# Patient Record
Sex: FEMALE | Race: WHITE
[De-identification: names, ages, dates, MRNs, and addresses within clinical notes are randomized per-mention and may not be internally consistent; named-entity substitution may affect disease eponyms.]

---

## 2019-11-01 ENCOUNTER — HOSPITAL ENCOUNTER (OUTPATIENT)
Dept: HOSPITAL 46 - ED | Age: 19
Setting detail: OBSERVATION
LOS: 1 days | Discharge: HOME | End: 2019-11-02
Attending: INTERNAL MEDICINE | Admitting: INTERNAL MEDICINE
Payer: COMMERCIAL

## 2019-11-01 VITALS — HEIGHT: 66 IN | WEIGHT: 222.01 LBS | BODY MASS INDEX: 35.68 KG/M2

## 2019-11-01 DIAGNOSIS — A08.4: ICD-10-CM

## 2019-11-01 DIAGNOSIS — I10: ICD-10-CM

## 2019-11-01 DIAGNOSIS — E86.0: ICD-10-CM

## 2019-11-01 DIAGNOSIS — Z88.2: ICD-10-CM

## 2019-11-01 DIAGNOSIS — Z94.4: ICD-10-CM

## 2019-11-01 DIAGNOSIS — N17.9: Primary | ICD-10-CM

## 2019-11-01 DIAGNOSIS — Z79.899: ICD-10-CM

## 2019-11-01 PROCEDURE — G0378 HOSPITAL OBSERVATION PER HR: HCPCS

## 2019-11-01 NOTE — XMS
Clinical Summary
  Created on: 2019
 
 BonifacioNila
 External Reference #: [id number]
 : [birth year]
 Sex: Female
 
 Demographics
 
 
+-----------------------+------------------------+
| Address               | [address]            |
|                       | [place]  [address]   |
+-----------------------+------------------------+
| Home Phone            | [phone]        |
+-----------------------+------------------------+
| Preferred Language    | Unknown                |
+-----------------------+------------------------+
| Marital Status        | Single                 |
+-----------------------+------------------------+
| Hindu Affiliation | Unknown                |
+-----------------------+------------------------+
| Race                  | White                  |
+-----------------------+------------------------+
| Ethnic Group          | Not  or  |
+-----------------------+------------------------+
 
 
 Author
 
 
+--------------+---------------------+
| Author       | [facility] |
+--------------+---------------------+
| Organization | [facility] |
+--------------+---------------------+
| Address      | Unknown             |
+--------------+---------------------+
| Phone        | Unavailable         |
+--------------+---------------------+
 
 
 
 Support
 
 
+-----------------+--------------+---------+-----------------+
| Name            | Relationship | Address | Phone           |
+-----------------+--------------+---------+-----------------+
| Kit Mathew   | ECON         | Unknown | [phone] |
+-----------------+--------------+---------+-----------------+
| Magdalena Mathew | ECON         | Unknown | [phone] |
+-----------------+--------------+---------+-----------------+
 
 
 
 Care Team Providers
 
 
 
+------------------------+------+-----------------+
| Care Team Member Name  | Role | Phone           |
+------------------------+------+-----------------+
| Lily Horton MD | PCP  | [phone] |
+------------------------+------+-----------------+
 
 
 
 Source Comments
 [facility]LAKO is fully live on both EpicCare Ambulatory and EpicCare InPatient.[facility]
 
 Allergies
 
 
+---------------------+-----------------+----------+----------+----------------------+
| Active Allergy      | Reactions       | Severity | Noted    | Comments             |
|                     |                 |          | Date     |                      |
+---------------------+-----------------+----------+----------+----------------------+
| Sulfa (Sulfonamide  | Hives,          | High     | 20 |                      |
| Antibiotics)        | Swelling-Facial |          | 09       |                      |
+---------------------+-----------------+----------+----------+----------------------+
| Varicella Vaccines  |                 | Low      | 20 |   Possible reaction  |
|                     |                 |          | 09       | with sulfa meds      |
+---------------------+-----------------+----------+----------+----------------------+
 
 
 
 Medications
 
 
+--------------------+---------------------+-----------+---------+------+------+-------+
| Medication         | Sig                 | Dispensed | Refills | Star | End  | Statu |
|                    |                     |           |         | t    | Date | s     |
|                    |                     |           |         | Date |      |       |
+--------------------+---------------------+-----------+---------+------+------+-------+
|   amLODIPine 5 mg  | Take 5 mg by mouth  |           | 0       |      |      | Activ |
| oral tablet        | once daily.         |           |         |      |      | e     |
+--------------------+---------------------+-----------+---------+------+------+-------+
 
 
 
 Active Problems
 
 
+-------------------------------------------------+------------+
| Problem                                         | Noted Date |
+-------------------------------------------------+------------+
| Polysplenia                                     | 2014 |
+-------------------------------------------------+------------+
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
+-------------------------------------------------+------------+
| Aortic insufficiency                            | 2014 |
+-------------------------------------------------+------------+
| Aortic root dilatation                          | 2014 |
+-------------------------------------------------+------------+
| VSD (ventricular septal defect), perimembranous | 2008 |
+-------------------------------------------------+------------+
 
 
 
 
+-----------------------------------------------------------------+
|   Overview:   10/16/2014  S/p 8-mm Amplatzer Vascular Plug IV,  |
| with no significant residual shuntingLeft atrial isomerism LAE  |
| (left atrial enlargement) LVE (left ventricular enlargement)    |
|LVE (left ventricular enlargement)                               |
+-----------------------------------------------------------------+
 
 
 
+--------------------+------------+
| Transplanted liver | 10/12/2006 |
+--------------------+------------+
 
 
 
+---------------------------------------------------------+
|   Overview:   For biliary atresia, 2000 at [facility] |
+---------------------------------------------------------+
 
 
 
 Resolved Problems
 
 
+----------------------------+----------+-----------+
| Problem                    | Noted    | Resolved  |
|                            | Date     | Date      |
+----------------------------+----------+-----------+
| Immunosuppressed status    | 20 |  |
|                            | 14       | 5         |
+----------------------------+----------+-----------+
| Aortic valve insufficiency | 20 |  |
|                            | 08       | 4         |
+----------------------------+----------+-----------+
 
 
 
 Social History
 
 
+-------------------+-------+-----------+--------+------+
| Tobacco Use       | Types | Packs/Day | Years  | Date |
|                   |       |           | Used   |      |
+-------------------+-------+-----------+--------+------+
| Passive Smoke     |       |           |        |      |
| Exposure - Never  |       |           |        |      |
| Smoker            |       |           |        |      |
+-------------------+-------+-----------+--------+------+
 
 
 
+---------------------+---+---+---+
| Smokeless Tobacco:  |   |   |   |
| Never Used          |   |   |   |
+---------------------+---+---+---+
 
 
 
 
+-------------+-------------+---------+----------+
| Alcohol Use | Drinks/Week | oz/Week | Comments |
+-------------+-------------+---------+----------+
| Not Asked   |             |         |          |
+-------------+-------------+---------+----------+
 
 
 
+------------------+---------------+
| Sex Assigned at  | Date Recorded |
| Birth            |               |
+------------------+---------------+
| Not on file      |               |
+------------------+---------------+
 
 
 
+----------------+-------------+-------------+
| Job Start Date | Occupation  | Industry    |
+----------------+-------------+-------------+
| Not on file    | Not on file | Not on file |
+----------------+-------------+-------------+
 
 
 
+----------------+--------------+------------+
| Travel History | Travel Start | Travel End |
+----------------+--------------+------------+
 
 
 
+-------------------------------------+
| No recent travel history available. |
+-------------------------------------+
 
 
 
 Last Filed Vital Signs
 
 
+-------------------+----------------------+----------------------+----------------------+
| Vital Sign        | Reading              | Time Taken           | Comments             |
+-------------------+----------------------+----------------------+----------------------+
| Blood Pressure    | 130/93               | 2016  9:24 AM  | right arm adult cuff |
|                   |                      | PDT                  |                      |
+-------------------+----------------------+----------------------+----------------------+
| Pulse             | 72                   | 2016  9:24 AM  |                      |
|                   |                      | PDT                  |                      |
+-------------------+----------------------+----------------------+----------------------+
| Temperature       | 36.7   C (98.1   F)  | 2016  8:59 AM  |                      |
|                   |                      | PDT                  |                      |
+-------------------+----------------------+----------------------+----------------------+
| Respiratory Rate  | -                    | -                    |                      |
+-------------------+----------------------+----------------------+----------------------+
| Oxygen Saturation | -                    | -                    |                      |
+-------------------+----------------------+----------------------+----------------------+
| Inhaled Oxygen    | -                    | -                    |                      |
| Concentration     |                      |                      |                      |
+-------------------+----------------------+----------------------+----------------------+
 
| Weight            | 81.5 kg (179 lb 10.8 | 2016  8:59 AM  |                      |
|                   |  oz)                 | PDT                  |                      |
+-------------------+----------------------+----------------------+----------------------+
| Height            | 164.4 cm (5' 4.72")  | 2016  8:59 AM  |                      |
|                   |                      | PDT                  |                      |
+-------------------+----------------------+----------------------+----------------------+
| Body Mass Index   | 30.16                | 2016  8:59 AM  |                      |
|                   |                      | PDT                  |                      |
+-------------------+----------------------+----------------------+----------------------+
 
 
 
 Plan of Treatment
 
 
+----------------------+-----------+-----------+----------+
| Health Maintenance   | Due Date  | Last Done | Comments |
+----------------------+-----------+-----------+----------+
| Pneumococcal         | 01/15/200 |           |          |
| vaccination (1 of 3  | 5         |           |          |
| - PCV13)             |           |           |          |
+----------------------+-----------+-----------+----------+
| Influenza (Flu)      | 10/01/201 |           |          |
| vaccination (#1)     | 9         |           |          |
+----------------------+-----------+-----------+----------+
 
 
 
 Results
 Not on filefrom Last 3 Months
 
 Insurance
 
 
+--------------+--------+-------------+--------+-------+---------+--------+
| Payer        | Benefi | Subscriber  | Effect | Phone | Address | Type   |
|              | t Plan | ID          | david    |       |         |        |
|              |  /     |             | Dates  |       |         |        |
|              | Group  |             |        |       |         |        |
+--------------+--------+-------------+--------+-------+---------+--------+
| [employer] MEDICA[employer] | [employer]    | [id number]    |  |       |         | Medica |
|              | [place] |             | 015-Pr |       |         | id     |
|              | [place]   |             | esent  |       |         |        |
+--------------+--------+-------------+--------+-------+---------+--------+
 
 
 
+----------------+--------+-------------+--------+-------------+---------------------+
| Guarantor Name | Accoun | Relation to | Date   | Phone       | Billing Address     |
|                | t Type |  Patient    | of     |             |                     |
|                |        |             | Birth  |             |                     |
+----------------+--------+-------------+--------+-------------+---------------------+
| KIT MATHEW  | Person | Father      | / |             |   [address]       |
|                | al/Fam |             | 1979   | [phone] | [place] [id number] |
|                | lenny    |             |        | [address] (Home)    |                     |
+----------------+--------+-------------+--------+-------------+---------------------+
 
 
 
 Advance Directives
 
 
 
+-----------------+---------------+----------------+-------------+
| Type            | Date Recorded | Patient        | Explanation |
|                 |               | Representative |             |
+-----------------+---------------+----------------+-------------+
| Advance         |               |                |             |
| Directives and  |               |                |             |
| Living Will     |               |                |             |
+-----------------+---------------+----------------+-------------+
| Power of        |               |                |             |
|         |               |                |             |
+-----------------+---------------+----------------+-------------+

## 2019-11-01 NOTE — XMS
Encounter Summary
  Created on: 2019
 
 Bonifacio Nila JB
 External Reference #: [id number]
 : [birth year]
 Sex: Female
 
 Demographics
 
 
+-----------------------+------------------------+
| Address               | [address]            |
|                       | [place]  [address]   |
+-----------------------+------------------------+
| Home Phone            | [phone]        |
+-----------------------+------------------------+
| Preferred Language    | Unknown                |
+-----------------------+------------------------+
| Marital Status        | Single                 |
+-----------------------+------------------------+
| Confucianist Affiliation | Unknown                |
+-----------------------+------------------------+
| Race                  | White                  |
+-----------------------+------------------------+
| Ethnic Group          | Not  or  |
+-----------------------+------------------------+
 
 
 Author
 
 
+--------------+------------------------------+
| Author       | [facility] |
+--------------+------------------------------+
| Organization | [facility] |
+--------------+------------------------------+
| Address      | Unknown                      |
+--------------+------------------------------+
| Phone        | Unavailable                  |
+--------------+------------------------------+
 
 
 
 Support
 
 
+-----------------+--------------+---------+-----------------+
| Name            | Relationship | Address | Phone           |
+-----------------+--------------+---------+-----------------+
| Kit Valdovinos   | ECON         | Unknown | [phone] |
+-----------------+--------------+---------+-----------------+
| Magdalena Valdovinos | ECON         | Unknown | [phone] |
+-----------------+--------------+---------+-----------------+
 
 
 
 Care Team Providers
 
 
 
+------------------------+------+-----------------+
| Care Team Member Name  | Role | Phone           |
+------------------------+------+-----------------+
| Lliy Horton MD | PCP  | [phone] |
+------------------------+------+-----------------+
 
 
 
 Reason for Visit
 
 
+----------+------------------------------+
| Reason   | Comments                     |
+----------+------------------------------+
| Lab Draw | past due for transplant labs |
+----------+------------------------------+
 
 
 
 Encounter Details
 
 
+--------+-----------+----------------------+----------------------+----------------------+
| Date   | Type      | Department           | Care Team            | Description          |
+--------+-----------+----------------------+----------------------+----------------------+
| / | Telephone |   Pediatric          |   Neema Khalil,   | Lab Draw (past due   |
| 2015   |           | Gastroenterology at  | MD  [address] | for transplant labs) |
|        |           | [place]          |   [place], [place]       |                      |
|        |           | [facility]  | [address]           |                      |
|        |           |  [address] | [phone]         |                      |
|        |           |  [address]  Mailcode:  | [phone] (Fax)   |                      |
|        |           | [facility]   |                      |                      |
|        |           | [place], [place]         |                      |                      |
|        |           | [address]           |                      |                      |
|        |           | [phone]         |                      |                      |
+--------+-----------+----------------------+----------------------+----------------------+
 
 
 
 Social History
 
 
+-------------------+-------+-----------+--------+------+
| Tobacco Use       | Types | Packs/Day | Years  | Date |
|                   |       |           | Used   |      |
+-------------------+-------+-----------+--------+------+
| Passive Smoke     |       |           |        |      |
| Exposure - Never  |       |           |        |      |
| Smoker            |       |           |        |      |
+-------------------+-------+-----------+--------+------+
 
 
 
+---------------------+---+---+---+
| Smokeless Tobacco:  |   |   |   |
| Never Used          |   |   |   |
+---------------------+---+---+---+
 
 
 
 
+-------------+-------------+---------+----------+
| Alcohol Use | Drinks/Week | oz/Week | Comments |
+-------------+-------------+---------+----------+
| Not Asked   |             |         |          |
+-------------+-------------+---------+----------+
 
 
 
+------------------+---------------+
| Sex Assigned at  | Date Recorded |
| Birth            |               |
+------------------+---------------+
| Not on file      |               |
+------------------+---------------+
 
 
 
+----------------+-------------+-------------+
| Job Start Date | Occupation  | Industry    |
+----------------+-------------+-------------+
| Not on file    | Not on file | Not on file |
+----------------+-------------+-------------+
 
 
 
+----------------+--------------+------------+
| Travel History | Travel Start | Travel End |
+----------------+--------------+------------+
 
 
 
+-------------------------------------+
| No recent travel history available. |
+-------------------------------------+
 documented as of this encounter
 
 Plan of Treatment
 Not on filedocumented as of this encounter
 
 Visit Diagnoses
 Not on filedocumented in this encounter

## 2019-11-01 NOTE — XMS
Encounter Summary
  Created on: 2019
 
 Bonifacio Nila JB
 External Reference #: [id number]
 : [birth year]
 Sex: Female
 
 Demographics
 
 
+-----------------------+------------------------+
| Address               | [address]            |
|                       | [place]  [address]   |
+-----------------------+------------------------+
| Home Phone            | [phone]        |
+-----------------------+------------------------+
| Preferred Language    | Unknown                |
+-----------------------+------------------------+
| Marital Status        | Single                 |
+-----------------------+------------------------+
| Jewish Affiliation | Unknown                |
+-----------------------+------------------------+
| Race                  | White                  |
+-----------------------+------------------------+
| Ethnic Group          | Not  or  |
+-----------------------+------------------------+
 
 
 Author
 
 
+--------------+------------------------------+
| Author       | [facility] |
+--------------+------------------------------+
| Organization | [facility] |
+--------------+------------------------------+
| Address      | Unknown                      |
+--------------+------------------------------+
| Phone        | Unavailable                  |
+--------------+------------------------------+
 
 
 
 Support
 
 
+-----------------+--------------+---------+-----------------+
| Name            | Relationship | Address | Phone           |
+-----------------+--------------+---------+-----------------+
| Kit Valdovinos   | ECON         | Unknown | [phone] |
+-----------------+--------------+---------+-----------------+
| Magdalena Valdovinos | ECON         | Unknown | [phone] |
+-----------------+--------------+---------+-----------------+
 
 
 
 Care Team Providers
 
 
 
+------------------------+------+-----------------+
| Care Team Member Name  | Role | Phone           |
+------------------------+------+-----------------+
| Lily Horton MD | PCP  | [phone] |
+------------------------+------+-----------------+
 
 
 
 Reason for Visit
 
 
+-------------------+----------+
| Reason            | Comments |
+-------------------+----------+
| Follow-up in      |          |
| outpatient clinic |          |
+-------------------+----------+
 Office Visit - E/M Services (Routine)
 
+--------+--------------+---------------+--------------+--------------+---------------+
| Status | Reason       | Specialty     | Diagnoses /  | Referred By  | Referred To   |
|        |              |               | Procedures   | Contact      | Contact       |
+--------+--------------+---------------+--------------+--------------+---------------+
| Closed |   Specialty  | Pediatric     |              |   Non[facility]   |   Remi,     |
|        | Services     | Gastroenterol |              | Epic Dept    | MD Neema   |
|        | Required     | ogy           |              |              | [address] |
|        |              |               |              |              |  Rd           |
|        |              |               |              |              | [place], [place]  |
|        |              |               |              |              | [address]    |
|        |              |               |              |              | Phone:        |
|        |              |               |              |              | [phone]  |
|        |              |               |              |              |  Fax:         |
|        |              |               |              |              | [phone]  |
+--------+--------------+---------------+--------------+--------------+---------------+
 
 
 
 
 Encounter Details
 
 
+--------+---------+----------------------+----------------------+----------------------+
| Date   | Type    | Department           | Care Team            | Description          |
+--------+---------+----------------------+----------------------+----------------------+
| / | Office  |   Pediatric          |   Neema Khalil,   | Transplanted liver   |
| 2014   | Visit   | Gastroenterology at  | MD  [address] | (HCC) (Primary Dx);  |
|        |         | [place]          |   [place], [place]       | Immunosuppressed     |
|        |         | [facility]  | [address]           | status (HCC); High   |
|        |         |  [address] | [phone]         | risk medications     |
|        |         |  Naresh Ward  Mailcode:  | [phone] (Fax)   | (not anticoagulants) |
|        |         | [facility]  [facility]   |                      |  long-term use; VSD  |
|        |         | [place], [place]         |                      | (ventricular septal  |
|        |         | [address]           |                      | defect),             |
|        |         | [phone]         |                      | perimembranous;      |
|        |         |                      |                      | Polysplenia; Aortic  |
|        |         |                      |                      | insufficiency;       |
|        |         |                      |                      | Aortic root          |
|        |         |                      |                      | dilatation (HCC);    |
 
|        |         |                      |                      | Interrupted inferior |
|        |         |                      |                      |  vena cava           |
+--------+---------+----------------------+----------------------+----------------------+
 
 
 
 Social History
 
 
+-------------------+-------+-----------+--------+------+
| Tobacco Use       | Types | Packs/Day | Years  | Date |
|                   |       |           | Used   |      |
+-------------------+-------+-----------+--------+------+
| Passive Smoke     |       |           |        |      |
| Exposure - Never  |       |           |        |      |
| Smoker            |       |           |        |      |
+-------------------+-------+-----------+--------+------+
 
 
 
+---------------------+---+---+---+
| Smokeless Tobacco:  |   |   |   |
| Never Used          |   |   |   |
+---------------------+---+---+---+
 
 
 
+-------------+-------------+---------+----------+
| Alcohol Use | Drinks/Week | oz/Week | Comments |
+-------------+-------------+---------+----------+
| Not Asked   |             |         |          |
+-------------+-------------+---------+----------+
 
 
 
+------------------+---------------+
| Sex Assigned at  | Date Recorded |
| Birth            |               |
+------------------+---------------+
| Not on file      |               |
+------------------+---------------+
 
 
 
+----------------+-------------+-------------+
| Job Start Date | Occupation  | Industry    |
+----------------+-------------+-------------+
| Not on file    | Not on file | Not on file |
+----------------+-------------+-------------+
 
 
 
+----------------+--------------+------------+
| Travel History | Travel Start | Travel End |
+----------------+--------------+------------+
 
 
 
+-------------------------------------+
| No recent travel history available. |
 
+-------------------------------------+
 documented as of this encounter
 
 Last Filed Vital Signs
 
 
+-------------------+---------------------+----------------------+----------+
| Vital Sign        | Reading             | Time Taken           | Comments |
+-------------------+---------------------+----------------------+----------+
| Blood Pressure    | 137/95              | 2014  3:52 PM  |          |
|                   |                     | PST                  |          |
+-------------------+---------------------+----------------------+----------+
| Pulse             | 74                  | 2014  3:52 PM  |          |
|                   |                     | PST                  |          |
+-------------------+---------------------+----------------------+----------+
| Temperature       | -                   | -                    |          |
+-------------------+---------------------+----------------------+----------+
| Respiratory Rate  | -                   | -                    |          |
+-------------------+---------------------+----------------------+----------+
| Oxygen Saturation | -                   | -                    |          |
+-------------------+---------------------+----------------------+----------+
| Inhaled Oxygen    | -                   | -                    |          |
| Concentration     |                     |                      |          |
+-------------------+---------------------+----------------------+----------+
| Weight            | 72 kg (158 lb 11.7  | 2014  3:52 PM  |          |
|                   | oz)                 | PST                  |          |
+-------------------+---------------------+----------------------+----------+
| Height            | 163.8 cm (5' 4.49") | 2014  3:52 PM  |          |
|                   |                     | PST                  |          |
+-------------------+---------------------+----------------------+----------+
| Body Mass Index   | 26.84               | 2014  3:52 PM  |          |
|                   |                     | PST                  |          |
+-------------------+---------------------+----------------------+----------+
 documented in this encounter
 
 Patient Instructions
 Patient Instructions Neema Khalil MD - 2014 10:38 AM PSTPlan: 
 
 - Target prograf level: Not established
 
 - Blood draw for labs: every  3  months
 
 - Change in medicines: None
 
 - Next appointment: 2015
 
 It was nice to see you today ! Thank you for choosing Pediatric Hepatology Clinic at [facility]
 
 Neema Khalil MD
 
 Results of tests:  
 Test results will be sent to you by [employer]. 
 
 Calls:
 Medical emergencies:  call 911
 Non-urgent issues:  Send [employer] message
 Monday - Friday work hours: call  [phone] # [phone]
 After hours, weekends, holidays: call [phone]
 To schedule an appointment: call [phone] # [phone]
 
 For refills: call to your pharmacy a week before running out medicine
 
 Electronically signed by Neema Khalil MD at 2014  4:20 PM PST
 documented in this encounter
 
 Progress Notes
 Neema Khalil MD - 2014 10:38 AM PSTFormatting of this note might be different fro
m the original.
 Primary Care Provider: Lily Horton MD
 
 [facility] Trans[facility] 
 DOS: 2014
 Visit type: Follow-up 
 
 Patient accompanied by: mother
  used: no
 
 Chief Complaint/Reason for visit: 
 - Post-liver transplant care 
 - High risk medication management
 
 Patient Active Problem List 
 Diagnosis 
   Transplanted Liver 
   VSD (Ventricular Septal Defect) 
   Aortic Valve Insufficiency 
 
 PAST Hx/kim: Biliary atresia
 -  s/p OLT at [facility]. She was last seen by Dr Burkett in . 
In , it was noticed that she has not been seen, attempts to reach to family was unsucces
sful. lost to follow up since that time. 
 - Cardiology:  last cardiology note is from  from Dr Lily Horton, cardiologist at [facility]. Her cardiac dx includes 1) moderate perimembraneous VSD nearly occluded by aneurysma
l tissue leaving a tiny VSD, 2) trace central aortic insufficiency, 3) Left atrial isomerism
 (polysplenia). It was recommended her annual follow up for progression of aortic insufficie
ncy in which case she may need closure of VSD. No further notes available in our system as s
he is lost to follow up since .
 - 10/16/2014 s/p VSD repair, 8-mm Amplatzer Vascular Plug IV with no significant residual s
hunting noted.
 
 INTERVAL HISTORY:
 - s/p VSD closure in mid-October. Will receive abx ppx 5 more months and aspirin x 5 mo
 - did not receive flu shot, mother says they don't do flu shots
 - doing well after VSD closure
 - no fever, abdominal pain, nausea, emesis, diarrhea
 
 REVIEW OF SYSTEMS: 
 General:  No fever, fatigue, or weight loss.  
 Eyes:  No changes in vision, no eye irritation or discharge.  
 ENT:  No nosebleeds, nasal congestion, difficulty swallowing, no mouth sores. 
 Respiratory:  No shortness of breath, cough, wheezing.
 Cardiovascular:  No difficulty breathing, edema, cyanosis.  
 Genitourinary:  No urinary infections.  
 Neurologic:  No seizures. No headaches.  
 Musculoskeletal:  No joint pain, swelling. No back pain.  Full range of motion.
 Skin:  No itching, rash, jaundice.  
 Heme: No anemia, abnormal bleeding, easy bruising
 Lymphatic: No enlarged lymph nodes.
 Metabolic/Endo: no glucose intolerance, hypothyroidism
 Allergy/immunology: no seasonal or food allergies, no asthma
 
 
 All other ROS are negative.
 
  
 Current Outpatient Prescriptions 
 Medication Sig 
   PROGRAF 1 mg Oral Capsule Take 1 Cap by mouth two times daily. 
 
 Aspirin daily
 
 Allergies 
 Allergen Reactions 
   Sulfa (Sulfonamide Antibiotics) Hives and Swelling-Facial 
   Varicella Vaccines  
   Possible reaction with sulfa meds 
 
 PHYSICAL EXAMINATION: 
 
 Patient reports a pain level of 0  today. No action required.
 
 Ht 163.8 cm (5' 4.49") (58%, Z = 0.21), Wt 72 kg (158 lb 11.7 oz) (92%, Z = 1.38), /9
5, Pulse 74, BMI 26.84 kg/(m^2).
 
 APPEARANCE: alert, active and in no apparent distress. 
 SKIN: no jaundice or rashes. 
 HEENT: normocephalic, PERRLA, mucous membranes moist. 
 NECK: supple, without thyromegaly. 
 CHEST: clear to auscultation bilaterally. 
 CV: no murmurs. 
 ABDOMEN: soft, NTND, no hepatosplenomegaly. 
 BACK: without tenderness. 
 MUSCULOSKELETAL: no muscle wasting, no deformity.
 EXTREMITIES: No edema, clubbing, deformity.
 NEURO: grossly intact
 
  
 Labs/imaging: as below
 
 ASSESSMENT:  14 yo female with
 
 V42.7   Transplanted liver: normal LFTs, GGT
 279.9   Immunosuppressed status: no signs of infection
 V58.69  High risk medications (not anticoagulants) long-term use
 745.4   VSD (ventricular septal defect), perimembranous: s/p closure
 759.0   Polysplenia
 424.1   Aortic insufficiency
 447.71  Aortic root dilatation
 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
 
 PLAN/RECOMMENDATIONS:
 1) Goal prograf level: not established
 
 2) Meds to continue without change: prograf 1 mg bid
 
 4) Labs:
  - CBC w diff, CMP, GGT, magnesium, trough tacrolimus level, quantitative EBC and CMV PCR: 
every 3 months
  
 5) follow up in liver transplant clinic: in 2015, then annually
 
 
 Health Care Maintenance: 
 - No live vaccines for immunosuppressed patients  
 - Flu vaccine (intramuscular): every . 
  2 shots the first year after transplant (regardless of age)
  If late in the season, can be given next season
 - Dental care every 6 months
 
 At this visit: 
 Dr. Nazario and DHARMESH Francois from [facility] were presen
t as observers during this visit.
 Psychosocial or economic issues that may affect patient's medical care or well being:none 
 Co-morbid chronic medical problems that may affect procedural sedation risk: N/A 
 
 Labs/imaging:
 
 Component
     Latest Ref Rng 2014 
 GLUCOSE
     60-99 mg/dL 81 
 BUN  6-20 mg/dL 13 
 CREATININE     0.46-0.81 mg/dL 0.76 
 SODIUM 136-145 mmol/L 139 
 POTASSIUM
     3.4-5.0 mmol/L 3.9 
 CHLORIDE     mmol/L 105 
 TOTAL CO2
     21-32 mmol/L 30 
 CALCIUM    8.6-10.2 mg/dL 9.4 
 BILIRUBIN TOTAL
     0.3-1.2 mg/dL 0.2 (L) 
 TOTAL PROTEIN
     6.2-8.5 g/dL 7.6 
 ALBUMIN
     3.5-4.7 g/dL 3.7 
 ALK PHOS
      U/L 101 
 AST(SGOT)
     18-36 U/L 15 (L) 
 ALT (SGPT)
     12-60 U/L 18 
 ANION GAP(ALB CORRECTED)
     4-11 mmol/L 4 
 ANION GAP     4 
 BILIRUBIN DIRECT
     0.0-0.3 mg/dL <0.1 
 LD TOTAL, PLASMA
     175-285 U/L 154 (L) 
 MAGNESIUM    1.8-2.5 mg/dL 1.6 (L) 
 PHOSPHORUS
     2.4-4.7 mg/dL 3.3 
 URIC ACID    2.5-6.2 mg/dL 3.7 
 CHOLESTEROL 
     <200 mg/dL 136 
 TACROLIMUS ()  5.0-15.0 ng/mL <2.0 (L) 
 
 Neema Khalil MD
 Pediatric Gastroenterology
 Consult line: [phone] 
 
 Electronically signed by Neema Khalil MD at 2014  9:34 PM PSTdocumented in this en
 
counter
 
 Plan of Treatment
 Not on filedocumented as of this encounter
 
 Results
 TACROLIMUS, WHOLE BLOOD (2014  5:27 PM PST)
 
+-------------+----------+-------------+-------------+--------------+
| Component   | Value    | Ref Range   | Performed   | Pathologist  |
|             |          |             | At          | Signature    |
+-------------+----------+-------------+-------------+--------------+
| TACROLIMUS  | <2.0 (L) | 5.0 - 15.0  | [facility]        |              |
| ()    |          | ng/mL       | LABORATORY  |              |
|             |          |             | SERVICES,   |              |
|             |          |             | SPECIAL IMM |              |
|             |          |             |  + COAG     |              |
+-------------+----------+-------------+-------------+--------------+
 
 
 
+---------------+
| Specimen      |
+---------------+
| Blood - Blood |
+---------------+
 
 
 
+------------------------------------------------------------------------+----------------+
| Narrative                                                              | Performed At   |
+------------------------------------------------------------------------+----------------+
|         Therapeutic range is based on a whole blood specimen drawn 12  |   [facility]         |
| hours post-dose or prior to next dose (the trough). Some other factors | LABORATORY     |
|  influencing therapeutic range, dose administered, and result          | SERVICES,      |
| interpretation include time since transplantation, the organ           | SPECIAL IMM +  |
| transplanted, co-administration of other immunosuppressants and        | COAG           |
| interaction with other drugs that may increase or decrease Tacrolimus  |                |
| concentrations.                                                        |                |
+------------------------------------------------------------------------+----------------+
 
 
 
+--------------------+------------------------+--------------------+--------------+
| Performing         | Address                | City/State/Zipcode | Phone Number |
| Organization       |                        |                    |              |
+--------------------+------------------------+--------------------+--------------+
|   [facility]  |   [address]  | [place] [address] |              |
| SERVICES, SPECIAL  | [address]                |                    |              |
| IMM + COAG         |                        |                    |              |
+--------------------+------------------------+--------------------+--------------+
 CHOLESTEROL TOTAL, PLASMA (2014  5:27 PM PST)
 
+-------------+-------+------------+-------------+--------------+
| Component   | Value | Ref Range  | Performed   | Pathologist  |
|             |       |            | At          | Signature    |
+-------------+-------+------------+-------------+--------------+
| CHOLESTEROL | 136   | <200 mg/dL | [facility]        |              |
|   (LAB)     |       |            | LABORATORY  |              |
|             |       |            | SERVICES,   |              |
 
|             |       |            | CORE        |              |
+-------------+-------+------------+-------------+--------------+
 
 
 
+---------------+
| Specimen      |
+---------------+
| Blood - Blood |
+---------------+
 
 
 
+--------------------+------------------------+--------------------+--------------+
| Performing         | Address                | City/State/Zipcode | Phone Number |
| Organization       |                        |                    |              |
+--------------------+------------------------+--------------------+--------------+
|   [facility]  |   [address]  | [place] [address] |              |
| SERVICES, CORE     | [address]                |                    |              |
+--------------------+------------------------+--------------------+--------------+
 LDH TOTAL, PLASMA (2014  5:27 PM PST)
 
+------------+---------+---------------+-------------+--------------+
| Component  | Value   | Ref Range     | Performed   | Pathologist  |
|            |         |               | At          | Signature    |
+------------+---------+---------------+-------------+--------------+
| LD TOTAL,  | 154 (L) | 175 - 285 U/L | [facility]        |              |
| PLASMA     |         |               | LABORATORY  |              |
|            |         |               | SERVICES,   |              |
|            |         |               | CORE        |              |
+------------+---------+---------------+-------------+--------------+
| LD CMNT    | No Hemo |               | [facility]        |              |
|            |         |               | LABORATORY  |              |
|            |         |               | SERVICES,   |              |
|            |         |               | CORE        |              |
+------------+---------+---------------+-------------+--------------+
 
 
 
+---------------+
| Specimen      |
+---------------+
| Blood - Blood |
+---------------+
 
 
 
+-----------------------------------------------------------------------+----------------+
| Narrative                                                             | Performed At   |
+-----------------------------------------------------------------------+----------------+
|      New reference range effective 2013 for LD Total performed  |   [facility]         |
| in Core Lab only.                                                     | LABORATORY     |
|                                                                       | SERVICES, CORE |
+-----------------------------------------------------------------------+----------------+
 
 
 
+--------------------+------------------------+--------------------+--------------+
| Performing         | Address                | City/State/Zipcode | Phone Number |
| Organization       |                        |                    |              |
 
+--------------------+------------------------+--------------------+--------------+
|   [facility]  |   [address]  | [place] [address] |              |
| SERVICES, CORE     | [address]                |                    |              |
+--------------------+------------------------+--------------------+--------------+
 BILIRUBIN DIRECT (2014  5:27 PM PST)
 
+-------------+---------+-----------------+-------------+--------------+
| Component   | Value   | Ref Range       | Performed   | Pathologist  |
|             |         |                 | At          | Signature    |
+-------------+---------+-----------------+-------------+--------------+
| BILIRUBIN   | <0.1    | 0.0 - 0.3 mg/dL | [facility]        |              |
| DIRECT      |         |                 | LABORATORY  |              |
|             |         |                 | SERVICES,   |              |
|             |         |                 | CORE        |              |
+-------------+---------+-----------------+-------------+--------------+
| BILI D CMNT | No Hemo |                 | [facility]        |              |
|             |         |                 | LABORATORY  |              |
|             |         |                 | SERVICES,   |              |
|             |         |                 | CORE        |              |
+-------------+---------+-----------------+-------------+--------------+
 
 
 
+---------------+
| Specimen      |
+---------------+
| Blood - Blood |
+---------------+
 
 
 
+--------------------+------------------------+--------------------+--------------+
| Performing         | Address                | City/State/Zipcode | Phone Number |
| Organization       |                        |                    |              |
+--------------------+------------------------+--------------------+--------------+
|   [facility]  |   [address]  | [place] [address] |              |
| SERVICES, CORE     | [address]                |                    |              |
+--------------------+------------------------+--------------------+--------------+
 URIC ACID, PLASMA (2014  5:27 PM PST)
 
+-------------+-------+-----------------+-------------+--------------+
| Component   | Value | Ref Range       | Performed   | Pathologist  |
|             |       |                 | At          | Signature    |
+-------------+-------+-----------------+-------------+--------------+
| URIC ACID,  | 3.7   | 2.5 - 6.2 mg/dL | [facility]        |              |
| PLASMA      |       |                 | LABORATORY  |              |
| (LAB)       |       |                 | SERVICES,   |              |
|             |       |                 | CORE        |              |
+-------------+-------+-----------------+-------------+--------------+
 
 
 
+---------------+
| Specimen      |
+---------------+
| Blood - Blood |
+---------------+
 
 
 
 
+--------------------+------------------------+--------------------+--------------+
| Performing         | Address                | City/State/Zipcode | Phone Number |
| Organization       |                        |                    |              |
+--------------------+------------------------+--------------------+--------------+
|   [facility]  |   [address]  | [place] [address] |              |
| SERVICES, CORE     | [address]                |                    |              |
+--------------------+------------------------+--------------------+--------------+
 PHOSPHORUS, PLASMA (2014  5:27 PM PST)
 
+-------------+-------+-----------------+-------------+--------------+
| Component   | Value | Ref Range       | Performed   | Pathologist  |
|             |       |                 | At          | Signature    |
+-------------+-------+-----------------+-------------+--------------+
| PHOSPHORUS, | 3.3   | 2.4 - 4.7 mg/dL | [facility]        |              |
|  PLASMA     |       |                 | LABORATORY  |              |
| (LAB)       |       |                 | SERVICES,   |              |
|             |       |                 | CORE        |              |
+-------------+-------+-----------------+-------------+--------------+
 
 
 
+---------------+
| Specimen      |
+---------------+
| Blood - Blood |
+---------------+
 
 
 
+--------------------+------------------------+--------------------+--------------+
| Performing         | Address                | City/State/Zipcode | Phone Number |
| Organization       |                        |                    |              |
+--------------------+------------------------+--------------------+--------------+
|   [facility]  |   [address]  | [place] [address] |              |
| SERVICES, CORE     | [address]                |                    |              |
+--------------------+------------------------+--------------------+--------------+
 MAGNESIUM, PLASMA (2014  5:27 PM PST)
 
+-------------+---------+-----------------+-------------+--------------+
| Component   | Value   | Ref Range       | Performed   | Pathologist  |
|             |         |                 | At          | Signature    |
+-------------+---------+-----------------+-------------+--------------+
| MAGNESIUM,P | 1.6 (L) | 1.8 - 2.5 mg/dL | [facility]        |              |
| LASMA       |         |                 | LABORATORY  |              |
|             |         |                 | SERVICES,   |              |
|             |         |                 | CORE        |              |
+-------------+---------+-----------------+-------------+--------------+
 
 
 
+---------------+
| Specimen      |
+---------------+
| Blood - Blood |
+---------------+
 
 
 
+--------------------+------------------------+--------------------+--------------+
| Performing         | Address                | City/State/Zipcode | Phone Number |
 
| Organization       |                        |                    |              |
+--------------------+------------------------+--------------------+--------------+
|   [facility]  |   [address]  | [place] [address] |              |
| SERVICES, CORE     | [address]                |                    |              |
+--------------------+------------------------+--------------------+--------------+
 COMPLETE METABOLIC SET (NA,K,CL,CO2,BUN,CREAT,GLUC,CA,AST,ALT,BILI TOTAL,ALK PHOS,ALB,PROT 
TOTAL) (2014  5:27 PM PST)
 
+-------------+---------+-----------------+-------------+--------------+
| Component   | Value   | Ref Range       | Performed   | Pathologist  |
|             |         |                 | At          | Signature    |
+-------------+---------+-----------------+-------------+--------------+
| GLUCOSE,    | 81      | 60 - 99 mg/dL   | [facility]        |              |
| PLASMA      |         |                 | LABORATORY  |              |
| (LAB)       |         |                 | SERVICES,   |              |
|             |         |                 | CORE        |              |
+-------------+---------+-----------------+-------------+--------------+
| BUN, PLASMA | 13      | 6 - 20 mg/dL    | [facility]        |              |
|  (LAB)      |         |                 | LABORATORY  |              |
|             |         |                 | SERVICES,   |              |
|             |         |                 | CORE        |              |
+-------------+---------+-----------------+-------------+--------------+
| CREATININE  | 0.76    | 0.46 - 0.81     | [facility]        |              |
| PLASMA      |         | mg/dL           | LABORATORY  |              |
| (LAB)       |         |                 | SERVICES,   |              |
|             |         |                 | CORE        |              |
+-------------+---------+-----------------+-------------+--------------+
| SODIUM,     | 139     | 136 - 145       | [facility]        |              |
| PLASMA      |         | mmol/L          | LABORATORY  |              |
| (LAB)       |         |                 | SERVICES,   |              |
|             |         |                 | CORE        |              |
+-------------+---------+-----------------+-------------+--------------+
| POTASSIUM,  | 3.9     | 3.4 - 5.0       | [facility]        |              |
| PLASMA      |         | mmol/L          | LABORATORY  |              |
| (LAB)       |         |                 | SERVICES,   |              |
|             |         |                 | CORE        |              |
+-------------+---------+-----------------+-------------+--------------+
| CHLORIDE,   | 105     | 97 - 108 mmol/L | [facility]        |              |
| PLASMA      |         |                 | LABORATORY  |              |
| (LAB)       |         |                 | SERVICES,   |              |
|             |         |                 | CORE        |              |
+-------------+---------+-----------------+-------------+--------------+
| TOTAL CO2,  | 30      | 21 - 32 mmol/L  | [facility]        |              |
| PLASMA      |         |                 | LABORATORY  |              |
| (LAB)       |         |                 | SERVICES,   |              |
|             |         |                 | CORE        |              |
+-------------+---------+-----------------+-------------+--------------+
| CALCIUM,    | 9.4     | 8.6 - 10.2      | [facility]        |              |
| PLASMA      |         | mg/dL           | LABORATORY  |              |
| (LAB)       |         |                 | SERVICES,   |              |
|             |         |                 | CORE        |              |
+-------------+---------+-----------------+-------------+--------------+
| BILIRUBIN   | 0.2 (L) | 0.3 - 1.2 mg/dL | [facility]        |              |
| TOTAL       |         |                 | LABORATORY  |              |
|             |         |                 | SERVICES,   |              |
|             |         |                 | CORE        |              |
+-------------+---------+-----------------+-------------+--------------+
| TOTAL       | 7.6     | 6.2 - 8.5 g/dL  | [facility]        |              |
| PROTEIN,    |         |                 | LABORATORY  |              |
| PLASMA      |         |                 | SERVICES,   |              |
 
| (LAB)       |         |                 | CORE        |              |
+-------------+---------+-----------------+-------------+--------------+
| ALBUMIN,    | 3.7     | 3.5 - 4.7 g/dL  | [facility]        |              |
| PLASMA      |         |                 | LABORATORY  |              |
| (LAB)       |         |                 | SERVICES,   |              |
|             |         |                 | CORE        |              |
+-------------+---------+-----------------+-------------+--------------+
| ALK PHOS    | 101     | 42 - 110 U/L    | [facility]        |              |
|             |         |                 | LABORATORY  |              |
|             |         |                 | SERVICES,   |              |
|             |         |                 | CORE        |              |
+-------------+---------+-----------------+-------------+--------------+
| AST(SGOT)   | 15 (L)  | 18 - 36 U/L     | [facility]        |              |
|             |         |                 | LABORATORY  |              |
|             |         |                 | SERVICES,   |              |
|             |         |                 | CORE        |              |
+-------------+---------+-----------------+-------------+--------------+
| ALT (SGPT)  | 18      | 12 - 60 U/L     | [facility]        |              |
|             |         |                 | LABORATORY  |              |
|             |         |                 | SERVICES,   |              |
|             |         |                 | CORE        |              |
+-------------+---------+-----------------+-------------+--------------+
| ANION       | 4       | 4 - 11 mmol/L   | [facility]        |              |
| GAP(ALB     |         |                 | LABORATORY  |              |
| CORRECTED)  |         |                 | SERVICES,   |              |
|             |         |                 | CORE        |              |
+-------------+---------+-----------------+-------------+--------------+
| POTASSIUM   | No Hemo |                 | [facility]        |              |
| CMNT        |         |                 | LABORATORY  |              |
|             |         |                 | SERVICES,   |              |
|             |         |                 | CORE        |              |
+-------------+---------+-----------------+-------------+--------------+
| BILI T CMNT | No Hemo |                 | [facility]        |              |
|             |         |                 | LABORATORY  |              |
|             |         |                 | SERVICES,   |              |
|             |         |                 | CORE        |              |
+-------------+---------+-----------------+-------------+--------------+
| AST CMNT    | No Hemo |                 | [facility]        |              |
|             |         |                 | LABORATORY  |              |
|             |         |                 | SERVICES,   |              |
|             |         |                 | CORE        |              |
+-------------+---------+-----------------+-------------+--------------+
| ANION GAP   | 4       | mmol/L          | [facility]        |              |
|             |         |                 | LABORATORY  |              |
|             |         |                 | SERVICES,   |              |
|             |         |                 | CORE        |              |
+-------------+---------+-----------------+-------------+--------------+
 
 
 
+---------------+
| Specimen      |
+---------------+
| Blood - Blood |
+---------------+
 
 
 
+--------------------+------------------------+--------------------+--------------+
| Performing         | Address                | City/State/Zipcode | Phone Number |
 
| Organization       |                        |                    |              |
+--------------------+------------------------+--------------------+--------------+
|   [facility]  |   [address]  | [place] [address] |              |
| SERVICES, CORE     | [address]                |                    |              |
+--------------------+------------------------+--------------------+--------------+
 documented in this encounter
 
 Visit Diagnoses
 
 
+--------------------------------------------------------------------------------------+
| Diagnosis                                                                            |
+--------------------------------------------------------------------------------------+
|   Transplanted liver (HCC) - Primary  Liver replaced by transplant                   |
+--------------------------------------------------------------------------------------+
|   Immunosuppressed status (HCC)  Unspecified disorder of immune mechanism            |
+--------------------------------------------------------------------------------------+
|   High risk medications (not anticoagulants) long-term use  Encounter for long-term  |
| (current) use of other medications                                                   |
+--------------------------------------------------------------------------------------+
|   VSD (ventricular septal defect), perimembranous  Ventricular septal defect         |
+--------------------------------------------------------------------------------------+
|   Polysplenia  Congenital anomalies of spleen                                        |
+--------------------------------------------------------------------------------------+
|   Aortic insufficiency  Aortic valve disorders                                       |
+--------------------------------------------------------------------------------------+
|   Aortic root dilatation (HCC)  Thoracic aortic ectasia                              |
+--------------------------------------------------------------------------------------+
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
+--------------------------------------------------------------------------------------+
 documented in this encounter

## 2019-11-01 NOTE — XMS
Encounter Summary
  Created on: 2019
 
 Bonifacio Nila JB
 External Reference #: [id number]
 : [birth year]
 Sex: Female
 
 Demographics
 
 
+-----------------------+------------------------+
| Address               | [address]            |
|                       | [place]  [address]   |
+-----------------------+------------------------+
| Home Phone            | [phone]        |
+-----------------------+------------------------+
| Preferred Language    | Unknown                |
+-----------------------+------------------------+
| Marital Status        | Single                 |
+-----------------------+------------------------+
| Episcopalian Affiliation | Unknown                |
+-----------------------+------------------------+
| Race                  | White                  |
+-----------------------+------------------------+
| Ethnic Group          | Not  or  |
+-----------------------+------------------------+
 
 
 Author
 
 
+--------------+------------------------------+
| Author       | [facility] |
+--------------+------------------------------+
| Organization | [facility] |
+--------------+------------------------------+
| Address      | Unknown                      |
+--------------+------------------------------+
| Phone        | Unavailable                  |
+--------------+------------------------------+
 
 
 
 Support
 
 
+-----------------+--------------+---------+-----------------+
| Name            | Relationship | Address | Phone           |
+-----------------+--------------+---------+-----------------+
| Kit Valdovinos   | ECON         | Unknown | [phone] |
+-----------------+--------------+---------+-----------------+
| Magdalena Valdovinos | ECON         | Unknown | [phone] |
+-----------------+--------------+---------+-----------------+
 
 
 
 Care Team Providers
 
 
 
+------------------------+------+-----------------+
| Care Team Member Name  | Role | Phone           |
+------------------------+------+-----------------+
| Lily Horton MD | PCP  | [phone] |
+------------------------+------+-----------------+
 
 
 
 Reason for Visit
 
 
+-------------------+----------+
| Reason            | Comments |
+-------------------+----------+
| Care Coordination |          |
+-------------------+----------+
 
 
 
 Encounter Details
 
 
+--------+-----------+----------------------+----------------------+-------------------+
| Date   | Type      | Department           | Care Team            | Description       |
+--------+-----------+----------------------+----------------------+-------------------+
| / | Telephone |   Pediatric          |   Neema Khalil,   | Care Coordination |
| 2017   |           | Gastroenterology at  | MD  [address] |                   |
|        |           | [place]          |   [place], [place]       |                   |
|        |           | [facility]  | [address]           |                   |
|        |           |  [address] [facility] | [phone]         |                   |
|        |           |  A[address]  Mailcode:  | [phone] (Fax)   |                   |
|        |           | [address]   |                      |                   |
|        |           | [place], [place]         |                      |                   |
|        |           | [address]           |                      |                   |
|        |           | [phone]         |                      |                   |
+--------+-----------+----------------------+----------------------+-------------------+
 
 
 
 Social History
 
 
+-------------------+-------+-----------+--------+------+
| Tobacco Use       | Types | Packs/Day | Years  | Date |
|                   |       |           | Used   |      |
+-------------------+-------+-----------+--------+------+
| Passive Smoke     |       |           |        |      |
| Exposure - Never  |       |           |        |      |
| Smoker            |       |           |        |      |
+-------------------+-------+-----------+--------+------+
 
 
 
+---------------------+---+---+---+
| Smokeless Tobacco:  |   |   |   |
| Never Used          |   |   |   |
+---------------------+---+---+---+
 
 
 
 
+-------------+-------------+---------+----------+
| Alcohol Use | Drinks/Week | oz/Week | Comments |
+-------------+-------------+---------+----------+
| Not Asked   |             |         |          |
+-------------+-------------+---------+----------+
 
 
 
+------------------+---------------+
| Sex Assigned at  | Date Recorded |
| Birth            |               |
+------------------+---------------+
| Not on file      |               |
+------------------+---------------+
 
 
 
+----------------+-------------+-------------+
| Job Start Date | Occupation  | Industry    |
+----------------+-------------+-------------+
| Not on file    | Not on file | Not on file |
+----------------+-------------+-------------+
 
 
 
+----------------+--------------+------------+
| Travel History | Travel Start | Travel End |
+----------------+--------------+------------+
 
 
 
+-------------------------------------+
| No recent travel history available. |
+-------------------------------------+
 documented as of this encounter
 
 Plan of Treatment
 Not on filedocumented as of this encounter
 
 Visit Diagnoses
 Not on filedocumented in this encounter

## 2019-11-01 NOTE — XMS
Encounter Summary
  Created on: 2019
 
 Bonifacio Nila JB
 External Reference #: [id number]
 : [birth year]
 Sex: Female
 
 Demographics
 
 
+-----------------------+------------------------+
| Address               | [address]            |
|                       | [place]  [address]   |
+-----------------------+------------------------+
| Home Phone            | [phone]        |
+-----------------------+------------------------+
| Preferred Language    | Unknown                |
+-----------------------+------------------------+
| Marital Status        | Single                 |
+-----------------------+------------------------+
| Uatsdin Affiliation | Unknown                |
+-----------------------+------------------------+
| Race                  | White                  |
+-----------------------+------------------------+
| Ethnic Group          | Not  or  |
+-----------------------+------------------------+
 
 
 Author
 
 
+--------------+------------------------------+
| Author       | [facility] |
+--------------+------------------------------+
| Organization | [facility] |
+--------------+------------------------------+
| Address      | Unknown                      |
+--------------+------------------------------+
| Phone        | Unavailable                  |
+--------------+------------------------------+
 
 
 
 Support
 
 
+-----------------+--------------+---------+-----------------+
| Name            | Relationship | Address | Phone           |
+-----------------+--------------+---------+-----------------+
| Kit Valdovinos   | ECON         | Unknown | [phone] |
+-----------------+--------------+---------+-----------------+
| Magdalena Valdovinos | ECON         | Unknown | [phone] |
+-----------------+--------------+---------+-----------------+
 
 
 
 Care Team Providers
 
 
 
+------------------------+------+-----------------+
| Care Team Member Name  | Role | Phone           |
+------------------------+------+-----------------+
| Lily Horton MD | PCP  | [phone] |
+------------------------+------+-----------------+
 
 
 
 Reason for Visit
 
 
+-------------------+-----------------------------------+
| Reason            | Comments                          |
+-------------------+-----------------------------------+
| Care Coordination | transplant labs overdue, liver US |
+-------------------+-----------------------------------+
 
 
 
 Encounter Details
 
 
+--------+-----------+----------------------+----------------------+--------------------+
| Date   | Type      | Department           | Care Team            | Description        |
+--------+-----------+----------------------+----------------------+--------------------+
| 10/16/ | Telephone |   Pediatric          |   Neema Khalil,   | Care Coordination  |
| 2015   |           | Gastroenterology at  | MD  [address] | (transplant labs   |
|        |           | [place]          |   [place], [place]       | overdue, liver US) |
|        |           | [facility]  | [address]           |                    |
|        |           |  [address] | [phone]         |                    |
|        |           |  [address]  Mailcode:  | [phone] (Fax)   |                    |
|        |           | [facility]   |                      |                    |
|        |           | [place], [place]         |                      |                    |
|        |           | [address]           |                      |                    |
|        |           | [phone]         |                      |                    |
+--------+-----------+----------------------+----------------------+--------------------+
 
 
 
 Social History
 
 
+-------------------+-------+-----------+--------+------+
| Tobacco Use       | Types | Packs/Day | Years  | Date |
|                   |       |           | Used   |      |
+-------------------+-------+-----------+--------+------+
| Passive Smoke     |       |           |        |      |
| Exposure - Never  |       |           |        |      |
| Smoker            |       |           |        |      |
+-------------------+-------+-----------+--------+------+
 
 
 
+---------------------+---+---+---+
| Smokeless Tobacco:  |   |   |   |
| Never Used          |   |   |   |
+---------------------+---+---+---+
 
 
 
 
+-------------+-------------+---------+----------+
| Alcohol Use | Drinks/Week | oz/Week | Comments |
+-------------+-------------+---------+----------+
| Not Asked   |             |         |          |
+-------------+-------------+---------+----------+
 
 
 
+------------------+---------------+
| Sex Assigned at  | Date Recorded |
| Birth            |               |
+------------------+---------------+
| Not on file      |               |
+------------------+---------------+
 
 
 
+----------------+-------------+-------------+
| Job Start Date | Occupation  | Industry    |
+----------------+-------------+-------------+
| Not on file    | Not on file | Not on file |
+----------------+-------------+-------------+
 
 
 
+----------------+--------------+------------+
| Travel History | Travel Start | Travel End |
+----------------+--------------+------------+
 
 
 
+-------------------------------------+
| No recent travel history available. |
+-------------------------------------+
 documented as of this encounter
 
 Plan of Treatment
 Not on filedocumented as of this encounter
 
 Visit Diagnoses
 Not on filedocumented in this encounter

## 2019-11-01 NOTE — XMS
Clinical Summary
  Created on: 2019
 
 Bonifacio Nila JB
 External Reference #: [id number]
 : [birth year]
 Sex: Female
 
 Demographics
 
 
+-----------------------+---------------------------+
| Address               | [address]          |
|                       | [place]  [address] |
+-----------------------+---------------------------+
| Home Phone            | [phone]           |
+-----------------------+---------------------------+
| Preferred Language    | Unknown                   |
+-----------------------+---------------------------+
| Marital Status        | Single                    |
+-----------------------+---------------------------+
| Quaker Affiliation | Unknown                   |
+-----------------------+---------------------------+
| Race                  | Unknown                   |
+-----------------------+---------------------------+
| Ethnic Group          | Unknown                   |
+-----------------------+---------------------------+
 
 
 Author
 
 
+--------------+------------------------------------------+
| Author       | [facility]Historical as of  |
|              | 19)                                 |
+--------------+------------------------------------------+
| Organization | [facility] (Historical as of  |
|              | 19)                                 |
+--------------+------------------------------------------+
| Address      | Unknown                                  |
+--------------+------------------------------------------+
| Phone        | Unavailable                              |
+--------------+------------------------------------------+
 
 
 
 Support
 
 
+-----------------+--------------+---------+-----------------+
| Name            | Relationship | Address | Phone           |
+-----------------+--------------+---------+-----------------+
| Magdalena Valdovinos | ECON         | Unknown | [phone] |
+-----------------+--------------+---------+-----------------+
 
 
 
 Care Team Providers
 
 
 
+-----------------------+------+-----------------+
| Care Team Member Name | Role | Phone           |
+-----------------------+------+-----------------+
| Augustine Buck DO      | PP   | [phone] |
+-----------------------+------+-----------------+
 
 
 
 Allergies
 
 
+-------------------+----------------+----------+----------+----------+
| Active Allergy    | Reactions      | Severity | Noted    | Comments |
|                   |                |          | Date     |          |
+-------------------+----------------+----------+----------+----------+
| Sulfa Antibiotics | Swelling, Rash | Medium   | 20 |          |
|                   |                |          | 19       |          |
+-------------------+----------------+----------+----------+----------+
 
 
 
 Current Medications
 
 
+----------------------+---------------------+-------+---------+------+------+-------+
| Prescription         | Sig.                | Disp. | Refills | Star | End  | Statu |
|                      |                     |       |         | t    | Date | s     |
|                      |                     |       |         | Date |      |       |
+----------------------+---------------------+-------+---------+------+------+-------+
|                      | Inject  into the    |       |         |      |      | Activ |
| medroxyPROGESTERone  | muscle every 3      |       |         |      |      | e     |
| Acetate              | (three) months.     |       |         |      |      |       |
| (DEPO-PROVERA IM)    |                     |       |         |      |      |       |
+----------------------+---------------------+-------+---------+------+------+-------+
|   amLODIPine         | Take 5 mg by mouth  |       |         |      |      | Activ |
| (NORVASC) 5 MG       | daily.              |       |         |      |      | e     |
| tablet               |                     |       |         |      |      |       |
+----------------------+---------------------+-------+---------+------+------+-------+
|                      | Take 1 tablet by    |       | 1       | 04/2 |      | Activ |
| lisinopril-hydrochlo | mouth daily.        |       |         | 3/20 |      | e     |
| rothiazide           |                     |       |         | 19   |      |       |
| (ZESTORETIC) 20-12.5 |                     |       |         |      |      |       |
|  MG per tablet       |                     |       |         |      |      |       |
+----------------------+---------------------+-------+---------+------+------+-------+
 
 
 
 Active Problems
 
 
+-----------------+------------+
| Problem         | Noted Date |
+-----------------+------------+
| S/P VSD closure | 2016 |
+-----------------+------------+
 
 
 
 
+------------------------------------------------------------+
|   Overview:   Overview:                                    |
| 8 mm Amplatzer Vascular plug-4 device placement (10/16/14) |
+------------------------------------------------------------+
 
 
 
+-------------------------------------------------+------------+
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
+-------------------------------------------------+------------+
| VSD (ventricular septal defect), perimembranous | 2008 |
+-------------------------------------------------+------------+
 
 
 
+------------------------------------------------------------------+
|   Overview:   Overview: 10/16/2014  S/p 8-mm Amplatzer Vascular  |
| Plug IV, with no significant residual shuntingLeft atrial        |
| isomerism LAE (left atrial enlargement) LVE (left ventricular    |
| enlargement)                                                     |
|LVE (left ventricular enlargement)                                |
+------------------------------------------------------------------+
 
 
 
+--------------------------+------------+
| Transplanted liver (HCC) | 10/12/2006 |
+--------------------------+------------+
 
 
 
+-------------------------------------------+
|   Overview:   Overview:                   |
| For biliary atresia, 2000 at [facility] |
+-------------------------------------------+
 
 
 
 Family History
 
 
+-----------------+----------+------+--------------------+
| Medical History | Relation | Name | Comments           |
+-----------------+----------+------+--------------------+
| Heart disease   | Father   |      | had heart surgery  |
+-----------------+----------+------+--------------------+
| Hypertension    | Father   |      |                    |
+-----------------+----------+------+--------------------+
 
 
 
+----------+------+--------+----------+
| Relation | Name | Status | Comments |
+----------+------+--------+----------+
| Father   |      | Alive  |          |
+----------+------+--------+----------+
| Mother   |      | Alive  |          |
+----------+------+--------+----------+
 
 
 
 
 Social History
 
 
+--------------+-------+-----------+--------+------+
| Tobacco Use  | Types | Packs/Day | Years  | Date |
|              |       |           | Used   |      |
+--------------+-------+-----------+--------+------+
| Never Smoker |       |           |        |      |
+--------------+-------+-----------+--------+------+
 
 
 
+---------------------+---+---+---+
| Smokeless Tobacco:  |   |   |   |
| Current User        |   |   |   |
+---------------------+---+---+---+
 
 
 
+-------------+-----------+---------+----------+
| Alcohol Use | Drinks/We | oz/Week | Comments |
|             | ek        |         |          |
+-------------+-----------+---------+----------+
| No          |           |         |          |
+-------------+-----------+---------+----------+
 
 
 
+------------------+---------------+
| Sex Assigned at  | Date Recorded |
| Birth            |               |
+------------------+---------------+
| Not on file      |               |
+------------------+---------------+
 
 
 
 Last Filed Vital Signs
 
 
+-------------------+----------------------+-------------------------+
| Vital Sign        | Reading              | Time Taken              |
+-------------------+----------------------+-------------------------+
| Blood Pressure    | 124/70               | 2019  2:18 PM PDT |
+-------------------+----------------------+-------------------------+
| Pulse             | 100                  | 2019  2:18 PM PDT |
+-------------------+----------------------+-------------------------+
| Temperature       | -                    | -                       |
+-------------------+----------------------+-------------------------+
| Respiratory Rate  | -                    | -                       |
+-------------------+----------------------+-------------------------+
| Oxygen Saturation | 98%                  | 2019  2:18 PM PDT |
+-------------------+----------------------+-------------------------+
| Inhaled Oxygen    | -                    | -                       |
| Concentration     |                      |                         |
+-------------------+----------------------+-------------------------+
| Weight            | 96.4 kg (212 lb 9.6  | 2019  2:18 PM PDT |
|                   | oz)                  |                         |
+-------------------+----------------------+-------------------------+
 
| Height            | 165.1 cm (5' 5")     | 2019  2:18 PM PDT |
+-------------------+----------------------+-------------------------+
| Body Mass Index   | 35.38                | 2019  2:18 PM PDT |
+-------------------+----------------------+-------------------------+
 
 
 
 Plan of Treatment
 
 
+--------+---------+-----------+----------------------+-------------+
| Date   | Type    | Specialty | Care Team            | Description |
+--------+---------+-----------+----------------------+-------------+
| / | Office  |           |   Renetta Goldberg, |             |
|    | Visit   |           |  MD [address]   |             |
|        |         |           | [address],  |             |
|        |         |           | [place] [address]             |             |
|        |         |           | [phone]         |             |
|        |         |           | [phone] (Fax)   |             |
+--------+---------+-----------+----------------------+-------------+
 
 
 
+----------------------+-----------+-----------+----------+
| Health Maintenance   | Due Date  | Last Done | Comments |
+----------------------+-----------+-----------+----------+
| Well Child Check     | 01/15/200 |           |          |
|                      | 2         |           |          |
+----------------------+-----------+-----------+----------+
| Vaccine: HPV (1 -    | 01/15/201 |           |          |
| Female 3-dose        | 4         |           |          |
| series)              |           |           |          |
+----------------------+-----------+-----------+----------+
| Vaccine:             | 01/15/201 |           |          |
| Dtap/Tdap/Td (1 -    | 8         |           |          |
| Tdap)                |           |           |          |
+----------------------+-----------+-----------+----------+
| Vaccine:             | 01/15/201 |           |          |
| Pneumococcal 19-64   | 8         |           |          |
| Highest Risk (1 of 3 |           |           |          |
|  - PCV13)            |           |           |          |
+----------------------+-----------+-----------+----------+
| Vaccine: Influenza   |  |           |          |
| (#1)                 | 9         |           |          |
+----------------------+-----------+-----------+----------+
 
 
 
 Results
 Not on filefrom Last 3 Months
 
 Insurance
 
 
+--------------------+--------+-------------+------+-------+---------+
| Payer              | Benefi | Subscriber  | Type | Phone | Address |
|                    | t Plan | ID          |      |       |         |
|                    |  /     |             |      |       |         |
|                    | Group  |             |      |       |         |
+--------------------+--------+-------------+------+-------+---------+
 
| [employer] [facility]     | FIRST  | [id number] |      |       |         |
| [facility]           | HEALTH |             |      |       |         |
|                    |  -     |             |      |       |         |
|                    | PACIFI |             |      |       |         |
|                    | CSOURC |             |      |       |         |
|                    | E      |             |      |       |         |
+--------------------+--------+-------------+------+-------+---------+
| [employer]  | PROVID | [id number] | PPO  |       |         |
| PLAN               | ENCE   |             |      |       |         |
|                    | HEALTH |             |      |       |         |
|                    |  PLAN  |             |      |       |         |
+--------------------+--------+-------------+------+-------+---------+
 
 
 
+-----------------+--------+-------------+--------+-------------+----------------------+
| Guarantor Name  | [employer]oun | Relation to | Date   | Phone       | Billing Address      |
|                 | t Type |  Patient    | of     |             |                      |
|                 |        |             | Birth  |             |                      |
+-----------------+--------+-------------+--------+-------------+----------------------+
| NILA VALDOVINOS | Person | Self        | [birth year] |   Home:     |   [address]   |
|                 | [address] |             |    | [phone] | [place]        |
|                 | [place]    |             |        | [address]        | [address]           |
+-----------------+--------+-------------+--------+-------------+----------------------+

## 2019-11-01 NOTE — XMS
Encounter Summary
  Created on: 2019
 
 Bonifacio Nila JB
 External Reference #: [id number]
 : [birth year]
 Sex: Female
 
 Demographics
 
 
+-----------------------+------------------------+
| Address               | [address]            |
|                       | [place]  [address]   |
+-----------------------+------------------------+
| Home Phone            | [phone]        |
+-----------------------+------------------------+
| Preferred Language    | Unknown                |
+-----------------------+------------------------+
| Marital Status        | Single                 |
+-----------------------+------------------------+
| Yazdanism Affiliation | Unknown                |
+-----------------------+------------------------+
| Race                  | White                  |
+-----------------------+------------------------+
| Ethnic Group          | Not  or  |
+-----------------------+------------------------+
 
 
 Author
 
 
+--------------+------------------------------+
| Author       | [facility] |
+--------------+------------------------------+
| Organization | [facility] |
+--------------+------------------------------+
| Address      | Unknown                      |
+--------------+------------------------------+
| Phone        | Unavailable                  |
+--------------+------------------------------+
 
 
 
 Support
 
 
+-----------------+--------------+---------+-----------------+
| Name            | Relationship | Address | Phone           |
+-----------------+--------------+---------+-----------------+
| Kit Valdovinos   | ECON         | Unknown | [phone] |
+-----------------+--------------+---------+-----------------+
| Magdalena Valdovinos | ECON         | Unknown | [phone] |
+-----------------+--------------+---------+-----------------+
 
 
 
 Care Team Providers
 
 
 
+------------------------+------+-----------------+
| Care Team Member Name  | Role | Phone           |
+------------------------+------+-----------------+
| Lily Horton MD | PCP  | [phone] |
+------------------------+------+-----------------+
 
 
 
 Reason for Visit
 
 
+-------------------+----------+
| Reason            | Comments |
+-------------------+----------+
| Care Coordination |          |
+-------------------+----------+
 
 
 
 Encounter Details
 
 
+--------+-----------+----------------------+----------------------+-------------------+
| Date   | Type      | Department           | Care Team            | Description       |
+--------+-----------+----------------------+----------------------+-------------------+
| / | Telephone |   Pediatric          |   Neema Khalil,   | Care Coordination |
| 2015   |           | Gastroenterology at  | MD  [address] |                   |
|        |           | [place]          |   [place], [place]       |                   |
|        |           | [facility]  | [address]           |                   |
|        |           |  [address] [facility] | [phone]         |                   |
|        |           |  [address]  Mailcode:  | [phone] (Fax)   |                   |
|        |           | [address]   |                      |                   |
|        |           | [place], [place]         |                      |                   |
|        |           | [address]           |                      |                   |
|        |           | [phone]         |                      |                   |
+--------+-----------+----------------------+----------------------+-------------------+
 
 
 
 Social History
 
 
+-------------------+-------+-----------+--------+------+
| Tobacco Use       | Types | Packs/Day | Years  | Date |
|                   |       |           | Used   |      |
+-------------------+-------+-----------+--------+------+
| Passive Smoke     |       |           |        |      |
| Exposure - Never  |       |           |        |      |
| Smoker            |       |           |        |      |
+-------------------+-------+-----------+--------+------+
 
 
 
+---------------------+---+---+---+
| Smokeless Tobacco:  |   |   |   |
| Never Used          |   |   |   |
+---------------------+---+---+---+
 
 
 
 
+-------------+-------------+---------+----------+
| Alcohol Use | Drinks/Week | oz/Week | Comments |
+-------------+-------------+---------+----------+
| Not Asked   |             |         |          |
+-------------+-------------+---------+----------+
 
 
 
+------------------+---------------+
| Sex Assigned at  | Date Recorded |
| Birth            |               |
+------------------+---------------+
| Not on file      |               |
+------------------+---------------+
 
 
 
+----------------+-------------+-------------+
| Job Start Date | Occupation  | Industry    |
+----------------+-------------+-------------+
| Not on file    | Not on file | Not on file |
+----------------+-------------+-------------+
 
 
 
+----------------+--------------+------------+
| Travel History | Travel Start | Travel End |
+----------------+--------------+------------+
 
 
 
+-------------------------------------+
| No recent travel history available. |
+-------------------------------------+
 documented as of this encounter
 
 Plan of Treatment
 Not on filedocumented as of this encounter
 
 Visit Diagnoses
 Not on filedocumented in this encounter

## 2019-11-01 NOTE — XMS
Encounter Summary
  Created on: 2019
 
 Bonifacio Nila JB
 External Reference #: [id number]
 : [birth year]
 Sex: Female
 
 Demographics
 
 
+-----------------------+------------------------+
| Address               | [address]            |
|                       | [place]  [address]   |
+-----------------------+------------------------+
| Home Phone            | [phone]        |
+-----------------------+------------------------+
| Preferred Language    | Unknown                |
+-----------------------+------------------------+
| Marital Status        | Single                 |
+-----------------------+------------------------+
| Temple Affiliation | Unknown                |
+-----------------------+------------------------+
| Race                  | White                  |
+-----------------------+------------------------+
| Ethnic Group          | Not  or  |
+-----------------------+------------------------+
 
 
 Author
 
 
+--------------+-------------+
| Organization | Unknown     |
+--------------+-------------+
| Address      | Unknown     |
+--------------+-------------+
| Phone        | Unavailable |
+--------------+-------------+
 
 
 
 Support
 
 
+-----------------+--------------+---------+-----------------+
| Name            | Relationship | Address | Phone           |
+-----------------+--------------+---------+-----------------+
| Kit Valdovinos   | ECON         | Unknown | [phone] |
+-----------------+--------------+---------+-----------------+
| Magdalena Valdovinos | ECON         | Unknown | [phone] |
+-----------------+--------------+---------+-----------------+
 
 
 
 Care Team Providers
 
 
 
+-----------------------+------+-----------------+
| Care Team Member Name | Role | Phone           |
+-----------------------+------+-----------------+
| Lily Horton MD   | PCP  | [phone] |
+-----------------------+------+-----------------+
 
 
 
 Encounter Details
 
 
+--------+-------------+------------+---------------------+---------------+
| Date   | Type        | Department | Care Team           | Description   |
+--------+-------------+------------+---------------------+---------------+
| 10/12/ | Office      |            |   Note, Outpatient  | Progress Note |
| 2006   | Visit-Trans |            | Clinic              |               |
|        | cribed      |            |                     |               |
+--------+-------------+------------+---------------------+---------------+
 
 
 
 Social History
 
 
+----------------+-------+-----------+--------+------+
| Tobacco Use    | Types | Packs/Day | Years  | Date |
|                |       |           | Used   |      |
+----------------+-------+-----------+--------+------+
| Never Assessed |       |           |        |      |
+----------------+-------+-----------+--------+------+
 
 
 
+------------------+---------------+
| Sex Assigned at  | Date Recorded |
| Birth            |               |
+------------------+---------------+
| Not on file      |               |
+------------------+---------------+
 
 
 
+----------------+-------------+-------------+
| Job Start Date | Occupation  | Industry    |
+----------------+-------------+-------------+
| Not on file    | Not on file | Not on file |
+----------------+-------------+-------------+
 
 
 
+----------------+--------------+------------+
| Travel History | Travel Start | Travel End |
+----------------+--------------+------------+
 
 
 
+-------------------------------------+
| No recent travel history available. |
+-------------------------------------+
 documented as of this encounter
 
 
 Progress Notes
 Interface, Transcription In - 2006  2:32 AM PST
      [id number]           10/12/529987/12/469159/2006 [id number]
         [id number]  BONIFACIO LEE          N [id number]
 
 Clinic Date:  10/12/2006
 
 Clinic:       Pediatric Liver Transplant Followup Clinic
 
 Problems:  Status post liver transplant for biliary atresia, 2000.
 
 Medication:  Prograf 1-mg capsules 1 p.o. b.i.d.
 
 Allergies:  SULFA, HIVES.
 
 Subjective:  Nila is a 6-year 8-month-old referred in consultation by Dr. Horton for followup of her liver transplant.  Dr. Eddy Nazario was an
 observer in clinic today.  Nila was accompanied by her father.  He reports
 that she was a 5-pound 2-ounce product of a 34-week gestation.  She
 required some oxygen in the  period.  She was found to have
 polysplenia, malrotation, and VSD.  She had biliary atresia and was
 transplanted at about [age] of age.  Her father reports that she
 did wonderfully after her transplant.  She has never had a rejection and
 has had no significant problems.  She is on 1 medication only.
 
 Past Medical History:  She has a VSD that is followed by Dr. Bush at
 [facility].
 
 Review of Systems:  She has no chronic cough, no abdominal pain, no
 diarrhea, no eczema or other allergic problems.  Rest of her review of
 systems is negative.
 
 Family History:  Positive for type 2 diabetes and heart problems in
 adults.
 
 Social History:  She lives with her mother, her father, and her 3-year-old
 brother.  She is in 2nd grade.
 
 Objective:  General:  She is a well-appearing girl in no acute distress.
 Vital Signs:  Her weight is 30.8 kg, 87th percentile for age; her height is
 130 cm which is 75th percentile; her BMI is 18.22, 86th percentile.  Her
 blood pressure is 120/83, slightly high; her heart rate is 80.  HEENT:  She
 is normocephalic.  Eyes:  No icterus.  No periorbital edema.  Pharynx
 unremarkable.  Neck:  Supple.  Chest:  Clear to auscultation.  Cardiac:
 She has a 3 to 4 over 6 systolic murmur that is heard best in the apex.
 Abdomen:  She has well-healed incisions, no hepatosplenomegaly, no masses
 noted.  Extremities:  No clubbing or edema.  Nodes:  No cervical or
 supraclavicular nodes.  Skin:  Clear.
 
 Laboratory Tests:  Most recent tests were done on 2006,
 electrolytes unremarkable, AST 32, ALT 19, alkaline phosphatase 221,
 bilirubin 0.8, albumin 3.2 which in the normal range for that lab.  GGT 9,
 magnesium 1.9.  White count 8.2, hematocrit 42.2, MCV 83, and platelets
 325.  Tacrolimus level 3.2.  In the past in , she developed positive
 antibodies to EBV.  She is also positive for varicella antibody.
 
 Assessment:  Status post liver transplant for biliary atresia.  She is
 doing well and has never had rejection.  Her Prograf level is in the target
 
 range.
 
 Recommendations:
 1.      Recommend she continue labs every 3 months as her previous
       schedule.
 2.      EBV PCR every 6 months.
 3.      No change in her medication at present.  Plan to return to clinic
       once a year unless she has problems.
 
 Ivis Burkett M.D.
 
 [facility] / 
 [id number] / [id number] / [id number] / [id number]
 D: 10/12/2006
 T: 10/15/2006
 
 cc:
 
 Lily Horton M.D.
 [address]
 [place]  [address]
 
 * [facility]
 [facility] [address] [place]
 [place]  [address]
 
 Electronically signed by Ivis Burkett 2006 10:05:03 PM
 Electronically signed by Interface, Transcription In at 2006  2:32 AM PSTdocumented i
n this encounter
 
 Plan of Treatment
 Not on filedocumented as of this encounter
 
 Visit Diagnoses
 Not on filedocumented in this encounter

## 2019-11-01 NOTE — XMS
Encounter Summary
  Created on: 2019
 
 Bonifacio Nila JB
 External Reference #: [id number]
 : [birth year]
 Sex: Female
 
 Demographics
 
 
+-----------------------+------------------------+
| Address               | [address]            |
|                       | [place]  [address]   |
+-----------------------+------------------------+
| Home Phone            | [phone]        |
+-----------------------+------------------------+
| Preferred Language    | Unknown                |
+-----------------------+------------------------+
| Marital Status        | Single                 |
+-----------------------+------------------------+
| Mu-ism Affiliation | Unknown                |
+-----------------------+------------------------+
| Race                  | White                  |
+-----------------------+------------------------+
| Ethnic Group          | Not  or  |
+-----------------------+------------------------+
 
 
 Author
 
 
+--------------+------------------------------+
| Author       | [facility] |
+--------------+------------------------------+
| Organization | [facility] |
+--------------+------------------------------+
| Address      | Unknown                      |
+--------------+------------------------------+
| Phone        | Unavailable                  |
+--------------+------------------------------+
 
 
 
 Support
 
 
+-----------------+--------------+---------+-----------------+
| Name            | Relationship | Address | Phone           |
+-----------------+--------------+---------+-----------------+
| Kit Valdovinos   | ECON         | Unknown | [phone] |
+-----------------+--------------+---------+-----------------+
| Magdalena Valdovinos | ECON         | Unknown | [phone] |
+-----------------+--------------+---------+-----------------+
 
 
 
 Care Team Providers
 
 
 
+------------------------+------+-----------------+
| Care Team Member Name  | Role | Phone           |
+------------------------+------+-----------------+
| Lily Horton MD | PCP  | [phone] |
+------------------------+------+-----------------+
 
 
 
 Encounter Details
 
 
+--------+-------------+----------------------+--------------+-------------+
| Date   | Type        | Department           | Care Team    | Description |
+--------+-------------+----------------------+--------------+-------------+
| / | Document-Sc |   UNKNOWN DEPARTMENT |   Unknown  . |             |
| 2016   | anned       |   [address]        |              |             |
|        |             | [address]      |              |             |
|        |             | [place], [place]         |              |             |
|        |             | [address]           |              |             |
+--------+-------------+----------------------+--------------+-------------+
 
 
 
 Social History
 
 
+-------------------+-------+-----------+--------+------+
| Tobacco Use       | Types | Packs/Day | Years  | Date |
|                   |       |           | Used   |      |
+-------------------+-------+-----------+--------+------+
| Passive Smoke     |       |           |        |      |
| Exposure - Never  |       |           |        |      |
| Smoker            |       |           |        |      |
+-------------------+-------+-----------+--------+------+
 
 
 
+---------------------+---+---+---+
| Smokeless Tobacco:  |   |   |   |
| Never Used          |   |   |   |
+---------------------+---+---+---+
 
 
 
+-------------+-------------+---------+----------+
| Alcohol Use | Drinks/Week | oz/Week | Comments |
+-------------+-------------+---------+----------+
| Not Asked   |             |         |          |
+-------------+-------------+---------+----------+
 
 
 
+------------------+---------------+
| Sex Assigned at  | Date Recorded |
| Birth            |               |
+------------------+---------------+
| Not on file      |               |
+------------------+---------------+
 
 
 
 
+----------------+-------------+-------------+
| Job Start Date | Occupation  | Industry    |
+----------------+-------------+-------------+
| Not on file    | Not on file | Not on file |
+----------------+-------------+-------------+
 
 
 
+----------------+--------------+------------+
| Travel History | Travel Start | Travel End |
+----------------+--------------+------------+
 
 
 
+-------------------------------------+
| No recent travel history available. |
+-------------------------------------+
 documented as of this encounter
 
 Plan of Treatment
 Not on filedocumented as of this encounter
 
 Procedures
 
 
+----------------+--------+-------------+----------------------+----------------------+
| Procedure Name | Priori | Date/Time   | Associated Diagnosis | Comments             |
|                | ty     |             |                      |                      |
+----------------+--------+-------------+----------------------+----------------------+
| LAB REPORTS    |        | 2016  |                      |   Results for this   |
|                |        | 12:00 AM    |                      | procedure are in the |
|                |        | PDT         |                      |  results section.    |
+----------------+--------+-------------+----------------------+----------------------+
 documented in this encounter
 
 Results
 LAB REPORTS (2016 12:00 AM PDT)
 
+----------------------------------------------------------+--------------+
| Narrative                                                | Performed At |
+----------------------------------------------------------+--------------+
|   This result has an attachment that is not available.   |              |
+----------------------------------------------------------+--------------+
 documented in this encounter
 
 Visit Diagnoses
 Not on filedocumented in this encounter

## 2019-11-01 NOTE — XMS
Encounter Summary
  Created on: 2019
 
 Bonifacio Nila JB
 External Reference #: [id number]
 : [birth year]
 Sex: Female
 
 Demographics
 
 
+-----------------------+------------------------+
| Address               | [address]            |
|                       | [place]  [address]   |
+-----------------------+------------------------+
| Home Phone            | [phone]        |
+-----------------------+------------------------+
| Preferred Language    | Unknown                |
+-----------------------+------------------------+
| Marital Status        | Single                 |
+-----------------------+------------------------+
| [employer] Affiliation | Unknown                |
+-----------------------+------------------------+
| Race                  | White                  |
+-----------------------+------------------------+
| Ethnic Group          | Not  or  |
+-----------------------+------------------------+
 
 
 Author
 
 
+--------------+------------------------------+
| Author       | [facility] |
+--------------+------------------------------+
| Organization | [facility] |
+--------------+------------------------------+
| Address      | Unknown                      |
+--------------+------------------------------+
| Phone        | Unavailable                  |
+--------------+------------------------------+
 
 
 
 Support
 
 
+-----------------+--------------+---------+-----------------+
| Name            | Relationship | Address | Phone           |
+-----------------+--------------+---------+-----------------+
| Kit Valdovinos   | ECON         | Unknown | [phone] |
+-----------------+--------------+---------+-----------------+
| Magdalena Valdovinos | ECON         | Unknown | [phone] |
+-----------------+--------------+---------+-----------------+
 
 
 
 Care Team Providers
 
 
 
+------------------------+------+-----------------+
| Care Team Member Name  | Role | Phone           |
+------------------------+------+-----------------+
| Lily Horton MD | PCP  | [phone] |
+------------------------+------+-----------------+
 
 
 
 Encounter Details
 
 
+--------+------+----------------------+-----------+---------------------+
| Date   | Type | Department           | Care Team | Description         |
+--------+------+----------------------+-----------+---------------------+
| / | Lab  |   [facility]  |           | Transplanted liver  |
|    |      | 7th Floor  [address]   |           | (HCC); High risk    |
|        |      | [address]  |           | medications (not    |
|        |      |  [place]        |           | anticoagulants)     |
|        |      | [address]           |           | long-term use       |
|        |      | [phone]         |           |                     |
+--------+------+----------------------+-----------+---------------------+
 
 
 
 Social History
 
 
+-------------------+-------+-----------+--------+------+
| Tobacco Use       | Types | Packs/Day | Years  | Date |
|                   |       |           | Used   |      |
+-------------------+-------+-----------+--------+------+
| Passive Smoke     |       |           |        |      |
| Exposure - Never  |       |           |        |      |
| Smoker            |       |           |        |      |
+-------------------+-------+-----------+--------+------+
 
 
 
+---------------------+---+---+---+
| Smokeless Tobacco:  |   |   |   |
| Never Used          |   |   |   |
+---------------------+---+---+---+
 
 
 
+-------------+-------------+---------+----------+
| Alcohol Use | Drinks/Week | oz/Week | Comments |
+-------------+-------------+---------+----------+
| Not Asked   |             |         |          |
+-------------+-------------+---------+----------+
 
 
 
+------------------+---------------+
| Sex Assigned at  | Date Recorded |
| Birth            |               |
+------------------+---------------+
| Not on file      |               |
 
+------------------+---------------+
 
 
 
+----------------+-------------+-------------+
| Job Start Date | Occupation  | Industry    |
+----------------+-------------+-------------+
| Not on file    | Not on file | Not on file |
+----------------+-------------+-------------+
 
 
 
+----------------+--------------+------------+
| Travel History | Travel Start | Travel End |
+----------------+--------------+------------+
 
 
 
+-------------------------------------+
| No recent travel history available. |
+-------------------------------------+
 documented as of this encounter
 
 Plan of Treatment
 Not on filedocumented as of this encounter
 
 Procedures
 
 
+----------------------+--------+-------------+----------------------+----------------------
+
| Procedure Name       | Priori | Date/Time   | Associated Diagnosis | Comments             
|
|                      | ty     |             |                      |                      
|
+----------------------+--------+-------------+----------------------+----------------------
+
| COMPLETE METABOLIC   | Routin | 2014  |   Transplanted liver |   Results for this   
|
| SET                  | e      |  5:27 PM    |  (HCC)  High risk    | procedure are in the 
|
| (NA,K,CL,CO2,BUN,CRE |        | PST         | medications (not     |  results section.    
|
| AT,GLUC,CA,AST,ALT,B |        |             | anticoagulants)      |                      
|
| ASHWINI TOTAL,ALK        |        |             | long-term use        |                      
|
| PHOS,ALB,PROT TOTAL) |        |             |                      |                      
|
+----------------------+--------+-------------+----------------------+----------------------
+
| TACROLIMUS, WHOLE    | Routin | 2014  |   Transplanted liver |   Results for this   
|
| BLOOD                | e      |  5:27 PM    |  (HCC)  High risk    | procedure are in the 
|
|                      |        | PST         | medications (not     |  results section.    
|
|                      |        |             | anticoagulants)      |                      
|
|                      |        |             | long-term use        |                      
 
|
+----------------------+--------+-------------+----------------------+----------------------
+
| PHOSPHORUS, PLASMA   | Routin | 2014  |   Transplanted liver |   Results for this   
|
|                      | e      |  5:27 PM    |  (HCC)  High risk    | procedure are in the 
|
|                      |        | PST         | medications (not     |  results section.    
|
|                      |        |             | anticoagulants)      |                      
|
|                      |        |             | long-term use        |                      
|
+----------------------+--------+-------------+----------------------+----------------------
+
| BILIRUBIN DIRECT     | Routin | 2014  |   Transplanted liver |   Results for this   
|
|                      | e      |  5:27 PM    |  (HCC)  High risk    | procedure are in the 
|
|                      |        | PST         | medications (not     |  results section.    
|
|                      |        |             | anticoagulants)      |                      
|
|                      |        |             | long-term use        |                      
|
+----------------------+--------+-------------+----------------------+----------------------
+
| URIC ACID, PLASMA    | Routin | 2014  |   Transplanted liver |   Results for this   
|
|                      | e      |  5:27 PM    |  (HCC)  High risk    | procedure are in the 
|
|                      |        | PST         | medications (not     |  results section.    
|
|                      |        |             | anticoagulants)      |                      
|
|                      |        |             | long-term use        |                      
|
+----------------------+--------+-------------+----------------------+----------------------
+
| MAGNESIUM, PLASMA    | Routin | 2014  |   Transplanted liver |   Results for this   
|
|                      | e      |  5:27 PM    |  (HCC)  High risk    | procedure are in the 
|
|                      |        | PST         | medications (not     |  results section.    
|
|                      |        |             | anticoagulants)      |                      
|
|                      |        |             | long-term use        |                      
|
+----------------------+--------+-------------+----------------------+----------------------
+
| LDH TOTAL, PLASMA    | Routin | 2014  |   Transplanted liver |   Results for this   
|
|                      | e      |  5:27 PM    |  (HCC)  High risk    | procedure are in the 
|
|                      |        | PST         | medications (not     |  results section.    
|
|                      |        |             | anticoagulants)      |                      
|
|                      |        |             | long-term use        |                      
 
|
+----------------------+--------+-------------+----------------------+----------------------
+
| CHOLESTEROL TOTAL,   | Routin | 2014  |   Transplanted liver |   Results for this   
|
| PLASMA               | e      |  5:27 PM    |  (HCC)  High risk    | procedure are in the 
|
|                      |        | PST         | medications (not     |  results section.    
|
|                      |        |             | anticoagulants)      |                      
|
|                      |        |             | long-term use        |                      
|
+----------------------+--------+-------------+----------------------+----------------------
+
 documented in this encounter
 
 Results
 TACROLIMUS, WHOLE BLOOD (2014  5:27 PM PST)
 
+-------------+----------+-------------+-------------+--------------+
| Component   | Value    | Ref Range   | Performed   | Pathologist  |
|             |          |             | At          | Signature    |
+-------------+----------+-------------+-------------+--------------+
| TACROLIMUS  | <2.0 (L) | 5.0 - 15.0  | [facility]        |              |
| ()    |          | ng/mL       | LABORATORY  |              |
|             |          |             | SERVICES,   |              |
|             |          |             | SPECIAL IMM |              |
|             |          |             |  + COAG     |              |
+-------------+----------+-------------+-------------+--------------+
 
 
 
+---------------+
| Specimen      |
+---------------+
| Blood - Blood |
+---------------+
 
 
 
+------------------------------------------------------------------------+----------------+
| Narrative                                                              | Performed At   |
+------------------------------------------------------------------------+----------------+
|         Therapeutic range is based on a whole blood specimen drawn 12  |   [facility]         |
| hours post-dose or prior to next dose (the trough). Some other factors | LABORATORY     |
|  influencing therapeutic range, dose administered, and result          | SERVICES,      |
| interpretation include time since transplantation, the organ           | SPECIAL IMM +  |
| transplanted, co-administration of other immunosuppressants and        | COAG           |
| interaction with other drugs that may increase or decrease Tacrolimus  |                |
| concentrations.                                                        |                |
+------------------------------------------------------------------------+----------------+
 
 
 
+--------------------+------------------------+--------------------+--------------+
| Performing         | Address                | City/State/Zipcode | Phone Number |
| Organization       |                        |                    |              |
+--------------------+------------------------+--------------------+--------------+
|   [facility]  |   [address]  | [place] [address] |              |
 
| SERVICES, SPECIAL  | [address]                |                    |              |
| IMM + COAG         |                        |                    |              |
+--------------------+------------------------+--------------------+--------------+
 CHOLESTEROL TOTAL, PLASMA (2014  5:27 PM PST)
 
+-------------+-------+------------+-------------+--------------+
| Component   | Value | Ref Range  | Performed   | Pathologist  |
|             |       |            | At          | Signature    |
+-------------+-------+------------+-------------+--------------+
| CHOLESTEROL | 136   | <200 mg/dL | [facility]        |              |
|   (LAB)     |       |            | LABORATORY  |              |
|             |       |            | SERVICES,   |              |
|             |       |            | CORE        |              |
+-------------+-------+------------+-------------+--------------+
 
 
 
+---------------+
| Specimen      |
+---------------+
| Blood - Blood |
+---------------+
 
 
 
+--------------------+------------------------+--------------------+--------------+
| Performing         | Address                | City/State/Zipcode | Phone Number |
| Organization       |                        |                    |              |
+--------------------+------------------------+--------------------+--------------+
|   [facility]  |   [address]  | [place] [address] |              |
| SERVICES, CORE     | [address]                |                    |              |
+--------------------+------------------------+--------------------+--------------+
 LDH TOTAL, PLASMA (2014  5:27 PM PST)
 
+------------+---------+---------------+-------------+--------------+
| Component  | Value   | Ref Range     | Performed   | Pathologist  |
|            |         |               | At          | Signature    |
+------------+---------+---------------+-------------+--------------+
| LD TOTAL,  | 154 (L) | 175 - 285 U/L | [facility]        |              |
| PLASMA     |         |               | LABORATORY  |              |
|            |         |               | SERVICES,   |              |
|            |         |               | CORE        |              |
+------------+---------+---------------+-------------+--------------+
| LD CMNT    | No Hemo |               | [facility]        |              |
|            |         |               | LABORATORY  |              |
|            |         |               | SERVICES,   |              |
|            |         |               | CORE        |              |
+------------+---------+---------------+-------------+--------------+
 
 
 
+---------------+
| Specimen      |
+---------------+
| Blood - Blood |
+---------------+
 
 
 
+-----------------------------------------------------------------------+----------------+
 
| Narrative                                                             | Performed At   |
+-----------------------------------------------------------------------+----------------+
|      New reference range effective 2013 for LD Total performed  |   [facility]         |
| in Core Lab only.                                                     | LABORATORY     |
|                                                                       | SERVICES, CORE |
+-----------------------------------------------------------------------+----------------+
 
 
 
+--------------------+------------------------+--------------------+--------------+
| Performing         | Address                | City/State/Zipcode | Phone Number |
| Organization       |                        |                    |              |
+--------------------+------------------------+--------------------+--------------+
|   [facility]  |   [address]  | [place] [address] |              |
| SERVICES, CORE     | [address]                |                    |              |
+--------------------+------------------------+--------------------+--------------+
 BILIRUBIN DIRECT (2014  5:27 PM PST)
 
+-------------+---------+-----------------+-------------+--------------+
| Component   | Value   | Ref Range       | Performed   | Pathologist  |
|             |         |                 | At          | Signature    |
+-------------+---------+-----------------+-------------+--------------+
| BILIRUBIN   | <0.1    | 0.0 - 0.3 mg/dL | [facility]        |              |
| DIRECT      |         |                 | LABORATORY  |              |
|             |         |                 | SERVICES,   |              |
|             |         |                 | CORE        |              |
+-------------+---------+-----------------+-------------+--------------+
| BILI D CMNT | No Hemo |                 | [facility]        |              |
|             |         |                 | LABORATORY  |              |
|             |         |                 | SERVICES,   |              |
|             |         |                 | CORE        |              |
+-------------+---------+-----------------+-------------+--------------+
 
 
 
+---------------+
| Specimen      |
+---------------+
| Blood - Blood |
+---------------+
 
 
 
+--------------------+------------------------+--------------------+--------------+
| Performing         | Address                | City/State/Zipcode | Phone Number |
| Organization       |                        |                    |              |
+--------------------+------------------------+--------------------+--------------+
|   [facility]  |   [address]  | [place] [address] |              |
| SERVICES, CORE     | [address]                |                    |              |
+--------------------+------------------------+--------------------+--------------+
 URIC ACID, PLASMA (2014  5:27 PM PST)
 
+-------------+-------+-----------------+-------------+--------------+
| Component   | Value | Ref Range       | Performed   | Pathologist  |
|             |       |                 | At          | Signature    |
+-------------+-------+-----------------+-------------+--------------+
| URIC ACID,  | 3.7   | 2.5 - 6.2 mg/dL | [facility]        |              |
| PLASMA      |       |                 | LABORATORY  |              |
| (LAB)       |       |                 | SERVICES,   |              |
|             |       |                 | CORE        |              |
 
+-------------+-------+-----------------+-------------+--------------+
 
 
 
+---------------+
| Specimen      |
+---------------+
| Blood - Blood |
+---------------+
 
 
 
+--------------------+------------------------+--------------------+--------------+
| Performing         | Address                | City/State/Zipcode | Phone Number |
| Organization       |                        |                    |              |
+--------------------+------------------------+--------------------+--------------+
|   [facility]  |   [address]  | [place] [address] |              |
| SERVICES, CORE     | [address]                |                    |              |
+--------------------+------------------------+--------------------+--------------+
 PHOSPHORUS, PLASMA (2014  5:27 PM PST)
 
+-------------+-------+-----------------+-------------+--------------+
| Component   | Value | Ref Range       | Performed   | Pathologist  |
|             |       |                 | At          | Signature    |
+-------------+-------+-----------------+-------------+--------------+
| PHOSPHORUS, | 3.3   | 2.4 - 4.7 mg/dL | [facility]        |              |
|  PLASMA     |       |                 | LABORATORY  |              |
| (LAB)       |       |                 | SERVICES,   |              |
|             |       |                 | CORE        |              |
+-------------+-------+-----------------+-------------+--------------+
 
 
 
+---------------+
| Specimen      |
+---------------+
| Blood - Blood |
+---------------+
 
 
 
+--------------------+------------------------+--------------------+--------------+
| Performing         | Address                | City/State/Zipcode | Phone Number |
| Organization       |                        |                    |              |
+--------------------+------------------------+--------------------+--------------+
|   [facility]  |   [address]  | [place] [address] |              |
| SERVICES, CORE     | [address]                |                    |              |
+--------------------+------------------------+--------------------+--------------+
 MAGNESIUM, PLASMA (2014  5:27 PM PST)
 
+-------------+---------+-----------------+-------------+--------------+
| Component   | Value   | Ref Range       | Performed   | Pathologist  |
|             |         |                 | At          | Signature    |
+-------------+---------+-----------------+-------------+--------------+
| MAGNESIUM,P | 1.6 (L) | 1.8 - 2.5 mg/dL | [facility]        |              |
| LASMA       |         |                 | LABORATORY  |              |
|             |         |                 | SERVICES,   |              |
|             |         |                 | CORE        |              |
+-------------+---------+-----------------+-------------+--------------+
 
 
 
 
+---------------+
| Specimen      |
+---------------+
| Blood - Blood |
+---------------+
 
 
 
+--------------------+------------------------+--------------------+--------------+
| Performing         | Address                | City/State/Zipcode | Phone Number |
| Organization       |                        |                    |              |
+--------------------+------------------------+--------------------+--------------+
|   [facility]  |   [address]  | [place] [address] |              |
| SERVICES, CORE     | [address]                |                    |              |
+--------------------+------------------------+--------------------+--------------+
 COMPLETE METABOLIC SET (NA,K,CL,CO2,BUN,CREAT,GLUC,CA,AST,ALT,BILI TOTAL,ALK PHOS,ALB,PROT 
TOTAL) (2014  5:27 PM PST)
 
+-------------+---------+-----------------+-------------+--------------+
| Component   | Value   | Ref Range       | Performed   | Pathologist  |
|             |         |                 | At          | Signature    |
+-------------+---------+-----------------+-------------+--------------+
| GLUCOSE,    | 81      | 60 - 99 mg/dL   | [facility]        |              |
| PLASMA      |         |                 | LABORATORY  |              |
| (LAB)       |         |                 | SERVICES,   |              |
|             |         |                 | CORE        |              |
+-------------+---------+-----------------+-------------+--------------+
| BUN, PLASMA | 13      | 6 - 20 mg/dL    | [facility]        |              |
|  (LAB)      |         |                 | LABORATORY  |              |
|             |         |                 | SERVICES,   |              |
|             |         |                 | CORE        |              |
+-------------+---------+-----------------+-------------+--------------+
| CREATININE  | 0.76    | 0.46 - 0.81     | [facility]        |              |
| PLASMA      |         | mg/dL           | LABORATORY  |              |
| (LAB)       |         |                 | SERVICES,   |              |
|             |         |                 | CORE        |              |
+-------------+---------+-----------------+-------------+--------------+
| SODIUM,     | 139     | 136 - 145       | [facility]        |              |
| PLASMA      |         | mmol/L          | LABORATORY  |              |
| (LAB)       |         |                 | SERVICES,   |              |
|             |         |                 | CORE        |              |
+-------------+---------+-----------------+-------------+--------------+
| POTASSIUM,  | 3.9     | 3.4 - 5.0       | [facility]        |              |
| PLASMA      |         | mmol/L          | LABORATORY  |              |
| (LAB)       |         |                 | SERVICES,   |              |
|             |         |                 | CORE        |              |
+-------------+---------+-----------------+-------------+--------------+
| CHLORIDE,   | 105     | 97 - 108 mmol/L | [facility]        |              |
| PLASMA      |         |                 | LABORATORY  |              |
| (LAB)       |         |                 | SERVICES,   |              |
|             |         |                 | CORE        |              |
+-------------+---------+-----------------+-------------+--------------+
| TOTAL CO2,  | 30      | 21 - 32 mmol/L  | [facility]        |              |
| PLASMA      |         |                 | LABORATORY  |              |
| (LAB)       |         |                 | SERVICES,   |              |
|             |         |                 | CORE        |              |
+-------------+---------+-----------------+-------------+--------------+
| CALCIUM,    | 9.4     | 8.6 - 10.2      | [facility]        |              |
 
| PLASMA      |         | mg/dL           | LABORATORY  |              |
| (LAB)       |         |                 | SERVICES,   |              |
|             |         |                 | CORE        |              |
+-------------+---------+-----------------+-------------+--------------+
| BILIRUBIN   | 0.2 (L) | 0.3 - 1.2 mg/dL | [facility]        |              |
| TOTAL       |         |                 | LABORATORY  |              |
|             |         |                 | SERVICES,   |              |
|             |         |                 | CORE        |              |
+-------------+---------+-----------------+-------------+--------------+
| TOTAL       | 7.6     | 6.2 - 8.5 g/dL  | [facility]        |              |
| PROTEIN,    |         |                 | LABORATORY  |              |
| PLASMA      |         |                 | SERVICES,   |              |
| (LAB)       |         |                 | CORE        |              |
+-------------+---------+-----------------+-------------+--------------+
| ALBUMIN,    | 3.7     | 3.5 - 4.7 g/dL  | [facility]        |              |
| PLASMA      |         |                 | LABORATORY  |              |
| (LAB)       |         |                 | SERVICES,   |              |
|             |         |                 | CORE        |              |
+-------------+---------+-----------------+-------------+--------------+
| ALK PHOS    | 101     | 42 - 110 U/L    | [facility]        |              |
|             |         |                 | LABORATORY  |              |
|             |         |                 | SERVICES,   |              |
|             |         |                 | CORE        |              |
+-------------+---------+-----------------+-------------+--------------+
| AST(SGOT)   | 15 (L)  | 18 - 36 U/L     | [facility]        |              |
|             |         |                 | LABORATORY  |              |
|             |         |                 | SERVICES,   |              |
|             |         |                 | CORE        |              |
+-------------+---------+-----------------+-------------+--------------+
| ALT (SGPT)  | 18      | 12 - 60 U/L     | [facility]        |              |
|             |         |                 | LABORATORY  |              |
|             |         |                 | SERVICES,   |              |
|             |         |                 | CORE        |              |
+-------------+---------+-----------------+-------------+--------------+
| ANION       | 4       | 4 - 11 mmol/L   | [facility]        |              |
| GAP(ALB     |         |                 | LABORATORY  |              |
| CORRECTED)  |         |                 | SERVICES,   |              |
|             |         |                 | CORE        |              |
+-------------+---------+-----------------+-------------+--------------+
| POTASSIUM   | No Hemo |                 | [facility]        |              |
| CMNT        |         |                 | LABORATORY  |              |
|             |         |                 | SERVICES,   |              |
|             |         |                 | CORE        |              |
+-------------+---------+-----------------+-------------+--------------+
| BILI T CMNT | No Hemo |                 | [facility]        |              |
|             |         |                 | LABORATORY  |              |
|             |         |                 | SERVICES,   |              |
|             |         |                 | CORE        |              |
+-------------+---------+-----------------+-------------+--------------+
| AST CMNT    | No Hemo |                 | [facility]        |              |
|             |         |                 | LABORATORY  |              |
|             |         |                 | SERVICES,   |              |
|             |         |                 | CORE        |              |
+-------------+---------+-----------------+-------------+--------------+
| ANION GAP   | 4       | mmol/L          | [facility]        |              |
|             |         |                 | LABORATORY  |              |
|             |         |                 | SERVICES,   |              |
|             |         |                 | CORE        |              |
+-------------+---------+-----------------+-------------+--------------+
 
 
 
 
+---------------+
| Specimen      |
+---------------+
| Blood - Blood |
+---------------+
 
 
 
+--------------------+------------------------+--------------------+--------------+
| Performing         | Address                | City/State/Zipcode | Phone Number |
| Organization       |                        |                    |              |
+--------------------+------------------------+--------------------+--------------+
|   [facility]  |   [address]  | [place] [address] |              |
| SERVICES, CORE     | [address]                |                    |              |
+--------------------+------------------------+--------------------+--------------+
 documented in this encounter
 
 Visit Diagnoses
 
 
+--------------------------------------------------------------------------------------+
| Diagnosis                                                                            |
+--------------------------------------------------------------------------------------+
|   Transplanted liver (HCC)  Liver replaced by transplant                             |
+--------------------------------------------------------------------------------------+
|   High risk medications (not anticoagulants) long-term use  Encounter for long-term  |
| (current) use of other medications                                                   |
+--------------------------------------------------------------------------------------+
 documented in this encounter

## 2019-11-01 NOTE — XMS
Encounter Summary
  Created on: 2019
 
 Bonifacio Nila JB
 External Reference #: [id number]
 : [birth year]
 Sex: Female
 
 Demographics
 
 
+-----------------------+------------------------+
| Address               | [address]            |
|                       | [place]  [address]   |
+-----------------------+------------------------+
| Home Phone            | [phone]        |
+-----------------------+------------------------+
| Preferred Language    | Unknown                |
+-----------------------+------------------------+
| Marital Status        | Single                 |
+-----------------------+------------------------+
| Nondenominational Affiliation | Unknown                |
+-----------------------+------------------------+
| Race                  | White                  |
+-----------------------+------------------------+
| Ethnic Group          | Not  or  |
+-----------------------+------------------------+
 
 
 Author
 
 
+--------------+------------------------------+
| Author       | [facility] |
+--------------+------------------------------+
| Organization | [facility] |
+--------------+------------------------------+
| Address      | Unknown                      |
+--------------+------------------------------+
| Phone        | Unavailable                  |
+--------------+------------------------------+
 
 
 
 Support
 
 
+-----------------+--------------+---------+-----------------+
| Name            | Relationship | Address | Phone           |
+-----------------+--------------+---------+-----------------+
| Kit Valdovinos   | ECON         | Unknown | [phone] |
+-----------------+--------------+---------+-----------------+
| Magdalena Valdovinos | ECON         | Unknown | [phone] |
+-----------------+--------------+---------+-----------------+
 
 
 
 Care Team Providers
 
 
 
+------------------------+------+-----------------+
| Care Team Member Name  | Role | Phone           |
+------------------------+------+-----------------+
| Lily Horton MD | PCP  | [phone] |
+------------------------+------+-----------------+
 
 
 
 Reason for Visit
 
 
+-------------------+------------------+
| Reason            | Comments         |
+-------------------+------------------+
| Care Coordination | Pedro request |
+-------------------+------------------+
 
 
 
 Encounter Details
 
 
+--------+-----------+----------------------+----------------------+--------------------+
| Date   | Type      | Department           | Care Team            | Description        |
+--------+-----------+----------------------+----------------------+--------------------+
| / | Telephone |   Pediatric          |   Neema Khalil,   | Care Coordination  |
| 2017   |           | Gastroenterology at  | MD  [address] | ([facility]) |
|        |           | [place]          |   [place], [place]       |                    |
|        |           | [facility]  | [address]           |                    |
|        |           |  [address] | [phone]         |                    |
|        |           |  [address]  Mailcode:  | [phone] (Fax)   |                    |
|        |           | [facility] [place]   |                      |                    |
|        |           | [facility] [place]         |                      |                    |
|        |           | [address]           |                      |                    |
|        |           | [phone]         |                      |                    |
+--------+-----------+----------------------+----------------------+--------------------+
 
 
 
 Social History
 
 
+-------------------+-------+-----------+--------+------+
| Tobacco Use       | Types | Packs/Day | Years  | Date |
|                   |       |           | Used   |      |
+-------------------+-------+-----------+--------+------+
| Passive Smoke     |       |           |        |      |
| Exposure - Never  |       |           |        |      |
| Smoker            |       |           |        |      |
+-------------------+-------+-----------+--------+------+
 
 
 
+---------------------+---+---+---+
| Smokeless Tobacco:  |   |   |   |
| Never Used          |   |   |   |
+---------------------+---+---+---+
 
 
 
 
+-------------+-------------+---------+----------+
| Alcohol Use | Drinks/Week | oz/Week | Comments |
+-------------+-------------+---------+----------+
| Not Asked   |             |         |          |
+-------------+-------------+---------+----------+
 
 
 
+------------------+---------------+
| Sex Assigned at  | Date Recorded |
| Birth            |               |
+------------------+---------------+
| Not on file      |               |
+------------------+---------------+
 
 
 
+----------------+-------------+-------------+
| Job Start Date | Occupation  | Industry    |
+----------------+-------------+-------------+
| Not on file    | Not on file | Not on file |
+----------------+-------------+-------------+
 
 
 
+----------------+--------------+------------+
| Travel History | Travel Start | Travel End |
+----------------+--------------+------------+
 
 
 
+-------------------------------------+
| No recent travel history available. |
+-------------------------------------+
 documented as of this encounter
 
 Plan of Treatment
 Not on filedocumented as of this encounter
 
 Visit Diagnoses
 Not on filedocumented in this encounter

## 2019-11-01 NOTE — XMS
Encounter Summary
  Created on: 2019
 
 Bonifacio Nila JB
 External Reference #: [id number]
 : [birth year]
 Sex: Female
 
 Demographics
 
 
+-----------------------+------------------------+
| Address               | [address]            |
|                       | [place]  [address]   |
+-----------------------+------------------------+
| Home Phone            | [phone]        |
+-----------------------+------------------------+
| Preferred Language    | Unknown                |
+-----------------------+------------------------+
| Marital Status        | Single                 |
+-----------------------+------------------------+
| Congregational Affiliation | Unknown                |
+-----------------------+------------------------+
| Race                  | White                  |
+-----------------------+------------------------+
| Ethnic Group          | Not  or  |
+-----------------------+------------------------+
 
 
 Author
 
 
+--------------+------------------------------+
| Author       | [facility] |
+--------------+------------------------------+
| Organization | [facility] |
+--------------+------------------------------+
| Address      | Unknown                      |
+--------------+------------------------------+
| Phone        | Unavailable                  |
+--------------+------------------------------+
 
 
 
 Support
 
 
+-----------------+--------------+---------+-----------------+
| Name            | Relationship | Address | Phone           |
+-----------------+--------------+---------+-----------------+
| Kit Valdovinos   | ECON         | Unknown | [phone] |
+-----------------+--------------+---------+-----------------+
| Magdalena Valdovinos | ECON         | Unknown | [phone] |
+-----------------+--------------+---------+-----------------+
 
 
 
 Care Team Providers
 
 
 
+-----------------------+------+-----------------+
| Care Team Member Name | Role | Phone           |
+-----------------------+------+-----------------+
| Lily Horton MD   | PCP  | [phone] |
+-----------------------+------+-----------------+
 
 
 
 Reason for Visit
 Consultation (Routine)
 
+--------+--------------+---------------+--------------+--------------+---------------+
| Status | Reason       | Specialty     | Diagnoses /  | Referred By  | Referred To   |
|        |              |               | Procedures   | Contact      | Contact       |
+--------+--------------+---------------+--------------+--------------+---------------+
| Closed |   Specialty  | Pediatric     |   Diagnoses  |   [facility]  |
|        | Services     | Gastroenterol |  Liver       | Lily CARY MD | [facility]  [address]   |
|        | Required     | ogy           | replaced by  |   PEDS       | [address]   |
|        |              |               | transplant   | SPECIALISTS  | [address]       |
|        |              |               | (HCC)        | [facility] [place] | Mailcode:     |
|        |              |               |              |   [address]   | [facility]         |
|        |              |               |              | [address] | [place]   |
|        |              |               |              |  [address]         | [place], [place]  |
|        |              |               |              | [place],   | [address]    |
|        |              |               |              | [place] [address]     | Phone:        |
|        |              |               |              | Phone:       | [phone]  |
|        |              |               |              | [phone] |  Fax:         |
|        |              |               |              |   Fax:       | [phone]  |
|        |              |               |              | [phone] |               |
+--------+--------------+---------------+--------------+--------------+---------------+
 
 
 
 
 Encounter Details
 
 
+--------+-----------+----------------------+--------------------+-------------+
| Date   | Type      | Department           | Care Team          | Description |
+--------+-----------+----------------------+--------------------+-------------+
| 10/12/ | Office    |   Pediatric          |   Ivis Burkett MD |             |
|    | Visit-ECX | Gastroenterology at  |                    |             |
|        |           | [place]          |                    |             |
|        |           | [facility]  |                    |             |
|        |           |  [address] |                    |             |
|        |           |  [address]  Mailcode:  |                    |             |
|        |           | [facility]  [place]   |                    |             |
|        |           | [place], [place]         |                    |             |
|        |           | [address]           |                    |             |
|        |           | [phone]         |                    |             |
+--------+-----------+----------------------+--------------------+-------------+
 
 
 
 Social History
 
 
+----------------+-------+-----------+--------+------+
 
| Tobacco Use    | Types | Packs/Day | Years  | Date |
|                |       |           | Used   |      |
+----------------+-------+-----------+--------+------+
| Never Assessed |       |           |        |      |
+----------------+-------+-----------+--------+------+
 
 
 
+------------------+---------------+
| Sex Assigned at  | Date Recorded |
| Birth            |               |
+------------------+---------------+
| Not on file      |               |
+------------------+---------------+
 
 
 
+----------------+-------------+-------------+
| Job Start Date | Occupation  | Industry    |
+----------------+-------------+-------------+
| Not on file    | Not on file | Not on file |
+----------------+-------------+-------------+
 
 
 
+----------------+--------------+------------+
| Travel History | Travel Start | Travel End |
+----------------+--------------+------------+
 
 
 
+-------------------------------------+
| No recent travel history available. |
+-------------------------------------+
 documented as of this encounter
 
 Last Filed Vital Signs
 
 
+-------------------+----------------------+----------------------+----------+
| Vital Sign        | Reading              | Time Taken           | Comments |
+-------------------+----------------------+----------------------+----------+
| Blood Pressure    | 120/83               | 10/12/2006  2:15 PM  |          |
|                   |                      | PDT                  |          |
+-------------------+----------------------+----------------------+----------+
| Pulse             | 80                   | 10/12/2006  2:15 PM  |          |
|                   |                      | PDT                  |          |
+-------------------+----------------------+----------------------+----------+
| Temperature       | -                    | -                    |          |
+-------------------+----------------------+----------------------+----------+
| Respiratory Rate  | -                    | -                    |          |
+-------------------+----------------------+----------------------+----------+
| Oxygen Saturation | -                    | -                    |          |
+-------------------+----------------------+----------------------+----------+
| Inhaled Oxygen    | -                    | -                    |          |
| Concentration     |                      |                      |          |
+-------------------+----------------------+----------------------+----------+
| Weight            | 30.8 kg (67 lb 14.4  | 10/12/2006  2:15 PM  |          |
|                   | oz)                  | PDT                  |          |
+-------------------+----------------------+----------------------+----------+
 
| Height            | 130 cm (4' 3.18")    | 10/12/2006  2:15 PM  |          |
|                   |                      | PDT                  |          |
+-------------------+----------------------+----------------------+----------+
| Body Mass Index   | 18.23                | 10/12/2006  2:15 PM  |          |
|                   |                      | PDT                  |          |
+-------------------+----------------------+----------------------+----------+
 documented in this encounter
 
 Plan of Treatment
 Not on filedocumented as of this encounter
 
 Visit Diagnoses
 Not on filedocumented in this encounter

## 2019-11-01 NOTE — XMS
Encounter Summary
  Created on: 2019
 
 Bonifacio Nila JB
 External Reference #: [id number]
 : [birth year]
 Sex: Female
 
 Demographics
 
 
+-----------------------+------------------------+
| Address               | [address]            |
|                       | [place]  [address]   |
+-----------------------+------------------------+
| Home Phone            | [phone]        |
+-----------------------+------------------------+
| Preferred Language    | Unknown                |
+-----------------------+------------------------+
| Marital Status        | Single                 |
+-----------------------+------------------------+
| Denominational Affiliation | Unknown                |
+-----------------------+------------------------+
| Race                  | White                  |
+-----------------------+------------------------+
| Ethnic Group          | Not  or  |
+-----------------------+------------------------+
 
 
 Author
 
 
+--------------+------------------------------+
| Author       | [facility] |
+--------------+------------------------------+
| Organization | [facility] |
+--------------+------------------------------+
| Address      | Unknown                      |
+--------------+------------------------------+
| Phone        | Unavailable                  |
+--------------+------------------------------+
 
 
 
 Support
 
 
+-----------------+--------------+---------+-----------------+
| Name            | Relationship | Address | Phone           |
+-----------------+--------------+---------+-----------------+
| Kit Valdovinos   | ECON         | Unknown | [phone] |
+-----------------+--------------+---------+-----------------+
| Magdalena Valdovinos | ECON         | Unknown | [phone] |
+-----------------+--------------+---------+-----------------+
 
 
 
 Care Team Providers
 
 
 
+------------------------+------+-----------------+
| Care Team Member Name  | Role | Phone           |
+------------------------+------+-----------------+
| Lily Horton MD | PCP  | [phone] |
+------------------------+------+-----------------+
 
 
 
 Reason for Visit
 
 
+-------------------+----------+
| Reason            | Comments |
+-------------------+----------+
| Social Work Notes |          |
+-------------------+----------+
 
 
 
 Encounter Details
 
 
+--------+-------------+----------------------+---------------------+-------------------+
| Date   | Type        | Department           | Care Team           | Description       |
+--------+-------------+----------------------+---------------------+-------------------+
| / | Documentati |   SOCIAL WORK        |   Magdalena Galarza,  | Social Work Notes |
| 2016   | on          | AM[address]ULATORY  [address]  | LCSW  [address]   |                   |
|        |             | [address]  | [address]     |                   |
|        |             |  Mailcode: [address]      | [place], [place]        |                   |
|        |             | [place], [place]         | [address]          |                   |
|        |             | [address]           |                     |                   |
|        |             | [phone]         |                     |                   |
+--------+-------------+----------------------+---------------------+-------------------+
 
 
 
 Social History
 
 
+-------------------+-------+-----------+--------+------+
| Tobacco Use       | Types | Packs/Day | Years  | Date |
|                   |       |           | Used   |      |
+-------------------+-------+-----------+--------+------+
| Passive Smoke     |       |           |        |      |
| Exposure - Never  |       |           |        |      |
| Smoker            |       |           |        |      |
+-------------------+-------+-----------+--------+------+
 
 
 
+---------------------+---+---+---+
| Smokeless Tobacco:  |   |   |   |
| Never Used          |   |   |   |
+---------------------+---+---+---+
 
 
 
+-------------+-------------+---------+----------+
 
| Alcohol Use | Drinks/Week | oz/Week | Comments |
+-------------+-------------+---------+----------+
| Not Asked   |             |         |          |
+-------------+-------------+---------+----------+
 
 
 
+------------------+---------------+
| Sex Assigned at  | Date Recorded |
| Birth            |               |
+------------------+---------------+
| Not on file      |               |
+------------------+---------------+
 
 
 
+----------------+-------------+-------------+
| Job Start Date | Occupation  | Industry    |
+----------------+-------------+-------------+
| Not on file    | Not on file | Not on file |
+----------------+-------------+-------------+
 
 
 
+----------------+--------------+------------+
| Travel History | Travel Start | Travel End |
+----------------+--------------+------------+
 
 
 
+-------------------------------------+
| No recent travel history available. |
+-------------------------------------+
 documented as of this encounter
 
 Plan of Treatment
 Not on filedocumented as of this encounter
 
 Visit Diagnoses
 Not on filedocumented in this encounter

## 2019-11-01 NOTE — XMS
Encounter Summary
  Created on: 2019
 
 Bonifacio Nila JB
 External Reference #: [id number]
 : [birth year]
 Sex: Female
 
 Demographics
 
 
+-----------------------+------------------------+
| Address               | [address]            |
|                       | [place]  [address]   |
+-----------------------+------------------------+
| Home Phone            | [phone]        |
+-----------------------+------------------------+
| Preferred Language    | Unknown                |
+-----------------------+------------------------+
| Marital Status        | Single                 |
+-----------------------+------------------------+
| Advent Affiliation | Unknown                |
+-----------------------+------------------------+
| Race                  | White                  |
+-----------------------+------------------------+
| Ethnic Group          | Not  or  |
+-----------------------+------------------------+
 
 
 Author
 
 
+--------------+------------------------------+
| Author       | [facility] |
+--------------+------------------------------+
| Organization | [facility] |
+--------------+------------------------------+
| Address      | Unknown                      |
+--------------+------------------------------+
| Phone        | Unavailable                  |
+--------------+------------------------------+
 
 
 
 Support
 
 
+-----------------+--------------+---------+-----------------+
| Name            | Relationship | Address | Phone           |
+-----------------+--------------+---------+-----------------+
| Kit Valdovinos   | ECON         | Unknown | [phone] |
+-----------------+--------------+---------+-----------------+
| Magdalena Valdovinos | ECON         | Unknown | [phone] |
+-----------------+--------------+---------+-----------------+
 
 
 
 Care Team Providers
 
 
 
+------------------------+------+-----------------+
| Care Team Member Name  | Role | Phone           |
+------------------------+------+-----------------+
| Lily Horton MD | PCP  | [phone] |
+------------------------+------+-----------------+
 
 
 
 Reason for Visit
 
 
+-------------------+----------+
| Reason            | Comments |
+-------------------+----------+
| Care Coordination |          |
+-------------------+----------+
 
 
 
 Encounter Details
 
 
+--------+-----------+----------------------+----------------------+-------------------+
| Date   | Type      | Department           | Care Team            | Description       |
+--------+-----------+----------------------+----------------------+-------------------+
| / | Telephone |   Pediatric          |   Neema Khalil,   | Care Coordination |
| 2016   |           | Gastroenterology at  | MD  [address] |                   |
|        |           | [place]          |   [place], [place]       |                   |
|        |           | [facility]  | [address]           |                   |
|        |           |  [address] [facility] | [phone]         |                   |
|        |           |  [address]  Mailcode:  | [phone] (Fax)   |                   |
|        |           | [address]   |                      |                   |
|        |           | [place], [place]         |                      |                   |
|        |           | [address]           |                      |                   |
|        |           | [phone]         |                      |                   |
+--------+-----------+----------------------+----------------------+-------------------+
 
 
 
 Social History
 
 
+-------------------+-------+-----------+--------+------+
| Tobacco Use       | Types | Packs/Day | Years  | Date |
|                   |       |           | Used   |      |
+-------------------+-------+-----------+--------+------+
| Passive Smoke     |       |           |        |      |
| Exposure - Never  |       |           |        |      |
| Smoker            |       |           |        |      |
+-------------------+-------+-----------+--------+------+
 
 
 
+---------------------+---+---+---+
| Smokeless Tobacco:  |   |   |   |
| Never Used          |   |   |   |
+---------------------+---+---+---+
 
 
 
 
+-------------+-------------+---------+----------+
| Alcohol Use | Drinks/Week | oz/Week | Comments |
+-------------+-------------+---------+----------+
| Not Asked   |             |         |          |
+-------------+-------------+---------+----------+
 
 
 
+------------------+---------------+
| Sex Assigned at  | Date Recorded |
| Birth            |               |
+------------------+---------------+
| Not on file      |               |
+------------------+---------------+
 
 
 
+----------------+-------------+-------------+
| Job Start Date | Occupation  | Industry    |
+----------------+-------------+-------------+
| Not on file    | Not on file | Not on file |
+----------------+-------------+-------------+
 
 
 
+----------------+--------------+------------+
| Travel History | Travel Start | Travel End |
+----------------+--------------+------------+
 
 
 
+-------------------------------------+
| No recent travel history available. |
+-------------------------------------+
 documented as of this encounter
 
 Plan of Treatment
 Not on filedocumented as of this encounter
 
 Visit Diagnoses
 Not on filedocumented in this encounter

## 2019-11-01 NOTE — XMS
Encounter Summary
  Created on: 2019
 
 Bonifacio Nila JB
 External Reference #: [id number]
 : [birth year]
 Sex: Female
 
 Demographics
 
 
+-----------------------+------------------------+
| Address               | [address]            |
|                       | [place]  [address]   |
+-----------------------+------------------------+
| Home Phone            | [phone]        |
+-----------------------+------------------------+
| Preferred Language    | Unknown                |
+-----------------------+------------------------+
| Marital Status        | Single                 |
+-----------------------+------------------------+
| Holiness Affiliation | Unknown                |
+-----------------------+------------------------+
| Race                  | White                  |
+-----------------------+------------------------+
| Ethnic Group          | Not  or  |
+-----------------------+------------------------+
 
 
 Author
 
 
+--------------+------------------------------+
| Author       | [facility] |
+--------------+------------------------------+
| Organization | [facility] |
+--------------+------------------------------+
| Address      | Unknown                      |
+--------------+------------------------------+
| Phone        | Unavailable                  |
+--------------+------------------------------+
 
 
 
 Support
 
 
+-----------------+--------------+---------+-----------------+
| Name            | Relationship | Address | Phone           |
+-----------------+--------------+---------+-----------------+
| Kit Valdovinos   | ECON         | Unknown | [phone] |
+-----------------+--------------+---------+-----------------+
| Magdalena Valdovinos | ECON         | Unknown | [phone] |
+-----------------+--------------+---------+-----------------+
 
 
 
 Care Team Providers
 
 
 
+------------------------+------+-----------------+
| Care Team Member Name  | Role | Phone           |
+------------------------+------+-----------------+
| Lily Horton MD | PCP  | [phone] |
+------------------------+------+-----------------+
 
 
 
 Encounter Details
 
 
+--------+-------------+----------------------+--------------+-------------+
| Date   | Type        | Department           | Care Team    | Description |
+--------+-------------+----------------------+--------------+-------------+
| / | Document-Sc |   UNKNOWN DEPARTMENT |   Unknown  . |             |
| 2016   | anned       |   [address]        |              |             |
|        |             | [address]      |              |             |
|        |             | [place], [place]         |              |             |
|        |             | [address]           |              |             |
+--------+-------------+----------------------+--------------+-------------+
 
 
 
 Social History
 
 
+-------------------+-------+-----------+--------+------+
| Tobacco Use       | Types | Packs/Day | Years  | Date |
|                   |       |           | Used   |      |
+-------------------+-------+-----------+--------+------+
| Passive Smoke     |       |           |        |      |
| Exposure - Never  |       |           |        |      |
| Smoker            |       |           |        |      |
+-------------------+-------+-----------+--------+------+
 
 
 
+---------------------+---+---+---+
| Smokeless Tobacco:  |   |   |   |
| Never Used          |   |   |   |
+---------------------+---+---+---+
 
 
 
+-------------+-------------+---------+----------+
| Alcohol Use | Drinks/Week | oz/Week | Comments |
+-------------+-------------+---------+----------+
| Not Asked   |             |         |          |
+-------------+-------------+---------+----------+
 
 
 
+------------------+---------------+
| Sex Assigned at  | Date Recorded |
| Birth            |               |
+------------------+---------------+
| Not on file      |               |
+------------------+---------------+
 
 
 
 
+----------------+-------------+-------------+
| Job Start Date | Occupation  | Industry    |
+----------------+-------------+-------------+
| Not on file    | Not on file | Not on file |
+----------------+-------------+-------------+
 
 
 
+----------------+--------------+------------+
| Travel History | Travel Start | Travel End |
+----------------+--------------+------------+
 
 
 
+-------------------------------------+
| No recent travel history available. |
+-------------------------------------+
 documented as of this encounter
 
 Plan of Treatment
 Not on filedocumented as of this encounter
 
 Procedures
 
 
+----------------+--------+-------------+----------------------+----------------------+
| Procedure Name | Priori | Date/Time   | Associated Diagnosis | Comments             |
|                | ty     |             |                      |                      |
+----------------+--------+-------------+----------------------+----------------------+
| LAB REPORTS    |        | 2016  |                      |   Results for this   |
|                |        | 12:00 AM    |                      | procedure are in the |
|                |        | PDT         |                      |  results section.    |
+----------------+--------+-------------+----------------------+----------------------+
 documented in this encounter
 
 Results
 LAB REPORTS (2016 12:00 AM PDT)
 
+----------------------------------------------------------+--------------+
| Narrative                                                | Performed At |
+----------------------------------------------------------+--------------+
|   This result has an attachment that is not available.   |              |
+----------------------------------------------------------+--------------+
 documented in this encounter
 
 Visit Diagnoses
 Not on filedocumented in this encounter

## 2019-11-01 NOTE — XMS
Encounter Summary
  Created on: 2019
 
 Bonifacio Nila JB
 External Reference #: [id number]
 : [birth year]
 Sex: Female
 
 Demographics
 
 
+-----------------------+------------------------+
| Address               | [address]            |
|                       | [place]  [address]   |
+-----------------------+------------------------+
| Home Phone            | [phone]        |
+-----------------------+------------------------+
| Preferred Language    | Unknown                |
+-----------------------+------------------------+
| Marital Status        | Single                 |
+-----------------------+------------------------+
| Protestant Affiliation | Unknown                |
+-----------------------+------------------------+
| Race                  | White                  |
+-----------------------+------------------------+
| Ethnic Group          | Not  or  |
+-----------------------+------------------------+
 
 
 Author
 
 
+--------------+------------------------------+
| Author       | [facility] |
+--------------+------------------------------+
| Organization | [facility] |
+--------------+------------------------------+
| Address      | Unknown                      |
+--------------+------------------------------+
| Phone        | Unavailable                  |
+--------------+------------------------------+
 
 
 
 Support
 
 
+-----------------+--------------+---------+-----------------+
| Name            | Relationship | Address | Phone           |
+-----------------+--------------+---------+-----------------+
| Kit Valdovinos   | ECON         | Unknown | [phone] |
+-----------------+--------------+---------+-----------------+
| Magdalena Valdovinos | ECON         | Unknown | [phone] |
+-----------------+--------------+---------+-----------------+
 
 
 
 Care Team Providers
 
 
 
+-----------------------+------+-----------------+
| Care Team Member Name | Role | Phone           |
+-----------------------+------+-----------------+
| Lily Horton MD   | PCP  | [phone] |
+-----------------------+------+-----------------+
 
 
 
 Reason for Visit
 
 
+-------------------+----------+
| Reason            | Comments |
+-------------------+----------+
| Follow-up in      |          |
| outpatient clinic |          |
+-------------------+----------+
 Consultation (Routine)
 
+--------+--------------+---------------+--------------+--------------+---------------+
| Status | Reason       | Specialty     | Diagnoses /  | Referred By  | Referred To   |
|        |              |               | Procedures   | Contact      | Contact       |
+--------+--------------+---------------+--------------+--------------+---------------+
| Closed |   Specialty  | Pediatric     |   Diagnoses  |   Sol,    |   Ped Gastro  |
|        | Services     | Gastroenterol |  Liver       | Lily CARY MD | [facility]  [address]   |
|        | Required     | ogy           | replaced by  |   PEDS       | [address]   |
|        |              |               | transplant   | SPECIALISTS  | [address]       |
|        |              |               | (HCC)        | OF [place] | Mailcode:     |
|        |              |               |              |   [address]   | [address]         |
|        |              |               |              | [address] | [address]   |
|        |              |               |              |  [address]         | [place], [place]  |
|        |              |               |              | [place],   | [address]    |
|        |              |               |              | [place] [address]     | Phone:        |
|        |              |               |              | Phone:       | [phone]  |
|        |              |               |              | [phone] |  Fax:         |
|        |              |               |              |   Fax:       | [phone]  |
|        |              |               |              | [phone] |               |
+--------+--------------+---------------+--------------+--------------+---------------+
 
 
 
 
 Encounter Details
 
 
+--------+---------+----------------------+--------------------+---------------------+
| Date   | Type    | Department           | Care Team          | Description         |
+--------+---------+----------------------+--------------------+---------------------+
| / | Office  |   Pediatric          |   Ivis Burkett MD | Transplanted Liver  |
|    | Visit   | Gastroenterology at  |                    | (HCC) (Primary Dx)  |
|        |         | [place]          |                    |                     |
|        |         | [facility]  |                    |                     |
|        |         |  [address] |                    |                     |
|        |         |  [address]  Mailcode:  |                    |                     |
|        |         | [address]  [place]   |                    |                     |
|        |         | [place], [place]         |                    |                     |
|        |         | [address]           |                    |                     |
|        |         | [phone]         |                    |                     |
 
+--------+---------+----------------------+--------------------+---------------------+
 
 
 
 Social History
 
 
+----------------+-------+-----------+--------+------+
| Tobacco Use    | Types | Packs/Day | Years  | Date |
|                |       |           | Used   |      |
+----------------+-------+-----------+--------+------+
| Never Assessed |       |           |        |      |
+----------------+-------+-----------+--------+------+
 
 
 
+------------------+---------------+
| Sex Assigned at  | Date Recorded |
| Birth            |               |
+------------------+---------------+
| Not on file      |               |
+------------------+---------------+
 
 
 
+----------------+-------------+-------------+
| Job Start Date | Occupation  | Industry    |
+----------------+-------------+-------------+
| Not on file    | Not on file | Not on file |
+----------------+-------------+-------------+
 
 
 
+----------------+--------------+------------+
| Travel History | Travel Start | Travel End |
+----------------+--------------+------------+
 
 
 
+-------------------------------------+
| No recent travel history available. |
+-------------------------------------+
 documented as of this encounter
 
 Last Filed Vital Signs
 
 
+-------------------+----------------------+----------------------+----------+
| Vital Sign        | Reading              | Time Taken           | Comments |
+-------------------+----------------------+----------------------+----------+
| Blood Pressure    | 126/80               | 2008 12:49 PM  |          |
|                   |                      | PDT                  |          |
+-------------------+----------------------+----------------------+----------+
| Pulse             | 77                   | 2008 12:49 PM  |          |
|                   |                      | PDT                  |          |
+-------------------+----------------------+----------------------+----------+
| Temperature       | -                    | -                    |          |
+-------------------+----------------------+----------------------+----------+
| Respiratory Rate  | -                    | -                    |          |
+-------------------+----------------------+----------------------+----------+
 
| Oxygen Saturation | -                    | -                    |          |
+-------------------+----------------------+----------------------+----------+
| Inhaled Oxygen    | -                    | -                    |          |
| Concentration     |                      |                      |          |
+-------------------+----------------------+----------------------+----------+
| Weight            | 38.9 kg (85 lb 12.1  | 2008 12:49 PM  |          |
|                   | oz)                  | PDT                  |          |
+-------------------+----------------------+----------------------+----------+
| Height            | 140.6 cm (4' 7.35")  | 2008 12:49 PM  |          |
|                   |                      | PDT                  |          |
+-------------------+----------------------+----------------------+----------+
| Body Mass Index   | 19.68                | 2008 12:49 PM  |          |
|                   |                      | PDT                  |          |
+-------------------+----------------------+----------------------+----------+
 documented in this encounter
 
 Tanmay Burkett, Ivis - 2008  1:12 PM PDTFormatting of this note might be different from the o
riginal.
 
 Nila Valdovinos is an 8 y.o. female, who is referred by Lily Horton, who returns to [facility] clinic for followup of her liver transplant
 
 Patient Active Problem List: 
   VSD (Ventricular Septal Defect)[745.4Y]
     Comment: With polysplenia 
   Aortic Valve Insufficiency[424.1F]
   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
     Comment: For biliary atresia, 2000 at [place]
 
 Current outpatient prescriptions prior to encounter 
 Medication Sig Dispense Refill 
   Tacrolimus (PROGRAF) 1 mg Oral Capsule 1 PO bid  60  1 
 
 No Known Allergies.
 
 HPI: Nila returned to clinic today with her mother. She has been doing very well and has n
ot had any significant illnesses since her last visit, which was in Oct 2006. She has contin
ued to take Prograf but admits to forgetting a dose about every few days. Her mother gives h
er a dose at 5AM but she is at her grandfather's at 5PM and sometimes forgets. 
 
 She did not have a flu shot this year. She changed schools to a private school with only 11
 kids in her class and 50 in the school, and due to decreased exposure, her mother felt she 
didn't need the shot.
 
 She was seen by her cardiologist, Dr Norman, and new aortic insufficiency was noted. Althoug
h her VSD is functionally smaller, it may need closure in the future if the aortic insuffici
ency worsens. She has no exercise restriction but SBE prophylaxis is recommended.
 
 Review of Systems:  
 General:  No  Fevers. Not sure whether EBV PCR was done with last weeks labs
 Ears, Nose and Throat:  No recurrent aphthous ulcers, sore throat
 Respiratory:  No cough, history of pneumonia, or wheezing
 Gastrointestinal:  No diarrhea or abdominal pain except when she is nervous
 Neurologic:  No unusual headaches 
 Skin:  No rashes
 
 grade 3  and runs track, with her best events being the 100 and 1500 M
 
 Physical Examination:
 
 
 /80, Pulse 77, Ht 140.6 cm (4' 7") (83 %ile), Wt 38.900 kg (85 lbs 12.1 oz) (89 %ile)
, Weight for age(%)  88.63%, BMI for age(%)  86.99%, Length for age(%)  82.81%. 
 
 86.99% of growth percentile based on BMI-for-age.
 
 Appearance: alert, active and in no apparent distress.
 Skin:  turgor normal,  capillary refill brisk, no rashes, petechiae 
 HEENT: normocephalic,PERRLA , no icterus,,nose without discharge, mouth no aphthous lesions
, mucous membranes moist, pharynx unremarkable, tonsils moderate sized without exudate
 Neck: supple, without thyromegaly
 Chest: clear to auscultation bilaterally.
 CV: regular sinus rhythm, normal S1 and S2, no  murmurs.
 Abdomen:,  well healed incisions, soft, no distention, no tenderness to palpation, no rebou
nd , no guarding, no palpable masses, normal bowel sounds, no hepatosplenomegaly
 Musculoskeletal: grossly intact  without clubbing or edema
 Neuro: appropriate interactions, symmetric facies, moves all extremities well
 Nodes: no signficant cervical, supraclavicular, or axillary adenopathy
 
 Last labs 08
 AST 28, ALT 24, alk phos 230, bili 0.1/0.8
 Cr 0.5
 GGT9
 Wbc 8.3, hct 40, plt 339
 Prograf level pending
 
 Assessment: Patient Active Problem List: 
   VSD (Ventricular Septal Defect)[745.4Y]
     Comment: With polysplenia 
   Aortic Valve Insufficiency[424.1F]
 
   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
     Comment: For biliary atresia, 2000 at [place]
 
 Doing well. May or may not have had labs in December, but recent labs normal
 
 Plan:  
 1. CBC, primary transplant panel, drug levels-      every  3  months
 2. EBV PCR and serology                                      every   12  months
 3. CMV PCR and serology                                     Not needed
 
 4. Other labs:   
 5. Imaging:       none
 6. Flu shot from PCP- recommend yearly 
 7. Return to transplant clinic about 6 months.
 
 8. .Mother has questions about whether immunosuppression can ever be stopped, and we discus
sed this and they were counselled to be regular with her medication. Advised to get med disp
enser with days of the week to keep at her grandfather's house.
 
 7. change in medications: none
      
 
 Electronically signed by Ivis Burkett at 2008  1:27 PM PDTdocumented in this encounter
 
 Plan of Treatment
 Not on filedocumented as of this encounter
 
 Visit Diagnoses
 
 
 
+--------------------------------------------------------------------+
| Diagnosis                                                          |
+--------------------------------------------------------------------+
|   Transplanted liver (HCC) - Primary  Liver replaced by transplant |
+--------------------------------------------------------------------+
 documented in this encounter

## 2019-11-01 NOTE — XMS
Encounter Summary
  Created on: 2019
 
 Bonifacio Nila JB
 External Reference #: [id number]
 : [birth year]
 Sex: Female
 
 Demographics
 
 
+-----------------------+------------------------+
| Address               | [address]            |
|                       | [place]  [address]   |
+-----------------------+------------------------+
| Home Phone            | [phone]        |
+-----------------------+------------------------+
| Preferred Language    | Unknown                |
+-----------------------+------------------------+
| Marital Status        | Single                 |
+-----------------------+------------------------+
| Mandaeism Affiliation | Unknown                |
+-----------------------+------------------------+
| Race                  | White                  |
+-----------------------+------------------------+
| Ethnic Group          | Not  or  |
+-----------------------+------------------------+
 
 
 Author
 
 
+--------------+------------------------------+
| Author       | [facility] [employer] [facility] |
+--------------+------------------------------+
| Organization | [facility] |
+--------------+------------------------------+
| Address      | Unknown                      |
+--------------+------------------------------+
| Phone        | Unavailable                  |
+--------------+------------------------------+
 
 
 
 Support
 
 
+-----------------+--------------+---------+-----------------+
| Name            | Relationship | Address | Phone           |
+-----------------+--------------+---------+-----------------+
| Kit Valdovinos   | ECON         | Unknown | [phone] |
+-----------------+--------------+---------+-----------------+
| Magdalena Valdovinos | ECON         | Unknown | [phone] |
+-----------------+--------------+---------+-----------------+
 
 
 
 Care Team Providers
 
 
 
+------------------------+------+-----------------+
| Care Team Member Name  | Role | Phone           |
+------------------------+------+-----------------+
| Lily Horton MD | PCP  | [phone] |
+------------------------+------+-----------------+
 
 
 
 Encounter Details
 
 
+--------+------+----------------------+-----------+---------------------+
| Date   | Type | Department           | Care Team | Description         |
+--------+------+----------------------+-----------+---------------------+
| / | Lab  |   [facility]  |           | Transplanted liver  |
|    |      | 7th Floor  [address]   |           | (HCC); High risk    |
|        |      | [address]  |           | medications (not    |
|        |      |  [place], [place]        |           | anticoagulants)     |
|        |      | [address]           |           | long-term use       |
|        |      | [phone]         |           |                     |
+--------+------+----------------------+-----------+---------------------+
 
 
 
 Social History
 
 
+-------------------+-------+-----------+--------+------+
| Tobacco Use       | Types | Packs/Day | Years  | Date |
|                   |       |           | Used   |      |
+-------------------+-------+-----------+--------+------+
| Passive Smoke     |       |           |        |      |
| Exposure - Never  |       |           |        |      |
| Smoker            |       |           |        |      |
+-------------------+-------+-----------+--------+------+
 
 
 
+---------------------+---+---+---+
| Smokeless Tobacco:  |   |   |   |
| Never Used          |   |   |   |
+---------------------+---+---+---+
 
 
 
+-------------+-------------+---------+----------+
| Alcohol Use | Drinks/Week | oz/Week | Comments |
+-------------+-------------+---------+----------+
| Not Asked   |             |         |          |
+-------------+-------------+---------+----------+
 
 
 
+------------------+---------------+
| Sex Assigned at  | Date Recorded |
| Birth            |               |
+------------------+---------------+
| Not on file      |               |
 
+------------------+---------------+
 
 
 
+----------------+-------------+-------------+
| Job Start Date | Occupation  | Industry    |
+----------------+-------------+-------------+
| Not on file    | Not on file | Not on file |
+----------------+-------------+-------------+
 
 
 
+----------------+--------------+------------+
| Travel History | Travel Start | Travel End |
+----------------+--------------+------------+
 
 
 
+-------------------------------------+
| No recent travel history available. |
+-------------------------------------+
 documented as of this encounter
 
 Plan of Treatment
 Not on filedocumented as of this encounter
 
 Procedures
 
 
+----------------------+--------+-------------+----------------------+----------------------
+
| Procedure Name       | Priori | Date/Time   | Associated Diagnosis | Comments             
|
|                      | ty     |             |                      |                      
|
+----------------------+--------+-------------+----------------------+----------------------
+
| COMPLETE METABOLIC   | Routin | 2014  |   Transplanted liver |   Results for this   
|
| SET                  | e      |  5:27 PM    |  (HCC)  High risk    | procedure are in the 
|
| (NA,K,CL,CO2,BUN,CRE |        | PST         | medications (not     |  results section.    
|
| AT,GLUC,CA,AST,ALT,B |        |             | anticoagulants)      |                      
|
| ASHWINI TOTAL,ALK        |        |             | long-term use        |                      
|
| PHOS,ALB,PROT TOTAL) |        |             |                      |                      
|
+----------------------+--------+-------------+----------------------+----------------------
+
| TACROLIMUS, WHOLE    | Routin | 2014  |   Transplanted liver |   Results for this   
|
| BLOOD                | e      |  5:27 PM    |  (HCC)  High risk    | procedure are in the 
|
|                      |        | PST         | medications (not     |  results section.    
|
|                      |        |             | anticoagulants)      |                      
|
|                      |        |             | long-term use        |                      
 
|
+----------------------+--------+-------------+----------------------+----------------------
+
| PHOSPHORUS, PLASMA   | Routin | 2014  |   Transplanted liver |   Results for this   
|
|                      | e      |  5:27 PM    |  (HCC)  High risk    | procedure are in the 
|
|                      |        | PST         | medications (not     |  results section.    
|
|                      |        |             | anticoagulants)      |                      
|
|                      |        |             | long-term use        |                      
|
+----------------------+--------+-------------+----------------------+----------------------
+
| BILIRUBIN DIRECT     | Routin | 2014  |   Transplanted liver |   Results for this   
|
|                      | e      |  5:27 PM    |  (HCC)  High risk    | procedure are in the 
|
|                      |        | PST         | medications (not     |  results section.    
|
|                      |        |             | anticoagulants)      |                      
|
|                      |        |             | long-term use        |                      
|
+----------------------+--------+-------------+----------------------+----------------------
+
| URIC ACID, PLASMA    | Routin | 2014  |   Transplanted liver |   Results for this   
|
|                      | e      |  5:27 PM    |  (HCC)  High risk    | procedure are in the 
|
|                      |        | PST         | medications (not     |  results section.    
|
|                      |        |             | anticoagulants)      |                      
|
|                      |        |             | long-term use        |                      
|
+----------------------+--------+-------------+----------------------+----------------------
+
| MAGNESIUM, PLASMA    | Routin | 2014  |   Transplanted liver |   Results for this   
|
|                      | e      |  5:27 PM    |  (HCC)  High risk    | procedure are in the 
|
|                      |        | PST         | medications (not     |  results section.    
|
|                      |        |             | anticoagulants)      |                      
|
|                      |        |             | long-term use        |                      
|
+----------------------+--------+-------------+----------------------+----------------------
+
| LDH TOTAL, PLASMA    | Routin | 2014  |   Transplanted liver |   Results for this   
|
|                      | e      |  5:27 PM    |  (HCC)  High risk    | procedure are in the 
|
|                      |        | PST         | medications (not     |  results section.    
|
|                      |        |             | anticoagulants)      |                      
|
|                      |        |             | long-term use        |                      
 
|
+----------------------+--------+-------------+----------------------+----------------------
+
| CHOLESTEROL TOTAL,   | Routin | 2014  |   Transplanted liver |   Results for this   
|
| PLASMA               | e      |  5:27 PM    |  (HCC)  High risk    | procedure are in the 
|
|                      |        | PST         | medications (not     |  results section.    
|
|                      |        |             | anticoagulants)      |                      
|
|                      |        |             | long-term use        |                      
|
+----------------------+--------+-------------+----------------------+----------------------
+
 documented in this encounter
 
 Results
 TACROLIMUS, WHOLE BLOOD (2014  5:27 PM PST)
 
+-------------+----------+-------------+-------------+--------------+
| Component   | Value    | Ref Range   | Performed   | Pathologist  |
|             |          |             | At          | Signature    |
+-------------+----------+-------------+-------------+--------------+
| TACROLIMUS  | <2.0 (L) | 5.0 - 15.0  | [facility]        |              |
| ()    |          | ng/mL       | LABORATORY  |              |
|             |          |             | SERVICES,   |              |
|             |          |             | SPECIAL IMM |              |
|             |          |             |  + COAG     |              |
+-------------+----------+-------------+-------------+--------------+
 
 
 
+---------------+
| Specimen      |
+---------------+
| Blood - Blood |
+---------------+
 
 
 
+------------------------------------------------------------------------+----------------+
| Narrative                                                              | Performed At   |
+------------------------------------------------------------------------+----------------+
|         Therapeutic range is based on a whole blood specimen drawn 12  |   [facility]         |
| hours post-dose or prior to next dose (the trough). Some other factors | LABORATORY     |
|  influencing therapeutic range, dose administered, and result          | SERVICES,      |
| interpretation include time since transplantation, the organ           | SPECIAL IMM +  |
| transplanted, co-administration of other immunosuppressants and        | COAG           |
| interaction with other drugs that may increase or decrease Tacrolimus  |                |
| concentrations.                                                        |                |
+------------------------------------------------------------------------+----------------+
 
 
 
+--------------------+------------------------+--------------------+--------------+
| Performing         | Address                | City/State/Zipcode | Phone Number |
| Organization       |                        |                    |              |
+--------------------+------------------------+--------------------+--------------+
|   [facility]  |   [address]  | [place] [address] |              |
 
| SERVICES, SPECIAL  | [address]                |                    |              |
| IMM + COAG         |                        |                    |              |
+--------------------+------------------------+--------------------+--------------+
 CHOLESTEROL TOTAL, PLASMA (2014  5:27 PM PST)
 
+-------------+-------+------------+-------------+--------------+
| Component   | Value | Ref Range  | Performed   | Pathologist  |
|             |       |            | At          | Signature    |
+-------------+-------+------------+-------------+--------------+
| CHOLESTEROL | 136   | <200 mg/dL | [facility]        |              |
|   (LAB)     |       |            | LABORATORY  |              |
|             |       |            | SERVICES,   |              |
|             |       |            | CORE        |              |
+-------------+-------+------------+-------------+--------------+
 
 
 
+---------------+
| Specimen      |
+---------------+
| Blood - Blood |
+---------------+
 
 
 
+--------------------+------------------------+--------------------+--------------+
| Performing         | Address                | City/State/Zipcode | Phone Number |
| Organization       |                        |                    |              |
+--------------------+------------------------+--------------------+--------------+
|   [facility]  |   [address]  | [place] [address] |              |
| SERVICES, CORE     | [address]                |                    |              |
+--------------------+------------------------+--------------------+--------------+
 LDH TOTAL, PLASMA (2014  5:27 PM PST)
 
+------------+---------+---------------+-------------+--------------+
| Component  | Value   | Ref Range     | Performed   | Pathologist  |
|            |         |               | At          | Signature    |
+------------+---------+---------------+-------------+--------------+
| LD TOTAL,  | 154 (L) | 175 - 285 U/L | [facility]        |              |
| PLASMA     |         |               | LABORATORY  |              |
|            |         |               | SERVICES,   |              |
|            |         |               | CORE        |              |
+------------+---------+---------------+-------------+--------------+
| LD CMNT    | No Hemo |               | [facility]        |              |
|            |         |               | LABORATORY  |              |
|            |         |               | SERVICES,   |              |
|            |         |               | CORE        |              |
+------------+---------+---------------+-------------+--------------+
 
 
 
+---------------+
| Specimen      |
+---------------+
| Blood - Blood |
+---------------+
 
 
 
+-----------------------------------------------------------------------+----------------+
 
| Narrative                                                             | Performed At   |
+-----------------------------------------------------------------------+----------------+
|      New reference range effective 2013 for LD Total performed  |   [facility]         |
| in Core Lab only.                                                     | LABORATORY     |
|                                                                       | SERVICES, CORE |
+-----------------------------------------------------------------------+----------------+
 
 
 
+--------------------+------------------------+--------------------+--------------+
| Performing         | Address                | City/State/Zipcode | Phone Number |
| Organization       |                        |                    |              |
+--------------------+------------------------+--------------------+--------------+
|   [facility]  |   [address]  | [place] [address] |              |
| SERVICES, CORE     | [address]                |                    |              |
+--------------------+------------------------+--------------------+--------------+
 BILIRUBIN DIRECT (2014  5:27 PM PST)
 
+-------------+---------+-----------------+-------------+--------------+
| Component   | Value   | Ref Range       | Performed   | Pathologist  |
|             |         |                 | At          | Signature    |
+-------------+---------+-----------------+-------------+--------------+
| BILIRUBIN   | <0.1    | 0.0 - 0.3 mg/dL | [facility]        |              |
| DIRECT      |         |                 | LABORATORY  |              |
|             |         |                 | SERVICES,   |              |
|             |         |                 | CORE        |              |
+-------------+---------+-----------------+-------------+--------------+
| BILI D CMNT | No Hemo |                 | [facility]        |              |
|             |         |                 | LABORATORY  |              |
|             |         |                 | SERVICES,   |              |
|             |         |                 | CORE        |              |
+-------------+---------+-----------------+-------------+--------------+
 
 
 
+---------------+
| Specimen      |
+---------------+
| Blood - Blood |
+---------------+
 
 
 
+--------------------+------------------------+--------------------+--------------+
| Performing         | Address                | City/State/Zipcode | Phone Number |
| Organization       |                        |                    |              |
+--------------------+------------------------+--------------------+--------------+
|   [facility]  |   [address]  | [place] [address] |              |
| SERVICES, CORE     | [address]                |                    |              |
+--------------------+------------------------+--------------------+--------------+
 URIC ACID, PLASMA (2014  5:27 PM PST)
 
+-------------+-------+-----------------+-------------+--------------+
| Component   | Value | Ref Range       | Performed   | Pathologist  |
|             |       |                 | At          | Signature    |
+-------------+-------+-----------------+-------------+--------------+
| URIC ACID,  | 3.7   | 2.5 - 6.2 mg/dL | [facility]        |              |
| PLASMA      |       |                 | LABORATORY  |              |
| (LAB)       |       |                 | SERVICES,   |              |
|             |       |                 | CORE        |              |
 
+-------------+-------+-----------------+-------------+--------------+
 
 
 
+---------------+
| Specimen      |
+---------------+
| Blood - Blood |
+---------------+
 
 
 
+--------------------+------------------------+--------------------+--------------+
| Performing         | Address                | City/State/Zipcode | Phone Number |
| Organization       |                        |                    |              |
+--------------------+------------------------+--------------------+--------------+
|   [facility]  |   [address]  | [place] [address] |              |
| SERVICES, CORE     | [address]                |                    |              |
+--------------------+------------------------+--------------------+--------------+
 PHOSPHORUS, PLASMA (2014  5:27 PM PST)
 
+-------------+-------+-----------------+-------------+--------------+
| Component   | Value | Ref Range       | Performed   | Pathologist  |
|             |       |                 | At          | Signature    |
+-------------+-------+-----------------+-------------+--------------+
| PHOSPHORUS, | 3.3   | 2.4 - 4.7 mg/dL | [facility]        |              |
|  PLASMA     |       |                 | LABORATORY  |              |
| (LAB)       |       |                 | SERVICES,   |              |
|             |       |                 | CORE        |              |
+-------------+-------+-----------------+-------------+--------------+
 
 
 
+---------------+
| Specimen      |
+---------------+
| Blood - Blood |
+---------------+
 
 
 
+--------------------+------------------------+--------------------+--------------+
| Performing         | Address                | City/State/Zipcode | Phone Number |
| Organization       |                        |                    |              |
+--------------------+------------------------+--------------------+--------------+
|   [facility]  |   [address]  | [place] [address] |              |
| SERVICES, CORE     | [address]                |                    |              |
+--------------------+------------------------+--------------------+--------------+
 MAGNESIUM, PLASMA (2014  5:27 PM PST)
 
+-------------+---------+-----------------+-------------+--------------+
| Component   | Value   | Ref Range       | Performed   | Pathologist  |
|             |         |                 | At          | Signature    |
+-------------+---------+-----------------+-------------+--------------+
| MAGNESIUM,P | 1.6 (L) | 1.8 - 2.5 mg/dL | [facility]        |              |
| LASMA       |         |                 | LABORATORY  |              |
|             |         |                 | SERVICES,   |              |
|             |         |                 | CORE        |              |
+-------------+---------+-----------------+-------------+--------------+
 
 
 
 
+---------------+
| Specimen      |
+---------------+
| Blood - Blood |
+---------------+
 
 
 
+--------------------+------------------------+--------------------+--------------+
| Performing         | Address                | City/State/Zipcode | Phone Number |
| Organization       |                        |                    |              |
+--------------------+------------------------+--------------------+--------------+
|   [facility]  |   [address]  | [place] [address] |              |
| SERVICES, CORE     | [address]                |                    |              |
+--------------------+------------------------+--------------------+--------------+
 COMPLETE METABOLIC SET (NA,K,CL,CO2,BUN,CREAT,GLUC,CA,AST,ALT,BILI TOTAL,ALK PHOS,ALB,PROT 
TOTAL) (2014  5:27 PM PST)
 
+-------------+---------+-----------------+-------------+--------------+
| Component   | Value   | Ref Range       | Performed   | Pathologist  |
|             |         |                 | At          | Signature    |
+-------------+---------+-----------------+-------------+--------------+
| GLUCOSE,    | 81      | 60 - 99 mg/dL   | [facility]        |              |
| PLASMA      |         |                 | LABORATORY  |              |
| (LAB)       |         |                 | SERVICES,   |              |
|             |         |                 | CORE        |              |
+-------------+---------+-----------------+-------------+--------------+
| BUN, PLASMA | 13      | 6 - 20 mg/dL    | [facility]        |              |
|  (LAB)      |         |                 | LABORATORY  |              |
|             |         |                 | SERVICES,   |              |
|             |         |                 | CORE        |              |
+-------------+---------+-----------------+-------------+--------------+
| CREATININE  | 0.76    | 0.46 - 0.81     | [facility]        |              |
| PLASMA      |         | mg/dL           | LABORATORY  |              |
| (LAB)       |         |                 | SERVICES,   |              |
|             |         |                 | CORE        |              |
+-------------+---------+-----------------+-------------+--------------+
| SODIUM,     | 139     | 136 - 145       | [facility]        |              |
| PLASMA      |         | mmol/L          | LABORATORY  |              |
| (LAB)       |         |                 | SERVICES,   |              |
|             |         |                 | CORE        |              |
+-------------+---------+-----------------+-------------+--------------+
| POTASSIUM,  | 3.9     | 3.4 - 5.0       | [facility]        |              |
| PLASMA      |         | mmol/L          | LABORATORY  |              |
| (LAB)       |         |                 | SERVICES,   |              |
|             |         |                 | CORE        |              |
+-------------+---------+-----------------+-------------+--------------+
| CHLORIDE,   | 105     | 97 - 108 mmol/L | [facility]        |              |
| PLASMA      |         |                 | LABORATORY  |              |
| (LAB)       |         |                 | SERVICES,   |              |
|             |         |                 | CORE        |              |
+-------------+---------+-----------------+-------------+--------------+
| TOTAL CO2,  | 30      | 21 - 32 mmol/L  | [facility]        |              |
| PLASMA      |         |                 | LABORATORY  |              |
| (LAB)       |         |                 | SERVICES,   |              |
|             |         |                 | CORE        |              |
+-------------+---------+-----------------+-------------+--------------+
| CALCIUM,    | 9.4     | 8.6 - 10.2      | [facility]        |              |
 
| PLASMA      |         | mg/dL           | LABORATORY  |              |
| (LAB)       |         |                 | SERVICES,   |              |
|             |         |                 | CORE        |              |
+-------------+---------+-----------------+-------------+--------------+
| BILIRUBIN   | 0.2 (L) | 0.3 - 1.2 mg/dL | [facility]        |              |
| TOTAL       |         |                 | LABORATORY  |              |
|             |         |                 | SERVICES,   |              |
|             |         |                 | CORE        |              |
+-------------+---------+-----------------+-------------+--------------+
| TOTAL       | 7.6     | 6.2 - 8.5 g/dL  | [facility]        |              |
| PROTEIN,    |         |                 | LABORATORY  |              |
| PLASMA      |         |                 | SERVICES,   |              |
| (LAB)       |         |                 | CORE        |              |
+-------------+---------+-----------------+-------------+--------------+
| ALBUMIN,    | 3.7     | 3.5 - 4.7 g/dL  | [facility]        |              |
| PLASMA      |         |                 | LABORATORY  |              |
| (LAB)       |         |                 | SERVICES,   |              |
|             |         |                 | CORE        |              |
+-------------+---------+-----------------+-------------+--------------+
| ALK PHOS    | 101     | 42 - 110 U/L    | [facility]        |              |
|             |         |                 | LABORATORY  |              |
|             |         |                 | SERVICES,   |              |
|             |         |                 | CORE        |              |
+-------------+---------+-----------------+-------------+--------------+
| AST(SGOT)   | 15 (L)  | 18 - 36 U/L     | [facility]        |              |
|             |         |                 | LABORATORY  |              |
|             |         |                 | SERVICES,   |              |
|             |         |                 | CORE        |              |
+-------------+---------+-----------------+-------------+--------------+
| ALT (SGPT)  | 18      | 12 - 60 U/L     | [facility]        |              |
|             |         |                 | LABORATORY  |              |
|             |         |                 | SERVICES,   |              |
|             |         |                 | CORE        |              |
+-------------+---------+-----------------+-------------+--------------+
| ANION       | 4       | 4 - 11 mmol/L   | [facility]        |              |
| GAP(ALB     |         |                 | LABORATORY  |              |
| CORRECTED)  |         |                 | SERVICES,   |              |
|             |         |                 | CORE        |              |
+-------------+---------+-----------------+-------------+--------------+
| POTASSIUM   | No Hemo |                 | [facility]        |              |
| CMNT        |         |                 | LABORATORY  |              |
|             |         |                 | SERVICES,   |              |
|             |         |                 | CORE        |              |
+-------------+---------+-----------------+-------------+--------------+
| BILI T CMNT | No Hemo |                 | [facility]        |              |
|             |         |                 | LABORATORY  |              |
|             |         |                 | SERVICES,   |              |
|             |         |                 | CORE        |              |
+-------------+---------+-----------------+-------------+--------------+
| AST CMNT    | No Hemo |                 | [facility]        |              |
|             |         |                 | LABORATORY  |              |
|             |         |                 | SERVICES,   |              |
|             |         |                 | CORE        |              |
+-------------+---------+-----------------+-------------+--------------+
| ANION GAP   | 4       | mmol/L          | [facility]        |              |
|             |         |                 | LABORATORY  |              |
|             |         |                 | SERVICES,   |              |
|             |         |                 | CORE        |              |
+-------------+---------+-----------------+-------------+--------------+
 
 
 
 
+---------------+
| Specimen      |
+---------------+
| Blood - Blood |
+---------------+
 
 
 
+--------------------+------------------------+--------------------+--------------+
| Performing         | Address                | City/State/Zipcode | Phone Number |
| Organization       |                        |                    |              |
+--------------------+------------------------+--------------------+--------------+
|   [facility]  |   [address]  | [place] [address] |              |
| SERVICES, CORE     | [address]                |                    |              |
+--------------------+------------------------+--------------------+--------------+
 documented in this encounter
 
 Visit Diagnoses
 
 
+--------------------------------------------------------------------------------------+
| Diagnosis                                                                            |
+--------------------------------------------------------------------------------------+
|   Transplanted liver (HCC)  Liver replaced by transplant                             |
+--------------------------------------------------------------------------------------+
|   High risk medications (not anticoagulants) long-term use  Encounter for long-term  |
| (current) use of other medications                                                   |
+--------------------------------------------------------------------------------------+
 documented in this encounter

## 2019-11-01 NOTE — XMS
Encounter Summary
  Created on: 2019
 
 Bonifacio Nila JB
 External Reference #: [id number]
 : [birth year]
 Sex: Female
 
 Demographics
 
 
+-----------------------+------------------------+
| Address               | [address]            |
|                       | [place]  [address]   |
+-----------------------+------------------------+
| Home Phone            | [phone]        |
+-----------------------+------------------------+
| Preferred Language    | Unknown                |
+-----------------------+------------------------+
| Marital Status        | Single                 |
+-----------------------+------------------------+
| Catholic Affiliation | Unknown                |
+-----------------------+------------------------+
| Race                  | White                  |
+-----------------------+------------------------+
| Ethnic Group          | Not  or  |
+-----------------------+------------------------+
 
 
 Author
 
 
+--------------+------------------------------+
| Author       | [facility] |
+--------------+------------------------------+
| Organization | [facility] |
+--------------+------------------------------+
| Address      | Unknown                      |
+--------------+------------------------------+
| Phone        | Unavailable                  |
+--------------+------------------------------+
 
 
 
 Support
 
 
+-----------------+--------------+---------+-----------------+
| Name            | Relationship | Address | Phone           |
+-----------------+--------------+---------+-----------------+
| Kit Valdovinos   | ECON         | Unknown | [phone] |
+-----------------+--------------+---------+-----------------+
| Magdalena Valdovinos | ECON         | Unknown | [phone] |
+-----------------+--------------+---------+-----------------+
 
 
 
 Care Team Providers
 
 
 
+-----------------------+------+-----------------+
| Care Team Member Name | Role | Phone           |
+-----------------------+------+-----------------+
| Lily Horton MD   | PCP  | [phone] |
+-----------------------+------+-----------------+
 
 
 
 Reason for Visit
 
 
+--------+-----------------+
| Reason | Comments        |
+--------+-----------------+
| Other  | Need labs drawn |
+--------+-----------------+
 
 
 
 Encounter Details
 
 
+--------+-----------+----------------------+---------------------+-------------------+
| Date   | Type      | Department           | Care Team           | Description       |
+--------+-----------+----------------------+---------------------+-------------------+
| 10/14/ | Telephone |   Pediatric          |   Monserrat Spann,  | Other (Need labs  |
|    |           | Gastroenterology at  | RN  [address] [facility] [facility]     | drawn)            |
|        |           | [place]          | [address]     |                   |
|        |           | [facility]  | [place] [address]  |                   |
|        |           |  [address] |                     |                   |
|        |           |  [facility]  Mailcode:  |                     |                   |
|        |           | [facility]  [place]   |                     |                   |
|        |           | [place], [place]         |                     |                   |
|        |           | [address]           |                     |                   |
|        |           | [phone]         |                     |                   |
+--------+-----------+----------------------+---------------------+-------------------+
 
 
 
 Social History
 
 
+----------------+-------+-----------+--------+------+
| Tobacco Use    | Types | Packs/Day | Years  | Date |
|                |       |           | Used   |      |
+----------------+-------+-----------+--------+------+
| Never Assessed |       |           |        |      |
+----------------+-------+-----------+--------+------+
 
 
 
+------------------+---------------+
| Sex Assigned at  | Date Recorded |
| Birth            |               |
+------------------+---------------+
| Not on file      |               |
+------------------+---------------+
 
 
 
 
+----------------+-------------+-------------+
| Job Start Date | Occupation  | Industry    |
+----------------+-------------+-------------+
| Not on file    | Not on file | Not on file |
+----------------+-------------+-------------+
 
 
 
+----------------+--------------+------------+
| Travel History | Travel Start | Travel End |
+----------------+--------------+------------+
 
 
 
+-------------------------------------+
| No recent travel history available. |
+-------------------------------------+
 documented as of this encounter
 
 Plan of Treatment
 Not on filedocumented as of this encounter
 
 Visit Diagnoses
 Not on filedocumented in this encounter

## 2019-11-01 NOTE — XMS
Encounter Summary
  Created on: 2019
 
 Bonifacio Nila JB
 External Reference #: [id number]
 : [birth year]
 Sex: Female
 
 Demographics
 
 
+-----------------------+------------------------+
| Address               | [address]            |
|                       | [place]  [address]   |
+-----------------------+------------------------+
| Home Phone            | [phone]        |
+-----------------------+------------------------+
| Preferred Language    | Unknown                |
+-----------------------+------------------------+
| Marital Status        | Single                 |
+-----------------------+------------------------+
| Faith Affiliation | Unknown                |
+-----------------------+------------------------+
| Race                  | White                  |
+-----------------------+------------------------+
| Ethnic Group          | Not  or  |
+-----------------------+------------------------+
 
 
 Author
 
 
+--------------+------------------------------+
| Author       | [facility] |
+--------------+------------------------------+
| Organization | [facility] |
+--------------+------------------------------+
| Address      | Unknown                      |
+--------------+------------------------------+
| Phone        | Unavailable                  |
+--------------+------------------------------+
 
 
 
 Support
 
 
+-----------------+--------------+---------+-----------------+
| Name            | Relationship | Address | Phone           |
+-----------------+--------------+---------+-----------------+
| Kit Valdovinos   | ECON         | Unknown | [phone] |
+-----------------+--------------+---------+-----------------+
| Magdalena Valdovinos | ECON         | Unknown | [phone] |
+-----------------+--------------+---------+-----------------+
 
 
 
 Care Team Providers
 
 
 
+------------------------+------+-----------------+
| Care Team Member Name  | Role | Phone           |
+------------------------+------+-----------------+
| Lily Horton MD | PCP  | [phone] |
+------------------------+------+-----------------+
 
 
 
 Reason for Visit
 
 
+-------------+----------+
| Reason      | Comments |
+-------------+----------+
| Lab Results |          |
+-------------+----------+
 
 
 
 Encounter Details
 
 
+--------+-----------+----------------------+----------------------+-------------+
| Date   | Type      | Department           | Care Team            | Description |
+--------+-----------+----------------------+----------------------+-------------+
| 10/23/ | Telephone |   Pediatric          |   Neema Khalil,   | Lab Results |
| 2017   |           | Gastroenterology at  | MD  [address] |             |
|        |           | [place]          |   [place], [place]       |             |
|        |           | [facility]  | [address]           |             |
|        |           |  [address] | [phone]         |             |
|        |           |  [address]  Mailcode:  | [phone] (Fax)   |             |
|        |           | [address] [address]   |                      |             |
|        |           | [place], [place]         |                      |             |
|        |           | [address]           |                      |             |
|        |           | [phone]         |                      |             |
+--------+-----------+----------------------+----------------------+-------------+
 
 
 
 Social History
 
 
+-------------------+-------+-----------+--------+------+
| Tobacco Use       | Types | Packs/Day | Years  | Date |
|                   |       |           | Used   |      |
+-------------------+-------+-----------+--------+------+
| Passive Smoke     |       |           |        |      |
| Exposure - Never  |       |           |        |      |
| Smoker            |       |           |        |      |
+-------------------+-------+-----------+--------+------+
 
 
 
+---------------------+---+---+---+
| Smokeless Tobacco:  |   |   |   |
| Never Used          |   |   |   |
+---------------------+---+---+---+
 
 
 
 
+-------------+-------------+---------+----------+
| Alcohol Use | Drinks/Week | oz/Week | Comments |
+-------------+-------------+---------+----------+
| Not Asked   |             |         |          |
+-------------+-------------+---------+----------+
 
 
 
+------------------+---------------+
| Sex Assigned at  | Date Recorded |
| Birth            |               |
+------------------+---------------+
| Not on file      |               |
+------------------+---------------+
 
 
 
+----------------+-------------+-------------+
| Job Start Date | Occupation  | Industry    |
+----------------+-------------+-------------+
| Not on file    | Not on file | Not on file |
+----------------+-------------+-------------+
 
 
 
+----------------+--------------+------------+
| Travel History | Travel Start | Travel End |
+----------------+--------------+------------+
 
 
 
+-------------------------------------+
| No recent travel history available. |
+-------------------------------------+
 documented as of this encounter
 
 Plan of Treatment
 Not on filedocumented as of this encounter
 
 Visit Diagnoses
 Not on filedocumented in this encounter

## 2019-11-01 NOTE — XMS
Encounter Summary
  Created on: 2019
 
 Bonifacio Nila JB
 External Reference #: [id number]
 : [birth year]
 Sex: Female
 
 Demographics
 
 
+-----------------------+------------------------+
| Address               | [address]            |
|                       | [place]  [address]   |
+-----------------------+------------------------+
| Home Phone            | [phone]        |
+-----------------------+------------------------+
| Preferred Language    | Unknown                |
+-----------------------+------------------------+
| Marital Status        | Single                 |
+-----------------------+------------------------+
| Alevism Affiliation | Unknown                |
+-----------------------+------------------------+
| Race                  | White                  |
+-----------------------+------------------------+
| Ethnic Group          | Not  or  |
+-----------------------+------------------------+
 
 
 Author
 
 
+--------------+-------------+
| Organization | Unknown     |
+--------------+-------------+
| Address      | Unknown     |
+--------------+-------------+
| Phone        | Unavailable |
+--------------+-------------+
 
 
 
 Support
 
 
+-----------------+--------------+---------+-----------------+
| Name            | Relationship | Address | Phone           |
+-----------------+--------------+---------+-----------------+
| Kit Valdovinos   | ECON         | Unknown | [phone] |
+-----------------+--------------+---------+-----------------+
| Magdalena Valdovinos | ECON         | Unknown | [phone] |
+-----------------+--------------+---------+-----------------+
 
 
 
 Care Team Providers
 
 
 
+-----------------------+------+-------------+
| Care Team Member Name | Role | Phone       |
+-----------------------+------+-------------+
 PCP  | Unavailable |
+-----------------------+------+-------------+
 
 
 
 Encounter Details
 
 
+--------+-------------+------------+--------------------+-------------+
| Date   | Type        | Department | Care Team          | Description |
+--------+-------------+------------+--------------------+-------------+
| 10/18/ | Transcribed |            |   Dictation, Other | Transcribed |
| 2001   |             |            |                    |             |
+--------+-------------+------------+--------------------+-------------+
 
 
 
 Social History
 
 
+----------------+-------+-----------+--------+------+
| Tobacco Use    | Types | Packs/Day | Years  | Date |
|                |       |           | Used   |      |
+----------------+-------+-----------+--------+------+
| Never Assessed |       |           |        |      |
+----------------+-------+-----------+--------+------+
 
 
 
+------------------+---------------+
| Sex Assigned at  | Date Recorded |
| Birth            |               |
+------------------+---------------+
| Not on file      |               |
+------------------+---------------+
 
 
 
+----------------+-------------+-------------+
| Job Start Date | Occupation  | Industry    |
+----------------+-------------+-------------+
| Not on file    | Not on file | Not on file |
+----------------+-------------+-------------+
 
 
 
+----------------+--------------+------------+
| Travel History | Travel Start | Travel End |
+----------------+--------------+------------+
 
 
 
+-------------------------------------+
| No recent travel history available. |
+-------------------------------------+
 documented as of this encounter
 
 
 Progress Notes
 Interface, Transcription In - 10/02/2006  3:01 AM [facility]                                    [place] [address], [place] [address] [phone] or [phone]
 
 2001
 
 TON BELLO M.D.
 [address] [place]  [address]
 
 RE:   NILA VALDOVINOS
 MR #: [id number]
 DATE OF TRANSPLANT:     2000
 
 Dear Dr. Bello:
 
 Nila returned to the Pediatric Gastroenterology  Clinic at [facility] today with
 her parents.  It was attended by Dr. Taiwo Hyatt  and Shaylee Louise R.N. of the Pediatric Liver Transplant  Team  visiting from [place].  This
 is one of her summer annual visits.  She continues to do extremely well and
 appears to have really nothing to complain of today.
 
 Her  only medication is Prograf 3 mL  b.i.d.   Acyclovir  was  discontinued
 earlier this year.  SHE IS ALLERGIC TO THE SULFA DRUGS.
 
 On examination, she weighs 14.8 kg and  measures 91 cm.  Her blood pressure
 is 103/48 and her pulse is 117.  She is anicteric and quite well nourished.
 Her behavior is normal in the office.   There  is  no  cervical adenopathy.
 She has unlabored respirations.  Her abdomen is soft and flat with no mass,
 tenderness, or organomegaly.  She has well-healed surgical scars.
 
 A  recent  laboratory  test  on  October   15,   2001  showed  normal  CBC,
 comprehensive metabolic panel, GGT, and  magnesium.  Her tacrolimus 12-hour
 trough level was 7.6.  Prior to this it ran about 4.
 
 In summary, Nila is a [id number] white  female  who  is 14 months status
 post liver transplantation for extrahepatic  biliary  atresia  at [place].
 She  continues to do extremely well with  an  uncomplicated  posttransplant
 course.  Her Prograf level was able to  be  lowered  over the last 6 months
 without difficulty.  It is unclear why  her  recent  level  of 7.6 occurred
 without a good explanation.  We would  merely repeat the Prograf level next
 month when she has her summer annual  EBV,  serology,  and  PCR level done.
 She  is  having  these  done every 6 months,  in  May  and  2001.
 Otherwise, since she is doing so well  Dr. Hyatt  believes she can have
 her labs drawn every other month instead of every month.
 We would like to have her Varicella titers  rechecked  soon.   In addition,
 she needs a flu shot once a year henceforth.
 
 We will plan to see her again in this clinic in 6 months.
 
 Sincerely,
 
 Eagle De Luna M.D.
 Pediatric Gastroenterology
 
 [facility] / 
 
 [id number] / [id number] / [id number] /
 D: 10/18/2001
 T: 10/19/2001
 C: 2001 ds
 
 cc:
 
 LETTY AMAYA M.D.
 [address]
 [place]  [address]
 
 ADRIANA FLANNERY M.D.
 [address]
 [place]  [address]
 
 TAIWO HYATT M.D.
 [address]
 [place]  [address]
 
 [id number] signed by Interface, Transcription In at 10/02/2006  3:01 AM [facility]
umented in this encounter
 
 Plan of Treatment
 Not on filedocumented as of this encounter
 
 Visit Diagnoses
 Not on filedocumented in this encounter

## 2019-11-01 NOTE — XMS
Encounter Summary
  Created on: 2019
 
 Bonifacio Nila JB
 External Reference #: [id number]
 : [birth year]
 Sex: Female
 
 Demographics
 
 
+-----------------------+------------------------+
| Address               | [address]            |
|                       | [place]  [address]   |
+-----------------------+------------------------+
| Home Phone            | [phone]        |
+-----------------------+------------------------+
| Preferred Language    | Unknown                |
+-----------------------+------------------------+
| Marital Status        | Single                 |
+-----------------------+------------------------+
| Confucianist Affiliation | Unknown                |
+-----------------------+------------------------+
| Race                  | White                  |
+-----------------------+------------------------+
| Ethnic Group          | Not  or  |
+-----------------------+------------------------+
 
 
 Author
 
 
+--------------+-------------+
| Organization | Unknown     |
+--------------+-------------+
| Address      | Unknown     |
+--------------+-------------+
| Phone        | Unavailable |
+--------------+-------------+
 
 
 
 Support
 
 
+-----------------+--------------+---------+-----------------+
| Name            | Relationship | Address | Phone           |
+-----------------+--------------+---------+-----------------+
| Kit Valdovinos   | ECON         | Unknown | [phone] |
+-----------------+--------------+---------+-----------------+
| Magdalena Valdovinos | ECON         | Unknown | [phone] |
+-----------------+--------------+---------+-----------------+
 
 
 
 Care Team Providers
 
 
 
+-----------------------+------+-----------------+
| Care Team Member Name | Role | Phone           |
+-----------------------+------+-----------------+
| Lily Horton MD   | PCP  | [phone] |
+-----------------------+------+-----------------+
 
 
 
 Encounter Details
 
 
+--------+-------------+------------+---------------------+---------------+
| Date   | Type        | Department | Care Team           | Description   |
+--------+-------------+------------+---------------------+---------------+
| 10/12/ | Office      |            |   Note, Outpatient  | Progress Note |
| 2006   | Visit-Trans |            | Clinic              |               |
|        | cribed      |            |                     |               |
+--------+-------------+------------+---------------------+---------------+
 
 
 
 Social History
 
 
+----------------+-------+-----------+--------+------+
| Tobacco Use    | Types | Packs/Day | Years  | Date |
|                |       |           | Used   |      |
+----------------+-------+-----------+--------+------+
| Never Assessed |       |           |        |      |
+----------------+-------+-----------+--------+------+
 
 
 
+------------------+---------------+
| Sex Assigned at  | Date Recorded |
| Birth            |               |
+------------------+---------------+
| Not on file      |               |
+------------------+---------------+
 
 
 
+----------------+-------------+-------------+
| Job Start Date | Occupation  | Industry    |
+----------------+-------------+-------------+
| Not on file    | Not on file | Not on file |
+----------------+-------------+-------------+
 
 
 
+----------------+--------------+------------+
| Travel History | Travel Start | Travel End |
+----------------+--------------+------------+
 
 
 
+-------------------------------------+
| No recent travel history available. |
+-------------------------------------+
 documented as of this encounter
 
 
 Progress Notes
 Interface, Transcription In - 2006  2:32 AM PST
      [id number]           10/12/094431/12/802062/2006 [id number]
         [id number]  BONIFACIO LEE          N [id number]
 
 Clinic Date:  10/12/2006
 
 Clinic:       Pediatric Liver Transplant Followup Clinic
 
 Problems:  Status post liver transplant for biliary atresia, 2000.
 
 Medication:  Prograf 1-mg capsules 1 p.o. b.i.d.
 
 Allergies:  SULFA, HIVES.
 
 Subjective:  Nila is a 6-year 8-month-old referred in consultation by Dr. Horton for followup of her liver transplant.  Dr. Eddy Nazario was an
 observer in clinic today.  Nila was accompanied by her father.  He reports
 that she was a 5-pound 2-ounce product of a 34-week gestation.  She
 required some oxygen in the  period.  She was found to have
 polysplenia, malrotation, and VSD.  She had biliary atresia and was
 transplanted at about [age] of age.  Her father reports that she
 did wonderfully after her transplant.  She has never had a rejection and
 has had no significant problems.  She is on 1 medication only.
 
 Past Medical History:  She has a VSD that is followed by Dr. Bush at
 [facility].
 
 Review of Systems:  She has no chronic cough, no abdominal pain, no
 diarrhea, no eczema or other allergic problems.  Rest of her review of
 systems is negative.
 
 Family History:  Positive for type 2 diabetes and heart problems in
 adults.
 
 Social History:  She lives with her mother, her father, and her 3-year-old
 brother.  She is in 2nd grade.
 
 Objective:  General:  She is a well-appearing girl in no acute distress.
 Vital Signs:  Her weight is 30.8 kg, 87th percentile for age; her height is
 130 cm which is 75th percentile; her BMI is 18.22, 86th percentile.  Her
 blood pressure is 120/83, slightly high; her heart rate is 80.  HEENT:  She
 is normocephalic.  Eyes:  No icterus.  No periorbital edema.  Pharynx
 unremarkable.  Neck:  Supple.  Chest:  Clear to auscultation.  Cardiac:
 She has a 3 to 4 over 6 systolic murmur that is heard best in the apex.
 Abdomen:  She has well-healed incisions, no hepatosplenomegaly, no masses
 noted.  Extremities:  No clubbing or edema.  Nodes:  No cervical or
 supraclavicular nodes.  Skin:  Clear.
 
 Laboratory Tests:  Most recent tests were done on 2006,
 electrolytes unremarkable, AST 32, ALT 19, alkaline phosphatase 221,
 bilirubin 0.8, albumin 3.2 which in the normal range for that lab.  GGT 9,
 magnesium 1.9.  White count 8.2, hematocrit 42.2, MCV 83, and platelets
 325.  Tacrolimus level 3.2.  In the past in , she developed positive
 antibodies to EBV.  She is also positive for varicella antibody.
 
 Assessment:  Status post liver transplant for biliary atresia.  She is
 doing well and has never had rejection.  Her Prograf level is in the target
 
 range.
 
 Recommendations:
 1.      Recommend she continue labs every 3 months as her previous
       schedule.
 2.      EBV PCR every 6 months.
 3.      No change in her medication at present.  Plan to return to clinic
       once a year unless she has problems.
 
 Ivis Burkett M.D.
 
 [facility] / 
 [id number] / [id number] / [id number] / [id number]
 D: 10/12/2006
 T: 10/15/2006
 
 cc:
 
 Lily Horton M.D.
 [address]
 [place]  [address]
 
 * [facility]
 [facility] [address] [place]
 [place]  [address]
 
 Electronically signed by Ivis Burkett 2006 10:05:03 PM
 Electronically signed by Interface, Transcription In at 2006  2:32 AM PSTdocumented i
n this encounter
 
 Plan of Treatment
 Not on filedocumented as of this encounter
 
 Visit Diagnoses
 Not on filedocumented in this encounter

## 2019-11-01 NOTE — XMS
Encounter Summary
  Created on: 2019
 
 Bonifacio Nila JB
 External Reference #: [id number]
 : [birth year]
 Sex: Female
 
 Demographics
 
 
+-----------------------+------------------------+
| Address               | [address]            |
|                       | [place]  [address]   |
+-----------------------+------------------------+
| Home Phone            | [phone]        |
+-----------------------+------------------------+
| Preferred Language    | Unknown                |
+-----------------------+------------------------+
| Marital Status        | Single                 |
+-----------------------+------------------------+
| Druze Affiliation | Unknown                |
+-----------------------+------------------------+
| Race                  | White                  |
+-----------------------+------------------------+
| Ethnic Group          | Not  or  |
+-----------------------+------------------------+
 
 
 Author
 
 
+--------------+------------------------------+
| Author       | [facility] |
+--------------+------------------------------+
| Organization | [facility] |
+--------------+------------------------------+
| Address      | Unknown                      |
+--------------+------------------------------+
| Phone        | Unavailable                  |
+--------------+------------------------------+
 
 
 
 Support
 
 
+-----------------+--------------+---------+-----------------+
| Name            | Relationship | Address | Phone           |
+-----------------+--------------+---------+-----------------+
| Kit Valdovinos   | ECON         | Unknown | [phone] |
+-----------------+--------------+---------+-----------------+
| Magdalena Valdovinos | ECON         | Unknown | [phone] |
+-----------------+--------------+---------+-----------------+
 
 
 
 Care Team Providers
 
 
 
+-----------------------+------+-----------------+
| Care Team Member Name | Role | Phone           |
+-----------------------+------+-----------------+
| Lily Horton MD   | PCP  | [phone] |
+-----------------------+------+-----------------+
 
 
 
 Encounter Details
 
 
+--------+----------+----------------------+--------------------+-------------+
| Date   | Type     | Department           | Care Team          | Description |
+--------+----------+----------------------+--------------------+-------------+
| / | Abstract |   Pediatric          |   Ivis Burkett MD |             |
|    |          | Gastroenterology at  |                    |             |
|        |          | [facility]          |                    |             |
|        |          | [facility]  |                    |             |
|        |          |  [address] |                    |             |
|        |          |  [address]  Mailcode:  |                    |             |
|        |          | [place]   |                    |             |
|        |          | [place], [place]         |                    |             |
|        |          | [address]           |                    |             |
|        |          | [phone]         |                    |             |
+--------+----------+----------------------+--------------------+-------------+
 
 
 
 Social History
 
 
+----------------+-------+-----------+--------+------+
| Tobacco Use    | Types | Packs/Day | Years  | Date |
|                |       |           | Used   |      |
+----------------+-------+-----------+--------+------+
| Never Assessed |       |           |        |      |
+----------------+-------+-----------+--------+------+
 
 
 
+------------------+---------------+
| Sex Assigned at  | Date Recorded |
| Birth            |               |
+------------------+---------------+
| Not on file      |               |
+------------------+---------------+
 
 
 
+----------------+-------------+-------------+
| Job Start Date | Occupation  | Industry    |
+----------------+-------------+-------------+
| Not on file    | Not on file | Not on file |
+----------------+-------------+-------------+
 
 
 
+----------------+--------------+------------+
 
| Travel History | Travel Start | Travel End |
+----------------+--------------+------------+
 
 
 
+-------------------------------------+
| No recent travel history available. |
+-------------------------------------+
 documented as of this encounter
 
 Plan of Treatment
 Not on filedocumented as of this encounter
 
 Procedures
 
 
+----------------------+--------+-------------+----------------------+----------------------
+
| Procedure Name       | Priori | Date/Time   | Associated Diagnosis | Comments             
|
|                      | ty     |             |                      |                      
|
+----------------------+--------+-------------+----------------------+----------------------
+
| CBC, WITH            | Routin | 2008  |                      |   Results for this   
|
| DIFFERENTIAL         | e      |  3:25 PM    |                      | procedure are in the 
|
|                      |        | PDT         |                      |  results section.    
|
+----------------------+--------+-------------+----------------------+----------------------
+
| COMPLETE METABOLIC   | Routin | 2008  |                      |   Results for this   
|
| SET                  | e      |  3:25 PM    |                      | procedure are in the 
|
| (NA,K,CL,CO2,BUN,CRE |        | PDT         |                      |  results section.    
|
| AT,GLUC,CA,AST,ALT,B |        |             |                      |                      
|
| ASHWINI TOTAL,ALK        |        |             |                      |                      
|
| PHOS,ALB,PROT TOTAL) |        |             |                      |                      
|
+----------------------+--------+-------------+----------------------+----------------------
+
| TACROLIMUS, WHOLE    | Routin | 2008  |                      |                      
|
| BLOOD                | e      |  3:25 PM    |                      |                      
|
|                      |        | PDT         |                      |                      
|
+----------------------+--------+-------------+----------------------+----------------------
+
| PHOSPHORUS, PLASMA   | Routin | 2008  |                      |   Results for this   
|
|                      | e      |  3:25 PM    |                      | procedure are in the 
|
|                      |        | PDT         |                      |  results section.    
|
 
+----------------------+--------+-------------+----------------------+----------------------
+
| GGT, PLASMA          | Routin | 2008  |                      |   Results for this   
|
|                      | e      |  3:25 PM    |                      | procedure are in the 
|
|                      |        | PDT         |                      |  results section.    
|
+----------------------+--------+-------------+----------------------+----------------------
+
| MAGNESIUM, PLASMA    | Routin | 2008  |                      |   Results for this   
|
|                      | e      |  3:25 PM    |                      | procedure are in the 
|
|                      |        | PDT         |                      |  results section.    
|
+----------------------+--------+-------------+----------------------+----------------------
+
| TRIGLYCERIDES,       | Routin | 2008  |                      |   Results for this   
|
| PLASMA               | e      |  3:25 PM    |                      | procedure are in the 
|
|                      |        | PDT         |                      |  results section.    
|
+----------------------+--------+-------------+----------------------+----------------------
+
| CHOLESTEROL TOTAL,   | Routin | 2008  |                      |   Results for this   
|
| PLASMA               | e      |  3:25 PM    |                      | procedure are in the 
|
|                      |        | PDT         |                      |  results section.    
|
+----------------------+--------+-------------+----------------------+----------------------
+
 documented in this encounter
 
 Results
 COMPLETE METABOLIC SET (NA,K,CL,CO2,BUN,CREAT,GLUC,CA,AST,ALT,BILI TOTAL,ALK PHOS,ALB,PROT 
TOTAL) (2008  3:25 PM PDT)
 
+-------------+-------+-----------------+------------+--------------+
| Component   | Value | Ref Range       | Performed  | Pathologist  |
|             |       |                 | At         | Signature    |
+-------------+-------+-----------------+------------+--------------+
| GLUCOSE,    | 82    | 60 - 100 mg/dL  | NON [facility]   |              |
| PLASMA      |       |                 | LAB        |              |
| (LAB)       |       |                 |            |              |
+-------------+-------+-----------------+------------+--------------+
| BUN, PLASMA | 11.1  | 7 - 21 mg/dL    | NON [facility]   |              |
|  (LAB)      |       |                 | LAB        |              |
+-------------+-------+-----------------+------------+--------------+
| CREATININE  | 0.51  | 0.5 - 1.5 mg/dL | NON [facility]   |              |
| PLASMA      |       |                 | LAB        |              |
| (LAB)       |       |                 |            |              |
+-------------+-------+-----------------+------------+--------------+
| TOTAL       | 7.1   | 6.1 - 8.5 g/dL  | NON [facility]   |              |
| PROTEIN,    |       |                 | LAB        |              |
| PLASMA      |       |                 |            |              |
| (LAB)       |       |                 |            |              |
+-------------+-------+-----------------+------------+--------------+
 
| ALBUMIN,    | 4.2   | 2.9 - 5.5 g/dL  | NON [facility]   |              |
| PLASMA      |       |                 | LAB        |              |
| (LAB)       |       |                 |            |              |
+-------------+-------+-----------------+------------+--------------+
| CALCIUM,    | 9.4   | 8.5 - 10.5      | NON [facility]   |              |
| PLASMA      |       | mg/dL           | LAB        |              |
| (LAB)       |       |                 |            |              |
+-------------+-------+-----------------+------------+--------------+
| BILIRUBIN   | 0.8   | 0.0 - 1.2       | NON [facility]   |              |
| TOTAL       |       | Transcutaneous  | LAB        |              |
|             |       | Bilirubinometer |            |              |
+-------------+-------+-----------------+------------+--------------+
| ALK PHOS    | 230   | 135 - 384 U/L   | NON [facility]   |              |
|             |       |                 | LAB        |              |
+-------------+-------+-----------------+------------+--------------+
| AST(SGOT)   | 28    | 0 - 40 U/L      | NON [facility]   |              |
|             |       |                 | LAB        |              |
+-------------+-------+-----------------+------------+--------------+
| SODIUM,     | 136   | 132 - 143       | NON [facility]   |              |
| PLASMA      |       | mmol/L          | LAB        |              |
| (LAB)       |       |                 |            |              |
+-------------+-------+-----------------+------------+--------------+
| POTASSIUM,  | 3.8   | 3.6 - 5.1       | NON [facility]   |              |
| PLASMA      |       | mmol/L          | LAB        |              |
| (LAB)       |       |                 |            |              |
+-------------+-------+-----------------+------------+--------------+
| CHLORIDE,   | 107   | 95 - 112 mmol/L | NON [facility]   |              |
| PLASMA      |       |                 | LAB        |              |
| (LAB)       |       |                 |            |              |
+-------------+-------+-----------------+------------+--------------+
| TOTAL CO2,  | 21.7  | 19 - 31 mmol/L  | NON [facility]   |              |
| PLASMA      |       |                 | LAB        |              |
| (LAB)       |       |                 |            |              |
+-------------+-------+-----------------+------------+--------------+
| ALT (SGPT)  | 24    | 0 - 46 U/L      | NON [facility]   |              |
|             |       |                 | LAB        |              |
+-------------+-------+-----------------+------------+--------------+
| BILIRUBIN   | 0.1   | 0.0 - 0.4 mg/dL | NON [facility]   |              |
| DIRECT      |       |                 | LAB        |              |
+-------------+-------+-----------------+------------+--------------+
 
 
 
+----------+
| Specimen |
+----------+
|          |
+----------+
 
 
 
+----------------+---------+--------------------+--------------+
| Performing     | Address | City/State/Zipcode | Phone Number |
| Organization   |         |                    |              |
+----------------+---------+--------------------+--------------+
|   NON [facility] |         |                    |              |
+----------------+---------+--------------------+--------------+
 CBC, WITH DIFFERENTIAL (2008  3:25 PM PDT)
 
+-------------+----------+-----------------+------------+--------------+
 
| Component   | Value    | Ref Range       | Performed  | Pathologist  |
|             |          |                 | At         | Signature    |
+-------------+----------+-----------------+------------+--------------+
| WHITE CELL  | 8.3      | 4.5 - 13.5 K/cu | [facility]   |              |
| COUNT       |          |  mm             | LAB        |              |
+-------------+----------+-----------------+------------+--------------+
| RED CELL    | 4.65     | 4.1 - 5.3 M/cu  | [facility]   |              |
| COUNT       |          | mm              | LAB        |              |
+-------------+----------+-----------------+------------+--------------+
| HEMOGLOBIN  | 14.2     | 10.6 - 15.1     | NON [facility]   |              |
|             |          | g/dL            | LAB        |              |
+-------------+----------+-----------------+------------+--------------+
| HEMATOCRIT  | 40.8     | 32 - 42 %       | NON [facility]   |              |
|             |          |                 | LAB        |              |
+-------------+----------+-----------------+------------+--------------+
| MCV         | 84.6     | 71 - 89 fL      | NON [facility]   |              |
|             |          |                 | LAB        |              |
+-------------+----------+-----------------+------------+--------------+
| MCH         | 31 (A)   | 24 - 30 pg      | NON [facility]   |              |
|             |          |                 | LAB        |              |
+-------------+----------+-----------------+------------+--------------+
| MCHC        | 35       | 30 - 36 g/dL    | [facility]   |              |
|             |          |                 | LAB        |              |
+-------------+----------+-----------------+------------+--------------+
| PLATELET    | 339      | 140 - 440 K/cu  | [facility]   |              |
| COUNT       |          | mm              | LAB        |              |
+-------------+----------+-----------------+------------+--------------+
| NEUTROPHIL  | 43.3     | 31 - 60 %       | [facility]   |              |
| %           |          |                 | LAB        |              |
+-------------+----------+-----------------+------------+--------------+
| LYMPHOCYTE  | 41.1     | 28 - 48 %       | [facility]   |              |
| %           |          |                 | LAB        |              |
+-------------+----------+-----------------+------------+--------------+
| MONOCYTE %  | 12.2 (A) | 0 - 12 %        | NON [facility]   |              |
|             |          |                 | LAB        |              |
+-------------+----------+-----------------+------------+--------------+
| EOS %       | 3.2      | 0 - 6 %         | [facility]   |              |
|             |          |                 | LAB        |              |
+-------------+----------+-----------------+------------+--------------+
| BASO %      | 0.2      | 0 - 2 %         | NON [facility]   |              |
|             |          |                 | LAB        |              |
+-------------+----------+-----------------+------------+--------------+
| RDW         | 12.6     | 10.5 - 15.0 %   | NON [facility]   |              |
|             |          |                 | LAB        |              |
+-------------+----------+-----------------+------------+--------------+
| MPV         |          | fL              | NON [facility]   |              |
|             |          |                 | LAB        |              |
+-------------+----------+-----------------+------------+--------------+
| NEUTROPHIL  |          | K/cu mm         | NON [facility]   |              |
| #           |          |                 | LAB        |              |
+-------------+----------+-----------------+------------+--------------+
| LYMPHOCYTE  |          | K/cu mm         | [facility]   |              |
| #           |          |                 | LAB        |              |
+-------------+----------+-----------------+------------+--------------+
| MONOCYTE #  |          | K/cu mm         | [facility]   |              |
|             |          |                 | LAB        |              |
+-------------+----------+-----------------+------------+--------------+
| EOS #       |          | K/cu mm         | NON [facility]   |              |
|             |          |                 | LAB        |              |
+-------------+----------+-----------------+------------+--------------+
 
| BASO #      |          |                 | NON [facility]   |              |
|             |          |                 | LAB        |              |
+-------------+----------+-----------------+------------+--------------+
 
 
 
+----------+
| Specimen |
+----------+
|          |
+----------+
 
 
 
+----------------+---------+--------------------+--------------+
| Performing     | Address | City/State/Zipcode | Phone Number |
| Organization   |         |                    |              |
+----------------+---------+--------------------+--------------+
|   [facility] |         |                    |              |
+----------------+---------+--------------------+--------------+
 MAGNESIUM, PLASMA (2008  3:25 PM PDT)
 
+-------------+-------+-----------------+------------+--------------+
| Component   | Value | Ref Range       | Performed  | Pathologist  |
|             |       |                 | At         | Signature    |
+-------------+-------+-----------------+------------+--------------+
| MAGNESIUM,P | 2.0   | 1.7 - 2.5 mg/dL | NON [facility]   |              |
| LASMA       |       |                 | LAB        |              |
+-------------+-------+-----------------+------------+--------------+
 
 
 
+----------+
| Specimen |
+----------+
|          |
+----------+
 
 
 
+----------------+---------+--------------------+--------------+
| Performing     | Address | City/State/Zipcode | Phone Number |
| Organization   |         |                    |              |
+----------------+---------+--------------------+--------------+
|   [facility] |         |                    |              |
+----------------+---------+--------------------+--------------+
 TACROLIMUS, WHOLE BLOOD (2008  3:25 PM PDT)
 
+-------------+-------+-----------+------------+--------------+
| Component   | Value | Ref Range | Performed  | Pathologist  |
|             |       |           | At         | Signature    |
+-------------+-------+-----------+------------+--------------+
| TACROLIMUS  |       | ng/mL     | NON [facility]   |              |
| ()    |       |           | LAB        |              |
+-------------+-------+-----------+------------+--------------+
 
 
 
+----------+
| Specimen |
 
+----------+
|          |
+----------+
 
 
 
+----------------+---------+--------------------+--------------+
| Performing     | Address | City/State/Zipcode | Phone Number |
| Organization   |         |                    |              |
+----------------+---------+--------------------+--------------+
|   [facility] |         |                    |              |
+----------------+---------+--------------------+--------------+
 GGT, PLASMA (2008  3:25 PM PDT)
 
+-----------+-------+------------+------------+--------------+
| Component | Value | Ref Range  | Performed  | Pathologist  |
|           |       |            | At         | Signature    |
+-----------+-------+------------+------------+--------------+
| GAMMA     | 9     | 5 - 60 U/L | NON [facility]   |              |
| GLUTAMYL  |       |            | LAB        |              |
| TRANS     |       |            |            |              |
+-----------+-------+------------+------------+--------------+
 
 
 
+----------+
| Specimen |
+----------+
|          |
+----------+
 
 
 
+----------------+---------+--------------------+--------------+
| Performing     | Address | City/State/Zipcode | Phone Number |
| Organization   |         |                    |              |
+----------------+---------+--------------------+--------------+
|   [facility] |         |                    |              |
+----------------+---------+--------------------+--------------+
 CHOLESTEROL TOTAL, PLASMA (2008  3:25 PM PDT)
 
+-------------+-------+---------------+------------+--------------+
| Component   | Value | Ref Range     | Performed  | Pathologist  |
|             |       |               | At         | Signature    |
+-------------+-------+---------------+------------+--------------+
| CHOLESTEROL | 145   | < - 200 mg/dL | [facility]   |              |
|   (LAB)     |       |               | LAB        |              |
+-------------+-------+---------------+------------+--------------+
 
 
 
+----------+
| Specimen |
+----------+
|          |
+----------+
 
 
 
+----------------+---------+--------------------+--------------+
 
| Performing     | Address | City/State/Zipcode | Phone Number |
| Organization   |         |                    |              |
+----------------+---------+--------------------+--------------+
|   [facility] |         |                    |              |
+----------------+---------+--------------------+--------------+
 TRIGLYCERIDES, PLASMA (2008  3:25 PM PDT)
 
+-------------+-------+----------------+------------+--------------+
| Component   | Value | Ref Range      | Performed  | Pathologist  |
|             |       |                | At         | Signature    |
+-------------+-------+----------------+------------+--------------+
| TRIGLYCERID | 120   | 30 - 150 mg/dL | NON [facility]   |              |
| ES          |       |                | LAB        |              |
+-------------+-------+----------------+------------+--------------+
 
 
 
+----------+
| Specimen |
+----------+
|          |
+----------+
 
 
 
+----------------+---------+--------------------+--------------+
| Performing     | Address | City/State/Zipcode | Phone Number |
| Organization   |         |                    |              |
+----------------+---------+--------------------+--------------+
|   [facility] |         |                    |              |
+----------------+---------+--------------------+--------------+
 PHOSPHORUS, PLASMA (2008  3:25 PM PDT)
 
+-------------+-------+-----------------+------------+--------------+
| Component   | Value | Ref Range       | Performed  | Pathologist  |
|             |       |                 | At         | Signature    |
+-------------+-------+-----------------+------------+--------------+
| PHOSPHORUS, | 4.5   | 2.6 - 6.2 mg/dL | NON [facility]   |              |
|  PLASMA     |       |                 | LAB        |              |
| (LAB)       |       |                 |            |              |
+-------------+-------+-----------------+------------+--------------+
 
 
 
+----------+
| Specimen |
+----------+
|          |
+----------+
 
 
 
+----------------+---------+--------------------+--------------+
| Performing     | Address | City/State/Zipcode | Phone Number |
| Organization   |         |                    |              |
+----------------+---------+--------------------+--------------+
|   [facility] |         |                    |              |
+----------------+---------+--------------------+--------------+
 documented in this encounter
 
 
 Visit Diagnoses
 Not on filedocumented in this encounter

## 2019-11-01 NOTE — XMS
Encounter Summary
  Created on: 2019
 
 Bonifacoi Nila JB
 External Reference #: [id number]
 : [birth year]
 Sex: Female
 
 Demographics
 
 
+-----------------------+------------------------+
| Address               | [address]            |
|                       | [place]  [address]   |
+-----------------------+------------------------+
| Home Phone            | [phone]        |
+-----------------------+------------------------+
| Preferred Language    | Unknown                |
+-----------------------+------------------------+
| Marital Status        | Single                 |
+-----------------------+------------------------+
| Shinto Affiliation | Unknown                |
+-----------------------+------------------------+
| Race                  | White                  |
+-----------------------+------------------------+
| Ethnic Group          | Not  or  |
+-----------------------+------------------------+
 
 
 Author
 
 
+--------------+------------------------------+
| Author       | [facility] [employer] [facility] |
+--------------+------------------------------+
| Organization | [facility] |
+--------------+------------------------------+
| Address      | Unknown                      |
+--------------+------------------------------+
| Phone        | Unavailable                  |
+--------------+------------------------------+
 
 
 
 Support
 
 
+-----------------+--------------+---------+-----------------+
| Name            | Relationship | Address | Phone           |
+-----------------+--------------+---------+-----------------+
| Kit Valdovinos   | ECON         | Unknown | [phone] |
+-----------------+--------------+---------+-----------------+
| Magdalena Valdovinos | ECON         | Unknown | [phone] |
+-----------------+--------------+---------+-----------------+
 
 
 
 Care Team Providers
 
 
 
+------------------------+------+-----------------+
| Care Team Member Name  | Role | Phone           |
+------------------------+------+-----------------+
| Lily Horton MD | PCP  | [phone] |
+------------------------+------+-----------------+
 
 
 
 Reason for Visit
 
 
+----------+---------------+
| Reason   | Comments      |
+----------+---------------+
| Lab Draw | Reminder call |
+----------+---------------+
 
 
 
 Encounter Details
 
 
+--------+-----------+----------------------+----------------------+---------------------+
| Date   | Type      | Department           | Care Team            | Description         |
+--------+-----------+----------------------+----------------------+---------------------+
| 10/13/ | Telephone |   Pediatric          |   Neema Khalil,   | Lab Draw (Reminder  |
| 2016   |           | Gastroenterology at  | MD  [address] | call)               |
|        |           | [place]          |   [place], [place]       |                     |
|        |           | [facility]  | [address]           |                     |
|        |           |  [address] [facility] | [phone]         |                     |
|        |           |  A[address]  Mailcode:  | [phone] (Fax)   |                     |
|        |           | [address] [address]   |                      |                     |
|        |           | [place], [place]         |                      |                     |
|        |           | [address]           |                      |                     |
|        |           | [phone]         |                      |                     |
+--------+-----------+----------------------+----------------------+---------------------+
 
 
 
 Social History
 
 
+-------------------+-------+-----------+--------+------+
| Tobacco Use       | Types | Packs/Day | Years  | Date |
|                   |       |           | Used   |      |
+-------------------+-------+-----------+--------+------+
| Passive Smoke     |       |           |        |      |
| Exposure - Never  |       |           |        |      |
| Smoker            |       |           |        |      |
+-------------------+-------+-----------+--------+------+
 
 
 
+---------------------+---+---+---+
| Smokeless Tobacco:  |   |   |   |
| Never Used          |   |   |   |
+---------------------+---+---+---+
 
 
 
 
+-------------+-------------+---------+----------+
| Alcohol Use | Drinks/Week | oz/Week | Comments |
+-------------+-------------+---------+----------+
| Not Asked   |             |         |          |
+-------------+-------------+---------+----------+
 
 
 
+------------------+---------------+
| Sex Assigned at  | Date Recorded |
| Birth            |               |
+------------------+---------------+
| Not on file      |               |
+------------------+---------------+
 
 
 
+----------------+-------------+-------------+
| Job Start Date | Occupation  | Industry    |
+----------------+-------------+-------------+
| Not on file    | Not on file | Not on file |
+----------------+-------------+-------------+
 
 
 
+----------------+--------------+------------+
| Travel History | Travel Start | Travel End |
+----------------+--------------+------------+
 
 
 
+-------------------------------------+
| No recent travel history available. |
+-------------------------------------+
 documented as of this encounter
 
 Plan of Treatment
 Not on filedocumented as of this encounter
 
 Visit Diagnoses
 Not on filedocumented in this encounter

## 2019-11-01 NOTE — XMS
Clinical Summary
  Created on: 2019
 
 BonifacioNila
 External Reference #: [id number]
 : [birth year]
 Sex: Female
 
 Demographics
 
 
+-----------------------+------------------------+
| Address               | [address]            |
|                       | [place]  [address]   |
+-----------------------+------------------------+
| Home Phone            | [phone]        |
+-----------------------+------------------------+
| Preferred Language    | Unknown                |
+-----------------------+------------------------+
| Marital Status        | Single                 |
+-----------------------+------------------------+
| Temple Affiliation | Unknown                |
+-----------------------+------------------------+
| Race                  | White                  |
+-----------------------+------------------------+
| Ethnic Group          | Not  or  |
+-----------------------+------------------------+
 
 
 Author
 
 
+--------------+---------------------+
| Author       | [facility] |
+--------------+---------------------+
| Organization | [facility] |
+--------------+---------------------+
| Address      | Unknown             |
+--------------+---------------------+
| Phone        | Unavailable         |
+--------------+---------------------+
 
 
 
 Support
 
 
+-----------------+--------------+---------+-----------------+
| Name            | Relationship | Address | Phone           |
+-----------------+--------------+---------+-----------------+
| Kit Mathew   | ECON         | Unknown | [phone] |
+-----------------+--------------+---------+-----------------+
| Magdalena Mathew | ECON         | Unknown | [phone] |
+-----------------+--------------+---------+-----------------+
 
 
 
 Care Team Providers
 
 
 
+------------------------+------+-----------------+
| Care Team Member Name  | Role | Phone           |
+------------------------+------+-----------------+
| Lily Horton MD | PCP  | [phone] |
+------------------------+------+-----------------+
 
 
 
 Source Comments
 [facility] is fully live on both Epic[facility] Ambulatory and Epic[facility] InPatient.[facility]
 
 Allergies
 
 
+---------------------+-----------------+----------+----------+----------------------+
| Active Allergy      | Reactions       | Severity | Noted    | Comments             |
|                     |                 |          | Date     |                      |
+---------------------+-----------------+----------+----------+----------------------+
| Sulfa (Sulfonamide  | Hives,          | High     | 20 |                      |
| Antibiotics)        | Swelling-Facial |          | 09       |                      |
+---------------------+-----------------+----------+----------+----------------------+
| Varicella Vaccines  |                 | Low      | 20 |   Possible reaction  |
|                     |                 |          | 09       | with sulfa meds      |
+---------------------+-----------------+----------+----------+----------------------+
 
 
 
 Medications
 
 
+--------------------+---------------------+-----------+---------+------+------+-------+
| Medication         | Sig                 | Dispensed | Refills | Star | End  | Statu |
|                    |                     |           |         | t    | Date | s     |
|                    |                     |           |         | Date |      |       |
+--------------------+---------------------+-----------+---------+------+------+-------+
|   amLODIPine 5 mg  | Take 5 mg by mouth  |           | 0       |      |      | Activ |
| oral tablet        | once daily.         |           |         |      |      | e     |
+--------------------+---------------------+-----------+---------+------+------+-------+
 
 
 
 Active Problems
 
 
+-------------------------------------------------+------------+
| Problem                                         | Noted Date |
+-------------------------------------------------+------------+
| Polysplenia                                     | 2014 |
+-------------------------------------------------+------------+
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
+-------------------------------------------------+------------+
| Aortic insufficiency                            | 2014 |
+-------------------------------------------------+------------+
| Aortic root dilatation                          | 2014 |
+-------------------------------------------------+------------+
| VSD (ventricular septal defect), perimembranous | 2008 |
+-------------------------------------------------+------------+
 
 
 
 
+-----------------------------------------------------------------+
|   Overview:   10/16/2014  S/p 8-mm Amplatzer Vascular Plug IV,  |
| with no significant residual shuntingLeft atrial isomerism LAE  |
| (left atrial enlargement) LVE (left ventricular enlargement)    |
|LVE (left ventricular enlargement)                               |
+-----------------------------------------------------------------+
 
 
 
+--------------------+------------+
| Transplanted liver | 10/12/2006 |
+--------------------+------------+
 
 
 
+---------------------------------------------------------+
|   Overview:   For biliary atresia, 2000 at [facility] |
+---------------------------------------------------------+
 
 
 
 Resolved Problems
 
 
+----------------------------+----------+-----------+
| Problem                    | Noted    | Resolved  |
|                            | Date     | Date      |
+----------------------------+----------+-----------+
| Immunosuppressed status    | 20 |  |
|                            | 14       | 5         |
+----------------------------+----------+-----------+
| Aortic valve insufficiency | 20 |  |
|                            | 08       | 4         |
+----------------------------+----------+-----------+
 
 
 
 Social History
 
 
+-------------------+-------+-----------+--------+------+
| Tobacco Use       | Types | Packs/Day | Years  | Date |
|                   |       |           | Used   |      |
+-------------------+-------+-----------+--------+------+
| Passive Smoke     |       |           |        |      |
| Exposure - Never  |       |           |        |      |
| Smoker            |       |           |        |      |
+-------------------+-------+-----------+--------+------+
 
 
 
+---------------------+---+---+---+
| Smokeless Tobacco:  |   |   |   |
| Never Used          |   |   |   |
+---------------------+---+---+---+
 
 
 
 
+-------------+-------------+---------+----------+
| Alcohol Use | Drinks/Week | oz/Week | Comments |
+-------------+-------------+---------+----------+
| Not Asked   |             |         |          |
+-------------+-------------+---------+----------+
 
 
 
+------------------+---------------+
| Sex Assigned at  | Date Recorded |
| Birth            |               |
+------------------+---------------+
| Not on file      |               |
+------------------+---------------+
 
 
 
+----------------+-------------+-------------+
| Job Start Date | Occupation  | Industry    |
+----------------+-------------+-------------+
| Not on file    | Not on file | Not on file |
+----------------+-------------+-------------+
 
 
 
+----------------+--------------+------------+
| Travel History | Travel Start | Travel End |
+----------------+--------------+------------+
 
 
 
+-------------------------------------+
| No recent travel history available. |
+-------------------------------------+
 
 
 
 Last Filed Vital Signs
 
 
+-------------------+----------------------+----------------------+----------------------+
| Vital Sign        | Reading              | Time Taken           | Comments             |
+-------------------+----------------------+----------------------+----------------------+
| Blood Pressure    | 130/93               | 2016  9:24 AM  | right arm adult cuff |
|                   |                      | PDT                  |                      |
+-------------------+----------------------+----------------------+----------------------+
| Pulse             | 72                   | 2016  9:24 AM  |                      |
|                   |                      | PDT                  |                      |
+-------------------+----------------------+----------------------+----------------------+
| Temperature       | 36.7   C (98.1   F)  | 2016  8:59 AM  |                      |
|                   |                      | PDT                  |                      |
+-------------------+----------------------+----------------------+----------------------+
| Respiratory Rate  | -                    | -                    |                      |
+-------------------+----------------------+----------------------+----------------------+
| Oxygen Saturation | -                    | -                    |                      |
+-------------------+----------------------+----------------------+----------------------+
| Inhaled Oxygen    | -                    | -                    |                      |
| Concentration     |                      |                      |                      |
+-------------------+----------------------+----------------------+----------------------+
 
| Weight            | 81.5 kg (179 lb 10.8 | 2016  8:59 AM  |                      |
|                   |  oz)                 | PDT                  |                      |
+-------------------+----------------------+----------------------+----------------------+
| Height            | 164.4 cm (5' 4.72")  | 2016  8:59 AM  |                      |
|                   |                      | PDT                  |                      |
+-------------------+----------------------+----------------------+----------------------+
| Body Mass Index   | 30.16                | 2016  8:59 AM  |                      |
|                   |                      | PDT                  |                      |
+-------------------+----------------------+----------------------+----------------------+
 
 
 
 Plan of Treatment
 
 
+----------------------+-----------+-----------+----------+
| Health Maintenance   | Due Date  | Last Done | Comments |
+----------------------+-----------+-----------+----------+
| Pneumococcal         | 01/15/200 |           |          |
| vaccination (1 of 3  | 5         |           |          |
| - PCV13)             |           |           |          |
+----------------------+-----------+-----------+----------+
| Influenza (Flu)      | 10/01/201 |           |          |
| vaccination (#1)     | 9         |           |          |
+----------------------+-----------+-----------+----------+
 
 
 
 Results
 Not on filefrom Last 3 Months
 
 Insurance
 
 
+--------------+--------+-------------+--------+-------+---------+--------+
| Payer        | Benefi | Subscriber  | Effect | Phone | Address | Type   |
|              | t Plan | ID          | david    |       |         |        |
|              |  /     |             | Dates  |       |         |        |
|              | Group  |             |        |       |         |        |
+--------------+--------+-------------+--------+-------+---------+--------+
| [employer] | [employer]    | xxxxxxxx    |  |       |         | Medica |
|              | [place] |             | 015-Pr |       |         | id     |
|              | [place]   |             | esent  |       |         |        |
+--------------+--------+-------------+--------+-------+---------+--------+
 
 
 
+----------------+--------+-------------+--------+-------------+---------------------+
| Guarantor Name | Accoun | Relation to | Date   | Phone       | Billing Address     |
|                | t Type |  Patient    | of     |             |                     |
|                |        |             | Birth  |             |                     |
+----------------+--------+-------------+--------+-------------+---------------------+
| KIT MATHEW  | Person | Father      | / |             |   [address]       |
|                | al/Fam |             | 1979   | [phone] | [place] [id number] |
|                | lenny    |             |        | [address] (Home)    |                     |
+----------------+--------+-------------+--------+-------------+---------------------+
 
 
 
 Advance Directives
 
 
 
+-----------------+---------------+----------------+-------------+
| Type            | Date Recorded | Patient        | Explanation |
|                 |               | Representative |             |
+-----------------+---------------+----------------+-------------+
| Advance         |               |                |             |
| Directives and  |               |                |             |
| Living Will     |               |                |             |
+-----------------+---------------+----------------+-------------+
| Power of        |               |                |             |
|         |               |                |             |
+-----------------+---------------+----------------+-------------+

## 2019-11-01 NOTE — XMS
Encounter Summary
  Created on: 2019
 
 Bonifacio Nila JB
 External Reference #: [id number]
 : [birth year]
 Sex: Female
 
 Demographics
 
 
+-----------------------+------------------------+
| Address               | [address]            |
|                       | [place]  [address]   |
+-----------------------+------------------------+
| Home Phone            | [phone]        |
+-----------------------+------------------------+
| Preferred Language    | Unknown                |
+-----------------------+------------------------+
| Marital Status        | Single                 |
+-----------------------+------------------------+
| Yazidi Affiliation | Unknown                |
+-----------------------+------------------------+
| Race                  | White                  |
+-----------------------+------------------------+
| Ethnic Group          | Not  or  |
+-----------------------+------------------------+
 
 
 Author
 
 
+--------------+-------------+
| Organization | Unknown     |
+--------------+-------------+
| Address      | Unknown     |
+--------------+-------------+
| Phone        | Unavailable |
+--------------+-------------+
 
 
 
 Support
 
 
+-----------------+--------------+---------+-----------------+
| Name            | Relationship | Address | Phone           |
+-----------------+--------------+---------+-----------------+
| Kit Valdovinos   | ECON         | Unknown | [phone] |
+-----------------+--------------+---------+-----------------+
| Magdalena Valdovinos | ECON         | Unknown | [phone] |
+-----------------+--------------+---------+-----------------+
 
 
 
 Care Team Providers
 
 
 
+-----------------------+------+-----------------+
| Care Team Member Name | Role | Phone           |
+-----------------------+------+-----------------+
| Lily Horton MD   | PCP  | [phone] |
+-----------------------+------+-----------------+
 
 
 
 Encounter Details
 
 
+--------+-------------+------------+--------------------+-------------+
| Date   | Type        | Department | Care Team          | Description |
+--------+-------------+------------+--------------------+-------------+
| 10/26/ | Transcribed |            |   Dictation, Other | Transcribed |
| 2000   |             |            |                    |             |
+--------+-------------+------------+--------------------+-------------+
 
 
 
 Social History
 
 
+----------------+-------+-----------+--------+------+
| Tobacco Use    | Types | Packs/Day | Years  | Date |
|                |       |           | Used   |      |
+----------------+-------+-----------+--------+------+
| Never Assessed |       |           |        |      |
+----------------+-------+-----------+--------+------+
 
 
 
+------------------+---------------+
| Sex Assigned at  | Date Recorded |
| Birth            |               |
+------------------+---------------+
| Not on file      |               |
+------------------+---------------+
 
 
 
+----------------+-------------+-------------+
| Job Start Date | Occupation  | Industry    |
+----------------+-------------+-------------+
| Not on file    | Not on file | Not on file |
+----------------+-------------+-------------+
 
 
 
+----------------+--------------+------------+
| Travel History | Travel Start | Travel End |
+----------------+--------------+------------+
 
 
 
+-------------------------------------+
| No recent travel history available. |
+-------------------------------------+
 documented as of this encounter
 
 
 Progress Notes
 Interface, Transcription In - 2006  3:05 AM [facility]                                    [place] [address], [place] [address] [phone] or [phone]
 
 2000
 
 Lily Horton M.D.
 [address]
 [place]  [address]
 
 RE:   NILA VALDOVINOS
 MR #: [id number]
 :  2000
 DATE OF TRANSPLANT:  2000
 
 Dear Dr. Horton:
 
 I had the pleasure of seeing Nila at the Pediatric Liver Transplant Clinic
 held  at  [facility],  attended  by  Dr. Mcrae   and   Dr. Becca Marsh,
 representatives of the Liver Transplant  team at [facility], in lieu with Dr. Bailey.  As you know, she underwent  liver  transplantation  on  2000, and did well posttransplant.   Since  she  has  been at home, she has
 continued to do fairly well.  The only problem is some diarrhea.  On a good
 day she will have 2 bowel movements a  day,  and on a bad day she will have
 4-5.  She has no fever or blood in the stool, however.
 
 She is on Prograf 5 ml b.i.d. and acyclovir 10 ml q.i.d.
 
 Septra was stopped a few weeks ago because of A LIKELY ALLERGIC REACTION TO
 SEPTRA.  It involved hives.
 
 On exam, she weighs 11.2 kg and measures  78  cm.  Blood pressure is 85/50.
 Pulse was 126.  She is anicteric, active, and playful.  Her abdomen is soft
 and rounded with no mass, tenderness, or organomegaly.  She had some recent
 blood tests, which show transaminases in the 40-50 range.  The remainder of
 the blood test results are very similar  to  the  ones  done a week before.
 Her most recent tacrolimus level is appropriate.   The  most  recent one is
 pending.
 
 In summary, Nila is a 1-1/2-year-old  white  female  who  is  2-1/2 months
 status post liver transplantation for  extrahepatic  biliary  atresia.  She
 has  had a relatively uncomplicated  posttransplant  course  and  is  doing
 fairly well.  We need to begin a pneumocystis  prophylaxis  soon.  She will
 start receiving pentamidine, 1 unit  dose  per  month,  (1 vial) inhalation
 therapy.  We will see if can arrange this to be done in the [facility],
 rather than having the family drive all  the  way to the [place] for this.
 This should be done for 1 year posttransplant.
 
 For the next 1-1/2 months, the family will have Nila's blood drawn every 2
 weeks.   Assuming  everything is under  control,  then  it  could  be  done
 monthly.  The family will be talking  to  Dr. Bailey soon to firm up plans
 to have Nila followed mostly by you in  the [facility], and to come to
 [place] either when the situation is  unstable  and,  at  the  very least,
 every 3-6 months for chronic monitoring.
 
 Finally, Becca will work out the details  of which laboratory tests should
 
 be done and when with the family.   Please  let  me  know  if  you have any
 questions.
 
 Sincerely,
 
 Eagle De Luna M.D.
 Pediatric Gastroenterology
 
 [facility] / 
 [id number] / [id number] / [id number] / [id number]
 D: 10/26/2000
 T: 10/28/2000
 
 cc:
 
 Eagle Mcrae M.D.
 [address] [place] [address]  [id number]
 
 [id number] signed by Interface, Transcription In at 2006  3:05 AM PSTdocume
nted in this encounter
 
 Plan of Treatment
 Not on filedocumented as of this encounter
 
 Visit Diagnoses
 Not on filedocumented in this encounter

## 2019-11-01 NOTE — XMS
Encounter Summary
  Created on: 2019
 
 Bonifacio Nila JB
 External Reference #: [id number]
 : [birth year]
 Sex: Female
 
 Demographics
 
 
+-----------------------+------------------------+
| Address               | [address]            |
|                       | [place]  [address]   |
+-----------------------+------------------------+
| Home Phone            | [phone]        |
+-----------------------+------------------------+
| Preferred Language    | Unknown                |
+-----------------------+------------------------+
| Marital Status        | Single                 |
+-----------------------+------------------------+
| Latter-day Affiliation | Unknown                |
+-----------------------+------------------------+
| Race                  | White                  |
+-----------------------+------------------------+
| Ethnic Group          | Not  or  |
+-----------------------+------------------------+
 
 
 Author
 
 
+--------------+------------------------------+
| Author       | [facility] |
+--------------+------------------------------+
| Organization | [facility] |
+--------------+------------------------------+
| Address      | Unknown                      |
+--------------+------------------------------+
| Phone        | Unavailable                  |
+--------------+------------------------------+
 
 
 
 Support
 
 
+-----------------+--------------+---------+-----------------+
| Name            | Relationship | Address | Phone           |
+-----------------+--------------+---------+-----------------+
| Kit Valdovinos   | ECON         | Unknown | [phone] |
+-----------------+--------------+---------+-----------------+
| Magdalena Valdovinos | ECON         | Unknown | [phone] |
+-----------------+--------------+---------+-----------------+
 
 
 
 Care Team Providers
 
 
 
+-----------------------+------+-------------+
| Care Team Member Name | Role | Phone       |
+-----------------------+------+-------------+
 PCP  | Unavailable |
+-----------------------+------+-------------+
 
 
 
 Encounter Details
 
 
+--------+-------------+----------------------+----------------------+-------------+
| Date   | Type        | Department           | Care Team            | Description |
+--------+-------------+----------------------+----------------------+-------------+
| / | Documentati |   Pediatric          |   Neema Khalil,   |             |
|    | on          | Gastroenterology at  | [address] |             |
|        |             | [place]          |   [facility] [place]       |             |
|        |             | [facility]  | [address]           |             |
|        |             |  [address] | [phone]         |             |
|        |             |  [address]  Mailcode:  | [phone] (Fax)   |             |
|        |             | [facility]  [place]   |                      |             |
|        |             | [place], [place]         |                      |             |
|        |             | [address]           |                      |             |
|        |             | [phone]         |                      |             |
+--------+-------------+----------------------+----------------------+-------------+
 
 
 
 Social History
 
 
+----------------+-------+-----------+--------+------+
| Tobacco Use    | Types | Packs/Day | Years  | Date |
|                |       |           | Used   |      |
+----------------+-------+-----------+--------+------+
| Never Assessed |       |           |        |      |
+----------------+-------+-----------+--------+------+
 
 
 
+------------------+---------------+
| Sex Assigned at  | Date Recorded |
| Birth            |               |
+------------------+---------------+
| Not on file      |               |
+------------------+---------------+
 
 
 
+----------------+-------------+-------------+
| Job Start Date | Occupation  | Industry    |
+----------------+-------------+-------------+
| Not on file    | Not on file | Not on file |
+----------------+-------------+-------------+
 
 
 
+----------------+--------------+------------+
 
| Travel History | Travel Start | Travel End |
+----------------+--------------+------------+
 
 
 
+-------------------------------------+
| No recent travel history available. |
+-------------------------------------+
 documented as of this encounter
 
 Plan of Treatment
 Not on filedocumented as of this encounter
 
 Visit Diagnoses
 Not on filedocumented in this encounter

## 2019-11-01 NOTE — XMS
Encounter Summary
  Created on: 2019
 
 Bonifacio Nila JB
 External Reference #: [id number]
 : [birth year]
 Sex: Female
 
 Demographics
 
 
+-----------------------+------------------------+
| Address               | [address]            |
|                       | [place]  [address]   |
+-----------------------+------------------------+
| Home Phone            | [phone]        |
+-----------------------+------------------------+
| Preferred Language    | Unknown                |
+-----------------------+------------------------+
| Marital Status        | Single                 |
+-----------------------+------------------------+
| Oriental orthodox Affiliation | Unknown                |
+-----------------------+------------------------+
| Race                  | White                  |
+-----------------------+------------------------+
| Ethnic Group          | Not  or  |
+-----------------------+------------------------+
 
 
 Author
 
 
+--------------+------------------------------+
| Author       | [facility] |
+--------------+------------------------------+
| Organization | [facility] |
+--------------+------------------------------+
| Address      | Unknown                      |
+--------------+------------------------------+
| Phone        | Unavailable                  |
+--------------+------------------------------+
 
 
 
 Support
 
 
+-----------------+--------------+---------+-----------------+
| Name            | Relationship | Address | Phone           |
+-----------------+--------------+---------+-----------------+
| Kit Valdovinos   | ECON         | Unknown | [phone] |
+-----------------+--------------+---------+-----------------+
| Magdalena Valdovinos | ECON         | Unknown | [phone] |
+-----------------+--------------+---------+-----------------+
 
 
 
 Care Team Providers
 
 
 
+------------------------+------+-----------------+
| Care Team Member Name  | Role | Phone           |
+------------------------+------+-----------------+
| Lily Horton MD | PCP  | [phone] |
+------------------------+------+-----------------+
 
 
 
 Reason for Visit
 
 
+-------------------+----------+
| Reason            | Comments |
+-------------------+----------+
| Social Work Notes |          |
+-------------------+----------+
 
 
 
 Encounter Details
 
 
+--------+-------------+----------------------+---------------------+-------------------+
| Date   | Type        | Department           | Care Team           | Description       |
+--------+-------------+----------------------+---------------------+-------------------+
| / | Documentati |   SOCIAL WORK        |   Magdalena Galarza,  | Social Work Notes |
| 2016   | on          | AMBULATORY  [address]  | LCSW  [address]   |                   |
|        |             | [address]  | [address]     |                   |
|        |             |  Mailcode: [address]      | [place], [place]        |                   |
|        |             | [place], [place]         | [address]          |                   |
|        |             | [address]           |                     |                   |
|        |             | [phone]         |                     |                   |
+--------+-------------+----------------------+---------------------+-------------------+
 
 
 
 Social History
 
 
+-------------------+-------+-----------+--------+------+
| Tobacco Use       | Types | Packs/Day | Years  | Date |
|                   |       |           | Used   |      |
+-------------------+-------+-----------+--------+------+
| Passive Smoke     |       |           |        |      |
| Exposure - Never  |       |           |        |      |
| Smoker            |       |           |        |      |
+-------------------+-------+-----------+--------+------+
 
 
 
+---------------------+---+---+---+
| Smokeless Tobacco:  |   |   |   |
| Never Used          |   |   |   |
+---------------------+---+---+---+
 
 
 
+-------------+-------------+---------+----------+
 
| Alcohol Use | Drinks/Week | oz/Week | Comments |
+-------------+-------------+---------+----------+
| Not Asked   |             |         |          |
+-------------+-------------+---------+----------+
 
 
 
+------------------+---------------+
| Sex Assigned at  | Date Recorded |
| Birth            |               |
+------------------+---------------+
| Not on file      |               |
+------------------+---------------+
 
 
 
+----------------+-------------+-------------+
| Job Start Date | Occupation  | Industry    |
+----------------+-------------+-------------+
| Not on file    | Not on file | Not on file |
+----------------+-------------+-------------+
 
 
 
+----------------+--------------+------------+
| Travel History | Travel Start | Travel End |
+----------------+--------------+------------+
 
 
 
+-------------------------------------+
| No recent travel history available. |
+-------------------------------------+
 documented as of this encounter
 
 Plan of Treatment
 Not on filedocumented as of this encounter
 
 Visit Diagnoses
 Not on filedocumented in this encounter

## 2019-11-01 NOTE — XMS
Encounter Summary
  Created on: 2019
 
 Bonifacio Nila JB
 External Reference #: [id number]
 : [birth year]
 Sex: Female
 
 Demographics
 
 
+-----------------------+------------------------+
| Address               | [address]            |
|                       | [place]  [address]   |
+-----------------------+------------------------+
| Home Phone            | [phone]        |
+-----------------------+------------------------+
| Preferred Language    | Unknown                |
+-----------------------+------------------------+
| Marital Status        | Single                 |
+-----------------------+------------------------+
| Catholic Affiliation | Unknown                |
+-----------------------+------------------------+
| Race                  | White                  |
+-----------------------+------------------------+
| Ethnic Group          | Not  or  |
+-----------------------+------------------------+
 
 
 Author
 
 
+--------------+------------------------------+
| Author       | [facility] |
+--------------+------------------------------+
| Organization | [facility] |
+--------------+------------------------------+
| Address      | Unknown                      |
+--------------+------------------------------+
| Phone        | Unavailable                  |
+--------------+------------------------------+
 
 
 
 Support
 
 
+-----------------+--------------+---------+-----------------+
| Name            | Relationship | Address | Phone           |
+-----------------+--------------+---------+-----------------+
| Kit Valdovinos   | ECON         | Unknown | [phone] |
+-----------------+--------------+---------+-----------------+
| Magdalena Valdovinos | ECON         | Unknown | [phone] |
+-----------------+--------------+---------+-----------------+
 
 
 
 Care Team Providers
 
 
 
+------------------------+------+-----------------+
| Care Team Member Name  | Role | Phone           |
+------------------------+------+-----------------+
| Lily Horton MD | PCP  | [phone] |
+------------------------+------+-----------------+
 
 
 
 Encounter Details
 
 
+--------+----------+----------------------+----------------------+-------------+
| Date   | Type     | Department           | Care Team            | Description |
+--------+----------+----------------------+----------------------+-------------+
| 10/27/ | Abstract |   Pediatric          |   Neema Khalil,   |             |
|    |          | Gastroenterology at  | MD  [address] |             |
|        |          | [place]          |   [place], [place]       |             |
|        |          | [facility]  | [address]           |             |
|        |          |  [address] | [phone]         |             |
|        |          |  [address]  Mailcode:  | [phone] (Fax)   |             |
|        |          |   [place]   |                      |             |
|        |          | [place], [place]         |                      |             |
|        |          | [address]           |                      |             |
|        |          | [phone]         |                      |             |
+--------+----------+----------------------+----------------------+-------------+
 
 
 
 Social History
 
 
+-------------------+-------+-----------+--------+------+
| Tobacco Use       | Types | Packs/Day | Years  | Date |
|                   |       |           | Used   |      |
+-------------------+-------+-----------+--------+------+
| Passive Smoke     |       |           |        |      |
| Exposure - Never  |       |           |        |      |
| Smoker            |       |           |        |      |
+-------------------+-------+-----------+--------+------+
 
 
 
+---------------------+---+---+---+
| Smokeless Tobacco:  |   |   |   |
| Never Used          |   |   |   |
+---------------------+---+---+---+
 
 
 
+-------------+-------------+---------+----------+
| Alcohol Use | Drinks/Week | oz/Week | Comments |
+-------------+-------------+---------+----------+
| Not Asked   |             |         |          |
+-------------+-------------+---------+----------+
 
 
 
+------------------+---------------+
 
| Sex Assigned at  | Date Recorded |
| Birth            |               |
+------------------+---------------+
| Not on file      |               |
+------------------+---------------+
 
 
 
+----------------+-------------+-------------+
| Job Start Date | Occupation  | Industry    |
+----------------+-------------+-------------+
| Not on file    | Not on file | Not on file |
+----------------+-------------+-------------+
 
 
 
+----------------+--------------+------------+
| Travel History | Travel Start | Travel End |
+----------------+--------------+------------+
 
 
 
+-------------------------------------+
| No recent travel history available. |
+-------------------------------------+
 documented as of this encounter
 
 Plan of Treatment
 Not on filedocumented as of this encounter
 
 Procedures
 
 
+----------------------+--------+-------------+----------------------+----------------------
+
| Procedure Name       | Priori | Date/Time   | Associated Diagnosis | Comments             
|
|                      | ty     |             |                      |                      
|
+----------------------+--------+-------------+----------------------+----------------------
+
| CMV PCR              | Routin | 10/23/2015  |                      |   Results for this   
|
| QUANTITATION, PLASMA | e      |  9:25 AM    |                      | procedure are in the 
|
|                      |        | PDT         |                      |  results section.    
|
+----------------------+--------+-------------+----------------------+----------------------
+
| FRANCIS CAGLE         | Routin | 10/23/2015  |                      |   Results for this   
|
| PCR,QUANT (PLASMA OR | e      |  9:25 AM    |                      | procedure are in the 
|
|  CSF)                |        | PDT         |                      |  results section.    
|
+----------------------+--------+-------------+----------------------+----------------------
+
 documented in this encounter
 
 Results
 
 CMV PCR QUANTITATION, PLASMA (10/23/2015  9:25 AM PDT)
 
+-------------+--------------+-----------+------------+--------------+
| Component   | Value        | Ref Range | Performed  | Pathologist  |
|             |              |           | At         | Signature    |
+-------------+--------------+-----------+------------+--------------+
| CMV DNA     | <390         |           | [facility]  |              |
| QUANT       |              |           | LAB -      |              |
| COPY/ML     |              |           | [place]  |              |
+-------------+--------------+-----------+------------+--------------+
| CMV, QUANT, | <2.6         |           | [facility]  |              |
|  LOG CPY/ML |              |           | LAB -      |              |
|             |              |           | [place]  |              |
+-------------+--------------+-----------+------------+--------------+
| CMV DNA     | <227         |           | [facility]  |              |
| QUANT IU/ML |              |           | LAB -      |              |
|             |              |           | [place]  |              |
+-------------+--------------+-----------+------------+--------------+
| CMV DNA     | <2.4         |           | [facility]  |              |
| QUANT LOG   |              |           | LAB -      |              |
| IU/ML       |              |           | [place]  |              |
+-------------+--------------+-----------+------------+--------------+
| CMV DNA     | Not detected |           | [facility]  |              |
| QUANT       |              |           | LAB -      |              |
| INTER[facility]      |              |           | [place]  |              |
+-------------+--------------+-----------+------------+--------------+
 
 
 
+---------------+
| Specimen      |
+---------------+
| Blood - Blood |
+---------------+
 
 
 
+--------------------+----------------------+--------------------+----------------+
| Performing         | Address              | City/State/Zipcode | Phone Number   |
| Organization       |                      |                    |                |
+--------------------+----------------------+--------------------+----------------+
|   [facility]  |   [address] | [place], [place]      |   [phone] |
| [place]          |                      |                    |                |
+--------------------+----------------------+--------------------+----------------+
 FRANCIS BARR PCR,QUANT (PLASMA OR CSF) (10/23/2015  9:25 AM PDT)
 
+-------------+--------------+-----------+------------+--------------+
| Component   | Value        | Ref Range | Performed  | Pathologist  |
|             |              |           | At         | Signature    |
+-------------+--------------+-----------+------------+--------------+
| EBV QUANT   | <390         |           | [facility]  |              |
| COPY/ML     |              |           | LAB -      |              |
|             |              |           | [place]  |              |
+-------------+--------------+-----------+------------+--------------+
| EBV QUANT   | Log <2.6     |           | [facility]  |              |
| SPECIMEN    |              |           | LAB -      |              |
|             |              |           | [place]  |              |
+-------------+--------------+-----------+------------+--------------+
| EBV         | Not detected |           | [facility]  |              |
| INTERPRETAT |              |           | LAB -      |              |
 
| ION         |              |           | [place]  |              |
+-------------+--------------+-----------+------------+--------------+
 
 
 
+----------+
| Specimen |
+----------+
| Blood    |
+----------+
 
 
 
+--------------------+----------------------+--------------------+----------------+
| Performing         | Address              | City/State/Zipcode | Phone Number   |
| Organization       |                      |                    |                |
+--------------------+----------------------+--------------------+----------------+
|   [facility]  |   [address] | [place], [place]      |   [phone] |
| [place]          |                      |                    |                |
+--------------------+----------------------+--------------------+----------------+
 documented in this encounter
 
 Visit Diagnoses
 Not on filedocumented in this encounter

## 2019-11-01 NOTE — XMS
Encounter Summary
  Created on: 2019
 
 Bonifacio Nila JB
 External Reference #: [id number]
 : [birth year]
 Sex: Female
 
 Demographics
 
 
+-----------------------+------------------------+
| Address               | [address]            |
|                       | [place]  [address]   |
+-----------------------+------------------------+
| Home Phone            | [phone]        |
+-----------------------+------------------------+
| Preferred Language    | Unknown                |
+-----------------------+------------------------+
| Marital Status        | Single                 |
+-----------------------+------------------------+
| Mormonism Affiliation | Unknown                |
+-----------------------+------------------------+
| Race                  | White                  |
+-----------------------+------------------------+
| Ethnic Group          | Not  or  |
+-----------------------+------------------------+
 
 
 Author
 
 
+--------------+------------------------------+
| Author       | [facility] |
+--------------+------------------------------+
| Organization | [facility] |
+--------------+------------------------------+
| Address      | Unknown                      |
+--------------+------------------------------+
| Phone        | Unavailable                  |
+--------------+------------------------------+
 
 
 
 Support
 
 
+-----------------+--------------+---------+-----------------+
| Name            | Relationship | Address | Phone           |
+-----------------+--------------+---------+-----------------+
| Kit Valdovinos   | ECON         | Unknown | [phone] |
+-----------------+--------------+---------+-----------------+
| Magdalena Valdovinos | ECON         | Unknown | [phone] |
+-----------------+--------------+---------+-----------------+
 
 
 
 Care Team Providers
 
 
 
+------------------------+------+-----------------+
| Care Team Member Name  | Role | Phone           |
+------------------------+------+-----------------+
| Lily Horton MD | PCP  | [phone] |
+------------------------+------+-----------------+
 
 
 
 Reason for Visit
 
 
+-------------------+----------+
| Reason            | Comments |
+-------------------+----------+
| Follow-up in      |          |
| outpatient clinic |          |
+-------------------+----------+
 Office Visit - E/M Services (Routine)
 
+--------+--------------+---------------+--------------+--------------+---------------+
| Status | Reason       | Specialty     | Diagnoses /  | Referred By  | Referred To   |
|        |              |               | Procedures   | Contact      | Contact       |
+--------+--------------+---------------+--------------+--------------+---------------+
| Closed |   Specialty  | Pediatric     |              |   Non-[facility]   |   Remi,     |
|        | Services     | Gastroenterol |              | Epic Dept    | MD Neema   |
|        | Required     | ogy           |              |              | [address] |
|        |              |               |              |              |  Rd           |
|        |              |               |              |              | [place], [place]  |
|        |              |               |              |              | [address]    |
|        |              |               |              |              | Phone:        |
|        |              |               |              |              | [phone]  |
|        |              |               |              |              |  Fax:         |
|        |              |               |              |              | [phone]  |
+--------+--------------+---------------+--------------+--------------+---------------+
 
 
 
 
 Encounter Details
 
 
+--------+---------+----------------------+----------------------+----------------------+
| Date   | Type    | Department           | Care Team            | Description          |
+--------+---------+----------------------+----------------------+----------------------+
| / | Office  |   Pediatric          |   Neema Khalil,   | Transplanted liver   |
| 2014   | Visit   | Gastroenterology at  | MD  [address] | (HCC) (Primary Dx);  |
|        |         | [place]          |   [place], [place]       | Immunosuppressed     |
|        |         | [facility]  | [address]           | status (HCC); High   |
|        |         |  [address] | [phone]         | risk medications     |
|        |         |  Naresh Ward  Mailcode:  | [phone] (Fax)   | (not anticoagulants) |
|        |         | [facility]   |                      |  long-term use; VSD  |
|        |         | [place], [place]         |                      | (ventricular septal  |
|        |         | [address]           |                      | defect),             |
|        |         | [phone]         |                      | perimembranous;      |
|        |         |                      |                      | Polysplenia; Aortic  |
|        |         |                      |                      | insufficiency;       |
|        |         |                      |                      | Aortic root          |
|        |         |                      |                      | dilatation (HCC);    |
 
|        |         |                      |                      | Interrupted inferior |
|        |         |                      |                      |  vena cava           |
+--------+---------+----------------------+----------------------+----------------------+
 
 
 
 Social History
 
 
+-------------------+-------+-----------+--------+------+
| Tobacco Use       | Types | Packs/Day | Years  | Date |
|                   |       |           | Used   |      |
+-------------------+-------+-----------+--------+------+
| Passive Smoke     |       |           |        |      |
| Exposure - Never  |       |           |        |      |
| Smoker            |       |           |        |      |
+-------------------+-------+-----------+--------+------+
 
 
 
+---------------------+---+---+---+
| Smokeless Tobacco:  |   |   |   |
| Never Used          |   |   |   |
+---------------------+---+---+---+
 
 
 
+-------------+-------------+---------+----------+
| Alcohol Use | Drinks/Week | oz/Week | Comments |
+-------------+-------------+---------+----------+
| Not Asked   |             |         |          |
+-------------+-------------+---------+----------+
 
 
 
+------------------+---------------+
| Sex Assigned at  | Date Recorded |
| Birth            |               |
+------------------+---------------+
| Not on file      |               |
+------------------+---------------+
 
 
 
+----------------+-------------+-------------+
| Job Start Date | Occupation  | Industry    |
+----------------+-------------+-------------+
| Not on file    | Not on file | Not on file |
+----------------+-------------+-------------+
 
 
 
+----------------+--------------+------------+
| Travel History | Travel Start | Travel End |
+----------------+--------------+------------+
 
 
 
+-------------------------------------+
| No recent travel history available. |
 
+-------------------------------------+
 documented as of this encounter
 
 Last Filed Vital Signs
 
 
+-------------------+---------------------+----------------------+----------+
| Vital Sign        | Reading             | Time Taken           | Comments |
+-------------------+---------------------+----------------------+----------+
| Blood Pressure    | 137/95              | 2014  3:52 PM  |          |
|                   |                     | PST                  |          |
+-------------------+---------------------+----------------------+----------+
| Pulse             | 74                  | 2014  3:52 PM  |          |
|                   |                     | PST                  |          |
+-------------------+---------------------+----------------------+----------+
| Temperature       | -                   | -                    |          |
+-------------------+---------------------+----------------------+----------+
| Respiratory Rate  | -                   | -                    |          |
+-------------------+---------------------+----------------------+----------+
| Oxygen Saturation | -                   | -                    |          |
+-------------------+---------------------+----------------------+----------+
| Inhaled Oxygen    | -                   | -                    |          |
| Concentration     |                     |                      |          |
+-------------------+---------------------+----------------------+----------+
| Weight            | 72 kg (158 lb 11.7  | 2014  3:52 PM  |          |
|                   | oz)                 | PST                  |          |
+-------------------+---------------------+----------------------+----------+
| Height            | 163.8 cm (5' 4.49") | 2014  3:52 PM  |          |
|                   |                     | PST                  |          |
+-------------------+---------------------+----------------------+----------+
| Body Mass Index   | 26.84               | 2014  3:52 PM  |          |
|                   |                     | PST                  |          |
+-------------------+---------------------+----------------------+----------+
 documented in this encounter
 
 Patient Instructions
 Patient Instructions Neema Khalil MD - 2014 10:38 AM PSTPlan: 
 
 - Target prograf level: Not established
 
 - Blood draw for labs: every  3  months
 
 - Change in medicines: None
 
 - Next appointment: 2015
 
 It was nice to see you today ! Thank you for choosing Pediatric [facility]atology Clinic at [facility]
 
 Neema Khalil MD
 
 Results of tests:  
 Test results will be sent to you by [employer]. 
 
 Calls:
 Medical emergencies:  call 911
 Non-urgent issues:  Send [employer] message
 Monday - Friday work hours: call  [phone] # [phone]
 After hours, weekends, holidays: call [phone]
 To schedule an appointment: call [phone] # [phone]
 
 For refills: call to your pharmacy a week before running out medicine
 
 Electronically signed by Neema Khalil MD at 2014  4:20 PM PST
 documented in this encounter
 
 Progress Notes
 Neema Khalil MD - 2014 10:38 AM PSTFormatting of this note might be different fro
m the original.
 Primary Care Provider: Lily Horton MD
 
 [facility] 
 DOS: 2014
 Visit type: Follow-up 
 
 Patient accompanied by: mother
  used: no
 
 Chief Complaint/Reason for visit: 
 - Post-liver transplant care 
 - High risk medication management
 
 Patient Active Problem List 
 Diagnosis 
   Transplanted Liver 
   VSD (Ventricular Septal Defect) 
   Aortic Valve Insufficiency 
 
 PAST Hx/kim: Biliary atresia
 -  s/p OLT at [facility]. She was last seen by Dr Burkett in . 
In , it was noticed that she has not been seen, attempts to reach to family was unsucces
sful. lost to follow up since that time. 
 - Cardiology:  last cardiology note is from  from Dr Lily Horton, cardiologist at [facility]. Her cardiac dx includes 1) moderate perimembraneous VSD nearly occluded by aneurysma
l tissue leaving a tiny VSD, 2) trace central aortic insufficiency, 3) Left atrial isomerism
 (polysplenia). It was recommended her annual follow up for progression of aortic insufficie
ncy in which case she may need closure of VSD. No further notes available in our system as s
he is lost to follow up since .
 - 10/16/2014 s/p VSD repair, 8-mm Amplatzer Vascular Plug IV with no significant residual s
hunting noted.
 
 INTERVAL HISTORY:
 - s/p VSD closure in mid-October. Will receive abx ppx 5 more months and aspirin x 5 mo
 - did not receive flu shot, mother says they don't do flu shots
 - doing well after VSD closure
 - no fever, abdominal pain, nausea, emesis, diarrhea
 
 REVIEW OF SYSTEMS: 
 General:  No fever, fatigue, or weight loss.  
 Eyes:  No changes in vision, no eye irritation or discharge.  
 ENT:  No nosebleeds, nasal congestion, difficulty swallowing, no mouth sores. 
 Respiratory:  No shortness of breath, cough, wheezing.
 Cardiovascular:  No difficulty breathing, edema, cyanosis.  
 Genitourinary:  No urinary infections.  
 Neurologic:  No seizures. No headaches.  
 Musculoskeletal:  No joint pain, swelling. No back pain.  Full range of motion.
 Skin:  No itching, rash, jaundice.  
 Heme: No anemia, abnormal bleeding, easy bruising
 Lymphatic: No enlarged lymph nodes.
 Metabolic/Endo: no glucose intolerance, hypothyroidism
 Allergy/immunology: no seasonal or food allergies, no asthma
 
 
 All other ROS are negative.
 
  
 Current Outpatient Prescriptions 
 Medication Sig 
   PROGRAF 1 mg Oral Capsule Take 1 Cap by mouth two times daily. 
 
 Aspirin daily
 
 Allergies 
 Allergen Reactions 
   Sulfa (Sulfonamide Antibiotics) Hives and Swelling-Facial 
   Varicella Vaccines  
   Possible reaction with sulfa meds 
 
 PHYSICAL EXAMINATION: 
 
 Patient reports a pain level of 0  today. No action required.
 
 Ht 163.8 cm (5' 4.49") (58%, Z = 0.21), Wt 72 kg (158 lb 11.7 oz) (92%, Z = 1.38), /9
5, Pulse 74, BMI 26.84 kg/(m^2).
 
 APPEARANCE: alert, active and in no apparent distress. 
 SKIN: no jaundice or rashes. 
 HEENT: normocephalic, PERRLA, mucous membranes moist. 
 NECK: supple, without thyromegaly. 
 CHEST: clear to auscultation bilaterally. 
 CV: no murmurs. 
 ABDOMEN: soft, NTND, no hepatosplenomegaly. 
 BACK: without tenderness. 
 MUSCULOSKELETAL: no muscle wasting, no deformity.
 EXTREMITIES: No edema, clubbing, deformity.
 NEURO: grossly intact
 
  
 Labs/imaging: as below
 
 ASSESSMENT:  14 yo female with
 
 V42.7   Transplanted liver: normal LFTs, GGT
 279.9   Immunosuppressed status: no signs of infection
 V58.69  High risk medications (not anticoagulants) long-term use
 745.4   VSD (ventricular septal defect), perimembranous: s/p closure
 759.0   Polysplenia
 424.1   Aortic insufficiency
 447.71  Aortic root dilatation
 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
 
 PLAN/RECOMMENDATIONS:
 1) Goal prograf level: not established
 
 2) Meds to continue without change: prograf 1 mg bid
 
 4) Labs:
  - CBC w diff, CMP, GGT, magnesium, trough tacrolimus level, quantitative EBC and CMV PCR: 
every 3 months
  
 5) follow up in liver transplant clinic: in 2015, then annually
 
 
 Health Care Maintenance: 
 - No live vaccines for immunosuppressed patients  
 - Flu vaccine (intramuscular): every . 
  2 shots the first year after transplant (regardless of age)
  If late in the season, can be given next season
 - Dental care every 6 months
 
 At this visit: 
 Dr. Nazario and DHARMESH Francois from [facility] were presen
t as observers during this visit.
 Psychosocial or economic issues that may affect patient's medical care or well being:none 
 Co-morbid chronic medical problems that may affect procedural sedation risk: N/A 
 
 Labs/imaging:
 
 Component
     Latest Ref Rng 2014 
 GLUCOSE
     60-99 mg/dL 81 
 BUN  6-20 mg/dL 13 
 CREATININE     0.46-0.81 mg/dL 0.76 
 SODIUM 136-145 mmol/L 139 
 POTASSIUM
     3.4-5.0 mmol/L 3.9 
 CHLORIDE     mmol/L 105 
 TOTAL CO2
     21-32 mmol/L 30 
 CALCIUM    8.6-10.2 mg/dL 9.4 
 BILIRUBIN TOTAL
     0.3-1.2 mg/dL 0.2 (L) 
 TOTAL PROTEIN
     6.2-8.5 g/dL 7.6 
 ALBUMIN
     3.5-4.7 g/dL 3.7 
 ALK PHOS
      U/L 101 
 AST(SGOT)
     18-36 U/L 15 (L) 
 ALT (SGPT)
     12-60 U/L 18 
 ANION GAP(ALB CORRECTED)
     4-11 mmol/L 4 
 ANION GAP     4 
 BILIRUBIN DIRECT
     0.0-0.3 mg/dL <0.1 
 LD TOTAL, PLASMA
     175-285 U/L 154 (L) 
 MAGNESIUM    1.8-2.5 mg/dL 1.6 (L) 
 PHOSPHORUS
     2.4-4.7 mg/dL 3.3 
 URIC ACID    2.5-6.2 mg/dL 3.7 
 CHOLESTEROL 
     <200 mg/dL 136 
 TACROLIMUS ()  5.0-15.0 ng/mL <2.0 (L) 
 
 Neema Khalil MD
 Pediatric Gastroenterology
 Consult line: [phone] 
 
 Electronically signed by Neema Khalil MD at 2014  9:34 PM PSTdocumented in this en
 
counter
 
 Plan of Treatment
 Not on filedocumented as of this encounter
 
 Results
 TACROLIMUS, WHOLE BLOOD (2014  5:27 PM PST)
 
+-------------+----------+-------------+-------------+--------------+
| Component   | Value    | Ref Range   | Performed   | Pathologist  |
|             |          |             | At          | Signature    |
+-------------+----------+-------------+-------------+--------------+
| TACROLIMUS  | <2.0 (L) | 5.0 - 15.0  | [facility]        |              |
| ()    |          | ng/mL       | LABORATORY  |              |
|             |          |             | SERVICES,   |              |
|             |          |             | SPECIAL IMM |              |
|             |          |             |  + COAG     |              |
+-------------+----------+-------------+-------------+--------------+
 
 
 
+---------------+
| Specimen      |
+---------------+
| Blood - Blood |
+---------------+
 
 
 
+------------------------------------------------------------------------+----------------+
| Narrative                                                              | Performed At   |
+------------------------------------------------------------------------+----------------+
|         Therapeutic range is based on a whole blood specimen drawn 12  |   [facility]         |
| hours post-dose or prior to next dose (the trough). Some other factors | LABORATORY     |
|  influencing therapeutic range, dose administered, and result          | SERVICES,      |
| interpretation include time since transplantation, the organ           | SPECIAL IMM +  |
| transplanted, co-administration of other immunosuppressants and        | COAG           |
| interaction with other drugs that may increase or decrease Tacrolimus  |                |
| concentrations.                                                        |                |
+------------------------------------------------------------------------+----------------+
 
 
 
+--------------------+------------------------+--------------------+--------------+
| Performing         | Address                | City/State/Zipcode | Phone Number |
| Organization       |                        |                    |              |
+--------------------+------------------------+--------------------+--------------+
|   [facility]  |   [address]  | [place] [address] |              |
| SERVICES, SPECIAL  | [address]                |                    |              |
| IMM + COAG         |                        |                    |              |
+--------------------+------------------------+--------------------+--------------+
 CHOLESTEROL TOTAL, PLASMA (2014  5:27 PM PST)
 
+-------------+-------+------------+-------------+--------------+
| Component   | Value | Ref Range  | Performed   | Pathologist  |
|             |       |            | At          | Signature    |
+-------------+-------+------------+-------------+--------------+
| CHOLESTEROL | 136   | <200 mg/dL | [facility]        |              |
|   (LAB)     |       |            | LABORATORY  |              |
|             |       |            | SERVICES,   |              |
 
|             |       |            | CORE        |              |
+-------------+-------+------------+-------------+--------------+
 
 
 
+---------------+
| Specimen      |
+---------------+
| Blood - Blood |
+---------------+
 
 
 
+--------------------+------------------------+--------------------+--------------+
| Performing         | Address                | City/State/Zipcode | Phone Number |
| Organization       |                        |                    |              |
+--------------------+------------------------+--------------------+--------------+
|   [facility]  |   [address]  | [place] [address] |              |
| SERVICES, CORE     | [address]                |                    |              |
+--------------------+------------------------+--------------------+--------------+
 LDH TOTAL, PLASMA (2014  5:27 PM PST)
 
+------------+---------+---------------+-------------+--------------+
| Component  | Value   | Ref Range     | Performed   | Pathologist  |
|            |         |               | At          | Signature    |
+------------+---------+---------------+-------------+--------------+
| LD TOTAL,  | 154 (L) | 175 - 285 U/L | [facility]        |              |
| PLASMA     |         |               | LABORATORY  |              |
|            |         |               | SERVICES,   |              |
|            |         |               | CORE        |              |
+------------+---------+---------------+-------------+--------------+
| LD CMNT    | No Hemo |               | [facility]        |              |
|            |         |               | LABORATORY  |              |
|            |         |               | SERVICES,   |              |
|            |         |               | CORE        |              |
+------------+---------+---------------+-------------+--------------+
 
 
 
+---------------+
| Specimen      |
+---------------+
| Blood - Blood |
+---------------+
 
 
 
+-----------------------------------------------------------------------+----------------+
| Narrative                                                             | Performed At   |
+-----------------------------------------------------------------------+----------------+
|      New reference range effective 2013 for LD Total performed  |   [facility]         |
| in Core Lab only.                                                     | LABORATORY     |
|                                                                       | SERVICES, CORE |
+-----------------------------------------------------------------------+----------------+
 
 
 
+--------------------+------------------------+--------------------+--------------+
| Performing         | Address                | City/State/Zipcode | Phone Number |
| Organization       |                        |                    |              |
 
+--------------------+------------------------+--------------------+--------------+
|   [facility]  |   [address]  | [place] [address] |              |
| SERVICES, CORE     | [address]                |                    |              |
+--------------------+------------------------+--------------------+--------------+
 BILIRUBIN DIRECT (2014  5:27 PM PST)
 
+-------------+---------+-----------------+-------------+--------------+
| Component   | Value   | Ref Range       | Performed   | Pathologist  |
|             |         |                 | At          | Signature    |
+-------------+---------+-----------------+-------------+--------------+
| BILIRUBIN   | <0.1    | 0.0 - 0.3 mg/dL | [facility]        |              |
| DIRECT      |         |                 | LABORATORY  |              |
|             |         |                 | SERVICES,   |              |
|             |         |                 | CORE        |              |
+-------------+---------+-----------------+-------------+--------------+
| BILI D CMNT | No Hemo |                 | [facility]        |              |
|             |         |                 | LABORATORY  |              |
|             |         |                 | SERVICES,   |              |
|             |         |                 | CORE        |              |
+-------------+---------+-----------------+-------------+--------------+
 
 
 
+---------------+
| Specimen      |
+---------------+
| Blood - Blood |
+---------------+
 
 
 
+--------------------+------------------------+--------------------+--------------+
| Performing         | Address                | City/State/Zipcode | Phone Number |
| Organization       |                        |                    |              |
+--------------------+------------------------+--------------------+--------------+
|   [facility]  |   [address]  | [place] [address] |              |
| SERVICES, CORE     | [address]                |                    |              |
+--------------------+------------------------+--------------------+--------------+
 URIC ACID, PLASMA (2014  5:27 PM PST)
 
+-------------+-------+-----------------+-------------+--------------+
| Component   | Value | Ref Range       | Performed   | Pathologist  |
|             |       |                 | At          | Signature    |
+-------------+-------+-----------------+-------------+--------------+
| URIC ACID,  | 3.7   | 2.5 - 6.2 mg/dL | [facility]        |              |
| PLASMA      |       |                 | LABORATORY  |              |
| (LAB)       |       |                 | SERVICES,   |              |
|             |       |                 | CORE        |              |
+-------------+-------+-----------------+-------------+--------------+
 
 
 
+---------------+
| Specimen      |
+---------------+
| Blood - Blood |
+---------------+
 
 
 
 
+--------------------+------------------------+--------------------+--------------+
| Performing         | Address                | City/State/Zipcode | Phone Number |
| Organization       |                        |                    |              |
+--------------------+------------------------+--------------------+--------------+
|   [facility]  |   [address]  | [place] [address] |              |
| SERVICES, CORE     | [address]                |                    |              |
+--------------------+------------------------+--------------------+--------------+
 PHOSPHORUS, PLASMA (2014  5:27 PM PST)
 
+-------------+-------+-----------------+-------------+--------------+
| Component   | Value | Ref Range       | Performed   | Pathologist  |
|             |       |                 | At          | Signature    |
+-------------+-------+-----------------+-------------+--------------+
| PHOSPHORUS, | 3.3   | 2.4 - 4.7 mg/dL | [facility]        |              |
|  PLASMA     |       |                 | LABORATORY  |              |
| (LAB)       |       |                 | SERVICES,   |              |
|             |       |                 | CORE        |              |
+-------------+-------+-----------------+-------------+--------------+
 
 
 
+---------------+
| Specimen      |
+---------------+
| Blood - Blood |
+---------------+
 
 
 
+--------------------+------------------------+--------------------+--------------+
| Performing         | Address                | City/State/Zipcode | Phone Number |
| Organization       |                        |                    |              |
+--------------------+------------------------+--------------------+--------------+
|   [facility]  |   [address]  | [place] [address] |              |
| SERVICES, CORE     | [address]                |                    |              |
+--------------------+------------------------+--------------------+--------------+
 MAGNESIUM, PLASMA (2014  5:27 PM PST)
 
+-------------+---------+-----------------+-------------+--------------+
| Component   | Value   | Ref Range       | Performed   | Pathologist  |
|             |         |                 | At          | Signature    |
+-------------+---------+-----------------+-------------+--------------+
| MAGNESIUM,P | 1.6 (L) | 1.8 - 2.5 mg/dL | [facility]        |              |
| LASMA       |         |                 | LABORATORY  |              |
|             |         |                 | SERVICES,   |              |
|             |         |                 | CORE        |              |
+-------------+---------+-----------------+-------------+--------------+
 
 
 
+---------------+
| Specimen      |
+---------------+
| Blood - Blood |
+---------------+
 
 
 
+--------------------+------------------------+--------------------+--------------+
| Performing         | Address                | City/State/Zipcode | Phone Number |
 
| Organization       |                        |                    |              |
+--------------------+------------------------+--------------------+--------------+
|   [facility]  |   [address]  | [place] [address] |              |
| SERVICES, CORE     | [address]                |                    |              |
+--------------------+------------------------+--------------------+--------------+
 COMPLETE METABOLIC SET (NA,K,CL,CO2,BUN,CREAT,GLUC,CA,AST,ALT,BILI TOTAL,ALK PHOS,ALB,PROT 
TOTAL) (2014  5:27 PM PST)
 
+-------------+---------+-----------------+-------------+--------------+
| Component   | Value   | Ref Range       | Performed   | Pathologist  |
|             |         |                 | At          | Signature    |
+-------------+---------+-----------------+-------------+--------------+
| GLUCOSE,    | 81      | 60 - 99 mg/dL   | [facility]        |              |
| PLASMA      |         |                 | LABORATORY  |              |
| (LAB)       |         |                 | SERVICES,   |              |
|             |         |                 | CORE        |              |
+-------------+---------+-----------------+-------------+--------------+
| BUN, PLASMA | 13      | 6 - 20 mg/dL    | [facility]        |              |
|  (LAB)      |         |                 | LABORATORY  |              |
|             |         |                 | SERVICES,   |              |
|             |         |                 | CORE        |              |
+-------------+---------+-----------------+-------------+--------------+
| CREATININE  | 0.76    | 0.46 - 0.81     | [facility]        |              |
| PLASMA      |         | mg/dL           | LABORATORY  |              |
| (LAB)       |         |                 | SERVICES,   |              |
|             |         |                 | CORE        |              |
+-------------+---------+-----------------+-------------+--------------+
| SODIUM,     | 139     | 136 - 145       | [facility]        |              |
| PLASMA      |         | mmol/L          | LABORATORY  |              |
| (LAB)       |         |                 | SERVICES,   |              |
|             |         |                 | CORE        |              |
+-------------+---------+-----------------+-------------+--------------+
| POTASSIUM,  | 3.9     | 3.4 - 5.0       | [facility]        |              |
| PLASMA      |         | mmol/L          | LABORATORY  |              |
| (LAB)       |         |                 | SERVICES,   |              |
|             |         |                 | CORE        |              |
+-------------+---------+-----------------+-------------+--------------+
| CHLORIDE,   | 105     | 97 - 108 mmol/L | [facility]        |              |
| PLASMA      |         |                 | LABORATORY  |              |
| (LAB)       |         |                 | SERVICES,   |              |
|             |         |                 | CORE        |              |
+-------------+---------+-----------------+-------------+--------------+
| TOTAL CO2,  | 30      | 21 - 32 mmol/L  | [facility]        |              |
| PLASMA      |         |                 | LABORATORY  |              |
| (LAB)       |         |                 | SERVICES,   |              |
|             |         |                 | CORE        |              |
+-------------+---------+-----------------+-------------+--------------+
| CALCIUM,    | 9.4     | 8.6 - 10.2      | [facility]        |              |
| PLASMA      |         | mg/dL           | LABORATORY  |              |
| (LAB)       |         |                 | SERVICES,   |              |
|             |         |                 | CORE        |              |
+-------------+---------+-----------------+-------------+--------------+
| BILIRUBIN   | 0.2 (L) | 0.3 - 1.2 mg/dL | [facility]        |              |
| TOTAL       |         |                 | LABORATORY  |              |
|             |         |                 | SERVICES,   |              |
|             |         |                 | CORE        |              |
+-------------+---------+-----------------+-------------+--------------+
| TOTAL       | 7.6     | 6.2 - 8.5 g/dL  | [facility]        |              |
| PROTEIN,    |         |                 | LABORATORY  |              |
| PLASMA      |         |                 | SERVICES,   |              |
 
| (LAB)       |         |                 | CORE        |              |
+-------------+---------+-----------------+-------------+--------------+
| ALBUMIN,    | 3.7     | 3.5 - 4.7 g/dL  | [facility]        |              |
| PLASMA      |         |                 | LABORATORY  |              |
| (LAB)       |         |                 | SERVICES,   |              |
|             |         |                 | CORE        |              |
+-------------+---------+-----------------+-------------+--------------+
| ALK PHOS    | 101     | 42 - 110 U/L    | [facility]        |              |
|             |         |                 | LABORATORY  |              |
|             |         |                 | SERVICES,   |              |
|             |         |                 | CORE        |              |
+-------------+---------+-----------------+-------------+--------------+
| AST(SGOT)   | 15 (L)  | 18 - 36 U/L     | [facility]        |              |
|             |         |                 | LABORATORY  |              |
|             |         |                 | SERVICES,   |              |
|             |         |                 | CORE        |              |
+-------------+---------+-----------------+-------------+--------------+
| ALT (SGPT)  | 18      | 12 - 60 U/L     | [facility]        |              |
|             |         |                 | LABORATORY  |              |
|             |         |                 | SERVICES,   |              |
|             |         |                 | CORE        |              |
+-------------+---------+-----------------+-------------+--------------+
| ANION       | 4       | 4 - 11 mmol/L   | [facility]        |              |
| GAP(ALB     |         |                 | LABORATORY  |              |
| CORRECTED)  |         |                 | SERVICES,   |              |
|             |         |                 | CORE        |              |
+-------------+---------+-----------------+-------------+--------------+
| POTASSIUM   | No Hemo |                 | [facility]        |              |
| CMNT        |         |                 | LABORATORY  |              |
|             |         |                 | SERVICES,   |              |
|             |         |                 | CORE        |              |
+-------------+---------+-----------------+-------------+--------------+
| BILI T CMNT | No Hemo |                 | [facility]        |              |
|             |         |                 | LABORATORY  |              |
|             |         |                 | SERVICES,   |              |
|             |         |                 | CORE        |              |
+-------------+---------+-----------------+-------------+--------------+
| AST CMNT    | No Hemo |                 | [facility]        |              |
|             |         |                 | LABORATORY  |              |
|             |         |                 | SERVICES,   |              |
|             |         |                 | CORE        |              |
+-------------+---------+-----------------+-------------+--------------+
| ANION GAP   | 4       | mmol/L          | [facility]        |              |
|             |         |                 | LABORATORY  |              |
|             |         |                 | SERVICES,   |              |
|             |         |                 | CORE        |              |
+-------------+---------+-----------------+-------------+--------------+
 
 
 
+---------------+
| Specimen      |
+---------------+
| Blood - Blood |
+---------------+
 
 
 
+--------------------+------------------------+--------------------+--------------+
| Performing         | Address                | City/State/Zipcode | Phone Number |
 
| Organization       |                        |                    |              |
+--------------------+------------------------+--------------------+--------------+
|   [facility]  |   [address]  | [place] [address] |              |
| SERVICES, CORE     | [address]                |                    |              |
+--------------------+------------------------+--------------------+--------------+
 documented in this encounter
 
 Visit Diagnoses
 
 
+--------------------------------------------------------------------------------------+
| Diagnosis                                                                            |
+--------------------------------------------------------------------------------------+
|   Transplanted liver (HCC) - Primary  Liver replaced by transplant                   |
+--------------------------------------------------------------------------------------+
|   Immunosuppressed status (HCC)  Unspecified disorder of immune mechanism            |
+--------------------------------------------------------------------------------------+
|   High risk medications (not anticoagulants) long-term use  Encounter for long-term  |
| (current) use of other medications                                                   |
+--------------------------------------------------------------------------------------+
|   VSD (ventricular septal defect), perimembranous  Ventricular septal defect         |
+--------------------------------------------------------------------------------------+
|   Polysplenia  Congenital anomalies of spleen                                        |
+--------------------------------------------------------------------------------------+
|   Aortic insufficiency  Aortic valve disorders                                       |
+--------------------------------------------------------------------------------------+
|   Aortic root dilatation (HCC)  Thoracic aortic ectasia                              |
+--------------------------------------------------------------------------------------+
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
+--------------------------------------------------------------------------------------+
 documented in this encounter

## 2019-11-01 NOTE — XMS
Encounter Summary
  Created on: 2019
 
 Bonifacio Nila JB
 External Reference #: [id number]
 : [birth year]
 Sex: Female
 
 Demographics
 
 
+-----------------------+------------------------+
| Address               | [address]            |
|                       | [place]  [address]   |
+-----------------------+------------------------+
| Home Phone            | [phone]        |
+-----------------------+------------------------+
| Preferred Language    | Unknown                |
+-----------------------+------------------------+
| Marital Status        | Single                 |
+-----------------------+------------------------+
| Sabianism Affiliation | Unknown                |
+-----------------------+------------------------+
| Race                  | White                  |
+-----------------------+------------------------+
| Ethnic Group          | Not  or  |
+-----------------------+------------------------+
 
 
 Author
 
 
+--------------+------------------------------+
| Author       | [facility] |
+--------------+------------------------------+
| Organization | [facility] |
+--------------+------------------------------+
| Address      | Unknown                      |
+--------------+------------------------------+
| Phone        | Unavailable                  |
+--------------+------------------------------+
 
 
 
 Support
 
 
+-----------------+--------------+---------+-----------------+
| Name            | Relationship | Address | Phone           |
+-----------------+--------------+---------+-----------------+
| Kit Valdovinos   | ECON         | Unknown | [phone] |
+-----------------+--------------+---------+-----------------+
| Magdalena Valdovinos | ECON         | Unknown | [phone] |
+-----------------+--------------+---------+-----------------+
 
 
 
 Care Team Providers
 
 
 
+-----------------------+------+-------------+
| Care Team Member Name | Role | Phone       |
+-----------------------+------+-------------+
 PCP  | Unavailable |
+-----------------------+------+-------------+
 
 
 
 Encounter Details
 
 
+--------+-------------+----------------------+----------------------+---------------------+
| Date   | Type        | Department           | Care Team            | Description         |
+--------+-------------+----------------------+----------------------+---------------------+
| / |  |   Pediatric          |   Neema Khalil,   | Transplanted liver  |
|    |             | Gastroenterology at  | MD  [address] | (HCC) (Primary Dx)  |
|        |             | [facility]          |   [place], [place]       |                     |
|        |             | [facility]  | [address]           |                     |
|        |             |  [address] | [phone]         |                     |
|        |             |  [address]  Mailcode:  | [phone] (Fax)   |                     |
|        |             | [facility]   |                      |                     |
|        |             | [place], [place]         |                      |                     |
|        |             | [address]           |                      |                     |
|        |             | [phone]         |                      |                     |
+--------+-------------+----------------------+----------------------+---------------------+
 
 
 
 Social History
 
 
+----------------+-------+-----------+--------+------+
| Tobacco Use    | Types | Packs/Day | Years  | Date |
|                |       |           | Used   |      |
+----------------+-------+-----------+--------+------+
| Never Assessed |       |           |        |      |
+----------------+-------+-----------+--------+------+
 
 
 
+------------------+---------------+
| Sex Assigned at  | Date Recorded |
| Birth            |               |
+------------------+---------------+
| Not on file      |               |
+------------------+---------------+
 
 
 
+----------------+-------------+-------------+
| Job Start Date | Occupation  | Industry    |
+----------------+-------------+-------------+
| Not on file    | Not on file | Not on file |
+----------------+-------------+-------------+
 
 
 
+----------------+--------------+------------+
 
| Travel History | Travel Start | Travel End |
+----------------+--------------+------------+
 
 
 
+-------------------------------------+
| No recent travel history available. |
+-------------------------------------+
 documented as of this encounter
 
 Plan of Treatment
 Not on filedocumented as of this encounter
 
 Visit Diagnoses
 
 
+--------------------------------------------------------------------+
| Diagnosis                                                          |
+--------------------------------------------------------------------+
|   Transplanted liver (HCC) - Primary  Liver replaced by transplant |
+--------------------------------------------------------------------+
 documented in this encounter

## 2019-11-01 NOTE — XMS
Encounter Summary
  Created on: 2019
 
 Bonifacio Nila JB
 External Reference #: [id number]
 : [birth year]
 Sex: Female
 
 Demographics
 
 
+-----------------------+------------------------+
| Address               | [address]            |
|                       | [place]  [address]   |
+-----------------------+------------------------+
| Home Phone            | [phone]        |
+-----------------------+------------------------+
| Preferred Language    | Unknown                |
+-----------------------+------------------------+
| Marital Status        | Single                 |
+-----------------------+------------------------+
| Mosque Affiliation | Unknown                |
+-----------------------+------------------------+
| Race                  | White                  |
+-----------------------+------------------------+
| Ethnic Group          | Not  or  |
+-----------------------+------------------------+
 
 
 Author
 
 
+--------------+------------------------------+
| Author       | [facility] |
+--------------+------------------------------+
| Organization | [facility] |
+--------------+------------------------------+
| Address      | Unknown                      |
+--------------+------------------------------+
| Phone        | Unavailable                  |
+--------------+------------------------------+
 
 
 
 Support
 
 
+-----------------+--------------+---------+-----------------+
| Name            | Relationship | Address | Phone           |
+-----------------+--------------+---------+-----------------+
| Kit Valdovinos   | ECON         | Unknown | [phone] |
+-----------------+--------------+---------+-----------------+
| Magdalena Valdovinos | ECON         | Unknown | [phone] |
+-----------------+--------------+---------+-----------------+
 
 
 
 Care Team Providers
 
 
 
+-----------------------+------+-----------------+
| Care Team Member Name | Role | Phone           |
+-----------------------+------+-----------------+
| Lily Horton MD   | PCP  | [phone] |
+-----------------------+------+-----------------+
 
 
 
 Encounter Details
 
 
+--------+-------------+----------------------+--------------------+-------------+
| Date   | Type        | Department           | Care Team          | Description |
+--------+-------------+----------------------+--------------------+-------------+
| / | Documentati |   Pediatric          |   Ivis Burkett MD |             |
|    | on          | Gastroenterology at  |                    |             |
|        |             | [facility]          |                    |             |
|        |             | [facility]  |                    |             |
|        |             |  [address] |                    |             |
|        |             |  [address]  Mailcode:  |                    |             |
|        |             |   [facility]   |                    |             |
|        |             | [place], [place]         |                    |             |
|        |             | [address]           |                    |             |
|        |             | [phone]         |                    |             |
+--------+-------------+----------------------+--------------------+-------------+
 
 
 
 Social History
 
 
+----------------+-------+-----------+--------+------+
| Tobacco Use    | Types | Packs/Day | Years  | Date |
|                |       |           | Used   |      |
+----------------+-------+-----------+--------+------+
| Never Assessed |       |           |        |      |
+----------------+-------+-----------+--------+------+
 
 
 
+------------------+---------------+
| Sex Assigned at  | Date Recorded |
| Birth            |               |
+------------------+---------------+
| Not on file      |               |
+------------------+---------------+
 
 
 
+----------------+-------------+-------------+
| Job Start Date | Occupation  | Industry    |
+----------------+-------------+-------------+
| Not on file    | Not on file | Not on file |
+----------------+-------------+-------------+
 
 
 
+----------------+--------------+------------+
 
| Travel History | Travel Start | Travel End |
+----------------+--------------+------------+
 
 
 
+-------------------------------------+
| No recent travel history available. |
+-------------------------------------+
 documented as of this encounter
 
 Plan of Treatment
 Not on filedocumented as of this encounter
 
 Visit Diagnoses
 Not on filedocumented in this encounter

## 2019-11-01 NOTE — XMS
Encounter Summary
  Created on: 2019
 
 Bonifacio Nila JB
 External Reference #: [id number]
 : [birth year]
 Sex: Female
 
 Demographics
 
 
+-----------------------+------------------------+
| Address               | [address]            |
|                       | [place]  [address]   |
+-----------------------+------------------------+
| Home Phone            | [phone]        |
+-----------------------+------------------------+
| Preferred Language    | Unknown                |
+-----------------------+------------------------+
| Marital Status        | Single                 |
+-----------------------+------------------------+
| Episcopalian Affiliation | Unknown                |
+-----------------------+------------------------+
| Race                  | White                  |
+-----------------------+------------------------+
| Ethnic Group          | Not  or  |
+-----------------------+------------------------+
 
 
 Author
 
 
+--------------+------------------------------+
| Author       | [facility] |
+--------------+------------------------------+
| Organization | [facility] |
+--------------+------------------------------+
| Address      | Unknown                      |
+--------------+------------------------------+
| Phone        | Unavailable                  |
+--------------+------------------------------+
 
 
 
 Support
 
 
+-----------------+--------------+---------+-----------------+
| Name            | Relationship | Address | Phone           |
+-----------------+--------------+---------+-----------------+
| Kit Valdovinos   | ECON         | Unknown | [phone] |
+-----------------+--------------+---------+-----------------+
| Magdalena Valdovinos | ECON         | Unknown | [phone] |
+-----------------+--------------+---------+-----------------+
 
 
 
 Care Team Providers
 
 
 
+-----------------------+------+-----------------+
| Care Team Member Name | Role | Phone           |
+-----------------------+------+-----------------+
| Lily Horton MD   | PCP  | [phone] |
+-----------------------+------+-----------------+
 
 
 
 Reason for Visit
 
 
+--------+-----------------+
| Reason | Comments        |
+--------+-----------------+
| Other  | Need labs drawn |
+--------+-----------------+
 
 
 
 Encounter Details
 
 
+--------+-----------+----------------------+---------------------+-------------------+
| Date   | Type      | Department           | Care Team           | Description       |
+--------+-----------+----------------------+---------------------+-------------------+
| 10/14/ | Telephone |   Pediatric          |   Monserrat Spann,  | Other (Need labs  |
|    |           | Gastroenterology at  | RN  [address] [facility]     | drawn)            |
|        |           | [facility]          | [address]     |                   |
|        |           | [facility]  | [place] [address]  |                   |
|        |           |  [address] [facility] |                     |                   |
|        |           |  [facility]  Mailcode:  |                     |                   |
|        |           | [facility]  [facility]   |                     |                   |
|        |           | [place], [place]         |                     |                   |
|        |           | [address]           |                     |                   |
|        |           | [phone]         |                     |                   |
+--------+-----------+----------------------+---------------------+-------------------+
 
 
 
 Social History
 
 
+----------------+-------+-----------+--------+------+
| Tobacco Use    | Types | Packs/Day | Years  | Date |
|                |       |           | Used   |      |
+----------------+-------+-----------+--------+------+
| Never Assessed |       |           |        |      |
+----------------+-------+-----------+--------+------+
 
 
 
+------------------+---------------+
| Sex Assigned at  | Date Recorded |
| Birth            |               |
+------------------+---------------+
| Not on file      |               |
+------------------+---------------+
 
 
 
 
+----------------+-------------+-------------+
| Job Start Date | Occupation  | Industry    |
+----------------+-------------+-------------+
| Not on file    | Not on file | Not on file |
+----------------+-------------+-------------+
 
 
 
+----------------+--------------+------------+
| Travel History | Travel Start | Travel End |
+----------------+--------------+------------+
 
 
 
+-------------------------------------+
| No recent travel history available. |
+-------------------------------------+
 documented as of this encounter
 
 Plan of Treatment
 Not on filedocumented as of this encounter
 
 Visit Diagnoses
 Not on filedocumented in this encounter

## 2019-11-01 NOTE — XMS
Encounter Summary
  Created on: 2019
 
 Bonifacio Nila JB
 External Reference #: [id number]
 : [birth year]
 Sex: Female
 
 Demographics
 
 
+-----------------------+------------------------+
| Address               | [address]            |
|                       | [place]  [address]   |
+-----------------------+------------------------+
| Home Phone            | [phone]        |
+-----------------------+------------------------+
| Preferred Language    | Unknown                |
+-----------------------+------------------------+
| Marital Status        | Single                 |
+-----------------------+------------------------+
| Restoration Affiliation | Unknown                |
+-----------------------+------------------------+
| Race                  | White                  |
+-----------------------+------------------------+
| Ethnic Group          | Not  or  |
+-----------------------+------------------------+
 
 
 Author
 
 
+--------------+------------------------------+
| Author       | [facility] |
+--------------+------------------------------+
| Organization | [facility] |
+--------------+------------------------------+
| Address      | Unknown                      |
+--------------+------------------------------+
| Phone        | Unavailable                  |
+--------------+------------------------------+
 
 
 
 Support
 
 
+-----------------+--------------+---------+-----------------+
| Name            | Relationship | Address | Phone           |
+-----------------+--------------+---------+-----------------+
| Kit Valdovinos   | ECON         | Unknown | [phone] |
+-----------------+--------------+---------+-----------------+
| Magdalena Valdovinos | ECON         | Unknown | [phone] |
+-----------------+--------------+---------+-----------------+
 
 
 
 Care Team Providers
 
 
 
+------------------------+------+-----------------+
| Care Team Member Name  | Role | Phone           |
+------------------------+------+-----------------+
| Lily Horton MD | PCP  | [phone] |
+------------------------+------+-----------------+
 
 
 
 Reason for Visit
 
 
+-------------+----------+
| Reason      | Comments |
+-------------+----------+
| Lab Results |          |
+-------------+----------+
 
 
 
 Encounter Details
 
 
+--------+-----------+----------------------+----------------------+-------------+
| Date   | Type      | Department           | Care Team            | Description |
+--------+-----------+----------------------+----------------------+-------------+
| 10/23/ | Telephone |   Pediatric          |   Neema Khalil,   | Lab Results |
| 2017   |           | Gastroenterology at  | MD  [address] |             |
|        |           | [facility]          |   [place], [place]       |             |
|        |           | [facility]  | [address]           |             |
|        |           |  [address] | [phone]         |             |
|        |           |  [address]  Mailcode:  | [phone] (Fax)   |             |
|        |           | [address]   |                      |             |
|        |           | [place], [place]         |                      |             |
|        |           | [address]           |                      |             |
|        |           | [phone]         |                      |             |
+--------+-----------+----------------------+----------------------+-------------+
 
 
 
 Social History
 
 
+-------------------+-------+-----------+--------+------+
| Tobacco Use       | Types | Packs/Day | Years  | Date |
|                   |       |           | Used   |      |
+-------------------+-------+-----------+--------+------+
| Passive Smoke     |       |           |        |      |
| Exposure - Never  |       |           |        |      |
| Smoker            |       |           |        |      |
+-------------------+-------+-----------+--------+------+
 
 
 
+---------------------+---+---+---+
| Smokeless Tobacco:  |   |   |   |
| Never Used          |   |   |   |
+---------------------+---+---+---+
 
 
 
 
+-------------+-------------+---------+----------+
| Alcohol Use | Drinks/Week | oz/Week | Comments |
+-------------+-------------+---------+----------+
| Not Asked   |             |         |          |
+-------------+-------------+---------+----------+
 
 
 
+------------------+---------------+
| Sex Assigned at  | Date Recorded |
| Birth            |               |
+------------------+---------------+
| Not on file      |               |
+------------------+---------------+
 
 
 
+----------------+-------------+-------------+
| Job Start Date | Occupation  | Industry    |
+----------------+-------------+-------------+
| Not on file    | Not on file | Not on file |
+----------------+-------------+-------------+
 
 
 
+----------------+--------------+------------+
| Travel History | Travel Start | Travel End |
+----------------+--------------+------------+
 
 
 
+-------------------------------------+
| No recent travel history available. |
+-------------------------------------+
 documented as of this encounter
 
 Plan of Treatment
 Not on filedocumented as of this encounter
 
 Visit Diagnoses
 Not on filedocumented in this encounter

## 2019-11-01 NOTE — XMS
Encounter Summary
  Created on: 2019
 
 Bonifacio Nila JB
 External Reference #: [id number]
 : [birth year]
 Sex: Female
 
 Demographics
 
 
+-----------------------+------------------------+
| Address               | [address]            |
|                       | [place]  [address]   |
+-----------------------+------------------------+
| Home Phone            | [phone]        |
+-----------------------+------------------------+
| Preferred Language    | Unknown                |
+-----------------------+------------------------+
| Marital Status        | Single                 |
+-----------------------+------------------------+
| Mandaeism Affiliation | Unknown                |
+-----------------------+------------------------+
| Race                  | White                  |
+-----------------------+------------------------+
| Ethnic Group          | Not  or  |
+-----------------------+------------------------+
 
 
 Author
 
 
+--------------+-------------+
| Organization | Unknown     |
+--------------+-------------+
| Address      | Unknown     |
+--------------+-------------+
| Phone        | Unavailable |
+--------------+-------------+
 
 
 
 Support
 
 
+-----------------+--------------+---------+-----------------+
| Name            | Relationship | Address | Phone           |
+-----------------+--------------+---------+-----------------+
| Kit Valdovinos   | ECON         | Unknown | [phone] |
+-----------------+--------------+---------+-----------------+
| Magdalena Valdovinos | ECON         | Unknown | [phone] |
+-----------------+--------------+---------+-----------------+
 
 
 
 Care Team Providers
 
 
 
+-----------------------+------+-----------------+
| Care Team Member Name | Role | Phone           |
+-----------------------+------+-----------------+
| Lily Horton MD   | PCP  | [phone] |
+-----------------------+------+-----------------+
 
 
 
 Encounter Details
 
 
+--------+-------------+------------+--------------------+-------------+
| Date   | Type        | Department | Care Team          | Description |
+--------+-------------+------------+--------------------+-------------+
| 10/28/ | Transcribed |            |   Dictation, Other | Transcribed |
|    |             |            |                    |             |
+--------+-------------+------------+--------------------+-------------+
 
 
 
 Social History
 
 
+----------------+-------+-----------+--------+------+
| Tobacco Use    | Types | Packs/Day | Years  | Date |
|                |       |           | Used   |      |
+----------------+-------+-----------+--------+------+
| Never Assessed |       |           |        |      |
+----------------+-------+-----------+--------+------+
 
 
 
+------------------+---------------+
| Sex Assigned at  | Date Recorded |
| Birth            |               |
+------------------+---------------+
| Not on file      |               |
+------------------+---------------+
 
 
 
+----------------+-------------+-------------+
| Job Start Date | Occupation  | Industry    |
+----------------+-------------+-------------+
| Not on file    | Not on file | Not on file |
+----------------+-------------+-------------+
 
 
 
+----------------+--------------+------------+
| Travel History | Travel Start | Travel End |
+----------------+--------------+------------+
 
 
 
+-------------------------------------+
| No recent travel history available. |
+-------------------------------------+
 documented as of this encounter
 
 
 Progress Notes
 Interface, Transcription In - 12/10/2006  3:10 AM [address] [address] [phone] or [phone]
 
 2000
 
 Lily Horton M.D.
 [address] [place] [address] 14
 [place]   [address]
 
 RE:   NILA VALDOVINOS
 MR #: [id number]
 
 Dear Sol:
 
 Nila was evaluated at [facility] in the Pediatric Liver Transplantation Clinic
 for the first time today by members of the [facility]
 [facility] Program which included Jorge Aguirre M.D. and Becca Marsh R.N.,
 Pediatric Liver Transplant Coordinator.  I presented Nila to the team in
 place of Dr. Bailey.  Briefly as you know, Nila is an 7-year-old female
 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
 heart disease.  This included left atrial isomerism, interrupted IVC, left
 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
 There were some other mild abnormalities found on angiography on 1999, (this information has been forwarded from the Cardiologist at
 [facility] to the cardiologists at [facility]).  She was eventually
 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  It required revision two days later, and she seemed to respond
 well to this with a fall in her bilirubin.  By , her bilirubin was 1.8
 and she was doing reasonably well.  She is doing reasonably well with
 colored stools.  Unfortunately in August, she developed jaundice with no
 fever.  Her white count was 20,000 and her delta bilirubin was elevated
 She was admitted to [facility] for a treatment of possible cholangitis.
 Unfortunately, her bilirubin did not come down and it has become clear that
 her Kasai procedure has failed.  Additional problems that have developed
 over the last few months include poor weight gain and growth requiring
 nocturnal supplementation, variceal bleeding, and apparent cirrhosis with
 resultant portal hypertension.  Approximately two weeks ago, she developed
 hematemesis and melena and required two transfusions with packed red blood
 cells.  Endoscopy showed incipient esophageal varices.
 
 She is on multiple medications including Actigall, Zantac, vitamin A, D, E,
 K, and iron.
 
 Her parents live in [place], [place] approximately four hours from
 [place].  They and the rest of the family are quite committed Nila's
 medical care.
 On examination today, she weighs 5.76 kg and measures 65 cm.  She is mildly
 icteric with an NG tube in place.  Her overall appearance is petite.  Her
 respirations are unlabored.  Her abdomen is soft and full with a large,
 hard nodular liver that extends 4-5 cm below the right costal margin.
 Spleen is also enlarged in the left upper quadrant descending approximately
 6 cm below the left costal margin.  Her umbilicus protrudes slightly and
 there certainly is a suggestion of ascites.  Her extremities are rather
 thin.  She is an alert and responsive child.
 
 
 Recent laboratory tests show a white blood count of 8.8, hematocrit of 27%,
 platelet count of 168k, sodium of 126, chloride 97, potassium 4.6, CO2 15,
 glucose 72, calcium 8.9, BUN 11, creatinine 0.2, , , alkaline
 phosphatase 1.017, gamma-GT 1250, total bilirubin 6.1 (mostly direct), and
 an albumin of 3.4.  Recent vitamin levels show adequate A and K but
 deficiency in D and E.  Her doses of D and E were recently to treat these
 deficiencies.
 
 In summary, Nila is an 7-year-old white female with rather progressed
 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
 biliary atresia and a failed Kasai procedure.  She is at an increased risk
 of even more progressive liver disease over the next 3 to 12 months
 resulting in death without liver transplantation.  She clearly needs to be
 listed and the transplant team intends to do this soon.  During the later
 half of the visit today at [facility], the family spent conversing with
 transplant coordinator regarding many of the practical aspects of getting
 one listed for liver transplantation.  In addition to category transplants,
 the living-related donor program was discussed.  Assuming a suitable donor
 can be identified, Nila is a good candidate for this program given the
 distance that the family lives from the Transplant Center and the degree of
 liver disease present.
 
 Dr. Bailey will continue to follow Nila closely and act as the liaison
 between the family and the Transplant Center.  Please let me know if you
 have any questions.
 
 Eagle De Luna M.D.
 
 [facility] / 
 [id number] / [id number] / [id number] /
 D: 1999
 T: 2000
 
 cc:
 
 Kevon Bailey M.D.
 [address] [place] [address]   [id number]
 
 Jorge Aguirre M.D.
 [address] [place] [address]   [phone]
 
 Allen Covington M.D.
 [address] [place] [address]   [address]
 
 [id number] signed by Interface, Transcription In at 12/10/2006  3:10 AM PSTdocume
nted in this encounter
 
 Plan of Treatment
 Not on filedocumented as of this encounter
 
 Visit Diagnoses
 Not on filedocumented in this encounter

## 2019-11-01 NOTE — XMS
Encounter Summary
  Created on: 2019
 
 Bonifacio Nila JB
 External Reference #: [id number]
 : [birth year]
 Sex: Female
 
 Demographics
 
 
+-----------------------+------------------------+
| Address               | [address]            |
|                       | [place]  [address]   |
+-----------------------+------------------------+
| Home Phone            | [phone]        |
+-----------------------+------------------------+
| Preferred Language    | Unknown                |
+-----------------------+------------------------+
| Marital Status        | Single                 |
+-----------------------+------------------------+
| Christianity Affiliation | Unknown                |
+-----------------------+------------------------+
| Race                  | White                  |
+-----------------------+------------------------+
| Ethnic Group          | Not  or  |
+-----------------------+------------------------+
 
 
 Author
 
 
+--------------+------------------------------+
| Author       | [facility] |
+--------------+------------------------------+
| Organization | [facility] |
+--------------+------------------------------+
| Address      | Unknown                      |
+--------------+------------------------------+
| Phone        | Unavailable                  |
+--------------+------------------------------+
 
 
 
 Support
 
 
+-----------------+--------------+---------+-----------------+
| Name            | Relationship | Address | Phone           |
+-----------------+--------------+---------+-----------------+
| Kit Valdovinos   | ECON         | Unknown | [phone] |
+-----------------+--------------+---------+-----------------+
| Magdalena Valdovinos | ECON         | Unknown | [phone] |
+-----------------+--------------+---------+-----------------+
 
 
 
 Care Team Providers
 
 
 
+------------------------+------+-----------------+
| Care Team Member Name  | Role | Phone           |
+------------------------+------+-----------------+
| Lily Horton MD | PCP  | [phone] |
+------------------------+------+-----------------+
 
 
 
 Encounter Details
 
 
+--------+-------------+----------------------+---------------------+-------------+
| Date   | Type        | Department           | Care Team           | Description |
+--------+-------------+----------------------+---------------------+-------------+
| / | Documentati |   SOCIAL WORK        |   Coretta Armijo,  |             |
|    | on          | AMBULATORY  [address]  | MS[address]  [address]    |             |
|        |             | [address]  | [address]     |             |
|        |             |  Mailcode: [address]      | [place], [place]        |             |
|        |             | [place], [place]         | [address]          |             |
|        |             | [address]           | [phone]        |             |
|        |             | [phone]         | [phone] (Fax)  |             |
+--------+-------------+----------------------+---------------------+-------------+
 
 
 
 Social History
 
 
+-------------------+-------+-----------+--------+------+
| Tobacco Use       | Types | Packs/Day | Years  | Date |
|                   |       |           | Used   |      |
+-------------------+-------+-----------+--------+------+
| Passive Smoke     |       |           |        |      |
| Exposure - Never  |       |           |        |      |
| Smoker            |       |           |        |      |
+-------------------+-------+-----------+--------+------+
 
 
 
+---------------------+---+---+---+
| Smokeless Tobacco:  |   |   |   |
| Never Used          |   |   |   |
+---------------------+---+---+---+
 
 
 
+-------------+-------------+---------+----------+
| Alcohol Use | Drinks/Week | oz/Week | Comments |
+-------------+-------------+---------+----------+
| Not Asked   |             |         |          |
+-------------+-------------+---------+----------+
 
 
 
+------------------+---------------+
| Sex Assigned at  | Date Recorded |
| Birth            |               |
+------------------+---------------+
 
| Not on file      |               |
+------------------+---------------+
 
 
 
+----------------+-------------+-------------+
| Job Start Date | Occupation  | Industry    |
+----------------+-------------+-------------+
| Not on file    | Not on file | Not on file |
+----------------+-------------+-------------+
 
 
 
+----------------+--------------+------------+
| Travel History | Travel Start | Travel End |
+----------------+--------------+------------+
 
 
 
+-------------------------------------+
| No recent travel history available. |
+-------------------------------------+
 documented as of this encounter
 
 Plan of Treatment
 Not on filedocumented as of this encounter
 
 Visit Diagnoses
 Not on filedocumented in this encounter

## 2019-11-01 NOTE — XMS
Encounter Summary
  Created on: 2019
 
 Bonifacio Nila JB
 External Reference #: [id number]
 : [birth year]
 Sex: Female
 
 Demographics
 
 
+-----------------------+------------------------+
| Address               | [address]            |
|                       | [place]  [address]   |
+-----------------------+------------------------+
| Home Phone            | [phone]        |
+-----------------------+------------------------+
| Preferred Language    | Unknown                |
+-----------------------+------------------------+
| Marital Status        | Single                 |
+-----------------------+------------------------+
| Mosque Affiliation | Unknown                |
+-----------------------+------------------------+
| Race                  | White                  |
+-----------------------+------------------------+
| Ethnic Group          | Not  or  |
+-----------------------+------------------------+
 
 
 Author
 
 
+--------------+------------------------------+
| Author       | [facility] |
+--------------+------------------------------+
| Organization | [facility] |
+--------------+------------------------------+
| Address      | Unknown                      |
+--------------+------------------------------+
| Phone        | Unavailable                  |
+--------------+------------------------------+
 
 
 
 Support
 
 
+-----------------+--------------+---------+-----------------+
| Name            | Relationship | Address | Phone           |
+-----------------+--------------+---------+-----------------+
| Kit Valdovinos   | ECON         | Unknown | [phone] |
+-----------------+--------------+---------+-----------------+
| Magdalena Valdovinos | ECON         | Unknown | [phone] |
+-----------------+--------------+---------+-----------------+
 
 
 
 Care Team Providers
 
 
 
+-----------------------+------+-------------+
| Care Team Member Name | Role | Phone       |
+-----------------------+------+-------------+
 PCP  | Unavailable |
+-----------------------+------+-------------+
 
 
 
 Encounter Details
 
 
+--------+-------------+----------------------+----------------------+---------------------+
| Date   | Type        | Department           | Care Team            | Description         |
+--------+-------------+----------------------+----------------------+---------------------+
| / |  |   Pediatric          |   Neema Khalil,   | Transplanted liver  |
|    |             | Gastroenterology at  | MD  [address] | (HCC) (Primary Dx)  |
|        |             | [place]          |   [place], [place]       |                     |
|        |             | [facility]  | [address]           |                     |
|        |             |  [address] | [phone]         |                     |
|        |             |  [facility]  Mailcode:  | [phone] (Fax)   |                     |
|        |             | [facility]   |                      |                     |
|        |             | [place], [place]         |                      |                     |
|        |             | [address]           |                      |                     |
|        |             | [phone]         |                      |                     |
+--------+-------------+----------------------+----------------------+---------------------+
 
 
 
 Social History
 
 
+----------------+-------+-----------+--------+------+
| Tobacco Use    | Types | Packs/Day | Years  | Date |
|                |       |           | Used   |      |
+----------------+-------+-----------+--------+------+
| Never Assessed |       |           |        |      |
+----------------+-------+-----------+--------+------+
 
 
 
+------------------+---------------+
| Sex Assigned at  | Date Recorded |
| Birth            |               |
+------------------+---------------+
| Not on file      |               |
+------------------+---------------+
 
 
 
+----------------+-------------+-------------+
| Job Start Date | Occupation  | Industry    |
+----------------+-------------+-------------+
| Not on file    | Not on file | Not on file |
+----------------+-------------+-------------+
 
 
 
+----------------+--------------+------------+
 
| Travel History | Travel Start | Travel End |
+----------------+--------------+------------+
 
 
 
+-------------------------------------+
| No recent travel history available. |
+-------------------------------------+
 documented as of this encounter
 
 Plan of Treatment
 Not on filedocumented as of this encounter
 
 Visit Diagnoses
 
 
+--------------------------------------------------------------------+
| Diagnosis                                                          |
+--------------------------------------------------------------------+
|   Transplanted liver (HCC) - Primary  Liver replaced by transplant |
+--------------------------------------------------------------------+
 documented in this encounter

## 2019-11-01 NOTE — XMS
Encounter Summary
  Created on: 2019
 
 Bonifacio Nila JB
 External Reference #: [id number]
 : [birth year]
 Sex: Female
 
 Demographics
 
 
+-----------------------+------------------------+
| Address               | [address]            |
|                       | [place]  [address]   |
+-----------------------+------------------------+
| Home Phone            | [phone]        |
+-----------------------+------------------------+
| Preferred Language    | Unknown                |
+-----------------------+------------------------+
| Marital Status        | Single                 |
+-----------------------+------------------------+
| [employer] Affiliation | Unknown                |
+-----------------------+------------------------+
| Race                  | White                  |
+-----------------------+------------------------+
| Ethnic Group          | Not  or  |
+-----------------------+------------------------+
 
 
 Author
 
 
+--------------+------------------------------+
| Author       | [facility] [employer] [facility] |
+--------------+------------------------------+
| Organization | [facility] |
+--------------+------------------------------+
| Address      | Unknown                      |
+--------------+------------------------------+
| Phone        | Unavailable                  |
+--------------+------------------------------+
 
 
 
 Support
 
 
+-----------------+--------------+---------+-----------------+
| Name            | Relationship | Address | Phone           |
+-----------------+--------------+---------+-----------------+
| Kit Valdovinos   | ECON         | Unknown | [phone] |
+-----------------+--------------+---------+-----------------+
| Magdalena Valdovinos | ECON         | Unknown | [phone] |
+-----------------+--------------+---------+-----------------+
 
 
 
 Care Team Providers
 
 
 
+-----------------------+------+-----------------+
| Care Team Member Name | Role | Phone           |
+-----------------------+------+-----------------+
| Lily Horton MD   | PCP  | [phone] |
+-----------------------+------+-----------------+
 
 
 
 Encounter Details
 
 
+--------+----------+----------------------+--------------------+-------------+
| Date   | Type     | Department           | Care Team          | Description |
+--------+----------+----------------------+--------------------+-------------+
| 10/27/ | Abstract |   Pediatric          |   Ivis Burkett MD |             |
|    |          | Gastroenterology at  |                    |             |
|        |          | [facility]          |                    |             |
|        |          | [facility]  |                    |             |
|        |          |  [address] |                    |             |
|        |          |  [address]  Mailcode:  |                    |             |
|        |          | [address]   |                    |             |
|        |          | [place], [place]         |                    |             |
|        |          | [address]           |                    |             |
|        |          | [phone]         |                    |             |
+--------+----------+----------------------+--------------------+-------------+
 
 
 
 Social History
 
 
+----------------+-------+-----------+--------+------+
| Tobacco Use    | Types | Packs/Day | Years  | Date |
|                |       |           | Used   |      |
+----------------+-------+-----------+--------+------+
| Never Assessed |       |           |        |      |
+----------------+-------+-----------+--------+------+
 
 
 
+------------------+---------------+
| Sex Assigned at  | Date Recorded |
| Birth            |               |
+------------------+---------------+
| Not on file      |               |
+------------------+---------------+
 
 
 
+----------------+-------------+-------------+
| Job Start Date | Occupation  | Industry    |
+----------------+-------------+-------------+
| Not on file    | Not on file | Not on file |
+----------------+-------------+-------------+
 
 
 
+----------------+--------------+------------+
 
| Travel History | Travel Start | Travel End |
+----------------+--------------+------------+
 
 
 
+-------------------------------------+
| No recent travel history available. |
+-------------------------------------+
 documented as of this encounter
 
 Plan of Treatment
 Not on filedocumented as of this encounter
 
 Procedures
 
 
+----------------------+--------+-------------+----------------------+----------------------
+
| Procedure Name       | Priori | Date/Time   | Associated Diagnosis | Comments             
|
|                      | ty     |             |                      |                      
|
+----------------------+--------+-------------+----------------------+----------------------
+
| OTHER                | Routin | 10/20/2009  |                      |   Results for this   
|
|                      | e      |  8:15 AM    |                      | procedure are in the 
|
|                      |        | PDT         |                      |  results section.    
|
+----------------------+--------+-------------+----------------------+----------------------
+
| CBC, WITH            | Routin | 10/20/2009  |                      |   Results for this   
|
| DIFFERENTIAL         | e      |  8:15 AM    |                      | procedure are in the 
|
|                      |        | PDT         |                      |  results section.    
|
+----------------------+--------+-------------+----------------------+----------------------
+
| MICROALBUMIN/CREATIN | Routin | 10/20/2009  |                      |   Results for this   
|
| INE RATIO (RANDOM    | e      |  8:15 AM    |                      | procedure are in the 
|
| URINE)               |        | PDT         |                      |  results section.    
|
+----------------------+--------+-------------+----------------------+----------------------
+
| COMPLETE METABOLIC   | Routin | 10/20/2009  |                      |   Results for this   
|
| SET                  | e      |  8:15 AM    |                      | procedure are in the 
|
| (NA,K,CL,CO2,BUN,CRE |        | PDT         |                      |  results section.    
|
| AT,GLUC,CA,AST,ALT,B |        |             |                      |                      
|
| ASHWINI TOTAL,ALK        |        |             |                      |                      
|
| PHOS,ALB,PROT TOTAL) |        |             |                      |                      
|
 
+----------------------+--------+-------------+----------------------+----------------------
+
| PHOSPHORUS, PLASMA   | Routin | 10/20/2009  |                      |   Results for this   
|
|                      | e      |  8:15 AM    |                      | procedure are in the 
|
|                      |        | PDT         |                      |  results section.    
|
+----------------------+--------+-------------+----------------------+----------------------
+
| GGT, PLASMA          | Routin | 10/20/2009  |                      |   Results for this   
|
|                      | e      |  8:15 AM    |                      | procedure are in the 
|
|                      |        | PDT         |                      |  results section.    
|
+----------------------+--------+-------------+----------------------+----------------------
+
| BILIRUBIN DIRECT     | Routin | 10/20/2009  |                      |   Results for this   
|
|                      | e      |  8:15 AM    |                      | procedure are in the 
|
|                      |        | PDT         |                      |  results section.    
|
+----------------------+--------+-------------+----------------------+----------------------
+
| MAGNESIUM, PLASMA    | Routin | 10/20/2009  |                      |   Results for this   
|
|                      | e      |  8:15 AM    |                      | procedure are in the 
|
|                      |        | PDT         |                      |  results section.    
|
+----------------------+--------+-------------+----------------------+----------------------
+
| TRIGLYCERIDES,       | Routin | 10/20/2009  |                      |   Results for this   
|
| PLASMA               | e      |  8:15 AM    |                      | procedure are in the 
|
|                      |        | PDT         |                      |  results section.    
|
+----------------------+--------+-------------+----------------------+----------------------
+
| CHOLESTEROL TOTAL,   | Routin | 10/20/2009  |                      |   Results for this   
|
| PLASMA               | e      |  8:15 AM    |                      | procedure are in the 
|
|                      |        | PDT         |                      |  results section.    
|
+----------------------+--------+-------------+----------------------+----------------------
+
| CREATININE, URINE    | Routin | 10/20/2009  |                      |   Results for this   
|
|                      | e      |  8:15 AM    |                      | procedure are in the 
|
|                      |        | PDT         |                      |  results section.    
|
+----------------------+--------+-------------+----------------------+----------------------
+
 documented in this encounter
 
 
 Results
 OTHER (10/20/2009  8:15 AM PDT)
 
+-------------+----------------+--------------+------------+--------------+
| Component   | Value          | Ref Range    | Performed  | Pathologist  |
|             |                |              | At         | Signature    |
+-------------+----------------+--------------+------------+--------------+
| ALKALINE    | 8.3Comment:    | 0 - 30 mg/dL | [facility]  |              |
| PHOS, OTHER | microalb/creat |              | LAB -      |              |
|  CALC       |                |              | [place]  |              |
+-------------+----------------+--------------+------------+--------------+
 
 
 
+----------+
| Specimen |
+----------+
| Urine    |
+----------+
 
 
 
+--------------------+----------------------+--------------------+----------------+
| Performing         | Address              | City/State/Zipcode | Phone Number   |
| Organization       |                      |                    |                |
+--------------------+----------------------+--------------------+----------------+
|   [facility]  |   [address] | [place], [place]      |   [phone] |
| [place]          |                      |                    |                |
+--------------------+----------------------+--------------------+----------------+
|   [facility]  |                      | [place], [place]      |                |
| [place]          |                      |                    |                |
+--------------------+----------------------+--------------------+----------------+
 COMPLETE METABOLIC SET (NA,K,CL,CO2,BUN,CREAT,GLUC,CA,AST,ALT,BILI TOTAL,ALK PHOS,ALB,PROT 
TOTAL) (10/20/2009  8:15 AM PDT)
 
+-------------+-------+-----------------+------------+--------------+
| Component   | Value | Ref Range       | Performed  | Pathologist  |
|             |       |                 | At         | Signature    |
+-------------+-------+-----------------+------------+--------------+
| GLUCOSE,    | 100   | 60 - 100 mg/dL  | [facility]  |              |
| PLASMA      |       |                 | LAB -      |              |
| (LAB)       |       |                 | [place]  |              |
+-------------+-------+-----------------+------------+--------------+
| BUN, PLASMA | 9     | 6 - 23 mg/dL    | [facility]  |              |
|  (LAB)      |       |                 | LAB -      |              |
|             |       |                 | [place]  |              |
+-------------+-------+-----------------+------------+--------------+
| CREATININE  | 0.51  | 0.5 - 1.5 mg/dL | [facility]  |              |
| PLASMA      |       |                 | LAB -      |              |
| (LAB)       |       |                 | [place]  |              |
+-------------+-------+-----------------+------------+--------------+
| TOTAL       | 6.7   | 6.1 - 8.5 g/dL  | [facility]  |              |
| PROTEIN,    |       |                 | LAB -      |              |
| PLASMA      |       |                 | [place]  |              |
| (LAB)       |       |                 |            |              |
+-------------+-------+-----------------+------------+--------------+
| ALBUMIN,    | 3.7   | 2.9 - 5.5 g/dL  | [facility]  |              |
| PLASMA      |       |                 | LAB -      |              |
| (LAB)       |       |                 | [place]  |              |
+-------------+-------+-----------------+------------+--------------+
 
| CALCIUM,    | 9.6   | 8.5 - 10.5      | [facility]  |              |
| PLASMA      |       | mg/dL           | LAB -      |              |
| (LAB)       |       |                 | [place]  |              |
+-------------+-------+-----------------+------------+--------------+
| BILIRUBIN   | 0.5   | 0.0 - 1.2       | [facility]  |              |
| TOTAL       |       | Transcutaneous  | LAB -      |              |
|             |       | Bilirubinometer | [place]  |              |
+-------------+-------+-----------------+------------+--------------+
| ALK PHOS    | 220   | 135 - 384 U/L   | [facility]  |              |
|             |       |                 | LAB -      |              |
|             |       |                 | [place]  |              |
+-------------+-------+-----------------+------------+--------------+
| AST(SGOT)   | 20    | 0 - 40 U/L      | [facility]  |              |
|             |       |                 | LAB -      |              |
|             |       |                 | [place]  |              |
+-------------+-------+-----------------+------------+--------------+
| SODIUM,     | 139   | 132 - 143       | [facility]  |              |
| PLASMA      |       | mmol/L          | LAB -      |              |
| (LAB)       |       |                 | [place]  |              |
+-------------+-------+-----------------+------------+--------------+
| POTASSIUM,  | 4.0   | 3.6 - 5.1       | [facility]  |              |
| PLASMA      |       | mmol/L          | LAB -      |              |
| (LAB)       |       |                 | [place]  |              |
+-------------+-------+-----------------+------------+--------------+
| CHLORIDE,   | 106   | 95 - 112 mmol/L | [facility]  |              |
| PLASMA      |       |                 | LAB -      |              |
| (LAB)       |       |                 | [place]  |              |
+-------------+-------+-----------------+------------+--------------+
| TOTAL CO2,  | 23.2  | 19 - 31 mmol/L  | [facility]  |              |
| PLASMA      |       |                 | LAB -      |              |
| (LAB)       |       |                 | [place]  |              |
+-------------+-------+-----------------+------------+--------------+
| ALT (SGPT)  | 17    | 0 - 46 U/L      | [facility]  |              |
|             |       |                 | LAB -      |              |
|             |       |                 | [place]  |              |
+-------------+-------+-----------------+------------+--------------+
| ANION GAP   | 13.8  | 7 - 21          | [facility]  |              |
|             |       |                 | LAB -      |              |
|             |       |                 | [place]  |              |
+-------------+-------+-----------------+------------+--------------+
| BUN/CREATIN | 17.6  | 6.0 - 28.6      | [facility]  |              |
| INE RATIO   |       |                 | LAB -      |              |
|             |       |                 | [place]  |              |
+-------------+-------+-----------------+------------+--------------+
| A/G RATIO   | 1.2   | 1.1 - 2.4       | [facility]  |              |
|             |       |                 | LAB -      |              |
|             |       |                 | [place]  |              |
+-------------+-------+-----------------+------------+--------------+
 
 
 
+---------------+
| Specimen      |
+---------------+
| Blood - Blood |
+---------------+
 
 
 
+--------------------+----------------------+--------------------+----------------+
 
| Performing         | Address              | City/State/Zipcode | Phone Number   |
| Organization       |                      |                    |                |
+--------------------+----------------------+--------------------+----------------+
|   [facility]  |   [address] | [place], [place]      |   [phone] |
| [place]          |                      |                    |                |
+--------------------+----------------------+--------------------+----------------+
|   [facility]  |                      | [place], [place]      |                |
| [place]          |                      |                    |                |
+--------------------+----------------------+--------------------+----------------+
 CBC, WITH DIFFERENTIAL (10/20/2009  8:15 AM PDT)
 
+-------------+----------+-----------------+------------+--------------+
| Component   | Value    | Ref Range       | Performed  | Pathologist  |
|             |          |                 | At         | Signature    |
+-------------+----------+-----------------+------------+--------------+
| WHITE CELL  | 5.7      | 4.5 - 13.5 K/cu | [facility]  |              |
| COUNT       |          |  mm             | LAB -      |              |
|             |          |                 | [place]  |              |
+-------------+----------+-----------------+------------+--------------+
| RED CELL    | 4.89     | 4.1 - 5.3 M/cu  | [facility]  |              |
| COUNT       |          | mm              | LAB -      |              |
|             |          |                 | [place]  |              |
+-------------+----------+-----------------+------------+--------------+
| HEMOGLOBIN  | 14.1     | 10.6 - 15.2     | [facility]  |              |
|             |          | g/dL            | LAB -      |              |
|             |          |                 | [place]  |              |
+-------------+----------+-----------------+------------+--------------+
| HEMATOCRIT  | 43.0 (A) | 32 - 42 %       | [facility]  |              |
|             |          |                 | LAB -      |              |
|             |          |                 | [place]  |              |
+-------------+----------+-----------------+------------+--------------+
| MCV         | 87.8     | 71 - 89 fL      | [facility]  |              |
|             |          |                 | LAB -      |              |
|             |          |                 | [place]  |              |
+-------------+----------+-----------------+------------+--------------+
| MCH         | 29       | 24 - 30 pg      | [facility]  |              |
|             |          |                 | LAB -      |              |
|             |          |                 | [place]  |              |
+-------------+----------+-----------------+------------+--------------+
| MCHC        | 33       | 30 - 36 g/dL    | [facility]  |              |
|             |          |                 | LAB -      |              |
|             |          |                 | [place]  |              |
+-------------+----------+-----------------+------------+--------------+
| PLATELET    | 320      | 140 - 440 K/cu  | [facility]  |              |
| COUNT       |          | mm              | LAB -      |              |
|             |          |                 | [place]  |              |
+-------------+----------+-----------------+------------+--------------+
| NEUTROPHIL  | 49.7     | 31 - 60 %       | [facility]  |              |
| %           |          |                 | LAB -      |              |
|             |          |                 | [place]  |              |
+-------------+----------+-----------------+------------+--------------+
| LYMPHOCYTE  | 41.0     | 28 - 48 %       | [facility]  |              |
| %           |          |                 | LAB -      |              |
|             |          |                 | [place]  |              |
+-------------+----------+-----------------+------------+--------------+
| MONOCYTE %  | 4.8      | 0 - 12 %        | [facility]  |              |
|             |          |                 | LAB -      |              |
|             |          |                 | [place]  |              |
+-------------+----------+-----------------+------------+--------------+
| EOS %       | 3.8      | 0 - 6 %         | [facility]  |              |
 
|             |          |                 | LAB -      |              |
|             |          |                 | [place]  |              |
+-------------+----------+-----------------+------------+--------------+
| BASO %      | 0.7      | 0 - 2 %         | [facility]  |              |
|             |          |                 | LAB -      |              |
|             |          |                 | [place]  |              |
+-------------+----------+-----------------+------------+--------------+
| RDW         | 13.4     | 10.5 - 15.0 %   | [facility]  |              |
|             |          |                 | LAB -      |              |
|             |          |                 | [place]  |              |
+-------------+----------+-----------------+------------+--------------+
| MPV         |          | fL              | [facility]  |              |
|             |          |                 | LAB -      |              |
|             |          |                 | [place]  |              |
+-------------+----------+-----------------+------------+--------------+
| NEUTROPHIL  |          | K/cu mm         | [facility]  |              |
| #           |          |                 | LAB -      |              |
|             |          |                 | [place]  |              |
+-------------+----------+-----------------+------------+--------------+
| LYMPHOCYTE  |          | K/cu mm         | [facility]  |              |
| #           |          |                 | LAB -      |              |
|             |          |                 | [place]  |              |
+-------------+----------+-----------------+------------+--------------+
| MONOCYTE #  |          | K/cu mm         | [facility]  |              |
|             |          |                 | LAB -      |              |
|             |          |                 | [place]  |              |
+-------------+----------+-----------------+------------+--------------+
| EOS #       |          | K/cu mm         | [facility]  |              |
|             |          |                 | LAB -      |              |
|             |          |                 | [place]  |              |
+-------------+----------+-----------------+------------+--------------+
| BASO #      |          |                 | [facility]  |              |
|             |          |                 | LAB -      |              |
|             |          |                 | [place]  |              |
+-------------+----------+-----------------+------------+--------------+
 
 
 
+---------------+
| Specimen      |
+---------------+
| Blood - Blood |
+---------------+
 
 
 
+--------------------+----------------------+--------------------+----------------+
| Performing         | Address              | City/State/Zipcode | Phone Number   |
| Organization       |                      |                    |                |
+--------------------+----------------------+--------------------+----------------+
|   [facility]  |   [address] | [place], [place]      |   [phone] |
| [place]          |                      |                    |                |
+--------------------+----------------------+--------------------+----------------+
|   [facility]  |                      | [place], [place]      |                |
| [place]          |                      |                    |                |
+--------------------+----------------------+--------------------+----------------+
 GGT, PLASMA (10/20/2009  8:15 AM PDT)
 
+-----------+-------+------------+------------+--------------+
| Component | Value | Ref Range  | Performed  | Pathologist  |
 
|           |       |            | At         | Signature    |
+-----------+-------+------------+------------+--------------+
| GAMMA     | 10    | 5 - 60 U/L | [facility]  |              |
| GLUTAMYL  |       |            | LAB -      |              |
| TRANS     |       |            | [place]  |              |
+-----------+-------+------------+------------+--------------+
 
 
 
+---------------+
| Specimen      |
+---------------+
| Blood - Blood |
+---------------+
 
 
 
+--------------------+----------------------+--------------------+----------------+
| Performing         | Address              | City/State/Zipcode | Phone Number   |
| Organization       |                      |                    |                |
+--------------------+----------------------+--------------------+----------------+
|   [facility]  |   [address] | [place], [place]      |   [phone] |
| [place]          |                      |                    |                |
+--------------------+----------------------+--------------------+----------------+
|   [facility]  |                      | [place], [place]      |                |
| [place]          |                      |                    |                |
+--------------------+----------------------+--------------------+----------------+
 MAGNESIUM, PLASMA (10/20/2009  8:15 AM PDT)
 
+-------------+----------+-----------------+------------+--------------+
| Component   | Value    | Ref Range       | Performed  | Pathologist  |
|             |          |                 | At         | Signature    |
+-------------+----------+-----------------+------------+--------------+
| MAGNESIUM,P | 1.69 (A) | 1.7 - 2.5 mg/dL | [facility]  |              |
| LASMA       |          |                 | LAB -      |              |
|             |          |                 | [place]  |              |
+-------------+----------+-----------------+------------+--------------+
 
 
 
+---------------+
| Specimen      |
+---------------+
| Blood - Blood |
+---------------+
 
 
 
+--------------------+----------------------+--------------------+----------------+
| Performing         | Address              | City/State/Zipcode | Phone Number   |
| Organization       |                      |                    |                |
+--------------------+----------------------+--------------------+----------------+
|   [facility]  |   [address] | [place]      |   [phone] |
| [place]          |                      |                    |                |
+--------------------+----------------------+--------------------+----------------+
|   [facility]  |                      | [place], [place]      |                |
| [place]          |                      |                    |                |
+--------------------+----------------------+--------------------+----------------+
 BILIRUBIN DIRECT (10/20/2009  8:15 AM PDT)
 
 
+------------+-------+-----------------+------------+--------------+
| Component  | Value | Ref Range       | Performed  | Pathologist  |
|            |       |                 | At         | Signature    |
+------------+-------+-----------------+------------+--------------+
| BILIRUBIN  | 0.1   | 0.0 - 0.4 mg/dL | [facility]  |              |
| DIRECT     |       |                 | LAB -      |              |
|            |       |                 | [place]  |              |
+------------+-------+-----------------+------------+--------------+
 
 
 
+---------------+
| Specimen      |
+---------------+
| Blood - Blood |
+---------------+
 
 
 
+--------------------+----------------------+--------------------+----------------+
| Performing         | Address              | City/State/Zipcode | Phone Number   |
| Organization       |                      |                    |                |
+--------------------+----------------------+--------------------+----------------+
|   [facility]  |   [address] | [place], [place]      |   [phone] |
| [place]          |                      |                    |                |
+--------------------+----------------------+--------------------+----------------+
|   [facility]  |                      | [place], [place]      |                |
| [place]          |                      |                    |                |
+--------------------+----------------------+--------------------+----------------+
 TRIGLYCERIDES, PLASMA (10/20/2009  8:15 AM PDT)
 
+-------------+-------+----------------+------------+--------------+
| Component   | Value | Ref Range      | Performed  | Pathologist  |
|             |       |                | At         | Signature    |
+-------------+-------+----------------+------------+--------------+
| TRIGLYCERID | 94    | 30 - 150 mg/dL | [facility]  |              |
| ES          |       |                | LAB -      |              |
|             |       |                | [place]  |              |
+-------------+-------+----------------+------------+--------------+
 
 
 
+---------------+
| Specimen      |
+---------------+
| Blood - Blood |
+---------------+
 
 
 
+--------------------+----------------------+--------------------+----------------+
| Performing         | Address              | City/State/Zipcode | Phone Number   |
| Organization       |                      |                    |                |
+--------------------+----------------------+--------------------+----------------+
|   [facility]  |   [address] | [place], [place]      |   [phone] |
| [place]          |                      |                    |                |
+--------------------+----------------------+--------------------+----------------+
|   [facility]  |                      | [place], [place]      |                |
| [place]          |                      |                    |                |
+--------------------+----------------------+--------------------+----------------+
 
 PHOSPHORUS, PLASMA (10/20/2009  8:15 AM PDT)
 
+-------------+-------+-----------------+------------+--------------+
| Component   | Value | Ref Range       | Performed  | Pathologist  |
|             |       |                 | At         | Signature    |
+-------------+-------+-----------------+------------+--------------+
| PHOSPHORUS, | 4.7   | 2.6 - 6.2 mg/dL | [facility]  |              |
|  PLASMA     |       |                 | LAB -      |              |
| (LAB)       |       |                 | [place]  |              |
+-------------+-------+-----------------+------------+--------------+
 
 
 
+---------------+
| Specimen      |
+---------------+
| Blood - Blood |
+---------------+
 
 
 
+--------------------+----------------------+--------------------+----------------+
| Performing         | Address              | City/State/Zipcode | Phone Number   |
| Organization       |                      |                    |                |
+--------------------+----------------------+--------------------+----------------+
|   [facility]  |   [address] | [place]      |   [phone] |
| [place]          |                      |                    |                |
+--------------------+----------------------+--------------------+----------------+
|   [facility]  |                      | [place], [place]      |                |
| [place]          |                      |                    |                |
+--------------------+----------------------+--------------------+----------------+
 CHOLESTEROL TOTAL, PLASMA (10/20/2009  8:15 AM PDT)
 
+-------------+-------+---------------+------------+--------------+
| Component   | Value | Ref Range     | Performed  | Pathologist  |
|             |       |               | At         | Signature    |
+-------------+-------+---------------+------------+--------------+
| CHOLESTEROL | 142   | < - 200 mg/dL | [facility]  |              |
|   (LAB)     |       |               | LAB -      |              |
|             |       |               | [place]  |              |
+-------------+-------+---------------+------------+--------------+
 
 
 
+---------------+
| Specimen      |
+---------------+
| Blood - Blood |
+---------------+
 
 
 
+--------------------+----------------------+--------------------+----------------+
| Performing         | Address              | City/State/Zipcode | Phone Number   |
| Organization       |                      |                    |                |
+--------------------+----------------------+--------------------+----------------+
|   [facility]  |   [address] | [place]      |   [phone] |
| [place]          |                      |                    |                |
+--------------------+----------------------+--------------------+----------------+
|   [facility]  |                      | [place], [place]      |                |
 
| [place]          |                      |                    |                |
+--------------------+----------------------+--------------------+----------------+
 CREATININE, URINE (10/20/2009  8:15 AM PDT)
 
+-------------+-------+-------------+------------+--------------+
| Component   | Value | Ref Range   | Performed  | Pathologist  |
|             |       |             | At         | Signature    |
+-------------+-------+-------------+------------+--------------+
| CREATININE, | 24    | g/col. time | [facility]  |              |
|  URINE      |       |             | LAB -      |              |
|             |       |             | [place]  |              |
+-------------+-------+-------------+------------+--------------+
 
 
 
+---------------+
| Specimen      |
+---------------+
| Urine - Urine |
+---------------+
 
 
 
+--------------------+----------------------+--------------------+----------------+
| Performing         | Address              | City/State/Zipcode | Phone Number   |
| Organization       |                      |                    |                |
+--------------------+----------------------+--------------------+----------------+
|   [facility]  |   [address] | [place], [place]      |   [phone] |
| [place]          |                      |                    |                |
+--------------------+----------------------+--------------------+----------------+
|   [facility]  |                      | [place], [place]      |                |
| [place]          |                      |                    |                |
+--------------------+----------------------+--------------------+----------------+
 MICROALBUMIN/CREATININE RATIO (RANDOM URINE) (10/20/2009  8:15 AM PDT)
 
+-------------+-------+----------------+------------+--------------+
| Component   | Value | Ref Range      | Performed  | Pathologist  |
|             |       |                | At         | Signature    |
+-------------+-------+----------------+------------+--------------+
| MICROALBUMI | 0.2   | 0.0 - 2.0 mg/L | [facility]  |              |
| N,          |       |                | LAB -      |              |
| URINE-RANDO |       |                | [place]  |              |
| M           |       |                |            |              |
+-------------+-------+----------------+------------+--------------+
 
 
 
+---------------+
| Specimen      |
+---------------+
| Urine - Urine |
+---------------+
 
 
 
+--------------------+----------------------+--------------------+----------------+
| Performing         | Address              | City/State/Zipcode | Phone Number   |
| Organization       |                      |                    |                |
+--------------------+----------------------+--------------------+----------------+
|   [facility]  |   [address] | [place], [place]      |   [phone] |
 
| [place]          |                      |                    |                |
+--------------------+----------------------+--------------------+----------------+
|   [facility]  |                      | [place], [place]      |                |
| [place]          |                      |                    |                |
+--------------------+----------------------+--------------------+----------------+
 documented in this encounter
 
 Visit Diagnoses
 Not on filedocumented in this encounter

## 2019-11-01 NOTE — XMS
Encounter Summary
  Created on: 2019
 
 Bonifacio Nila JB
 External Reference #: [id number]
 : [birth year]
 Sex: Female
 
 Demographics
 
 
+-----------------------+------------------------+
| Address               | [address]            |
|                       | [place]  [address]   |
+-----------------------+------------------------+
| Home Phone            | [phone]        |
+-----------------------+------------------------+
| Preferred Language    | Unknown                |
+-----------------------+------------------------+
| Marital Status        | Single                 |
+-----------------------+------------------------+
| [employer] Affiliation | Unknown                |
+-----------------------+------------------------+
| Race                  | White                  |
+-----------------------+------------------------+
| Ethnic Group          | Not  or  |
+-----------------------+------------------------+
 
 
 Author
 
 
+--------------+------------------------------+
| Author       | [facility] |
+--------------+------------------------------+
| Organization | [facility] |
+--------------+------------------------------+
| Address      | Unknown                      |
+--------------+------------------------------+
| Phone        | Unavailable                  |
+--------------+------------------------------+
 
 
 
 Support
 
 
+-----------------+--------------+---------+-----------------+
| Name            | Relationship | Address | Phone           |
+-----------------+--------------+---------+-----------------+
| Kit Valdovinos   | ECON         | Unknown | [phone] |
+-----------------+--------------+---------+-----------------+
| Magdalena Valdovinos | ECON         | Unknown | [phone] |
+-----------------+--------------+---------+-----------------+
 
 
 
 Care Team Providers
 
 
 
+------------------------+------+-----------------+
| Care Team Member Name  | Role | Phone           |
+------------------------+------+-----------------+
| Lily Horton MD | PCP  | [phone] |
+------------------------+------+-----------------+
 
 
 
 Reason for Referral
 PROC - Dept/Practice Procedure (Routine)
 
+--------+--------+---------------+--------------+--------------+---------------+
| Status | Reason | Specialty     | Diagnoses /  | Referred By  | Referred To   |
|        |        |               | Procedures   | Contact      | Contact       |
+--------+--------+---------------+--------------+--------------+---------------+
| Closed |        | Gastroenterol |   Diagnoses  |   Eroglu,    |   Gas Endo    |
|        |        | ogy           |  Biliary     | MD Neema  | [address] |
|        |        |               | atresia      |  [address]      |  [address]     |
|        |        |               | Transplanted | [address]    | Mailcode:     |
|        |        |               |  liver (HCC) | [place], [place] | [address]  |
|        |        |               |   Procedures |  [address]  | for Health    |
|        |        |               |   [facility]  |  Phone:      | and Healing,  |
|        |        |               |              | [phone] | [address]    |
|        |        |               |              |   Fax:       | [place], [place]  |
|        |        |               |              | [phone] | [address]    |
|        |        |               |              |              | Phone:        |
|        |        |               |              |              | [phone]  |
|        |        |               |              |              |  Fax:         |
|        |        |               |              |              | [phone]  |
+--------+--------+---------------+--------------+--------------+---------------+
 
 
 Consultation (Routine)
 
+------------+--------+------------+--------------+--------------+---------------+
| Status     | Reason | Specialty  | Diagnoses /  | Referred By  | Referred To   |
|            |        |            | Procedures   | Contact      | Contact       |
+------------+--------+------------+--------------+--------------+---------------+
| Authorized |        | Liver      |   Diagnoses  |   Eroglu,    |   Ltx Liver   |
|            |        | Transplant |  Biliary     | MD Neema  | [address]  |
|            |        |            | atresia      |  [address]      | [address]        |
|            |        |            | Other        | [address]    | [address]  |
|            |        |            | secondary    | [place], [place] | Rd  Mailcode: |
|            |        |            | hypertension |  [address]  |  [address]         |
|            |        |            |              |  Phone:      | Physician's   |
|            |        |            | Transplanted | [phone] | [address]  |
|            |        |            |  liver (HCC) |   Fax:       |  [place], [place] |
|            |        |            |   Procedures | [phone] |  [address]   |
|            |        |            |   CONSULT TO |              | Phone:        |
|            |        |            |  HEPATOLOGY  |              | [phone]  |
|            |        |            |              |              |  Fax:         |
|            |        |            |              |              | [phone]  |
+------------+--------+------------+--------------+--------------+---------------+
 
 
 
 
 Reason for Visit
 
 Office Visit - E/M Services (Routine)
 
+--------+--------+---------------+--------------+--------------+---------------+
| Status | Reason | Specialty     | Diagnoses /  | Referred By  | Referred To   |
|        |        |               | Procedures   | Contact      | Contact       |
+--------+--------+---------------+--------------+--------------+---------------+
| Closed |        | Pediatric     |   Diagnoses  |   Sol,    |   Ped Gastro  |
|        |        | Gastroenterol |  Congenital  | Lily Lakhani, | [facility]  [address]   |
|        |        | ogy           | malformation |  [address]    | [address]   |
|        |        |               | s of spleen  | SPECIALISTS  | [address]       |
|        |        |               |  Liver       | [facility] [place] | Mailcode:     |
|        |        |               | transplant   |   [address]    | [facility]CP         |
|        |        |               | status       | [address]  | [address]   |
|        |        |               | Procedures   |  [place],  | [place], [place]  |
|        |        |               | includes     | [place] [address]     | [address]    |
|        |        |               | diagnostics  | Phone:       | Phone:        |
|        |        |               |              | [phone] | [phone]  |
|        |        |               |              |   Fax:       |  Fax:         |
|        |        |               |              | [phone] | [phone]  |
+--------+--------+---------------+--------------+--------------+---------------+
 
 
 
 
 Encounter Details
 
 
+--------+---------+----------------------+----------------------+----------------------+
| Date   | Type    | Department           | Care Team            | Description          |
+--------+---------+----------------------+----------------------+----------------------+
| / | Office  |   Pediatric          |   Neema Khalil,   | Biliary atresia      |
| 2016   | Visit   | Gastroenterology at  | MD  [address] | (Primary Dx);        |
|        |         | [place]          |   [place], [place]       | Polysplenia; Other   |
|        |         | [facility]  | [address]           | secondary            |
|        |         |  [address] | [phone]         | hypertension;        |
|        |         |  [address]  Mailcode:  | [phone] (Fax)   | Transplanted liver   |
|        |         | [facility]  [facility]   |                      | ([facility]); Nonrheumatic  |
|        |         | [place], [place]         |                      | aortic valve         |
|        |         | [address]           |                      | insufficiency; VSD   |
|        |         | [phone]         |                      | (ventricular septal  |
|        |         |                      |                      | defect),             |
|        |         |                      |                      | perimembranous;      |
|        |         |                      |                      | Interrupted inferior |
|        |         |                      |                      |  vena cava; Aortic   |
|        |         |                      |                      | root dilatation      |
|        |         |                      |                      | ([facility])                |
+--------+---------+----------------------+----------------------+----------------------+
 
 
 
 Social History
 
 
+-------------------+-------+-----------+--------+------+
| Tobacco Use       | Types | Packs/Day | Years  | Date |
|                   |       |           | Used   |      |
+-------------------+-------+-----------+--------+------+
| Passive Smoke     |       |           |        |      |
| Exposure - Never  |       |           |        |      |
| Smoker            |       |           |        |      |
 
+-------------------+-------+-----------+--------+------+
 
 
 
+---------------------+---+---+---+
| Smokeless Tobacco:  |   |   |   |
| Never Used          |   |   |   |
+---------------------+---+---+---+
 
 
 
+-------------+-------------+---------+----------+
| Alcohol Use | Drinks/Week | oz/Week | Comments |
+-------------+-------------+---------+----------+
| Not Asked   |             |         |          |
+-------------+-------------+---------+----------+
 
 
 
+------------------+---------------+
| Sex Assigned at  | Date Recorded |
| Birth            |               |
+------------------+---------------+
| Not on file      |               |
+------------------+---------------+
 
 
 
+----------------+-------------+-------------+
| Job Start Date | Occupation  | Industry    |
+----------------+-------------+-------------+
| Not on file    | Not on file | Not on file |
+----------------+-------------+-------------+
 
 
 
+----------------+--------------+------------+
| Travel History | Travel Start | Travel End |
+----------------+--------------+------------+
 
 
 
+-------------------------------------+
| No recent travel history available. |
+-------------------------------------+
 documented as of this encounter
 
 Last Filed Vital Signs
 
 
+-------------------+----------------------+----------------------+----------------------+
| Vital Sign        | Reading              | Time Taken           | Comments             |
+-------------------+----------------------+----------------------+----------------------+
| Blood Pressure    | 130/93               | 2016  9:24 AM  | right arm adult cuff |
|                   |                      | PDT                  |                      |
+-------------------+----------------------+----------------------+----------------------+
| Pulse             | 72                   | 2016  9:24 AM  |                      |
|                   |                      | PDT                  |                      |
+-------------------+----------------------+----------------------+----------------------+
| Temperature       | 36.7   C (98.1   F)  | 2016  8:59 AM  |                      |
 
|                   |                      | PDT                  |                      |
+-------------------+----------------------+----------------------+----------------------+
| Respiratory Rate  | -                    | -                    |                      |
+-------------------+----------------------+----------------------+----------------------+
| Oxygen Saturation | -                    | -                    |                      |
+-------------------+----------------------+----------------------+----------------------+
| Inhaled Oxygen    | -                    | -                    |                      |
| Concentration     |                      |                      |                      |
+-------------------+----------------------+----------------------+----------------------+
| Weight            | 81.5 kg (179 lb 10.8 | 2016  8:59 AM  |                      |
|                   |  oz)                 | PDT                  |                      |
+-------------------+----------------------+----------------------+----------------------+
| Height            | 164.4 cm (5' 4.72")  | 2016  8:59 AM  |                      |
|                   |                      | PDT                  |                      |
+-------------------+----------------------+----------------------+----------------------+
| Body Mass Index   | 30.16                | 2016  8:59 AM  |                      |
|                   |                      | PDT                  |                      |
+-------------------+----------------------+----------------------+----------------------+
 documented in this encounter
 
 Patient Instructions
 Patient Instructions Neema Khalil MD - 2016  9:03 AM PDTDear family, 
 
 It was nice to see you in the clinic today !  Our recommendations are as below:
 
 Plan: 
 
 - Blood draw for labs: every 3 months
 
 - Next appointment: With Adult GI next summer (can schedule with Fibroscan)
 
 Results of tests:  
 Test results will be sent to you by MyChart. 
 
 Calls:
 Medical emergencies:  call 911
 Non-urgent issues:  Send [employer] message
 Monday - Friday work hours: call  [phone] # [phone]
 After hours, weekends, holidays: call [phone]
 To schedule an appointment: call [phone] # [phone]
 For refills: call to your pharmacy a week before running out medicine
 
 Electronically signed by Marianne Barba RN at 2016  9:17 AM PDT
 documented in this encounter
 
 Progress Notes
 Neema Khalil MD - 2016  3:40 PM PDTFormatting of this note might be different fro
m the original.
 
 Primary Care Provider: Lily Horton MD
 
 [facility] 
 DOS: 2016
 Visit type: Follow-up 
 
 Patient accompanied by: father
  used: no
 
 CC: 
 - Post-liver transplant care 
 
 - High risk medication management
 
 Patient Active Problem List 
 Diagnosis 
   Transplanted Liver 
   VSD (ventricular septal defect), perimembranous 
   Polysplenia 
   Interrupted inferior vena cava 
   Aortic insufficiency 
   Aortic root dilatation 
 
  PAST Hx/kim: Biliary atresia
 -  s/p OLT at [facility]. She was last seen by Dr Burkett in . 
In , it was noticed that she has not been seen, attempts to reach to family was unsucces
sful. lost to follow up since that time. 
 - Cardiology: last cardiology note is from  from Dr Lily Horton, cardiologist at [facility]. Her cardiac dx includes 1) moderate perimembraneous VSD nearly occluded by aneurysmal
 tissue leaving a tiny VSD, 2) trace central aortic insufficiency, 3) Left atrial isomerism 
(polysplenia). It was recommended her annual follow up for progression of aortic insufficien
cy in which case she may need closure of VSD. No further notes available in our system as julio c minor is lost to follow up since .
 - 10/16/2014 s/p VSD repair, 8-mm Amplatzer Vascular Plug IV with no significant residual s
hunting noted.
 - not on prograf for a long time
 
 INTERVAL HISTORY:
 - no complaints,no abdominal pain, emesis, diarrhea or constipation
 - started BCP, blood pressures were high since then, she will be seen  By cardiologist chelita acosta, they will monitor BPs
 
 REVIEW OF SYSTEMS: 
 General:  No fever, fatigue, or weight loss.  
 Eyes:  No changes in vision, no eye irritation or discharge.  
 ENT:  No nosebleeds, nasal congestion, difficulty swallowing, no mouth sores. 
 Respiratory:  No shortness of breath, cough, wheezing.
 Cardiovascular:  No difficulty breathing, edema, cyanosis.  
 Genitourinary:  No urinary infections.  
 Neurologic:  No seizures. No headaches.  
 Musculoskeletal:  No joint pain, swelling. No back pain.  Full range of motion.
 Skin:  No itching, rash, jaundice.  
 Psychological:  No abnormal anxiety, depression. Attending school regularly. Will be senior
 this year
 Heme: No anemia, abnormal bleeding, easy bruising
 Lymphatic: No enlarged lymph nodes.
 Metabolic/Endo: no glucose intolerance, hypothyroidism
 Allergy/immunology: no seasonal or food allergies, no asthma
 
 All other ROS are negative.
 
  
 Past Medical History 
 Diagnosis Date 
   Biliary atresia  
 
 Past Surgical History 
 Procedure Laterality Date 
   Liver transplant  2000 
   at [facility] 
   Vsd repair, amplatzer device  10/16/2014 
   8-mm Amplatzer Vascular Plug IV, no significant residual shunting 
 
 
 FHx: reviewed, unchanged
 
 SHx: reviewed, will be senior at  this year
 
 Allergies 
 Allergen Reactions 
   Sulfa (Sulfonamide Antibiotics) Hives and Swelling-Facial 
   Varicella Vaccines  
   Possible reaction with sulfa meds 
 
 PHYSICAL EXAMINATION: 
 
 Patient reports a pain level of  0 today. No action required.
 
 Ht 1.644 m (5' 4.72") (58 %*, Z = 0.21), Wt 81.5 kg (179 lb 10.8 oz) (96 %*, Z = 1.70), Jae
ght for age(%)  96% (Z=1.70) , /98, Pulse 84, Temperature 36.7 C (98.1 F), Tempera
ture source Oral, BMI 30.15 kg/(m^2). 
 
 Repeat BP at the end of clinic: 130/93 mmHg
 
 APPEARANCE: alert, active and in no apparent distress. 
 SKIN: no jaundice or rashes. 
 HEENT: normocephalic, PERRLA, mucous membranes moist. 
 NECK: supple, without thyromegaly. 
 CHEST: clear to auscultation bilaterally. 
 CV: no murmurs. 
 ABDOMEN: soft, NTND, no hepatosplenomegaly. 
 BACK: without tenderness. 
 MUSCULOSKELETAL: no muscle wasting, no deformity.
 EXTREMITIES: No edema, clubbing, deformity.
 NEURO: grossly intact
 
  
 ASSESSMENT: 14 yo female with h/o biliary atresia, s/p OLT in , off immunosuppression b
y choice for a long time, congenital heart disease, s/p surgery, now w hypertension. CNI cou
ld be contributor to HTN, despite that she has been off for a long time. Patient says she wi
ll be referred to nephrologist by PCP.
 Recommend fibroscan to evaluate for fibrosis which could have developed despite normal LFts
.
 
 Q44.2   Biliary atresia
 Q89.09  Polysplenia
 I15.8   Other secondary hypertension
 Z94.4   Transplanted liver (HCC)
 I35.1   Nonrheumatic aortic valve insufficiency
 Q21.0   VSD (ventricular septal defect), perimembranous
 Q26.9   Interrupted inferior vena cava
 I77.810  Aortic root dilatation (HCC)
 
 PLAN/RECOMMENDATIONS:  
 - Labs: CBC w diff, CMP, GGT: every 3 months
 - will transfer care to adult hepatology, requested appt to be scheduled for summer 2017 , 
this works for family well.
 - Fibroscan next year when she is seen by adult hepatology
 - she will be seen by cardiology today for follow up
 - PCP referred her to nephrologist for HTN
 
 Health Care Maintenance: 
 - she can receive all vaccines as she is not on immunsupression, especially we recommend he
 
p A, hep B vaccines if she is not already immune.
 - Flu vaccine (intramuscular): every . 
 - Dental care every 6 months
 
 At this visit: 
 Dr. Jorge Aguirre and DHARMESH Webber from [facility] were presen
t as observers during this visit.
 Psychosocial or economic issues that may affect patient's medical care or well being:none 
 Co-morbid chronic medical problems that may affect future procedural sedation risk: NA
 
 Labs/imaging:
 Component
     Latest Ref Rng 2016 
 GLUCOSE, PLASMA (LAB)
     70 - 100 mg/dL 73 
 BUN, PLASMA (LAB)
     6 - 23 mg/dL 8 
 CREATININE PLASMA (LAB)
     0.6 - 1.2 mg/dL 0.61 
 TOTAL PROTEIN, PLASMA 
     6 - 8 g/dL 7.9 
 ALBUMIN, PLASMA (LAB)
     3.5 - 5 g/dL 4 
 CALCIUM, PLASMA (LAB)
     8.4 - 10.2 mg/dL 9.7 
 BILIRUBIN TOTAL
     0 - 1.2 mg/dL 0.3 
 ALK PHOS     30 - 128 U/L 108 
 AST(SGOT)     13 - 39 U/L 18 
 SODIUM, PLASMA (LAB)
     132 - 143 mmol/L 137 
 POTASSIUM, PLASMA  (LAB)
     3.6 - 5.1 mmol/L 4 
 CHLORIDE, PLASMA (LAB)
     95 - 112 mmol/L 100 
 TOTAL CO2, PLASMA (LAB)
     19 - 31 mmol/L 24 
 ALT (SGPT)     7 - 52 U/L 18 
 WHITE CELL COUNT
     4.5 - 11 K/cu mm 8.8 
 RED CELL COUNT
     3.8 - 5.1 M/cu mm 4.97 
 HEMOGLOBIN
     12 - 16 g/dL 14.5 
 HEMATOCRIT
     35 - 45 % 43.2 
 MCV     81 - 99 fL 86.9 
 PLATELET COUNT
     140 - 440 K/cu mm 415 
 
 Neema Khalil MD
 Pediatric Gastroenterology
 Consult line: [phone] 
 
 Electronically signed by Neema Khalil MD at 2016 12:31 PM PDTEroNeema dias MD -
 2016  3:39 PM PDT
 
 Electronically signed by Neema Khalil MD at 2016 12:31 PM PDTdocumented in this en
counter
 
 
 Plan of Treatment
 
 
+-----------+------------+--------+----------------------+---------------------+
| Name      | Type       | Priori | Associated Diagnoses | Order Schedule      |
|           |            | ty     |                      |                     |
+-----------+------------+--------+----------------------+---------------------+
| [facility] | Procedures | Routin |   Biliary atresia    | Ordered: 2016 |
|           |            | e      | Transplanted liver   |                     |
|           |            |        | (HCC)                |                     |
+-----------+------------+--------+----------------------+---------------------+
 documented as of this encounter
 
 Visit Diagnoses
 
 
+------------------------------------------------------------------------------+
| Diagnosis                                                                    |
+------------------------------------------------------------------------------+
|   Biliary atresia - Primary  Congenital biliary atresia                      |
+------------------------------------------------------------------------------+
|   Polysplenia  Congenital anomalies of spleen                                |
+------------------------------------------------------------------------------+
|   Other secondary hypertension                                               |
+------------------------------------------------------------------------------+
|   Transplanted liver (HCC)  Liver replaced by transplant                     |
+------------------------------------------------------------------------------+
|   Nonrheumatic aortic valve insufficiency  Aortic valve disorders            |
+------------------------------------------------------------------------------+
|   VSD (ventricular septal defect), perimembranous  Ventricular septal defect |
+------------------------------------------------------------------------------+
|   Interrupted inferior vena cava  Other congenital anomalies of great veins  |
+------------------------------------------------------------------------------+
|   Aortic root dilatation (HCC)  Thoracic aortic ectasia                      |
+------------------------------------------------------------------------------+
 documented in this encounter

## 2019-11-01 NOTE — XMS
Encounter Summary
  Created on: 2019
 
 Bonifacio Nila JB
 External Reference #: [id number]
 : [birth year]
 Sex: Female
 
 Demographics
 
 
+-----------------------+------------------------+
| Address               | [address]            |
|                       | [place]  [address]   |
+-----------------------+------------------------+
| Home Phone            | [phone]        |
+-----------------------+------------------------+
| Preferred Language    | Unknown                |
+-----------------------+------------------------+
| Marital Status        | Single                 |
+-----------------------+------------------------+
| Cheondoism Affiliation | Unknown                |
+-----------------------+------------------------+
| Race                  | White                  |
+-----------------------+------------------------+
| Ethnic Group          | Not  or  |
+-----------------------+------------------------+
 
 
 Author
 
 
+--------------+------------------------------+
| Author       | [facility] |
+--------------+------------------------------+
| Organization | [facility] |
+--------------+------------------------------+
| Address      | Unknown                      |
+--------------+------------------------------+
| Phone        | Unavailable                  |
+--------------+------------------------------+
 
 
 
 Support
 
 
+-----------------+--------------+---------+-----------------+
| Name            | Relationship | Address | Phone           |
+-----------------+--------------+---------+-----------------+
| Kit Valdovinos   | ECON         | Unknown | [phone] |
+-----------------+--------------+---------+-----------------+
| Magdalena Valdovinos | ECON         | Unknown | [phone] |
+-----------------+--------------+---------+-----------------+
 
 
 
 Care Team Providers
 
 
 
+------------------------+------+-----------------+
| Care Team Member Name  | Role | Phone           |
+------------------------+------+-----------------+
| Lily Horton MD | PCP  | [phone] |
+------------------------+------+-----------------+
 
 
 
 Encounter Details
 
 
+--------+-------------+----------------------+--------------+-------------+
| Date   | Type        | Department           | Care Team    | Description |
+--------+-------------+----------------------+--------------+-------------+
| / | Document-Sc |   Health Information |   Unknown  . |             |
|    | anned       |  Services  [address]    |              |             |
|        |             | [address]  |              |             |
|        |             |  Mailcode: OP17A     |              |             |
|        |             | [facility]  |              |             |
|        |             | [place], [place]  |              |             |
|        |             | [address]           |              |             |
|        |             | [phone]         |              |             |
+--------+-------------+----------------------+--------------+-------------+
 
 
 
 Social History
 
 
+-------------------+-------+-----------+--------+------+
| Tobacco Use       | Types | Packs/Day | Years  | Date |
|                   |       |           | Used   |      |
+-------------------+-------+-----------+--------+------+
| Passive Smoke     |       |           |        |      |
| Exposure - Never  |       |           |        |      |
| Smoker            |       |           |        |      |
+-------------------+-------+-----------+--------+------+
 
 
 
+---------------------+---+---+---+
| Smokeless Tobacco:  |   |   |   |
| Never Used          |   |   |   |
+---------------------+---+---+---+
 
 
 
+-------------+-------------+---------+----------+
| Alcohol Use | Drinks/Week | oz/Week | Comments |
+-------------+-------------+---------+----------+
| Not Asked   |             |         |          |
+-------------+-------------+---------+----------+
 
 
 
+------------------+---------------+
| Sex Assigned at  | Date Recorded |
| Birth            |               |
 
+------------------+---------------+
| Not on file      |               |
+------------------+---------------+
 
 
 
+----------------+-------------+-------------+
| Job Start Date | Occupation  | Industry    |
+----------------+-------------+-------------+
| Not on file    | Not on file | Not on file |
+----------------+-------------+-------------+
 
 
 
+----------------+--------------+------------+
| Travel History | Travel Start | Travel End |
+----------------+--------------+------------+
 
 
 
+-------------------------------------+
| No recent travel history available. |
+-------------------------------------+
 documented as of this encounter
 
 Plan of Treatment
 Not on filedocumented as of this encounter
 
 Procedures
 
 
+----------------+--------+-------------+----------------------+----------------------+
| Procedure Name | Priori | Date/Time   | Associated Diagnosis | Comments             |
|                | ty     |             |                      |                      |
+----------------+--------+-------------+----------------------+----------------------+
| LAB REPORTS    |        | 2015  |                      |   Results for this   |
|                |        | 12:00 AM    |                      | procedure are in the |
|                |        | PDT         |                      |  results section.    |
+----------------+--------+-------------+----------------------+----------------------+
 documented in this encounter
 
 Results
 LAB REPORTS (2015 12:00 AM PDT)
 
+----------------------------------------------------------+--------------+
| Narrative                                                | Performed At |
+----------------------------------------------------------+--------------+
|   This result has an attachment that is not available.   |              |
+----------------------------------------------------------+--------------+
 documented in this encounter
 
 Visit Diagnoses
 Not on filedocumented in this encounter

## 2019-11-01 NOTE — XMS
Encounter Summary
  Created on: 2019
 
 Bonifacio Nila JB
 External Reference #: [id number]
 : [birth year]
 Sex: Female
 
 Demographics
 
 
+-----------------------+------------------------+
| Address               | [address]            |
|                       | [place]  [address]   |
+-----------------------+------------------------+
| Home Phone            | [phone]        |
+-----------------------+------------------------+
| Preferred Language    | Unknown                |
+-----------------------+------------------------+
| Marital Status        | Single                 |
+-----------------------+------------------------+
| Nondenominational Affiliation | Unknown                |
+-----------------------+------------------------+
| Race                  | White                  |
+-----------------------+------------------------+
| Ethnic Group          | Not  or  |
+-----------------------+------------------------+
 
 
 Author
 
 
+--------------+------------------------------+
| Author       | [facility] |
+--------------+------------------------------+
| Organization | [facility] |
+--------------+------------------------------+
| Address      | Unknown                      |
+--------------+------------------------------+
| Phone        | Unavailable                  |
+--------------+------------------------------+
 
 
 
 Support
 
 
+-----------------+--------------+---------+-----------------+
| Name            | Relationship | Address | Phone           |
+-----------------+--------------+---------+-----------------+
| Kit Valdovinos   | ECON         | Unknown | [phone] |
+-----------------+--------------+---------+-----------------+
| Magdalena Valdovinos | ECON         | Unknown | [phone] |
+-----------------+--------------+---------+-----------------+
 
 
 
 Care Team Providers
 
 
 
+-----------------------+------+-----------------+
| Care Team Member Name | Role | Phone           |
+-----------------------+------+-----------------+
| Lily Horton MD   | PCP  | [phone] |
+-----------------------+------+-----------------+
 
 
 
 Reason for Visit
 
 
+-------------------+----------+
| Reason            | Comments |
+-------------------+----------+
| Follow-up in      |          |
| outpatient clinic |          |
+-------------------+----------+
 Office Visit - E/M Services (Routine)
 
+--------+--------------+---------------+--------------+--------------+---------------+
| Status | Reason       | Specialty     | Diagnoses /  | Referred By  | Referred To   |
|        |              |               | Procedures   | Contact      | Contact       |
+--------+--------------+---------------+--------------+--------------+---------------+
| Closed |   Specialty  | Pediatric     |   Diagnoses  |   Sol,    |   Ped Gastro  |
|        | Services     | Gastroenterol |  Liver       | Lily CARY MD | [facility]  [address]   |
|        | Required     | ogy           | replaced by  |   PEDS       | [address]   |
|        |              |               | transplant   | SPECIALISTS  | [address]       |
|        |              |               | (HCC)        | OF [place] | Mailcode:     |
|        |              |               |              |   [address]   | [facility]         |
|        |              |               |              | [address]   |
|        |              |               |              |  [address]         | [place], [place]  |
|        |              |               |              | [place],   | [address]    |
|        |              |               |              | [place] [address]     | Phone:        |
|        |              |               |              | Phone:       | [phone]  |
|        |              |               |              | [phone] |  Fax:         |
|        |              |               |              |   Fax:       | [phone]  |
|        |              |               |              | [phone] |               |
+--------+--------------+---------------+--------------+--------------+---------------+
 
 
 
 
 Encounter Details
 
 
+--------+---------+----------------------+--------------------+----------------------+
| Date   | Type    | Department           | Care Team          | Description          |
+--------+---------+----------------------+--------------------+----------------------+
| 10/22/ | Office  |   Specialty Clinics  |   Ivis Burkett MD | Complications of     |
|    | Visit   | at [facility]  [address] [facility]  |                    | Transplanted Liver;  |
|        |         | [address]      |                    | Transplanted Liver   |
|        |         | Mailcode: [facility]       |                    | (HCC)                |
|        |         | [address]          |                    |                      |
|        |         | [place], [place]         |                    |                      |
|        |         | [address]           |                    |                      |
|        |         | [phone]         |                    |                      |
+--------+---------+----------------------+--------------------+----------------------+
 
 
 
 
 Social History
 
 
+----------------+-------+-----------+--------+------+
| Tobacco Use    | Types | Packs/Day | Years  | Date |
|                |       |           | Used   |      |
+----------------+-------+-----------+--------+------+
| Never Assessed |       |           |        |      |
+----------------+-------+-----------+--------+------+
 
 
 
+------------------+---------------+
| Sex Assigned at  | Date Recorded |
| Birth            |               |
+------------------+---------------+
| Not on file      |               |
+------------------+---------------+
 
 
 
+----------------+-------------+-------------+
| Job Start Date | Occupation  | Industry    |
+----------------+-------------+-------------+
| Not on file    | Not on file | Not on file |
+----------------+-------------+-------------+
 
 
 
+----------------+--------------+------------+
| Travel History | Travel Start | Travel End |
+----------------+--------------+------------+
 
 
 
+-------------------------------------+
| No recent travel history available. |
+-------------------------------------+
 documented as of this encounter
 
 Last Filed Vital Signs
 
 
+-------------------+----------------------+----------------------+----------+
| Vital Sign        | Reading              | Time Taken           | Comments |
+-------------------+----------------------+----------------------+----------+
| Blood Pressure    | 131/78               | 10/22/2009  9:20 AM  |          |
|                   |                      | PDT                  |          |
+-------------------+----------------------+----------------------+----------+
| Pulse             | 98                   | 10/22/2009  9:20 AM  |          |
|                   |                      | PDT                  |          |
+-------------------+----------------------+----------------------+----------+
| Temperature       | -                    | -                    |          |
+-------------------+----------------------+----------------------+----------+
| Respiratory Rate  | -                    | -                    |          |
+-------------------+----------------------+----------------------+----------+
| Oxygen Saturation | -                    | -                    |          |
+-------------------+----------------------+----------------------+----------+
 
| Inhaled Oxygen    | -                    | -                    |          |
| Concentration     |                      |                      |          |
+-------------------+----------------------+----------------------+----------+
| Weight            | 50.4 kg (111 lb 1.8  | 10/22/2009  9:20 AM  |          |
|                   | oz)                  | PDT                  |          |
+-------------------+----------------------+----------------------+----------+
| Height            | 153.3 cm (5' 0.35")  | 10/22/2009  9:20 AM  |          |
|                   |                      | PDT                  |          |
+-------------------+----------------------+----------------------+----------+
| Body Mass Index   | 21.45                | 10/22/2009  9:20 AM  |          |
|                   |                      | PDT                  |          |
+-------------------+----------------------+----------------------+----------+
 documented in this encounter
 
 Patient Instructions
 Patient Instructions Ivis Burkett MD - 10/22/2009 10:25 AM PDTPlan:  
 1. Please have labs done   every  3  Months
 
 2. EBV PCR Quantitative                     every   12  months
 
 3 . Other labs or imaging studies: none
 
 5. Please get Flu shots  from your PCP
  Be sure to get both the seasonal influenza shot and the H1N1 influenza shot
  Your child should not receive Flumist or any live virus vaccines
  Family members should not receive Flumist
  If your child develops flu like symptoms of cough, fever, muscle pains, then call your [facility] doctor immediately to start Tamiflu
 
 7. change in medications:
    non3  
 
 8. Return to clinic in   12  months
 
 Electronically signed by Ivis Burkett MD at 10/22/2009 10:25 AM PDT
 documented in this encounter
 
 Progress Notes
 Ivis Burkett MD - 10/22/2009 11:17 AM PDTFormatting of this note might be different from t
he original.
 
 
 Nila Valdovinos is an 9 y.o. female, who is referred by Lily Horton, who returns to [facility] for transplant followup. Dr Aguirre from [facility] was an observ
er in clinic today.
 
 Patient Active Problem List: 
   VSD (Ventricular Septal Defect)[745.4Y]
     Comment: With polysplenia 
   Aortic Valve Insufficiency[424.1F]
   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
     Comment: For biliary atresia, 2000 at [facility]
 
  
 Current outpatient prescriptions prior to encounter 
 Medication Sig Dispense Refill 
   PROGRAF 1 mg Oral Capsule Take 1 Cap by mouth two times daily.   60  3 
 
 Allergies 
 Allergen Reactions 
 
   Sulfa (Sulfonamide Antibiotics) Hives and Swelling-Facial 
   Varicella  
   Possible reaction with sulfa meds 
 
 HPI:Nila was seen in clinic with both of her parents. She reports that she has been doing 
well, and has not had any significant illnesses since her last visit except one UTI. She say
s she misses about one dose of Prograf a week. She has not had liver labs between Feb and -- her mother said "I guess we need to be a little better about them". 
 
 She is seeing her cardiologist every 2 years and last saw him 1 year ago. She gets SBE prop
hylaxis because of polysplenia. Her energy is good and she backpacked 30 miles last summer. 
 
 Has not had a flu shot yet.
 
 lives with parents, and sibling and grade 5. Says school is going well.
 
 Physical Examination:
 
 Ht 153.3 cm (5' 0.35") (93 %ile), Wt 50.4 kg (111 lbs 1.8 oz) (93 %ile), Weight for age(%) 
 92.64%, BMI for age(%)  88.85%, Length for age(%)  93.23%, /78, Pulse 98. 
 88.85% of growth percentile based on BMI-for-age.
 
 Appearance: alert, active and in no apparent distress.
 Skin:  turgor normal, capillary refill brisk, no rashes, petechiae 
 HEENT: normocephalic,  no icterus,nose without discharge, mucous membranes moist, pharynx u
nremarkable
 Neck: supple, without thyromegaly
 Chest: clear to auscultation bilaterally.
 CV: regular sinus rhythm, normal S1 and S2, no murmurs.
 Abdomen: , well healed incisions, soft, no distention, no tenderness to palpation, no rebou
nd, no guarding, no palpable masses, normal bowel sounds, no hepatosplenomegaly
 Musculoskeletal: grossly intact without clubbing or edema
 Neuro: appropriate interactions, symmetric facies, moves all extremities well, 
 Nodes: no signficant cervical, supraclavicular,adenopathy
 
 Last labs 10/20/09
 AST 20, ALT 19, alk phos 220, GGT 10, cr 0.51, rest normal also
 Wbc 5.7, hct 43, MCV 87, plt 320
 EBV PCR pending
 
 Lab Results 
 Component Value Date 
    0.5 09 
    3.4 10/9/07 
 
  
  
 Assessment: Patient Active Problem List: 
   VSD (Ventricular Septal Defect)[745.4Y]
     Comment: With polysplenia 
   Aortic Valve Insufficiency[424.1F]
   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
     Comment: For biliary atresia, 2000 at [facility]
 
 Plan:  
 1. CBC, primary transplant panel, drug levels-Prograf 1     every  3  months
 2. EBV PCR Quantitative                                                             every  
 12  months
 3. CMV PCR Quantitative                                                            every  -
   months
 
 
 4. Other labs:   none
 5. Imaging:       non3
 6. Flu shot and other non-live virus vaccinations from PCP
 
 7. If she develops flu -ross symptoms, she should be treated immediately empirically with Ta
miflu for a 10 day course
 8. change in medications: none
       
 9. Return to clinic    12  months Electronically signed by Ivis Burkett MD at 10/26/2009  1
:01 PM PDTdocumented in this encounter
 
 Plan of Treatment
 Not on filedocumented as of this encounter
 
 Procedures
 
 
+----------------+--------+-------------+----------------------+----------------------+
| Procedure Name | Priori | Date/Time   | Associated Diagnosis | Comments             |
|                | ty     |             |                      |                      |
+----------------+--------+-------------+----------------------+----------------------+
| LAB REPORTS    |        | 10/20/2009  |                      |   Results for this   |
|                |        | 12:00 AM    |                      | procedure are in the |
|                |        | PDT         |                      |  results section.    |
+----------------+--------+-------------+----------------------+----------------------+
 documented in this encounter
 
 Results
 LAB REPORTS (10/20/2009 12:00 AM PDT)
 
+----------------------------------------------------------+--------------+
| Narrative                                                | Performed At |
+----------------------------------------------------------+--------------+
|   This result has an attachment that is not available.   |              |
+----------------------------------------------------------+--------------+
 
 
 
+-----------------------------------------------+
| Procedure Note                                |
+-----------------------------------------------+
|   Dang Bowling - 10/20/2009 12:00 AM PDT    |
|                                               |
+-----------------------------------------------+
 documented in this encounter
 
 Visit Diagnoses
 
 
+----------------------------------------------------------+
| Diagnosis                                                |
+----------------------------------------------------------+
|   Complications of transplanted liver                    |
+----------------------------------------------------------+
|   Transplanted liver (HCC)  Liver replaced by transplant |
+----------------------------------------------------------+
 documented in this encounter

## 2019-11-01 NOTE — XMS
MU2 Ambulatory Summary
  Created on: 2017
 
 Nila Valdovinos
 External Reference #: [id number]
 : [birth year]
 Sex: Female
 
 Demographics
 
 
+-----------------------+------------------------+
| Address               | [address] [place] [address]     |
|                       | [place]  [id number]   |
+-----------------------+------------------------+
| Home Phone            | [phone]          |
+-----------------------+------------------------+
| Preferred Language    | Unknown                |
+-----------------------+------------------------+
| Marital Status        | Never           |
+-----------------------+------------------------+
| Temple Affiliation | Unknown                |
+-----------------------+------------------------+
| Race                  | White                  |
+-----------------------+------------------------+
| Ethnic Group          | Not  or  |
+-----------------------+------------------------+
 
 
 Author
 
 
+--------------+----------------------------------------+
| Author       | [facility] |
+--------------+----------------------------------------+
| Organization | [facility] |
+--------------+----------------------------------------+
| Address      | [address]                    |
|              | [place]  [address]              |
+--------------+----------------------------------------+
| Phone        | [phone]                          |
+--------------+----------------------------------------+
 
 
 
 Care Team Providers
 
 
+-----------------------+-------------------+---------------+
| Care Team Member Name | Role              | Phone         |
+-----------------------+-------------------+---------------+
| Lily Horton PCP               | [phone] |
+-----------------------+-------------------+---------------+
 Unavailable       | Unavailable   |
+-----------------------+-------------------+---------------+
 Unavailable       | Unavailable   |
+-----------------------+-------------------+---------------+
 Unavailable       | Unavailable   |
 
+-----------------------+-------------------+---------------+
| Lily Horton      | PreferredProvider | [phone] |
+-----------------------+-------------------+---------------+
 
 
 
 Allergies and Adverse Reactions
 
 
+----------------------+----------+-------+
| Name                 | Reaction | Notes |
+----------------------+----------+-------+
| SULFA (SULFONAMIDES) |          |       |
+----------------------+----------+-------+
 
 
 
 Plan of Treatment
 Not available.
 
 Medications
 
 
+--------+
| Active |
+--------+
 
 
 
+---------------+------------+-----------------+-----+----------+
| Name          | Start Date | Estimated       | SIG | Comments |
|               |            | Completion Date |     |          |
+---------------+------------+-----------------+-----+----------+
| Depo-Provera  |            |                 |     |          |
| intramuscular |            |                 |     |          |
+---------------+------------+-----------------+-----+----------+
 
 
 
+---------+
|  |
+---------+
 
 
 
+-----------------+------------+-----------------+-----------------+----------+
| Name            | Start Date | Expiration Date | SIG             | Comments |
+-----------------+------------+-----------------+-----------------+----------+
| cefprozil 500   | 2016  | 2016        | take 1 tablet   |          |
| mg oral tablet  |            |                 | (500 mg) by     |          |
|                 |            |                 | oral route      |          |
|                 |            |                 | every 12 hours  |          |
|                 |            |                 | for 10 days     |          |
+-----------------+------------+-----------------+-----------------+----------+
| Augmentin       | 2016   | 2016       | take 1 tablet   |          |
| 875-125 mg oral |            |                 | by oral route   |          |
|  tablet         |            |                 | every 12 hours  |          |
|                 |            |                 | for 10 days     |          |
+-----------------+------------+-----------------+-----------------+----------+
| amlodipine 5 mg | 2016  | 2016       | take 1 tablet   |          |
 
|  oral tablet    |            |                 | (5 mg) by oral  |          |
|                 |            |                 | route once      |          |
|                 |            |                 | daily for 30    |          |
|                 |            |                 | days            |          |
+-----------------+------------+-----------------+-----------------+----------+
 
 
 
+--------------+
| Discontinued |
+--------------+
 
 
 
+-----------------+------------+---------------+-----------------+----------+
| Name            | Start Date | Discontinued  | SIG             | Comments |
|                 |            | Date          |                 |          |
+-----------------+------------+---------------+-----------------+----------+
| Sprintec (28)   |            | 2016     | take 1 tablet   |          |
| 0.25-35 mg-mcg  |            |               | by oral route   |          |
| oral tablet     |            |               | once daily for  |          |
|                 |            |               | 30 days         |          |
+-----------------+------------+---------------+-----------------+----------+
 
 
 
 Problem List
 
 
+-----------------------------+--------+------------+
| Description                 | Status | Onset      |
+-----------------------------+--------+------------+
| Liver Transplant            | Active | 2000 |
+-----------------------------+--------+------------+
| Ventricular Septal Defect   | Active |            |
+-----------------------------+--------+------------+
| Aortic stenosis             | Active |            |
+-----------------------------+--------+------------+
| Patent Ductus Arteriosus    | Active |            |
+-----------------------------+--------+------------+
| Biliary atresia             | Active |            |
+-----------------------------+--------+------------+
| Liver Transplant            | Active | 2016  |
+-----------------------------+--------+------------+
| Aortic stenosis             | Active | 2016   |
+-----------------------------+--------+------------+
| Hypertension                | Active | 2016   |
+-----------------------------+--------+------------+
| Dysmenorrhea                | Active | 2016  |
+-----------------------------+--------+------------+
| Eustachian tube dysfunction | Active | 2016  |
+-----------------------------+--------+------------+
 
 
 
 Vital Signs
 
 
+-----+-----+-----+-----+-----+-----+-----+-----+-----+----+-----+-----+-----+-----+
| Ishmael | Lee | BP- | BP- | HR( | RR( | Tem | WT  | HT  | HC | BMI | BSA | BMI | O2  |
 
| e   | e   | Sys | Christine | bpm | rpm | p   |     |     |    |     |     |     | Sat |
|     |     | (mm | (mm | )   | )   |     |     |     |    |     |     | Per | (%) |
|     |     | [Hg | [Hg |     |     |     |     |     |    |     |     | sarahi |     |
|     |     | ]   | ])  |     |     |     |     |     |    |     |     | til |     |
|     |     |     |     |     |     |     |     |     |    |     |     | e   |     |
+-----+-----+-----+-----+-----+-----+-----+-----+-----+----+-----+-----+-----+-----+
| 7 | 1:2 | 120 | 76  | 90  | 32  | 98. | 179 | 52. |    | 46. | 1.7 | 99. | 99  |
| 1/2 | 7:0 |     | mmH | bpm | rpm | 1 F | .5  | 25  |    | 226 | 325 | 3 % | %   |
| 016 | 0   | mmH | g   |     |     |     | lbs | in  |    | 4   |     |     |     |
|     | PM  | g   |     |     |     |     |     |     |    | kg/ | m   |     |     |
|     |     |     |     |     |     |     |     |     |    | m   |     |     |     |
+-----+-----+-----+-----+-----+-----+-----+-----+-----+----+-----+-----+-----+-----+
| 7 | 9:0 | 136 | 100 |     |     |     |     |     |    |     |     |     |     |
| 1/2 | 9:0 |     |     |     |     |     |     |     |    |     |     |     |     |
| 016 | 0   | mmH | mmH |     |     |     |     |     |    |     |     |     |     |
|     | AM  | g   | g   |     |     |     |     |     |    |     |     |     |     |
+-----+-----+-----+-----+-----+-----+-----+-----+-----+----+-----+-----+-----+-----+
|  | 8:5 | 150 | 100 |     |     |     |     |     |    |     |     |     |     |
| /20 | 3:0 |     |     |     |     |     |     |     |    |     |     |     |     |
| 16  | 0   | mmH | mmH |     |     |     |     |     |    |     |     |     |     |
|     | AM  | g   | g   |     |     |     |     |     |    |     |     |     |     |
+-----+-----+-----+-----+-----+-----+-----+-----+-----+----+-----+-----+-----+-----+
|  | 9:1 | 140 | 98  |     |     |     |     |     |    |     |     |     |     |
| 92 | 7:0 |     | mmH |     |     |     |     |     |    |     |     |     |     |
| 016 | 0   | mmH | g   |     |     |     |     |     |    |     |     |     |     |
|     | AM  | g   |     |     |     |     |     |     |    |     |     |     |     |
+-----+-----+-----+-----+-----+-----+-----+-----+-----+----+-----+-----+-----+-----+
|  | 9:4 | 122 | 90  | 76  | 16  | 98. | 171 | 64. |    | 28. | 1.8 | 93. | 98  |
| 1/2 | 6:0 |     | mmH | bpm | rpm | 3 F |     | 5   |    | 90  | 8   | 7 % | %   |
| 016 | 0   | mmH | g   |     |     |     | lbs | in  |    | kg/ | m2  |     |     |
|     | AM  | g   |     |     |     |     |     |     |    | m2  |     |     |     |
+-----+-----+-----+-----+-----+-----+-----+-----+-----+----+-----+-----+-----+-----+
| 6/6 | 9:5 | 162 | 120 | 70  | 16  | 98. | 172 | 64. |    | 28. | 1.8 | 93. | 99  |
| /20 | 8:0 |     |     | bpm | rpm | 2 F |     | 8   |    | 80  | 9   | 6 % | %   |
| 16  | 0   | mmH | mmH |     |     |     | lbs | in  |    | kg/ | m2  |     |     |
|     | AM  | g   | g   |     |     |     |     |     |    | m2  |     |     |     |
+-----+-----+-----+-----+-----+-----+-----+-----+-----+----+-----+-----+-----+-----+
| 5/2 | 1:3 | 140 | 98  | 90  | 30  | 98. | 175 | 64. |    | 29. | 1.9 | 94. | 98  |
| 3/2 | 7:0 |     | mmH | bpm | rpm | 9 F | .5  | 5   |    | 659 | 033 | 7 % | %   |
| 016 | 0   | mmH | g   |     |     |     | lbs | in  |    |     |     |     |     |
|     | PM  | g   |     |     |     |     |     |     |    | kg/ | m   |     |     |
|     |     |     |     |     |     |     |     |     |    | m   |     |     |     |
+-----+-----+-----+-----+-----+-----+-----+-----+-----+----+-----+-----+-----+-----+
| 2/4 | 1:1 | 126 | 68  | 82  | 18  | 97. | 179 | 65  |    | 29. | 1.9 | 95. | 98  |
| /20 | 9:0 |     | mmH | bpm | rpm | 8 F | .5  | in  |    | 87  | 3   | 2 % | %   |
| 16  | 0   | mmH | g   |     |     |     | lbs |     |    | kg/ | m2  |     |     |
|     | PM  | g   |     |     |     |     |     |     |    | m2  |     |     |     |
+-----+-----+-----+-----+-----+-----+-----+-----+-----+----+-----+-----+-----+-----+
| 11/ | 10: | 116 | 74  | 80  | 20  | 97. | 129 | 64  |    | 22. | 1.6 | 87. |     |
| 16/ | 30: |     | mmH | bpm | rpm | 6 F |     | in  |    | 142 | 255 | 6 % |     |
| 201 | 00  | mmH | g   |     |     |     | lbs |     |    | 6   |     |     |     |
| 0   | AM  | g   |     |     |     |     |     |     |    | kg/ | m   |     |     |
|     |     |     |     |     |     |     |     |     |    | m   |     |     |     |
+-----+-----+-----+-----+-----+-----+-----+-----+-----+----+-----+-----+-----+-----+
 
 
 
 Social History
 
 
 
+------------------+-------------+-----------------------------+
| Name             | Description | Comments                    |
+------------------+-------------+-----------------------------+
| Smoker           | Never       |                             |
+------------------+-------------+-----------------------------+
| Parents  |             |                             |
+------------------+-------------+-----------------------------+
| Lives With       |             | (separate households) mom   |
|                  |             | brother Daquan Nichols. Dad |
|                  |             |  nely Pantoja        |
+------------------+-------------+-----------------------------+
 
 
 
 History of Procedures
 
 
+---------------------+----------------------------+--------------+
| Date Ordered        | Description                | Order Status |
+---------------------+----------------------------+--------------+
| 2016 12:00 AM   | HUMAN PAPILLOMA VIRUS      | Reviewed     |
|                     | VACCINE QUADRIV 3 DOSE IM  |              |
+---------------------+----------------------------+--------------+
| 2016 12:00 AM   | INFLUENZA VAC 4 VALENT     | Reviewed     |
|                     | PRSRV FREE 3 YRS PLUS IM   |              |
+---------------------+----------------------------+--------------+
| 2010 12:00 AM | IMMUNIZATION ADMIN         | Reviewed     |
+---------------------+----------------------------+--------------+
| 2016 12:00 AM  | MEASURE BLOOD OXYGEN LEVEL | Reviewed     |
+---------------------+----------------------------+--------------+
| 2016 12:00 AM  | HUMAN PAPILLOMA VIRUS      | Reviewed     |
|                     | NONAVALENT HPV 3 DOSE IM   |              |
+---------------------+----------------------------+--------------+
| 2016 10:15 AM   | URINALYSIS NONAUTO W/O     | Reviewed     |
|                     | SCOPE                      |              |
+---------------------+----------------------------+--------------+
| 2016 12:00 AM   | MEASURE BLOOD OXYGEN LEVEL | Reviewed     |
+---------------------+----------------------------+--------------+
| 2016 12:00 AM   | TYMPANOMETRY               | Reviewed     |
+---------------------+----------------------------+--------------+
| 2016 12:00 AM   | COMPLETE CBC W/AUTO DIFF   | Reviewed     |
|                     | WBC                        |              |
+---------------------+----------------------------+--------------+
| 2016 12:00 AM   | COMPREHEN METABOLIC PANEL  | Reviewed     |
+---------------------+----------------------------+--------------+
| 2016 12:00 AM  | MEASURE BLOOD OXYGEN LEVEL | Reviewed     |
+---------------------+----------------------------+--------------+
| 2016 12:00 AM  | TYMPANOMETRY               | Reviewed     |
+---------------------+----------------------------+--------------+
| 2016 12:00 AM  | MEASURE BLOOD OXYGEN LEVEL | Reviewed     |
+---------------------+----------------------------+--------------+
| 2010 12:00 AM | FLU VACCINE 3 YRS & > IM   | Reviewed     |
+---------------------+----------------------------+--------------+
 
 
 
 Results Summary
 
 
+----------------------+--------------------------------------------+
 
| Date and Description | Results                                    |
+----------------------+--------------------------------------------+
| 2016 10:15 AM    | Glucose. Negative Bilirubin. Negative      |
|                      | Ketones Negative Spec Grav 1.020 PH 6.0    |
|                      | Protein 30+ Urobilinogen 0.2 Nitrites      |
|                      | Negative Leukocyte Est Negative Urine      |
|                      | Color georgette Blood Moderate 2+              |
+----------------------+--------------------------------------------+
| 2016 11:37 AM    | SODIUM 137 POTASSIUM 4.0 CHLORIDE 100      |
|                      | CARBON DIOXIDE 24 ANION GAP 17.0 GLUCOSE   |
|                      | 73 UREA NITROGEN 8 CREATININE, SERUM 0.61  |
|                      | GFR ESTIMATION NOT PERFORMED               |
|                      | BUN/CREAT.RATIO 13.1 CALCIUM 9.7 AST(SGOT) |
|                      |  18 ALT(SGPT) 18 ALKALINE PHOS 108         |
|                      | BILIRUBIN, TOTAL 0.3 PROTEIN 7.9 ALBUMIN   |
|                      | 4.0 GLOBULIN 3.9 A/G RATIO 1.0 WBC 8.8 RBC |
|                      |  4.97 HEMOGLOBIN 14.5 HEMATOCRIT 43.2 MCV  |
|                      | 86.9 RDW 14.4 MCH 29 MCHC 34 PLATELET      |
|                      | COUNT 415 NEUTROPHILS 65.8 LYMPHOCYTES     |
|                      | 22.0 MONOCYTES 7.4 EOSINOPHILS 3.9         |
|                      | BASOPHILS 0.9                              |
+----------------------+--------------------------------------------+
 
 
 
 History Of Immunizations
 
 
+-------+-------+-------+------+-------+-------+-------+-------+-------+-------+-----+
| Name  | Date  | Mfg   | Mfg  | Trade | Lot#  | Route | Inj   | Vis   | Vis   | CVX |
|       | Admin | Name  | Code |  Name |       |       |       | Given | Pub   |     |
+-------+-------+-------+------+-------+-------+-------+-------+-------+-------+-----+
| DTaP  | 3/23/ | Not   | NE   | Not   |       | Not   | Not   |  |  | 999 |
|       | 1999  | Enter |      | Enter |       | Enter | Enter | 001   | 001   |     |
|       |       | ed    |      | ed    |       | ed    | ed    |       |       |     |
+-------+-------+-------+------+-------+-------+-------+-------+-------+-------+-----+
| DTaP  | / | Not   | NE   | Not   |       | Not   | Not   |  |  | 999 |
|       |   | Enter |      | Enter |       | Enter | Enter | 001   | 001   |     |
|       |       | ed    |      | ed    |       | ed    | ed    |       |       |     |
+-------+-------+-------+------+-------+-------+-------+-------+-------+-------+-----+
| DTaP  | / | Not   | NE   | Not   |       | Not   | Not   |  |  | 999 |
|       |   | Enter |      | Enter |       | Enter | Enter | 001   | 001   |     |
|       |       | ed    |      | ed    |       | ed    | ed    |       |       |     |
+-------+-------+-------+------+-------+-------+-------+-------+-------+-------+-----+
| DTaP  | 3/9/2 | Not   | NE   | Not   |       | Not   | Not   |  |  | 999 |
|       | 000   | Enter |      | Enter |       | Enter | Enter | 001   | 001   |     |
|       |       | ed    |      | ed    |       | ed    | ed    |       |       |     |
+-------+-------+-------+------+-------+-------+-------+-------+-------+-------+-----+
| DTaP  |  | Not   | NE   | Not   |       | Not   | Not   |  |  | 999 |
|       | 003   | Enter |      | Enter |       | Enter | Enter | 001   | 001   |     |
|       |       | ed    |      | ed    |       | ed    | ed    |       |       |     |
+-------+-------+-------+------+-------+-------+-------+-------+-------+-------+-----+
| Tdap  |  | Not   | NE   | Not   |       | Not   | Not   |  |  | 999 |
|       | 009   | Enter |      | Enter |       | Enter | Enter | 001   | 001   |     |
|       |       | ed    |      | ed    |       | ed    | ed    |       |       |     |
+-------+-------+-------+------+-------+-------+-------+-------+-------+-------+-----+
| Hib   | 3/23/ | Not   | NE   | Not   |       | Not   | Not   |  |  | 999 |
|       | 1999  | Enter |      | Enter |       | Enter | Enter | 001   | 001   |     |
|       |       | ed    |      | ed    |       | ed    | ed    |       |       |     |
+-------+-------+-------+------+-------+-------+-------+-------+-------+-------+-----+
 
| Hib   | / | Not   | NE   | Not   |       | Not   | Not   |  |  | 999 |
|       | 1999  | Enter |      | Enter |       | Enter | Enter | 001   | 001   |     |
|       |       | ed    |      | ed    |       | ed    | ed    |       |       |     |
+-------+-------+-------+------+-------+-------+-------+-------+-------+-------+-----+
| Hib   | / | Not   | NE   | Not   |       | Not   | Not   |  |  | 999 |
|       | 1999  | Enter |      | Enter |       | Enter | Enter | 001   | 001   |     |
|       |       | ed    |      | ed    |       | ed    | ed    |       |       |     |
+-------+-------+-------+------+-------+-------+-------+-------+-------+-------+-----+
| Hib   | 3/9/2 | Not   | NE   | Not   |       | Not   | Not   |  |  | 999 |
|       | 000   | Enter |      | Enter |       | Enter | Enter | 001   | 001   |     |
|       |       | ed    |      | ed    |       | ed    | ed    |       |       |     |
+-------+-------+-------+------+-------+-------+-------+-------+-------+-------+-----+
| HepB  | 1/15/ | Not   | NE   | Not   |       | Not   | Not   |  |  | 999 |
|       | 1999  | Enter |      | Enter |       | Enter | Enter | 001   | 001   |     |
|       |       | ed    |      | ed    |       | ed    | ed    |       |       |     |
+-------+-------+-------+------+-------+-------+-------+-------+-------+-------+-----+
| HepB  | 3/23/ | Not   | NE   | Not   |       | Not   | Not   |  |  | 999 |
|       | 1999  | Enter |      | Enter |       | Enter | Enter | 001   | 001   |     |
|       |       | ed    |      | ed    |       | ed    | ed    |       |       |     |
+-------+-------+-------+------+-------+-------+-------+-------+-------+-------+-----+
| HepB  | / | Not   | NE   | Not   |       | Not   | Not   |  |  | 999 |
|       | 1999  | Enter |      | Enter |       | Enter | Enter | 001   | 001   |     |
|       |       | ed    |      | ed    |       | ed    | ed    |       |       |     |
+-------+-------+-------+------+-------+-------+-------+-------+-------+-------+-----+
| IPV   | 3/23/ | Not   | NE   | Not   |       | Not   | Not   |  |  | 999 |
|       |   | Enter |      | Enter |       | Enter | Enter | 001   | 001   |     |
|       |       | ed    |      | ed    |       | ed    | ed    |       |       |     |
+-------+-------+-------+------+-------+-------+-------+-------+-------+-------+-----+
| IPV   | / | Not   | NE   | Not   |       | Not   | Not   |  |  | 999 |
|       |   | Enter |      | Enter |       | Enter | Enter | 001   | 001   |     |
|       |       | ed    |      | ed    |       | ed    | ed    |       |       |     |
+-------+-------+-------+------+-------+-------+-------+-------+-------+-------+-----+
| IPV   | / | Not   | NE   | Not   |       | Not   | Not   | 0 |  | 999 |
|       |   | Enter |      | Enter |       | Enter | Enter | 001   | 001   |     |
|       |       | ed    |      | ed    |       | ed    | ed    |       |       |     |
+-------+-------+-------+------+-------+-------+-------+-------+-------+-------+-----+
| IPV   |  | Not   | NE   | Not   |       | Not   | Not   |  |  | 999 |
|       | 003   | Enter |      | Enter |       | Enter | Enter | 001   | 001   |     |
|       |       | ed    |      | ed    |       | ed    | ed    |       |       |     |
+-------+-------+-------+------+-------+-------+-------+-------+-------+-------+-----+
| MMR   | 3/9/2 | Not   | NE   | Not   |       | Not   | Not   |  |  | 999 |
|       | 000   | Enter |      | Enter |       | Enter | Enter | 001   | 001   |     |
|       |       | ed    |      | ed    |       | ed    | ed    |       |       |     |
+-------+-------+-------+------+-------+-------+-------+-------+-------+-------+-----+
| Varic | 3/9/2 | Not   | NE   | Not   |       | Not   | Not   |  |  | 999 |
| violeta  | 000   | Enter |      | Enter |       | Enter | Enter | 001   | 001   |     |
|       |       | ed    |      | ed    |       | ed    | ed    |       |       |     |
+-------+-------+-------+------+-------+-------+-------+-------+-------+-------+-----+
| Hep A |  | Not   | NE   | Not   |       | Not   | Not   |  |  | 999 |
|       | / | Enter |      | Enter |       | Enter | Enter | 001   | 001   |     |
|       |       | ed    |      | ed    |       | ed    | ed    |       |       |     |
+-------+-------+-------+------+-------+-------+-------+-------+-------+-------+-----+
| Hep A |  | Not   | NE   | Not   |       | Not   | Not   |  |  | 999 |
|       | 003   | Enter |      | Enter |       | Enter | Enter | 001   | 001   |     |
|       |       | ed    |      | ed    |       | ed    | ed    |       |       |     |
+-------+-------+-------+------+-------+-------+-------+-------+-------+-------+-----+
| Rotav | 3/23/ | Not   | NE   | Not   |       | Not   | Not   |  |  | 999 |
| irus  |   | Enter |      | Enter |       | Enter | Enter | 001   | 001   |     |
|       |       | ed    |      | ed    |       | ed    | ed    |       |       |     |
+-------+-------+-------+------+-------+-------+-------+-------+-------+-------+-----+
 
| Rotav | / | Not   | NE   | Not   |       | Not   | Not   |  |  | 999 |
| irus  |   | Enter |      | Enter |       | Enter | Enter | 001   | 001   |     |
|       |       | ed    |      | ed    |       | ed    | ed    |       |       |     |
+-------+-------+-------+------+-------+-------+-------+-------+-------+-------+-----+
| Flu   |  | Not   | NE   | Not   |       | Not   | Not   |  |  | 999 |
| 3+    | / | Enter |      | Enter |       | Enter | Enter | 001   | 001   |     |
| years |       | ed    |      | ed    |       | ed    | ed    |       |       |     |
+-------+-------+-------+------+-------+-------+-------+-------+-------+-------+-----+
| Flu   | 2/3/2 | Not   | NE   | Not   |       | Not   | Not   |  |  | 999 |
| 3+    | 005   | Enter |      | Enter |       | Enter | Enter | 001   | 001   |     |
| years |       | ed    |      | ed    |       | ed    | ed    |       |       |     |
+-------+-------+-------+------+-------+-------+-------+-------+-------+-------+-----+
| Flu   | 10/27 | Not   | NE   | Not   |       | Not   | Not   |  |  | 999 |
| 3+    |  | Enter |      | Enter |       | Enter | Enter | 001   | 001   |     |
| years |       | ed    |      | ed    |       | ed    | ed    |       |       |     |
+-------+-------+-------+------+-------+-------+-------+-------+-------+-------+-----+
| Flu   |  | CSL   | CSL  | AFLUR |  | Intra | Right |  | 8/10/ | 999 |
| 3+    | / | Bioth |      | IA    | 8     | muscu |       | / |   |     |
| years |       | erapi |      |       |       | lar   | Delto |       |       |     |
|       |       | es,   |      |       |       |       | id    |       |       |     |
|       |       | Inc.  |      |       |       |       |       |       |       |     |
+-------+-------+-------+------+-------+-------+-------+-------+-------+-------+-----+
| HPV   |  | Merck | MSD  | GARDA |  | Intra | Left  |  | / | 62  |
|       | 016   |  &    |      | DINA   | 47    | muscu | Upper | 016   |   |     |
|       |       | Co.,  |      |       |       | lar   |       |       |       |     |
|       |       | Inc.  |      |       |       |       | Delto |       |       |     |
|       |       |       |      |       |       |       | id    |       |       |     |
+-------+-------+-------+------+-------+-------+-------+-------+-------+-------+-----+
| Flu   |  | sanof | PMC  | Fluzo |  | Intra | Left  |  |  | 150 |
| 3+    | 016   | i     |      | ne    | AB    | muscu | Lower | 016   | 015   |     |
| years |       | paste |      | Quadr |       | lar   |       |       |       |     |
|       |       | ur    |      | ivale |       |       | Delto |       |       |     |
|       |       |       |      | nt    |       |       | id    |       |       |     |
+-------+-------+-------+------+-------+-------+-------+-------+-------+-------+-----+
| HPV   | / | Merck | MSD  | Garda |  | Intra | Left  | / | 3/31/ | 165 |
|       | 2016  |  &    |      | dina 9 | 37    | muscu | Delto | 2016  | 2016  |     |
|       |       | Co.,  |      |       |       | lar   | id    |       |       |     |
|       |       | Inc.  |      |       |       |       |       |       |       |     |
+-------+-------+-------+------+-------+-------+-------+-------+-------+-------+-----+
 
 
 
 History of Past Illness
 
 
+-----------------------------+---------------------+----------+
| Name                        | Date of Onset       | Comments |
+-----------------------------+---------------------+----------+
| Well Child Check            | 2010 10:26AM |          |
+-----------------------------+---------------------+----------+
| Influenza 3YR & UP          | 2010 10:26AM |          |
+-----------------------------+---------------------+----------+
| Ventricular Septal Defect   | 2010 10:26AM |          |
+-----------------------------+---------------------+----------+
| Aortic Stenosis             | 2010 10:26AM |          |
+-----------------------------+---------------------+----------+
| Patent Ductus Arteriosus    | 2010 10:26AM |          |
+-----------------------------+---------------------+----------+
| Biliary atresia             | 2010 10:26AM |          |
+-----------------------------+---------------------+----------+
 
| Ventricular Septal Defect   |                     |          |
+-----------------------------+---------------------+----------+
| Aortic stenosis             |                     |          |
+-----------------------------+---------------------+----------+
| Patent Ductus Arteriosus    |                     |          |
+-----------------------------+---------------------+----------+
| Biliary atresia             |                     |          |
+-----------------------------+---------------------+----------+
| Otitis Media, Acute         |                     |          |
+-----------------------------+---------------------+----------+
| Urinary tract infection     |                     |          |
+-----------------------------+---------------------+----------+
| Gastroenteritis             |                     |          |
+-----------------------------+---------------------+----------+
| Vision problems             |                     |          |
+-----------------------------+---------------------+----------+
| Birth defect                |                     |          |
+-----------------------------+---------------------+----------+
| Jaundice                    |                     |          |
+-----------------------------+---------------------+----------+
| Liver Transplant            | 2016          |          |
+-----------------------------+---------------------+----------+
| Aortic stenosis             | 2016          |          |
+-----------------------------+---------------------+----------+
| Hypertension                | 2016          |          |
+-----------------------------+---------------------+----------+
| Dysmenorrhea                | 2016          |          |
+-----------------------------+---------------------+----------+
| Eustachian tube dysfunction | 2016          |          |
+-----------------------------+---------------------+----------+
| Well Child Check            | 2016  1:11PM |          |
+-----------------------------+---------------------+----------+
| HPV                         | b  2016  1:11PM |          |
+-----------------------------+---------------------+----------+
| Influenza 3YR & UP          | 2016  1:11PM |          |
+-----------------------------+---------------------+----------+
| Ventricular Septal Defect   | 2016  1:11PM |          |
+-----------------------------+---------------------+----------+
| Biliary atresia             | 2016  1:11PM |          |
+-----------------------------+---------------------+----------+
| HPV 9                       | May 23 2016  1:26PM |          |
+-----------------------------+---------------------+----------+
| Otitis Media, Bilateral     | May 23 2016  1:26PM |          |
+-----------------------------+---------------------+----------+
| Ventricular Septal Defect   | May 23 2016  1:26PM |          |
+-----------------------------+---------------------+----------+
| Aortic Stenosis             | May 23 2016  1:26PM |          |
+-----------------------------+---------------------+----------+
| Biliary atresia             | May 23 2016  1:26PM |          |
+-----------------------------+---------------------+----------+
| Liver Transplant            | May 23 2016  1:26PM |          |
+-----------------------------+---------------------+----------+
| Otitis Media, Bilateral     | 2016 10:00AM |          |
+-----------------------------+---------------------+----------+
| Hypertension                | 2016 10:00AM |          |
+-----------------------------+---------------------+----------+
| Liver Transplant            | 2016 10:00AM |          |
+-----------------------------+---------------------+----------+
| Ventricular Septal Defect   | 2016 10:00AM |          |
+-----------------------------+---------------------+----------+
 
| Aortic stenosis             | 2016 10:00AM |          |
+-----------------------------+---------------------+----------+
| Asymptomatic systolic       | 2016  4:28PM |          |
| hypertension in pediatric   |                     |          |
| patient                     |                     |          |
+-----------------------------+---------------------+----------+
| Liver Transplant            | 2016  4:28PM |          |
+-----------------------------+---------------------+----------+
| Eustachian tube dysfunction | 2016  9:34AM |          |
+-----------------------------+---------------------+----------+
| Aortic stenosis             | 2016  9:34AM |          |
+-----------------------------+---------------------+----------+
| Hypertension                | 2016  9:34AM |          |
+-----------------------------+---------------------+----------+
| Liver Transplant            | 2016  9:34AM |          |
+-----------------------------+---------------------+----------+
| Ventricular Septal Defect   | 2016  9:34AM |          |
+-----------------------------+---------------------+----------+
| Patent Ductus Arteriosus    | 2016  9:34AM |          |
+-----------------------------+---------------------+----------+
| Biliary atresia             | 2016  9:34AM |          |
+-----------------------------+---------------------+----------+
| Dysmenorrhea                | 2016  9:34AM |          |
+-----------------------------+---------------------+----------+
| Ventricular Septal Defect   | 2016  1:20PM |          |
+-----------------------------+---------------------+----------+
| Aortic stenosis             | 2016  1:20PM |          |
+-----------------------------+---------------------+----------+
| Dysmenorrhea                | 2016  1:20PM |          |
+-----------------------------+---------------------+----------+
| Liver Transplant            | 2016  1:20PM |          |
+-----------------------------+---------------------+----------+
| Hypertension                | 2016  1:20PM |          |
+-----------------------------+---------------------+----------+
 
 
 
 Payers
 
 
+------------+------------+-----------+----------+------------+---------+------------+
| Insurance  | Company    | Plan Name | Plan     | Policy     | Policy  | Start Date |
| Name       | Name       |           | Number   | Number     | Group   |            |
|            |            |           |          |            | Number  |            |
+------------+------------+-----------+----------+------------+---------+------------+
|            | [employer] | [employer]     | [id number] | [id number]   |         | N/A        |
|            |            |           |          |            |         |            |
|            | Health/ohp |           |          |            |         |            |
+------------+------------+-----------+----------+------------+---------+------------+
|            | [employer]   | [employer]  |          | [id number] |         | Friday,    |
|            |            |           |          | [id number]        |         | December   |
|            |            |           |          |            |         | 2007    |
+------------+------------+-----------+----------+------------+---------+------------+
 
 
 
 History of Encounters
 
 
+------------+------------------+----------------------+
 
| Visit Date | Visit Type       | Provider             |
+------------+------------------+----------------------+
| 2016  | Office Visit     | Lily Horton MD  |
+------------+------------------+----------------------+
| 2016  | Office Visit     | Lily Horton MD  |
+------------+------------------+----------------------+
| 2016   | Acute Illness    |                      |
+------------+------------------+----------------------+
| 2016   | Acute Illness    | Lily Horton MD  |
+------------+------------------+----------------------+
| 2016  | Same Day Appt    | Lily Horton MD  |
+------------+------------------+----------------------+
| 2016   | New Patient      | Isabel BREWSTER |
+------------+------------------+----------------------+
| 2010 | Well Child Check | Lily L. Wyland MD  |
+------------+------------------+----------------------+

## 2019-11-01 NOTE — NUR
IV SITE IS INTACT, NO REDNESS OR SWELLING NOTED, FLUSHES EASILY. PT DENIES
PAIN, NAUSEA, AND SOB. PT FAMILY IS AT THE BEDSIDE. PT REPORTS FEELING HUNGRY
FOR A FEW BITES OF MASHED POTATOES.

## 2019-11-01 NOTE — XMS
Encounter Summary
  Created on: 2019
 
 Bonifacio Nila JB
 External Reference #: [id number]
 : [birth year]
 Sex: Female
 
 Demographics
 
 
+-----------------------+------------------------+
| Address               | [address]            |
|                       | [place]  [address]   |
+-----------------------+------------------------+
| Home Phone            | [phone]        |
+-----------------------+------------------------+
| Preferred Language    | Unknown                |
+-----------------------+------------------------+
| Marital Status        | Single                 |
+-----------------------+------------------------+
| Muslim Affiliation | Unknown                |
+-----------------------+------------------------+
| Race                  | White                  |
+-----------------------+------------------------+
| Ethnic Group          | Not  or  |
+-----------------------+------------------------+
 
 
 Author
 
 
+--------------+-------------+
| Organization | Unknown     |
+--------------+-------------+
| Address      | Unknown     |
+--------------+-------------+
| Phone        | Unavailable |
+--------------+-------------+
 
 
 
 Support
 
 
+-----------------+--------------+---------+-----------------+
| Name            | Relationship | Address | Phone           |
+-----------------+--------------+---------+-----------------+
| Kit Valdovinos   | ECON         | Unknown | [phone] |
+-----------------+--------------+---------+-----------------+
| Magdalena Valdovinos | ECON         | Unknown | [phone] |
+-----------------+--------------+---------+-----------------+
 
 
 
 Care Team Providers
 
 
 
+-----------------------+------+-----------------+
| Care Team Member Name | Role | Phone           |
+-----------------------+------+-----------------+
| Lily Horton MD   | PCP  | [phone] |
+-----------------------+------+-----------------+
 
 
 
 Encounter Details
 
 
+--------+-------------+------------+--------------------+-------------+
| Date   | Type        | Department | Care Team          | Description |
+--------+-------------+------------+--------------------+-------------+
| / | Transcribed |            |   Dictation, Other | Transcribed |
|    |             |            |                    |             |
+--------+-------------+------------+--------------------+-------------+
 
 
 
 Social History
 
 
+----------------+-------+-----------+--------+------+
| Tobacco Use    | Types | Packs/Day | Years  | Date |
|                |       |           | Used   |      |
+----------------+-------+-----------+--------+------+
| Never Assessed |       |           |        |      |
+----------------+-------+-----------+--------+------+
 
 
 
+------------------+---------------+
| Sex Assigned at  | Date Recorded |
| Birth            |               |
+------------------+---------------+
| Not on file      |               |
+------------------+---------------+
 
 
 
+----------------+-------------+-------------+
| Job Start Date | Occupation  | Industry    |
+----------------+-------------+-------------+
| Not on file    | Not on file | Not on file |
+----------------+-------------+-------------+
 
 
 
+----------------+--------------+------------+
| Travel History | Travel Start | Travel End |
+----------------+--------------+------------+
 
 
 
+-------------------------------------+
| No recent travel history available. |
+-------------------------------------+
 documented as of this encounter
 
 
 Progress Notes
 Interface, Transcription In - 10/29/2006  5:04 AM [facility]                                    [place] [address] [place] [place], [place] [address] [phone] or [phone]
 
 2002
 
 Lily Horton M.D.
 [address]
 [place]  [address]
 
 RE:   NILA VALDOVINOS
 MR #: [id number]
 
 Dear Dr. Horton:
 
 I had the pleasure of seeing Nila in the Pediatric Liver Transplant Clinic
 at [facility] today attended by Dr. Marcos Aguirre of the [place] [facility]
 Team for Dr. Bailey.  As you know,  she  is  a  1-year-old female who is 8
 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   She has had a relatively  uneventful  posttransplant
 course and is doing reasonably well.   The  only  clinical concern today is
 intermittent yet chronic mild diarrhea  and  intestinal  gas which produces
 abdominal discomfort and pain.  Since  the  intake  of fruit juice and milk
 products have been reduced, her diarrhea  is  better  but the gas persists.
 There is no fever, vomiting, weight loss,  or  blood  in the stool, but the
 mother is yet concerned.  No studies have been done.
 
 She is otherwise doing well.  Her present  medications include Prograf 5 mL
 b.i.d. (0.5 mg/mL), acyclovir (200 mg/5mL),  2.75  mL  (110 mg) b.i.d., and
 monthly pentamidine.  She missed the pentamidine last month, so the plan is
 to get it soon.  SHE IS ALLERGIC TO SULFA.
 
 On exam, she weighs 12.92 kg and measures  87.2  cm.   Both  parameters are
 close to the 50th percentile for age and represent normal increases.  Blood
 pressure is 107/52, pulse is 120, and  head  circumference is 50.4 cm.  She
 is slightly pale yet active, alert, and  playful.   She  has no adenopathy.
 Her abdomen is slightly distended, and  her  liver  is  several centimeters
 below the right costal margin, although  it  is soft and not enlarged.  Her
 spleen is palpable (or spleens since  she  has polysplenia), and she has an
 adequate amount of fat and muscle in her extremities.
 
 The  last  laboratory  tests were on  2000.   There  were  no
 significant abnormalities except a slightly  elevated  phosphorus  of  8.0.
 Her tacrolimus level was 7.6.  Faye-Barr  virus serology showed positive
 viral capsule antigen IgG but negative IgM, and negative EBV DNA PCR.
 
 In summary, Nila is a [id number]  white  female  who  is 8 months status
 post liver transplantation for biliary atresia who is doing quite well.  We
 need to make some changes in her posttransplant management.
 
 The first goal is to get her Prograf level down.  The first step will be to
 decrease her Prograf dose to 4 mL b.i.d.  or 4 mg a day divided b.i.d.  The
 mother will do this today.  In one week,  she  will  need  laboratory tests
 that will include a chemistry panel,  CBC,  and  Prograf  level.  Then, she
 will get labs one month later.  She needs  to  have  labs done monthly.  If
 she has normal laboratory tests for 2  months  after a reduction in Prograf
 
 dose, then another change can be made.   Her  dose should be decreased by 1
 mg a day which is 1 mL in the morning  and  1  mL at night.  The goal is to
 get her down to 1 to 2 g a day, although  3  g  a  day would be acceptable.
 The Prograf blood level goal is 3 to 5 ng/mL.
 
 Posttransplant patients usually receive  monthly  labs  for  the  first  12
 months following transplant.  During  the  second  year,  if  everything is
 going  well, they can have their laboratory  tests  drawn  every  6  weeks.
 During the third year, if all goes well,  they can have the blood test done
 every other month.  During the third and fourth years and thereafter, every
 third month labs is acceptable, again, given the provision of an uneventful
 course.
 
 When her Prograf dose is down to 3  mg  a  day  (or  3  mL b.i.d.), her EBV
 serology and DNA PCR should be checked  again.  If it is negative, then the
 acyclovir can be discontinued.
 
 Finally, again, when her Prograf dose  is  down  to  3 mg a day, she should
 receive hepatitis A and B vaccines if  they  have  not  already been given.
 Given that when the Prograf dose is  so  elevated,  often  it is associated
 with ineffectiveness, and obviously, immunosuppression  patients should not
 receive live virus vaccines such as the measles vaccine.
 
 Nila should be seen again in 6 months  when  the  transplant  team  visits
 [place] again.
 
 Please let me know if you have any questions.
 
 Sincerely,
 
 Eagle De Luna M.D.
 Pediatric Gastroenterology
 
 [facility] / 
 [id number] / [id number] / [id number] / [id number]
 D: 2002
 T: 2002
 
 cc:
 
 Cristo Bailey M.D.
 [address]
 [place]  [address]
 
 Valentin Covington M.D.
 [address]
 [place]  [address]
 
 Marcos Aguirre M.D.
 [address]
 [place]  [address]
 
 [id number] signed by Interface, Transcription In at 10/29/2006  5:04 AM PSTdocume
nted in this encounter
 
 Plan of Treatment
 Not on filedocumented as of this encounter
 
 Visit Diagnoses
 Not on filedocumented in this encounter

## 2019-11-01 NOTE — XMS
Encounter Summary
  Created on: 2019
 
 Bonifacio Nila JB
 External Reference #: [id number]
 : [birth year]
 Sex: Female
 
 Demographics
 
 
+-----------------------+------------------------+
| Address               | [address]            |
|                       | [place]  [address]   |
+-----------------------+------------------------+
| Home Phone            | [phone]        |
+-----------------------+------------------------+
| Preferred Language    | Unknown                |
+-----------------------+------------------------+
| Marital Status        | Single                 |
+-----------------------+------------------------+
| Orthodoxy Affiliation | Unknown                |
+-----------------------+------------------------+
| Race                  | White                  |
+-----------------------+------------------------+
| Ethnic Group          | Not  or  |
+-----------------------+------------------------+
 
 
 Author
 
 
+--------------+-------------+
| Organization | Unknown     |
+--------------+-------------+
| Address      | Unknown     |
+--------------+-------------+
| Phone        | Unavailable |
+--------------+-------------+
 
 
 
 Support
 
 
+-----------------+--------------+---------+-----------------+
| Name            | Relationship | Address | Phone           |
+-----------------+--------------+---------+-----------------+
| Kit Valdovinos   | ECON         | Unknown | [phone] |
+-----------------+--------------+---------+-----------------+
| Magdalena Valdovinos | ECON         | Unknown | [phone] |
+-----------------+--------------+---------+-----------------+
 
 
 
 Care Team Providers
 
 
 
+-----------------------+------+-------------+
| Care Team Member Name | Role | Phone       |
+-----------------------+------+-------------+
 PCP  | Unavailable |
+-----------------------+------+-------------+
 
 
 
 Encounter Details
 
 
+--------+-------------+------------+--------------------+-------------+
| Date   | Type        | Department | Care Team          | Description |
+--------+-------------+------------+--------------------+-------------+
| / | Transcribed |            |   Dictation, Other | Transcribed |
| 2002   |             |            |                    |             |
+--------+-------------+------------+--------------------+-------------+
 
 
 
 Social History
 
 
+----------------+-------+-----------+--------+------+
| Tobacco Use    | Types | Packs/Day | Years  | Date |
|                |       |           | Used   |      |
+----------------+-------+-----------+--------+------+
| Never Assessed |       |           |        |      |
+----------------+-------+-----------+--------+------+
 
 
 
+------------------+---------------+
| Sex Assigned at  | Date Recorded |
| Birth            |               |
+------------------+---------------+
| Not on file      |               |
+------------------+---------------+
 
 
 
+----------------+-------------+-------------+
| Job Start Date | Occupation  | Industry    |
+----------------+-------------+-------------+
| Not on file    | Not on file | Not on file |
+----------------+-------------+-------------+
 
 
 
+----------------+--------------+------------+
| Travel History | Travel Start | Travel End |
+----------------+--------------+------------+
 
 
 
+-------------------------------------+
| No recent travel history available. |
+-------------------------------------+
 documented as of this encounter
 
 
 Progress Notes
 Interface, Transcription In - 2006  3:05 AM [facility]                                    [place] [address] [place], [place] [address] [phone] or [phone]
 
 2002
 
 Lily Horton M.D.
 [address]
 [place]  [address]
 
 RE:   NILA VALDOVINOS
 MR #: [id number]
 
 Dear Dr. Horton:
 
 I had the pleasure of seeing Nila back  in  the Pediatric Liver Transplant
 Clinic  at [facility] staffed by Dr.Bill Mcrae  and  Dr. Jennifer Webb  from
 [place].  This is a routine followup.   Nila  continues  to do quite well
 with no symptoms.  She is gaining weight well and developing normally.  She
 is presently on Prograf 2 mL (0.5 mg/mL) b.i.d.
 
 Past medical history is significant for an allergy to sulfa medication.  In
 addition, she has a very small VSD that  is  followed  by  Dr. Baljinder Diaz,
 pediatric cardiologist.  The VSD certainly  does  not seem to be giving her
 any trouble and Dr. Diaz does not want to see her for quite sometime.
 
 On examination, she weighs 15.2 kg and  measures  95 cm.  Blood pressure is
 105/58 and heart rate is 119.  She is  anicteric.   She  has large tonsils,
 but  they  are  normal  in  appearance   and   symmetrical.   There  is  no
 lymphadenopathy of the neck or axilla.   She has normal shotty nodes in the
 inguinal areas.  She has unlabored respirations.   Her  abdomen  is benign.
 There is no mass, tenderness, or organomegaly.   She has no clubbing of the
 digits.
 
 Recent  laboratory tests from 2002,  show  normal  electrolytes,
 serum protein, transaminases, bilirubin, and GGT.  Specifically, the GGT is
 11, AST is 27, ALT is 14, bilirubin 0.5, and albumin 4.2.  Her magnesium is
 1.8.  The CBC is normal.  Her tacrolimus level is pending.
 
 Laboratory tests from 2001,  show  a  positive  IgG EBV capsid
 antibody and negative IgM EBV capsid antibody.   The qualitative EBV PCR is
 positive.  A year ago, the serology was  negative  for  EBV.  Thus, she has
 acquired EBV within the last year.  Her  CMV  serology  is negative and the
 CMV PCR is negative.
 
 IMPRESSION:  In summary, Nila is a 2-year-old  white  female  who is 1-1/2
 year  status post liver transplant who  is  doing  quite  well.   Her  last
 tacrolimus level was mildly elevated  at  11.5  on  2001.  Her
 dose was decreased from 3 mL b.i.d. to 2 mL b.i.d. at that point.  Her goal
 Prograf level now is about 3.  Depending  on  her pending Prograf level, we
 may drop her dose to 1.5 mL b.i.d. which  is  closer  to 0.1 mg/kg per day.
 If we do drop the dose, she will need another Prograf level a week later to
 document an appropriate level.
 
 Because she has converted to EBV positivity,  she  needs  to go back on the
 acyclovir.  The dose will be 10mg/kg per dose given q.i.d.  The solution is
 
 40 mg/cc, so she will receive 150 mg  q.i.d.  of  acyclovir until she has 2
 negative EBV PCRs.  She is to repeat  her  EBV  serology  in  May 2002, and
 again in 2002.  We will try to  order the quantitative EBV PCRs next
 time.
 
 She will return back to this clinic in  the  fall for routine followup.  If
 everything is relatively stable at that point, perhaps she can go to yearly
 followup with the Transplant team.
 
 Sincerely,
 
 Eagle De Luna M.D.
 Pediatric Gastroenterology
 
 [facility] / 
 [id number] / [id number] / [id number] /
 D: 2002
 T: 2002
 
 cc:
 
 Cristo Bailey M.D.
 [address]
 [place]  [address]
 
 Simeon Covington M.D.
 [address]
 [place]  [address]
 
 Patel Mcrae M.D.
 [address]
 [place]  [address]
 
 [id number] signed by Interface, Transcription In at [facility]ed in this encounter
 
 Plan of Treatment
 Not on filedocumented as of this encounter
 
 Visit Diagnoses
 Not on filedocumented in this encounter

## 2019-11-01 NOTE — XMS
Encounter Summary
  Created on: 2019
 
 Bonifacio Nila JB
 External Reference #: [id number]
 : [birth year]
 Sex: Female
 
 Demographics
 
 
+-----------------------+------------------------+
| Address               | [address]            |
|                       | [place]  [address]   |
+-----------------------+------------------------+
| Home Phone            | [phone]        |
+-----------------------+------------------------+
| Preferred Language    | Unknown                |
+-----------------------+------------------------+
| Marital Status        | Single                 |
+-----------------------+------------------------+
| Shinto Affiliation | Unknown                |
+-----------------------+------------------------+
| Race                  | White                  |
+-----------------------+------------------------+
| Ethnic Group          | Not  or  |
+-----------------------+------------------------+
 
 
 Author
 
 
+--------------+------------------------------+
| Author       | [facility] |
+--------------+------------------------------+
| Organization | [facility] |
+--------------+------------------------------+
| Address      | Unknown                      |
+--------------+------------------------------+
| Phone        | Unavailable                  |
+--------------+------------------------------+
 
 
 
 Support
 
 
+-----------------+--------------+---------+-----------------+
| Name            | Relationship | Address | Phone           |
+-----------------+--------------+---------+-----------------+
| Kit Valdovinos   | ECON         | Unknown | [phone] |
+-----------------+--------------+---------+-----------------+
| Magdalena Valdovinos | ECON         | Unknown | [phone] |
+-----------------+--------------+---------+-----------------+
 
 
 
 Care Team Providers
 
 
 
+------------------------+------+-----------------+
| Care Team Member Name  | Role | Phone           |
+------------------------+------+-----------------+
| Lily Horton MD | PCP  | [phone] |
+------------------------+------+-----------------+
 
 
 
 Encounter Details
 
 
+--------+----------+----------------------+----------------------+-------------+
| Date   | Type     | Department           | Care Team            | Description |
+--------+----------+----------------------+----------------------+-------------+
| / | Abstract |   Pediatric          |   Neema Khalil,   |             |
|    |          | Gastroenterology at  | MD  [address] |             |
|        |          | [place]          |   [place], [place]       |             |
|        |          | [facility]  | [address]           |             |
|        |          |  [address] | [phone]         |             |
|        |          |  [address]  Mailcode:  | [phone] (Fax)   |             |
|        |          |   [place]   |                      |             |
|        |          | [place], [place]         |                      |             |
|        |          | [address]           |                      |             |
|        |          | [phone]         |                      |             |
+--------+----------+----------------------+----------------------+-------------+
 
 
 
 Social History
 
 
+-------------------+-------+-----------+--------+------+
| Tobacco Use       | Types | Packs/Day | Years  | Date |
|                   |       |           | Used   |      |
+-------------------+-------+-----------+--------+------+
| Passive Smoke     |       |           |        |      |
| Exposure - Never  |       |           |        |      |
| Smoker            |       |           |        |      |
+-------------------+-------+-----------+--------+------+
 
 
 
+---------------------+---+---+---+
| Smokeless Tobacco:  |   |   |   |
| Never Used          |   |   |   |
+---------------------+---+---+---+
 
 
 
+-------------+-------------+---------+----------+
| Alcohol Use | Drinks/Week | oz/Week | Comments |
+-------------+-------------+---------+----------+
| Not Asked   |             |         |          |
+-------------+-------------+---------+----------+
 
 
 
+------------------+---------------+
 
| Sex Assigned at  | Date Recorded |
| Birth            |               |
+------------------+---------------+
| Not on file      |               |
+------------------+---------------+
 
 
 
+----------------+-------------+-------------+
| Job Start Date | Occupation  | Industry    |
+----------------+-------------+-------------+
| Not on file    | Not on file | Not on file |
+----------------+-------------+-------------+
 
 
 
+----------------+--------------+------------+
| Travel History | Travel Start | Travel End |
+----------------+--------------+------------+
 
 
 
+-------------------------------------+
| No recent travel history available. |
+-------------------------------------+
 documented as of this encounter
 
 Plan of Treatment
 Not on filedocumented as of this encounter
 
 Procedures
 
 
+----------------------+--------+-------------+----------------------+----------------------
+
| Procedure Name       | Priori | Date/Time   | Associated Diagnosis | Comments             
|
|                      | ty     |             |                      |                      
|
+----------------------+--------+-------------+----------------------+----------------------
+
| CHOLESTEROL, TOTAL   | Routin | 2015  |                      |   Results for this   
|
| (EXTERNAL RESULTS    | e      |  3:11 PM    |                      | procedure are in the 
|
| ONLY)                |        | PST         |                      |  results section.    
|
+----------------------+--------+-------------+----------------------+----------------------
+
| CBC, WITH            | Routin | 2015  |                      |   Results for this   
|
| DIFFERENTIAL         | e      |  3:11 PM    |                      | procedure are in the 
|
|                      |        | PST         |                      |  results section.    
|
+----------------------+--------+-------------+----------------------+----------------------
+
| COMPLETE METABOLIC   | Routin | 2015  |                      |   Results for this   
|
| SET                  | e      |  3:11 PM    |                      | procedure are in the 
 
|
| (NA,K,CL,CO2,BUN,CRE |        | PST         |                      |  results section.    
|
| AT,GLUC,CA,AST,ALT,B |        |             |                      |                      
|
| ASHWINI TOTAL,ALK        |        |             |                      |                      
|
| PHOS,ALB,PROT TOTAL) |        |             |                      |                      
|
+----------------------+--------+-------------+----------------------+----------------------
+
| PHOSPHORUS, PLASMA   | Routin | 2015  |                      |   Results for this   
|
|                      | e      |  3:11 PM    |                      | procedure are in the 
|
|                      |        | PST         |                      |  results section.    
|
+----------------------+--------+-------------+----------------------+----------------------
+
| GGT, PLASMA          | Routin | 2015  |                      |   Results for this   
|
|                      | e      |  3:11 PM    |                      | procedure are in the 
|
|                      |        | PST         |                      |  results section.    
|
+----------------------+--------+-------------+----------------------+----------------------
+
| URIC ACID, PLASMA    | Routin | 2015  |                      |   Results for this   
|
|                      | e      |  3:11 PM    |                      | procedure are in the 
|
|                      |        | PST         |                      |  results section.    
|
+----------------------+--------+-------------+----------------------+----------------------
+
| MAGNESIUM, PLASMA    | Routin | 2015  |                      |   Results for this   
|
|                      | e      |  3:11 PM    |                      | procedure are in the 
|
|                      |        | PST         |                      |  results section.    
|
+----------------------+--------+-------------+----------------------+----------------------
+
| TRIGLYCERIDES,       | Routin | 2015  |                      |   Results for this   
|
| PLASMA               | e      |  3:11 PM    |                      | procedure are in the 
|
|                      |        | PST         |                      |  results section.    
|
+----------------------+--------+-------------+----------------------+----------------------
+
| LDH TOTAL, PLASMA    | Routin | 2015  |                      |   Results for this   
|
|                      | e      |  3:11 PM    |                      | procedure are in the 
|
|                      |        | PST         |                      |  results section.    
|
+----------------------+--------+-------------+----------------------+----------------------
+
 documented in this encounter
 
 
 Results
 CBC, WITH DIFFERENTIAL (2015  3:11 PM PST)
 
+-------------+----------+-----------------+------------+--------------+
| Component   | Value    | Ref Range       | Performed  | Pathologist  |
|             |          |                 | At         | Signature    |
+-------------+----------+-----------------+------------+--------------+
| WHITE CELL  | 10.7     | 4.5 - 11.0 K/cu | [facility]  |              |
| COUNT       |          |  mm             | LAB -      |              |
|             |          |                 | [place]  |              |
+-------------+----------+-----------------+------------+--------------+
| RED CELL    | 4.62     | 3.8 - 5.1 M/cu  | [facility]  |              |
| COUNT       |          | mm              | LAB -      |              |
|             |          |                 | [place]  |              |
+-------------+----------+-----------------+------------+--------------+
| HEMOGLOBIN  | 14.3     | 12 - 16 g/dL    | [facility]  |              |
|             |          |                 | LAB -      |              |
|             |          |                 | [place]  |              |
+-------------+----------+-----------------+------------+--------------+
| HEMATOCRIT  | 41.4     | 35 - 45 %       | [facility]  |              |
|             |          |                 | LAB -      |              |
|             |          |                 | [place]  |              |
+-------------+----------+-----------------+------------+--------------+
| MCV         | 89.5     | 81 - 99 fL      | [facility]  |              |
|             |          |                 | LAB -      |              |
|             |          |                 | [place]  |              |
+-------------+----------+-----------------+------------+--------------+
| MCH         | 31       | 27 - 33 pg      | [facility]  |              |
|             |          |                 | LAB -      |              |
|             |          |                 | [place]  |              |
+-------------+----------+-----------------+------------+--------------+
| MCHC        | 35       | 30 - 36 g/dL    | [facility]  |              |
|             |          |                 | LAB -      |              |
|             |          |                 | [place]  |              |
+-------------+----------+-----------------+------------+--------------+
| PLATELET    | 370      | 140 - 440 K/cu  | [facility]  |              |
| COUNT       |          | mm              | LAB -      |              |
|             |          |                 | [place]  |              |
+-------------+----------+-----------------+------------+--------------+
| NEUTROPHIL  | 70.4     | 39 - 80 %       | [facility]  |              |
| %           |          |                 | LAB -      |              |
|             |          |                 | [place]  |              |
+-------------+----------+-----------------+------------+--------------+
| LYMPHOCYTE  | 20.5 (A) | 24 - 44 %       | [facility]  |              |
| %           |          |                 | LAB -      |              |
|             |          |                 | [place]  |              |
+-------------+----------+-----------------+------------+--------------+
| MONOCYTE %  | 6.7      | 0 - 12 %        | [facility]  |              |
|             |          |                 | LAB -      |              |
|             |          |                 | [place]  |              |
+-------------+----------+-----------------+------------+--------------+
| EOS %       | 2.1      | 0 - 6 %         | [facility]  |              |
|             |          |                 | LAB -      |              |
|             |          |                 | [place]  |              |
+-------------+----------+-----------------+------------+--------------+
| BASO %      | 0.6      | 0 - 2 %         | [facility]  |              |
|             |          |                 | LAB -      |              |
|             |          |                 | [place]  |              |
+-------------+----------+-----------------+------------+--------------+
 
| RDW         | 13       | 10.5 - 15 %     | [facility]  |              |
|             |          |                 | LAB -      |              |
|             |          |                 | [place]  |              |
+-------------+----------+-----------------+------------+--------------+
 
 
 
+---------------+
| Specimen      |
+---------------+
| Blood - Blood |
+---------------+
 
 
 
+--------------------+----------------------+--------------------+----------------+
| Performing         | Address              | City/State/Zipcode | Phone Number   |
| Organization       |                      |                    |                |
+--------------------+----------------------+--------------------+----------------+
|   [facility]  |   [address] | [place], [place]      |   [phone] |
| [place]          |                      |                    |                |
+--------------------+----------------------+--------------------+----------------+
 MAGNESIUM, PLASMA (2015  3:11 PM PST)
 
+-----------+-------+-----------------+------------+--------------+
| Component | Value | Ref Range       | Performed  | Pathologist  |
|           |       |                 | At         | Signature    |
+-----------+-------+-----------------+------------+--------------+
| MAGNESIUM | 1.8   | 1.7 - 2.5 mg/dL | [facility]  |              |
|           |       |                 | LAB -      |              |
|           |       |                 | [place]  |              |
+-----------+-------+-----------------+------------+--------------+
 
 
 
+---------------+
| Specimen      |
+---------------+
| Blood - Blood |
+---------------+
 
 
 
+--------------------+----------------------+--------------------+----------------+
| Performing         | Address              | City/State/Zipcode | Phone Number   |
| Organization       |                      |                    |                |
+--------------------+----------------------+--------------------+----------------+
|   [facility]  |   [address] | [place]      |   [phone] |
| [place]          |                      |                    |                |
+--------------------+----------------------+--------------------+----------------+
 GGT, PLASMA (2015  3:11 PM PST)
 
+-----------+-------+------------+------------+--------------+
| Component | Value | Ref Range  | Performed  | Pathologist  |
|           |       |            | At         | Signature    |
+-----------+-------+------------+------------+--------------+
| GAMMA     | 7     | 5 - 60 U/L | [facility]  |              |
| GLUTAMYL  |       |            | LAB -      |              |
| TRANS     |       |            | [place]  |              |
+-----------+-------+------------+------------+--------------+
 
 
 
 
+---------------+
| Specimen      |
+---------------+
| Blood - Blood |
+---------------+
 
 
 
+--------------------+----------------------+--------------------+----------------+
| Performing         | Address              | City/State/Zipcode | Phone Number   |
| Organization       |                      |                    |                |
+--------------------+----------------------+--------------------+----------------+
|   [facility]  |   [address] | [place]      |   [phone] |
| [place]          |                      |                    |                |
+--------------------+----------------------+--------------------+----------------+
 COMPLETE METABOLIC SET (NA,K,CL,CO2,BUN,CREAT,GLUC,CA,AST,ALT,BILI TOTAL,ALK PHOS,ALB,PROT 
TOTAL) (2015  3:11 PM PST)
 
+-------------+--------+-----------------+------------+--------------+
| Component   | Value  | Ref Range       | Performed  | Pathologist  |
|             |        |                 | At         | Signature    |
+-------------+--------+-----------------+------------+--------------+
| GLUCOSE,    | 77     | 70 - 100 mg/dL  | [facility]  |              |
| PLASMA      |        |                 | LAB -      |              |
| (LAB)       |        |                 | [place]  |              |
+-------------+--------+-----------------+------------+--------------+
| BUN, PLASMA | 14     | 6 - 23 mg/dL    | [facility]  |              |
|  (LAB)      |        |                 | LAB -      |              |
|             |        |                 | [place]  |              |
+-------------+--------+-----------------+------------+--------------+
| CREATININE  | 0.66   | 0.60 - 1.20     | [facility]  |              |
| PLASMA      |        | mg/dL           | LAB -      |              |
| (LAB)       |        |                 | [place]  |              |
+-------------+--------+-----------------+------------+--------------+
| TOTAL       | 7.4    | 6.1 - 8.5 g/dL  | [facility]  |              |
| PROTEIN,    |        |                 | LAB -      |              |
| PLASMA      |        |                 | [place]  |              |
| (LAB)       |        |                 |            |              |
+-------------+--------+-----------------+------------+--------------+
| ALBUMIN,    | 4.2    | 3.5 - 5.0 g/dL  | [facility]  |              |
| PLASMA      |        |                 | LAB -      |              |
| (LAB)       |        |                 | [place]  |              |
+-------------+--------+-----------------+------------+--------------+
| CALCIUM,    | 9.9    | 8.4 - 10.2      | [facility]  |              |
| PLASMA      |        | mg/dL           | LAB -      |              |
| (LAB)       |        |                 | [place]  |              |
+-------------+--------+-----------------+------------+--------------+
| BILIRUBIN   | 0.3    | 0 - 1.2         | [facility]  |              |
| TOTAL       |        | Transcutaneous  | LAB -      |              |
|             |        | Bilirubinometer | [place]  |              |
+-------------+--------+-----------------+------------+--------------+
| ALK PHOS    | 82     | 30 - 128 U/L    | [facility]  |              |
|             |        |                 | LAB -      |              |
|             |        |                 | [place]  |              |
+-------------+--------+-----------------+------------+--------------+
| AST(SGOT)   | 12 (A) | 13 - 39 U/L     | [facility]  |              |
|             |        |                 | LAB -      |              |
 
|             |        |                 | [place]  |              |
+-------------+--------+-----------------+------------+--------------+
| SODIUM,     | 139    | 132 - 143       | [facility]  |              |
| PLASMA      |        | mmol/L          | LAB -      |              |
| (LAB)       |        |                 | [place]  |              |
+-------------+--------+-----------------+------------+--------------+
| POTASSIUM,  | 4.2    | 3.6 - 5.1       | [facility]  |              |
| PLASMA      |        | mmol/L          | LAB -      |              |
| (LAB)       |        |                 | [place]  |              |
+-------------+--------+-----------------+------------+--------------+
| CHLORIDE,   | 102    | 95 - 112 mmol/L | [facility]  |              |
| PLASMA      |        |                 | LAB -      |              |
| (LAB)       |        |                 | [place]  |              |
+-------------+--------+-----------------+------------+--------------+
| TOTAL CO2,  | 28     | 19 - 31 mmol/L  | [facility]  |              |
| PLASMA      |        |                 | LAB -      |              |
| (LAB)       |        |                 | [place]  |              |
+-------------+--------+-----------------+------------+--------------+
| ALT (SGPT)  | 10     | 7 - 52 U/L      | [facility]  |              |
|             |        |                 | LAB -      |              |
|             |        |                 | [place]  |              |
+-------------+--------+-----------------+------------+--------------+
| ANION GAP   | 13.2   | 7 - 21          | [facility]  |              |
|             |        |                 | LAB -      |              |
|             |        |                 | [place]  |              |
+-------------+--------+-----------------+------------+--------------+
| BUN/CREATIN | 21.2   | 6.0 - 28.6      | [facility]  |              |
| INE RATIO   |        |                 | LAB -      |              |
|             |        |                 | [place]  |              |
+-------------+--------+-----------------+------------+--------------+
| GLOBULIN    | 3.2    | 1.8 - 3.5       | [facility]  |              |
|             |        |                 | LAB -      |              |
|             |        |                 | [place]  |              |
+-------------+--------+-----------------+------------+--------------+
| A/G RATIO   | 1.3    | 1.1 - 2.4       | [facility]  |              |
|             |        |                 | LAB -      |              |
|             |        |                 | [place]  |              |
+-------------+--------+-----------------+------------+--------------+
| BILIRUBIN   | 0.1    | 0.0 - 0.1 mg/dL | [facility]  |              |
| DIRECT      |        |                 | LAB -      |              |
|             |        |                 | [place]  |              |
+-------------+--------+-----------------+------------+--------------+
 
 
 
+---------------+
| Specimen      |
+---------------+
| Blood - Blood |
+---------------+
 
 
 
+--------------------+----------------------+--------------------+----------------+
| Performing         | Address              | City/State/Zipcode | Phone Number   |
| Organization       |                      |                    |                |
+--------------------+----------------------+--------------------+----------------+
|   [facility]  |   [address] | [place]      |   [phone] |
| [place]          |                      |                    |                |
+--------------------+----------------------+--------------------+----------------+
 
 TRIGLYCERIDES, PLASMA (2015  3:11 PM PST)
 
+-------------+-------+----------------+------------+--------------+
| Component   | Value | Ref Range      | Performed  | Pathologist  |
|             |       |                | At         | Signature    |
+-------------+-------+----------------+------------+--------------+
| TRIGLYCERID | 86    | 30 - 150 mg/dL | [facility]  |              |
| ES          |       |                | LAB -      |              |
|             |       |                | [place]  |              |
+-------------+-------+----------------+------------+--------------+
 
 
 
+---------------+
| Specimen      |
+---------------+
| Blood - Blood |
+---------------+
 
 
 
+--------------------+----------------------+--------------------+----------------+
| Performing         | Address              | City/State/Zipcode | Phone Number   |
| Organization       |                      |                    |                |
+--------------------+----------------------+--------------------+----------------+
|   [facility]  |   [address] | [place], [place]      |   [phone] |
| [place]          |                      |                    |                |
+--------------------+----------------------+--------------------+----------------+
 LDH TOTAL, PLASMA (2015  3:11 PM PST)
 
+-------------+-------+------------+------------+--------------+
| Component   | Value | Ref Range  | Performed  | Pathologist  |
|             |       |            | At         | Signature    |
+-------------+-------+------------+------------+--------------+
| LDH (TOTAL) | 129   | 100 - 215  | [facility]  |              |
|             |       | Units/L    | LAB -      |              |
|             |       |            | [place]  |              |
+-------------+-------+------------+------------+--------------+
 
 
 
+---------------+
| Specimen      |
+---------------+
| Blood - Blood |
+---------------+
 
 
 
+--------------------+----------------------+--------------------+----------------+
| Performing         | Address              | City/State/Zipcode | Phone Number   |
| Organization       |                      |                    |                |
+--------------------+----------------------+--------------------+----------------+
|   [facility]  |   [address] | [place], [place]      |   [phone] |
| [place]          |                      |                    |                |
+--------------------+----------------------+--------------------+----------------+
 CHOLESTEROL, TOTAL (EXTERNAL RESULTS ONLY) (2015  3:11 PM PST)
 
+-------------+-------+-----------+------------+--------------+
| Component   | Value | Ref Range | Performed  | Pathologist  |
 
|             |       |           | At         | Signature    |
+-------------+-------+-----------+------------+--------------+
| CHOLESTEROL | 129   | 200 mg/dL | [facility]  |              |
|   (LAB)     |       |           | LAB -      |              |
|             |       |           | [place]  |              |
+-------------+-------+-----------+------------+--------------+
 
 
 
+----------+
| Specimen |
+----------+
| Blood    |
+----------+
 
 
 
+--------------------+----------------------+--------------------+----------------+
| Performing         | Address              | City/State/Zipcode | Phone Number   |
| Organization       |                      |                    |                |
+--------------------+----------------------+--------------------+----------------+
|   [facility]  |   [address] | [place]      |   [phone] |
| [place]          |                      |                    |                |
+--------------------+----------------------+--------------------+----------------+
 URIC ACID, PLASMA (2015  3:11 PM PST)
 
+-------------+-------+-----------------+------------+--------------+
| Component   | Value | Ref Range       | Performed  | Pathologist  |
|             |       |                 | At         | Signature    |
+-------------+-------+-----------------+------------+--------------+
| URIC ACID,  | 3.6   | 2.3 - 6.6 mg/dL | [facility]  |              |
| PLASMA      |       |                 | LAB -      |              |
| (LAB)       |       |                 | [place]  |              |
+-------------+-------+-----------------+------------+--------------+
 
 
 
+---------------+
| Specimen      |
+---------------+
| Blood - Blood |
+---------------+
 
 
 
+--------------------+----------------------+--------------------+----------------+
| Performing         | Address              | City/State/Zipcode | Phone Number   |
| Organization       |                      |                    |                |
+--------------------+----------------------+--------------------+----------------+
|   [facility]  |   [address] | [place], [place]      |   [phone] |
| [place]          |                      |                    |                |
+--------------------+----------------------+--------------------+----------------+
 PHOSPHORUS, PLASMA (2015  3:11 PM PST)
 
+-------------+-------+-----------------+------------+--------------+
| Component   | Value | Ref Range       | Performed  | Pathologist  |
|             |       |                 | At         | Signature    |
+-------------+-------+-----------------+------------+--------------+
| PHOSPHORUS, | 3.9   | 2.5 - 5.0 mg/dL | [facility]  |              |
|  PLASMA     |       |                 | LAB -      |              |
 
| (LAB)       |       |                 | [place]  |              |
+-------------+-------+-----------------+------------+--------------+
 
 
 
+---------------+
| Specimen      |
+---------------+
| Blood - Blood |
+---------------+
 
 
 
+--------------------+----------------------+--------------------+----------------+
| Performing         | Address              | City/State/Zipcode | Phone Number   |
| Organization       |                      |                    |                |
+--------------------+----------------------+--------------------+----------------+
|   [facility]  |   [address] | [place]      |   [phone] |
| [place]          |                      |                    |                |
+--------------------+----------------------+--------------------+----------------+
 documented in this encounter
 
 Visit Diagnoses
 Not on filedocumented in this encounter

## 2019-11-01 NOTE — XMS
Encounter Summary
  Created on: 2019
 
 Bonifacio Nila JB
 External Reference #: [id number]
 : [birth year]
 Sex: Female
 
 Demographics
 
 
+-----------------------+------------------------+
| Address               | [address]            |
|                       | [place]  [address]   |
+-----------------------+------------------------+
| Home Phone            | [phone]        |
+-----------------------+------------------------+
| Preferred Language    | Unknown                |
+-----------------------+------------------------+
| Marital Status        | Single                 |
+-----------------------+------------------------+
| Yarsanism Affiliation | Unknown                |
+-----------------------+------------------------+
| Race                  | White                  |
+-----------------------+------------------------+
| Ethnic Group          | Not  or  |
+-----------------------+------------------------+
 
 
 Author
 
 
+--------------+------------------------------+
| Author       | [facility] [facility] |
+--------------+------------------------------+
| Organization | [facility] |
+--------------+------------------------------+
| Address      | Unknown                      |
+--------------+------------------------------+
| Phone        | Unavailable                  |
+--------------+------------------------------+
 
 
 
 Support
 
 
+-----------------+--------------+---------+-----------------+
| Name            | Relationship | Address | Phone           |
+-----------------+--------------+---------+-----------------+
| Kit Valdovinos   | ECON         | Unknown | [phone] |
+-----------------+--------------+---------+-----------------+
| Magdalena Valdovinos | ECON         | Unknown | [phone] |
+-----------------+--------------+---------+-----------------+
 
 
 
 Care Team Providers
 
 
 
+-----------------------+------+-----------------+
| Care Team Member Name | Role | Phone           |
+-----------------------+------+-----------------+
| Lily Horton MD   | PCP  | [phone] |
+-----------------------+------+-----------------+
 
 
 
 Reason for Visit
 
 
+----------------+----------+
| Reason         | Comments |
+----------------+----------+
| Refill Request |          |
+----------------+----------+
 
 
 
 Encounter Details
 
 
+--------+--------+----------------------+---------------------+----------------+
| Date   | Type   | Department           | Care Team           | Description    |
+--------+--------+----------------------+---------------------+----------------+
| / | Refill |   Pediatric          |   Monserrat Spann,  | Refill Request |
|    |        | Gastroenterology at  | RN  [address]     |                |
|        |        | [place]          | [address]     |                |
|        |        | [facility]  | [place] [address]  |                |
|        |        |  [address] |                     |                |
|        |        |  [address]  Mailcode:  |                     |                |
|        |        | [facility]  [place]   |                     |                |
|        |        | [place], [place]         |                     |                |
|        |        | [address]           |                     |                |
|        |        | [phone]         |                     |                |
+--------+--------+----------------------+---------------------+----------------+
 
 
 
 Social History
 
 
+----------------+-------+-----------+--------+------+
| Tobacco Use    | Types | Packs/Day | Years  | Date |
|                |       |           | Used   |      |
+----------------+-------+-----------+--------+------+
| Never Assessed |       |           |        |      |
+----------------+-------+-----------+--------+------+
 
 
 
+------------------+---------------+
| Sex Assigned at  | Date Recorded |
| Birth            |               |
+------------------+---------------+
| Not on file      |               |
+------------------+---------------+
 
 
 
 
+----------------+-------------+-------------+
| Job Start Date | Occupation  | Industry    |
+----------------+-------------+-------------+
| Not on file    | Not on file | Not on file |
+----------------+-------------+-------------+
 
 
 
+----------------+--------------+------------+
| Travel History | Travel Start | Travel End |
+----------------+--------------+------------+
 
 
 
+-------------------------------------+
| No recent travel history available. |
+-------------------------------------+
 documented as of this encounter
 
 Plan of Treatment
 Not on filedocumented as of this encounter
 
 Visit Diagnoses
 Not on filedocumented in this encounter

## 2019-11-01 NOTE — XMS
Encounter Summary
  Created on: 2019
 
 Bonifacio Nila JB
 External Reference #: [id number]
 : [birth year]
 Sex: Female
 
 Demographics
 
 
+-----------------------+------------------------+
| Address               | [address]            |
|                       | [place]  [address]   |
+-----------------------+------------------------+
| Home Phone            | [phone]        |
+-----------------------+------------------------+
| Preferred Language    | Unknown                |
+-----------------------+------------------------+
| Marital Status        | Single                 |
+-----------------------+------------------------+
| Sabianist Affiliation | Unknown                |
+-----------------------+------------------------+
| Race                  | White                  |
+-----------------------+------------------------+
| Ethnic Group          | Not  or  |
+-----------------------+------------------------+
 
 
 Author
 
 
+--------------+------------------------------+
| Author       | [facility] [facility] |
+--------------+------------------------------+
| Organization | [facility] |
+--------------+------------------------------+
| Address      | Unknown                      |
+--------------+------------------------------+
| Phone        | Unavailable                  |
+--------------+------------------------------+
 
 
 
 Support
 
 
+-----------------+--------------+---------+-----------------+
| Name            | Relationship | Address | Phone           |
+-----------------+--------------+---------+-----------------+
| Kit Valdovinos   | ECON         | Unknown | [phone] |
+-----------------+--------------+---------+-----------------+
| Magdalena Valdovinos | ECON         | Unknown | [phone] |
+-----------------+--------------+---------+-----------------+
 
 
 
 Care Team Providers
 
 
 
+------------------------+------+-----------------+
| Care Team Member Name  | Role | Phone           |
+------------------------+------+-----------------+
| Lily Horton MD | PCP  | [phone] |
+------------------------+------+-----------------+
 
 
 
 Reason for Visit
 
 
+-------------+----------+
| Reason      | Comments |
+-------------+----------+
| Lab Results |          |
+-------------+----------+
 
 
 
 Encounter Details
 
 
+--------+-----------+----------------------+----------------------+-------------+
| Date   | Type      | Department           | Care Team            | Description |
+--------+-----------+----------------------+----------------------+-------------+
| / | Telephone |   Pediatric          |   Neema Khalil,   | Lab Results |
| 2016   |           | Gastroenterology at  | MD  [address] |             |
|        |           | [place]          |   [place], [place]       |             |
|        |           | [facility]  | [address]           |             |
|        |           |  [address] | [phone]         |             |
|        |           |  [address]  Mailcode:  | [phone] (Fax)   |             |
|        |           | [facility]   |                      |             |
|        |           | [place], [place]         |                      |             |
|        |           | [address]           |                      |             |
|        |           | [phone]         |                      |             |
+--------+-----------+----------------------+----------------------+-------------+
 
 
 
 Social History
 
 
+-------------------+-------+-----------+--------+------+
| Tobacco Use       | Types | Packs/Day | Years  | Date |
|                   |       |           | Used   |      |
+-------------------+-------+-----------+--------+------+
| Passive Smoke     |       |           |        |      |
| Exposure - Never  |       |           |        |      |
| Smoker            |       |           |        |      |
+-------------------+-------+-----------+--------+------+
 
 
 
+---------------------+---+---+---+
| Smokeless Tobacco:  |   |   |   |
| Never Used          |   |   |   |
+---------------------+---+---+---+
 
 
 
 
+-------------+-------------+---------+----------+
| Alcohol Use | Drinks/Week | oz/Week | Comments |
+-------------+-------------+---------+----------+
| Not Asked   |             |         |          |
+-------------+-------------+---------+----------+
 
 
 
+------------------+---------------+
| Sex Assigned at  | Date Recorded |
| Birth            |               |
+------------------+---------------+
| Not on file      |               |
+------------------+---------------+
 
 
 
+----------------+-------------+-------------+
| Job Start Date | Occupation  | Industry    |
+----------------+-------------+-------------+
| Not on file    | Not on file | Not on file |
+----------------+-------------+-------------+
 
 
 
+----------------+--------------+------------+
| Travel History | Travel Start | Travel End |
+----------------+--------------+------------+
 
 
 
+-------------------------------------+
| No recent travel history available. |
+-------------------------------------+
 documented as of this encounter
 
 Plan of Treatment
 Not on filedocumented as of this encounter
 
 Visit Diagnoses
 Not on filedocumented in this encounter

## 2019-11-01 NOTE — XMS
Encounter Summary
  Created on: 2019
 
 Bonifacio Nila JB
 External Reference #: [id number]
 : [birth year]
 Sex: Female
 
 Demographics
 
 
+-----------------------+------------------------+
| Address               | [address]            |
|                       | [place]  [address]   |
+-----------------------+------------------------+
| Home Phone            | [phone]        |
+-----------------------+------------------------+
| Preferred Language    | Unknown                |
+-----------------------+------------------------+
| Marital Status        | Single                 |
+-----------------------+------------------------+
| Restorationism Affiliation | Unknown                |
+-----------------------+------------------------+
| Race                  | White                  |
+-----------------------+------------------------+
| Ethnic Group          | Not  or  |
+-----------------------+------------------------+
 
 
 Author
 
 
+--------------+------------------------------+
| Author       | [facility] |
+--------------+------------------------------+
| Organization | [facility] |
+--------------+------------------------------+
| Address      | Unknown                      |
+--------------+------------------------------+
| Phone        | Unavailable                  |
+--------------+------------------------------+
 
 
 
 Support
 
 
+-----------------+--------------+---------+-----------------+
| Name            | Relationship | Address | Phone           |
+-----------------+--------------+---------+-----------------+
| Kit Valdovinos   | ECON         | Unknown | [phone] |
+-----------------+--------------+---------+-----------------+
| Magdalena Valdovinos | ECON         | Unknown | [phone] |
+-----------------+--------------+---------+-----------------+
 
 
 
 Care Team Providers
 
 
 
+------------------------+------+-----------------+
| Care Team Member Name  | Role | Phone           |
+------------------------+------+-----------------+
| Lily Horton MD | PCP  | [phone] |
+------------------------+------+-----------------+
 
 
 
 Reason for Visit
 
 
+--------------+----------+
| Reason       | Comments |
+--------------+----------+
| Test Results |          |
+--------------+----------+
 
 
 
 Encounter Details
 
 
+--------+-------------+----------------------+----------------------+--------------+
| Date   | Type        | Department           | Care Team            | Description  |
+--------+-------------+----------------------+----------------------+--------------+
| / | Documentati |   Pediatric          |   Neema Khalil,   | Test Results |
| 2015   | on          | Gastroenterology at  | MD  [address] |              |
|        |             | [facility]          |   [place], [place]       |              |
|        |             | [facility]  | [address]           |              |
|        |             |  [address] | [phone]         |              |
|        |             |  [address]  Mailcode:  | [phone] (Fax)   |              |
|        |             | [facility]  [place]   |                      |              |
|        |             | [place], [place]         |                      |              |
|        |             | [address]           |                      |              |
|        |             | [phone]         |                      |              |
+--------+-------------+----------------------+----------------------+--------------+
 
 
 
 Social History
 
 
+-------------------+-------+-----------+--------+------+
| Tobacco Use       | Types | Packs/Day | Years  | Date |
|                   |       |           | Used   |      |
+-------------------+-------+-----------+--------+------+
| Passive Smoke     |       |           |        |      |
| Exposure - Never  |       |           |        |      |
| Smoker            |       |           |        |      |
+-------------------+-------+-----------+--------+------+
 
 
 
+---------------------+---+---+---+
| Smokeless Tobacco:  |   |   |   |
| Never Used          |   |   |   |
+---------------------+---+---+---+
 
 
 
 
+-------------+-------------+---------+----------+
| Alcohol Use | Drinks/Week | oz/Week | Comments |
+-------------+-------------+---------+----------+
| Not Asked   |             |         |          |
+-------------+-------------+---------+----------+
 
 
 
+------------------+---------------+
| Sex Assigned at  | Date Recorded |
| Birth            |               |
+------------------+---------------+
| Not on file      |               |
+------------------+---------------+
 
 
 
+----------------+-------------+-------------+
| Job Start Date | Occupation  | Industry    |
+----------------+-------------+-------------+
| Not on file    | Not on file | Not on file |
+----------------+-------------+-------------+
 
 
 
+----------------+--------------+------------+
| Travel History | Travel Start | Travel End |
+----------------+--------------+------------+
 
 
 
+-------------------------------------+
| No recent travel history available. |
+-------------------------------------+
 documented as of this encounter
 
 Plan of Treatment
 Not on filedocumented as of this encounter
 
 Visit Diagnoses
 Not on filedocumented in this encounter

## 2019-11-01 NOTE — XMS
Encounter Summary
  Created on: 2019
 
 Bonifacio Nila JB
 External Reference #: [id number]
 : [birth year]
 Sex: Female
 
 Demographics
 
 
+-----------------------+------------------------+
| Address               | [address]            |
|                       | [place]  [address]   |
+-----------------------+------------------------+
| Home Phone            | [phone]        |
+-----------------------+------------------------+
| Preferred Language    | Unknown                |
+-----------------------+------------------------+
| Marital Status        | Single                 |
+-----------------------+------------------------+
| Roman [employer] Affiliation | Unknown                |
+-----------------------+------------------------+
| Race                  | White                  |
+-----------------------+------------------------+
| Ethnic Group          | Not  or  |
+-----------------------+------------------------+
 
 
 Author
 
 
+--------------+-------------+
| Organization | Unknown     |
+--------------+-------------+
| Address      | Unknown     |
+--------------+-------------+
| Phone        | Unavailable |
+--------------+-------------+
 
 
 
 Support
 
 
+-----------------+--------------+---------+-----------------+
| Name            | Relationship | Address | Phone           |
+-----------------+--------------+---------+-----------------+
| Kit Valdovinos   | ECON         | Unknown | [phone] |
+-----------------+--------------+---------+-----------------+
| Magdalena Valdovinos | ECON         | Unknown | [phone] |
+-----------------+--------------+---------+-----------------+
 
 
 
 Care Team Providers
 
 
 
+-----------------------+------+-----------------+
| Care Team Member Name | Role | Phone           |
+-----------------------+------+-----------------+
| Lily Horton MD   | PCP  | [phone] |
+-----------------------+------+-----------------+
 
 
 
 Encounter Details
 
 
+--------+-------------+------------+--------------------+-------------+
| Date   | Type        | Department | Care Team          | Description |
+--------+-------------+------------+--------------------+-------------+
| / | Transcribed |            |   Dictation, Other | Transcribed |
|    |             |            |                    |             |
+--------+-------------+------------+--------------------+-------------+
 
 
 
 Social History
 
 
+----------------+-------+-----------+--------+------+
| Tobacco Use    | Types | Packs/Day | Years  | Date |
|                |       |           | Used   |      |
+----------------+-------+-----------+--------+------+
| Never Assessed |       |           |        |      |
+----------------+-------+-----------+--------+------+
 
 
 
+------------------+---------------+
| Sex Assigned at  | Date Recorded |
| Birth            |               |
+------------------+---------------+
| Not on file      |               |
+------------------+---------------+
 
 
 
+----------------+-------------+-------------+
| Job Start Date | Occupation  | Industry    |
+----------------+-------------+-------------+
| Not on file    | Not on file | Not on file |
+----------------+-------------+-------------+
 
 
 
+----------------+--------------+------------+
| Travel History | Travel Start | Travel End |
+----------------+--------------+------------+
 
 
 
+-------------------------------------+
| No recent travel history available. |
+-------------------------------------+
 documented as of this encounter
 
 
 Progress Notes
 Interface, Transcription In - 10/29/2006  5:04 AM [facility]                                    [place] [address], [place] [address] [phone] or [phone]
 
 2002
 
 Lily Horton M.D.
 [address]
 [place]  [address]
 
 RE:   NILA VALDOVINOS
 MR #: [id number]
 
 Dear Dr. Horton:
 
 I had the pleasure of seeing Nila in the Pediatric Liver Transplant Clinic
 at [facility] today attended by Dr. Marcos Aguirre of the [facility]
 Team for Dr. Bailey.  As you know,  she  is  a  1-year-old female who is 8
 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   She has had a relatively  uneventful  posttransplant
 course and is doing reasonably well.   The  only  clinical concern today is
 intermittent yet chronic mild diarrhea  and  intestinal  gas which produces
 abdominal discomfort and pain.  Since  the  intake  of fruit juice and milk
 products have been reduced, her diarrhea  is  better  but the gas persists.
 There is no fever, vomiting, weight loss,  or  blood  in the stool, but the
 mother is yet concerned.  No studies have been done.
 
 She is otherwise doing well.  Her present  medications include Prograf 5 mL
 b.i.d. (0.5 mg/mL), acyclovir (200 mg/5mL),  2.75  mL  (110 mg) b.i.d., and
 monthly pentamidine.  She missed the pentamidine last month, so the plan is
 to get it soon.  SHE IS ALLERGIC TO SULFA.
 
 On exam, she weighs 12.92 kg and measures  87.2  cm.   Both  parameters are
 close to the 50th percentile for age and represent normal increases.  Blood
 pressure is 107/52, pulse is 120, and  head  circumference is 50.4 cm.  She
 is slightly pale yet active, alert, and  playful.   She  has no adenopathy.
 Her abdomen is slightly distended, and  her  liver  is  several centimeters
 below the right costal margin, although  it  is soft and not enlarged.  Her
 spleen is palpable (or spleens since  she  has polysplenia), and she has an
 adequate amount of fat and muscle in her extremities.
 
 The  last  laboratory  tests were on  2000.   There  were  no
 significant abnormalities except a slightly  elevated  phosphorus  of  8.0.
 Her tacrolimus level was 7.6.  Faye-Barr  virus serology showed positive
 viral capsule antigen IgG but negative IgM, and negative EBV DNA PCR.
 
 In summary, Nila is a [id number]-bxe  white  female  who  is 8 months status
 post liver transplantation for biliary atresia who is doing quite well.  We
 need to make some changes in her posttransplant management.
 
 The first goal is to get her Prograf level down.  The first step will be to
 decrease her Prograf dose to 4 mL b.i.d.  or 4 mg a day divided b.i.d.  The
 mother will do this today.  In one week,  she  will  need  laboratory tests
 that will include a chemistry panel,  CBC,  and  Prograf  level.  Then, she
 will get labs one month later.  She needs  to  have  labs done monthly.  If
 she has normal laboratory tests for 2  months  after a reduction in Prograf
 
 dose, then another change can be made.   Her  dose should be decreased by 1
 mg a day which is 1 mL in the morning  and  1  mL at night.  The goal is to
 get her down to 1 to 2 g a day, although  3  g  a  day would be acceptable.
 The Prograf blood level goal is 3 to 5 ng/mL.
 
 Posttransplant patients usually receive  monthly  labs  for  the  first  12
 months following transplant.  During  the  second  year,  if  everything is
 going  well, they can have their laboratory  tests  drawn  every  6  weeks.
 During the third year, if all goes well,  they can have the blood test done
 every other month.  During the third and fourth years and thereafter, every
 third month labs is acceptable, again, given the provision of an uneventful
 course.
 
 When her Prograf dose is down to 3  mg  a  day  (or  3  mL b.i.d.), her EBV
 serology and DNA PCR should be checked  again.  If it is negative, then the
 acyclovir can be discontinued.
 
 Finally, again, when her Prograf dose  is  down  to  3 mg a day, she should
 receive hepatitis A and B vaccines if  they  have  not  already been given.
 Given that when the Prograf dose is  so  elevated,  often  it is associated
 with ineffectiveness, and obviously, immunosuppression  patients should not
 receive live virus vaccines such as the measles vaccine.
 
 Nila should be seen again in 6 months  when  the  transplant  team  visits
 [place] again.
 
 Please let me know if you have any questions.
 
 Sincerely,
 
 Eagle De Luna M.D.
 Pediatric Gastroenterology
 
 [facility] / 
 [id number] / [id number] / [id number] / [id number]
 D: 2002
 T: 2002
 
 cc:
 
 Cristo Bailey M.D.
 [address]
 [place]  [address]
 
 Valentin Covington M.D.
 [address]
 [place]  [address]
 
 Marcos Aguirre M.D.
 [address]
 [place]  [address]
 
 [id number] signed by Interface, Transcription In at 10/29/2006  5:04 AM PSTdocume
nted in this encounter
 
 Plan of Treatment
 Not on filedocumented as of this encounter
 
 Visit Diagnoses
 Not on filedocumented in this encounter

## 2019-11-01 NOTE — XMS
Encounter Summary
  Created on: 2019
 
 Bonifacio Nila JB
 External Reference #: [id number]
 : [birth year]
 Sex: Female
 
 Demographics
 
 
+-----------------------+------------------------+
| Address               | [address]            |
|                       | [place]  [address]   |
+-----------------------+------------------------+
| Home Phone            | [phone]        |
+-----------------------+------------------------+
| Preferred Language    | Unknown                |
+-----------------------+------------------------+
| Marital Status        | Single                 |
+-----------------------+------------------------+
| Catholic Affiliation | Unknown                |
+-----------------------+------------------------+
| Race                  | White                  |
+-----------------------+------------------------+
| Ethnic Group          | Not  or  |
+-----------------------+------------------------+
 
 
 Author
 
 
+--------------+------------------------------+
| Author       | [facility] |
+--------------+------------------------------+
| Organization | [facility] |
+--------------+------------------------------+
| Address      | Unknown                      |
+--------------+------------------------------+
| Phone        | Unavailable                  |
+--------------+------------------------------+
 
 
 
 Support
 
 
+-----------------+--------------+---------+-----------------+
| Name            | Relationship | Address | Phone           |
+-----------------+--------------+---------+-----------------+
| Kit Valdovinos   | ECON         | Unknown | [phone] |
+-----------------+--------------+---------+-----------------+
| Magdalena Valdovinos | ECON         | Unknown | [phone] |
+-----------------+--------------+---------+-----------------+
 
 
 
 Care Team Providers
 
 
 
+------------------------+------+-----------------+
| Care Team Member Name  | Role | Phone           |
+------------------------+------+-----------------+
| Lily Horton MD | PCP  | [phone] |
+------------------------+------+-----------------+
 
 
 
 Encounter Details
 
 
+--------+-------------+----------------------+----------------------+-------------+
| Date   | Type        | Department           | Care Team            | Description |
+--------+-------------+----------------------+----------------------+-------------+
| 06/15/ | Documentati |   Pediatric          |   Neema Khalil,   |             |
|    | on          | Gastroenterology at  | MD [address] |             |
|        |             | [place]          |   [place], [place]       |             |
|        |             | [facility]  | [address]           |             |
|        |             |  [address] | [phone]         |             |
|        |             |  [address]  Mailcode:  | [phone] (Fax)   |             |
|        |             | [facility] [address]   |                      |             |
|        |             | [place], [place]         |                      |             |
|        |             | [address]           |                      |             |
|        |             | [phone]         |                      |             |
+--------+-------------+----------------------+----------------------+-------------+
 
 
 
 Social History
 
 
+-------------------+-------+-----------+--------+------+
| Tobacco Use       | Types | Packs/Day | Years  | Date |
|                   |       |           | Used   |      |
+-------------------+-------+-----------+--------+------+
| Passive Smoke     |       |           |        |      |
| Exposure - Never  |       |           |        |      |
| Smoker            |       |           |        |      |
+-------------------+-------+-----------+--------+------+
 
 
 
+---------------------+---+---+---+
| Smokeless Tobacco:  |   |   |   |
| Never Used          |   |   |   |
+---------------------+---+---+---+
 
 
 
+-------------+-------------+---------+----------+
| Alcohol Use | Drinks/Week | oz/Week | Comments |
+-------------+-------------+---------+----------+
| Not Asked   |             |         |          |
+-------------+-------------+---------+----------+
 
 
 
+------------------+---------------+
 
| Sex Assigned at  | Date Recorded |
| Birth            |               |
+------------------+---------------+
| Not on file      |               |
+------------------+---------------+
 
 
 
+----------------+-------------+-------------+
| Job Start Date | Occupation  | Industry    |
+----------------+-------------+-------------+
| Not on file    | Not on file | Not on file |
+----------------+-------------+-------------+
 
 
 
+----------------+--------------+------------+
| Travel History | Travel Start | Travel End |
+----------------+--------------+------------+
 
 
 
+-------------------------------------+
| No recent travel history available. |
+-------------------------------------+
 documented as of this encounter
 
 Plan of Treatment
 Not on filedocumented as of this encounter
 
 Procedures
 
 
+----------------------+--------+-------------+----------------------+----------------------
+
| Procedure Name       | Priori | Date/Time   | Associated Diagnosis | Comments             
|
|                      | ty     |             |                      |                      
|
+----------------------+--------+-------------+----------------------+----------------------
+
| LIPID SET (TRIG, T   | Routin | 2015  |                      |   Results for this   
|
| CHOL, HDL, CALC LDL) | e      |  2:24 PM    |                      | procedure are in the 
|
|                      |        | PDT         |                      |  results section.    
|
+----------------------+--------+-------------+----------------------+----------------------
+
| CBC, WITH            | Routin | 2015  |                      |   Results for this   
|
| DIFFERENTIAL         | e      |  2:19 PM    |                      | procedure are in the 
|
|                      |        | PDT         |                      |  results section.    
|
+----------------------+--------+-------------+----------------------+----------------------
+
| COMPLETE METABOLIC   | Routin | 2015  |                      |   Results for this   
|
| SET                  | e      |  2:19 PM    |                      | procedure are in the 
 
|
| (NA,K,CL,CO2,BUN,CRE |        | PDT         |                      |  results section.    
|
| AT,GLUC,CA,AST,ALT,B |        |             |                      |                      
|
| ASHWINI TOTAL,ALK        |        |             |                      |                      
|
| PHOS,ALB,PROT TOTAL) |        |             |                      |                      
|
+----------------------+--------+-------------+----------------------+----------------------
+
| GGT, PLASMA          | Routin | 2015  |                      |   Results for this   
|
|                      | e      |  2:19 PM    |                      | procedure are in the 
|
|                      |        | PDT         |                      |  results section.    
|
+----------------------+--------+-------------+----------------------+----------------------
+
| MAGNESIUM, PLASMA    | Routin | 2015  |                      |   Results for this   
|
|                      | e      |  2:19 PM    |                      | procedure are in the 
|
|                      |        | PDT         |                      |  results section.    
|
+----------------------+--------+-------------+----------------------+----------------------
+
 documented in this encounter
 
 Results
 LIPID SET (TRIG, T CHOL, HDL, CALC LDL) (2015  2:24 PM PDT)
 
+-------------+-------+-----------------+------------+--------------+
| Component   | Value | Ref Range       | Performed  | Pathologist  |
|             |       |                 | At         | Signature    |
+-------------+-------+-----------------+------------+--------------+
| CHOLESTEROL | 123   | 0 - 200 mg/dL   | [facility]  |              |
|   (LAB)     |       |                 | LAB -      |              |
|             |       |                 | [place]  |              |
+-------------+-------+-----------------+------------+--------------+
| TRIGLYCERID | 106   | 30 - 150 mg/dL  | [facility]  |              |
| ES          |       |                 | LAB -      |              |
|             |       |                 | [place]  |              |
+-------------+-------+-----------------+------------+--------------+
| LDL         | 140   | 100 - 215 mg/dL | [facility]  |              |
| CHOLESTEROL |       |                 | LAB -      |              |
| ,           |       |                 | [place]  |              |
| CALCULATED  |       |                 |            |              |
+-------------+-------+-----------------+------------+--------------+
 
 
 
+---------------+
| Specimen      |
+---------------+
| Blood - Blood |
+---------------+
 
 
 
 
+--------------------+----------------------+--------------------+----------------+
| Performing         | Address              | City/State/Zipcode | Phone Number   |
| Organization       |                      |                    |                |
+--------------------+----------------------+--------------------+----------------+
|   [facility]  |   [address] | [place]      |   [phone] |
| [place]          |                      |                    |                |
+--------------------+----------------------+--------------------+----------------+
 MAGNESIUM, PLASMA (2015  2:19 PM PDT)
 
+-------------+-------+-----------------+------------+--------------+
| Component   | Value | Ref Range       | Performed  | Pathologist  |
|             |       |                 | At         | Signature    |
+-------------+-------+-----------------+------------+--------------+
| MAGNESIUM,P | 1.8   | 1.7 - 2.5 mg/dL | [facility]  |              |
| LASMA       |       |                 | LAB -      |              |
|             |       |                 | [place]  |              |
+-------------+-------+-----------------+------------+--------------+
 
 
 
+---------------+
| Specimen      |
+---------------+
| Blood - Blood |
+---------------+
 
 
 
+--------------------+----------------------+--------------------+----------------+
| Performing         | Address              | City/State/Zipcode | Phone Number   |
| Organization       |                      |                    |                |
+--------------------+----------------------+--------------------+----------------+
|   [facility]  |   [address] | [place], [place]      |   [phone] |
| [place]          |                      |                    |                |
+--------------------+----------------------+--------------------+----------------+
 GGT, PLASMA (2015  2:19 PM PDT)
 
+-------------+-------+------------+------------+--------------+
| Component   | Value | Ref Range  | Performed  | Pathologist  |
|             |       |            | At         | Signature    |
+-------------+-------+------------+------------+--------------+
| GAMMA       | 7     | 5 - 60 u/l | [facility]  |              |
| GLUTAMYL    |       |            | LAB -      |              |
| TRANSFERASE |       |            | [place]  |              |
+-------------+-------+------------+------------+--------------+
 
 
 
+---------------+
| Specimen      |
+---------------+
| Blood - Blood |
+---------------+
 
 
 
+--------------------+----------------------+--------------------+----------------+
| Performing         | Address              | City/State/Zipcode | Phone Number   |
| Organization       |                      |                    |                |
+--------------------+----------------------+--------------------+----------------+
 
|   [facility]  |   [address] | [place], [place]      |   [phone] |
| [place]          |                      |                    |                |
+--------------------+----------------------+--------------------+----------------+
 COMPLETE METABOLIC SET (NA,K,CL,CO2,BUN,CREAT,GLUC,CA,AST,ALT,BILI TOTAL,ALK PHOS,ALB,PROT 
TOTAL) (2015  2:19 PM PDT)
 
+-------------+-------+-----------------+------------+--------------+
| Component   | Value | Ref Range       | Performed  | Pathologist  |
|             |       |                 | At         | Signature    |
+-------------+-------+-----------------+------------+--------------+
| GLUCOSE,    | 79    | 70 - 100 mg/dL  | [facility]  |              |
| PLASMA      |       |                 | LAB -      |              |
| (LAB)       |       |                 | [place]  |              |
+-------------+-------+-----------------+------------+--------------+
| ANION GAP   | 16.8  | 7 - 21          | [facility]  |              |
|             |       |                 | LAB -      |              |
|             |       |                 | [place]  |              |
+-------------+-------+-----------------+------------+--------------+
| CREATININE  | 0.66  | 0.60 - 1.20     | [facility]  |              |
| PLASMA      |       | mg/dL           | LAB -      |              |
| (LAB)       |       |                 | [place]  |              |
+-------------+-------+-----------------+------------+--------------+
| TOTAL       | 7.1   | 6.1 - 8.5 g/dL  | [facility]  |              |
| PROTEIN,    |       |                 | LAB -      |              |
| PLASMA      |       |                 | [place]  |              |
| (LAB)       |       |                 |            |              |
+-------------+-------+-----------------+------------+--------------+
| ALBUMIN,    | 4.0   | 3.5 - 5.0 g/dL  | [facility]  |              |
| PLASMA      |       |                 | LAB -      |              |
| (LAB)       |       |                 | [place]  |              |
+-------------+-------+-----------------+------------+--------------+
| CALCIUM,    | 9.4   | 8.4 - 10.2      | [facility]  |              |
| PLASMA      |       | mg/dL           | LAB -      |              |
| (LAB)       |       |                 | [place]  |              |
+-------------+-------+-----------------+------------+--------------+
| BILIRUBIN   | 0.2   | 0.0 - 1.2       | [facility]  |              |
| TOTAL       |       | Transcutaneous  | LAB -      |              |
|             |       | Bilirubinometer | [place]  |              |
+-------------+-------+-----------------+------------+--------------+
| ALK PHOS    | 88    | 30 - 128 U/L    | [facility]  |              |
|             |       |                 | LAB -      |              |
|             |       |                 | [place]  |              |
+-------------+-------+-----------------+------------+--------------+
| AST(SGOT)   | 20    | 13 - 39 U/L     | [facility]  |              |
|             |       |                 | LAB -      |              |
|             |       |                 | [place]  |              |
+-------------+-------+-----------------+------------+--------------+
| SODIUM,     | 137   | 132 - 143       | [facility]  |              |
| PLASMA      |       | mmol/L          | LAB -      |              |
| (LAB)       |       |                 | [place]  |              |
+-------------+-------+-----------------+------------+--------------+
| POTASSIUM,  | 3.8   | 3.6 - 5.1       | [facility]  |              |
| PLASMA      |       | mmol/L          | LAB -      |              |
| (LAB)       |       |                 | [place]  |              |
+-------------+-------+-----------------+------------+--------------+
| CHLORIDE,   | 102   | 95 - 112 mmol/L | [facility]  |              |
| PLASMA      |       |                 | LAB -      |              |
| (LAB)       |       |                 | [place]  |              |
+-------------+-------+-----------------+------------+--------------+
| TOTAL CO2,  | 22    | 19 - 31 mmol/L  | [facility]  |              |
 
| PLASMA      |       |                 | LAB -      |              |
| (LAB)       |       |                 | [place]  |              |
+-------------+-------+-----------------+------------+--------------+
| ALT (SGPT)  | 10    | 7 - 52 U/L      | [facility]  |              |
|             |       |                 | LAB -      |              |
|             |       |                 | [place]  |              |
+-------------+-------+-----------------+------------+--------------+
| BUN/CREATIN | 21.2  | 6.0 - 28.6      | [facility]  |              |
| INE RATIO   |       |                 | LAB -      |              |
|             |       |                 | [place]  |              |
+-------------+-------+-----------------+------------+--------------+
| UREA        | 14    | 6 - 23 mg/dL    | [facility]  |              |
| NITROGEN,   |       |                 | LAB -      |              |
| SERUM       |       |                 | [place]  |              |
+-------------+-------+-----------------+------------+--------------+
| GLOBULIN    | 3.1   | 1.8 - 3.5       | [facility]  |              |
|             |       |                 | LAB -      |              |
|             |       |                 | [place]  |              |
+-------------+-------+-----------------+------------+--------------+
| A/G RATIO   | 1.3   | 1.1 - 2.4       | [facility]  |              |
|             |       |                 | LAB -      |              |
|             |       |                 | [place]  |              |
+-------------+-------+-----------------+------------+--------------+
 
 
 
+---------------+
| Specimen      |
+---------------+
| Blood - Blood |
+---------------+
 
 
 
+--------------------+----------------------+--------------------+----------------+
| Performing         | Address              | City/State/Zipcode | Phone Number   |
| Organization       |                      |                    |                |
+--------------------+----------------------+--------------------+----------------+
|   [facility]  |   [address] | [place]      |   [phone] |
| [place]          |                      |                    |                |
+--------------------+----------------------+--------------------+----------------+
 CBC, WITH DIFFERENTIAL (2015  2:19 PM PDT)
 
+-------------+----------+-----------------+------------+--------------+
| Component   | Value    | Ref Range       | Performed  | Pathologist  |
|             |          |                 | At         | Signature    |
+-------------+----------+-----------------+------------+--------------+
| WHITE CELL  | 8.3      | 4.5 - 11.0 K/cu | [facility]  |              |
| COUNT       |          |  mm             | LAB -      |              |
|             |          |                 | [place]  |              |
+-------------+----------+-----------------+------------+--------------+
| RED CELL    | 4.55     | 3.8 - 5.1 M/cu  | [facility]  |              |
| COUNT       |          | mm              | LAB -      |              |
|             |          |                 | [place]  |              |
+-------------+----------+-----------------+------------+--------------+
| HEMOGLOBIN  | 13.5     | 12.0 - 16.0     | [facility]  |              |
|             |          | g/dL            | LAB -      |              |
|             |          |                 | [place]  |              |
+-------------+----------+-----------------+------------+--------------+
| HEMATOCRIT  | 40.2     | 35 - 45 %       | [facility]  |              |
 
|             |          |                 | LAB -      |              |
|             |          |                 | [place]  |              |
+-------------+----------+-----------------+------------+--------------+
| MCV         | 88.3     | 81 - 99 fL      | [facility]  |              |
|             |          |                 | LAB -      |              |
|             |          |                 | [place]  |              |
+-------------+----------+-----------------+------------+--------------+
| MCH         | 30       | 27 - 33 pg      | [facility]  |              |
|             |          |                 | LAB -      |              |
|             |          |                 | [place]  |              |
+-------------+----------+-----------------+------------+--------------+
| MCHC        | 34       | 30 - 36 g/dL    | [facility]  |              |
|             |          |                 | LAB -      |              |
|             |          |                 | [place]  |              |
+-------------+----------+-----------------+------------+--------------+
| PLATELET    | 313      | 140 - 440 K/cu  | [facility]  |              |
| COUNT       |          | mm              | LAB -      |              |
|             |          |                 | [place]  |              |
+-------------+----------+-----------------+------------+--------------+
| NEUTROPHIL  | 63.8     | 39 - 80 %       | [facility]  |              |
| %           |          |                 | LAB -      |              |
|             |          |                 | [place]  |              |
+-------------+----------+-----------------+------------+--------------+
| LYMPHOCYTE  | 21.5 (A) | 24 - 44 %       | [facility]  |              |
| %           |          |                 | LAB -      |              |
|             |          |                 | [place]  |              |
+-------------+----------+-----------------+------------+--------------+
| MONOCYTE %  | 12.6 (A) | 0 - 12 %        | [facility]  |              |
|             |          |                 | LAB -      |              |
|             |          |                 | [place]  |              |
+-------------+----------+-----------------+------------+--------------+
| EOS %       | 1.6      | 0 - 6 %         | [facility]  |              |
|             |          |                 | LAB -      |              |
|             |          |                 | [place]  |              |
+-------------+----------+-----------------+------------+--------------+
| BASO %      | 0.5      | 0 - 2 %         | [facility]  |              |
|             |          |                 | LAB -      |              |
|             |          |                 | [place]  |              |
+-------------+----------+-----------------+------------+--------------+
| RDW         | 13.5     | 10.5 - 15.0 %   | [facility]  |              |
|             |          |                 | LAB -      |              |
|             |          |                 | [place]  |              |
+-------------+----------+-----------------+------------+--------------+
 
 
 
+---------------+
| Specimen      |
+---------------+
| Blood - Blood |
+---------------+
 
 
 
+--------------------+----------------------+--------------------+----------------+
| Performing         | Address              | City/State/Zipcode | Phone Number   |
| Organization       |                      |                    |                |
+--------------------+----------------------+--------------------+----------------+
|   [facility]  |   [address] | [place]      |   [phone] |
| [place]          |                      |                    |                |
 
+--------------------+----------------------+--------------------+----------------+
 documented in this encounter
 
 Visit Diagnoses
 Not on filedocumented in this encounter

## 2019-11-01 NOTE — XMS
Encounter Summary
  Created on: 2019
 
 Bonifacio Nila JB
 External Reference #: [id number]
 : [birth year]
 Sex: Female
 
 Demographics
 
 
+-----------------------+------------------------+
| Address               | [address]            |
|                       | [place]  [address]   |
+-----------------------+------------------------+
| Home Phone            | [phone]        |
+-----------------------+------------------------+
| Preferred Language    | Unknown                |
+-----------------------+------------------------+
| Marital Status        | Single                 |
+-----------------------+------------------------+
| Gnosticist Affiliation | Unknown                |
+-----------------------+------------------------+
| Race                  | White                  |
+-----------------------+------------------------+
| Ethnic Group          | Not  or  |
+-----------------------+------------------------+
 
 
 Author
 
 
+--------------+------------------------------+
| Author       | [facility] |
+--------------+------------------------------+
| Organization | [facility] |
+--------------+------------------------------+
| Address      | Unknown                      |
+--------------+------------------------------+
| Phone        | Unavailable                  |
+--------------+------------------------------+
 
 
 
 Support
 
 
+-----------------+--------------+---------+-----------------+
| Name            | Relationship | Address | Phone           |
+-----------------+--------------+---------+-----------------+
| Kit Valdovinos   | ECON         | Unknown | [phone] |
+-----------------+--------------+---------+-----------------+
| Magdalena Valdovinos | ECON         | Unknown | [phone] |
+-----------------+--------------+---------+-----------------+
 
 
 
 Care Team Providers
 
 
 
+------------------------+------+-----------------+
| Care Team Member Name  | Role | Phone           |
+------------------------+------+-----------------+
| Lily Horton MD | PCP  | [phone] |
+------------------------+------+-----------------+
 
 
 
 Reason for Visit
 
 
+-------------------+-----------------------------------+
| Reason            | Comments                          |
+-------------------+-----------------------------------+
| Care Coordination | transplant labs overdue, liver US |
+-------------------+-----------------------------------+
 
 
 
 Encounter Details
 
 
+--------+-----------+----------------------+----------------------+--------------------+
| Date   | Type      | Department           | Care Team            | Description        |
+--------+-----------+----------------------+----------------------+--------------------+
| 10/16/ | Telephone |   Pediatric          |   Neema Khalil,   | Care Coordination  |
| 2015   |           | Gastroenterology at  | MD  [address] | (transplant labs   |
|        |           | [facility]          |   [place], [place]       | overdue, liver US) |
|        |           | [facility]  | [address]           |                    |
|        |           |  [address] | [phone]         |                    |
|        |           |  [address]  Mailcode:  | [phone] (Fax)   |                    |
|        |           | [facility]   |                      |                    |
|        |           | [place], [place]         |                      |                    |
|        |           | [address]           |                      |                    |
|        |           | [phone]         |                      |                    |
+--------+-----------+----------------------+----------------------+--------------------+
 
 
 
 Social History
 
 
+-------------------+-------+-----------+--------+------+
| Tobacco Use       | Types | Packs/Day | Years  | Date |
|                   |       |           | Used   |      |
+-------------------+-------+-----------+--------+------+
| Passive Smoke     |       |           |        |      |
| Exposure - Never  |       |           |        |      |
| Smoker            |       |           |        |      |
+-------------------+-------+-----------+--------+------+
 
 
 
+---------------------+---+---+---+
| Smokeless Tobacco:  |   |   |   |
| Never Used          |   |   |   |
+---------------------+---+---+---+
 
 
 
 
+-------------+-------------+---------+----------+
| Alcohol Use | Drinks/Week | oz/Week | Comments |
+-------------+-------------+---------+----------+
| Not Asked   |             |         |          |
+-------------+-------------+---------+----------+
 
 
 
+------------------+---------------+
| Sex Assigned at  | Date Recorded |
| Birth            |               |
+------------------+---------------+
| Not on file      |               |
+------------------+---------------+
 
 
 
+----------------+-------------+-------------+
| Job Start Date | Occupation  | Industry    |
+----------------+-------------+-------------+
| Not on file    | Not on file | Not on file |
+----------------+-------------+-------------+
 
 
 
+----------------+--------------+------------+
| Travel History | Travel Start | Travel End |
+----------------+--------------+------------+
 
 
 
+-------------------------------------+
| No recent travel history available. |
+-------------------------------------+
 documented as of this encounter
 
 Plan of Treatment
 Not on filedocumented as of this encounter
 
 Visit Diagnoses
 Not on filedocumented in this encounter

## 2019-11-01 NOTE — XMS
Encounter Summary
  Created on: 2019
 
 Bonifacio Nila JB
 External Reference #: [id number]
 : [birth year]
 Sex: Female
 
 Demographics
 
 
+-----------------------+------------------------+
| Address               | [address]            |
|                       | [place]  [address]   |
+-----------------------+------------------------+
| Home Phone            | [phone]        |
+-----------------------+------------------------+
| Preferred Language    | Unknown                |
+-----------------------+------------------------+
| Marital Status        | Single                 |
+-----------------------+------------------------+
| Advent Affiliation | Unknown                |
+-----------------------+------------------------+
| Race                  | White                  |
+-----------------------+------------------------+
| Ethnic Group          | Not  or  |
+-----------------------+------------------------+
 
 
 Author
 
 
+--------------+------------------------------+
| Author       | [facility] [employer] [facility] |
+--------------+------------------------------+
| Organization | [facility] |
+--------------+------------------------------+
| Address      | Unknown                      |
+--------------+------------------------------+
| Phone        | Unavailable                  |
+--------------+------------------------------+
 
 
 
 Support
 
 
+-----------------+--------------+---------+-----------------+
| Name            | Relationship | Address | Phone           |
+-----------------+--------------+---------+-----------------+
| Kit Valdovinos   | ECON         | Unknown | [phone] |
+-----------------+--------------+---------+-----------------+
| Magdalena Valdovinos | ECON         | Unknown | [phone] |
+-----------------+--------------+---------+-----------------+
 
 
 
 Care Team Providers
 
 
 
+-----------------------+------+-----------------+
| Care Team Member Name | Role | Phone           |
+-----------------------+------+-----------------+
| Lily Horton MD   | PCP  | [phone] |
+-----------------------+------+-----------------+
 
 
 
 Reason for Visit
 
 
+----------------+----------+
| Reason         | Comments |
+----------------+----------+
| Refill Request |          |
+----------------+----------+
 
 
 
 Encounter Details
 
 
+--------+--------+----------------------+---------------------+----------------+
| Date   | Type   | Department           | Care Team           | Description    |
+--------+--------+----------------------+---------------------+----------------+
| / | Refill |   Pediatric          |   Monserrat Spann,  | Refill Request |
|    |        | Gastroenterology at  | RN  [address]     |                |
|        |        | [facility]          | [address]     |                |
|        |        | [facility]  | [place] [address]  |                |
|        |        |  [address] |                     |                |
|        |        |  [address]  Mailcode:  |                     |                |
|        |        | [facility]  [facility]   |                     |                |
|        |        | [place], [place]         |                     |                |
|        |        | [address]           |                     |                |
|        |        | [phone]         |                     |                |
+--------+--------+----------------------+---------------------+----------------+
 
 
 
 Social History
 
 
+----------------+-------+-----------+--------+------+
| Tobacco Use    | Types | Packs/Day | Years  | Date |
|                |       |           | Used   |      |
+----------------+-------+-----------+--------+------+
| Never Assessed |       |           |        |      |
+----------------+-------+-----------+--------+------+
 
 
 
+------------------+---------------+
| Sex Assigned at  | Date Recorded |
| Birth            |               |
+------------------+---------------+
| Not on file      |               |
+------------------+---------------+
 
 
 
 
+----------------+-------------+-------------+
| Job Start Date | Occupation  | Industry    |
+----------------+-------------+-------------+
| Not on file    | Not on file | Not on file |
+----------------+-------------+-------------+
 
 
 
+----------------+--------------+------------+
| Travel History | Travel Start | Travel End |
+----------------+--------------+------------+
 
 
 
+-------------------------------------+
| No recent travel history available. |
+-------------------------------------+
 documented as of this encounter
 
 Plan of Treatment
 Not on filedocumented as of this encounter
 
 Visit Diagnoses
 Not on filedocumented in this encounter

## 2019-11-01 NOTE — XMS
Encounter Summary
  Created on: 2019
 
 Bonifacio Nila JB
 External Reference #: [id number]
 : [birth year]
 Sex: Female
 
 Demographics
 
 
+-----------------------+------------------------+
| Address               | [address]            |
|                       | [place]  [address]   |
+-----------------------+------------------------+
| Home Phone            | [phone]        |
+-----------------------+------------------------+
| Preferred Language    | Unknown                |
+-----------------------+------------------------+
| Marital Status        | Single                 |
+-----------------------+------------------------+
| Religion Affiliation | Unknown                |
+-----------------------+------------------------+
| Race                  | White                  |
+-----------------------+------------------------+
| Ethnic Group          | Not  or  |
+-----------------------+------------------------+
 
 
 Author
 
 
+--------------+------------------------------+
| Author       | [facility] |
+--------------+------------------------------+
| Organization | [facility] |
+--------------+------------------------------+
| Address      | Unknown                      |
+--------------+------------------------------+
| Phone        | Unavailable                  |
+--------------+------------------------------+
 
 
 
 Support
 
 
+-----------------+--------------+---------+-----------------+
| Name            | Relationship | Address | Phone           |
+-----------------+--------------+---------+-----------------+
| Kit Valdovinos   | ECON         | Unknown | [phone] |
+-----------------+--------------+---------+-----------------+
| Magdalena Valdovinos | ECON         | Unknown | [phone] |
+-----------------+--------------+---------+-----------------+
 
 
 
 Care Team Providers
 
 
 
+------------------------+------+-----------------+
| Care Team Member Name  | Role | Phone           |
+------------------------+------+-----------------+
| Lily Horton MD | PCP  | [phone] |
+------------------------+------+-----------------+
 
 
 
 Reason for Visit
 
 
+-------------+----------+
| Reason      | Comments |
+-------------+----------+
| Lab Results |          |
+-------------+----------+
 
 
 
 Encounter Details
 
 
+--------+----------+----------------------+----------------------+-------------+
| Date   | Type     | Department           | Care Team            | Description |
+--------+----------+----------------------+----------------------+-------------+
| / | Abstract |   Pediatric          |   Neema Khalil,   | Lab Results |
| 2016   |          | Gastroenterology at  | MD  [address] |             |
|        |          | [place]          |   [place], [place]       |             |
|        |          | [facility]  | [address]           |             |
|        |          |  [address] | [phone]         |             |
|        |          |  [address]  Mailcode:  | [phone] (Fax)   |             |
|        |          | [facility]   |                      |             |
|        |          | [place], [place]         |                      |             |
|        |          | [address]           |                      |             |
|        |          | [phone]         |                      |             |
+--------+----------+----------------------+----------------------+-------------+
 
 
 
 Social History
 
 
+-------------------+-------+-----------+--------+------+
| Tobacco Use       | Types | Packs/Day | Years  | Date |
|                   |       |           | Used   |      |
+-------------------+-------+-----------+--------+------+
| Passive Smoke     |       |           |        |      |
| Exposure - Never  |       |           |        |      |
| Smoker            |       |           |        |      |
+-------------------+-------+-----------+--------+------+
 
 
 
+---------------------+---+---+---+
| Smokeless Tobacco:  |   |   |   |
| Never Used          |   |   |   |
+---------------------+---+---+---+
 
 
 
 
+-------------+-------------+---------+----------+
| Alcohol Use | Drinks/Week | oz/Week | Comments |
+-------------+-------------+---------+----------+
| Not Asked   |             |         |          |
+-------------+-------------+---------+----------+
 
 
 
+------------------+---------------+
| Sex Assigned at  | Date Recorded |
| Birth            |               |
+------------------+---------------+
| Not on file      |               |
+------------------+---------------+
 
 
 
+----------------+-------------+-------------+
| Job Start Date | Occupation  | Industry    |
+----------------+-------------+-------------+
| Not on file    | Not on file | Not on file |
+----------------+-------------+-------------+
 
 
 
+----------------+--------------+------------+
| Travel History | Travel Start | Travel End |
+----------------+--------------+------------+
 
 
 
+-------------------------------------+
| No recent travel history available. |
+-------------------------------------+
 documented as of this encounter
 
 Plan of Treatment
 Not on filedocumented as of this encounter
 
 Procedures
 
 
+----------------------+--------+------------+----------------------+----------------------+
| Procedure Name       | Priori | Date/Time  | Associated Diagnosis | Comments             |
|                      | ty     |            |                      |                      |
+----------------------+--------+------------+----------------------+----------------------+
| CBC, WITH            | Routin | 2016 |                      |   Results for this   |
| DIFFERENTIAL         | e      |            |                      | procedure are in the |
|                      |        |            |                      |  results section.    |
+----------------------+--------+------------+----------------------+----------------------+
| COMPLETE METABOLIC   | Routin | 2016 |                      |   Results for this   |
| SET                  | e      |            |                      | procedure are in the |
| (NA,K,CL,CO2,BUN,CRE |        |            |                      |  results section.    |
| AT,GLUC,CA,AST,ALT,B |        |            |                      |                      |
| ASHWINI TOTAL,ALK        |        |            |                      |                      |
| PHOS,ALB,PROT TOTAL) |        |            |                      |                      |
+----------------------+--------+------------+----------------------+----------------------+
 documented in this encounter
 
 
 Results
 CBC, WITH DIFFERENTIAL (2016)
 
+-------------+--------+-----------------+------------+--------------+
| Component   | Value  | Ref Range       | Performed  | Pathologist  |
|             |        |                 | At         | Signature    |
+-------------+--------+-----------------+------------+--------------+
| WHITE CELL  | 8.8    | 4.5 - 11 K/cu   | [facility]  |              |
| COUNT       |        | mm              | LAB -      |              |
|             |        |                 | [place]  |              |
+-------------+--------+-----------------+------------+--------------+
| RED CELL    | 4.97   | 3.8 - 5.1 M/cu  | [facility]  |              |
| COUNT       |        | mm              | LAB -      |              |
|             |        |                 | [place]  |              |
+-------------+--------+-----------------+------------+--------------+
| HEMOGLOBIN  | 14.5   | 12 - 16 g/dL    | [facility]  |              |
|             |        |                 | LAB -      |              |
|             |        |                 | [place]  |              |
+-------------+--------+-----------------+------------+--------------+
| HEMATOCRIT  | 43.2   | 35 - 45 %       | [facility]  |              |
|             |        |                 | LAB -      |              |
|             |        |                 | [place]  |              |
+-------------+--------+-----------------+------------+--------------+
| MCV         | 86.9   | 81 - 99 fL      | [facility]  |              |
|             |        |                 | LAB -      |              |
|             |        |                 | [place]  |              |
+-------------+--------+-----------------+------------+--------------+
| MCH         | 29     | 27 - 33 pg      | [facility]  |              |
|             |        |                 | LAB -      |              |
|             |        |                 | [place]  |              |
+-------------+--------+-----------------+------------+--------------+
| MCHC        | 34     | 30 - 36 g/dL    | [facility]  |              |
|             |        |                 | LAB -      |              |
|             |        |                 | [place]  |              |
+-------------+--------+-----------------+------------+--------------+
| PLATELET    | 415    | 140 - 440 K/cu  | [facility]  |              |
| COUNT       |        | mm              | LAB -      |              |
|             |        |                 | [place]  |              |
+-------------+--------+-----------------+------------+--------------+
| NEUTROPHIL  | 65.8   | 39 - 80 %       | [facility]  |              |
| %           |        |                 | LAB -      |              |
|             |        |                 | [place]  |              |
+-------------+--------+-----------------+------------+--------------+
| LYMPHOCYTE  | 22 (A) | 24 - 44 %       | [facility]  |              |
| %           |        |                 | LAB -      |              |
|             |        |                 | [place]  |              |
+-------------+--------+-----------------+------------+--------------+
| MONOCYTE %  | 7.4    | 0 - 12 %        | [facility]  |              |
|             |        |                 | LAB -      |              |
|             |        |                 | [place]  |              |
+-------------+--------+-----------------+------------+--------------+
| EOS %       | 3.9    | 0 - 6 %         | [facility]  |              |
|             |        |                 | LAB -      |              |
|             |        |                 | [place]  |              |
+-------------+--------+-----------------+------------+--------------+
| BASO %      | 0.9    | 0 - 2 %         | [facility]  |              |
|             |        |                 | LAB -      |              |
|             |        |                 | [place]  |              |
+-------------+--------+-----------------+------------+--------------+
 
| RDW         | 14.4   | 10.5 - 15 %     | [facility]  |              |
|             |        |                 | LAB -      |              |
|             |        |                 | [place]  |              |
+-------------+--------+-----------------+------------+--------------+
 
 
 
+---------------+
| Specimen      |
+---------------+
| Blood - Blood |
+---------------+
 
 
 
+--------------------+----------------------+--------------------+----------------+
| Performing         | Address              | City/State/Zipcode | Phone Number   |
| Organization       |                      |                    |                |
+--------------------+----------------------+--------------------+----------------+
|   [facility]  |   [address] | [place], [place]      |   [phone] |
| [place]          |                      |                    |                |
+--------------------+----------------------+--------------------+----------------+
 COMPLETE METABOLIC SET (NA,K,CL,CO2,BUN,CREAT,GLUC,CA,AST,ALT,BILI TOTAL,ALK PHOS,ALB,PROT 
TOTAL) (2016)
 
+-------------+-------+-----------------+------------+--------------+
| Component   | Value | Ref Range       | Performed  | Pathologist  |
|             |       |                 | At         | Signature    |
+-------------+-------+-----------------+------------+--------------+
| GLUCOSE,    | 73    | 70 - 100 mg/dL  | [facility]  |              |
| PLASMA      |       |                 | LAB -      |              |
| (LAB)       |       |                 | [place]  |              |
+-------------+-------+-----------------+------------+--------------+
| BUN, PLASMA | 8     | 6 - 23 mg/dL    | [facility]  |              |
|  (LAB)      |       |                 | LAB -      |              |
|             |       |                 | [place]  |              |
+-------------+-------+-----------------+------------+--------------+
| CREATININE  | 0.61  | 0.6 - 1.2 mg/dL | [facility]  |              |
| PLASMA      |       |                 | LAB -      |              |
| (LAB)       |       |                 | [place]  |              |
+-------------+-------+-----------------+------------+--------------+
| TOTAL       | 7.9   | 6 - 8 g/dL      | [facility]  |              |
| PROTEIN,    |       |                 | LAB -      |              |
| PLASMA      |       |                 | [place]  |              |
| (LAB)       |       |                 |            |              |
+-------------+-------+-----------------+------------+--------------+
| ALBUMIN,    | 4     | 3.5 - 5 g/dL    | [facility]  |              |
| PLASMA      |       |                 | LAB -      |              |
| (LAB)       |       |                 | [place]  |              |
+-------------+-------+-----------------+------------+--------------+
| CALCIUM,    | 9.7   | 8.4 - 10.2      | [facility]  |              |
| PLASMA      |       | mg/dL           | LAB -      |              |
| (LAB)       |       |                 | [place]  |              |
+-------------+-------+-----------------+------------+--------------+
| BILIRUBIN   | 0.3   | 0 - 1.2 mg/dL   | [facility]  |              |
| TOTAL       |       |                 | LAB -      |              |
|             |       |                 | [place]  |              |
+-------------+-------+-----------------+------------+--------------+
| ALK PHOS    | 108   | 30 - 128 U/L    | [facility]  |              |
|             |       |                 | LAB -      |              |
 
|             |       |                 | [place]  |              |
+-------------+-------+-----------------+------------+--------------+
| AST(SGOT)   | 18    | 13 - 39 U/L     | [facility]  |              |
|             |       |                 | LAB -      |              |
|             |       |                 | [place]  |              |
+-------------+-------+-----------------+------------+--------------+
| SODIUM,     | 137   | 132 - 143       | [facility]  |              |
| PLASMA      |       | mmol/L          | LAB -      |              |
| (LAB)       |       |                 | [place]  |              |
+-------------+-------+-----------------+------------+--------------+
| POTASSIUM,  | 4     | 3.6 - 5.1       | [facility]  |              |
| PLASMA      |       | mmol/L          | LAB -      |              |
| (LAB)       |       |                 | [place]  |              |
+-------------+-------+-----------------+------------+--------------+
| CHLORIDE,   | 100   | 95 - 112 mmol/L | [facility]  |              |
| PLASMA      |       |                 | LAB -      |              |
| (LAB)       |       |                 | [place]  |              |
+-------------+-------+-----------------+------------+--------------+
| TOTAL CO2,  | 24    | 19 - 31 mmol/L  | [facility]  |              |
| PLASMA      |       |                 | LAB -      |              |
| (LAB)       |       |                 | [place]  |              |
+-------------+-------+-----------------+------------+--------------+
| ALT (SGPT)  | 18    | 7 - 52 U/L      | [facility]  |              |
|             |       |                 | LAB -      |              |
|             |       |                 | [place]  |              |
+-------------+-------+-----------------+------------+--------------+
 
 
 
+---------------+
| Specimen      |
+---------------+
| Blood - Blood |
+---------------+
 
 
 
+--------------------+----------------------+--------------------+----------------+
| Performing         | Address              | City/State/Zipcode | Phone Number   |
| Organization       |                      |                    |                |
+--------------------+----------------------+--------------------+----------------+
|   [facility]  |   [address] | [place]      |   [phone] |
| [place]          |                      |                    |                |
+--------------------+----------------------+--------------------+----------------+
 documented in this encounter
 
 Visit Diagnoses
 Not on filedocumented in this encounter

## 2019-11-01 NOTE — XMS
Encounter Summary
  Created on: 2019
 
 Bonifacio Nila JB
 External Reference #: [id number]
 : [birth year]
 Sex: Female
 
 Demographics
 
 
+-----------------------+------------------------+
| Address               | [address]            |
|                       | [place]  [address]   |
+-----------------------+------------------------+
| Home Phone            | [phone]        |
+-----------------------+------------------------+
| Preferred Language    | Unknown                |
+-----------------------+------------------------+
| Marital Status        | Single                 |
+-----------------------+------------------------+
| Rastafarian Affiliation | Unknown                |
+-----------------------+------------------------+
| Race                  | White                  |
+-----------------------+------------------------+
| Ethnic Group          | Not  or  |
+-----------------------+------------------------+
 
 
 Author
 
 
+--------------+------------------------------+
| Author       | [facility] [employer] [facility] |
+--------------+------------------------------+
| Organization | [facility] |
+--------------+------------------------------+
| Address      | Unknown                      |
+--------------+------------------------------+
| Phone        | Unavailable                  |
+--------------+------------------------------+
 
 
 
 Support
 
 
+-----------------+--------------+---------+-----------------+
| Name            | Relationship | Address | Phone           |
+-----------------+--------------+---------+-----------------+
| Kit Valdovinos   | ECON         | Unknown | [phone] |
+-----------------+--------------+---------+-----------------+
| Magdalena Valdovinos | ECON         | Unknown | [phone] |
+-----------------+--------------+---------+-----------------+
 
 
 
 Care Team Providers
 
 
 
+------------------------+------+-----------------+
| Care Team Member Name  | Role | Phone           |
+------------------------+------+-----------------+
| Lily Horton MD | PCP  | [phone] |
+------------------------+------+-----------------+
 
 
 
 Reason for Visit
 
 
+----------+---------------+
| Reason   | Comments      |
+----------+---------------+
| Lab Draw | Reminder call |
+----------+---------------+
 
 
 
 Encounter Details
 
 
+--------+-----------+----------------------+----------------------+---------------------+
| Date   | Type      | Department           | Care Team            | Description         |
+--------+-----------+----------------------+----------------------+---------------------+
| 10/13/ | Telephone |   Pediatric          |   Neema Khalil,   | Lab Draw (Reminder  |
| 2016   |           | Gastroenterology at  | MD  [address] | call)               |
|        |           | [facility]          |   [place], [place]       |                     |
|        |           | [facility]  | [address]           |                     |
|        |           |  [address] [facility] | [phone]         |                     |
|        |           |  [address]  Mailcode:  | [phone] (Fax)   |                     |
|        |           | [facility]   |                      |                     |
|        |           | [place], [place]         |                      |                     |
|        |           | [address]           |                      |                     |
|        |           | [phone]         |                      |                     |
+--------+-----------+----------------------+----------------------+---------------------+
 
 
 
 Social History
 
 
+-------------------+-------+-----------+--------+------+
| Tobacco Use       | Types | Packs/Day | Years  | Date |
|                   |       |           | Used   |      |
+-------------------+-------+-----------+--------+------+
| Passive Smoke     |       |           |        |      |
| Exposure - Never  |       |           |        |      |
| Smoker            |       |           |        |      |
+-------------------+-------+-----------+--------+------+
 
 
 
+---------------------+---+---+---+
| Smokeless Tobacco:  |   |   |   |
| Never Used          |   |   |   |
+---------------------+---+---+---+
 
 
 
 
+-------------+-------------+---------+----------+
| Alcohol Use | Drinks/Week | oz/Week | Comments |
+-------------+-------------+---------+----------+
| Not Asked   |             |         |          |
+-------------+-------------+---------+----------+
 
 
 
+------------------+---------------+
| Sex Assigned at  | Date Recorded |
| Birth            |               |
+------------------+---------------+
| Not on file      |               |
+------------------+---------------+
 
 
 
+----------------+-------------+-------------+
| Job Start Date | Occupation  | Industry    |
+----------------+-------------+-------------+
| Not on file    | Not on file | Not on file |
+----------------+-------------+-------------+
 
 
 
+----------------+--------------+------------+
| Travel History | Travel Start | Travel End |
+----------------+--------------+------------+
 
 
 
+-------------------------------------+
| No recent travel history available. |
+-------------------------------------+
 documented as of this encounter
 
 Plan of Treatment
 Not on filedocumented as of this encounter
 
 Visit Diagnoses
 Not on filedocumented in this encounter

## 2019-11-01 NOTE — XMS
Encounter Summary
  Created on: 2019
 
 Bonifacio Nila JB
 External Reference #: [id number]
 : [birth year]
 Sex: Female
 
 Demographics
 
 
+-----------------------+------------------------+
| Address               | [address]            |
|                       | [place]  [address]   |
+-----------------------+------------------------+
| Home Phone            | [phone]        |
+-----------------------+------------------------+
| Preferred Language    | Unknown                |
+-----------------------+------------------------+
| Marital Status        | Single                 |
+-----------------------+------------------------+
| Worship Affiliation | Unknown                |
+-----------------------+------------------------+
| Race                  | White                  |
+-----------------------+------------------------+
| Ethnic Group          | Not  or  |
+-----------------------+------------------------+
 
 
 Author
 
 
+--------------+------------------------------+
| Author       | [facility] |
+--------------+------------------------------+
| Organization | [facility] |
+--------------+------------------------------+
| Address      | Unknown                      |
+--------------+------------------------------+
| Phone        | Unavailable                  |
+--------------+------------------------------+
 
 
 
 Support
 
 
+-----------------+--------------+---------+-----------------+
| Name            | Relationship | Address | Phone           |
+-----------------+--------------+---------+-----------------+
| Kit Valdovinos   | ECON         | Unknown | [phone] |
+-----------------+--------------+---------+-----------------+
| Magdalena Valdovinos | ECON         | Unknown | [phone] |
+-----------------+--------------+---------+-----------------+
 
 
 
 Care Team Providers
 
 
 
+------------------------+------+-----------------+
| Care Team Member Name  | Role | Phone           |
+------------------------+------+-----------------+
| Lily Horton MD | PCP  | [phone] |
+------------------------+------+-----------------+
 
 
 
 Reason for Visit
 
 
+-------------------+----------+
| Reason            | Comments |
+-------------------+----------+
| Care Coordination |          |
+-------------------+----------+
 
 
 
 Encounter Details
 
 
+--------+-----------+----------------------+----------------------+-------------------+
| Date   | Type      | Department           | Care Team            | Description       |
+--------+-----------+----------------------+----------------------+-------------------+
| / | Telephone |   Pediatric          |   Neema Khalil,   | Care Coordination |
| 2018   |           | Gastroenterology at  | MD  [address] |                   |
|        |           | [place]          |   [place], [place]       |                   |
|        |           | [facility]  | [address]           |                   |
|        |           |  [address] [facility] | [phone]         |                   |
|        |           |  A[address]  Mailcode:  | [phone] (Fax)   |                   |
|        |           | [address]   |                      |                   |
|        |           | [place], [place]         |                      |                   |
|        |           | [address]           |                      |                   |
|        |           | [phone]         |                      |                   |
+--------+-----------+----------------------+----------------------+-------------------+
 
 
 
 Social History
 
 
+-------------------+-------+-----------+--------+------+
| Tobacco Use       | Types | Packs/Day | Years  | Date |
|                   |       |           | Used   |      |
+-------------------+-------+-----------+--------+------+
| Passive Smoke     |       |           |        |      |
| Exposure - Never  |       |           |        |      |
| Smoker            |       |           |        |      |
+-------------------+-------+-----------+--------+------+
 
 
 
+---------------------+---+---+---+
| Smokeless Tobacco:  |   |   |   |
| Never Used          |   |   |   |
+---------------------+---+---+---+
 
 
 
 
+-------------+-------------+---------+----------+
| Alcohol Use | Drinks/Week | oz/Week | Comments |
+-------------+-------------+---------+----------+
| Not Asked   |             |         |          |
+-------------+-------------+---------+----------+
 
 
 
+------------------+---------------+
| Sex Assigned at  | Date Recorded |
| Birth            |               |
+------------------+---------------+
| Not on file      |               |
+------------------+---------------+
 
 
 
+----------------+-------------+-------------+
| Job Start Date | Occupation  | Industry    |
+----------------+-------------+-------------+
| Not on file    | Not on file | Not on file |
+----------------+-------------+-------------+
 
 
 
+----------------+--------------+------------+
| Travel History | Travel Start | Travel End |
+----------------+--------------+------------+
 
 
 
+-------------------------------------+
| No recent travel history available. |
+-------------------------------------+
 documented as of this encounter
 
 Plan of Treatment
 Not on filedocumented as of this encounter
 
 Visit Diagnoses
 Not on filedocumented in this encounter

## 2019-11-01 NOTE — XMS
Clinical Summary
  Created on: 2019
 
 BonifacioNila spencer
 External Reference #: [id number]
 : [birth year]
 Sex: Female
 
 Demographics
 
 
+-----------------------+---------------------------+
| Address               | [address]          |
|                       | [place]  [address] |
+-----------------------+---------------------------+
| Home Phone            | [phone]           |
+-----------------------+---------------------------+
| Preferred Language    | Unknown                   |
+-----------------------+---------------------------+
| Marital Status        | Single                    |
+-----------------------+---------------------------+
| Protestant Affiliation | Unknown                   |
+-----------------------+---------------------------+
| Race                  | Unknown                   |
+-----------------------+---------------------------+
| Ethnic Group          | Unknown                   |
+-----------------------+---------------------------+
 
 
 Author
 
 
+--------------+--------------------------------------------+
| Author       | [facility]  |
|              | [facility] [place]                                |
+--------------+--------------------------------------------+
| Organization | [facility]  |
|              | [facility] [place]                                |
+--------------+--------------------------------------------+
| Address      | Unknown                                    |
+--------------+--------------------------------------------+
| Phone        | Unavailable                                |
+--------------+--------------------------------------------+
 
 
 
 Support
 
 
+-----------------+--------------+-------------------+-----------------+
| Name            | Relationship | Address           | Phone           |
+-----------------+--------------+-------------------+-----------------+
| Magdalena Valdovinos | ECON         | [address]     | [phone] |
|                 |              | [place]   |                 |
|                 |              | [address]        |                 |
+-----------------+--------------+-------------------+-----------------+
 
 
 
 
 Care Team Providers
 
 
+-----------------------+------+-----------------+
| Care Team Member Name | Role | Phone           |
+-----------------------+------+-----------------+
| Augustine Buck DO      | PCP  | [phone] |
+-----------------------+------+-----------------+
 
 
 
 Allergies
 
 
+-------------------+----------------+----------+----------+----------+
| Active Allergy    | Reactions      | Severity | Noted    | Comments |
|                   |                |          | Date     |          |
+-------------------+----------------+----------+----------+----------+
| Sulfa Antibiotics | Swelling, Rash | Medium   | 20 |          |
|                   |                |          | 19       |          |
+-------------------+----------------+----------+----------+----------+
 
 
 
 Medications
 
 
+----------------------+---------------------+-----------+---------+------+------+-------+
| Medication           | Sig                 | Dispensed | Refills | Star | End  | Statu |
|                      |                     |           |         | t    | Date | s     |
|                      |                     |           |         | Date |      |       |
+----------------------+---------------------+-----------+---------+------+------+-------+
|                      | Inject  into the    |           | 0       | 05/0 |      | Activ |
| medroxyPROGESTERone  | muscle every 3      |           |         | / |      | e     |
| Acetate              | (three) months.     |           |         | 19   |      |       |
| (DEPO-PROVERA IM)    |                     |           |         |      |      |       |
+----------------------+---------------------+-----------+---------+------+------+-------+
|   amLODIPine         | Take 5 mg by mouth  |           | 0       | 05/0 |      | Activ |
| (NORVASC) 5 mg       | daily.              |           |         |  |      | e     |
| tablet               |                     |           |         | 19   |      |       |
+----------------------+---------------------+-----------+---------+------+------+-------+
|                      | Take 1 tablet by    |           | 1       | 04/2 |      | Activ |
| lisinopril-hydrochlo | mouth daily.        |           |         | 3/20 |      | e     |
| rothiazide           |                     |           |         | 19   |      |       |
| (PRINZIDE,ZESTORETIC |                     |           |         |      |      |       |
| ) 20-12.5 MG per     |                     |           |         |      |      |       |
| tablet               |                     |           |         |      |      |       |
+----------------------+---------------------+-----------+---------+------+------+-------+
 
 
 
 Active Problems
 
 
+-----------------+------------+
| Problem         | Noted Date |
+-----------------+------------+
| S/P VSD closure | 2016 |
+-----------------+------------+
 
 
 
 
+------------------------------------------------------------+
|   Overview:   Overview:                                    |
| 8 mm Amplatzer Vascular plug-4 device placement (10/16/14) |
+------------------------------------------------------------+
 
 
 
+-------------------------------------------------+------------+
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
+-------------------------------------------------+------------+
| VSD (ventricular septal defect), perimembranous | 2008 |
+-------------------------------------------------+------------+
 
 
 
+-------------------------------------------------------------------+
|   Overview:   Overview:  10/16/2014  S/p 8-mm Amplatzer Vascular  |
| Plug IV, with no significant residual shunting Left atrial        |
| isomerism  LAE (left atrial enlargement)  LVE (left ventricular   |
| enlargement)                                                      |
|LVE (left ventricular enlargement)                                 |
+-------------------------------------------------------------------+
 
 
 
+--------------------+------------+
| Transplanted liver | 10/12/2006 |
+--------------------+------------+
 
 
 
+-------------------------------------------+
|   Overview:   Overview:                   |
| For biliary atresia, 2000 at [facility] |
+-------------------------------------------+
 
 
 
 Family History
 
 
+-----------------+----------+------+--------------------+
| Medical History | Relation | Name | Comments           |
+-----------------+----------+------+--------------------+
| Heart disease   | Father   |      | had heart surgery  |
+-----------------+----------+------+--------------------+
| Hypertension    | Father   |      |                    |
+-----------------+----------+------+--------------------+
 
 
 
+----------+------+--------+----------+
| Relation | Name | Status | Comments |
+----------+------+--------+----------+
| Father   |      | Alive  |          |
+----------+------+--------+----------+
| Father   |      |        |          |
 
+----------+------+--------+----------+
| Mother   |      | Alive  |          |
+----------+------+--------+----------+
 
 
 
 Social History
 
 
+--------------+-------+-----------+--------+------+
| Tobacco Use  | Types | Packs/Day | Years  | Date |
|              |       |           | Used   |      |
+--------------+-------+-----------+--------+------+
| Never Smoker |       |           |        |      |
+--------------+-------+-----------+--------+------+
 
 
 
+------------------+---------------+
| Sex Assigned at  | Date Recorded |
| Birth            |               |
+------------------+---------------+
| Not on file      |               |
+------------------+---------------+
 
 
 
+----------------+-------------+-------------+
| Job Start Date | Occupation  | Industry    |
+----------------+-------------+-------------+
| Not on file    | Not on file | Not on file |
+----------------+-------------+-------------+
 
 
 
+----------------+--------------+------------+
| Travel History | Travel Start | Travel End |
+----------------+--------------+------------+
 
 
 
+-------------------------------------+
| No recent travel history available. |
+-------------------------------------+
 
 
 
 Last Filed Vital Signs
 
 
+-------------------+----------------------+---------------------+
| Vital Sign        | Reading              | Time Taken          |
+-------------------+----------------------+---------------------+
| Blood Pressure    | 124/70               | 2019 1455 PDT |
+-------------------+----------------------+---------------------+
| Pulse             | 100                  | 2019 PDT |
+-------------------+----------------------+---------------------+
| Temperature       | -                    | -                   |
+-------------------+----------------------+---------------------+
| Respiratory Rate  | -                    | -                   |
 
+-------------------+----------------------+---------------------+
| Oxygen Saturation | -                    | -                   |
+-------------------+----------------------+---------------------+
| Inhaled Oxygen    | -                    | -                   |
| Concentration     |                      |                     |
+-------------------+----------------------+---------------------+
| Weight            | 96.4 kg (212 lb 9.6  | 2019 1455 PDT |
|                   | oz)                  |                     |
+-------------------+----------------------+---------------------+
| Height            | 165.1 cm (5' 5")     | 20195 PDT |
+-------------------+----------------------+---------------------+
| Body Mass Index   | 35.38                | 20195 PDT |
+-------------------+----------------------+---------------------+
 
 
 
 Plan of Treatment
 
 
+--------+---------+------------+----------------------+-------------+
| Date   | Type    | Specialty  | Care Team            | Description |
+--------+---------+------------+----------------------+-------------+
| / | Office  | Cardiology |   Renetta Goldberg, |             |
|    | Visit   |            |  MD [address]   |             |
|        |         |            | [address]  [place], [place]  |             |
|        |         |            | [id number] [phone]  |             |
|        |         |            |  [phone] (Fax)  |             |
+--------+---------+------------+----------------------+-------------+
 
 
 
+---------------------+-----------+-----------+----------+
| Health Maintenance  | Due Date  | Last Done | Comments |
+---------------------+-----------+-----------+----------+
| Well Child Check    | 01/15/200 |           |          |
|                     | 2         |           |          |
+---------------------+-----------+-----------+----------+
| Vaccine: HPV (1 -   | 01/15/201 |           |          |
| Female 3-dose       | 4         |           |          |
| series)             |           |           |          |
+---------------------+-----------+-----------+----------+
| Vaccine:            | 01/15/201 |           |          |
| Dtap/Tdap/Td (1 -   | 8         |           |          |
| Tdap)               |           |           |          |
+---------------------+-----------+-----------+----------+
| Vaccine: Influenza  |  |           |          |
| (#1)                | 9         |           |          |
+---------------------+-----------+-----------+----------+
 
 
 
 Results
 Not on filefrom Last 3 Months
 
 Insurance
 
 
+--------------------+--------+-------------+--------+-------------+---------+------+
| Payer              | Benefi | Subscriber  | Effect | Phone       | Address | Type |
|                    | t Plan | ID          | david    |             |         |      |
 
|                    |  /     |             | Dates  |             |         |      |
|                    | Group  |             |        |             |         |      |
+--------------------+--------+-------------+--------+-------------+---------+------+
| [employer]       | FIRST  | [id number] |  | [phone] |         | PPO  |
|                    | HEALTH |             | 018-Pr | 7           |         |      |
|                    |  OTHER |             | esent  |             |         |      |
+--------------------+--------+-------------+--------+-------------+---------+------+
| [employer]  | PHP    | [id number] | 20 | [phone] |         | PPO  |
| PLAN               | PEBB   |             | 19-Pre | 5           |         |      |
|                    | STATEW |             | sent   |             |         |      |
|                    | JONY    |             |        |             |         |      |
+--------------------+--------+-------------+--------+-------------+---------+------+
 
 
 
+-----------------+--------+-------------+--------+-------------+----------------------+
| Guarantor Name  | Accoun | Relation to | Date   | Phone       | Billing Address      |
|                 | t Type |  Patient    | of     |             |                      |
|                 |        |             | Birth  |             |                      |
+-----------------+--------+-------------+--------+-------------+----------------------+
| Nila Valdovinos | Person | Self        | [birth year]             |   [address]   |
|                 | [place] |             | 2000   | [phone] | [place]        |
|                 | [place]    |             |        | [address] (Home)    | [address]           |
+-----------------+--------+-------------+--------+-------------+----------------------+
 
 
 
 Advance Directives
 Patient has advance care planning documents on file. For more information, please contact:[facility] [place] [address]

## 2019-11-01 NOTE — XMS
Encounter Summary
  Created on: 2019
 
 Bonifacio Nila JB
 External Reference #: [id number]
 : [birth year]
 Sex: Female
 
 Demographics
 
 
+-----------------------+------------------------+
| Address               | [address]            |
|                       | [place]  [address]   |
+-----------------------+------------------------+
| Home Phone            | [phone]        |
+-----------------------+------------------------+
| Preferred Language    | Unknown                |
+-----------------------+------------------------+
| Marital Status        | Single                 |
+-----------------------+------------------------+
| Catholic Affiliation | Unknown                |
+-----------------------+------------------------+
| Race                  | White                  |
+-----------------------+------------------------+
| Ethnic Group          | Not  or  |
+-----------------------+------------------------+
 
 
 Author
 
 
+--------------+------------------------------+
| Author       | [facility] |
+--------------+------------------------------+
| Organization | [facility] |
+--------------+------------------------------+
| Address      | Unknown                      |
+--------------+------------------------------+
| Phone        | Unavailable                  |
+--------------+------------------------------+
 
 
 
 Support
 
 
+-----------------+--------------+---------+-----------------+
| Name            | Relationship | Address | Phone           |
+-----------------+--------------+---------+-----------------+
| Kit Valdovinos   | ECON         | Unknown | [phone] |
+-----------------+--------------+---------+-----------------+
| Magdalena Valdovinos | ECON         | Unknown | [phone] |
+-----------------+--------------+---------+-----------------+
 
 
 
 Care Team Providers
 
 
 
+-----------------------+------+-----------------+
| Care Team Member Name | Role | Phone           |
+-----------------------+------+-----------------+
| Lily Horton MD   | PCP  | [phone] |
+-----------------------+------+-----------------+
 
 
 
 Reason for Visit
 Consultation (Routine)
 
+--------+--------------+---------------+--------------+--------------+---------------+
| Status | Reason       | Specialty     | Diagnoses /  | Referred By  | Referred To   |
|        |              |               | Procedures   | Contact      | Contact       |
+--------+--------------+---------------+--------------+--------------+---------------+
| Closed |   Specialty  | Pediatric     |   Diagnoses  |   [facility]  |
|        | Services     | Gastroenterol |  Liver       | Lily CARY MD | [facility]  [address]   |
|        | Required     | ogy           | replaced by  |   PEDS       | [address]   |
|        |              |               | transplant   | SPECIALISTS  | [address]       |
|        |              |               | (HCC)        | [place] | Mailcode:     |
|        |              |               |              |   [address]   | [facility]         |
|        |              |               |              | [address] | [place]   |
|        |              |               |              |  [address]         | [place], [place]  |
|        |              |               |              | [place],   | [address]    |
|        |              |               |              | [place] [address]     | Phone:        |
|        |              |               |              | Phone:       | [phone]  |
|        |              |               |              | [phone] |  Fax:         |
|        |              |               |              |   Fax:       | [phone]  |
|        |              |               |              | [phone] |               |
+--------+--------------+---------------+--------------+--------------+---------------+
 
 
 
 
 Encounter Details
 
 
+--------+-----------+----------------------+--------------------+-------------+
| Date   | Type      | Department           | Care Team          | Description |
+--------+-----------+----------------------+--------------------+-------------+
| 10/12/ | Office    |   Pediatric          |   Ivis Burkett MD |             |
|    | Visit-ECX | Gastroenterology at  |                    |             |
|        |           | [place]          |                    |             |
|        |           | [facility]  |                    |             |
|        |           |  [address] |                    |             |
|        |           |  [address]  Mailcode:  |                    |             |
|        |           | [facility]  [place]   |                    |             |
|        |           | [place], [place]         |                    |             |
|        |           | [address]           |                    |             |
|        |           | [phone]         |                    |             |
+--------+-----------+----------------------+--------------------+-------------+
 
 
 
 Social History
 
 
+----------------+-------+-----------+--------+------+
 
| Tobacco Use    | Types | Packs/Day | Years  | Date |
|                |       |           | Used   |      |
+----------------+-------+-----------+--------+------+
| Never Assessed |       |           |        |      |
+----------------+-------+-----------+--------+------+
 
 
 
+------------------+---------------+
| Sex Assigned at  | Date Recorded |
| Birth            |               |
+------------------+---------------+
| Not on file      |               |
+------------------+---------------+
 
 
 
+----------------+-------------+-------------+
| Job Start Date | Occupation  | Industry    |
+----------------+-------------+-------------+
| Not on file    | Not on file | Not on file |
+----------------+-------------+-------------+
 
 
 
+----------------+--------------+------------+
| Travel History | Travel Start | Travel End |
+----------------+--------------+------------+
 
 
 
+-------------------------------------+
| No recent travel history available. |
+-------------------------------------+
 documented as of this encounter
 
 Last Filed Vital Signs
 
 
+-------------------+----------------------+----------------------+----------+
| Vital Sign        | Reading              | Time Taken           | Comments |
+-------------------+----------------------+----------------------+----------+
| Blood Pressure    | 120/83               | 10/12/2006  2:15 PM  |          |
|                   |                      | PDT                  |          |
+-------------------+----------------------+----------------------+----------+
| Pulse             | 80                   | 10/12/2006  2:15 PM  |          |
|                   |                      | PDT                  |          |
+-------------------+----------------------+----------------------+----------+
| Temperature       | -                    | -                    |          |
+-------------------+----------------------+----------------------+----------+
| Respiratory Rate  | -                    | -                    |          |
+-------------------+----------------------+----------------------+----------+
| Oxygen Saturation | -                    | -                    |          |
+-------------------+----------------------+----------------------+----------+
| Inhaled Oxygen    | -                    | -                    |          |
| Concentration     |                      |                      |          |
+-------------------+----------------------+----------------------+----------+
| Weight            | 30.8 kg (67 lb 14.4  | 10/12/2006  2:15 PM  |          |
|                   | oz)                  | PDT                  |          |
+-------------------+----------------------+----------------------+----------+
 
| Height            | 130 cm (4' 3.18")    | 10/12/2006  2:15 PM  |          |
|                   |                      | PDT                  |          |
+-------------------+----------------------+----------------------+----------+
| Body Mass Index   | 18.23                | 10/12/2006  2:15 PM  |          |
|                   |                      | PDT                  |          |
+-------------------+----------------------+----------------------+----------+
 documented in this encounter
 
 Plan of Treatment
 Not on filedocumented as of this encounter
 
 Visit Diagnoses
 Not on filedocumented in this encounter

## 2019-11-01 NOTE — XMS
Encounter Summary
  Created on: 2019
 
 Bonifacio Nila JB
 External Reference #: [id number]
 : [birth year]
 Sex: Female
 
 Demographics
 
 
+-----------------------+------------------------+
| Address               | [address]            |
|                       | [place]  [address]   |
+-----------------------+------------------------+
| Home Phone            | [phone]        |
+-----------------------+------------------------+
| Preferred Language    | Unknown                |
+-----------------------+------------------------+
| Marital Status        | Single                 |
+-----------------------+------------------------+
| Holiness Affiliation | Unknown                |
+-----------------------+------------------------+
| Race                  | White                  |
+-----------------------+------------------------+
| Ethnic Group          | Not  or  |
+-----------------------+------------------------+
 
 
 Author
 
 
+--------------+------------------------------+
| Author       | [facility] [employer] [facility] |
+--------------+------------------------------+
| Organization | [facility] |
+--------------+------------------------------+
| Address      | Unknown                      |
+--------------+------------------------------+
| Phone        | Unavailable                  |
+--------------+------------------------------+
 
 
 
 Support
 
 
+-----------------+--------------+---------+-----------------+
| Name            | Relationship | Address | Phone           |
+-----------------+--------------+---------+-----------------+
| Kit Valdovinos   | ECON         | Unknown | [phone] |
+-----------------+--------------+---------+-----------------+
| Magdalena Valdovinos | ECON         | Unknown | [phone] |
+-----------------+--------------+---------+-----------------+
 
 
 
 Care Team Providers
 
 
 
+-----------------------+------+-----------------+
| Care Team Member Name | Role | Phone           |
+-----------------------+------+-----------------+
| Lily Horton MD   | PCP  | [phone] |
+-----------------------+------+-----------------+
 
 
 
 Reason for Visit
 
 
+----------------+----------+
| Reason         | Comments |
+----------------+----------+
| Refill Request |          |
+----------------+----------+
 
 
 
 Encounter Details
 
 
+--------+--------+----------------------+---------------------+----------------+
| Date   | Type   | Department           | Care Team           | Description    |
+--------+--------+----------------------+---------------------+----------------+
| / | Refill |   Pediatric          |   Monserrat Spann,  | Refill Request |
|    |        | Gastroenterology at  | RN  [address]     |                |
|        |        | [place]          | [address]     |                |
|        |        | [facility]  | [place] [address]  |                |
|        |        |  [address] |                     |                |
|        |        |  [address]  Mailcode:  |                     |                |
|        |        | [facility]  [place]   |                     |                |
|        |        | [place], [place]         |                     |                |
|        |        | [address]           |                     |                |
|        |        | [phone]         |                     |                |
+--------+--------+----------------------+---------------------+----------------+
 
 
 
 Social History
 
 
+----------------+-------+-----------+--------+------+
| Tobacco Use    | Types | Packs/Day | Years  | Date |
|                |       |           | Used   |      |
+----------------+-------+-----------+--------+------+
| Never Assessed |       |           |        |      |
+----------------+-------+-----------+--------+------+
 
 
 
+------------------+---------------+
| Sex Assigned at  | Date Recorded |
| Birth            |               |
+------------------+---------------+
| Not on file      |               |
+------------------+---------------+
 
 
 
 
+----------------+-------------+-------------+
| Job Start Date | Occupation  | Industry    |
+----------------+-------------+-------------+
| Not on file    | Not on file | Not on file |
+----------------+-------------+-------------+
 
 
 
+----------------+--------------+------------+
| Travel History | Travel Start | Travel End |
+----------------+--------------+------------+
 
 
 
+-------------------------------------+
| No recent travel history available. |
+-------------------------------------+
 documented as of this encounter
 
 Plan of Treatment
 Not on filedocumented as of this encounter
 
 Visit Diagnoses
 Not on filedocumented in this encounter

## 2019-11-01 NOTE — XMS
Encounter Summary
  Created on: 2019
 
 Bonifacio Nila JB
 External Reference #: [id number]
 : [birth year]
 Sex: Female
 
 Demographics
 
 
+-----------------------+------------------------+
| Address               | [address]            |
|                       | [place]  [address]   |
+-----------------------+------------------------+
| Home Phone            | [phone]        |
+-----------------------+------------------------+
| Preferred Language    | Unknown                |
+-----------------------+------------------------+
| Marital Status        | Single                 |
+-----------------------+------------------------+
| Sikh Affiliation | Unknown                |
+-----------------------+------------------------+
| Race                  | White                  |
+-----------------------+------------------------+
| Ethnic Group          | Not  or  |
+-----------------------+------------------------+
 
 
 Author
 
 
+--------------+------------------------------+
| Author       | [facility] [employer] [facility] |
+--------------+------------------------------+
| Organization | [facility] |
+--------------+------------------------------+
| Address      | Unknown                      |
+--------------+------------------------------+
| Phone        | Unavailable                  |
+--------------+------------------------------+
 
 
 
 Support
 
 
+-----------------+--------------+---------+-----------------+
| Name            | Relationship | Address | Phone           |
+-----------------+--------------+---------+-----------------+
| Kit Valdovinos   | ECON         | Unknown | [phone] |
+-----------------+--------------+---------+-----------------+
| Magdalena Valdovinos | ECON         | Unknown | [phone] |
+-----------------+--------------+---------+-----------------+
 
 
 
 Care Team Providers
 
 
 
+------------------------+------+-----------------+
| Care Team Member Name  | Role | Phone           |
+------------------------+------+-----------------+
| Lily Horton MD | PCP  | [phone] |
+------------------------+------+-----------------+
 
 
 
 Reason for Visit
 
 
+-------------------+------------------+
| Reason            | Comments         |
+-------------------+------------------+
| Care Coordination | Pedro request |
+-------------------+------------------+
 
 
 
 Encounter Details
 
 
+--------+-----------+----------------------+----------------------+--------------------+
| Date   | Type      | Department           | Care Team            | Description        |
+--------+-----------+----------------------+----------------------+--------------------+
| / | Telephone |   Pediatric          |   Neema Khalil,   | Care Coordination  |
| 2017   |           | Gastroenterology at  | MD  [address] | ([facility]) |
|        |           | [place]          |   [place], [place]       |                    |
|        |           | [facility]  | [address]           |                    |
|        |           |  [address] | [phone]         |                    |
|        |           |  [address]  Mailcode:  | [phone] (Fax)   |                    |
|        |           | [facility] [address]   |                      |                    |
|        |           | [facility] [place]         |                      |                    |
|        |           | [address]           |                      |                    |
|        |           | [phone]         |                      |                    |
+--------+-----------+----------------------+----------------------+--------------------+
 
 
 
 Social History
 
 
+-------------------+-------+-----------+--------+------+
| Tobacco Use       | Types | Packs/Day | Years  | Date |
|                   |       |           | Used   |      |
+-------------------+-------+-----------+--------+------+
| Passive Smoke     |       |           |        |      |
| Exposure - Never  |       |           |        |      |
| Smoker            |       |           |        |      |
+-------------------+-------+-----------+--------+------+
 
 
 
+---------------------+---+---+---+
| Smokeless Tobacco:  |   |   |   |
| Never Used          |   |   |   |
+---------------------+---+---+---+
 
 
 
 
+-------------+-------------+---------+----------+
| Alcohol Use | Drinks/Week | oz/Week | Comments |
+-------------+-------------+---------+----------+
| Not Asked   |             |         |          |
+-------------+-------------+---------+----------+
 
 
 
+------------------+---------------+
| Sex Assigned at  | Date Recorded |
| Birth            |               |
+------------------+---------------+
| Not on file      |               |
+------------------+---------------+
 
 
 
+----------------+-------------+-------------+
| Job Start Date | Occupation  | Industry    |
+----------------+-------------+-------------+
| Not on file    | Not on file | Not on file |
+----------------+-------------+-------------+
 
 
 
+----------------+--------------+------------+
| Travel History | Travel Start | Travel End |
+----------------+--------------+------------+
 
 
 
+-------------------------------------+
| No recent travel history available. |
+-------------------------------------+
 documented as of this encounter
 
 Plan of Treatment
 Not on filedocumented as of this encounter
 
 Visit Diagnoses
 Not on filedocumented in this encounter

## 2019-11-01 NOTE — XMS
Encounter Summary
  Created on: 2019
 
 Bonifacio Nila JB
 External Reference #: [id number]
 : [birth year]
 Sex: Female
 
 Demographics
 
 
+-----------------------+------------------------+
| Address               | [address]            |
|                       | [place]  [address]   |
+-----------------------+------------------------+
| Home Phone            | [phone]        |
+-----------------------+------------------------+
| Preferred Language    | Unknown                |
+-----------------------+------------------------+
| Marital Status        | Single                 |
+-----------------------+------------------------+
| Congregational Affiliation | Unknown                |
+-----------------------+------------------------+
| Race                  | White                  |
+-----------------------+------------------------+
| Ethnic Group          | Not  or  |
+-----------------------+------------------------+
 
 
 Author
 
 
+--------------+------------------------------+
| Author       | [facility] |
+--------------+------------------------------+
| Organization | [facility] |
+--------------+------------------------------+
| Address      | Unknown                      |
+--------------+------------------------------+
| Phone        | Unavailable                  |
+--------------+------------------------------+
 
 
 
 Support
 
 
+-----------------+--------------+---------+-----------------+
| Name            | Relationship | Address | Phone           |
+-----------------+--------------+---------+-----------------+
| Kit Valdovinos   | ECON         | Unknown | [phone] |
+-----------------+--------------+---------+-----------------+
| Magdalena Valdovinos | ECON         | Unknown | [phone] |
+-----------------+--------------+---------+-----------------+
 
 
 
 Care Team Providers
 
 
 
+------------------------+------+-----------------+
| Care Team Member Name  | Role | Phone           |
+------------------------+------+-----------------+
| Lily Horton MD | PCP  | [phone] |
+------------------------+------+-----------------+
 
 
 
 Encounter Details
 
 
+--------+----------+----------------------+----------------------+-------------+
| Date   | Type     | Department           | Care Team            | Description |
+--------+----------+----------------------+----------------------+-------------+
| 10/26/ | Abstract |   Pediatric          |   Neema Khalil,   |             |
|    |          | Gastroenterology at  | MD  [address] |             |
|        |          | [place]          |   [place], [place]       |             |
|        |          | [facility]  | [address]           |             |
|        |          |  [address] | [phone]         |             |
|        |          |  [address]  Mailcode:  | [phone] (Fax)   |             |
|        |          |   [place]   |                      |             |
|        |          | [place], [place]         |                      |             |
|        |          | [address]           |                      |             |
|        |          | [phone]         |                      |             |
+--------+----------+----------------------+----------------------+-------------+
 
 
 
 Social History
 
 
+-------------------+-------+-----------+--------+------+
| Tobacco Use       | Types | Packs/Day | Years  | Date |
|                   |       |           | Used   |      |
+-------------------+-------+-----------+--------+------+
| Passive Smoke     |       |           |        |      |
| Exposure - Never  |       |           |        |      |
| Smoker            |       |           |        |      |
+-------------------+-------+-----------+--------+------+
 
 
 
+---------------------+---+---+---+
| Smokeless Tobacco:  |   |   |   |
| Never Used          |   |   |   |
+---------------------+---+---+---+
 
 
 
+-------------+-------------+---------+----------+
| Alcohol Use | Drinks/Week | oz/Week | Comments |
+-------------+-------------+---------+----------+
| Not Asked   |             |         |          |
+-------------+-------------+---------+----------+
 
 
 
+------------------+---------------+
 
| Sex Assigned at  | Date Recorded |
| Birth            |               |
+------------------+---------------+
| Not on file      |               |
+------------------+---------------+
 
 
 
+----------------+-------------+-------------+
| Job Start Date | Occupation  | Industry    |
+----------------+-------------+-------------+
| Not on file    | Not on file | Not on file |
+----------------+-------------+-------------+
 
 
 
+----------------+--------------+------------+
| Travel History | Travel Start | Travel End |
+----------------+--------------+------------+
 
 
 
+-------------------------------------+
| No recent travel history available. |
+-------------------------------------+
 documented as of this encounter
 
 Plan of Treatment
 Not on filedocumented as of this encounter
 
 Procedures
 
 
+----------------------+--------+-------------+----------------------+----------------------
+
| Procedure Name       | Priori | Date/Time   | Associated Diagnosis | Comments             
|
|                      | ty     |             |                      |                      
|
+----------------------+--------+-------------+----------------------+----------------------
+
| CHOLESTEROL, TOTAL   | Routin | 10/23/2015  |                      |   Results for this   
|
| (EXTERNAL RESULTS    | e      |  9:25 AM    |                      | procedure are in the 
|
| ONLY)                |        | PDT         |                      |  results section.    
|
+----------------------+--------+-------------+----------------------+----------------------
+
| CBC, WITH            | Routin | 10/23/2015  |                      |   Results for this   
|
| DIFFERENTIAL         | e      |  9:25 AM    |                      | procedure are in the 
|
|                      |        | PDT         |                      |  results section.    
|
+----------------------+--------+-------------+----------------------+----------------------
+
| COMPLETE METABOLIC   | Routin | 10/23/2015  |                      |   Results for this   
|
| SET                  | e      |  9:25 AM    |                      | procedure are in the 
 
|
| (NA,K,CL,CO2,BUN,CRE |        | PDT         |                      |  results section.    
|
| AT,GLUC,CA,AST,ALT,B |        |             |                      |                      
|
| ASHWINI TOTAL,ALK        |        |             |                      |                      
|
| PHOS,ALB,PROT TOTAL) |        |             |                      |                      
|
+----------------------+--------+-------------+----------------------+----------------------
+
| TACROLIMUS, WHOLE    | Routin | 10/23/2015  |                      |   Results for this   
|
| BLOOD                | e      |  9:25 AM    |                      | procedure are in the 
|
|                      |        | PDT         |                      |  results section.    
|
+----------------------+--------+-------------+----------------------+----------------------
+
| PHOSPHORUS, PLASMA   | Routin | 10/23/2015  |                      |   Results for this   
|
|                      | e      |  9:25 AM    |                      | procedure are in the 
|
|                      |        | PDT         |                      |  results section.    
|
+----------------------+--------+-------------+----------------------+----------------------
+
| GGT, PLASMA          | Routin | 10/23/2015  |                      |   Results for this   
|
|                      | e      |  9:25 AM    |                      | procedure are in the 
|
|                      |        | PDT         |                      |  results section.    
|
+----------------------+--------+-------------+----------------------+----------------------
+
| URIC ACID, PLASMA    | Routin | 10/23/2015  |                      |   Results for this   
|
|                      | e      |  9:25 AM    |                      | procedure are in the 
|
|                      |        | PDT         |                      |  results section.    
|
+----------------------+--------+-------------+----------------------+----------------------
+
| MAGNESIUM, PLASMA    | Routin | 10/23/2015  |                      |   Results for this   
|
|                      | e      |  9:25 AM    |                      | procedure are in the 
|
|                      |        | PDT         |                      |  results section.    
|
+----------------------+--------+-------------+----------------------+----------------------
+
| TRIGLYCERIDES,       | Routin | 10/23/2015  |                      |   Results for this   
|
| PLASMA               | e      |  9:25 AM    |                      | procedure are in the 
|
|                      |        | PDT         |                      |  results section.    
|
+----------------------+--------+-------------+----------------------+----------------------
+
| LDH TOTAL, PLASMA    | Routin | 10/23/2015  |                      |   Results for this   
 
|
|                      | e      |  9:25 AM    |                      | procedure are in the 
|
|                      |        | PDT         |                      |  results section.    
|
+----------------------+--------+-------------+----------------------+----------------------
+
 documented in this encounter
 
 Results
 TACROLIMUS, WHOLE BLOOD (10/23/2015  9:25 AM PDT)
 
+-----------+-------+-----------+------------+--------------+
| Component | Value | Ref Range | Performed  | Pathologist  |
|           |       |           | At         | Signature    |
+-----------+-------+-----------+------------+--------------+
|     | <1.0  |           | [facility]  |              |
|           |       |           | LAB -      |              |
|           |       |           | [place]  |              |
+-----------+-------+-----------+------------+--------------+
 
 
 
+---------------+
| Specimen      |
+---------------+
| Blood - Blood |
+---------------+
 
 
 
+--------------------+----------------------+--------------------+----------------+
| Performing         | Address              | City/State/Zipcode | Phone Number   |
| Organization       |                      |                    |                |
+--------------------+----------------------+--------------------+----------------+
|   [facility]  |   [address] | [place]      |   [phone] |
| [place]          |                      |                    |                |
+--------------------+----------------------+--------------------+----------------+
 CBC, WITH DIFFERENTIAL (10/23/2015  9:25 AM PDT)
 
+-------------+-------+-----------------+------------+--------------+
| Component   | Value | Ref Range       | Performed  | Pathologist  |
|             |       |                 | At         | Signature    |
+-------------+-------+-----------------+------------+--------------+
| WHITE CELL  | 5.2   | 4.5 - 11.0 K/cu | [facility]  |              |
| COUNT       |       |  mm             | LAB -      |              |
|             |       |                 | [place]  |              |
+-------------+-------+-----------------+------------+--------------+
| RED CELL    | 4.65  | 3.8 - 5.1 M/cu  | [facility]  |              |
| COUNT       |       | mm              | LAB -      |              |
|             |       |                 | [place]  |              |
+-------------+-------+-----------------+------------+--------------+
| HEMOGLOBIN  | 13.5  | 12 - 16 g/dL    | [facility]  |              |
|             |       |                 | LAB -      |              |
|             |       |                 | [place]  |              |
+-------------+-------+-----------------+------------+--------------+
| HEMATOCRIT  | 41    | 35 - 45 %       | [facility]  |              |
|             |       |                 | LAB -      |              |
|             |       |                 | [place]  |              |
+-------------+-------+-----------------+------------+--------------+
 
| MCV         | 88.2  | 81 - 99 fL      | [facility]  |              |
|             |       |                 | LAB -      |              |
|             |       |                 | [place]  |              |
+-------------+-------+-----------------+------------+--------------+
| MCH         | 29    | 27 - 33 pg      | [facility]  |              |
|             |       |                 | LAB -      |              |
|             |       |                 | [place]  |              |
+-------------+-------+-----------------+------------+--------------+
| MCHC        | 33    | 30 - 36 g/dL    | [facility]  |              |
|             |       |                 | LAB -      |              |
|             |       |                 | [place]  |              |
+-------------+-------+-----------------+------------+--------------+
| PLATELET    | 343   | 140 - 440 K/cu  | [facility]  |              |
| COUNT       |       | mm              | LAB -      |              |
|             |       |                 | [place]  |              |
+-------------+-------+-----------------+------------+--------------+
| NEUTROPHIL  | 54.2  | 39 - 80 %       | [facility]  |              |
| %           |       |                 | LAB -      |              |
|             |       |                 | [place]  |              |
+-------------+-------+-----------------+------------+--------------+
| LYMPHOCYTE  | 32.3  | 24 - 44 %       | [facility]  |              |
| %           |       |                 | LAB -      |              |
|             |       |                 | [place]  |              |
+-------------+-------+-----------------+------------+--------------+
| MONOCYTE %  | 9.8   | 0 - 12 %        | [facility]  |              |
|             |       |                 | LAB -      |              |
|             |       |                 | [place]  |              |
+-------------+-------+-----------------+------------+--------------+
| EOS %       | 2.6   | 0 - 6 %         | [facility]  |              |
|             |       |                 | LAB -      |              |
|             |       |                 | [place]  |              |
+-------------+-------+-----------------+------------+--------------+
| BASO %      | 1.1   | 0 - 2 %         | [facility]  |              |
|             |       |                 | LAB -      |              |
|             |       |                 | [place]  |              |
+-------------+-------+-----------------+------------+--------------+
| RDW         | 13.7  | 10.5 - 15.0 %   | [facility]  |              |
|             |       |                 | LAB -      |              |
|             |       |                 | [place]  |              |
+-------------+-------+-----------------+------------+--------------+
 
 
 
+---------------+
| Specimen      |
+---------------+
| Blood - Blood |
+---------------+
 
 
 
+--------------------+----------------------+--------------------+----------------+
| Performing         | Address              | City/State/Zipcode | Phone Number   |
| Organization       |                      |                    |                |
+--------------------+----------------------+--------------------+----------------+
|   [facility]  |   [address] | [place]      |   [phone] |
| [place]          |                      |                    |                |
+--------------------+----------------------+--------------------+----------------+
 MAGNESIUM, PLASMA (10/23/2015  9:25 AM PDT)
 
 
+-----------+-------+-----------------+------------+--------------+
| Component | Value | Ref Range       | Performed  | Pathologist  |
|           |       |                 | At         | Signature    |
+-----------+-------+-----------------+------------+--------------+
| MAGNESIUM | 1.7   | 1.7 - 2.5 mg/dL | [facility]  |              |
|           |       |                 | LAB -      |              |
|           |       |                 | [place]  |              |
+-----------+-------+-----------------+------------+--------------+
 
 
 
+---------------+
| Specimen      |
+---------------+
| Blood - Blood |
+---------------+
 
 
 
+--------------------+----------------------+--------------------+----------------+
| Performing         | Address              | City/State/Zipcode | Phone Number   |
| Organization       |                      |                    |                |
+--------------------+----------------------+--------------------+----------------+
|   [facility]  |   [address] | [place]      |   [phone] |
| [place]          |                      |                    |                |
+--------------------+----------------------+--------------------+----------------+
 GGT, PLASMA (10/23/2015  9:25 AM PDT)
 
+-----------+-------+------------+------------+--------------+
| Component | Value | Ref Range  | Performed  | Pathologist  |
|           |       |            | At         | Signature    |
+-----------+-------+------------+------------+--------------+
| GAMMA     | 9     | 5 - 60 U/L | [facility]  |              |
| GLUTAMYL  |       |            | LAB -      |              |
| TRANS     |       |            | [place]  |              |
+-----------+-------+------------+------------+--------------+
 
 
 
+---------------+
| Specimen      |
+---------------+
| Blood - Blood |
+---------------+
 
 
 
+--------------------+----------------------+--------------------+----------------+
| Performing         | Address              | City/State/Zipcode | Phone Number   |
| Organization       |                      |                    |                |
+--------------------+----------------------+--------------------+----------------+
|   [facility]  |   [address] | [place], [place]      |   [phone] |
| [place]          |                      |                    |                |
+--------------------+----------------------+--------------------+----------------+
 COMPLETE METABOLIC SET (NA,K,CL,CO2,BUN,CREAT,GLUC,CA,AST,ALT,BILI TOTAL,ALK PHOS,ALB,PROT 
TOTAL) (10/23/2015  9:25 AM PDT)
 
+-------------+-------+-----------------+------------+--------------+
| Component   | Value | Ref Range       | Performed  | Pathologist  |
|             |       |                 | At         | Signature    |
 
+-------------+-------+-----------------+------------+--------------+
| GLUCOSE,    | 83    | 70 - 100 mg/dL  | [facility]  |              |
| PLASMA      |       |                 | LAB -      |              |
| (LAB)       |       |                 | [place]  |              |
+-------------+-------+-----------------+------------+--------------+
| BUN, PLASMA | 13    | 6 - 23 mg/dL    | [facility]  |              |
|  (LAB)      |       |                 | LAB -      |              |
|             |       |                 | [place]  |              |
+-------------+-------+-----------------+------------+--------------+
| CREATININE  | 0.61  | 0.60 - 1.20     | [facility]  |              |
| PLASMA      |       | mg/dL           | LAB -      |              |
| (LAB)       |       |                 | [place]  |              |
+-------------+-------+-----------------+------------+--------------+
| TOTAL       | 7.2   | 6.1 - 8.5 g/dL  | [facility]  |              |
| PROTEIN,    |       |                 | LAB -      |              |
| PLASMA      |       |                 | [place]  |              |
| (LAB)       |       |                 |            |              |
+-------------+-------+-----------------+------------+--------------+
| ALBUMIN,    | 4.1   | 3.5 - 5.0 g/dL  | [facility]  |              |
| PLASMA      |       |                 | LAB -      |              |
| (LAB)       |       |                 | [place]  |              |
+-------------+-------+-----------------+------------+--------------+
| CALCIUM,    | 9.4   | 8.4 - 10.2      | [facility]  |              |
| PLASMA      |       | mg/dL           | LAB -      |              |
| (LAB)       |       |                 | [place]  |              |
+-------------+-------+-----------------+------------+--------------+
| BILIRUBIN   | 0.6   | 0 - 1.2         | [facility]  |              |
| TOTAL       |       | Transcutaneous  | LAB -      |              |
|             |       | Bilirubinometer | [place]  |              |
+-------------+-------+-----------------+------------+--------------+
| ALK PHOS    | 91    | 30 - 128 U/L    | [facility]  |              |
|             |       |                 | LAB -      |              |
|             |       |                 | [place]  |              |
+-------------+-------+-----------------+------------+--------------+
| AST(SGOT)   | 16    | 13 - 39 U/L     | [facility]  |              |
|             |       |                 | LAB -      |              |
|             |       |                 | [place]  |              |
+-------------+-------+-----------------+------------+--------------+
| SODIUM,     | 139   | 132 - 143       | [facility]  |              |
| PLASMA      |       | mmol/L          | LAB -      |              |
| (LAB)       |       |                 | [place]  |              |
+-------------+-------+-----------------+------------+--------------+
| POTASSIUM,  | 4.0   | 3.6 - 5.1       | [facility]  |              |
| PLASMA      |       | mmol/L          | LAB -      |              |
| (LAB)       |       |                 | [place]  |              |
+-------------+-------+-----------------+------------+--------------+
| CHLORIDE,   | 105   | 95 - 112 mmol/L | [facility]  |              |
| PLASMA      |       |                 | LAB -      |              |
| (LAB)       |       |                 | [place]  |              |
+-------------+-------+-----------------+------------+--------------+
| TOTAL CO2,  | 24    | 19 - 31 mmol/L  | [facility]  |              |
| PLASMA      |       |                 | LAB -      |              |
| (LAB)       |       |                 | [place]  |              |
+-------------+-------+-----------------+------------+--------------+
| ALT (SGPT)  | 14    | 7 - 52 U/L      | [facility]  |              |
|             |       |                 | LAB -      |              |
|             |       |                 | [place]  |              |
+-------------+-------+-----------------+------------+--------------+
| ANION GAP   | 14    | 7 - 21          | [facility]  |              |
|             |       |                 | LAB -      |              |
 
|             |       |                 | [place]  |              |
+-------------+-------+-----------------+------------+--------------+
| BUN/CREATIN | 21.3  | 6.0 - 28.6      | [facility]  |              |
| INE RATIO   |       |                 | LAB -      |              |
|             |       |                 | [place]  |              |
+-------------+-------+-----------------+------------+--------------+
| GLOBULIN    | 3.1   | 1.8 - 3.5       | [facility]  |              |
|             |       |                 | LAB -      |              |
|             |       |                 | [place]  |              |
+-------------+-------+-----------------+------------+--------------+
| A/G RATIO   | 1.3   | 1.1 - 2.4       | [facility]  |              |
|             |       |                 | LAB -      |              |
|             |       |                 | [place]  |              |
+-------------+-------+-----------------+------------+--------------+
| BILIRUBIN   | 0.1   | 0 - 0.1 mg/dL   | [facility]  |              |
| DIRECT      |       |                 | LAB -      |              |
|             |       |                 | [place]  |              |
+-------------+-------+-----------------+------------+--------------+
 
 
 
+---------------+
| Specimen      |
+---------------+
| Blood - Blood |
+---------------+
 
 
 
+--------------------+----------------------+--------------------+----------------+
| Performing         | Address              | City/State/Zipcode | Phone Number   |
| Organization       |                      |                    |                |
+--------------------+----------------------+--------------------+----------------+
|   [facility]  |   [address] | [place]      |   [phone] |
| [place]          |                      |                    |                |
+--------------------+----------------------+--------------------+----------------+
 TRIGLYCERIDES, PLASMA (10/23/2015  9:25 AM PDT)
 
+-------------+-------+----------------+------------+--------------+
| Component   | Value | Ref Range      | Performed  | Pathologist  |
|             |       |                | At         | Signature    |
+-------------+-------+----------------+------------+--------------+
| TRIGLYCERID | 54    | 30 - 150 mg/dL | [facility]  |              |
| ES          |       |                | LAB -      |              |
|             |       |                | [place]  |              |
+-------------+-------+----------------+------------+--------------+
 
 
 
+---------------+
| Specimen      |
+---------------+
| Blood - Blood |
+---------------+
 
 
 
+--------------------+----------------------+--------------------+----------------+
| Performing         | Address              | City/State/Zipcode | Phone Number   |
| Organization       |                      |                    |                |
 
+--------------------+----------------------+--------------------+----------------+
|   [facility]  |   [address] | [place]      |   [phone] |
| [place]          |                      |                    |                |
+--------------------+----------------------+--------------------+----------------+
 LDH TOTAL, PLASMA (10/23/2015  9:25 AM PDT)
 
+-------------+-------+------------+------------+--------------+
| Component   | Value | Ref Range  | Performed  | Pathologist  |
|             |       |            | At         | Signature    |
+-------------+-------+------------+------------+--------------+
| LDH (TOTAL) | 144   | 100 - 215  | [facility]  |              |
|             |       | Units/L    | LAB -      |              |
|             |       |            | [place]  |              |
+-------------+-------+------------+------------+--------------+
 
 
 
+---------------+
| Specimen      |
+---------------+
| Blood - Blood |
+---------------+
 
 
 
+--------------------+----------------------+--------------------+----------------+
| Performing         | Address              | City/State/Zipcode | Phone Number   |
| Organization       |                      |                    |                |
+--------------------+----------------------+--------------------+----------------+
|   [facility]  |   [address] | [place], [place]      |   [phone] |
| [place]          |                      |                    |                |
+--------------------+----------------------+--------------------+----------------+
 CHOLESTEROL, TOTAL (EXTERNAL RESULTS ONLY) (10/23/2015  9:25 AM PDT)
 
+-------------+-------+-----------+------------+--------------+
| Component   | Value | Ref Range | Performed  | Pathologist  |
|             |       |           | At         | Signature    |
+-------------+-------+-----------+------------+--------------+
| CHOLESTEROL | 120   | 200 mg/dL | [facility]  |              |
|   (LAB)     |       |           | LAB -      |              |
|             |       |           | [place]  |              |
+-------------+-------+-----------+------------+--------------+
 
 
 
+----------+
| Specimen |
+----------+
| Blood    |
+----------+
 
 
 
+--------------------+----------------------+--------------------+----------------+
| Performing         | Address              | City/State/Zipcode | Phone Number   |
| Organization       |                      |                    |                |
+--------------------+----------------------+--------------------+----------------+
|   [facility]  |   [address] | [place], [place]      |   [phone] |
| [place]          |                      |                    |                |
+--------------------+----------------------+--------------------+----------------+
 
 URIC ACID, PLASMA (10/23/2015  9:25 AM PDT)
 
+-------------+-------+-----------------+------------+--------------+
| Component   | Value | Ref Range       | Performed  | Pathologist  |
|             |       |                 | At         | Signature    |
+-------------+-------+-----------------+------------+--------------+
| URIC ACID,  | 4.8   | 2.3 - 6.6 mg/dL | [facility]  |              |
| PLASMA      |       |                 | LAB -      |              |
| (LAB)       |       |                 | [place]  |              |
+-------------+-------+-----------------+------------+--------------+
 
 
 
+---------------+
| Specimen      |
+---------------+
| Blood - Blood |
+---------------+
 
 
 
+--------------------+----------------------+--------------------+----------------+
| Performing         | Address              | City/State/Zipcode | Phone Number   |
| Organization       |                      |                    |                |
+--------------------+----------------------+--------------------+----------------+
|   [facility]  |   [address] | [place]      |   [phone] |
| [place]          |                      |                    |                |
+--------------------+----------------------+--------------------+----------------+
 PHOSPHORUS, PLASMA (10/23/2015  9:25 AM PDT)
 
+-------------+-------+-----------------+------------+--------------+
| Component   | Value | Ref Range       | Performed  | Pathologist  |
|             |       |                 | At         | Signature    |
+-------------+-------+-----------------+------------+--------------+
| PHOSPHORUS, | 3.0   | 2.5 - 5.0 mg/dL | [facility]  |              |
|  PLASMA     |       |                 | LAB -      |              |
| (LAB)       |       |                 | [place]  |              |
+-------------+-------+-----------------+------------+--------------+
 
 
 
+---------------+
| Specimen      |
+---------------+
| Blood - Blood |
+---------------+
 
 
 
+--------------------+----------------------+--------------------+----------------+
| Performing         | Address              | City/State/Zipcode | Phone Number   |
| Organization       |                      |                    |                |
+--------------------+----------------------+--------------------+----------------+
|   [facility]  |   [address] | [place]      |   [phone] |
| [place]          |                      |                    |                |
+--------------------+----------------------+--------------------+----------------+
 documented in this encounter
 
 Visit Diagnoses
 Not on filedocumented in this encounter

## 2019-11-01 NOTE — XMS
Encounter Summary
  Created on: 2019
 
 Bonifacio Nila JB
 External Reference #: [id number]
 : [birth year]
 Sex: Female
 
 Demographics
 
 
+-----------------------+------------------------+
| Address               | [address]            |
|                       | [place]  [address]   |
+-----------------------+------------------------+
| Home Phone            | [phone]        |
+-----------------------+------------------------+
| Preferred Language    | Unknown                |
+-----------------------+------------------------+
| Marital Status        | Single                 |
+-----------------------+------------------------+
| Anabaptism Affiliation | Unknown                |
+-----------------------+------------------------+
| Race                  | White                  |
+-----------------------+------------------------+
| Ethnic Group          | Not  or  |
+-----------------------+------------------------+
 
 
 Author
 
 
+--------------+------------------------------+
| Author       | [facility] |
+--------------+------------------------------+
| Organization | [facility] |
+--------------+------------------------------+
| Address      | Unknown                      |
+--------------+------------------------------+
| Phone        | Unavailable                  |
+--------------+------------------------------+
 
 
 
 Support
 
 
+-----------------+--------------+---------+-----------------+
| Name            | Relationship | Address | Phone           |
+-----------------+--------------+---------+-----------------+
| Kit Valdovinos   | ECON         | Unknown | [phone] |
+-----------------+--------------+---------+-----------------+
| Magdalena Valdovinos | ECON         | Unknown | [phone] |
+-----------------+--------------+---------+-----------------+
 
 
 
 Care Team Providers
 
 
 
+-----------------------+------+-------------+
| Care Team Member Name | Role | Phone       |
+-----------------------+------+-------------+
| No Pcp Per Patient    | PCP  | Unavailable |
+-----------------------+------+-------------+
 
 
 
 Reason for Visit
 
 
+-------------------+----------+
| Reason            | Comments |
+-------------------+----------+
| Follow-up in      |          |
| outpatient clinic |          |
+-------------------+----------+
 Office Visit - E/M Services (Routine)
 
+--------+--------------+---------------+--------------+--------------+---------------+
| Status | Reason       | Specialty     | Diagnoses /  | Referred By  | Referred To   |
|        |              |               | Procedures   | Contact      | Contact       |
+--------+--------------+---------------+--------------+--------------+---------------+
| Closed |   Specialty  | Pediatric     |              |   Non-[facility]   |   Remi,     |
|        | Services     | Gastroenterol |              | Epic Dept    | MD Neema   |
|        | Required     | ogy           |              |              | [address] |
|        |              |               |              |              |  Ed           |
|        |              |               |              |              | [place], [place]  |
|        |              |               |              |              | [address]    |
|        |              |               |              |              | Phone:        |
|        |              |               |              |              | [phone]  |
|        |              |               |              |              |  Fax:         |
|        |              |               |              |              | [phone]  |
+--------+--------------+---------------+--------------+--------------+---------------+
 
 
 
 
 Encounter Details
 
 
+--------+---------+----------------------+----------------------+----------------------+
| Date   | Type    | Department           | Care Team            | Description          |
+--------+---------+----------------------+----------------------+----------------------+
| / | Office  |   Pediatric          |   Neema Khalil,   | Immunosuppressed     |
|    | Visit   | Gastroenterology at  | MD [address] | status (HCC)         |
|        |         | [facility]          |   [place], [place]       | (Primary Dx);        |
|        |         | [facility]  | [address]           | Noncompliance with   |
|        |         |  [address] | [phone]         | treatment; BP (high  |
|        |         |  [address]  Mailcode:  | [phone] (Fax)   | blood pressure);     |
|        |         | [facility]  [facility]   |                      | Transplanted liver   |
|        |         | [place], [place]         |                      | ([facility]); VSD           |
|        |         | [address]           |                      | (ventricular septal  |
|        |         | [phone]         |                      | defect); Aortic      |
|        |         |                      |                      | valve insufficiency  |
+--------+---------+----------------------+----------------------+----------------------+
 
 
 
 
 Social History
 
 
+-------------------+-------+-----------+--------+------+
| Tobacco Use       | Types | Packs/Day | Years  | Date |
|                   |       |           | Used   |      |
+-------------------+-------+-----------+--------+------+
| Passive Smoke     |       |           |        |      |
| Exposure - Never  |       |           |        |      |
| Smoker            |       |           |        |      |
+-------------------+-------+-----------+--------+------+
 
 
 
+---------------------+---+---+---+
| Smokeless Tobacco:  |   |   |   |
| Never Used          |   |   |   |
+---------------------+---+---+---+
 
 
 
+-------------+-------------+---------+----------+
| Alcohol Use | Drinks/Week | oz/Week | Comments |
+-------------+-------------+---------+----------+
| Not Asked   |             |         |          |
+-------------+-------------+---------+----------+
 
 
 
+------------------+---------------+
| Sex Assigned at  | Date Recorded |
| Birth            |               |
+------------------+---------------+
| Not on file      |               |
+------------------+---------------+
 
 
 
+----------------+-------------+-------------+
| Job Start Date | Occupation  | Industry    |
+----------------+-------------+-------------+
| Not on file    | Not on file | Not on file |
+----------------+-------------+-------------+
 
 
 
+----------------+--------------+------------+
| Travel History | Travel Start | Travel End |
+----------------+--------------+------------+
 
 
 
+-------------------------------------+
| No recent travel history available. |
+-------------------------------------+
 documented as of this encounter
 
 Last Filed Vital Signs
 
 
 
+-------------------+----------------------+----------------------+----------+
| Vital Sign        | Reading              | Time Taken           | Comments |
+-------------------+----------------------+----------------------+----------+
| Blood Pressure    | 141/104              | 2014  9:33 AM  |          |
|                   |                      | PDT                  |          |
+-------------------+----------------------+----------------------+----------+
| Pulse             | 71                   | 2014  9:33 AM  |          |
|                   |                      | PDT                  |          |
+-------------------+----------------------+----------------------+----------+
| Temperature       | -                    | -                    |          |
+-------------------+----------------------+----------------------+----------+
| Respiratory Rate  | -                    | -                    |          |
+-------------------+----------------------+----------------------+----------+
| Oxygen Saturation | -                    | -                    |          |
+-------------------+----------------------+----------------------+----------+
| Inhaled Oxygen    | -                    | -                    |          |
| Concentration     |                      |                      |          |
+-------------------+----------------------+----------------------+----------+
| Weight            | 72.2 kg (159 lb 2.8  | 2014  9:33 AM  |          |
|                   | oz)                  | PDT                  |          |
+-------------------+----------------------+----------------------+----------+
| Height            | 164 cm (5' 4.57")    | 2014  9:33 AM  |          |
|                   |                      | PDT                  |          |
+-------------------+----------------------+----------------------+----------+
| Body Mass Index   | 26.84                | 2014  9:33 AM  |          |
|                   |                      | PDT                  |          |
+-------------------+----------------------+----------------------+----------+
 documented in this encounter
 
 Progress Notes
 Neema Khalil MD - 2014  9:04 AM PDTFormatting of this note might be different fro
m the original.
 Referring provider: No Pcp Per PATIENT
 Primary Care Provider: No Pcp Per PATIENT
 
 [facility]
 
 Visit type: New Patient Visit 
 DOS: 2014
 
 CHIEF  COMPLAINT: Post liver transplant care first time since 
 
 HISTORY OF PRESENT ILLNESS: Nila Valdovinos is an 14 y.o. female, who is referred by Lily Horton, for post liver transplant care. She had liver transplant for biliary atresia i
2000 at [facility]. She was last seen by Dr Burkett in . In , 
it was noticed that she has not been seen, attempts to reach to family was unsuccessful. She
 was lost to follow up since that time. In 2014, our SW contacted family and they agre
ed to go to local lab for post-transplant labs and to come for clinic visit at [facility].
 She is accompanied by her father today. Today, she denies any pain, nausea, emesis, diarrhe
a or constipation. No fever.
 She reports she takes tacrolimus one pill once daily. Father reports she has not been takin
g regularly
 
 In [employer], Nila's last cardiology note is from  from Dr Lily Horton, cardiologist at [facility]. 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
mal tissue leaving a tiny VSD, 2) trace central aortic insufficiency, 3) Left atrial isomeri
sm (polysplenia). It was recommended her annual follow up for progression of aortic insuffic
iency in which case she may need closure of VSD. No further notes available in our system as
 she is lost to follow up since . Today, father says she has an appointment with cardiol
 
igor at [facility] soon. 
 
 Current Outpatient Prescriptions 
 Medication Sig 
   PROGRAF 1 mg Oral Capsule Take 1 Cap by mouth two times daily. 
 
 Allergies 
 Allergen Reactions 
   Sulfa (Sulfonamide Antibiotics) Hives and Swelling-Facial 
   Varicella Vaccines  
   Possible reaction with sulfa meds 
 
 Past Medical History 
 Diagnosis Date 
   Biliary atresia  
 
  
 Past Surgical History 
 Procedure Laterality Date 
   Liver transplant  2000 
   at [place] 
 
 Family History 
 No liver problems 
 
 SHx: lives with family
  
 
 REVIEW OF SYSTEMS: 
 General:  No fever, fatigue, or weight loss.  
 Eyes:  No changes in vision, no eye irritation or discharge.  
 ENT:  No nosebleeds, nasal congestion, difficulty swallowing, no mouth sores. 
 Respiratory:  No shortness of breath, cough, wheezing.
 Cardiovascular: No breathing difficulty, cyanosis
 Genitourinary:  No urinary infections.  
 Neurologic:  No seizures. No headaches.  
 Musculoskeletal:  No joint pain, swelling. No back pain.  Full range of motion.
 Skin:  No itching, rash, jaundice.  
 Heme: No anemia, abnormal bleeding, easy bruising
 Lymphatic: No enlarged lymph nodes.
 Metabolic/Endo: no glucose intolerance, hypothyroidism
 Allergy/immunology: no seasonal or food allergies, no asthma
 
 All other ROS are negative.
 
  
 PHYSICAL EXAMINATION:
 
 Patient reports a pain level of  0 today. No action required.
 
 Ht 164 cm (5' 4.57") (60%, Z = 0.25), Wt 72.2 kg (159 lb 2.8 oz) (92%, Z = 1.41), /10
4, Pulse 71, BMI 26.84 kg/(m^2).
 
 APPEARANCE: alert, active and in no apparent distress. 
 SKIN:  no jaundice or rashes.  
 HEENT: normocephalic, PERRLA, mucous membranes moist. 
 NECK: supple, without thyromegaly. 
 CHEST: clear to auscultation bilaterally. 
 CV: systolic murmur at left sternal border
 ABDOMEN: soft, NTND, no hepatosplenomegaly. 
 
 BACK: without tenderness. 
 MUSCULOSKELETAL: no muscle wasting, no deformity.
 EXTREMITIES: no edema, clubbing, deformity.
 NEURO: grossly intact and appropriate to age, DTRs 2+ and symmetric
 NODES: no significant cervical, axillary or inguinal adenopathy
  
 Labs/imaging: as below
 
  ASSESSMENT and Plan: 14 yo female with
 
 279.9   Immunosuppressed status
 V15.81  Noncompliance with treatment
 401.9   BP (high blood pressure): repeat BP at the end of visit is 131/92
 V42.7   Transplanted liver
 745.4   VSD (ventricular septal defect)
 424.1   Aortic valve insufficiency
 
 Pt is lost to follow up for 5 years. Off meds, reportedly started taking tacrolimus once a 
day recently. She might be immunotolerant as she did not reject past 5 years. However this d
oesn't guarantee future rejection.
 Educated importance of compliance with meds and clinic visits. Since she is 14 years out fr
 liver transplant, her lab schedule is every 3 months, clinic visit schedule is once a beck ellis. Father says they will be able to take her to local laboratory every 3 months for blood dr
karlee and they will be able to come to [facility] when [place] team is here next time.
 I strongly recommended to follow her BPs at PCPs office. If they continue to be persistentl
y elevated she will need to be seen by nephrology team. Hypertension is a well-known complic
ation of calcineurin inhibitors, although she has not been taking tacrolimus past couple of 
years.  
 
 - labs: CBC, CMP, GGT, magnesium, trough tacrolimus level: every 3 months
 - follow up in liver transplant clinic: annually, next in 2014.
 - cardiology fu locally and at [facility]
  
 
 At this visit: 
 Patient accompanied by: father 
  used:no 
 Psychosocial or economic issues that may affect patient's medical care or well being: nonco
mpliance 
 Co-morbid chronic medical problems that may affect procedural sedation risk: VSD
 
 Labs/imaging:
 
 Component
     Latest Ref Rng 3/26/2014 2014 
 GLUCOSE
     70 - 100 mg/dL 82 87 
 BUN  6 - 23 mg/dL 18 13 
 CREATININE
     0.40 - 1.05 mg/dL 0.81 0.75 
 TOTAL PROTEIN
     6.1 - 8.5 g/dL 7.3 7.6 
 ALBUMIN
     3.5 - 5.0 g/dL 4.2 4.4 
 CALCIUM
     8.4 - 10.2 mg/dL 9.6 9.9 
 BILIRUBIN TOTAL
     0 - 1.2  0.3 0.8 
 ALK PHOS
     135 - 384 U/L 78 (A) 92 (A) 
 
 AST(SGOT)
     13 - 39 U/L 15 13 
 SODIUM
     132 - 143 mmol/L 136 140 
 POTASSIUM
     3.6 - 5.1 mmol/L 3.6 4.0 
 CHLORIDE
     95 - 112 mmol/L 102 100 
 TOTAL CO2
     19 - 31 mmol/L 25 28 
 ALT (SGPT)
     7 - 52 U/L 12 9 
 ANION GAP
     7 - 21 12.6 16 
 BUN/CREATININE RATIO  6.0 - 28.6 22.2 17.3 
 GLOBULIN
     1.8 - 3.5 3.1 3.2 
 A/G RATIO
     1.1 - 2.4 1.4 1.4 
 WHITE CELL COUNT
  4.4 - 11.8 K/cu mm 7.8 6.6 
 RED CELL COUNT
     3.8 - 5.3 M/cu mm 4.55 4.83 
 HEMOGLOBIN
     11.1 - 15.7 g/dL 14 14.7 
 HEMATOCRIT
     32 - 47 % 40.7 42.8 
 MCV 73 - 91 fL 89.4 88.6 
 PLATELET COUNT
  140 - 440 K/cu mm 296 328 
 GGT  5 - 60 U/L 8  
 MAGNESIUM
     1.7 - 2.5 mg/dL 1.8  
 
 Neema Khalil MD
 Pediatric Gastroenterology
 Consult line: [phone] 
 
 Electronically signed by Neema Khalil MD at 2014 10:27 PM PDTdocumented in this en
counter
 
 Plan of Treatment
 Not on filedocumented as of this encounter
 
 Visit Diagnoses
 
 
+-------------------------------------------------------------------------------------+
| Diagnosis                                                                           |
+-------------------------------------------------------------------------------------+
|   Immunosuppressed status (HCC) - Primary  Unspecified disorder of immune mechanism |
+-------------------------------------------------------------------------------------+
|   Noncompliance with treatment  Personal history of noncompliance with medical      |
| treatment, presenting hazards to health                                             |
+-------------------------------------------------------------------------------------+
|   BP (high blood pressure)  Unspecified essential hypertension                      |
+-------------------------------------------------------------------------------------+
|   Transplanted liver (HCC)  Liver replaced by transplant                            |
+-------------------------------------------------------------------------------------+
|   VSD (ventricular septal defect)  Ventricular septal defect                        |
 
+-------------------------------------------------------------------------------------+
|   Aortic valve insufficiency  Aortic valve disorders                                |
+-------------------------------------------------------------------------------------+
 documented in this encounter

## 2019-11-01 NOTE — XMS
Encounter Summary
  Created on: 2019
 
 Bonifacio Nila JB
 External Reference #: [id number]
 : [birth year]
 Sex: Female
 
 Demographics
 
 
+-----------------------+------------------------+
| Address               | [address]            |
|                       | [place]  [address]   |
+-----------------------+------------------------+
| Home Phone            | [phone]        |
+-----------------------+------------------------+
| Preferred Language    | Unknown                |
+-----------------------+------------------------+
| Marital Status        | Single                 |
+-----------------------+------------------------+
| Jehovah's witness Affiliation | Unknown                |
+-----------------------+------------------------+
| Race                  | White                  |
+-----------------------+------------------------+
| Ethnic Group          | Not  or  |
+-----------------------+------------------------+
 
 
 Author
 
 
+--------------+------------------------------+
| Author       | [facility] |
+--------------+------------------------------+
| Organization | [facility] |
+--------------+------------------------------+
| Address      | Unknown                      |
+--------------+------------------------------+
| Phone        | Unavailable                  |
+--------------+------------------------------+
 
 
 
 Support
 
 
+-----------------+--------------+---------+-----------------+
| Name            | Relationship | Address | Phone           |
+-----------------+--------------+---------+-----------------+
| Kit Valdovinos   | ECON         | Unknown | [phone] |
+-----------------+--------------+---------+-----------------+
| Magdalena Valdovinos | ECON         | Unknown | [phone] |
+-----------------+--------------+---------+-----------------+
 
 
 
 Care Team Providers
 
 
 
+-----------------------+------+-----------------+
| Care Team Member Name | Role | Phone           |
+-----------------------+------+-----------------+
| Lily Horton MD   | PCP  | [phone] |
+-----------------------+------+-----------------+
 
 
 
 Encounter Details
 
 
+--------+----------+----------------------+--------------------+-------------+
| Date   | Type     | Department           | Care Team          | Description |
+--------+----------+----------------------+--------------------+-------------+
| / | Abstract |   Pediatric          |   Ivis Burkett MD |             |
|    |          | Gastroenterology at  |                    |             |
|        |          | [place]          |                    |             |
|        |          | [facility]  |                    |             |
|        |          |  [address] |                    |             |
|        |          |  [address]  Mailcode:  |                    |             |
|        |          | [address]   |                    |             |
|        |          | [place], [place]         |                    |             |
|        |          | [id number]           |                    |             |
|        |          | [phone]         |                    |             |
+--------+----------+----------------------+--------------------+-------------+
 
 
 
 Social History
 
 
+----------------+-------+-----------+--------+------+
| Tobacco Use    | Types | Packs/Day | Years  | Date |
|                |       |           | Used   |      |
+----------------+-------+-----------+--------+------+
| Never Assessed |       |           |        |      |
+----------------+-------+-----------+--------+------+
 
 
 
+------------------+---------------+
| Sex Assigned at  | Date Recorded |
| Birth            |               |
+------------------+---------------+
| Not on file      |               |
+------------------+---------------+
 
 
 
+----------------+-------------+-------------+
| Job Start Date | Occupation  | Industry    |
+----------------+-------------+-------------+
| Not on file    | Not on file | Not on file |
+----------------+-------------+-------------+
 
 
 
+----------------+--------------+------------+
 
| Travel History | Travel Start | Travel End |
+----------------+--------------+------------+
 
 
 
+-------------------------------------+
| No recent travel history available. |
+-------------------------------------+
 documented as of this encounter
 
 Plan of Treatment
 Not on filedocumented as of this encounter
 
 Procedures
 
 
+----------------------+--------+-------------+----------------------+----------------------
+
| Procedure Name       | Priori | Date/Time   | Associated Diagnosis | Comments             
|
|                      | ty     |             |                      |                      
|
+----------------------+--------+-------------+----------------------+----------------------
+
| CBC, WITH            | Routin | 2008  |                      |   Results for this   
|
| DIFFERENTIAL         | e      |  3:25 PM    |                      | procedure are in the 
|
|                      |        | PDT         |                      |  results section.    
|
+----------------------+--------+-------------+----------------------+----------------------
+
| COMPLETE METABOLIC   | Routin | 2008  |                      |   Results for this   
|
| SET                  | e      |  3:25 PM    |                      | procedure are in the 
|
| (NA,K,CL,CO2,BUN,CRE |        | PDT         |                      |  results section.    
|
| AT,GLUC,CA,AST,ALT,B |        |             |                      |                      
|
| ASHWINI TOTAL,ALK        |        |             |                      |                      
|
| PHOS,ALB,PROT TOTAL) |        |             |                      |                      
|
+----------------------+--------+-------------+----------------------+----------------------
+
| TACROLIMUS, WHOLE    | Routin | 2008  |                      |                      
|
| BLOOD                | e      |  3:25 PM    |                      |                      
|
|                      |        | PDT         |                      |                      
|
+----------------------+--------+-------------+----------------------+----------------------
+
| PHOSPHORUS, PLASMA   | Routin | 2008  |                      |   Results for this   
|
|                      | e      |  3:25 PM    |                      | procedure are in the 
|
|                      |        | PDT         |                      |  results section.    
|
 
+----------------------+--------+-------------+----------------------+----------------------
+
| GGT, PLASMA          | Routin | 2008  |                      |   Results for this   
|
|                      | e      |  3:25 PM    |                      | procedure are in the 
|
|                      |        | PDT         |                      |  results section.    
|
+----------------------+--------+-------------+----------------------+----------------------
+
| MAGNESIUM, PLASMA    | Routin | 2008  |                      |   Results for this   
|
|                      | e      |  3:25 PM    |                      | procedure are in the 
|
|                      |        | PDT         |                      |  results section.    
|
+----------------------+--------+-------------+----------------------+----------------------
+
| TRIGLYCERIDES,       | Routin | 2008  |                      |   Results for this   
|
| PLASMA               | e      |  3:25 PM    |                      | procedure are in the 
|
|                      |        | PDT         |                      |  results section.    
|
+----------------------+--------+-------------+----------------------+----------------------
+
| CHOLESTEROL TOTAL,   | Routin | 2008  |                      |   Results for this   
|
| PLASMA               | e      |  3:25 PM    |                      | procedure are in the 
|
|                      |        | PDT         |                      |  results section.    
|
+----------------------+--------+-------------+----------------------+----------------------
+
 documented in this encounter
 
 Results
 COMPLETE METABOLIC SET (NA,K,CL,CO2,BUN,CREAT,GLUC,CA,AST,ALT,BILI TOTAL,ALK PHOS,ALB,PROT 
TOTAL) (2008  3:25 PM PDT)
 
+-------------+-------+-----------------+------------+--------------+
| Component   | Value | Ref Range       | Performed  | Pathologist  |
|             |       |                 | At         | Signature    |
+-------------+-------+-----------------+------------+--------------+
| GLUCOSE,    | 82    | 60 - 100 mg/dL  | NON [facility]   |              |
| PLASMA      |       |                 | LAB        |              |
| (LAB)       |       |                 |            |              |
+-------------+-------+-----------------+------------+--------------+
| BUN, PLASMA | 11.1  | 7 - 21 mg/dL    | NON [facility]   |              |
|  (LAB)      |       |                 | LAB        |              |
+-------------+-------+-----------------+------------+--------------+
| CREATININE  | 0.51  | 0.5 - 1.5 mg/dL | NON [facility]   |              |
| PLASMA      |       |                 | LAB        |              |
| (LAB)       |       |                 |            |              |
+-------------+-------+-----------------+------------+--------------+
| TOTAL       | 7.1   | 6.1 - 8.5 g/dL  | NON [facility]   |              |
| PROTEIN,    |       |                 | LAB        |              |
| PLASMA      |       |                 |            |              |
| (LAB)       |       |                 |            |              |
+-------------+-------+-----------------+------------+--------------+
 
| ALBUMIN,    | 4.2   | 2.9 - 5.5 g/dL  | NON [facility]   |              |
| PLASMA      |       |                 | LAB        |              |
| (LAB)       |       |                 |            |              |
+-------------+-------+-----------------+------------+--------------+
| CALCIUM,    | 9.4   | 8.5 - 10.5      | NON [facility]   |              |
| PLASMA      |       | mg/dL           | LAB        |              |
| (LAB)       |       |                 |            |              |
+-------------+-------+-----------------+------------+--------------+
| BILIRUBIN   | 0.8   | 0.0 - 1.2       | NON [facility]   |              |
| TOTAL       |       | Transcutaneous  | LAB        |              |
|             |       | Bilirubinometer |            |              |
+-------------+-------+-----------------+------------+--------------+
| ALK PHOS    | 230   | 135 - 384 U/L   | NON [facility]   |              |
|             |       |                 | LAB        |              |
+-------------+-------+-----------------+------------+--------------+
| AST(SGOT)   | 28    | 0 - 40 U/L      | NON [facility]   |              |
|             |       |                 | LAB        |              |
+-------------+-------+-----------------+------------+--------------+
| SODIUM,     | 136   | 132 - 143       | [facility]   |              |
| PLASMA      |       | mmol/L          | LAB        |              |
| (LAB)       |       |                 |            |              |
+-------------+-------+-----------------+------------+--------------+
| POTASSIUM,  | 3.8   | 3.6 - 5.1       | NON [facility]   |              |
| PLASMA      |       | mmol/L          | LAB        |              |
| (LAB)       |       |                 |            |              |
+-------------+-------+-----------------+------------+--------------+
| CHLORIDE,   | 107   | 95 - 112 mmol/L | NON [facility]   |              |
| PLASMA      |       |                 | LAB        |              |
| (LAB)       |       |                 |            |              |
+-------------+-------+-----------------+------------+--------------+
| TOTAL CO2,  | 21.7  | 19 - 31 mmol/L  | NON [facility]   |              |
| PLASMA      |       |                 | LAB        |              |
| (LAB)       |       |                 |            |              |
+-------------+-------+-----------------+------------+--------------+
| ALT (SGPT)  | 24    | 0 - 46 U/L      | NON [facility]   |              |
|             |       |                 | LAB        |              |
+-------------+-------+-----------------+------------+--------------+
| BILIRUBIN   | 0.1   | 0.0 - 0.4 mg/dL | NON [facility]   |              |
| DIRECT      |       |                 | LAB        |              |
+-------------+-------+-----------------+------------+--------------+
 
 
 
+----------+
| Specimen |
+----------+
|          |
+----------+
 
 
 
+----------------+---------+--------------------+--------------+
| Performing     | Address | City/State/Zipcode | Phone Number |
| Organization   |         |                    |              |
+----------------+---------+--------------------+--------------+
|   [facility] |         |                    |              |
+----------------+---------+--------------------+--------------+
 CBC, WITH DIFFERENTIAL (2008  3:25 PM PDT)
 
+-------------+----------+-----------------+------------+--------------+
 
| Component   | Value    | Ref Range       | Performed  | Pathologist  |
|             |          |                 | At         | Signature    |
+-------------+----------+-----------------+------------+--------------+
| WHITE CELL  | 8.3      | 4.5 - 13.5 K/cu | [facility]   |              |
| COUNT       |          |  mm             | LAB        |              |
+-------------+----------+-----------------+------------+--------------+
| RED CELL    | 4.65     | 4.1 - 5.3 M/cu  | [facility]   |              |
| COUNT       |          | mm              | LAB        |              |
+-------------+----------+-----------------+------------+--------------+
| HEMOGLOBIN  | 14.2     | 10.6 - 15.1     | NON [facility]   |              |
|             |          | g/dL            | LAB        |              |
+-------------+----------+-----------------+------------+--------------+
| HEMATOCRIT  | 40.8     | 32 - 42 %       | NON [facility]   |              |
|             |          |                 | LAB        |              |
+-------------+----------+-----------------+------------+--------------+
| MCV         | 84.6     | 71 - 89 fL      | NON [facility]   |              |
|             |          |                 | LAB        |              |
+-------------+----------+-----------------+------------+--------------+
| MCH         | 31 (A)   | 24 - 30 pg      | NON [facility]   |              |
|             |          |                 | LAB        |              |
+-------------+----------+-----------------+------------+--------------+
| MCHC        | 35       | 30 - 36 g/dL    | [facility]   |              |
|             |          |                 | LAB        |              |
+-------------+----------+-----------------+------------+--------------+
| PLATELET    | 339      | 140 - 440 K/cu  | [facility]   |              |
| COUNT       |          | mm              | LAB        |              |
+-------------+----------+-----------------+------------+--------------+
| NEUTROPHIL  | 43.3     | 31 - 60 %       | [facility]   |              |
| %           |          |                 | LAB        |              |
+-------------+----------+-----------------+------------+--------------+
| LYMPHOCYTE  | 41.1     | 28 - 48 %       | [facility]   |              |
| %           |          |                 | LAB        |              |
+-------------+----------+-----------------+------------+--------------+
| MONOCYTE %  | 12.2 (A) | 0 - 12 %        | NON [facility]   |              |
|             |          |                 | LAB        |              |
+-------------+----------+-----------------+------------+--------------+
| EOS %       | 3.2      | 0 - 6 %         | [facility]   |              |
|             |          |                 | LAB        |              |
+-------------+----------+-----------------+------------+--------------+
| BASO %      | 0.2      | 0 - 2 %         | NON [facility]   |              |
|             |          |                 | LAB        |              |
+-------------+----------+-----------------+------------+--------------+
| RDW         | 12.6     | 10.5 - 15.0 %   | [facility]   |              |
|             |          |                 | LAB        |              |
+-------------+----------+-----------------+------------+--------------+
| MPV         |          | fL              | [facility]   |              |
|             |          |                 | LAB        |              |
+-------------+----------+-----------------+------------+--------------+
| NEUTROPHIL  |          | K/cu mm         | [facility]   |              |
| #           |          |                 | LAB        |              |
+-------------+----------+-----------------+------------+--------------+
| LYMPHOCYTE  |          | K/cu mm         | [facility]   |              |
| #           |          |                 | LAB        |              |
+-------------+----------+-----------------+------------+--------------+
| MONOCYTE #  |          | K/cu mm         | [facility]   |              |
|             |          |                 | LAB        |              |
+-------------+----------+-----------------+------------+--------------+
| EOS #       |          | K/cu mm         | [facility]   |              |
|             |          |                 | LAB        |              |
+-------------+----------+-----------------+------------+--------------+
 
| BASO #      |          |                 | [facility]   |              |
|             |          |                 | LAB        |              |
+-------------+----------+-----------------+------------+--------------+
 
 
 
+----------+
| Specimen |
+----------+
|          |
+----------+
 
 
 
+----------------+---------+--------------------+--------------+
| Performing     | Address | City/State/Zipcode | Phone Number |
| Organization   |         |                    |              |
+----------------+---------+--------------------+--------------+
|   [facility] |         |                    |              |
+----------------+---------+--------------------+--------------+
 MAGNESIUM, PLASMA (2008  3:25 PM PDT)
 
+-------------+-------+-----------------+------------+--------------+
| Component   | Value | Ref Range       | Performed  | Pathologist  |
|             |       |                 | At         | Signature    |
+-------------+-------+-----------------+------------+--------------+
| MAGNESIUM,P | 2.0   | 1.7 - 2.5 mg/dL | NON [facility]   |              |
| LASMA       |       |                 | LAB        |              |
+-------------+-------+-----------------+------------+--------------+
 
 
 
+----------+
| Specimen |
+----------+
|          |
+----------+
 
 
 
+----------------+---------+--------------------+--------------+
| Performing     | Address | City/State/Zipcode | Phone Number |
| Organization   |         |                    |              |
+----------------+---------+--------------------+--------------+
|   [facility] |         |                    |              |
+----------------+---------+--------------------+--------------+
 TACROLIMUS, WHOLE BLOOD (2008  3:25 PM PDT)
 
+-------------+-------+-----------+------------+--------------+
| Component   | Value | Ref Range | Performed  | Pathologist  |
|             |       |           | At         | Signature    |
+-------------+-------+-----------+------------+--------------+
| TACROLIMUS  |       | ng/mL     | [facility]   |              |
| ()    |       |           | LAB        |              |
+-------------+-------+-----------+------------+--------------+
 
 
 
+----------+
| Specimen |
 
+----------+
|          |
+----------+
 
 
 
+----------------+---------+--------------------+--------------+
| Performing     | Address | City/State/Zipcode | Phone Number |
| Organization   |         |                    |              |
+----------------+---------+--------------------+--------------+
|   [facility] |         |                    |              |
+----------------+---------+--------------------+--------------+
 GGT, PLASMA (2008  3:25 PM PDT)
 
+-----------+-------+------------+------------+--------------+
| Component | Value | Ref Range  | Performed  | Pathologist  |
|           |       |            | At         | Signature    |
+-----------+-------+------------+------------+--------------+
| GAMMA     | 9     | 5 - 60 U/L | [facility]   |              |
| GLUTAMYL  |       |            | LAB        |              |
| TRANS     |       |            |            |              |
+-----------+-------+------------+------------+--------------+
 
 
 
+----------+
| Specimen |
+----------+
|          |
+----------+
 
 
 
+----------------+---------+--------------------+--------------+
| Performing     | Address | City/State/Zipcode | Phone Number |
| Organization   |         |                    |              |
+----------------+---------+--------------------+--------------+
|   [facility] |         |                    |              |
+----------------+---------+--------------------+--------------+
 CHOLESTEROL TOTAL, PLASMA (2008  3:25 PM PDT)
 
+-------------+-------+---------------+------------+--------------+
| Component   | Value | Ref Range     | Performed  | Pathologist  |
|             |       |               | At         | Signature    |
+-------------+-------+---------------+------------+--------------+
| CHOLESTEROL | 145   | < - 200 mg/dL | [facility]   |              |
|   (LAB)     |       |               | LAB        |              |
+-------------+-------+---------------+------------+--------------+
 
 
 
+----------+
| Specimen |
+----------+
|          |
+----------+
 
 
 
+----------------+---------+--------------------+--------------+
 
| Performing     | Address | City/State/Zipcode | Phone Number |
| Organization   |         |                    |              |
+----------------+---------+--------------------+--------------+
|   [facility] |         |                    |              |
+----------------+---------+--------------------+--------------+
 TRIGLYCERIDES, PLASMA (2008  3:25 PM PDT)
 
+-------------+-------+----------------+------------+--------------+
| Component   | Value | Ref Range      | Performed  | Pathologist  |
|             |       |                | At         | Signature    |
+-------------+-------+----------------+------------+--------------+
| TRIGLYCERID | 120   | 30 - 150 mg/dL | [facility]   |              |
| ES          |       |                | LAB        |              |
+-------------+-------+----------------+------------+--------------+
 
 
 
+----------+
| Specimen |
+----------+
|          |
+----------+
 
 
 
+----------------+---------+--------------------+--------------+
| Performing     | Address | City/State/Zipcode | Phone Number |
| Organization   |         |                    |              |
+----------------+---------+--------------------+--------------+
|   [facility] |         |                    |              |
+----------------+---------+--------------------+--------------+
 PHOSPHORUS, PLASMA (2008  3:25 PM PDT)
 
+-------------+-------+-----------------+------------+--------------+
| Component   | Value | Ref Range       | Performed  | Pathologist  |
|             |       |                 | At         | Signature    |
+-------------+-------+-----------------+------------+--------------+
| PHOSPHORUS, | 4.5   | 2.6 - 6.2 mg/dL | NON [facility]   |              |
|  PLASMA     |       |                 | LAB        |              |
| (LAB)       |       |                 |            |              |
+-------------+-------+-----------------+------------+--------------+
 
 
 
+----------+
| Specimen |
+----------+
|          |
+----------+
 
 
 
+----------------+---------+--------------------+--------------+
| Performing     | Address | City/State/Zipcode | Phone Number |
| Organization   |         |                    |              |
+----------------+---------+--------------------+--------------+
|   [facility] |         |                    |              |
+----------------+---------+--------------------+--------------+
 documented in this encounter
 
 
 Visit Diagnoses
 Not on filedocumented in this encounter

## 2019-11-01 NOTE — XMS
Encounter Summary
  Created on: 2019
 
 Bonifacio Nila JB
 External Reference #: [id number]
 : [birth year]
 Sex: Female
 
 Demographics
 
 
+-----------------------+------------------------+
| Address               | [address]            |
|                       | [place]  [address]   |
+-----------------------+------------------------+
| Home Phone            | [phone]        |
+-----------------------+------------------------+
| Preferred Language    | Unknown                |
+-----------------------+------------------------+
| Marital Status        | Single                 |
+-----------------------+------------------------+
| Taoist Affiliation | Unknown                |
+-----------------------+------------------------+
| Race                  | White                  |
+-----------------------+------------------------+
| Ethnic Group          | Not  or  |
+-----------------------+------------------------+
 
 
 Author
 
 
+--------------+------------------------------+
| Author       | [facility] |
+--------------+------------------------------+
| Organization | [facility] |
+--------------+------------------------------+
| Address      | Unknown                      |
+--------------+------------------------------+
| Phone        | Unavailable                  |
+--------------+------------------------------+
 
 
 
 Support
 
 
+-----------------+--------------+---------+-----------------+
| Name            | Relationship | Address | Phone           |
+-----------------+--------------+---------+-----------------+
| Kit Valdovinos   | ECON         | Unknown | [phone] |
+-----------------+--------------+---------+-----------------+
| Magdalena Valdovinos | ECON         | Unknown | [phone] |
+-----------------+--------------+---------+-----------------+
 
 
 
 Care Team Providers
 
 
 
+------------------------+------+-----------------+
| Care Team Member Name  | Role | Phone           |
+------------------------+------+-----------------+
| Lily Horton MD | PCP  | [phone] |
+------------------------+------+-----------------+
 
 
 
 Reason for Visit
 
 
+--------------+----------+
| Reason       | Comments |
+--------------+----------+
| Test Results |          |
+--------------+----------+
 
 
 
 Encounter Details
 
 
+--------+-------------+----------------------+----------------------+--------------+
| Date   | Type        | Department           | Care Team            | Description  |
+--------+-------------+----------------------+----------------------+--------------+
| / | Documentati |   Pediatric          |   Neema Khalil,   | Test Results |
| 2015   | on          | Gastroenterology at  | MD  [address] |              |
|        |             | [place]          |   [place], [place]       |              |
|        |             | [facility]  | [address]           |              |
|        |             |  [address] | [phone]         |              |
|        |             |  [address]  Mailcode:  | [phone] (Fax)   |              |
|        |             | [facility]  [place]   |                      |              |
|        |             | [place], [place]         |                      |              |
|        |             | [address]           |                      |              |
|        |             | [phone]         |                      |              |
+--------+-------------+----------------------+----------------------+--------------+
 
 
 
 Social History
 
 
+-------------------+-------+-----------+--------+------+
| Tobacco Use       | Types | Packs/Day | Years  | Date |
|                   |       |           | Used   |      |
+-------------------+-------+-----------+--------+------+
| Passive Smoke     |       |           |        |      |
| Exposure - Never  |       |           |        |      |
| Smoker            |       |           |        |      |
+-------------------+-------+-----------+--------+------+
 
 
 
+---------------------+---+---+---+
| Smokeless Tobacco:  |   |   |   |
| Never Used          |   |   |   |
+---------------------+---+---+---+
 
 
 
 
+-------------+-------------+---------+----------+
| Alcohol Use | Drinks/Week | oz/Week | Comments |
+-------------+-------------+---------+----------+
| Not Asked   |             |         |          |
+-------------+-------------+---------+----------+
 
 
 
+------------------+---------------+
| Sex Assigned at  | Date Recorded |
| Birth            |               |
+------------------+---------------+
| Not on file      |               |
+------------------+---------------+
 
 
 
+----------------+-------------+-------------+
| Job Start Date | Occupation  | Industry    |
+----------------+-------------+-------------+
| Not on file    | Not on file | Not on file |
+----------------+-------------+-------------+
 
 
 
+----------------+--------------+------------+
| Travel History | Travel Start | Travel End |
+----------------+--------------+------------+
 
 
 
+-------------------------------------+
| No recent travel history available. |
+-------------------------------------+
 documented as of this encounter
 
 Plan of Treatment
 Not on filedocumented as of this encounter
 
 Visit Diagnoses
 Not on filedocumented in this encounter

## 2019-11-01 NOTE — XMS
Encounter Summary
  Created on: 2019
 
 Bonifacio Nila JB
 External Reference #: [id number]
 : [birth year]
 Sex: Female
 
 Demographics
 
 
+-----------------------+------------------------+
| Address               | [address]            |
|                       | [place]  [address]   |
+-----------------------+------------------------+
| Home Phone            | [phone]        |
+-----------------------+------------------------+
| Preferred Language    | Unknown                |
+-----------------------+------------------------+
| Marital Status        | Single                 |
+-----------------------+------------------------+
| Mosque Affiliation | Unknown                |
+-----------------------+------------------------+
| Race                  | White                  |
+-----------------------+------------------------+
| Ethnic Group          | Not  or  |
+-----------------------+------------------------+
 
 
 Author
 
 
+--------------+------------------------------+
| Author       | [facility] |
+--------------+------------------------------+
| Organization | [facility] |
+--------------+------------------------------+
| Address      | Unknown                      |
+--------------+------------------------------+
| Phone        | Unavailable                  |
+--------------+------------------------------+
 
 
 
 Support
 
 
+-----------------+--------------+---------+-----------------+
| Name            | Relationship | Address | Phone           |
+-----------------+--------------+---------+-----------------+
| Kit Valdovinos   | ECON         | Unknown | [phone] |
+-----------------+--------------+---------+-----------------+
| Magdalena Valdovinos | ECON         | Unknown | [phone] |
+-----------------+--------------+---------+-----------------+
 
 
 
 Care Team Providers
 
 
 
+-----------------------+------+-----------------+
| Care Team Member Name | Role | Phone           |
+-----------------------+------+-----------------+
| Lily Horton MD   | PCP  | [phone] |
+-----------------------+------+-----------------+
 
 
 
 Encounter Details
 
 
+--------+----------+----------------------+---------------------+-------------+
| Date   | Type     | Department           | Care Team           | Description |
+--------+----------+----------------------+---------------------+-------------+
| 10/10/ | Abstract |   Pediatric          |   Monserrat Spann,  |             |
|    |          | Gastroenterology at  | RN  [address]     |             |
|        |          | [place]          | [address]     |             |
|        |          | [facility]  | [place] [address]  |             |
|        |          |  [address] |                     |             |
|        |          |  [address]  Mailcode:  |                     |             |
|        |          | [facility]  [place]   |                     |             |
|        |          | [place], [place]         |                     |             |
|        |          | [id number]           |                     |             |
|        |          | [phone]         |                     |             |
+--------+----------+----------------------+---------------------+-------------+
 
 
 
 Social History
 
 
+----------------+-------+-----------+--------+------+
| Tobacco Use    | Types | Packs/Day | Years  | Date |
|                |       |           | Used   |      |
+----------------+-------+-----------+--------+------+
| Never Assessed |       |           |        |      |
+----------------+-------+-----------+--------+------+
 
 
 
+------------------+---------------+
| Sex Assigned at  | Date Recorded |
| Birth            |               |
+------------------+---------------+
| Not on file      |               |
+------------------+---------------+
 
 
 
+----------------+-------------+-------------+
| Job Start Date | Occupation  | Industry    |
+----------------+-------------+-------------+
| Not on file    | Not on file | Not on file |
+----------------+-------------+-------------+
 
 
 
+----------------+--------------+------------+
 
| Travel History | Travel Start | Travel End |
+----------------+--------------+------------+
 
 
 
+-------------------------------------+
| No recent travel history available. |
+-------------------------------------+
 documented as of this encounter
 
 Plan of Treatment
 Not on filedocumented as of this encounter
 
 Procedures
 
 
+-------------------+--------+-------------+----------------------+----------------------+
| Procedure Name    | Priori | Date/Time   | Associated Diagnosis | Comments             |
|                   | ty     |             |                      |                      |
+-------------------+--------+-------------+----------------------+----------------------+
| PEDS              | Routin | 10/09/2007  |                      |   Results for this   |
| GASTROENTEROLOGY  | e      |  8:00 AM    |                      | procedure are in the |
| EXTERNAL RESULTS  |        | PDT         |                      |  results section.    |
| PANEL             |        |             |                      |                      |
+-------------------+--------+-------------+----------------------+----------------------+
 documented in this encounter
 
 Results
 PEDS GASTROENTEROLOGY EXTERNAL RESULTS PANEL (10/09/2007  8:00 AM PDT)
 
+-------------+----------+-----------------+-------------+--------------+
| Component   | Value    | Ref Range       | Performed   | Pathologist  |
|             |          |                 | At          | Signature    |
+-------------+----------+-----------------+-------------+--------------+
| SODIUM,     | 140      | 132 - 143       | OUTSIDE LAB |              |
| PLASMA      |          | mmol/L          |             |              |
| (LAB)       |          |                 |             |              |
+-------------+----------+-----------------+-------------+--------------+
| POTASSIUM,  | 4.0      | 3.6 - 5.1       | OUTSIDE LAB |              |
| PLASMA      |          | mmol/L          |             |              |
| (LAB)       |          |                 |             |              |
+-------------+----------+-----------------+-------------+--------------+
| CHLORIDE,   | 108      | 95 - 112 mmol/L | OUTSIDE LAB |              |
| PLASMA      |          |                 |             |              |
| (LAB)       |          |                 |             |              |
+-------------+----------+-----------------+-------------+--------------+
| TOTAL CO2,  | 21.1     | 19 - 31 mmol/L  | OUTSIDE LAB |              |
| PLASMA      |          |                 |             |              |
| (LAB)       |          |                 |             |              |
+-------------+----------+-----------------+-------------+--------------+
| BUN, PLASMA | 13       | 6 - 23 mg/dL    | OUTSIDE LAB |              |
|  (LAB)      |          |                 |             |              |
+-------------+----------+-----------------+-------------+--------------+
| CREATININE  | 0.48 (A) | 0.5 - 1.5 mg/dL | OUTSIDE LAB |              |
| PLASMA      |          |                 |             |              |
| (LAB)       |          |                 |             |              |
+-------------+----------+-----------------+-------------+--------------+
| GLUCOSE,    | 96       | 60 - 100 mg/dL  | OUTSIDE LAB |              |
| PLASMA      |          |                 |             |              |
| (LAB)       |          |                 |             |              |
 
+-------------+----------+-----------------+-------------+--------------+
| CALCIUM,    | 10.2     | 8.5 - 10.5      | OUTSIDE LAB |              |
| PLASMA      |          | mg/dL           |             |              |
| (LAB)       |          |                 |             |              |
+-------------+----------+-----------------+-------------+--------------+
| TOTAL       | 7.8      | 6.0 - 8.1 g/dL  | OUTSIDE LAB |              |
| PROTEIN,    |          |                 |             |              |
| PLASMA      |          |                 |             |              |
| (LAB)       |          |                 |             |              |
+-------------+----------+-----------------+-------------+--------------+
| ALBUMIN,    | 4.2      | 2.9 - 5.5 g/dL  | OUTSIDE LAB |              |
| PLASMA      |          |                 |             |              |
| (LAB)       |          |                 |             |              |
+-------------+----------+-----------------+-------------+--------------+
| ALK PHOS    | 263      | 135 - 384 U/L   | OUTSIDE LAB |              |
+-------------+----------+-----------------+-------------+--------------+
| ALT (SGPT)  | 24       | 0 - 46 U/L      | OUTSIDE LAB |              |
+-------------+----------+-----------------+-------------+--------------+
| AST(SGOT)   | 28       | 0 - 40 U/L      | OUTSIDE LAB |              |
+-------------+----------+-----------------+-------------+--------------+
| BILIRUBIN   | 0.8      | 0.0 - 1.2       | OUTSIDE LAB |              |
| TOTAL       |          | Transcutaneous  |             |              |
|             |          | Bilirubinometer |             |              |
+-------------+----------+-----------------+-------------+--------------+
| BILIRUBIN   |          | mg/dL           | OUTSIDE LAB |              |
| DIRECT      |          |                 |             |              |
+-------------+----------+-----------------+-------------+--------------+
| PHOSPHORUS, |          | mg/dL           | OUTSIDE LAB |              |
|  PLASMA     |          |                 |             |              |
| (LAB)       |          |                 |             |              |
+-------------+----------+-----------------+-------------+--------------+
| MAGNESIUM,P | 1.9      | 1.7 - 2.5 mg/dL | OUTSIDE LAB |              |
| LASMA       |          |                 |             |              |
+-------------+----------+-----------------+-------------+--------------+
| GAMMA       | 16       | 5 - 60 U/L      | OUTSIDE LAB |              |
| GLUTAMYL    |          |                 |             |              |
| TRANS       |          |                 |             |              |
+-------------+----------+-----------------+-------------+--------------+
| URIC ACID,  |          | mg/dL           | OUTSIDE LAB |              |
| PLASMA      |          |                 |             |              |
| (LAB)       |          |                 |             |              |
+-------------+----------+-----------------+-------------+--------------+
| AMYLASE,NADEEN |          | U/L             | OUTSIDE LAB |              |
| SMA         |          |                 |             |              |
+-------------+----------+-----------------+-------------+--------------+
| LIPASE      |          | U/L             | OUTSIDE LAB |              |
| (LAB)       |          |                 |             |              |
+-------------+----------+-----------------+-------------+--------------+
| AMMONIA     |          | umol/L          | OUTSIDE LAB |              |
| (LAB)       |          |                 |             |              |
+-------------+----------+-----------------+-------------+--------------+
| WHITE CELL  | 6.3      | 4.5 - 13.5 K/cu | OUTSIDE LAB |              |
| COUNT       |          |  mm             |             |              |
+-------------+----------+-----------------+-------------+--------------+
| HEMOGLOBIN  | 15.5 (A) | 10.6 - 15.2     | OUTSIDE LAB |              |
|             |          | g/dL            |             |              |
+-------------+----------+-----------------+-------------+--------------+
| HEMATOCRIT  | 44.6 (A) | 32 - 42 %       | OUTSIDE LAB |              |
+-------------+----------+-----------------+-------------+--------------+
| RDW         | 13.5     | 10.5 - 15.0 %   | OUTSIDE LAB |              |
 
+-------------+----------+-----------------+-------------+--------------+
| PLATELET    | 310      | 140 - 440 K/cu  | OUTSIDE LAB |              |
| COUNT       |          | mm              |             |              |
+-------------+----------+-----------------+-------------+--------------+
| MCV         | 83.3     | 71 - 89 fL      | OUTSIDE LAB |              |
+-------------+----------+-----------------+-------------+--------------+
| NEUTROPHIL  | 54.4     | %               | OUTSIDE LAB |              |
| %           |          |                 |             |              |
+-------------+----------+-----------------+-------------+--------------+
| LYMPHOCYTE  | 37.6     | %               | OUTSIDE LAB |              |
| %           |          |                 |             |              |
+-------------+----------+-----------------+-------------+--------------+
| MONOCYTE %  | 4.9      | %               | OUTSIDE LAB |              |
+-------------+----------+-----------------+-------------+--------------+
| EOS %       | 1.9      | %               | OUTSIDE LAB |              |
+-------------+----------+-----------------+-------------+--------------+
| BASO %      | 1.1      | %               | OUTSIDE LAB |              |
+-------------+----------+-----------------+-------------+--------------+
| PEDS        |          |                 | OUTSIDE LAB |              |
| GASTROENTER |          |                 |             |              |
| OLOGY PT    |          |                 |             |              |
+-------------+----------+-----------------+-------------+--------------+
| PROTHROMBIN |          |                 | OUTSIDE LAB |              |
|  TIME       |          |                 |             |              |
| (INR), POC  |          |                 |             |              |
+-------------+----------+-----------------+-------------+--------------+
| APTT        |          | seconds         | OUTSIDE LAB |              |
+-------------+----------+-----------------+-------------+--------------+
| SEDIMENTATI |          | mm/hr           | OUTSIDE LAB |              |
| ON RATE     |          |                 |             |              |
+-------------+----------+-----------------+-------------+--------------+
| C-REACTIVE  |          | mg/dl           | OUTSIDE LAB |              |
| PROTEIN     |          |                 |             |              |
+-------------+----------+-----------------+-------------+--------------+
| CHOLESTEROL |          | mg/dL           | OUTSIDE LAB |              |
|   (LAB)     |          |                 |             |              |
+-------------+----------+-----------------+-------------+--------------+
| TRIGLYCERID |          | mg/dL           | OUTSIDE LAB |              |
| ES          |          |                 |             |              |
+-------------+----------+-----------------+-------------+--------------+
| HDL         |          | mg/dL           | OUTSIDE LAB |              |
| CHOLESTEROL |          |                 |             |              |
+-------------+----------+-----------------+-------------+--------------+
| LDL         |          | mg/dL           | OUTSIDE LAB |              |
| CHOLESTEROL |          |                 |             |              |
| ,           |          |                 |             |              |
| CALCULATED  |          |                 |             |              |
+-------------+----------+-----------------+-------------+--------------+
| IRON SERUM  |          | ug/dL           | OUTSIDE LAB |              |
+-------------+----------+-----------------+-------------+--------------+
| IRON BIND   |          | ug/dL           | OUTSIDE LAB |              |
| CAP SERUM   |          |                 |             |              |
+-------------+----------+-----------------+-------------+--------------+
| %           |          | %               | OUTSIDE LAB |              |
| SATURATION  |          |                 |             |              |
| TRANSFERRIN |          |                 |             |              |
| , SERUM     |          |                 |             |              |
+-------------+----------+-----------------+-------------+--------------+
| FERRITIN    |          | ng/mL           | OUTSIDE LAB |              |
+-------------+----------+-----------------+-------------+--------------+
 
| TRANSFERRIN |          | mg/dL           | OUTSIDE LAB |              |
| , SERUM     |          |                 |             |              |
+-------------+----------+-----------------+-------------+--------------+
| CYCLOSPORIN |          | ng/ml           | OUTSIDE LAB |              |
| E, WHOLE    |          |                 |             |              |
| BLOOD       |          |                 |             |              |
+-------------+----------+-----------------+-------------+--------------+
| TACROLIMUS  | 3.4 (A)  | 5.0 - 18.0      | OUTSIDE LAB |              |
| ()    |          | ng/mL           |             |              |
+-------------+----------+-----------------+-------------+--------------+
| MYCOPHENOLI |          |                 | OUTSIDE LAB |              |
| C ACID      |          |                 |             |              |
| BLOOD LEVEL |          |                 |             |              |
+-------------+----------+-----------------+-------------+--------------+
| SIROLIMUS,  |          | ng/mL           | OUTSIDE LAB |              |
| WHOLE BLOOD |          |                 |             |              |
+-------------+----------+-----------------+-------------+--------------+
| FRANCIS-BAR |          |                 | OUTSIDE LAB |              |
| R PCR,QUAL  |          |                 |             |              |
+-------------+----------+-----------------+-------------+--------------+
| EBV QUANT   |          |                 | OUTSIDE LAB |              |
| INTERPRETAT |          |                 |             |              |
| ION         |          |                 |             |              |
+-------------+----------+-----------------+-------------+--------------+
| IGG SERUM   |          | mg/dL           | OUTSIDE LAB |              |
+-------------+----------+-----------------+-------------+--------------+
| IGM SERUM   |          | mg/dL           | OUTSIDE LAB |              |
+-------------+----------+-----------------+-------------+--------------+
| AB TO       |          | IV              | OUTSIDE LAB |              |
| NUCLEAR AG  |          |                 |             |              |
+-------------+----------+-----------------+-------------+--------------+
| AB TO EARLY |          | IV              | OUTSIDE LAB |              |
|  AG         |          |                 |             |              |
+-------------+----------+-----------------+-------------+--------------+
| CMV IGG AB  |          | AU/mL           | OUTSIDE LAB |              |
+-------------+----------+-----------------+-------------+--------------+
| CMV IGM AB  |          | IV              | OUTSIDE LAB |              |
+-------------+----------+-----------------+-------------+--------------+
| CMV         |          |                 | OUTSIDE LAB |              |
| QUANTITATIV |          |                 |             |              |
| E PCR       |          |                 |             |              |
+-------------+----------+-----------------+-------------+--------------+
| HEPATITIS A |          |                 | OUTSIDE LAB |              |
|  AB TOTAL   |          |                 |             |              |
+-------------+----------+-----------------+-------------+--------------+
| HEPATITIS B |          |                 | OUTSIDE LAB |              |
|  CORE AB,   |          |                 |             |              |
| SERUM       |          |                 |             |              |
+-------------+----------+-----------------+-------------+--------------+
| HEPATITIS B |          |                 | OUTSIDE LAB |              |
|  SURFACE    |          |                 |             |              |
| AG, SERUM   |          |                 |             |              |
+-------------+----------+-----------------+-------------+--------------+
| HEPATITIS B |          |                 | OUTSIDE LAB |              |
|  SURF AB,   |          |                 |             |              |
| QUANT       |          |                 |             |              |
+-------------+----------+-----------------+-------------+--------------+
| HEPATITIS C |          |                 | OUTSIDE LAB |              |
|  AB         |          |                 |             |              |
+-------------+----------+-----------------+-------------+--------------+
 
| VITAMIN D   |          | ng/mL           | OUTSIDE LAB |              |
| 25 HYDROXY  |          |                 |             |              |
+-------------+----------+-----------------+-------------+--------------+
| VITAMIN     |          | pg/ml           | OUTSIDE LAB |              |
| B12, SERUM  |          |                 |             |              |
+-------------+----------+-----------------+-------------+--------------+
| VITAMIN A   |          |                 | OUTSIDE LAB |              |
| (LAB)       |          |                 |             |              |
+-------------+----------+-----------------+-------------+--------------+
| VITAMIN E   |          | mg/L            | OUTSIDE LAB |              |
| (ALPHA      |          |                 |             |              |
| USHA), SERUM |          |                 |             |              |
+-------------+----------+-----------------+-------------+--------------+
| CARNITINE   |          |                 | OUTSIDE LAB |              |
| MUSCLE -    |          |                 |             |              |
| CAM         |          |                 |             |              |
+-------------+----------+-----------------+-------------+--------------+
| COPPER      |          | ug/dL           | OUTSIDE LAB |              |
| SERUM       |          |                 |             |              |
+-------------+----------+-----------------+-------------+--------------+
| PEDS        |          |                 | OUTSIDE LAB |              |
| GASTROENTER |          |                 |             |              |
| OLOGY       |          |                 |             |              |
| CHROMIUM    |          |                 |             |              |
+-------------+----------+-----------------+-------------+--------------+
| PEDS        |          |                 | OUTSIDE LAB |              |
| GASTROENTER |          |                 |             |              |
| OLOGY       |          |                 |             |              |
| MANGANESE   |          |                 |             |              |
+-------------+----------+-----------------+-------------+--------------+
| SELENIUM,   |          | ug/L            | OUTSIDE LAB |              |
| SERUM       |          |                 |             |              |
+-------------+----------+-----------------+-------------+--------------+
| ZINC SERUM  |          | ug/dL           | OUTSIDE LAB |              |
+-------------+----------+-----------------+-------------+--------------+
| IGA SERUM   |          | mg/dl           | OUTSIDE LAB |              |
+-------------+----------+-----------------+-------------+--------------+
| TTG IGA     |          | AU              | OUTSIDE LAB |              |
+-------------+----------+-----------------+-------------+--------------+
| [facility]     |          | [facility]              | OUTSIDE LAB |              |
| PEPTIDE AB, |          |                 |             |              |
|  IGA        |          |                 |             |              |
+-------------+----------+-----------------+-------------+--------------+
 
 
 
+----------+
| Specimen |
+----------+
|          |
+----------+
 
 
 
+----------------+---------+--------------------+--------------+
| Performing     | Address | City/State/Zipcode | Phone Number |
| Organization   |         |                    |              |
+----------------+---------+--------------------+--------------+
|   [facility] |         |                    |              |
+----------------+---------+--------------------+--------------+
 
|   OUTSIDE LAB  |         |                    |              |
+----------------+---------+--------------------+--------------+
 documented in this encounter
 
 Visit Diagnoses
 Not on filedocumented in this encounter

## 2019-11-01 NOTE — XMS
Encounter Summary
  Created on: 2019
 
 Bonifacio Nila JB
 External Reference #: [id number]
 : [birth year]
 Sex: Female
 
 Demographics
 
 
+-----------------------+------------------------+
| Address               | [address]            |
|                       | [place]  [address]   |
+-----------------------+------------------------+
| Home Phone            | [phone]        |
+-----------------------+------------------------+
| Preferred Language    | Unknown                |
+-----------------------+------------------------+
| Marital Status        | Single                 |
+-----------------------+------------------------+
| Mosque Affiliation | Unknown                |
+-----------------------+------------------------+
| Race                  | White                  |
+-----------------------+------------------------+
| Ethnic Group          | Not  or  |
+-----------------------+------------------------+
 
 
 Author
 
 
+--------------+------------------------------+
| Author       | [facility] [employer] [facility] |
+--------------+------------------------------+
| Organization | [facility] |
+--------------+------------------------------+
| Address      | Unknown                      |
+--------------+------------------------------+
| Phone        | Unavailable                  |
+--------------+------------------------------+
 
 
 
 Support
 
 
+-----------------+--------------+---------+-----------------+
| Name            | Relationship | Address | Phone           |
+-----------------+--------------+---------+-----------------+
| Kit Valdovinos   | ECON         | Unknown | [phone] |
+-----------------+--------------+---------+-----------------+
| Magdalena Valdovinos | ECON         | Unknown | [phone] |
+-----------------+--------------+---------+-----------------+
 
 
 
 Care Team Providers
 
 
 
+-----------------------+------+-----------------+
| Care Team Member Name | Role | Phone           |
+-----------------------+------+-----------------+
| Lily Horton MD   | PCP  | [phone] |
+-----------------------+------+-----------------+
 
 
 
 Encounter Details
 
 
+--------+----------+----------------------+---------------------+-------------+
| Date   | Type     | Department           | Care Team           | Description |
+--------+----------+----------------------+---------------------+-------------+
| 10/10/ | Abstract |   Pediatric          |   Monserrat Spann,  |             |
|    |          | Gastroenterology at  | RN  [address]     |             |
|        |          | [place]          | [address]     |             |
|        |          | [facility]  | [place] [address]  |             |
|        |          |  [address] |                     |             |
|        |          |  [address]  Mailcode:  |                     |             |
|        |          | [facility]  [place]   |                     |             |
|        |          | [place], [place]         |                     |             |
|        |          | [address]           |                     |             |
|        |          | [phone]         |                     |             |
+--------+----------+----------------------+---------------------+-------------+
 
 
 
 Social History
 
 
+----------------+-------+-----------+--------+------+
| Tobacco Use    | Types | Packs/Day | Years  | Date |
|                |       |           | Used   |      |
+----------------+-------+-----------+--------+------+
| Never Assessed |       |           |        |      |
+----------------+-------+-----------+--------+------+
 
 
 
+------------------+---------------+
| Sex Assigned at  | Date Recorded |
| Birth            |               |
+------------------+---------------+
| Not on file      |               |
+------------------+---------------+
 
 
 
+----------------+-------------+-------------+
| Job Start Date | Occupation  | Industry    |
+----------------+-------------+-------------+
| Not on file    | Not on file | Not on file |
+----------------+-------------+-------------+
 
 
 
+----------------+--------------+------------+
 
| Travel History | Travel Start | Travel End |
+----------------+--------------+------------+
 
 
 
+-------------------------------------+
| No recent travel history available. |
+-------------------------------------+
 documented as of this encounter
 
 Plan of Treatment
 Not on filedocumented as of this encounter
 
 Procedures
 
 
+-------------------+--------+-------------+----------------------+----------------------+
| Procedure Name    | Priori | Date/Time   | Associated Diagnosis | Comments             |
|                   | ty     |             |                      |                      |
+-------------------+--------+-------------+----------------------+----------------------+
| PEDS              | Routin | 10/09/2007  |                      |   Results for this   |
| GASTROENTEROLOGY  | e      |  8:00 AM    |                      | procedure are in the |
| EXTERNAL RESULTS  |        | PDT         |                      |  results section.    |
| PANEL             |        |             |                      |                      |
+-------------------+--------+-------------+----------------------+----------------------+
 documented in this encounter
 
 Results
 PEDS GASTROENTEROLOGY EXTERNAL RESULTS PANEL (10/09/2007  8:00 AM PDT)
 
+-------------+----------+-----------------+-------------+--------------+
| Component   | Value    | Ref Range       | Performed   | Pathologist  |
|             |          |                 | At          | Signature    |
+-------------+----------+-----------------+-------------+--------------+
| SODIUM,     | 140      | 132 - 143       | OUTSIDE LAB |              |
| PLASMA      |          | mmol/L          |             |              |
| (LAB)       |          |                 |             |              |
+-------------+----------+-----------------+-------------+--------------+
| POTASSIUM,  | 4.0      | 3.6 - 5.1       | OUTSIDE LAB |              |
| PLASMA      |          | mmol/L          |             |              |
| (LAB)       |          |                 |             |              |
+-------------+----------+-----------------+-------------+--------------+
| CHLORIDE,   | 108      | 95 - 112 mmol/L | OUTSIDE LAB |              |
| PLASMA      |          |                 |             |              |
| (LAB)       |          |                 |             |              |
+-------------+----------+-----------------+-------------+--------------+
| TOTAL CO2,  | 21.1     | 19 - 31 mmol/L  | OUTSIDE LAB |              |
| PLASMA      |          |                 |             |              |
| (LAB)       |          |                 |             |              |
+-------------+----------+-----------------+-------------+--------------+
| BUN, PLASMA | 13       | 6 - 23 mg/dL    | OUTSIDE LAB |              |
|  (LAB)      |          |                 |             |              |
+-------------+----------+-----------------+-------------+--------------+
| CREATININE  | 0.48 (A) | 0.5 - 1.5 mg/dL | OUTSIDE LAB |              |
| PLASMA      |          |                 |             |              |
| (LAB)       |          |                 |             |              |
+-------------+----------+-----------------+-------------+--------------+
| GLUCOSE,    | 96       | 60 - 100 mg/dL  | OUTSIDE LAB |              |
| PLASMA      |          |                 |             |              |
| (LAB)       |          |                 |             |              |
 
+-------------+----------+-----------------+-------------+--------------+
| CALCIUM,    | 10.2     | 8.5 - 10.5      | OUTSIDE LAB |              |
| PLASMA      |          | mg/dL           |             |              |
| (LAB)       |          |                 |             |              |
+-------------+----------+-----------------+-------------+--------------+
| TOTAL       | 7.8      | 6.0 - 8.1 g/dL  | OUTSIDE LAB |              |
| PROTEIN,    |          |                 |             |              |
| PLASMA      |          |                 |             |              |
| (LAB)       |          |                 |             |              |
+-------------+----------+-----------------+-------------+--------------+
| ALBUMIN,    | 4.2      | 2.9 - 5.5 g/dL  | OUTSIDE LAB |              |
| PLASMA      |          |                 |             |              |
| (LAB)       |          |                 |             |              |
+-------------+----------+-----------------+-------------+--------------+
| ALK PHOS    | 263      | 135 - 384 U/L   | OUTSIDE LAB |              |
+-------------+----------+-----------------+-------------+--------------+
| ALT (SGPT)  | 24       | 0 - 46 U/L      | OUTSIDE LAB |              |
+-------------+----------+-----------------+-------------+--------------+
| AST(SGOT)   | 28       | 0 - 40 U/L      | OUTSIDE LAB |              |
+-------------+----------+-----------------+-------------+--------------+
| BILIRUBIN   | 0.8      | 0.0 - 1.2       | OUTSIDE LAB |              |
| TOTAL       |          | Transcutaneous  |             |              |
|             |          | Bilirubinometer |             |              |
+-------------+----------+-----------------+-------------+--------------+
| BILIRUBIN   |          | mg/dL           | OUTSIDE LAB |              |
| DIRECT      |          |                 |             |              |
+-------------+----------+-----------------+-------------+--------------+
| PHOSPHORUS, |          | mg/dL           | OUTSIDE LAB |              |
|  PLASMA     |          |                 |             |              |
| (LAB)       |          |                 |             |              |
+-------------+----------+-----------------+-------------+--------------+
| MAGNESIUM,P | 1.9      | 1.7 - 2.5 mg/dL | OUTSIDE LAB |              |
| LASMA       |          |                 |             |              |
+-------------+----------+-----------------+-------------+--------------+
| GAMMA       | 16       | 5 - 60 U/L      | OUTSIDE LAB |              |
| GLUTAMYL    |          |                 |             |              |
| TRANS       |          |                 |             |              |
+-------------+----------+-----------------+-------------+--------------+
| URIC ACID,  |          | mg/dL           | OUTSIDE LAB |              |
| PLASMA      |          |                 |             |              |
| (LAB)       |          |                 |             |              |
+-------------+----------+-----------------+-------------+--------------+
| AMYLASE,NADEEN |          | U/L             | OUTSIDE LAB |              |
| SMA         |          |                 |             |              |
+-------------+----------+-----------------+-------------+--------------+
| LIPASE      |          | U/L             | OUTSIDE LAB |              |
| (LAB)       |          |                 |             |              |
+-------------+----------+-----------------+-------------+--------------+
| AMMONIA     |          | umol/L          | OUTSIDE LAB |              |
| (LAB)       |          |                 |             |              |
+-------------+----------+-----------------+-------------+--------------+
| WHITE CELL  | 6.3      | 4.5 - 13.5 K/cu | OUTSIDE LAB |              |
| COUNT       |          |  mm             |             |              |
+-------------+----------+-----------------+-------------+--------------+
| HEMOGLOBIN  | 15.5 (A) | 10.6 - 15.2     | OUTSIDE LAB |              |
|             |          | g/dL            |             |              |
+-------------+----------+-----------------+-------------+--------------+
| HEMATOCRIT  | 44.6 (A) | 32 - 42 %       | OUTSIDE LAB |              |
+-------------+----------+-----------------+-------------+--------------+
| RDW         | 13.5     | 10.5 - 15.0 %   | OUTSIDE LAB |              |
 
+-------------+----------+-----------------+-------------+--------------+
| PLATELET    | 310      | 140 - 440 K/cu  | OUTSIDE LAB |              |
| COUNT       |          | mm              |             |              |
+-------------+----------+-----------------+-------------+--------------+
| MCV         | 83.3     | 71 - 89 fL      | OUTSIDE LAB |              |
+-------------+----------+-----------------+-------------+--------------+
| NEUTROPHIL  | 54.4     | %               | OUTSIDE LAB |              |
| %           |          |                 |             |              |
+-------------+----------+-----------------+-------------+--------------+
| LYMPHOCYTE  | 37.6     | %               | OUTSIDE LAB |              |
| %           |          |                 |             |              |
+-------------+----------+-----------------+-------------+--------------+
| MONOCYTE %  | 4.9      | %               | OUTSIDE LAB |              |
+-------------+----------+-----------------+-------------+--------------+
| EOS %       | 1.9      | %               | OUTSIDE LAB |              |
+-------------+----------+-----------------+-------------+--------------+
| BASO %      | 1.1      | %               | OUTSIDE LAB |              |
+-------------+----------+-----------------+-------------+--------------+
| PEDS        |          |                 | OUTSIDE LAB |              |
| GASTROENTER |          |                 |             |              |
| OLOGY PT    |          |                 |             |              |
+-------------+----------+-----------------+-------------+--------------+
| PROTHROMBIN |          |                 | OUTSIDE LAB |              |
|  TIME       |          |                 |             |              |
| (INR), POC  |          |                 |             |              |
+-------------+----------+-----------------+-------------+--------------+
| APTT        |          | seconds         | OUTSIDE LAB |              |
+-------------+----------+-----------------+-------------+--------------+
| SEDIMENTATI |          | mm/hr           | OUTSIDE LAB |              |
| ON RATE     |          |                 |             |              |
+-------------+----------+-----------------+-------------+--------------+
| C-REACTIVE  |          | mg/dl           | OUTSIDE LAB |              |
| PROTEIN     |          |                 |             |              |
+-------------+----------+-----------------+-------------+--------------+
| CHOLESTEROL |          | mg/dL           | OUTSIDE LAB |              |
|   (LAB)     |          |                 |             |              |
+-------------+----------+-----------------+-------------+--------------+
| TRIGLYCERID |          | mg/dL           | OUTSIDE LAB |              |
| ES          |          |                 |             |              |
+-------------+----------+-----------------+-------------+--------------+
| HDL         |          | mg/dL           | OUTSIDE LAB |              |
| CHOLESTEROL |          |                 |             |              |
+-------------+----------+-----------------+-------------+--------------+
| LDL         |          | mg/dL           | OUTSIDE LAB |              |
| CHOLESTEROL |          |                 |             |              |
| ,           |          |                 |             |              |
| CALCULATED  |          |                 |             |              |
+-------------+----------+-----------------+-------------+--------------+
| IRON SERUM  |          | ug/dL           | OUTSIDE LAB |              |
+-------------+----------+-----------------+-------------+--------------+
| IRON BIND   |          | ug/dL           | OUTSIDE LAB |              |
| CAP SERUM   |          |                 |             |              |
+-------------+----------+-----------------+-------------+--------------+
| %           |          | %               | OUTSIDE LAB |              |
| SATURATION  |          |                 |             |              |
| TRANSFERRIN |          |                 |             |              |
| , SERUM     |          |                 |             |              |
+-------------+----------+-----------------+-------------+--------------+
| FERRITIN    |          | ng/mL           | OUTSIDE LAB |              |
+-------------+----------+-----------------+-------------+--------------+
 
| TRANSFERRIN |          | mg/dL           | OUTSIDE LAB |              |
| , SERUM     |          |                 |             |              |
+-------------+----------+-----------------+-------------+--------------+
| CYCLOSPORIN |          | ng/ml           | OUTSIDE LAB |              |
| E, WHOLE    |          |                 |             |              |
| BLOOD       |          |                 |             |              |
+-------------+----------+-----------------+-------------+--------------+
| TACROLIMUS  | 3.4 (A)  | 5.0 - 18.0      | OUTSIDE LAB |              |
| ()    |          | ng/mL           |             |              |
+-------------+----------+-----------------+-------------+--------------+
| MYCOPHENOLI |          |                 | OUTSIDE LAB |              |
| C ACID      |          |                 |             |              |
| BLOOD LEVEL |          |                 |             |              |
+-------------+----------+-----------------+-------------+--------------+
| SIROLIMUS,  |          | ng/mL           | OUTSIDE LAB |              |
| WHOLE BLOOD |          |                 |             |              |
+-------------+----------+-----------------+-------------+--------------+
| FRANCIS-BAR |          |                 | OUTSIDE LAB |              |
| R PCR,QUAL  |          |                 |             |              |
+-------------+----------+-----------------+-------------+--------------+
| EBV QUANT   |          |                 | OUTSIDE LAB |              |
| INTERPRETAT |          |                 |             |              |
| ION         |          |                 |             |              |
+-------------+----------+-----------------+-------------+--------------+
| IGG SERUM   |          | mg/dL           | OUTSIDE LAB |              |
+-------------+----------+-----------------+-------------+--------------+
| IGM SERUM   |          | mg/dL           | OUTSIDE LAB |              |
+-------------+----------+-----------------+-------------+--------------+
| AB TO       |          | IV              | OUTSIDE LAB |              |
| NUCLEAR AG  |          |                 |             |              |
+-------------+----------+-----------------+-------------+--------------+
| AB TO EARLY |          | IV              | OUTSIDE LAB |              |
|  AG         |          |                 |             |              |
+-------------+----------+-----------------+-------------+--------------+
| CMV IGG AB  |          | AU/mL           | OUTSIDE LAB |              |
+-------------+----------+-----------------+-------------+--------------+
| CMV IGM AB  |          | IV              | OUTSIDE LAB |              |
+-------------+----------+-----------------+-------------+--------------+
| CMV         |          |                 | OUTSIDE LAB |              |
| QUANTITATIV |          |                 |             |              |
| E PCR       |          |                 |             |              |
+-------------+----------+-----------------+-------------+--------------+
| HEPATITIS A |          |                 | OUTSIDE LAB |              |
|  AB TOTAL   |          |                 |             |              |
+-------------+----------+-----------------+-------------+--------------+
| HEPATITIS B |          |                 | OUTSIDE LAB |              |
|  CORE AB,   |          |                 |             |              |
| SERUM       |          |                 |             |              |
+-------------+----------+-----------------+-------------+--------------+
| HEPATITIS B |          |                 | OUTSIDE LAB |              |
|  SURFACE    |          |                 |             |              |
| AG, SERUM   |          |                 |             |              |
+-------------+----------+-----------------+-------------+--------------+
| HEPATITIS B |          |                 | OUTSIDE LAB |              |
|  SURF AB,   |          |                 |             |              |
| QUANT       |          |                 |             |              |
+-------------+----------+-----------------+-------------+--------------+
| HEPATITIS C |          |                 | OUTSIDE LAB |              |
|  AB         |          |                 |             |              |
+-------------+----------+-----------------+-------------+--------------+
 
| VITAMIN D   |          | ng/mL           | OUTSIDE LAB |              |
| 25 HYDROXY  |          |                 |             |              |
+-------------+----------+-----------------+-------------+--------------+
| VITAMIN     |          | pg/ml           | OUTSIDE LAB |              |
| B12, SERUM  |          |                 |             |              |
+-------------+----------+-----------------+-------------+--------------+
| VITAMIN A   |          |                 | OUTSIDE LAB |              |
| (LAB)       |          |                 |             |              |
+-------------+----------+-----------------+-------------+--------------+
| VITAMIN E   |          | mg/L            | OUTSIDE LAB |              |
| (ALPHA      |          |                 |             |              |
| USHA), SERUM |          |                 |             |              |
+-------------+----------+-----------------+-------------+--------------+
| CARNITINE   |          |                 | OUTSIDE LAB |              |
| MUSCLE -    |          |                 |             |              |
| CAM         |          |                 |             |              |
+-------------+----------+-----------------+-------------+--------------+
| COPPER      |          | ug/dL           | OUTSIDE LAB |              |
| SERUM       |          |                 |             |              |
+-------------+----------+-----------------+-------------+--------------+
| PEDS        |          |                 | OUTSIDE LAB |              |
| GASTROENTER |          |                 |             |              |
| OLOGY       |          |                 |             |              |
| CHROMIUM    |          |                 |             |              |
+-------------+----------+-----------------+-------------+--------------+
| PEDS        |          |                 | OUTSIDE LAB |              |
| GASTROENTER |          |                 |             |              |
| OLOGY       |          |                 |             |              |
| MANGANESE   |          |                 |             |              |
+-------------+----------+-----------------+-------------+--------------+
| SELENIUM,   |          | ug/L            | OUTSIDE LAB |              |
| SERUM       |          |                 |             |              |
+-------------+----------+-----------------+-------------+--------------+
| ZINC SERUM  |          | ug/dL           | OUTSIDE LAB |              |
+-------------+----------+-----------------+-------------+--------------+
| IGA SERUM   |          | mg/dl           | OUTSIDE LAB |              |
+-------------+----------+-----------------+-------------+--------------+
| TTG IGA     |          | AU              | OUTSIDE LAB |              |
+-------------+----------+-----------------+-------------+--------------+
| GLIADIN     |          | EU              | OUTSIDE LAB |              |
| PEPTIDE AB, |          |                 |             |              |
|  IGA        |          |                 |             |              |
+-------------+----------+-----------------+-------------+--------------+
 
 
 
+----------+
| Specimen |
+----------+
|          |
+----------+
 
 
 
+----------------+---------+--------------------+--------------+
| Performing     | Address | City/State/Zipcode | Phone Number |
| Organization   |         |                    |              |
+----------------+---------+--------------------+--------------+
|   [facility] |         |                    |              |
+----------------+---------+--------------------+--------------+
 
|   OUTSIDE LAB  |         |                    |              |
+----------------+---------+--------------------+--------------+
 documented in this encounter
 
 Visit Diagnoses
 Not on filedocumented in this encounter

## 2019-11-01 NOTE — XMS
Encounter Summary
  Created on: 2019
 
 Bonifacio Nila JB
 External Reference #: [id number]
 : [birth year]
 Sex: Female
 
 Demographics
 
 
+-----------------------+------------------------+
| Address               | [address]            |
|                       | [place]  [address]   |
+-----------------------+------------------------+
| Home Phone            | [phone]        |
+-----------------------+------------------------+
| Preferred Language    | Unknown                |
+-----------------------+------------------------+
| Marital Status        | Single                 |
+-----------------------+------------------------+
| Oriental orthodox Affiliation | Unknown                |
+-----------------------+------------------------+
| Race                  | White                  |
+-----------------------+------------------------+
| Ethnic Group          | Not  or  |
+-----------------------+------------------------+
 
 
 Author
 
 
+--------------+-------------+
| Organization | Unknown     |
+--------------+-------------+
| Address      | Unknown     |
+--------------+-------------+
| Phone        | Unavailable |
+--------------+-------------+
 
 
 
 Support
 
 
+-----------------+--------------+---------+-----------------+
| Name            | Relationship | Address | Phone           |
+-----------------+--------------+---------+-----------------+
| Kit Valdovinos   | ECON         | Unknown | [phone] |
+-----------------+--------------+---------+-----------------+
| Magdalena Valdovinos | ECON         | Unknown | [phone] |
+-----------------+--------------+---------+-----------------+
 
 
 
 Care Team Providers
 
 
 
+-----------------------+------+-------------+
| Care Team Member Name | Role | Phone       |
+-----------------------+------+-------------+
 PCP  | Unavailable |
+-----------------------+------+-------------+
 
 
 
 Encounter Details
 
 
+--------+-------------+------------+--------------------+-------------+
| Date   | Type        | Department | Care Team          | Description |
+--------+-------------+------------+--------------------+-------------+
| / | Transcribed |            |   Dictation, Other | Transcribed |
| 2002   |             |            |                    |             |
+--------+-------------+------------+--------------------+-------------+
 
 
 
 Social History
 
 
+----------------+-------+-----------+--------+------+
| Tobacco Use    | Types | Packs/Day | Years  | Date |
|                |       |           | Used   |      |
+----------------+-------+-----------+--------+------+
| Never Assessed |       |           |        |      |
+----------------+-------+-----------+--------+------+
 
 
 
+------------------+---------------+
| Sex Assigned at  | Date Recorded |
| Birth            |               |
+------------------+---------------+
| Not on file      |               |
+------------------+---------------+
 
 
 
+----------------+-------------+-------------+
| Job Start Date | Occupation  | Industry    |
+----------------+-------------+-------------+
| Not on file    | Not on file | Not on file |
+----------------+-------------+-------------+
 
 
 
+----------------+--------------+------------+
| Travel History | Travel Start | Travel End |
+----------------+--------------+------------+
 
 
 
+-------------------------------------+
| No recent travel history available. |
+-------------------------------------+
 documented as of this encounter
 
 
 Progress Notes
 Interface, Transcription In - 2006  3:05 AM [facility] [place]                                    [place] [address] [place], [place] [address] [phone] or [phone]
 
 2002
 
 Lily Horton M.D.
 [address]
 [place]  [address]
 
 RE:   NILA VALDOVINOS
 MR #: [id number]
 
 Dear Dr. Horton:
 
 I had the pleasure of seeing Nila back  in  the Pediatric Liver Transplant
 Clinic  at [facility] staffed by Dr.Bill Mcrae  and  Dr. Jennifer Webb  from
 [place].  This is a routine followup.   Nila  continues  to do quite well
 with no symptoms.  She is gaining weight well and developing normally.  She
 is presently on Prograf 2 mL (0.5 mg/mL) b.i.d.
 
 Past medical history is significant for an allergy to sulfa medication.  In
 addition, she has a very small VSD that  is  followed  by  Dr. Baljinder Diaz,
 pediatric cardiologist.  The VSD certainly  does  not seem to be giving her
 any trouble and Dr. Diaz does not want to see her for quite sometime.
 
 On examination, she weighs 15.2 kg and  measures  95 cm.  Blood pressure is
 105/58 and heart rate is 119.  She is  anicteric.   She  has large tonsils,
 but  they  are  normal  in  appearance   and   symmetrical.   There  is  no
 lymphadenopathy of the neck or axilla.   She has normal shotty nodes in the
 inguinal areas.  She has unlabored respirations.   Her  abdomen  is benign.
 There is no mass, tenderness, or organomegaly.   She has no clubbing of the
 digits.
 
 Recent  laboratory tests from 2002,  show  normal  electrolytes,
 serum protein, transaminases, bilirubin, and GGT.  Specifically, the GGT is
 11, AST is 27, ALT is 14, bilirubin 0.5, and albumin 4.2.  Her magnesium is
 1.8.  The CBC is normal.  Her tacrolimus level is pending.
 
 Laboratory tests from 2001,  show  a  positive  IgG EBV capsid
 antibody and negative IgM EBV capsid antibody.   The qualitative EBV PCR is
 positive.  A year ago, the serology was  negative  for  EBV.  Thus, she has
 acquired EBV within the last year.  Her  CMV  serology  is negative and the
 CMV PCR is negative.
 
 IMPRESSION:  In summary, Nila is a 2-year-old  white  female  who is 1-1/2
 year  status post liver transplant who  is  doing  quite  well.   Her  last
 tacrolimus level was mildly elevated  at  11.5  on  2001.  Her
 dose was decreased from 3 mL b.i.d. to 2 mL b.i.d. at that point.  Her goal
 Prograf level now is about 3.  Depending  on  her pending Prograf level, we
 may drop her dose to 1.5 mL b.i.d. which  is  closer  to 0.1 mg/kg per day.
 If we do drop the dose, she will need another Prograf level a week later to
 document an appropriate level.
 
 Because she has converted to EBV positivity,  she  needs  to go back on the
 acyclovir.  The dose will be 10mg/kg per dose given q.i.d.  The solution is
 
 40 mg/cc, so she will receive 150 mg  q.i.d.  of  acyclovir until she has 2
 negative EBV PCRs.  She is to repeat  her  EBV  serology  in  May 2002, and
 again in 2002.  We will try to  order the quantitative EBV PCRs next
 time.
 
 She will return back to this clinic in  the  fall for routine followup.  If
 everything is relatively stable at that point, perhaps she can go to yearly
 followup with the Transplant team.
 
 Sincerely,
 
 Eagle De Luna M.D.
 Pediatric Gastroenterology
 
 [facility] / 
 [id number] / [id number] / [id number] /
 D: 2002
 T: 2002
 
 cc:
 
 Cristo Bailey M.D.
 [address]
 [place]  [address]
 
 Simeon Covington M.D.
 [address]
 [place]  [address]
 
 Patel Mcrae M.D.
 [address]
 [place]  [address]
 
 [id number] signed by Interface, Transcription In at 2006  3:05 AM [facility] [place]
umented in this encounter
 
 Plan of Treatment
 Not on filedocumented as of this encounter
 
 Visit Diagnoses
 Not on filedocumented in this encounter

## 2019-11-01 NOTE — XMS
Encounter Summary
  Created on: 2019
 
 Bonifacio Nila JB
 External Reference #: [id number]
 : [birth year]
 Sex: Female
 
 Demographics
 
 
+-----------------------+------------------------+
| Address               | [address]            |
|                       | [place]  [address]   |
+-----------------------+------------------------+
| Home Phone            | [phone]        |
+-----------------------+------------------------+
| Preferred Language    | Unknown                |
+-----------------------+------------------------+
| Marital Status        | Single                 |
+-----------------------+------------------------+
| Protestant Affiliation | Unknown                |
+-----------------------+------------------------+
| Race                  | White                  |
+-----------------------+------------------------+
| Ethnic Group          | Not  or  |
+-----------------------+------------------------+
 
 
 Author
 
 
+--------------+------------------------------+
| Author       | [facility] |
+--------------+------------------------------+
| Organization | [facility] |
+--------------+------------------------------+
| Address      | Unknown                      |
+--------------+------------------------------+
| Phone        | Unavailable                  |
+--------------+------------------------------+
 
 
 
 Support
 
 
+-----------------+--------------+---------+-----------------+
| Name            | Relationship | Address | Phone           |
+-----------------+--------------+---------+-----------------+
| Kit Valdovinos   | ECON         | Unknown | [phone] |
+-----------------+--------------+---------+-----------------+
| Magdalena Valdovinos | ECON         | Unknown | [phone] |
+-----------------+--------------+---------+-----------------+
 
 
 
 Care Team Providers
 
 
 
+-----------------------+------+-----------------+
| Care Team Member Name | Role | Phone           |
+-----------------------+------+-----------------+
| Lily Horton MD   | PCP  | [phone] |
+-----------------------+------+-----------------+
 
 
 
 Reason for Visit
 
 
+----------------+----------+
| Reason         | Comments |
+----------------+----------+
| Refill Request |          |
+----------------+----------+
 
 
 
 Encounter Details
 
 
+--------+--------+----------------------+---------------------+----------------+
| Date   | Type   | Department           | Care Team           | Description    |
+--------+--------+----------------------+---------------------+----------------+
| / | Refill |   Pediatric          |   Monserrat Spann,  | Refill Request |
|    |        | Gastroenterology at  | RN  [address]     |                |
|        |        | [place]          | [address]     |                |
|        |        | [facility]  | [place] [address]  |                |
|        |        |  [address] |                     |                |
|        |        |  [address]  Mailcode:  |                     |                |
|        |        | [facility]  [place]   |                     |                |
|        |        | [place], [place]         |                     |                |
|        |        | [address]           |                     |                |
|        |        | [phone]         |                     |                |
+--------+--------+----------------------+---------------------+----------------+
 
 
 
 Social History
 
 
+----------------+-------+-----------+--------+------+
| Tobacco Use    | Types | Packs/Day | Years  | Date |
|                |       |           | Used   |      |
+----------------+-------+-----------+--------+------+
| Never Assessed |       |           |        |      |
+----------------+-------+-----------+--------+------+
 
 
 
+------------------+---------------+
| Sex Assigned at  | Date Recorded |
| Birth            |               |
+------------------+---------------+
| Not on file      |               |
+------------------+---------------+
 
 
 
 
+----------------+-------------+-------------+
| Job Start Date | Occupation  | Industry    |
+----------------+-------------+-------------+
| Not on file    | Not on file | Not on file |
+----------------+-------------+-------------+
 
 
 
+----------------+--------------+------------+
| Travel History | Travel Start | Travel End |
+----------------+--------------+------------+
 
 
 
+-------------------------------------+
| No recent travel history available. |
+-------------------------------------+
 documented as of this encounter
 
 Plan of Treatment
 Not on filedocumented as of this encounter
 
 Visit Diagnoses
 Not on filedocumented in this encounter

## 2019-11-01 NOTE — XMS
Encounter Summary
  Created on: 2019
 
 Bonifacio Nila JB
 External Reference #: [id number]
 : [birth year]
 Sex: Female
 
 Demographics
 
 
+-----------------------+------------------------+
| Address               | [address]            |
|                       | [place]  [address]   |
+-----------------------+------------------------+
| Home Phone            | [phone]        |
+-----------------------+------------------------+
| Preferred Language    | Unknown                |
+-----------------------+------------------------+
| Marital Status        | Single                 |
+-----------------------+------------------------+
| Alevism Affiliation | Unknown                |
+-----------------------+------------------------+
| Race                  | White                  |
+-----------------------+------------------------+
| Ethnic Group          | Not  or  |
+-----------------------+------------------------+
 
 
 Author
 
 
+--------------+------------------------------+
| Author       | [facility] |
+--------------+------------------------------+
| Organization | [facility] |
+--------------+------------------------------+
| Address      | Unknown                      |
+--------------+------------------------------+
| Phone        | Unavailable                  |
+--------------+------------------------------+
 
 
 
 Support
 
 
+-----------------+--------------+---------+-----------------+
| Name            | Relationship | Address | Phone           |
+-----------------+--------------+---------+-----------------+
| Kit Valdovinos   | ECON         | Unknown | [phone] |
+-----------------+--------------+---------+-----------------+
| Magdalena Valdovinos | ECON         | Unknown | [phone] |
+-----------------+--------------+---------+-----------------+
 
 
 
 Care Team Providers
 
 
 
+------------------------+------+-----------------+
| Care Team Member Name  | Role | Phone           |
+------------------------+------+-----------------+
| Lily Horton MD | PCP  | [phone] |
+------------------------+------+-----------------+
 
 
 
 Encounter Details
 
 
+--------+-------------+----------------------+--------------------+-------------+
| Date   | Type        | Department           | Care Team          | Description |
+--------+-------------+----------------------+--------------------+-------------+
| 10/19/ | Document-Sc |   Health Information |   Other, Faculty   |             |
| 2017   | anned       |  Services  [address]    | [phone]       |             |
|        |             | [address]  |                    |             |
|        |             |  Mailcode: [address]     |                    |             |
|        |             | [facility]  |                    |             |
|        |             | [place], [place]  |                    |             |
|        |             | [address]           |                    |             |
|        |             | [phone]         |                    |             |
+--------+-------------+----------------------+--------------------+-------------+
 
 
 
 Social History
 
 
+-------------------+-------+-----------+--------+------+
| Tobacco Use       | Types | Packs/Day | Years  | Date |
|                   |       |           | Used   |      |
+-------------------+-------+-----------+--------+------+
| Passive Smoke     |       |           |        |      |
| Exposure - Never  |       |           |        |      |
| Smoker            |       |           |        |      |
+-------------------+-------+-----------+--------+------+
 
 
 
+---------------------+---+---+---+
| Smokeless Tobacco:  |   |   |   |
| Never Used          |   |   |   |
+---------------------+---+---+---+
 
 
 
+-------------+-------------+---------+----------+
| Alcohol Use | Drinks/Week | oz/Week | Comments |
+-------------+-------------+---------+----------+
| Not Asked   |             |         |          |
+-------------+-------------+---------+----------+
 
 
 
+------------------+---------------+
| Sex Assigned at  | Date Recorded |
| Birth            |               |
 
+------------------+---------------+
| Not on file      |               |
+------------------+---------------+
 
 
 
+----------------+-------------+-------------+
| Job Start Date | Occupation  | Industry    |
+----------------+-------------+-------------+
| Not on file    | Not on file | Not on file |
+----------------+-------------+-------------+
 
 
 
+----------------+--------------+------------+
| Travel History | Travel Start | Travel End |
+----------------+--------------+------------+
 
 
 
+-------------------------------------+
| No recent travel history available. |
+-------------------------------------+
 documented as of this encounter
 
 Plan of Treatment
 Not on filedocumented as of this encounter
 
 Procedures
 
 
+----------------+--------+-------------+----------------------+----------------------+
| Procedure Name | Priori | Date/Time   | Associated Diagnosis | Comments             |
|                | ty     |             |                      |                      |
+----------------+--------+-------------+----------------------+----------------------+
| LAB REPORTS    |        | 10/19/2017  |                      |   Results for this   |
|                |        | 12:00 AM    |                      | procedure are in the |
|                |        | PDT         |                      |  results section.    |
+----------------+--------+-------------+----------------------+----------------------+
 documented in this encounter
 
 Results
 LAB REPORTS (10/19/2017 12:00 AM PDT)
 
+----------------------------------------------------------+--------------+
| Narrative                                                | Performed At |
+----------------------------------------------------------+--------------+
|   This result has an attachment that is not available.   |              |
+----------------------------------------------------------+--------------+
 documented in this encounter
 
 Visit Diagnoses
 Not on filedocumented in this encounter

## 2019-11-01 NOTE — XMS
Encounter Summary
  Created on: 2019
 
 Bonifacio Nila JB
 External Reference #: [id number]
 : [birth year]
 Sex: Female
 
 Demographics
 
 
+-----------------------+------------------------+
| Address               | [address]            |
|                       | [place]  [address]   |
+-----------------------+------------------------+
| Home Phone            | [phone]        |
+-----------------------+------------------------+
| Preferred Language    | Unknown                |
+-----------------------+------------------------+
| Marital Status        | Single                 |
+-----------------------+------------------------+
| Scientology Affiliation | Unknown                |
+-----------------------+------------------------+
| Race                  | White                  |
+-----------------------+------------------------+
| Ethnic Group          | Not  or  |
+-----------------------+------------------------+
 
 
 Author
 
 
+--------------+------------------------------+
| Author       | [facility] |
+--------------+------------------------------+
| Organization | [facility] |
+--------------+------------------------------+
| Address      | Unknown                      |
+--------------+------------------------------+
| Phone        | Unavailable                  |
+--------------+------------------------------+
 
 
 
 Support
 
 
+-----------------+--------------+---------+-----------------+
| Name            | Relationship | Address | Phone           |
+-----------------+--------------+---------+-----------------+
| Kit Valdovinos   | ECON         | Unknown | [phone] |
+-----------------+--------------+---------+-----------------+
| Magdalena Valdovinos | ECON         | Unknown | [phone] |
+-----------------+--------------+---------+-----------------+
 
 
 
 Care Team Providers
 
 
 
+-----------------------+------+-------------+
| Care Team Member Name | Role | Phone       |
+-----------------------+------+-------------+
 PCP  | Unavailable |
+-----------------------+------+-------------+
 
 
 
 Reason for Visit
 
 
+--------------+----------+
| Reason       | Comments |
+--------------+----------+
| Social work  |          |
| consultation |          |
+--------------+----------+
 
 
 
 Encounter Details
 
 
+--------+-------------+----------------------+---------------------+--------------+
| Date   | Type        | Department           | Care Team           | Description  |
+--------+-------------+----------------------+---------------------+--------------+
| 03/13/ | Documentati |   SOCIAL WORK        |   Coretta Armijo,  | Social work  |
|    | on          | AMBULATORY  [address]  | MS[address]  [address]    | consultation |
|        |             | [address]  | [address]     |              |
|        |             |  Mailcode: [address]      | [place], [place]        |              |
|        |             | [place], [place]         | [address]          |              |
|        |             | [address]           | [phone]        |              |
|        |             | [phone]         | [phone] (Fax)  |              |
+--------+-------------+----------------------+---------------------+--------------+
 
 
 
 Social History
 
 
+----------------+-------+-----------+--------+------+
| Tobacco Use    | Types | Packs/Day | Years  | Date |
|                |       |           | Used   |      |
+----------------+-------+-----------+--------+------+
| Never Assessed |       |           |        |      |
+----------------+-------+-----------+--------+------+
 
 
 
+------------------+---------------+
| Sex Assigned at  | Date Recorded |
| Birth            |               |
+------------------+---------------+
| Not on file      |               |
+------------------+---------------+
 
 
 
 
+----------------+-------------+-------------+
| Job Start Date | Occupation  | Industry    |
+----------------+-------------+-------------+
| Not on file    | Not on file | Not on file |
+----------------+-------------+-------------+
 
 
 
+----------------+--------------+------------+
| Travel History | Travel Start | Travel End |
+----------------+--------------+------------+
 
 
 
+-------------------------------------+
| No recent travel history available. |
+-------------------------------------+
 documented as of this encounter
 
 Plan of Treatment
 Not on filedocumented as of this encounter
 
 Visit Diagnoses
 Not on filedocumented in this encounter

## 2019-11-01 NOTE — XMS
Encounter Summary
  Created on: 2019
 
 Bonifacio Nila JB
 External Reference #: [id number]
 : [birth year]
 Sex: Female
 
 Demographics
 
 
+-----------------------+------------------------+
| Address               | [address]            |
|                       | [place]  [address]   |
+-----------------------+------------------------+
| Home Phone            | [phone]        |
+-----------------------+------------------------+
| Preferred Language    | Unknown                |
+-----------------------+------------------------+
| Marital Status        | Single                 |
+-----------------------+------------------------+
| Advent Affiliation | Unknown                |
+-----------------------+------------------------+
| Race                  | White                  |
+-----------------------+------------------------+
| Ethnic Group          | Not  or  |
+-----------------------+------------------------+
 
 
 Author
 
 
+--------------+------------------------------+
| Author       | [facility] |
+--------------+------------------------------+
| Organization | [facility] |
+--------------+------------------------------+
| Address      | Unknown                      |
+--------------+------------------------------+
| Phone        | Unavailable                  |
+--------------+------------------------------+
 
 
 
 Support
 
 
+-----------------+--------------+---------+-----------------+
| Name            | Relationship | Address | Phone           |
+-----------------+--------------+---------+-----------------+
| Kit Valdovinos   | ECON         | Unknown | [phone] |
+-----------------+--------------+---------+-----------------+
| Magdalena Valdovinos | ECON         | Unknown | [phone] |
+-----------------+--------------+---------+-----------------+
 
 
 
 Care Team Providers
 
 
 
+-----------------------+------+-----------------+
| Care Team Member Name | Role | Phone           |
+-----------------------+------+-----------------+
| Lily Horton MD   | PCP  | [phone] |
+-----------------------+------+-----------------+
 
 
 
 Reason for Visit
 
 
+----------------+----------+
| Reason         | Comments |
+----------------+----------+
| Refill Request |          |
+----------------+----------+
 
 
 
 Encounter Details
 
 
+--------+--------+----------------------+---------------------+----------------+
| Date   | Type   | Department           | Care Team           | Description    |
+--------+--------+----------------------+---------------------+----------------+
| / | Refill |   Pediatric          |   Monserrat Spann,  | Refill Request |
|    |        | Gastroenterology at  | RN  [address]     |                |
|        |        | [place]          | [address]     |                |
|        |        | [facility]  | [place] [address]  |                |
|        |        |  [address] |                     |                |
|        |        |  [address]  Mailcode:  |                     |                |
|        |        | [facility]  [place]   |                     |                |
|        |        | [place], [place]         |                     |                |
|        |        | [address]           |                     |                |
|        |        | [phone]         |                     |                |
+--------+--------+----------------------+---------------------+----------------+
 
 
 
 Social History
 
 
+----------------+-------+-----------+--------+------+
| Tobacco Use    | Types | Packs/Day | Years  | Date |
|                |       |           | Used   |      |
+----------------+-------+-----------+--------+------+
| Never Assessed |       |           |        |      |
+----------------+-------+-----------+--------+------+
 
 
 
+------------------+---------------+
| Sex Assigned at  | Date Recorded |
| Birth            |               |
+------------------+---------------+
| Not on file      |               |
+------------------+---------------+
 
 
 
 
+----------------+-------------+-------------+
| Job Start Date | Occupation  | Industry    |
+----------------+-------------+-------------+
| Not on file    | Not on file | Not on file |
+----------------+-------------+-------------+
 
 
 
+----------------+--------------+------------+
| Travel History | Travel Start | Travel End |
+----------------+--------------+------------+
 
 
 
+-------------------------------------+
| No recent travel history available. |
+-------------------------------------+
 documented as of this encounter
 
 Plan of Treatment
 Not on filedocumented as of this encounter
 
 Visit Diagnoses
 Not on filedocumented in this encounter

## 2019-11-01 NOTE — XMS
Clinical Summary
  Created on: 2019
 
 BonifacioNila spencer
 External Reference #: [id number]
 : [birth year]
 Sex: Female
 
 Demographics
 
 
+-----------------------+---------------------------+
| Address               | [address]          |
|                       | [place]  [address] |
+-----------------------+---------------------------+
| Home Phone            | [phone]           |
+-----------------------+---------------------------+
| Preferred Language    | Unknown                   |
+-----------------------+---------------------------+
| Marital Status        | Single                    |
+-----------------------+---------------------------+
| Hindu Affiliation | Unknown                   |
+-----------------------+---------------------------+
| Race                  | Unknown                   |
+-----------------------+---------------------------+
| Ethnic Group          | Unknown                   |
+-----------------------+---------------------------+
 
 
 Author
 
 
+--------------+--------------------------------------------+
| Author       | [facility]  |
|              | [facility] [place]                                |
+--------------+--------------------------------------------+
| Organization | [facility]  |
|              | [facility] [place]                                |
+--------------+--------------------------------------------+
| Address      | Unknown                                    |
+--------------+--------------------------------------------+
| Phone        | Unavailable                                |
+--------------+--------------------------------------------+
 
 
 
 Support
 
 
+-----------------+--------------+-------------------+-----------------+
| Name            | Relationship | Address           | Phone           |
+-----------------+--------------+-------------------+-----------------+
| Magdalena Valdovinos | ECON         | [address]     | [phone] |
|                 |              | [place]   |                 |
|                 |              | [address]        |                 |
+-----------------+--------------+-------------------+-----------------+
 
 
 
 
 Care Team Providers
 
 
+-----------------------+------+-----------------+
| Care Team Member Name | Role | Phone           |
+-----------------------+------+-----------------+
| Augustine Buck DO      | PCP  | [phone] |
+-----------------------+------+-----------------+
 
 
 
 Allergies
 
 
+-------------------+----------------+----------+----------+----------+
| Active Allergy    | Reactions      | Severity | Noted    | Comments |
|                   |                |          | Date     |          |
+-------------------+----------------+----------+----------+----------+
| Sulfa Antibiotics | Swelling, Rash | Medium   | 20 |          |
|                   |                |          | 19       |          |
+-------------------+----------------+----------+----------+----------+
 
 
 
 Medications
 
 
+----------------------+---------------------+-----------+---------+------+------+-------+
| Medication           | Sig                 | Dispensed | Refills | Star | End  | Statu |
|                      |                     |           |         | t    | Date | s     |
|                      |                     |           |         | Date |      |       |
+----------------------+---------------------+-----------+---------+------+------+-------+
|                      | Inject  into the    |           | 0       | 05/0 |      | Activ |
| medroxyPROGESTERone  | muscle every 3      |           |         | / |      | e     |
| Acetate              | (three) months.     |           |         | 19   |      |       |
| (DEPO-PROVERA IM)    |                     |           |         |      |      |       |
+----------------------+---------------------+-----------+---------+------+------+-------+
|   amLODIPine         | Take 5 mg by mouth  |           | 0       | 05/0 |      | Activ |
| (NORVASC) 5 mg       | daily.              |           |         |  |      | e     |
| tablet               |                     |           |         | 19   |      |       |
+----------------------+---------------------+-----------+---------+------+------+-------+
|                      | Take 1 tablet by    |           | 1       | 04/2 |      | Activ |
| lisinopril-hydrochlo | mouth daily.        |           |         | 3/20 |      | e     |
| rothiazide           |                     |           |         | 19   |      |       |
| (PRINZIDE,ZESTORETIC |                     |           |         |      |      |       |
| ) 20-12.5 MG per     |                     |           |         |      |      |       |
| tablet               |                     |           |         |      |      |       |
+----------------------+---------------------+-----------+---------+------+------+-------+
 
 
 
 Active Problems
 
 
+-----------------+------------+
| Problem         | Noted Date |
+-----------------+------------+
| S/P VSD closure | 2016 |
+-----------------+------------+
 
 
 
 
+------------------------------------------------------------+
|   Overview:   Overview:                                    |
| 8 mm Amplatzer Vascular plug-4 device placement (10/16/14) |
+------------------------------------------------------------+
 
 
 
+-------------------------------------------------+------------+
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
+-------------------------------------------------+------------+
| VSD (ventricular septal defect), perimembranous | 2008 |
+-------------------------------------------------+------------+
 
 
 
+-------------------------------------------------------------------+
|   Overview:   Overview:  10/16/2014  S/p 8-mm Amplatzer Vascular  |
| Plug IV, with no significant residual shunting Left atrial        |
| isomerism  LAE (left atrial enlargement)  LVE (left ventricular   |
| enlargement)                                                      |
|LVE (left ventricular enlargement)                                 |
+-------------------------------------------------------------------+
 
 
 
+--------------------+------------+
| Transplanted liver | 10/12/2006 |
+--------------------+------------+
 
 
 
+-------------------------------------------+
|   Overview:   Overview:                   |
| For biliary atresia, 2000 at [place] |
+-------------------------------------------+
 
 
 
 Family History
 
 
+-----------------+----------+------+--------------------+
| Medical History | Relation | Name | Comments           |
+-----------------+----------+------+--------------------+
| Heart disease   | Father   |      | had heart surgery  |
+-----------------+----------+------+--------------------+
| Hypertension    | Father   |      |                    |
+-----------------+----------+------+--------------------+
 
 
 
+----------+------+--------+----------+
| Relation | Name | Status | Comments |
+----------+------+--------+----------+
| Father   |      | Alive  |          |
+----------+------+--------+----------+
| Father   |      |        |          |
 
+----------+------+--------+----------+
| Mother   |      | Alive  |          |
+----------+------+--------+----------+
 
 
 
 Social History
 
 
+--------------+-------+-----------+--------+------+
| Tobacco Use  | Types | Packs/Day | Years  | Date |
|              |       |           | Used   |      |
+--------------+-------+-----------+--------+------+
| Never Smoker |       |           |        |      |
+--------------+-------+-----------+--------+------+
 
 
 
+------------------+---------------+
| Sex Assigned at  | Date Recorded |
| Birth            |               |
+------------------+---------------+
| Not on file      |               |
+------------------+---------------+
 
 
 
+----------------+-------------+-------------+
| Job Start Date | Occupation  | Industry    |
+----------------+-------------+-------------+
| Not on file    | Not on file | Not on file |
+----------------+-------------+-------------+
 
 
 
+----------------+--------------+------------+
| Travel History | Travel Start | Travel End |
+----------------+--------------+------------+
 
 
 
+-------------------------------------+
| No recent travel history available. |
+-------------------------------------+
 
 
 
 Last Filed Vital Signs
 
 
+-------------------+----------------------+---------------------+
| Vital Sign        | Reading              | Time Taken          |
+-------------------+----------------------+---------------------+
| Blood Pressure    | 124/70               | 2019 1455 PDT |
+-------------------+----------------------+---------------------+
| Pulse             | 100                  | 2019 PDT |
+-------------------+----------------------+---------------------+
| Temperature       | -                    | -                   |
+-------------------+----------------------+---------------------+
| Respiratory Rate  | -                    | -                   |
 
+-------------------+----------------------+---------------------+
| Oxygen Saturation | -                    | -                   |
+-------------------+----------------------+---------------------+
| Inhaled Oxygen    | -                    | -                   |
| Concentration     |                      |                     |
+-------------------+----------------------+---------------------+
| Weight            | 96.4 kg (212 lb 9.6  | 2019 1455 PDT |
|                   | oz)                  |                     |
+-------------------+----------------------+---------------------+
| Height            | 165.1 cm (5' 5")     | 20195 PDT |
+-------------------+----------------------+---------------------+
| Body Mass Index   | 35.38                | 20195 PDT |
+-------------------+----------------------+---------------------+
 
 
 
 Plan of Treatment
 
 
+--------+---------+------------+----------------------+-------------+
| Date   | Type    | Specialty  | Care Team            | Description |
+--------+---------+------------+----------------------+-------------+
| / | Office  | Cardiology |   Renetta Goldberg, |             |
|    | Visit   |            |  MD [address]   |             |
|        |         |            | [address]  [place], [place]  |             |
|        |         |            | [address] [phone]  |             |
|        |         |            |  [phone] (Fax)  |             |
+--------+---------+------------+----------------------+-------------+
 
 
 
+---------------------+-----------+-----------+----------+
| Health Maintenance  | Due Date  | Last Done | Comments |
+---------------------+-----------+-----------+----------+
| Well Child Check    | 01/15/200 |           |          |
|                     | 2         |           |          |
+---------------------+-----------+-----------+----------+
| Vaccine: HPV (1 -   | 01/15/201 |           |          |
| Female 3-dose       | 4         |           |          |
| series)             |           |           |          |
+---------------------+-----------+-----------+----------+
| Vaccine:            | 01/15/201 |           |          |
| Dtap/Tdap/Td (1 -   | 8         |           |          |
| Tdap)               |           |           |          |
+---------------------+-----------+-----------+----------+
| Vaccine: Influenza  |  |           |          |
| (#1)                | 9         |           |          |
+---------------------+-----------+-----------+----------+
 
 
 
 Results
 Not on filefrom Last 3 Months
 
 Insurance
 
 
+--------------------+--------+-------------+--------+-------------+---------+------+
| Payer              | Benefi | Subscriber  | Effect | Phone       | Address | Type |
|                    | t Plan | ID          | david    |             |         |      |
 
|                    |  /     |             | Dates  |             |         |      |
|                    | Group  |             |        |             |         |      |
+--------------------+--------+-------------+--------+-------------+---------+------+
| [employer]       | FIRST  | [id number] |  | [phone] |         | PPO  |
|                    | HEALTH |             | 018-Pr | 7           |         |      |
|                    |  OTHER |             | esent  |             |         |      |
+--------------------+--------+-------------+--------+-------------+---------+------+
| [employer]  | PHP    | [id number] | 20 | [phone] |         | PPO  |
| PLAN               | PEBB   |             | 19-Pre | 5           |         |      |
|                    | [place]W |             | sent   |             |         |      |
|                    | JONY    |             |        |             |         |      |
+--------------------+--------+-------------+--------+-------------+---------+------+
 
 
 
+-----------------+--------+-------------+--------+-------------+----------------------+
| Guarantor Name  | Accoun | Relation to | Date   | Phone       | Billing Address      |
|                 | t Type |  Patient    | of     |             |                      |
|                 |        |             | Birth  |             |                      |
+-----------------+--------+-------------+--------+-------------+----------------------+
| Nila Valdovinos | Person | Self        | [birth year]             |   [address]   |
|                 | [place] |             | 2000   | [phone] | [place]        |
|                 | [place]    |             |        | [address] (Home)    | [address]           |
+-----------------+--------+-------------+--------+-------------+----------------------+
 
 
 
 Advance Directives
 Patient has advance care planning documents on file. For more information, please contact:[facility]IL[facility] and [facility] [place] [address]

## 2019-11-01 NOTE — XMS
Encounter Summary
  Created on: 2019
 
 Bonifacio Nila JB
 External Reference #: [id number]
 : [birth year]
 Sex: Female
 
 Demographics
 
 
+-----------------------+------------------------+
| Address               | [address]            |
|                       | [place]  [address]   |
+-----------------------+------------------------+
| Home Phone            | [phone]        |
+-----------------------+------------------------+
| Preferred Language    | Unknown                |
+-----------------------+------------------------+
| Marital Status        | Single                 |
+-----------------------+------------------------+
| Worship Affiliation | Unknown                |
+-----------------------+------------------------+
| Race                  | White                  |
+-----------------------+------------------------+
| Ethnic Group          | Not  or  |
+-----------------------+------------------------+
 
 
 Author
 
 
+--------------+-------------+
| Organization | Unknown     |
+--------------+-------------+
| Address      | Unknown     |
+--------------+-------------+
| Phone        | Unavailable |
+--------------+-------------+
 
 
 
 Support
 
 
+-----------------+--------------+---------+-----------------+
| Name            | Relationship | Address | Phone           |
+-----------------+--------------+---------+-----------------+
| Kit Valdovinos   | ECON         | Unknown | [phone] |
+-----------------+--------------+---------+-----------------+
| Magdalena Valdovinos | ECON         | Unknown | [phone] |
+-----------------+--------------+---------+-----------------+
 
 
 
 Care Team Providers
 
 
 
+-----------------------+------+-----------------+
| Care Team Member Name | Role | Phone           |
+-----------------------+------+-----------------+
| Lily Horton MD   | PCP  | [phone] |
+-----------------------+------+-----------------+
 
 
 
 Encounter Details
 
 
+--------+-------------+------------+--------------------+-------------+
| Date   | Type        | Department | Care Team          | Description |
+--------+-------------+------------+--------------------+-------------+
| / | Transcribed |            |   Dictation, Other | Transcribed |
| 2000   |             |            |                    |             |
+--------+-------------+------------+--------------------+-------------+
 
 
 
 Social History
 
 
+----------------+-------+-----------+--------+------+
| Tobacco Use    | Types | Packs/Day | Years  | Date |
|                |       |           | Used   |      |
+----------------+-------+-----------+--------+------+
| Never Assessed |       |           |        |      |
+----------------+-------+-----------+--------+------+
 
 
 
+------------------+---------------+
| Sex Assigned at  | Date Recorded |
| Birth            |               |
+------------------+---------------+
| Not on file      |               |
+------------------+---------------+
 
 
 
+----------------+-------------+-------------+
| Job Start Date | Occupation  | Industry    |
+----------------+-------------+-------------+
| Not on file    | Not on file | Not on file |
+----------------+-------------+-------------+
 
 
 
+----------------+--------------+------------+
| Travel History | Travel Start | Travel End |
+----------------+--------------+------------+
 
 
 
+-------------------------------------+
| No recent travel history available. |
+-------------------------------------+
 documented as of this encounter
 
 
 Progress Notes
 Interface, Transcription In - 2006  1:05 AM [facility]                                    [place]
[address] [address] [phone] or [phone]
 
 2000
 
 Lily Horton M.D.
 [address] [place] [address]  [id number]
 
 RE:   NILA VALDOVINOS
 MR #: [id number]
 
 Dear Dr. Horton:
 
 I had the pleasure of seeing Nila at  [facility]
 today in the Pediatric Liver Transplant  Clinic  and  presenting  her  once
 again  to members of the Pediatric Liver  Transplant  team  from  [place],
 which included Dr. Marcos Aguirre.  Nila  has been doing reasonably well over
 the last six months during the interim  since her interim since her initial
 presentation to the transplant team.   She  has  grown  and  gained weight,
 although in a subdued fashion.  Her mother has few complaints.  She is on a
 regular diet and has dark brown stools.   Evidently her VSD is closing, and
 she is followed by Dr. Baljinder Bush, pediatric cardiologist.
 
 His present medications include Zantac  1  mL  b.i.d.,  Bactrim    teaspoon
 q.d., Actigall 1.5 cc t.i.d., iron 0.8  q.d.,  vitamin  A  and  D capsule 1
 q.d., vitamin E capsule 1 q.d., and vitamin K 2.5 mg q.d.
 
 On physical examination, she weighs  8.37  kg  and  measures  70.4 cm.  Her
 blood pressure is 104/69.  She is anicteric,  although her skin is slightly
 pigmented.   She  has thin extremities  and  a  protuberant  abdomen.   The
 vasculature over her abdomen is prominent.   She  has a very large and hard
 liver,  left lobe even more so than  the  right  lobe  and  a  spleen  that
 descends  at least 6 to 7 cm below the  left  costal  margin.   Ascites  is
 questionable.
 
 Her most recent laboratory tests on 2000, show an AST of 137, ALT
 99,  albumin 3.0, a direct bilirubin  of  1.0,  and  a  platelet  count  of
 116,000.  Her GGT is 571.  Nila has a blood type of O negative.
 
 In  summary, Nila is a 15-month-old  white  female  who  has  extrahepatic
 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
 disease and evidence of portal hypertension  and  failure to thrive.  Given
 the details of the situation, Dr. Aguirre wishes to move ahead and continue her
 listing as a 2B.  Members of the transplant  team  will investigate further
 and firm up plans to have a living-related donor on the wings in case Nila
 deteriorates quickly at some point in  the future requiring transplantation
 sooner  than  later.   In  the  meantime,  we  will  continue  the  present
 medications.  With her next blood draw,  she  needs  a  PT,  INR,  and PTT.
 Mother will try to remember to request  this  the  next  time  she comes to
 either your office or your laboratory  facility  for  the  next blood draw.
 Finally, mother will coordinate a visit  to both Dr. Bush and Dr. Bailey
 in the next month or two.  Please let me know if you have any questions.
 
 Sincerely,
 
 
 Eagle De Luna M.D.
 Pediatric Gastroenterology
 
 [facility] / 
 [id number] / [id number] / [id number] / [id number]
 D: 2000
 T: 2000
 
 cc:
 
 Marcos Aguirre M.D.
 [facility]
 [address] [facility]
 [place], [place]
 
 Cristo Bailey M.D.
 [address] [place] [address]  [id number]
 
 [id number] signed by Interface, Transcription In at 2006  1:05 AM PSTdocume
nted in this encounter
 
 Plan of Treatment
 Not on filedocumented as of this encounter
 
 Visit Diagnoses
 Not on filedocumented in this encounter

## 2019-11-01 NOTE — XMS
Encounter Summary
  Created on: 2019
 
 Bonifacio Nila JB
 External Reference #: [id number]
 : [birth year]
 Sex: Female
 
 Demographics
 
 
+-----------------------+------------------------+
| Address               | [address]            |
|                       | [place]  [address]   |
+-----------------------+------------------------+
| Home Phone            | [phone]        |
+-----------------------+------------------------+
| Preferred Language    | Unknown                |
+-----------------------+------------------------+
| Marital Status        | Single                 |
+-----------------------+------------------------+
| Mu-ism Affiliation | Unknown                |
+-----------------------+------------------------+
| Race                  | White                  |
+-----------------------+------------------------+
| Ethnic Group          | Not  or  |
+-----------------------+------------------------+
 
 
 Author
 
 
+--------------+------------------------------+
| Author       | [facility] |
+--------------+------------------------------+
| Organization | [facility] |
+--------------+------------------------------+
| Address      | Unknown                      |
+--------------+------------------------------+
| Phone        | Unavailable                  |
+--------------+------------------------------+
 
 
 
 Support
 
 
+-----------------+--------------+---------+-----------------+
| Name            | Relationship | Address | Phone           |
+-----------------+--------------+---------+-----------------+
| Kit Valdovinos   | ECON         | Unknown | [phone] |
+-----------------+--------------+---------+-----------------+
| Magdalena Valdovinos | ECON         | Unknown | [phone] |
+-----------------+--------------+---------+-----------------+
 
 
 
 Care Team Providers
 
 
 
+-----------------------+------+-----------------+
| Care Team Member Name | Role | Phone           |
+-----------------------+------+-----------------+
| Lily Horton MD   | PCP  | [phone] |
+-----------------------+------+-----------------+
 
 
 
 Encounter Details
 
 
+--------+----------+----------------------+--------------------+-------------+
| Date   | Type     | Department           | Care Team          | Description |
+--------+----------+----------------------+--------------------+-------------+
| / | Abstract |   Pediatric          |   Ivis Burkett MD |             |
|    |          | Gastroenterology at  |                    |             |
|        |          | [facility]          |                    |             |
|        |          | [facility]  |                    |             |
|        |          |  [address] |                    |             |
|        |          |  [address]  Mailcode:  |                    |             |
|        |          | [address]   |                    |             |
|        |          | [place], [place]         |                    |             |
|        |          | [address]           |                    |             |
|        |          | [phone]         |                    |             |
+--------+----------+----------------------+--------------------+-------------+
 
 
 
 Social History
 
 
+----------------+-------+-----------+--------+------+
| Tobacco Use    | Types | Packs/Day | Years  | Date |
|                |       |           | Used   |      |
+----------------+-------+-----------+--------+------+
| Never Assessed |       |           |        |      |
+----------------+-------+-----------+--------+------+
 
 
 
+------------------+---------------+
| Sex Assigned at  | Date Recorded |
| Birth            |               |
+------------------+---------------+
| Not on file      |               |
+------------------+---------------+
 
 
 
+----------------+-------------+-------------+
| Job Start Date | Occupation  | Industry    |
+----------------+-------------+-------------+
| Not on file    | Not on file | Not on file |
+----------------+-------------+-------------+
 
 
 
+----------------+--------------+------------+
 
| Travel History | Travel Start | Travel End |
+----------------+--------------+------------+
 
 
 
+-------------------------------------+
| No recent travel history available. |
+-------------------------------------+
 documented as of this encounter
 
 Plan of Treatment
 Not on filedocumented as of this encounter
 
 Visit Diagnoses
 Not on filedocumented in this encounter

## 2019-11-01 NOTE — XMS
Encounter Summary
  Created on: 2019
 
 Bonifacio Nila JB
 External Reference #: [id number]
 : [birth year]
 Sex: Female
 
 Demographics
 
 
+-----------------------+------------------------+
| Address               | [address]            |
|                       | [place]  [address]   |
+-----------------------+------------------------+
| Home Phone            | [phone]        |
+-----------------------+------------------------+
| Preferred Language    | Unknown                |
+-----------------------+------------------------+
| Marital Status        | Single                 |
+-----------------------+------------------------+
| Evangelical Affiliation | Unknown                |
+-----------------------+------------------------+
| Race                  | White                  |
+-----------------------+------------------------+
| Ethnic Group          | Not  or  |
+-----------------------+------------------------+
 
 
 Author
 
 
+--------------+------------------------------+
| Author       | [facility] |
+--------------+------------------------------+
| Organization | [facility] |
+--------------+------------------------------+
| Address      | Unknown                      |
+--------------+------------------------------+
| Phone        | Unavailable                  |
+--------------+------------------------------+
 
 
 
 Support
 
 
+-----------------+--------------+---------+-----------------+
| Name            | Relationship | Address | Phone           |
+-----------------+--------------+---------+-----------------+
| Kit Valdovinos   | ECON         | Unknown | [phone] |
+-----------------+--------------+---------+-----------------+
| Magdalena Valdovinos | ECON         | Unknown | [phone] |
+-----------------+--------------+---------+-----------------+
 
 
 
 Care Team Providers
 
 
 
+-----------------------+------+-----------------+
| Care Team Member Name | Role | Phone           |
+-----------------------+------+-----------------+
| Lily Horton MD   | PCP  | [phone] |
+-----------------------+------+-----------------+
 
 
 
 Encounter Details
 
 
+--------+-------------+----------------------+---------------------+----------------------+
| Date   | Type        | Department           | Care Team           | Description          |
+--------+-------------+----------------------+---------------------+----------------------+
| / |  |   Pediatric          |   Monserrat Spann,  | Complications of     |
|    |             | Gastroenterology at  | RN  [address]     | Transplanted Liver;  |
|        |             | [facility]          | [address]     | Transplanted Liver   |
|        |             | [facility]  | [place] [address]  | (HCC)                |
|        |             |  [address] |                     |                      |
|        |             |  Aide [facility]  Mailcode:  |                     |                      |
|        |             | [facility]  [facility]   |                     |                      |
|        |             | [place], [place]         |                     |                      |
|        |             | [address]           |                     |                      |
|        |             | [phone]         |                     |                      |
+--------+-------------+----------------------+---------------------+----------------------+
 
 
 
 Social History
 
 
+----------------+-------+-----------+--------+------+
| Tobacco Use    | Types | Packs/Day | Years  | Date |
|                |       |           | Used   |      |
+----------------+-------+-----------+--------+------+
| Never Assessed |       |           |        |      |
+----------------+-------+-----------+--------+------+
 
 
 
+------------------+---------------+
| Sex Assigned at  | Date Recorded |
| Birth            |               |
+------------------+---------------+
| Not on file      |               |
+------------------+---------------+
 
 
 
+----------------+-------------+-------------+
| Job Start Date | Occupation  | Industry    |
+----------------+-------------+-------------+
| Not on file    | Not on file | Not on file |
+----------------+-------------+-------------+
 
 
 
+----------------+--------------+------------+
 
| Travel History | Travel Start | Travel End |
+----------------+--------------+------------+
 
 
 
+-------------------------------------+
| No recent travel history available. |
+-------------------------------------+
 documented as of this encounter
 
 Plan of Treatment
 
 
+----------------------+------+--------+----------------------+---------------------+
| Name                 | Type | Priori | Associated Diagnoses | Order Schedule      |
|                      |      | ty     |                      |                     |
+----------------------+------+--------+----------------------+---------------------+
| LDH TOTAL, PLASMA    | Lab  | Routin |   Complications of   | Ordered: 2009 |
|                      |      | e      | Transplanted Liver   |                     |
|                      |      |        | Transplanted Liver   |                     |
|                      |      |        | (HCC)                |                     |
+----------------------+------+--------+----------------------+---------------------+
| CMV PCR              | Lab  | Routin |   Complications of   | Ordered: 2009 |
| QUANTITATION, PLASMA |      | e      | Transplanted Liver   |                     |
|                      |      |        | Transplanted Liver   |                     |
|                      |      |        | (HCC)                |                     |
+----------------------+------+--------+----------------------+---------------------+
 documented as of this encounter
 
 Procedures
 
 
+----------------------+--------+-------------+----------------------+----------------------
+
| Procedure Name       | Priori | Date/Time   | Associated Diagnosis | Comments             
|
|                      | ty     |             |                      |                      
|
+----------------------+--------+-------------+----------------------+----------------------
+
| CHOLESTEROL TOTAL,   | Routin | 2009  |   Complications of   |   Results for this   
|
| PLASMA               | e      |  3:26 PM    | Transplanted Liver   | procedure are in the 
|
|                      |        | PST         | Transplanted Liver   |  results section.    
|
|                      |        |             | (HCC)                |                      
|
+----------------------+--------+-------------+----------------------+----------------------
+
| COMPLETE METABOLIC   | Routin | 2009  |   Complications of   |   Results for this   
|
| SET                  | e      |  3:20 PM    | Transplanted Liver   | procedure are in the 
|
| (NA,K,CL,CO2,BUN,CRE |        | PST         | Transplanted Liver   |  results section.    
|
| AT,GLUC,CA,AST,ALT,B |        |             | (HCC)                |                      
|
| ASHWINI TOTAL,ALK        |        |             |                      |                      
|
 
| PHOS,ALB,PROT TOTAL) |        |             |                      |                      
|
+----------------------+--------+-------------+----------------------+----------------------
+
| TACROLIMUS, WHOLE    | Routin | 2009  |   Complications of   |   Results for this   
|
| BLOOD                | e      |  3:20 PM    | Transplanted Liver   | procedure are in the 
|
|                      |        | PST         | Transplanted Liver   |  results section.    
|
|                      |        |             | (HCC)                |                      
|
+----------------------+--------+-------------+----------------------+----------------------
+
| PHOSPHORUS, PLASMA   | Routin | 2009  |   Complications of   |   Results for this   
|
|                      | e      |  3:20 PM    | Transplanted Liver   | procedure are in the 
|
|                      |        | PST         | Transplanted Liver   |  results section.    
|
|                      |        |             | (HCC)                |                      
|
+----------------------+--------+-------------+----------------------+----------------------
+
| GGT, PLASMA          | Routin | 2009  |   Complications of   |   Results for this   
|
|                      | e      |  3:20 PM    | Transplanted Liver   | procedure are in the 
|
|                      |        | PST         | Transplanted Liver   |  results section.    
|
|                      |        |             | (HCC)                |                      
|
+----------------------+--------+-------------+----------------------+----------------------
+
| BILIRUBIN DIRECT     | Routin | 2009  |   Complications of   |   Results for this   
|
|                      | e      |  3:20 PM    | Transplanted Liver   | procedure are in the 
|
|                      |        | PST         | Transplanted Liver   |  results section.    
|
|                      |        |             | (HCC)                |                      
|
+----------------------+--------+-------------+----------------------+----------------------
+
| URIC ACID, PLASMA    | Routin | 2009  |   Complications of   |   Results for this   
|
|                      | e      |  3:20 PM    | Transplanted Liver   | procedure are in the 
|
|                      |        | PST         | Transplanted Liver   |  results section.    
|
|                      |        |             | (HCC)                |                      
|
+----------------------+--------+-------------+----------------------+----------------------
+
| MAGNESIUM, PLASMA    | Routin | 2009  |   Complications of   |   Results for this   
|
|                      | e      |  3:20 PM    | Transplanted Liver   | procedure are in the 
|
|                      |        | PST         | Transplanted Liver   |  results section.    
|
 
|                      |        |             | (HCC)                |                      
|
+----------------------+--------+-------------+----------------------+----------------------
+
| FRANCIS-BARR VIRUS   | Routin | 2009  |   Complications of   |   Results for this   
|
| PCR, PLASMA          | e      |             | Transplanted Liver   | procedure are in the 
|
|                      |        |             | Transplanted Liver   |  results section.    
|
|                      |        |             | (HCC)                |                      
|
+----------------------+--------+-------------+----------------------+----------------------
+
| CBC, WITH            | Routin | 2009  |   Complications of   |   Results for this   
|
| DIFFERENTIAL         | e      |             | Transplanted Liver   | procedure are in the 
|
|                      |        |             | Transplanted Liver   |  results section.    
|
|                      |        |             | (HCC)                |                      
|
+----------------------+--------+-------------+----------------------+----------------------
+
 documented in this encounter
 
 Results
 CHOLESTEROL TOTAL, PLASMA (2009  3:26 PM PST)
 
+-------------+-------+---------------+------------+--------------+
| Component   | Value | Ref Range     | Performed  | Pathologist  |
|             |       |               | At         | Signature    |
+-------------+-------+---------------+------------+--------------+
| CHOLESTEROL | 149   | < - 200 mg/dL | NON [facility]   |              |
|   (LAB)     |       |               | LAB        |              |
+-------------+-------+---------------+------------+--------------+
 
 
 
+---------------+
| Specimen      |
+---------------+
| Serum - Blood |
+---------------+
 
 
 
+----------------+---------+--------------------+--------------+
| Performing     | Address | City/State/Zipcode | Phone Number |
| Organization   |         |                    |              |
+----------------+---------+--------------------+--------------+
|   NON [facility] |         |                    |              |
+----------------+---------+--------------------+--------------+
 TACROLIMUS, WHOLE BLOOD (2009  3:20 PM PST)
 
+-------------+---------+--------------+------------+--------------+
| Component   | Value   | Ref Range    | Performed  | Pathologist  |
|             |         |              | At         | Signature    |
+-------------+---------+--------------+------------+--------------+
| TACROLIMUS  | 0.5 (A) | 5 - 18 ng/mL | [facility]  |              |
 
| ()    |         |              | LAB -      |              |
|             |         |              | [place]  |              |
+-------------+---------+--------------+------------+--------------+
 
 
 
+---------------+
| Specimen      |
+---------------+
| Serum - Blood |
+---------------+
 
 
 
+--------------------+----------------------+--------------------+----------------+
| Performing         | Address              | City/State/Zipcode | Phone Number   |
| Organization       |                      |                    |                |
+--------------------+----------------------+--------------------+----------------+
|   [facility]  |   [address] | [place]      |   [phone] |
| [place]          |                      |                    |                |
+--------------------+----------------------+--------------------+----------------+
|   [facility] -  |                      | [place], [place]      |                |
| [place]          |                      |                    |                |
+--------------------+----------------------+--------------------+----------------+
 GGT, PLASMA (2009  3:20 PM PST)
 
+-----------+-------+-----------+------------+--------------+
| Component | Value | Ref Range | Performed  | Pathologist  |
|           |       |           | At         | Signature    |
+-----------+-------+-----------+------------+--------------+
| GAMMA     | 7 (A) | 5 - 6 U/L | NON [facility]   |              |
| GLUTAMYL  |       |           | LAB        |              |
| TRANS     |       |           |            |              |
+-----------+-------+-----------+------------+--------------+
 
 
 
+---------------+
| Specimen      |
+---------------+
| Serum - Blood |
+---------------+
 
 
 
+----------------+---------+--------------------+--------------+
| Performing     | Address | City/State/Zipcode | Phone Number |
| Organization   |         |                    |              |
+----------------+---------+--------------------+--------------+
|   NON [facility] |         |                    |              |
+----------------+---------+--------------------+--------------+
 BILIRUBIN DIRECT (2009  3:20 PM PST)
 
+------------+-------+-----------------+------------+--------------+
| Component  | Value | Ref Range       | Performed  | Pathologist  |
|            |       |                 | At         | Signature    |
+------------+-------+-----------------+------------+--------------+
| BILIRUBIN  | 0.1   | 0.0 - 0.4 mg/dL | [facility]  |              |
| DIRECT     |       |                 | LAB -      |              |
|            |       |                 | [place]  |              |
 
+------------+-------+-----------------+------------+--------------+
 
 
 
+---------------+
| Specimen      |
+---------------+
| Serum - Blood |
+---------------+
 
 
 
+--------------------+----------------------+--------------------+----------------+
| Performing         | Address              | City/State/Zipcode | Phone Number   |
| Organization       |                      |                    |                |
+--------------------+----------------------+--------------------+----------------+
|   [facility]  |   [address] | [place], [place]      |   [phone] |
| [place]          |                      |                    |                |
+--------------------+----------------------+--------------------+----------------+
|   [facility] -  |                      | [place], [place]      |                |
| [place]          |                      |                    |                |
+--------------------+----------------------+--------------------+----------------+
 URIC ACID, PLASMA (2009  3:20 PM PST)
 
+-------------+---------+-----------+------------+--------------+
| Component   | Value   | Ref Range | Performed  | Pathologist  |
|             |         |           | At         | Signature    |
+-------------+---------+-----------+------------+--------------+
| URIC ACID,  | pending | mg/dL     | [facility]  |              |
| PLASMA      |         |           | LAB -      |              |
| (LAB)       |         |           | [place]  |              |
+-------------+---------+-----------+------------+--------------+
 
 
 
+---------------+
| Specimen      |
+---------------+
| Serum - Blood |
+---------------+
 
 
 
+--------------------+----------------------+--------------------+----------------+
| Performing         | Address              | City/State/Zipcode | Phone Number   |
| Organization       |                      |                    |                |
+--------------------+----------------------+--------------------+----------------+
|   [facility]  |   [address] | [place], [place]      |   [phone] |
| [place]          |                      |                    |                |
+--------------------+----------------------+--------------------+----------------+
|   [facility]  |                      | [place], [place]      |                |
| [place]          |                      |                    |                |
+--------------------+----------------------+--------------------+----------------+
 PHOSPHORUS, PLASMA (2009  3:20 PM PST)
 
+-------------+-------+-----------------+------------+--------------+
| Component   | Value | Ref Range       | Performed  | Pathologist  |
|             |       |                 | At         | Signature    |
+-------------+-------+-----------------+------------+--------------+
| PHOSPHORUS, | 4.8   | 2.6 - 6.2 mg/dL | [facility]  |              |
 
|  PLASMA     |       |                 | LAB -      |              |
| (LAB)       |       |                 | [place]  |              |
+-------------+-------+-----------------+------------+--------------+
 
 
 
+---------------+
| Specimen      |
+---------------+
| Serum - Blood |
+---------------+
 
 
 
+--------------------+----------------------+--------------------+----------------+
| Performing         | Address              | City/State/Zipcode | Phone Number   |
| Organization       |                      |                    |                |
+--------------------+----------------------+--------------------+----------------+
|   [facility]  |   [address] | [place], [place]      |   [phone] |
| [place]          |                      |                    |                |
+--------------------+----------------------+--------------------+----------------+
|   [facility]  |                      | [place], [place]      |                |
| [place]          |                      |                    |                |
+--------------------+----------------------+--------------------+----------------+
 MAGNESIUM, PLASMA (2009  3:20 PM PST)
 
+-------------+-------+-----------------+------------+--------------+
| Component   | Value | Ref Range       | Performed  | Pathologist  |
|             |       |                 | At         | Signature    |
+-------------+-------+-----------------+------------+--------------+
| MAGNESIUM,P | 1.99  | 1.7 - 2.5 mg/dL | [facility]  |              |
| LASMA       |       |                 | LAB -      |              |
|             |       |                 | [place]  |              |
+-------------+-------+-----------------+------------+--------------+
 
 
 
+---------------+
| Specimen      |
+---------------+
| Serum - Blood |
+---------------+
 
 
 
+--------------------+----------------------+--------------------+----------------+
| Performing         | Address              | City/State/Zipcode | Phone Number   |
| Organization       |                      |                    |                |
+--------------------+----------------------+--------------------+----------------+
|   [facility]  |   [address] | [place], [place]      |   [phone] |
| [place]          |                      |                    |                |
+--------------------+----------------------+--------------------+----------------+
|   [facility] -  |                      | [place], [place]      |                |
| [place]          |                      |                    |                |
+--------------------+----------------------+--------------------+----------------+
 COMPLETE METABOLIC SET (NA,K,CL,CO2,BUN,CREAT,GLUC,CA,AST,ALT,BILI TOTAL,ALK PHOS,ALB,PROT 
TOTAL) (2009  3:20 PM PST)
 
+-------------+-------+-----------------+------------+--------------+
| Component   | Value | Ref Range       | Performed  | Pathologist  |
 
|             |       |                 | At         | Signature    |
+-------------+-------+-----------------+------------+--------------+
| GLUCOSE,    | 88    | 60 - 100 mg/dL  | [facility]  |              |
| PLASMA      |       |                 | LAB -      |              |
| (LAB)       |       |                 | [place]  |              |
+-------------+-------+-----------------+------------+--------------+
| BUN, PLASMA | 13    | 6 - 23 mg/dL    | [facility]  |              |
|  (LAB)      |       |                 | LAB -      |              |
|             |       |                 | [place]  |              |
+-------------+-------+-----------------+------------+--------------+
| CREATININE  | 0.56  | 0.5 - 1.5 mg/dL | [facility]  |              |
| PLASMA      |       |                 | LAB -      |              |
| (LAB)       |       |                 | [place]  |              |
+-------------+-------+-----------------+------------+--------------+
| TOTAL       | 7.3   | 6.1 - 8.5 g/dL  | [facility]  |              |
| PROTEIN,    |       |                 | LAB -      |              |
| PLASMA      |       |                 | [place]  |              |
| (LAB)       |       |                 |            |              |
+-------------+-------+-----------------+------------+--------------+
| ALBUMIN,    | 4.3   | 2.9 - 5.5 g/dL  | [facility]  |              |
| PLASMA      |       |                 | LAB -      |              |
| (LAB)       |       |                 | [place]  |              |
+-------------+-------+-----------------+------------+--------------+
| CALCIUM,    | 10.0  | 8.5 - 10.5      | [facility]  |              |
| PLASMA      |       | mg/dL           | LAB -      |              |
| (LAB)       |       |                 | [place]  |              |
+-------------+-------+-----------------+------------+--------------+
| BILIRUBIN   | 0.4   | 0.0 - 1.2       | [facility]  |              |
| TOTAL       |       | Transcutaneous  | LAB -      |              |
|             |       | Bilirubinometer | [place]  |              |
+-------------+-------+-----------------+------------+--------------+
| ALK PHOS    | 273   | 135 - 384 U/L   | [facility]  |              |
|             |       |                 | LAB -      |              |
|             |       |                 | [place]  |              |
+-------------+-------+-----------------+------------+--------------+
| AST(SGOT)   | 26    | 0 - 40 U/L      | [facility]  |              |
|             |       |                 | LAB -      |              |
|             |       |                 | [place]  |              |
+-------------+-------+-----------------+------------+--------------+
| SODIUM,     | 140   | 132 - 143       | [facility]  |              |
| PLASMA      |       | mmol/L          | LAB -      |              |
| (LAB)       |       |                 | [place]  |              |
+-------------+-------+-----------------+------------+--------------+
| POTASSIUM,  | 3.9   | 3.6 - 5.1       | [facility]  |              |
| PLASMA      |       | mmol/L          | LAB -      |              |
| (LAB)       |       |                 | [place]  |              |
+-------------+-------+-----------------+------------+--------------+
| CHLORIDE,   | 107   | 95 - 112 mmol/L | [facility]  |              |
| PLASMA      |       |                 | LAB -      |              |
| (LAB)       |       |                 | [place]  |              |
+-------------+-------+-----------------+------------+--------------+
| TOTAL CO2,  | 22.4  | 19 - 31 mmol/L  | [facility]  |              |
| PLASMA      |       |                 | LAB -      |              |
| (LAB)       |       |                 | [place]  |              |
+-------------+-------+-----------------+------------+--------------+
| ALT (SGPT)  | 20    | 0 - 46 U/L      | [facility]  |              |
|             |       |                 | LAB -      |              |
|             |       |                 | [place]  |              |
+-------------+-------+-----------------+------------+--------------+
| ANION GAP   | 14.5  | 7.0 - 21        | [facility]  |              |
 
|             |       |                 | LAB -      |              |
|             |       |                 | [place]  |              |
+-------------+-------+-----------------+------------+--------------+
| BUN/CREATIN | 23.2  | 6.0 - 28.6      | [facility]  |              |
| INE RATIO   |       |                 | LAB -      |              |
|             |       |                 | [place]  |              |
+-------------+-------+-----------------+------------+--------------+
| GLOBULIN    | 3.0   | 1.8 - 3.5       | [facility]  |              |
|             |       |                 | LAB -      |              |
|             |       |                 | [place]  |              |
+-------------+-------+-----------------+------------+--------------+
| A/G RATIO   | 1.4   | 1.1 - 2.4       | [facility]  |              |
|             |       |                 | LAB -      |              |
|             |       |                 | [place]  |              |
+-------------+-------+-----------------+------------+--------------+
| BILIRUBIN   | 0.1   | 0.0 - 0.4 mg/dL | [facility]  |              |
| DIRECT      |       |                 | LAB -      |              |
|             |       |                 | [place]  |              |
+-------------+-------+-----------------+------------+--------------+
| GAMMA       | 7     | 5 - 60 U/L      | [facility]  |              |
| GLUTAMYL    |       |                 | LAB -      |              |
| TRANS       |       |                 | [place]  |              |
+-------------+-------+-----------------+------------+--------------+
 
 
 
+---------------+
| Specimen      |
+---------------+
| Serum - Blood |
+---------------+
 
 
 
+--------------------+----------------------+--------------------+----------------+
| Performing         | Address              | City/State/Zipcode | Phone Number   |
| Organization       |                      |                    |                |
+--------------------+----------------------+--------------------+----------------+
|   [facility]  |   [address] | [place], [place]      |   [phone] |
| [place]          |                      |                    |                |
+--------------------+----------------------+--------------------+----------------+
|   [facility]  |                      | [place], [place]      |                |
| [place]          |                      |                    |                |
+--------------------+----------------------+--------------------+----------------+
 FRANCIS-BARR VIRUS PCR (2009)
 
+-------------+-------+-----------+------------+--------------+
| Component   | Value | Ref Range | Performed  | Pathologist  |
|             |       |           | At         | Signature    |
+-------------+-------+-----------+------------+--------------+
| EBV QUANT   | <2.6  | <2.6      | [facility]  |              |
| INTERPRETAT |       |           | LAB -      |              |
| ION         |       |           | [place]  |              |
+-------------+-------+-----------+------------+--------------+
 
 
 
+----------+
| Specimen |
+----------+
 
| Serum    |
+----------+
 
 
 
+--------------------+----------------------+--------------------+----------------+
| Performing         | Address              | City/State/Zipcode | Phone Number   |
| Organization       |                      |                    |                |
+--------------------+----------------------+--------------------+----------------+
|   [facility]  |   [address] | [place], [place]      |   [phone] |
| [place]          |                      |                    |                |
+--------------------+----------------------+--------------------+----------------+
|   [facility]  |                      | [place], [place]      |                |
| [place]          |                      |                    |                |
+--------------------+----------------------+--------------------+----------------+
 CBC, WITH DIFFERENTIAL (2009)
 
+-------------+-------+-----------------+------------+--------------+
| Component   | Value | Ref Range       | Performed  | Pathologist  |
|             |       |                 | At         | Signature    |
+-------------+-------+-----------------+------------+--------------+
| WHITE CELL  | 8.6   | 4.5 - 13.5 K/cu | [facility]  |              |
| COUNT       |       |  mm             | LAB -      |              |
|             |       |                 | [place]  |              |
+-------------+-------+-----------------+------------+--------------+
| RED CELL    | 4.92  | 4.1 - 5.3 M/cu  | [facility]  |              |
| COUNT       |       | mm              | LAB -      |              |
|             |       |                 | [place]  |              |
+-------------+-------+-----------------+------------+--------------+
| HEMOGLOBIN  | 14.5  | 10.6 - 15.2     | [facility]  |              |
|             |       |                 | LAB -      |              |
|             |       |                 | [place]  |              |
+-------------+-------+-----------------+------------+--------------+
| HEMATOCRIT  | 41.5  | 32 - 42 %       | [facility]  |              |
|             |       |                 | LAB -      |              |
|             |       |                 | [place]  |              |
+-------------+-------+-----------------+------------+--------------+
| MCV         | 84.3  | 71 - 89 fL      | [facility]  |              |
|             |       |                 | LAB -      |              |
|             |       |                 | [place]  |              |
+-------------+-------+-----------------+------------+--------------+
| MCH         | 29    | 24 - 30 pg      | [facility]  |              |
|             |       |                 | LAB -      |              |
|             |       |                 | [place]  |              |
+-------------+-------+-----------------+------------+--------------+
| MCHC        | 35    | 30 - 36 g/dL    | [facility]  |              |
|             |       |                 | LAB -      |              |
|             |       |                 | [place]  |              |
+-------------+-------+-----------------+------------+--------------+
| PLATELET    | 368   | 140 - 440 K/cu  | [facility]  |              |
| COUNT       |       | mm              | LAB -      |              |
|             |       |                 | [place]  |              |
+-------------+-------+-----------------+------------+--------------+
| NEUTROPHIL  | 59.3  | 31 - 60 %       | [facility]  |              |
| %           |       |                 | LAB -      |              |
|             |       |                 | [place]  |              |
+-------------+-------+-----------------+------------+--------------+
| LYMPHOCYTE  | 30.1  | 28 - 48 %       | [facility]  |              |
| %           |       |                 | LAB -      |              |
|             |       |                 | [place]  |              |
 
+-------------+-------+-----------------+------------+--------------+
| MONOCYTE %  | 7.2   | 0 - 12 %        | [facility]  |              |
|             |       |                 | LAB -      |              |
|             |       |                 | [place]  |              |
+-------------+-------+-----------------+------------+--------------+
| EOS %       | 2.0   | 0 - 6 %         | [facility]  |              |
|             |       |                 | LAB -      |              |
|             |       |                 | [place]  |              |
+-------------+-------+-----------------+------------+--------------+
| BASO %      | 0.7   | 0 - 2 %         | [facility]  |              |
|             |       |                 | LAB -      |              |
|             |       |                 | [place]  |              |
+-------------+-------+-----------------+------------+--------------+
| RDW         | 13.4  | 10.5 - 15.8 %   | [facility]  |              |
|             |       |                 | LAB -      |              |
|             |       |                 | [place]  |              |
+-------------+-------+-----------------+------------+--------------+
| MPV         |       | fL              | [facility]  |              |
|             |       |                 | LAB -      |              |
|             |       |                 | [place]  |              |
+-------------+-------+-----------------+------------+--------------+
| NEUTROPHIL  |       | K/cu mm         | [facility]  |              |
| #           |       |                 | LAB -      |              |
|             |       |                 | [place]  |              |
+-------------+-------+-----------------+------------+--------------+
| LYMPHOCYTE  |       | K/cu mm         | [facility]  |              |
| #           |       |                 | LAB -      |              |
|             |       |                 | [place]  |              |
+-------------+-------+-----------------+------------+--------------+
| MONOCYTE #  |       | K/cu mm         | [facility]  |              |
|             |       |                 | LAB -      |              |
|             |       |                 | [place]  |              |
+-------------+-------+-----------------+------------+--------------+
| EOS #       |       | K/cu mm         | [facility]  |              |
|             |       |                 | LAB -      |              |
|             |       |                 | [place]  |              |
+-------------+-------+-----------------+------------+--------------+
| BASO #      |       |                 | [facility]  |              |
|             |       |                 | LAB -      |              |
|             |       |                 | [place]  |              |
+-------------+-------+-----------------+------------+--------------+
 
 
 
+---------------+
| Specimen      |
+---------------+
| Serum - Blood |
+---------------+
 
 
 
+--------------------+----------------------+--------------------+----------------+
| Performing         | Address              | City/State/Zipcode | Phone Number   |
| Organization       |                      |                    |                |
+--------------------+----------------------+--------------------+----------------+
|   [facility]  |   [address] | [place], [place]      |   [phone] |
| [place]          |                      |                    |                |
+--------------------+----------------------+--------------------+----------------+
|   [facility]  |                      | [place], [place]      |                |
 
| [place]          |                      |                    |                |
+--------------------+----------------------+--------------------+----------------+
 documented in this encounter
 
 Visit Diagnoses
 
 
+----------------------------------------------------------+
| Diagnosis                                                |
+----------------------------------------------------------+
|   Complications of transplanted liver                    |
+----------------------------------------------------------+
|   Transplanted liver (HCC)  Liver replaced by transplant |
+----------------------------------------------------------+
 documented in this encounter

## 2019-11-01 NOTE — XMS
Encounter Summary
  Created on: 2019
 
 Bonifacio Nila JB
 External Reference #: [id number]
 : [birth year]
 Sex: Female
 
 Demographics
 
 
+-----------------------+------------------------+
| Address               | [address]            |
|                       | [place]  [address]   |
+-----------------------+------------------------+
| Home Phone            | [phone]        |
+-----------------------+------------------------+
| Preferred Language    | Unknown                |
+-----------------------+------------------------+
| Marital Status        | Single                 |
+-----------------------+------------------------+
| Confucianist Affiliation | Unknown                |
+-----------------------+------------------------+
| Race                  | White                  |
+-----------------------+------------------------+
| Ethnic Group          | Not  or  |
+-----------------------+------------------------+
 
 
 Author
 
 
+--------------+------------------------------+
| Author       | [facility] |
+--------------+------------------------------+
| Organization | [facility] |
+--------------+------------------------------+
| Address      | Unknown                      |
+--------------+------------------------------+
| Phone        | Unavailable                  |
+--------------+------------------------------+
 
 
 
 Support
 
 
+-----------------+--------------+---------+-----------------+
| Name            | Relationship | Address | Phone           |
+-----------------+--------------+---------+-----------------+
| Kit Valdovinos   | ECON         | Unknown | [phone] |
+-----------------+--------------+---------+-----------------+
| Magdalena Valdovinos | ECON         | Unknown | [phone] |
+-----------------+--------------+---------+-----------------+
 
 
 
 Care Team Providers
 
 
 
+------------------------+------+-----------------+
| Care Team Member Name  | Role | Phone           |
+------------------------+------+-----------------+
| Lily Horton MD | PCP  | [phone] |
+------------------------+------+-----------------+
 
 
 
 Reason for Visit
 
 
+-------------------+----------+
| Reason            | Comments |
+-------------------+----------+
| Social Work Notes |          |
+-------------------+----------+
 
 
 
 Encounter Details
 
 
+--------+-------------+----------------------+---------------------+-------------------+
| Date   | Type        | Department           | Care Team           | Description       |
+--------+-------------+----------------------+---------------------+-------------------+
| / | Documentati |   SOCIAL WORK        |   Magdalena Galarza,  | Social Work Notes |
| 2016   | on          | AMBULATORY  [address]  | LCSW  [address]   |                   |
|        |             | [address]  | [address]     |                   |
|        |             |  Mailcode: [address]      | [place], [place]        |                   |
|        |             | [place], [place]         | [address]          |                   |
|        |             | [address]           |                     |                   |
|        |             | [phone]         |                     |                   |
+--------+-------------+----------------------+---------------------+-------------------+
 
 
 
 Social History
 
 
+-------------------+-------+-----------+--------+------+
| Tobacco Use       | Types | Packs/Day | Years  | Date |
|                   |       |           | Used   |      |
+-------------------+-------+-----------+--------+------+
| Passive Smoke     |       |           |        |      |
| Exposure - Never  |       |           |        |      |
| Smoker            |       |           |        |      |
+-------------------+-------+-----------+--------+------+
 
 
 
+---------------------+---+---+---+
| Smokeless Tobacco:  |   |   |   |
| Never Used          |   |   |   |
+---------------------+---+---+---+
 
 
 
+-------------+-------------+---------+----------+
 
| Alcohol Use | Drinks/Week | oz/Week | Comments |
+-------------+-------------+---------+----------+
| Not Asked   |             |         |          |
+-------------+-------------+---------+----------+
 
 
 
+------------------+---------------+
| Sex Assigned at  | Date Recorded |
| Birth            |               |
+------------------+---------------+
| Not on file      |               |
+------------------+---------------+
 
 
 
+----------------+-------------+-------------+
| Job Start Date | Occupation  | Industry    |
+----------------+-------------+-------------+
| Not on file    | Not on file | Not on file |
+----------------+-------------+-------------+
 
 
 
+----------------+--------------+------------+
| Travel History | Travel Start | Travel End |
+----------------+--------------+------------+
 
 
 
+-------------------------------------+
| No recent travel history available. |
+-------------------------------------+
 documented as of this encounter
 
 Plan of Treatment
 Not on filedocumented as of this encounter
 
 Visit Diagnoses
 Not on filedocumented in this encounter

## 2019-11-01 NOTE — XMS
Encounter Summary
  Created on: 2019
 
 Bonifacio Nila JB
 External Reference #: [id number]
 : [birth year]
 Sex: Female
 
 Demographics
 
 
+-----------------------+------------------------+
| Address               | [address]            |
|                       | [place]  [address]   |
+-----------------------+------------------------+
| Home Phone            | [phone]        |
+-----------------------+------------------------+
| Preferred Language    | Unknown                |
+-----------------------+------------------------+
| Marital Status        | Single                 |
+-----------------------+------------------------+
| Quaker Affiliation | Unknown                |
+-----------------------+------------------------+
| Race                  | White                  |
+-----------------------+------------------------+
| Ethnic Group          | Not  or  |
+-----------------------+------------------------+
 
 
 Author
 
 
+--------------+------------------------------+
| Author       | [facility] [employer] [facility] |
+--------------+------------------------------+
| Organization | [facility] |
+--------------+------------------------------+
| Address      | Unknown                      |
+--------------+------------------------------+
| Phone        | Unavailable                  |
+--------------+------------------------------+
 
 
 
 Support
 
 
+-----------------+--------------+---------+-----------------+
| Name            | Relationship | Address | Phone           |
+-----------------+--------------+---------+-----------------+
| Kit Valdovinos   | ECON         | Unknown | [phone] |
+-----------------+--------------+---------+-----------------+
| Magadlena Valdovinos | ECON         | Unknown | [phone] |
+-----------------+--------------+---------+-----------------+
 
 
 
 Care Team Providers
 
 
 
+-----------------------+------+-----------------+
| Care Team Member Name | Role | Phone           |
+-----------------------+------+-----------------+
| Lily Horton MD   | PCP  | [phone] |
+-----------------------+------+-----------------+
 
 
 
 Encounter Details
 
 
+--------+----------+----------------------+---------------------+-------------+
| Date   | Type     | Department           | Care Team           | Description |
+--------+----------+----------------------+---------------------+-------------+
| 10/10/ | Abstract |   Pediatric          |   [facility]err[facility] Spann,  |             |
|    |          | Gastroenterology at  | RN  [address]     |             |
|        |          | [place]          | [address]     |             |
|        |          | [facility]  | [place] [address]  |             |
|        |          |  [address] |                     |             |
|        |          |  [address]  Mailcode:  |                     |             |
|        |          | [facility]  [place]   |                     |             |
|        |          | [place], [place]         |                     |             |
|        |          | [address]           |                     |             |
|        |          | [phone]         |                     |             |
+--------+----------+----------------------+---------------------+-------------+
 
 
 
 Social History
 
 
+----------------+-------+-----------+--------+------+
| Tobacco Use    | Types | Packs/Day | Years  | Date |
|                |       |           | Used   |      |
+----------------+-------+-----------+--------+------+
| Never Assessed |       |           |        |      |
+----------------+-------+-----------+--------+------+
 
 
 
+------------------+---------------+
| Sex Assigned at  | Date Recorded |
| Birth            |               |
+------------------+---------------+
| Not on file      |               |
+------------------+---------------+
 
 
 
+----------------+-------------+-------------+
| Job Start Date | Occupation  | Industry    |
+----------------+-------------+-------------+
| Not on file    | Not on file | Not on file |
+----------------+-------------+-------------+
 
 
 
+----------------+--------------+------------+
 
| Travel History | Travel Start | Travel End |
+----------------+--------------+------------+
 
 
 
+-------------------------------------+
| No recent travel history available. |
+-------------------------------------+
 documented as of this encounter
 
 Plan of Treatment
 Not on filedocumented as of this encounter
 
 Procedures
 
 
+-------------------+--------+-------------+----------------------+----------------------+
| Procedure Name    | Priori | Date/Time   | Associated Diagnosis | Comments             |
|                   | ty     |             |                      |                      |
+-------------------+--------+-------------+----------------------+----------------------+
| PEDS              | Routin | 10/09/2007  |                      |   Results for this   |
| GASTROENTEROLOGY  | e      |  8:00 AM    |                      | procedure are in the |
| EXTERNAL RESULTS  |        | PDT         |                      |  results section.    |
| PANEL             |        |             |                      |                      |
+-------------------+--------+-------------+----------------------+----------------------+
 documented in this encounter
 
 Results
 PEDS GASTROENTEROLOGY EXTERNAL RESULTS PANEL (10/09/2007  8:00 AM PDT)
 
+-------------+----------+-----------------+-------------+--------------+
| Component   | Value    | Ref Range       | Performed   | Pathologist  |
|             |          |                 | At          | Signature    |
+-------------+----------+-----------------+-------------+--------------+
| SODIUM,     | 140      | 132 - 143       | OUTSIDE LAB |              |
| PLASMA      |          | mmol/L          |             |              |
| (LAB)       |          |                 |             |              |
+-------------+----------+-----------------+-------------+--------------+
| POTASSIUM,  | 4.0      | 3.6 - 5.1       | OUTSIDE LAB |              |
| PLASMA      |          | mmol/L          |             |              |
| (LAB)       |          |                 |             |              |
+-------------+----------+-----------------+-------------+--------------+
| CHLORIDE,   | 108      | 95 - 112 mmol/L | OUTSIDE LAB |              |
| PLASMA      |          |                 |             |              |
| (LAB)       |          |                 |             |              |
+-------------+----------+-----------------+-------------+--------------+
| TOTAL CO2,  | 21.1     | 19 - 31 mmol/L  | OUTSIDE LAB |              |
| PLASMA      |          |                 |             |              |
| (LAB)       |          |                 |             |              |
+-------------+----------+-----------------+-------------+--------------+
| BUN, PLASMA | 13       | 6 - 23 mg/dL    | OUTSIDE LAB |              |
|  (LAB)      |          |                 |             |              |
+-------------+----------+-----------------+-------------+--------------+
| CREATININE  | 0.48 (A) | 0.5 - 1.5 mg/dL | OUTSIDE LAB |              |
| PLASMA      |          |                 |             |              |
| (LAB)       |          |                 |             |              |
+-------------+----------+-----------------+-------------+--------------+
| GLUCOSE,    | 96       | 60 - 100 mg/dL  | OUTSIDE LAB |              |
| PLASMA      |          |                 |             |              |
| (LAB)       |          |                 |             |              |
 
+-------------+----------+-----------------+-------------+--------------+
| CALCIUM,    | 10.2     | 8.5 - 10.5      | OUTSIDE LAB |              |
| PLASMA      |          | mg/dL           |             |              |
| (LAB)       |          |                 |             |              |
+-------------+----------+-----------------+-------------+--------------+
| TOTAL       | 7.8      | 6.0 - 8.1 g/dL  | OUTSIDE LAB |              |
| PROTEIN,    |          |                 |             |              |
| PLASMA      |          |                 |             |              |
| (LAB)       |          |                 |             |              |
+-------------+----------+-----------------+-------------+--------------+
| ALBUMIN,    | 4.2      | 2.9 - 5.5 g/dL  | OUTSIDE LAB |              |
| PLASMA      |          |                 |             |              |
| (LAB)       |          |                 |             |              |
+-------------+----------+-----------------+-------------+--------------+
| ALK PHOS    | 263      | 135 - 384 U/L   | OUTSIDE LAB |              |
+-------------+----------+-----------------+-------------+--------------+
| ALT (SGPT)  | 24       | 0 - 46 U/L      | OUTSIDE LAB |              |
+-------------+----------+-----------------+-------------+--------------+
| AST(SGOT)   | 28       | 0 - 40 U/L      | OUTSIDE LAB |              |
+-------------+----------+-----------------+-------------+--------------+
| BILIRUBIN   | 0.8      | 0.0 - 1.2       | OUTSIDE LAB |              |
| TOTAL       |          | Transcutaneous  |             |              |
|             |          | Bilirubinometer |             |              |
+-------------+----------+-----------------+-------------+--------------+
| BILIRUBIN   |          | mg/dL           | OUTSIDE LAB |              |
| DIRECT      |          |                 |             |              |
+-------------+----------+-----------------+-------------+--------------+
| PHOSPHORUS, |          | mg/dL           | OUTSIDE LAB |              |
|  PLASMA     |          |                 |             |              |
| (LAB)       |          |                 |             |              |
+-------------+----------+-----------------+-------------+--------------+
| MAGNESIUM,P | 1.9      | 1.7 - 2.5 mg/dL | OUTSIDE LAB |              |
| LASMA       |          |                 |             |              |
+-------------+----------+-----------------+-------------+--------------+
| GAMMA       | 16       | 5 - 60 U/L      | OUTSIDE LAB |              |
| GLUTAMYL    |          |                 |             |              |
| TRANS       |          |                 |             |              |
+-------------+----------+-----------------+-------------+--------------+
| URIC ACID,  |          | mg/dL           | OUTSIDE LAB |              |
| PLASMA      |          |                 |             |              |
| (LAB)       |          |                 |             |              |
+-------------+----------+-----------------+-------------+--------------+
| AMYLASE,NADEEN |          | U/L             | OUTSIDE LAB |              |
| SMA         |          |                 |             |              |
+-------------+----------+-----------------+-------------+--------------+
| LIPASE      |          | U/L             | OUTSIDE LAB |              |
| (LAB)       |          |                 |             |              |
+-------------+----------+-----------------+-------------+--------------+
| AMMONIA     |          | umol/L          | OUTSIDE LAB |              |
| (LAB)       |          |                 |             |              |
+-------------+----------+-----------------+-------------+--------------+
| WHITE CELL  | 6.3      | 4.5 - 13.5 K/cu | OUTSIDE LAB |              |
| COUNT       |          |  mm             |             |              |
+-------------+----------+-----------------+-------------+--------------+
| HEMOGLOBIN  | 15.5 (A) | 10.6 - 15.2     | OUTSIDE LAB |              |
|             |          | g/dL            |             |              |
+-------------+----------+-----------------+-------------+--------------+
| HEMATOCRIT  | 44.6 (A) | 32 - 42 %       | OUTSIDE LAB |              |
+-------------+----------+-----------------+-------------+--------------+
| RDW         | 13.5     | 10.5 - 15.0 %   | OUTSIDE LAB |              |
 
+-------------+----------+-----------------+-------------+--------------+
| PLATELET    | 310      | 140 - 440 K/cu  | OUTSIDE LAB |              |
| COUNT       |          | mm              |             |              |
+-------------+----------+-----------------+-------------+--------------+
| MCV         | 83.3     | 71 - 89 fL      | OUTSIDE LAB |              |
+-------------+----------+-----------------+-------------+--------------+
| NEUTROPHIL  | 54.4     | %               | OUTSIDE LAB |              |
| %           |          |                 |             |              |
+-------------+----------+-----------------+-------------+--------------+
| LYMPHOCYTE  | 37.6     | %               | OUTSIDE LAB |              |
| %           |          |                 |             |              |
+-------------+----------+-----------------+-------------+--------------+
| MONOCYTE %  | 4.9      | %               | OUTSIDE LAB |              |
+-------------+----------+-----------------+-------------+--------------+
| EOS %       | 1.9      | %               | OUTSIDE LAB |              |
+-------------+----------+-----------------+-------------+--------------+
| BASO %      | 1.1      | %               | OUTSIDE LAB |              |
+-------------+----------+-----------------+-------------+--------------+
| PEDS        |          |                 | OUTSIDE LAB |              |
| GASTROENTER |          |                 |             |              |
| OLOGY PT    |          |                 |             |              |
+-------------+----------+-----------------+-------------+--------------+
| PROTHROMBIN |          |                 | OUTSIDE LAB |              |
|  TIME       |          |                 |             |              |
| (INR), POC  |          |                 |             |              |
+-------------+----------+-----------------+-------------+--------------+
| APTT        |          | seconds         | OUTSIDE LAB |              |
+-------------+----------+-----------------+-------------+--------------+
| SEDIMENTATI |          | mm/hr           | OUTSIDE LAB |              |
| ON RATE     |          |                 |             |              |
+-------------+----------+-----------------+-------------+--------------+
| C-REACTIVE  |          | mg/dl           | OUTSIDE LAB |              |
| PROTEIN     |          |                 |             |              |
+-------------+----------+-----------------+-------------+--------------+
| CHOLESTEROL |          | mg/dL           | OUTSIDE LAB |              |
|   (LAB)     |          |                 |             |              |
+-------------+----------+-----------------+-------------+--------------+
| TRIGLYCERID |          | mg/dL           | OUTSIDE LAB |              |
| ES          |          |                 |             |              |
+-------------+----------+-----------------+-------------+--------------+
| HDL         |          | mg/dL           | OUTSIDE LAB |              |
| CHOLESTEROL |          |                 |             |              |
+-------------+----------+-----------------+-------------+--------------+
| LDL         |          | mg/dL           | OUTSIDE LAB |              |
| CHOLESTEROL |          |                 |             |              |
| ,           |          |                 |             |              |
| CALCULATED  |          |                 |             |              |
+-------------+----------+-----------------+-------------+--------------+
| IRON SERUM  |          | ug/dL           | OUTSIDE LAB |              |
+-------------+----------+-----------------+-------------+--------------+
| IRON BIND   |          | ug/dL           | OUTSIDE LAB |              |
| CAP SERUM   |          |                 |             |              |
+-------------+----------+-----------------+-------------+--------------+
| %           |          | %               | OUTSIDE LAB |              |
| SATURATION  |          |                 |             |              |
| TRANSFERRIN |          |                 |             |              |
| , SERUM     |          |                 |             |              |
+-------------+----------+-----------------+-------------+--------------+
| FERRITIN    |          | ng/mL           | OUTSIDE LAB |              |
+-------------+----------+-----------------+-------------+--------------+
 
| TRANSFERRIN |          | mg/dL           | OUTSIDE LAB |              |
| , SERUM     |          |                 |             |              |
+-------------+----------+-----------------+-------------+--------------+
| CYCLOSPORIN |          | ng/ml           | OUTSIDE LAB |              |
| E, WHOLE    |          |                 |             |              |
| BLOOD       |          |                 |             |              |
+-------------+----------+-----------------+-------------+--------------+
| TACROLIMUS  | 3.4 (A)  | 5.0 - 18.0      | OUTSIDE LAB |              |
| ()    |          | ng/mL           |             |              |
+-------------+----------+-----------------+-------------+--------------+
| MYCOPHENOLI |          |                 | OUTSIDE LAB |              |
| C ACID      |          |                 |             |              |
| BLOOD LEVEL |          |                 |             |              |
+-------------+----------+-----------------+-------------+--------------+
| SIROLIMUS,  |          | ng/mL           | OUTSIDE LAB |              |
| WHOLE BLOOD |          |                 |             |              |
+-------------+----------+-----------------+-------------+--------------+
| FRANCIS-BAR |          |                 | OUTSIDE LAB |              |
| R PCR,QUAL  |          |                 |             |              |
+-------------+----------+-----------------+-------------+--------------+
| EBV QUANT   |          |                 | OUTSIDE LAB |              |
| INTERPRETAT |          |                 |             |              |
| ION         |          |                 |             |              |
+-------------+----------+-----------------+-------------+--------------+
| IGG SERUM   |          | mg/dL           | OUTSIDE LAB |              |
+-------------+----------+-----------------+-------------+--------------+
| IGM SERUM   |          | mg/dL           | OUTSIDE LAB |              |
+-------------+----------+-----------------+-------------+--------------+
| AB TO       |          | IV              | OUTSIDE LAB |              |
| NUCLEAR AG  |          |                 |             |              |
+-------------+----------+-----------------+-------------+--------------+
| AB TO EARLY |          | IV              | OUTSIDE LAB |              |
|  AG         |          |                 |             |              |
+-------------+----------+-----------------+-------------+--------------+
| CMV IGG AB  |          | AU/mL           | OUTSIDE LAB |              |
+-------------+----------+-----------------+-------------+--------------+
| CMV IGM AB  |          | IV              | OUTSIDE LAB |              |
+-------------+----------+-----------------+-------------+--------------+
| CMV         |          |                 | OUTSIDE LAB |              |
| QUANTITATIV |          |                 |             |              |
| E PCR       |          |                 |             |              |
+-------------+----------+-----------------+-------------+--------------+
| HEPATITIS A |          |                 | OUTSIDE LAB |              |
|  AB TOTAL   |          |                 |             |              |
+-------------+----------+-----------------+-------------+--------------+
| HEPATITIS B |          |                 | OUTSIDE LAB |              |
|  CORE AB,   |          |                 |             |              |
| SERUM       |          |                 |             |              |
+-------------+----------+-----------------+-------------+--------------+
| HEPATITIS B |          |                 | OUTSIDE LAB |              |
|  SURFACE    |          |                 |             |              |
| AG, SERUM   |          |                 |             |              |
+-------------+----------+-----------------+-------------+--------------+
| HEPATITIS B |          |                 | OUTSIDE LAB |              |
|  SURF AB,   |          |                 |             |              |
| QUANT       |          |                 |             |              |
+-------------+----------+-----------------+-------------+--------------+
| HEPATITIS C |          |                 | OUTSIDE LAB |              |
|  AB         |          |                 |             |              |
+-------------+----------+-----------------+-------------+--------------+
 
| VITAMIN D   |          | ng/mL           | OUTSIDE LAB |              |
| 25 HYDROXY  |          |                 |             |              |
+-------------+----------+-----------------+-------------+--------------+
| VITAMIN     |          | pg/ml           | OUTSIDE LAB |              |
| B12, SERUM  |          |                 |             |              |
+-------------+----------+-----------------+-------------+--------------+
| VITAMIN A   |          |                 | OUTSIDE LAB |              |
| (LAB)       |          |                 |             |              |
+-------------+----------+-----------------+-------------+--------------+
| VITAMIN E   |          | mg/L            | OUTSIDE LAB |              |
| (ALPHA      |          |                 |             |              |
| USHA), SERUM |          |                 |             |              |
+-------------+----------+-----------------+-------------+--------------+
| CARNITINE   |          |                 | OUTSIDE LAB |              |
| MUSCLE -    |          |                 |             |              |
| CAM         |          |                 |             |              |
+-------------+----------+-----------------+-------------+--------------+
| COPPER      |          | ug/dL           | OUTSIDE LAB |              |
| SERUM       |          |                 |             |              |
+-------------+----------+-----------------+-------------+--------------+
| PEDS        |          |                 | OUTSIDE LAB |              |
| GASTROENTER |          |                 |             |              |
| OLOGY       |          |                 |             |              |
| CHROMIUM    |          |                 |             |              |
+-------------+----------+-----------------+-------------+--------------+
| PEDS        |          |                 | OUTSIDE LAB |              |
| GASTROENTER |          |                 |             |              |
| OLOGY       |          |                 |             |              |
| MANGANESE   |          |                 |             |              |
+-------------+----------+-----------------+-------------+--------------+
| SELENIUM,   |          | ug/L            | OUTSIDE LAB |              |
| SERUM       |          |                 |             |              |
+-------------+----------+-----------------+-------------+--------------+
| ZINC SERUM  |          | ug/dL           | OUTSIDE LAB |              |
+-------------+----------+-----------------+-------------+--------------+
| IGA SERUM   |          | mg/dl           | OUTSIDE LAB |              |
+-------------+----------+-----------------+-------------+--------------+
| TTG IGA     |          | AU              | OUTSIDE LAB |              |
+-------------+----------+-----------------+-------------+--------------+
| GLIADIN     |          | EU              | OUTSIDE LAB |              |
| PEPTIDE AB, |          |                 |             |              |
|  IGA        |          |                 |             |              |
+-------------+----------+-----------------+-------------+--------------+
 
 
 
+----------+
| Specimen |
+----------+
|          |
+----------+
 
 
 
+----------------+---------+--------------------+--------------+
| Performing     | Address | City/State/Zipcode | Phone Number |
| Organization   |         |                    |              |
+----------------+---------+--------------------+--------------+
|   [facility] |         |                    |              |
+----------------+---------+--------------------+--------------+
 
|   OUTSIDE LAB  |         |                    |              |
+----------------+---------+--------------------+--------------+
 documented in this encounter
 
 Visit Diagnoses
 Not on filedocumented in this encounter

## 2019-11-01 NOTE — XMS
Encounter Summary
  Created on: 2019
 
 Bonifacio Nila JB
 External Reference #: [id number]
 : [birth year]
 Sex: Female
 
 Demographics
 
 
+-----------------------+------------------------+
| Address               | [address]            |
|                       | [place]  [address]   |
+-----------------------+------------------------+
| Home Phone            | [phone]        |
+-----------------------+------------------------+
| Preferred Language    | Unknown                |
+-----------------------+------------------------+
| Marital Status        | Single                 |
+-----------------------+------------------------+
| Yazidism Affiliation | Unknown                |
+-----------------------+------------------------+
| Race                  | White                  |
+-----------------------+------------------------+
| Ethnic Group          | Not  or  |
+-----------------------+------------------------+
 
 
 Author
 
 
+--------------+------------------------------+
| Author       | [facility] |
+--------------+------------------------------+
| Organization | [facility] |
+--------------+------------------------------+
| Address      | Unknown                      |
+--------------+------------------------------+
| Phone        | Unavailable                  |
+--------------+------------------------------+
 
 
 
 Support
 
 
+-----------------+--------------+---------+-----------------+
| Name            | Relationship | Address | Phone           |
+-----------------+--------------+---------+-----------------+
| Kit Valdovinos   | ECON         | Unknown | [phone] |
+-----------------+--------------+---------+-----------------+
| Magdalena Valdovinos | ECON         | Unknown | [phone] |
+-----------------+--------------+---------+-----------------+
 
 
 
 Care Team Providers
 
 
 
+------------------------+------+-----------------+
| Care Team Member Name  | Role | Phone           |
+------------------------+------+-----------------+
| Lily Horton MD | PCP  | [phone] |
+------------------------+------+-----------------+
 
 
 
 Reason for Visit
 
 
+-------------------+----------+
| Reason            | Comments |
+-------------------+----------+
| Care Coordination |          |
+-------------------+----------+
 
 
 
 Encounter Details
 
 
+--------+-----------+----------------------+----------------------+-------------------+
| Date   | Type      | Department           | Care Team            | Description       |
+--------+-----------+----------------------+----------------------+-------------------+
| / | Telephone |   Pediatric          |   Neema Khalil,   | Care Coordination |
| 2016   |           | Gastroenterology at  | MD  [address] |                   |
|        |           | [place]          |   [place], [place]       |                   |
|        |           | [facility]  | [address]           |                   |
|        |           |  [address] [facility] | [phone]         |                   |
|        |           |  [address]  Mailcode:  | [phone] (Fax)   |                   |
|        |           | [facility]   |                      |                   |
|        |           | [place], [place]         |                      |                   |
|        |           | [address]           |                      |                   |
|        |           | [phone]         |                      |                   |
+--------+-----------+----------------------+----------------------+-------------------+
 
 
 
 Social History
 
 
+-------------------+-------+-----------+--------+------+
| Tobacco Use       | Types | Packs/Day | Years  | Date |
|                   |       |           | Used   |      |
+-------------------+-------+-----------+--------+------+
| Passive Smoke     |       |           |        |      |
| Exposure - Never  |       |           |        |      |
| Smoker            |       |           |        |      |
+-------------------+-------+-----------+--------+------+
 
 
 
+---------------------+---+---+---+
| Smokeless Tobacco:  |   |   |   |
| Never Used          |   |   |   |
+---------------------+---+---+---+
 
 
 
 
+-------------+-------------+---------+----------+
| Alcohol Use | Drinks/Week | oz/Week | Comments |
+-------------+-------------+---------+----------+
| Not Asked   |             |         |          |
+-------------+-------------+---------+----------+
 
 
 
+------------------+---------------+
| Sex Assigned at  | Date Recorded |
| Birth            |               |
+------------------+---------------+
| Not on file      |               |
+------------------+---------------+
 
 
 
+----------------+-------------+-------------+
| Job Start Date | Occupation  | Industry    |
+----------------+-------------+-------------+
| Not on file    | Not on file | Not on file |
+----------------+-------------+-------------+
 
 
 
+----------------+--------------+------------+
| Travel History | Travel Start | Travel End |
+----------------+--------------+------------+
 
 
 
+-------------------------------------+
| No recent travel history available. |
+-------------------------------------+
 documented as of this encounter
 
 Plan of Treatment
 Not on filedocumented as of this encounter
 
 Visit Diagnoses
 Not on filedocumented in this encounter

## 2019-11-01 NOTE — XMS
Encounter Summary
  Created on: 2019
 
 Bonifacio Nila JB
 External Reference #: [id number]
 : [birth year]
 Sex: Female
 
 Demographics
 
 
+-----------------------+------------------------+
| Address               | [address]            |
|                       | [place]  [address]   |
+-----------------------+------------------------+
| Home Phone            | [phone]        |
+-----------------------+------------------------+
| Preferred Language    | Unknown                |
+-----------------------+------------------------+
| Marital Status        | Single                 |
+-----------------------+------------------------+
| Sikh Affiliation | Unknown                |
+-----------------------+------------------------+
| Race                  | White                  |
+-----------------------+------------------------+
| Ethnic Group          | Not  or  |
+-----------------------+------------------------+
 
 
 Author
 
 
+--------------+------------------------------+
| Author       | [facility] |
+--------------+------------------------------+
| Organization | [facility] |
+--------------+------------------------------+
| Address      | Unknown                      |
+--------------+------------------------------+
| Phone        | Unavailable                  |
+--------------+------------------------------+
 
 
 
 Support
 
 
+-----------------+--------------+---------+-----------------+
| Name            | Relationship | Address | Phone           |
+-----------------+--------------+---------+-----------------+
| Kit Valdovinos   | ECON         | Unknown | [phone] |
+-----------------+--------------+---------+-----------------+
| Magdalena Valdovinos | ECON         | Unknown | [phone] |
+-----------------+--------------+---------+-----------------+
 
 
 
 Care Team Providers
 
 
 
+-----------------------+------+-----------------+
| Care Team Member Name | Role | Phone           |
+-----------------------+------+-----------------+
| Lily Horton MD   | PCP  | [phone] |
+-----------------------+------+-----------------+
 
 
 
 Encounter Details
 
 
+--------+----------+----------------------+--------------------+-------------+
| Date   | Type     | Department           | Care Team          | Description |
+--------+----------+----------------------+--------------------+-------------+
| 10/12/ | Abstract |   Pediatric          |   Ivis Burkett MD |             |
|    |          | Gastroenterology at  |                    |             |
|        |          | [place]          |                    |             |
|        |          | [facility]  |                    |             |
|        |          |  [address] |                    |             |
|        |          |  [address]  Mailcode:  |                    |             |
|        |          | [address]   |                    |             |
|        |          | [place], [place]         |                    |             |
|        |          | [id number]           |                    |             |
|        |          | [phone]         |                    |             |
+--------+----------+----------------------+--------------------+-------------+
 
 
 
 Social History
 
 
+----------------+-------+-----------+--------+------+
| Tobacco Use    | Types | Packs/Day | Years  | Date |
|                |       |           | Used   |      |
+----------------+-------+-----------+--------+------+
| Never Assessed |       |           |        |      |
+----------------+-------+-----------+--------+------+
 
 
 
+------------------+---------------+
| Sex Assigned at  | Date Recorded |
| Birth            |               |
+------------------+---------------+
| Not on file      |               |
+------------------+---------------+
 
 
 
+----------------+-------------+-------------+
| Job Start Date | Occupation  | Industry    |
+----------------+-------------+-------------+
| Not on file    | Not on file | Not on file |
+----------------+-------------+-------------+
 
 
 
+----------------+--------------+------------+
 
| Travel History | Travel Start | Travel End |
+----------------+--------------+------------+
 
 
 
+-------------------------------------+
| No recent travel history available. |
+-------------------------------------+
 documented as of this encounter
 
 Plan of Treatment
 Not on filedocumented as of this encounter
 
 Procedures
 
 
+----------------------+--------+-------------+----------------------+----------------------
+
| Procedure Name       | Priori | Date/Time   | Associated Diagnosis | Comments             
|
|                      | ty     |             |                      |                      
|
+----------------------+--------+-------------+----------------------+----------------------
+
| CULTURE, URINE BACTI | Routin | 2009  |                      |   Results for this   
|
|                      | e      |  8:23 PM    |                      | procedure are in the 
|
|                      |        | PDT         |                      |  results section.    
|
+----------------------+--------+-------------+----------------------+----------------------
+
| CBC, WITH            | Routin | 2009  |                      |   Results for this   
|
| DIFFERENTIAL         | e      |  7:59 PM    |                      | procedure are in the 
|
|                      |        | PDT         |                      |  results section.    
|
+----------------------+--------+-------------+----------------------+----------------------
+
| CULTURE, BLOOD BACTI | Routin | 2009  |                      |   Results for this   
|
|  & YEAST             | e      |  7:57 PM    |                      | procedure are in the 
|
|                      |        | PDT         |                      |  results section.    
|
+----------------------+--------+-------------+----------------------+----------------------
+
| COMPLETE METABOLIC   | Routin | 2009  |                      |   Results for this   
|
| SET                  | e      |  7:52 PM    |                      | procedure are in the 
|
| (NA,K,CL,CO2,BUN,CRE |        | PDT         |                      |  results section.    
|
| AT,GLUC,CA,AST,ALT,B |        |             |                      |                      
|
| ASHWINI TOTAL,ALK        |        |             |                      |                      
|
| PHOS,ALB,PROT TOTAL) |        |             |                      |                      
|
 
+----------------------+--------+-------------+----------------------+----------------------
+
| UA, DIPSTICK ONLY    | Routin | 2009  |                      |   Results for this   
|
|                      | e      |             |                      | procedure are in the 
|
|                      |        |             |                      |  results section.    
|
+----------------------+--------+-------------+----------------------+----------------------
+
 documented in this encounter
 
 Results
 CULTURE, URINE BACTI (2009  8:23 PM PDT)
 
+-----------+------------------------+-----------+------------+--------------+
| Component | Value                  | Ref Range | Performed  | Pathologist  |
|           |                        |           | At         | Signature    |
+-----------+------------------------+-----------+------------+--------------+
| CULTURE   | negComment: Day 1,     |           | [facility]  |              |
| RESULT    | 09 am No growth   |           | LAB -      |              |
|           | after overnight        |           | [place]  |              |
|           | incubation             |           |            |              |
+-----------+------------------------+-----------+------------+--------------+
| CULTURE   | nuxedComment: Days 2,  |           | [facility]  |              |
| RESULT    | - AM, 20,000     |           | LAB -      |              |
|           | CFU/ML mixed autumn     |           | [place]  |              |
|           | bacteria and/or yeast  |           |            |              |
|           | isolated probably      |           |            |              |
|           | represent urethral,    |           |            |              |
|           | perineal, or other     |           |            |              |
|           | contaminating autumn    |           |            |              |
+-----------+------------------------+-----------+------------+--------------+
 
 
 
+----------+
| Specimen |
+----------+
| Urine    |
+----------+
 
 
 
+------------------------------------------------------------------------+-----------------+
| Narrative                                                              | Performed At    |
+------------------------------------------------------------------------+-----------------+
|   Day 1 no aerobic or anaerobic growth as of 09   07:22 AM  Day 2 |   [facility] |
|  no aerobic or anaerobic growth as of 09   07:16 AM  Day 3 no     |  - [place]    |
| aerobic or anaerobic growth as of 09   06:51 AM        Day 6 - No |                 |
|  growth after 6 days incubation                                        |                 |
+------------------------------------------------------------------------+-----------------+
 
 
 
+--------------------+----------------------+--------------------+----------------+
| Performing         | Address              | City/State/Zipcode | Phone Number   |
| Organization       |                      |                    |                |
+--------------------+----------------------+--------------------+----------------+
|   [facility]  |   [address] | [place], [place]      |   [phone] |
 
| [place]          |                      |                    |                |
+--------------------+----------------------+--------------------+----------------+
|   [facility]  |                      | [place], [place]      |                |
| [place]          |                      |                    |                |
+--------------------+----------------------+--------------------+----------------+
 CBC, WITH DIFFERENTIAL (2009  7:59 PM PDT)
 
+-------------+----------+-----------------+------------+--------------+
| Component   | Value    | Ref Range       | Performed  | Pathologist  |
|             |          |                 | At         | Signature    |
+-------------+----------+-----------------+------------+--------------+
| WHITE CELL  | 8.6      | 4.5 - 13.5 K/cu | [facility]  |              |
| COUNT       |          |  mm             | LAB -      |              |
|             |          |                 | [place]  |              |
+-------------+----------+-----------------+------------+--------------+
| RED CELL    | 5.48 (A) | 4.1 - 5.3 M/cu  | [facility]  |              |
| COUNT       |          | mm              | LAB -      |              |
|             |          |                 | [place]  |              |
+-------------+----------+-----------------+------------+--------------+
| HEMOGLOBIN  | 16.4 (A) | 10.6 - 15.2     | [facility]  |              |
|             |          | g/dL            | LAB -      |              |
|             |          |                 | [place]  |              |
+-------------+----------+-----------------+------------+--------------+
| HEMATOCRIT  | 46.5 (A) | 32 - 42 %       | [facility]  |              |
|             |          |                 | LAB -      |              |
|             |          |                 | [place]  |              |
+-------------+----------+-----------------+------------+--------------+
| MCV         | 84.9     | 71 - 89 fL      | [facility]  |              |
|             |          |                 | LAB -      |              |
|             |          |                 | [place]  |              |
+-------------+----------+-----------------+------------+--------------+
| MCH         | 30       | 24 - 30 pg      | [facility]  |              |
|             |          |                 | LAB -      |              |
|             |          |                 | [place]  |              |
+-------------+----------+-----------------+------------+--------------+
| MCHC        | 35       | 30 - 36 g/dL    | [facility]  |              |
|             |          |                 | LAB -      |              |
|             |          |                 | [place]  |              |
+-------------+----------+-----------------+------------+--------------+
| PLATELET    | 359      | 140 - 440 K/cu  | [facility]  |              |
| COUNT       |          | mm              | LAB -      |              |
|             |          |                 | [place]  |              |
+-------------+----------+-----------------+------------+--------------+
| NEUTROPHIL  | 71.7 (A) | 31 - 60 %       | [facility]  |              |
| %           |          |                 | LAB -      |              |
|             |          |                 | [place]  |              |
+-------------+----------+-----------------+------------+--------------+
| LYMPHOCYTE  | 17.7 (A) | 28 - 48 %       | [facility]  |              |
| %           |          |                 | LAB -      |              |
|             |          |                 | [place]  |              |
+-------------+----------+-----------------+------------+--------------+
| MONOCYTE %  | 8.3      | 0 - 12 %        | [facility]  |              |
|             |          |                 | LAB -      |              |
|             |          |                 | [place]  |              |
+-------------+----------+-----------------+------------+--------------+
| EOS %       | 0.5      | 0 - 6 %         | [facility]  |              |
|             |          |                 | LAB -      |              |
|             |          |                 | [place]  |              |
+-------------+----------+-----------------+------------+--------------+
| BASO %      | 1.8      | 0 - 2 %         | [facility]  |              |
 
|             |          |                 | LAB -      |              |
|             |          |                 | [place]  |              |
+-------------+----------+-----------------+------------+--------------+
| RDW         | 12.0     | 10.5 - 15.0 %   | [facility]  |              |
|             |          |                 | LAB -      |              |
|             |          |                 | [place]  |              |
+-------------+----------+-----------------+------------+--------------+
| MPV         |          | fL              | [facility]  |              |
|             |          |                 | LAB -      |              |
|             |          |                 | [place]  |              |
+-------------+----------+-----------------+------------+--------------+
| NEUTROPHIL  |          | K/cu mm         | [facility]  |              |
| #           |          |                 | LAB -      |              |
|             |          |                 | [place]  |              |
+-------------+----------+-----------------+------------+--------------+
| LYMPHOCYTE  |          | K/cu mm         | [facility]  |              |
| #           |          |                 | LAB -      |              |
|             |          |                 | [place]  |              |
+-------------+----------+-----------------+------------+--------------+
| MONOCYTE #  |          | K/cu mm         | [facility]  |              |
|             |          |                 | LAB -      |              |
|             |          |                 | [place]  |              |
+-------------+----------+-----------------+------------+--------------+
| EOS #       |          | K/cu mm         | [facility]  |              |
|             |          |                 | LAB -      |              |
|             |          |                 | [place]  |              |
+-------------+----------+-----------------+------------+--------------+
| BASO #      |          |                 | [facility]  |              |
|             |          |                 | LAB -      |              |
|             |          |                 | [place]  |              |
+-------------+----------+-----------------+------------+--------------+
 
 
 
+---------------+
| Specimen      |
+---------------+
| Blood - Blood |
+---------------+
 
 
 
+--------------------+----------------------+--------------------+----------------+
| Performing         | Address              | City/State/Zipcode | Phone Number   |
| Organization       |                      |                    |                |
+--------------------+----------------------+--------------------+----------------+
|   [facility]  |   [address] | [place], [place]      |   [phone] |
| [place]          |                      |                    |                |
+--------------------+----------------------+--------------------+----------------+
|   [facility]  |                      | [place], [place]      |                |
| [place]          |                      |                    |                |
+--------------------+----------------------+--------------------+----------------+
 CULTURE, BLOOD BACTI & YEAST (2009  7:57 PM PDT)
 
+-----------+-------+-----------+------------+--------------+
| Component | Value | Ref Range | Performed  | Pathologist  |
|           |       |           | At         | Signature    |
+-----------+-------+-----------+------------+--------------+
| CULTURE   | Neg   |           | [facility]  |              |
| RESULT    |       |           | LAB -      |              |
 
|           |       |           | [place]  |              |
+-----------+-------+-----------+------------+--------------+
 
 
 
+----------+
| Specimen |
+----------+
| Blood    |
+----------+
 
 
 
+------------------------------------------------------------------------+-----------------+
| Narrative                                                              | Performed At    |
+------------------------------------------------------------------------+-----------------+
|   Day 1 no aerobic or anaerobic growth as of 09   07:22 AM  Day 2 |   [facility] |
|  no aerobic or anaerobic growth as of 09   07:16 AM  Day 3 no     |  - [place]    |
| aerobic or anaerobic growth as of 09   06:51 AM        Day 6 - No |                 |
|  growth after 6 days incubation                                        |                 |
+------------------------------------------------------------------------+-----------------+
 
 
 
+--------------------+----------------------+--------------------+----------------+
| Performing         | Address              | City/State/Zipcode | Phone Number   |
| Organization       |                      |                    |                |
+--------------------+----------------------+--------------------+----------------+
|   [facility]  |   [address] | [place], [place]      |   [phone] |
| [place]          |                      |                    |                |
+--------------------+----------------------+--------------------+----------------+
|   [facility]  |                      | [place], [place]      |                |
| [place]          |                      |                    |                |
+--------------------+----------------------+--------------------+----------------+
 COMPLETE METABOLIC SET (NA,K,CL,CO2,BUN,CREAT,GLUC,CA,AST,ALT,BILI TOTAL,ALK PHOS,ALB,PROT 
TOTAL) (2009  7:52 PM PDT)
 
+-------------+-------+-----------------+------------+--------------+
| Component   | Value | Ref Range       | Performed  | Pathologist  |
|             |       |                 | At         | Signature    |
+-------------+-------+-----------------+------------+--------------+
| GLUCOSE,    | 99    | 60 - 100 mg/dL  | [facility]  |              |
| PLASMA      |       |                 | LAB -      |              |
| (LAB)       |       |                 | [place]  |              |
+-------------+-------+-----------------+------------+--------------+
| BUN, PLASMA | 14.8  | 7 - 21 mg/dL    | [facility]  |              |
|  (LAB)      |       |                 | LAB -      |              |
|             |       |                 | [place]  |              |
+-------------+-------+-----------------+------------+--------------+
| CREATININE  | 0.70  | 0.5 - 1.5 mg/dL | [facility]  |              |
| PLASMA      |       |                 | LAB -      |              |
| (LAB)       |       |                 | [place]  |              |
+-------------+-------+-----------------+------------+--------------+
| TOTAL       |       | g/dL            | [facility]  |              |
| PROTEIN,    |       |                 | LAB -      |              |
| PLASMA      |       |                 | [place]  |              |
| (LAB)       |       |                 |            |              |
+-------------+-------+-----------------+------------+--------------+
| ALBUMIN,    |       | g/dL            | [facility]  |              |
| PLASMA      |       |                 | LAB -      |              |
 
| (LAB)       |       |                 | [place]  |              |
+-------------+-------+-----------------+------------+--------------+
| CALCIUM,    |       | mg/dL           | [facility]  |              |
| PLASMA      |       |                 | LAB -      |              |
| (LAB)       |       |                 | [place]  |              |
+-------------+-------+-----------------+------------+--------------+
| BILIRUBIN   |       | Transcutaneous  | [facility]  |              |
| TOTAL       |       | Bilirubinometer | LAB -      |              |
|             |       |                 | [place]  |              |
+-------------+-------+-----------------+------------+--------------+
| ALK PHOS    |       | U/L             | [facility]  |              |
|             |       |                 | LAB -      |              |
|             |       |                 | [place]  |              |
+-------------+-------+-----------------+------------+--------------+
| AST(SGOT)   |       | U/L             | [facility]  |              |
|             |       |                 | LAB -      |              |
|             |       |                 | [place]  |              |
+-------------+-------+-----------------+------------+--------------+
| SODIUM,     | 138   | 132 - 143       | [facility]  |              |
| PLASMA      |       | mmol/L          | LAB -      |              |
| (LAB)       |       |                 | [place]  |              |
+-------------+-------+-----------------+------------+--------------+
| POTASSIUM,  | 3.8   | 3.6 - 5.1       | [facility]  |              |
| PLASMA      |       | mmol/L          | LAB -      |              |
| (LAB)       |       |                 | [place]  |              |
+-------------+-------+-----------------+------------+--------------+
| CHLORIDE,   | 103   | 95 - 112 mmol/L | [facility]  |              |
| PLASMA      |       |                 | LAB -      |              |
| (LAB)       |       |                 | [place]  |              |
+-------------+-------+-----------------+------------+--------------+
| TOTAL CO2,  | 24.0  | 19 - 31 mmol/L  | [facility]  |              |
| PLASMA      |       |                 | LAB -      |              |
| (LAB)       |       |                 | [place]  |              |
+-------------+-------+-----------------+------------+--------------+
| ALT (SGPT)  |       | U/L             | [facility]  |              |
|             |       |                 | LAB -      |              |
|             |       |                 | [place]  |              |
+-------------+-------+-----------------+------------+--------------+
 
 
 
+---------------+
| Specimen      |
+---------------+
| Blood - Blood |
+---------------+
 
 
 
+--------------------+----------------------+--------------------+----------------+
| Performing         | Address              | City/State/Zipcode | Phone Number   |
| Organization       |                      |                    |                |
+--------------------+----------------------+--------------------+----------------+
|   [facility]  |   [address] | [place], [place]      |   [phone] |
| [place]          |                      |                    |                |
+--------------------+----------------------+--------------------+----------------+
|   [facility]  |                      | [place], [place]      |                |
| [place]          |                      |                    |                |
+--------------------+----------------------+--------------------+----------------+
 UA, DIPSTICK ONLY (2009)
 
 
+-------------+-------------+----------------+------------+--------------+
| Component   | Value       | Ref Range      | Performed  | Pathologist  |
|             |             |                | At         | Signature    |
+-------------+-------------+----------------+------------+--------------+
| COLOR(UR)   | yellow      |                | [facility]  |              |
|             |             |                | LAB -      |              |
|             |             |                | [place]  |              |
+-------------+-------------+----------------+------------+--------------+
| APPEARANCE  | hazy        |                | [facility]  |              |
|             |             |                | LAB -      |              |
|             |             |                | [place]  |              |
+-------------+-------------+----------------+------------+--------------+
| LEUKOCYTE   | Neg         | Negative       | [facility]  |              |
| ESTERASE    |             |                | LAB -      |              |
|             |             |                | [place]  |              |
+-------------+-------------+----------------+------------+--------------+
| NITRITES    | Neg         | Negative       | [facility]  |              |
|             |             |                | LAB -      |              |
|             |             |                | [place]  |              |
+-------------+-------------+----------------+------------+--------------+
| UROBILINOGE | 1           | 0.2 SARTHAK    | [facility]  |              |
| N           |             | UNITS          | LAB -      |              |
|             |             |                | [place]  |              |
+-------------+-------------+----------------+------------+--------------+
| PROTEIN(LAB | 30          | Negative to    | [facility]  |              |
| )           |             | Trace mg/dL    | LAB -      |              |
|             |             |                | [place]  |              |
+-------------+-------------+----------------+------------+--------------+
| PH(UR)      | 7           | 5 - 9          | [facility]  |              |
|             |             |                | LAB -      |              |
|             |             |                | [place]  |              |
+-------------+-------------+----------------+------------+--------------+
| BLOOD       | 10          | Negative       | [facility]  |              |
|             |             |                | LAB -      |              |
|             |             |                | [place]  |              |
+-------------+-------------+----------------+------------+--------------+
| SPECIFIC    | 1.033 (A)   | 1.005 - 1.03   | [facility]  |              |
| GRAVITY     |             |                | LAB -      |              |
|             |             |                | [place]  |              |
+-------------+-------------+----------------+------------+--------------+
| KETONES     | 150         | Negative mg/dL | [facility]  |              |
|             |             |                | LAB -      |              |
|             |             |                | [place]  |              |
+-------------+-------------+----------------+------------+--------------+
| BILIRUBIN   | neg         | Negative       | [facility]  |              |
|             |             |                | LAB -      |              |
|             |             |                | [place]  |              |
+-------------+-------------+----------------+------------+--------------+
| GLUCOSE(UR) | normal      | Negative to    | [facility]  |              |
|             |             | Trace mg/dL    | LAB -      |              |
|             |             |                | [place]  |              |
+-------------+-------------+----------------+------------+--------------+
| WHITE CELL  | 5 (A)       | 0 - 4 K/cu mm  | [facility]  |              |
| COUNT       |             |                | LAB -      |              |
|             |             |                | [place]  |              |
+-------------+-------------+----------------+------------+--------------+
| RED CELLS   | 15 (A)      | 0 - 4 /hpf     | [facility]  |              |
|             |             |                | LAB -      |              |
|             |             |                | [place]  |              |
 
+-------------+-------------+----------------+------------+--------------+
| EPITHELIAL  | squamous 1+ | Not applicable | [facility]  |              |
| CELLS       |             |                | LAB -      |              |
|             |             |                | [place]  |              |
+-------------+-------------+----------------+------------+--------------+
| CRYSTALS    | neg         |                | [facility]  |              |
|             |             |                | LAB -      |              |
|             |             |                | [place]  |              |
+-------------+-------------+----------------+------------+--------------+
| BACTERIA    | 2+          | /hpf           | [facility]  |              |
|             |             |                | LAB -      |              |
|             |             |                | [place]  |              |
+-------------+-------------+----------------+------------+--------------+
 
 
 
+---------------+
| Specimen      |
+---------------+
| Urine - Urine |
+---------------+
 
 
 
+--------------------+----------------------+--------------------+----------------+
| Performing         | Address              | City/State/Zipcode | Phone Number   |
| Organization       |                      |                    |                |
+--------------------+----------------------+--------------------+----------------+
|   [facility]  |   [address] | [place]      |   [phone] |
| [place]          |                      |                    |                |
+--------------------+----------------------+--------------------+----------------+
|   [facility]  |                      | [place], [place]      |                |
| [place]          |                      |                    |                |
+--------------------+----------------------+--------------------+----------------+
 documented in this encounter
 
 Visit Diagnoses
 Not on filedocumented in this encounter

## 2019-11-01 NOTE — XMS
Encounter Summary
  Created on: 2019
 
 Bonifacio Nila JB
 External Reference #: [id number]
 : [birth year]
 Sex: Female
 
 Demographics
 
 
+-----------------------+------------------------+
| Address               | [address]            |
|                       | [place]  [address]   |
+-----------------------+------------------------+
| Home Phone            | [phone]        |
+-----------------------+------------------------+
| Preferred Language    | Unknown                |
+-----------------------+------------------------+
| Marital Status        | Single                 |
+-----------------------+------------------------+
| Religion Affiliation | Unknown                |
+-----------------------+------------------------+
| Race                  | White                  |
+-----------------------+------------------------+
| Ethnic Group          | Not  or  |
+-----------------------+------------------------+
 
 
 Author
 
 
+--------------+------------------------------+
| Author       | [facility] |
+--------------+------------------------------+
| Organization | [facility] |
+--------------+------------------------------+
| Address      | Unknown                      |
+--------------+------------------------------+
| Phone        | Unavailable                  |
+--------------+------------------------------+
 
 
 
 Support
 
 
+-----------------+--------------+---------+-----------------+
| Name            | Relationship | Address | Phone           |
+-----------------+--------------+---------+-----------------+
| Kit Valdovinos   | ECON         | Unknown | [phone] |
+-----------------+--------------+---------+-----------------+
| Magdalena Valdovinos | ECON         | Unknown | [phone] |
+-----------------+--------------+---------+-----------------+
 
 
 
 Care Team Providers
 
 
 
+-----------------------+------+-------------+
| Care Team Member Name | Role | Phone       |
+-----------------------+------+-------------+
 PCP  | Unavailable |
+-----------------------+------+-------------+
 
 
 
 Encounter Details
 
 
+--------+----------+----------------------+----------------------+-------------+
| Date   | Type     | Department           | Care Team            | Description |
+--------+----------+----------------------+----------------------+-------------+
| / | Abstract |   Pediatric          |   Neema Khalil,   |             |
|    |          | Gastroenterology at  | MD [address] |             |
|        |          | [facility]          |   [place], [place]       |             |
|        |          | [facility]  | [address]           |             |
|        |          |  [address] | [phone]         |             |
|        |          |  [address]  Mailcode:  | [phone] (Fax)   |             |
|        |          | [facility]  [facility]   |                      |             |
|        |          | [place], [place]         |                      |             |
|        |          | [address]           |                      |             |
|        |          | [phone]         |                      |             |
+--------+----------+----------------------+----------------------+-------------+
 
 
 
 Social History
 
 
+----------------+-------+-----------+--------+------+
| Tobacco Use    | Types | Packs/Day | Years  | Date |
|                |       |           | Used   |      |
+----------------+-------+-----------+--------+------+
| Never Assessed |       |           |        |      |
+----------------+-------+-----------+--------+------+
 
 
 
+------------------+---------------+
| Sex Assigned at  | Date Recorded |
| Birth            |               |
+------------------+---------------+
| Not on file      |               |
+------------------+---------------+
 
 
 
+----------------+-------------+-------------+
| Job Start Date | Occupation  | Industry    |
+----------------+-------------+-------------+
| Not on file    | Not on file | Not on file |
+----------------+-------------+-------------+
 
 
 
+----------------+--------------+------------+
 
| Travel History | Travel Start | Travel End |
+----------------+--------------+------------+
 
 
 
+-------------------------------------+
| No recent travel history available. |
+-------------------------------------+
 documented as of this encounter
 
 Plan of Treatment
 Not on filedocumented as of this encounter
 
 Visit Diagnoses
 Not on filedocumented in this encounter

## 2019-11-01 NOTE — XMS
Encounter Summary
  Created on: 2019
 
 Bonifacio Nila JB
 External Reference #: [id number]
 : [birth year]
 Sex: Female
 
 Demographics
 
 
+-----------------------+------------------------+
| Address               | [address]            |
|                       | [place]  [address]   |
+-----------------------+------------------------+
| Home Phone            | [phone]        |
+-----------------------+------------------------+
| Preferred Language    | Unknown                |
+-----------------------+------------------------+
| Marital Status        | Single                 |
+-----------------------+------------------------+
| Yarsanism Affiliation | Unknown                |
+-----------------------+------------------------+
| Race                  | White                  |
+-----------------------+------------------------+
| Ethnic Group          | Not  or  |
+-----------------------+------------------------+
 
 
 Author
 
 
+--------------+------------------------------+
| Author       | [facility] |
+--------------+------------------------------+
| Organization | [facility] |
+--------------+------------------------------+
| Address      | Unknown                      |
+--------------+------------------------------+
| Phone        | Unavailable                  |
+--------------+------------------------------+
 
 
 
 Support
 
 
+-----------------+--------------+---------+-----------------+
| Name            | Relationship | Address | Phone           |
+-----------------+--------------+---------+-----------------+
| Kit Valdovinos   | ECON         | Unknown | [phone] |
+-----------------+--------------+---------+-----------------+
| Magdalena Valdovinos | ECON         | Unknown | [phone] |
+-----------------+--------------+---------+-----------------+
 
 
 
 Care Team Providers
 
 
 
+-----------------------+------+-----------------+
| Care Team Member Name | Role | Phone           |
+-----------------------+------+-----------------+
| Lily Horton MD   | PCP  | [phone] |
+-----------------------+------+-----------------+
 
 
 
 Reason for Visit
 
 
+-------------------+----------+
| Reason            | Comments |
+-------------------+----------+
| Medication Refill |          |
+-------------------+----------+
 
 
 
 Encounter Details
 
 
+--------+-----------+----------------------+--------------------+-------------------+
| Date   | Type      | Department           | Care Team          | Description       |
+--------+-----------+----------------------+--------------------+-------------------+
| / | Telephone |   Pediatric          |   Ivis Burkett MD | Medication Refill |
|    |           | Gastroenterology at  |                    |                   |
|        |           | [facility]          |                    |                   |
|        |           | [facility]  |                    |                   |
|        |           |  [address] |                    |                   |
|        |           |  [address]  Mailcode:  |                    |                   |
|        |           | [facility]  [facility]   |                    |                   |
|        |           | [place], [place]         |                    |                   |
|        |           | [address]           |                    |                   |
|        |           | [phone]         |                    |                   |
+--------+-----------+----------------------+--------------------+-------------------+
 
 
 
 Social History
 
 
+----------------+-------+-----------+--------+------+
| Tobacco Use    | Types | Packs/Day | Years  | Date |
|                |       |           | Used   |      |
+----------------+-------+-----------+--------+------+
| Never Assessed |       |           |        |      |
+----------------+-------+-----------+--------+------+
 
 
 
+------------------+---------------+
| Sex Assigned at  | Date Recorded |
| Birth            |               |
+------------------+---------------+
| Not on file      |               |
+------------------+---------------+
 
 
 
 
+----------------+-------------+-------------+
| Job Start Date | Occupation  | Industry    |
+----------------+-------------+-------------+
| Not on file    | Not on file | Not on file |
+----------------+-------------+-------------+
 
 
 
+----------------+--------------+------------+
| Travel History | Travel Start | Travel End |
+----------------+--------------+------------+
 
 
 
+-------------------------------------+
| No recent travel history available. |
+-------------------------------------+
 documented as of this encounter
 
 Plan of Treatment
 Not on filedocumented as of this encounter
 
 Visit Diagnoses
 Not on filedocumented in this encounter

## 2019-11-01 NOTE — XMS
Encounter Summary
  Created on: 2019
 
 Bonifacio Nila JB
 External Reference #: [id number]
 : [birth year]
 Sex: Female
 
 Demographics
 
 
+-----------------------+------------------------+
| Address               | [address]            |
|                       | [place]  [address]   |
+-----------------------+------------------------+
| Home Phone            | [phone]        |
+-----------------------+------------------------+
| Preferred Language    | Unknown                |
+-----------------------+------------------------+
| Marital Status        | Single                 |
+-----------------------+------------------------+
| Yarsanism Affiliation | Unknown                |
+-----------------------+------------------------+
| Race                  | White                  |
+-----------------------+------------------------+
| Ethnic Group          | Not  or  |
+-----------------------+------------------------+
 
 
 Author
 
 
+--------------+------------------------------+
| Author       | [facility] |
+--------------+------------------------------+
| Organization | [facility] |
+--------------+------------------------------+
| Address      | Unknown                      |
+--------------+------------------------------+
| Phone        | Unavailable                  |
+--------------+------------------------------+
 
 
 
 Support
 
 
+-----------------+--------------+---------+-----------------+
| Name            | Relationship | Address | Phone           |
+-----------------+--------------+---------+-----------------+
| Kit Valdovinos   | ECON         | Unknown | [phone] |
+-----------------+--------------+---------+-----------------+
| Magdalena Valdovinos | ECON         | Unknown | [phone] |
+-----------------+--------------+---------+-----------------+
 
 
 
 Care Team Providers
 
 
 
+------------------------+------+-----------------+
| Care Team Member Name  | Role | Phone           |
+------------------------+------+-----------------+
| Lily Horton MD | PCP  | [phone] |
+------------------------+------+-----------------+
 
 
 
 Reason for Visit
 
 
+-------------------+-------------------+
| Reason            | Comments          |
+-------------------+-------------------+
| Care Coordination | lost to follow-up |
+-------------------+-------------------+
 
 
 
 Encounter Details
 
 
+--------+-------------+----------------------+----------------------+---------------------+
| Date   | Type        | Department           | Care Team            | Description         |
+--------+-------------+----------------------+----------------------+---------------------+
| / | Documentati |   Pediatric          |   Neema Khalil,   | Care Coordination   |
| 2017   | on          | Gastroenterology at  | MD  [address] | (lost to follow-up) |
|        |             | [facility]          |   [place], [place]       |                     |
|        |             | [facility]  | [address]           |                     |
|        |             |  [address] | [phone]         |                     |
|        |             |  [address]  Mailcode:  | [phone] (Fax)   |                     |
|        |             | [facility]   |                      |                     |
|        |             | [place], [place]         |                      |                     |
|        |             | [address]           |                      |                     |
|        |             | [phone]         |                      |                     |
+--------+-------------+----------------------+----------------------+---------------------+
 
 
 
 Social History
 
 
+-------------------+-------+-----------+--------+------+
| Tobacco Use       | Types | Packs/Day | Years  | Date |
|                   |       |           | Used   |      |
+-------------------+-------+-----------+--------+------+
| Passive Smoke     |       |           |        |      |
| Exposure - Never  |       |           |        |      |
| Smoker            |       |           |        |      |
+-------------------+-------+-----------+--------+------+
 
 
 
+---------------------+---+---+---+
| Smokeless Tobacco:  |   |   |   |
| Never Used          |   |   |   |
+---------------------+---+---+---+
 
 
 
 
+-------------+-------------+---------+----------+
| Alcohol Use | Drinks/Week | oz/Week | Comments |
+-------------+-------------+---------+----------+
| Not Asked   |             |         |          |
+-------------+-------------+---------+----------+
 
 
 
+------------------+---------------+
| Sex Assigned at  | Date Recorded |
| Birth            |               |
+------------------+---------------+
| Not on file      |               |
+------------------+---------------+
 
 
 
+----------------+-------------+-------------+
| Job Start Date | Occupation  | Industry    |
+----------------+-------------+-------------+
| Not on file    | Not on file | Not on file |
+----------------+-------------+-------------+
 
 
 
+----------------+--------------+------------+
| Travel History | Travel Start | Travel End |
+----------------+--------------+------------+
 
 
 
+-------------------------------------+
| No recent travel history available. |
+-------------------------------------+
 documented as of this encounter
 
 Plan of Treatment
 Not on filedocumented as of this encounter
 
 Visit Diagnoses
 Not on filedocumented in this encounter

## 2019-11-01 NOTE — XMS
Encounter Summary
  Created on: 2019
 
 Bonifacio Nila JB
 External Reference #: [id number]
 : [birth year]
 Sex: Female
 
 Demographics
 
 
+-----------------------+------------------------+
| Address               | [address]            |
|                       | [place]  [address]   |
+-----------------------+------------------------+
| Home Phone            | [phone]        |
+-----------------------+------------------------+
| Preferred Language    | Unknown                |
+-----------------------+------------------------+
| Marital Status        | Single                 |
+-----------------------+------------------------+
| Faith Affiliation | Unknown                |
+-----------------------+------------------------+
| Race                  | White                  |
+-----------------------+------------------------+
| Ethnic Group          | Not  or  |
+-----------------------+------------------------+
 
 
 Author
 
 
+--------------+------------------------------+
| Author       | [facility] [employer] [facility] |
+--------------+------------------------------+
| Organization | [facility] |
+--------------+------------------------------+
| Address      | Unknown                      |
+--------------+------------------------------+
| Phone        | Unavailable                  |
+--------------+------------------------------+
 
 
 
 Support
 
 
+-----------------+--------------+---------+-----------------+
| Name            | Relationship | Address | Phone           |
+-----------------+--------------+---------+-----------------+
| Kit Valdovinos   | ECON         | Unknown | [phone] |
+-----------------+--------------+---------+-----------------+
| Magdalena Valdovinos | ECON         | Unknown | [phone] |
+-----------------+--------------+---------+-----------------+
 
 
 
 Care Team Providers
 
 
 
+-----------------------+------+-----------------+
| Care Team Member Name | Role | Phone           |
+-----------------------+------+-----------------+
| Lily Horton MD   | PCP  | [phone] |
+-----------------------+------+-----------------+
 
 
 
 Reason for Visit
 
 
+-----------------+----------+
| Reason          | Comments |
+-----------------+----------+
| Follow-up visit |          |
+-----------------+----------+
 Consultation (Routine)
 
+--------+--------------+---------------+--------------+--------------+---------------+
| Status | Reason       | Specialty     | Diagnoses /  | Referred By  | Referred To   |
|        |              |               | Procedures   | Contact      | Contact       |
+--------+--------------+---------------+--------------+--------------+---------------+
| Closed |   Specialty  | Pediatric     |   Diagnoses  |   Sol,    |   Ped Gastro  |
|        | Services     | Gastroenterol |  Liver       | Lily CARY MD | [facility]  [address]  |
|        | Required     | ogy           | replaced by  |   PEDS       | [address]   |
|        |              |               | transplant   | SPECIALISTS  | [address]       |
|        |              |               | (HCC)        | OF [place] | Mailcode:     |
|        |              |               |              |   [address]   | [facility]         |
|        |              |               |              | [address] | [address]   |
|        |              |               |              |  [address]         | [place], [place]  |
|        |              |               |              | [place],   | [address]    |
|        |              |               |              | [place] [address]     | Phone:        |
|        |              |               |              | Phone:       | [phone]  |
|        |              |               |              | [phone] |  Fax:         |
|        |              |               |              |   Fax:       | [phone]  |
|        |              |               |              | [phone] |               |
+--------+--------------+---------------+--------------+--------------+---------------+
 
 
 
 
 Encounter Details
 
 
+--------+---------+----------------------+--------------------+---------------------+
| Date   | Type    | Department           | Care Team          | Description         |
+--------+---------+----------------------+--------------------+---------------------+
| / | Office  |   Specialty Clinics  |   Ivis Burkett MD | Transplanted Liver  |
|    | Visit   | at [facility]  [address]  |                    | (HCC) (Primary Dx)  |
|        |         | [facility]      |                    |                     |
|        |         | Mailcode: [facility]       |                    |                     |
|        |         | [facility]          |                    |                     |
|        |         | [place], [place]         |                    |                     |
|        |         | [address]           |                    |                     |
|        |         | [phone]         |                    |                     |
+--------+---------+----------------------+--------------------+---------------------+
 
 
 
 
 Social History
 
 
+----------------+-------+-----------+--------+------+
| Tobacco Use    | Types | Packs/Day | Years  | Date |
|                |       |           | Used   |      |
+----------------+-------+-----------+--------+------+
| Never Assessed |       |           |        |      |
+----------------+-------+-----------+--------+------+
 
 
 
+------------------+---------------+
| Sex Assigned at  | Date Recorded |
| Birth            |               |
+------------------+---------------+
| Not on file      |               |
+------------------+---------------+
 
 
 
+----------------+-------------+-------------+
| Job Start Date | Occupation  | Industry    |
+----------------+-------------+-------------+
| Not on file    | Not on file | Not on file |
+----------------+-------------+-------------+
 
 
 
+----------------+--------------+------------+
| Travel History | Travel Start | Travel End |
+----------------+--------------+------------+
 
 
 
+-------------------------------------+
| No recent travel history available. |
+-------------------------------------+
 documented as of this encounter
 
 Last Filed Vital Signs
 
 
+-------------------+----------------------+----------------------+----------+
| Vital Sign        | Reading              | Time Taken           | Comments |
+-------------------+----------------------+----------------------+----------+
| Blood Pressure    | 123/82               | 2009  9:47 AM  |          |
|                   |                      | PST                  |          |
+-------------------+----------------------+----------------------+----------+
| Pulse             | 84                   | 2009  9:47 AM  |          |
|                   |                      | PST                  |          |
+-------------------+----------------------+----------------------+----------+
| Temperature       | -                    | -                    |          |
+-------------------+----------------------+----------------------+----------+
| Respiratory Rate  | -                    | -                    |          |
+-------------------+----------------------+----------------------+----------+
| Oxygen Saturation | -                    | -                    |          |
+-------------------+----------------------+----------------------+----------+
| Inhaled Oxygen    | -                    | -                    |          |
 
| Concentration     |                      |                      |          |
+-------------------+----------------------+----------------------+----------+
| Weight            | 45 kg (99 lb 3.3 oz) | 2009  9:47 AM  |          |
|                   |                      | PST                  |          |
+-------------------+----------------------+----------------------+----------+
| Height            | 148.3 cm (4' 10.39") | 2009  9:47 AM  |          |
|                   |                      | PST                  |          |
+-------------------+----------------------+----------------------+----------+
| Body Mass Index   | 20.46                | 2009  9:47 AM  |          |
|                   |                      | PST                  |          |
+-------------------+----------------------+----------------------+----------+
 documented in this encounter
 
 Patient Instructions
 Patient Instructions Ivis Burkett MD - 2009 11:01 AM PST1. Continue the Prograf 1 mg
 twice daily
 2. Have her labs done every 3 months
 3. She should continue to have an antibiotic before dental work
 Electronically signed by Ivis Burkett MD at 2009 11:01 AM PST
 documented in this encounter
 
 Progress Notes
 Ivis Burkett MD - 2009 11:04 AM PSTFormatting of this note might be different from t
he original.
 
 Nila Valdovinos is an 9 y.o. female, who is referred by Lily Horton, who returns to [facility] for transplant followup. Dr Aguirre from [place] was an observ
er in clinic today.
 
 Patient Active Problem List: 
   VSD (Ventricular Septal Defect)[745.4Y]
     Comment: With polysplenia 
   Aortic Valve Insufficiency[424.1F]
   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
     Comment: For biliary atresia, 2000 at [place]
 
 Current outpatient prescriptions prior to encounter 
 Medication Sig Dispense Refill 
   PROGRAF 1 mg Oral Capsule Take 1 Cap by mouth two times daily.   60  3 
 
 Allergies 
 Allergen Reactions 
   Sulfa (Sulfonamide Antibiotics) Hives and Swelling-Facial 
   Varicella  
   Possible reaction with sulfa meds 
 
 HPI Nila was seen in clinic with her mother. They report that she has not had any medical 
problems this year. She has not had any labs done between May and this month: they thought s
he only needed labs every 6 months. 
 She is doing well in 4th grade and plans to do track again this year. :
 
 Review of Systems:  
 General:  No  fevers, fatigue, weight loss   
 Ears, Nose and Throat: negative
 Respiratory:  No cough,
 Cardiovascular:  Will see her cardiologist again this spring
 Gastrointestinal: no abdominal pain
 
 Physical Examination:
 
 
 /82, Pulse 84, Ht 148.3 cm (4' 10.39") (92 %ile), Wt 45 kg (99 lbs 3.3 oz) (91 %ile),
 Weight for age(%)  91.25%, BMI for age(%)  87.31%, Length for age(%)  92.16%. 
 87.31% of growth percentile based on BMI-for-age.
 
 Appearance: alert, active and in no apparent distress.
 Skin:  Multiple warts R hand,  no rashes, petechiae 
 HEENT: normocephalic,PERRLA , no icterus,,nose without discharge, mouth no aphthous lesions
, mucous membranes moist, pharynx unremarkable
 Neck: supple, without thyromegaly
 Chest: clear to auscultation bilaterally.
 CV: regular sinus rhythm, 4/6 systolic murmur heard best lower left sternal border 
 Abdomen: ,  well healed incisions, soft, no distention, no tenderness to palpation, no rebo
und , no guarding, no palpable masses, normal bowel sounds, no hepatosplenomegaly
 Musculoskeletal: grossly intact  without clubbing or edema
 Nodes: no signficant cervical, supraclavicular adenopathy
 
 Lab Results 
 Basename Value Date/Time 
   WBC 8.3 08  3:25 PM 
   HB 14.2 08  3:25 PM 
   HCT 40.8 08  3:25 PM 
    08  3:25 PM 
   MCV 84.6 08  3:25 PM 
   RDW 12.6 08  3:25 PM 
 
 Lab Results 
 Basename Value Date/Time 
   TBILI 0.8 08  3:25 PM 
    08  3:25 PM 
   TP 7.1 08  3:25 PM 
   DIRBILI 0.1 08  3:25 PM 
   ALB 4.2 08  3:25 PM 
   AST 28 08  3:25 PM 
   ALT 24 08  3:25 PM 
 
 Lab Results 
 Basename Value Date/Time 
    3.4 10/9/07  8:00 AM 
 
 Last prograf level from this week is pending
 
  last EBV PCR       ?
 
  
 Assessment: Patient Active Problem List: 
   VSD (Ventricular Septal Defect)[745.4Y]
     Comment: With polysplenia 
   Aortic Valve Insufficiency[424.1F]
   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
     Comment: For biliary atresia, 2000 at [place]
 
 Plan:  
 1. CBC, primary transplant panel, drug levels-      every  3  months
 2. EBV PCR and serology                                      every   12  months
 3. CMV PCR and serology                                      every  12   months
 
 4. Other labs:   
 5. Imaging:       none
 6. Flu shot from PCP yearly
 7. change in medications:
 
          Prograf  No change- 1mg twice daily
          
        
 8. Continue to give antibiotic prior to dental procedures because of her polysplenia, even 
if not needed for her cardiac lesion
 
 8. Return to clinic    12  months Electronically signed by Ivis Burkett MD at 2009 11
:14 AM PSTdocumented in this encounter
 
 Plan of Treatment
 Not on filedocumented as of this encounter
 
 Procedures
 
 
+----------------+--------+-------------+----------------------+----------------------+
| Procedure Name | Priori | Date/Time   | Associated Diagnosis | Comments             |
|                | ty     |             |                      |                      |
+----------------+--------+-------------+----------------------+----------------------+
| LAB REPORTS    |        | 2009  |                      |   Results for this   |
|                |        | 12:00 AM    |                      | procedure are in the |
|                |        | PST         |                      |  results section.    |
+----------------+--------+-------------+----------------------+----------------------+
 documented in this encounter
 
 Results
 LAB REPORTS (2009 12:00 AM PST)
 
+----------------------------------------------------------+--------------+
| Narrative                                                | Performed At |
+----------------------------------------------------------+--------------+
|   This result has an attachment that is not available.   |              |
+----------------------------------------------------------+--------------+
 
 
 
+------------------------------------------------+
| Procedure Note                                 |
+------------------------------------------------+
|   Dang Bowling - 2009 12:00 AM PST     |
+------------------------------------------------+
 documented in this encounter
 
 Visit Diagnoses
 
 
+--------------------------------------------------------------------+
| Diagnosis                                                          |
+--------------------------------------------------------------------+
|   Transplanted liver (HCC) - Primary  Liver replaced by transplant |
+--------------------------------------------------------------------+
 documented in this encounter

## 2019-11-01 NOTE — XMS
Encounter Summary
  Created on: 2019
 
 Bonifacio Nila JB
 External Reference #: [id number]
 : [birth year]
 Sex: Female
 
 Demographics
 
 
+-----------------------+------------------------+
| Address               | [address]            |
|                       | [place]  [address]   |
+-----------------------+------------------------+
| Home Phone            | [phone]        |
+-----------------------+------------------------+
| Preferred Language    | Unknown                |
+-----------------------+------------------------+
| Marital Status        | Single                 |
+-----------------------+------------------------+
| [employer] Affiliation | Unknown                |
+-----------------------+------------------------+
| Race                  | White                  |
+-----------------------+------------------------+
| Ethnic Group          | Not  or  |
+-----------------------+------------------------+
 
 
 Author
 
 
+--------------+-------------+
| Organization | Unknown     |
+--------------+-------------+
| Address      | Unknown     |
+--------------+-------------+
| Phone        | Unavailable |
+--------------+-------------+
 
 
 
 Support
 
 
+-----------------+--------------+---------+-----------------+
| Name            | Relationship | Address | Phone           |
+-----------------+--------------+---------+-----------------+
| Kit Valdovinos   | ECON         | Unknown | [phone] |
+-----------------+--------------+---------+-----------------+
| Magdalena Valdovinos | ECON         | Unknown | [phone] |
+-----------------+--------------+---------+-----------------+
 
 
 
 Care Team Providers
 
 
 
+-----------------------+------+-----------------+
| Care Team Member Name | Role | Phone           |
+-----------------------+------+-----------------+
| Lily Horton MD   | PCP  | [phone] |
+-----------------------+------+-----------------+
 
 
 
 Encounter Details
 
 
+--------+-------------+------------+--------------------+-------------+
| Date   | Type        | Department | Care Team          | Description |
+--------+-------------+------------+--------------------+-------------+
| / | Transcribed |            |   Dictation, Other | Transcribed |
| 2000   |             |            |                    |             |
+--------+-------------+------------+--------------------+-------------+
 
 
 
 Social History
 
 
+----------------+-------+-----------+--------+------+
| Tobacco Use    | Types | Packs/Day | Years  | Date |
|                |       |           | Used   |      |
+----------------+-------+-----------+--------+------+
| Never Assessed |       |           |        |      |
+----------------+-------+-----------+--------+------+
 
 
 
+------------------+---------------+
| Sex Assigned at  | Date Recorded |
| Birth            |               |
+------------------+---------------+
| Not on file      |               |
+------------------+---------------+
 
 
 
+----------------+-------------+-------------+
| Job Start Date | Occupation  | Industry    |
+----------------+-------------+-------------+
| Not on file    | Not on file | Not on file |
+----------------+-------------+-------------+
 
 
 
+----------------+--------------+------------+
| Travel History | Travel Start | Travel End |
+----------------+--------------+------------+
 
 
 
+-------------------------------------+
| No recent travel history available. |
+-------------------------------------+
 documented as of this encounter
 
 
 Progress Notes
 Interface, Transcription In - 2006  1:05 AM [facility]                                    [place]
[address] [address] [phone] or [phone]
 
 2000
 
 Lily Horton M.D.
 [address] [place] [address]  [address]
 
 RE:   NILA VALDOVINOS
 MR #: [id number]
 
 Dear Dr. Horton:
 
 I had the pleasure of seeing Nila at  [facility]
 today in the Pediatric Liver Transplant  Clinic  and  presenting  her  once
 again  to members of the Pediatric Liver  Transplant  team  from  [place],
 which included Dr. Marcos Aguirre.  Nila  has been doing reasonably well over
 the last six months during the interim  since her interim since her initial
 presentation to the transplant team.   She  has  grown  and  gained weight,
 although in a subdued fashion.  Her mother has few complaints.  She is on a
 regular diet and has dark brown stools.   Evidently her VSD is closing, and
 she is followed by Dr. Baljinder Bush, pediatric cardiologist.
 
 His present medications include Zantac  1  mL  b.i.d.,  Bactrim    teaspoon
 q.d., Actigall 1.5 cc t.i.d., iron 0.8  q.d.,  vitamin  A  and  D capsule 1
 q.d., vitamin E capsule 1 q.d., and vitamin K 2.5 mg q.d.
 
 On physical examination, she weighs  8.37  kg  and  measures  70.4 cm.  Her
 blood pressure is 104/69.  She is anicteric,  although her skin is slightly
 pigmented.   She  has thin extremities  and  a  protuberant  abdomen.   The
 vasculature over her abdomen is prominent.   She  has a very large and hard
 liver,  left lobe even more so than  the  right  lobe  and  a  spleen  that
 descends  at least 6 to 7 cm below the  left  costal  margin.   Ascites  is
 questionable.
 
 Her most recent laboratory tests on 2000, show an AST of 137, ALT
 99,  albumin 3.0, a direct bilirubin  of  1.0,  and  a  platelet  count  of
 116,000.  Her GGT is 571.  Nila has a blood type of O negative.
 
 In  summary, Nila is a 15-month-old  white  female  who  has  extrahepatic
 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
 disease and evidence of portal hypertension  and  failure to thrive.  Given
 the details of the situation, Dr. Aguirre wishes to move ahead and continue her
 listing as a 2B.  Members of the transplant  team  will investigate further
 and firm up plans to have a living-related donor on the wings in case Nila
 deteriorates quickly at some point in  the future requiring transplantation
 sooner  than  later.   In  the  meantime,  we  will  continue  the  present
 medications.  With her next blood draw,  she  needs  a  PT,  INR,  and PTT.
 Mother will try to remember to request  this  the  next  time  she comes to
 either your office or your laboratory  facility  for  the  next blood draw.
 Finally, mother will coordinate a visit  to both Dr. Bush and Dr. Bailey
 in the next month or two.  Please let me know if you have any questions.
 
 Sincerely,
 
 
 Eagle De Luna M.D.
 Pediatric Gastroenterology
 
 [facility] / 
 [id number] / [id number] / [id number] / [id number]
 D: 2000
 T: 2000
 
 cc:
 
 Marcos Aguirre M.D.
 [place] [facility]
 [address]
 [place]
 
 Cristo Bailey M.D.
 [address] [place] [address]  [id number]
 
 [id number] signed by Interface, Transcription In at 2006  1:05 AM PSTdocume
nted in this encounter
 
 Plan of Treatment
 Not on filedocumented as of this encounter
 
 Visit Diagnoses
 Not on filedocumented in this encounter

## 2019-11-01 NOTE — XMS
Encounter Summary
  Created on: 2019
 
 Bonifacio Nila JB
 External Reference #: [id number]
 : [birth year]
 Sex: Female
 
 Demographics
 
 
+-----------------------+------------------------+
| Address               | [address]            |
|                       | [place]  [address]   |
+-----------------------+------------------------+
| Home Phone            | [phone]        |
+-----------------------+------------------------+
| Preferred Language    | Unknown                |
+-----------------------+------------------------+
| Marital Status        | Single                 |
+-----------------------+------------------------+
| Mormonism Affiliation | Unknown                |
+-----------------------+------------------------+
| Race                  | White                  |
+-----------------------+------------------------+
| Ethnic Group          | Not  or  |
+-----------------------+------------------------+
 
 
 Author
 
 
+--------------+------------------------------+
| Author       | [facility] |
+--------------+------------------------------+
| Organization | [facility] |
+--------------+------------------------------+
| Address      | Unknown                      |
+--------------+------------------------------+
| Phone        | Unavailable                  |
+--------------+------------------------------+
 
 
 
 Support
 
 
+-----------------+--------------+---------+-----------------+
| Name            | Relationship | Address | Phone           |
+-----------------+--------------+---------+-----------------+
| Kit Valdovinos   | ECON         | Unknown | [phone] |
+-----------------+--------------+---------+-----------------+
| Magdalena Valdovinos | ECON         | Unknown | [phone] |
+-----------------+--------------+---------+-----------------+
 
 
 
 Care Team Providers
 
 
 
+-----------------------+------+-------------+
| Care Team Member Name | Role | Phone       |
+-----------------------+------+-------------+
| No Pcp Per Patient    | PCP  | Unavailable |
+-----------------------+------+-------------+
 
 
 
 Encounter Details
 
 
+--------+----------+----------------------+----------------------+-------------+
| Date   | Type     | Department           | Care Team            | Description |
+--------+----------+----------------------+----------------------+-------------+
| 09/15/ | Abstract |   Pediatric          |   Neema Khalil,   |             |
|    |          | Gastroenterology at  | [address] |             |
|        |          | [place]          |   [place], [place]       |             |
|        |          | [facility]  | [address]           |             |
|        |          |  [address] | [phone]         |             |
|        |          [address]  Mailcode:  | [phone] (Fax)   |             |
|        |          | [facility]  [facility]   |                      |             |
|        |          | [place], [place]         |                      |             |
|        |          | [address]           |                      |             |
|        |          | [phone]         |                      |             |
+--------+----------+----------------------+----------------------+-------------+
 
 
 
 Social History
 
 
+-------------------+-------+-----------+--------+------+
| Tobacco Use       | Types | Packs/Day | Years  | Date |
|                   |       |           | Used   |      |
+-------------------+-------+-----------+--------+------+
| Passive Smoke     |       |           |        |      |
| Exposure - Never  |       |           |        |      |
| Smoker            |       |           |        |      |
+-------------------+-------+-----------+--------+------+
 
 
 
+---------------------+---+---+---+
| Smokeless Tobacco:  |   |   |   |
| Never Used          |   |   |   |
+---------------------+---+---+---+
 
 
 
+-------------+-------------+---------+----------+
| Alcohol Use | Drinks/Week | oz/Week | Comments |
+-------------+-------------+---------+----------+
| Not Asked   |             |         |          |
+-------------+-------------+---------+----------+
 
 
 
+------------------+---------------+
 
| Sex Assigned at  | Date Recorded |
| Birth            |               |
+------------------+---------------+
| Not on file      |               |
+------------------+---------------+
 
 
 
+----------------+-------------+-------------+
| Job Start Date | Occupation  | Industry    |
+----------------+-------------+-------------+
| Not on file    | Not on file | Not on file |
+----------------+-------------+-------------+
 
 
 
+----------------+--------------+------------+
| Travel History | Travel Start | Travel End |
+----------------+--------------+------------+
 
 
 
+-------------------------------------+
| No recent travel history available. |
+-------------------------------------+
 documented as of this encounter
 
 Plan of Treatment
 Not on filedocumented as of this encounter
 
 Procedures
 
 
+----------------------+--------+-------------+----------------------+----------------------
+
| Procedure Name       | Priori | Date/Time   | Associated Diagnosis | Comments             
|
|                      | ty     |             |                      |                      
|
+----------------------+--------+-------------+----------------------+----------------------
+
| CBC, WITH            | Routin | 2014  |                      |   Results for this   
|
| DIFFERENTIAL         | e      | 11:57 AM    |                      | procedure are in the 
|
|                      |        | PDT         |                      |  results section.    
|
+----------------------+--------+-------------+----------------------+----------------------
+
| COMPLETE METABOLIC   | Routin | 2014  |                      |   Results for this   
|
| SET                  | e      | 11:57 AM    |                      | procedure are in the 
|
| (NA,K,CL,CO2,BUN,CRE |        | PDT         |                      |  results section.    
|
| AT,GLUC,CA,AST,ALT,B |        |             |                      |                      
|
| ASHWINI TOTAL,ALK        |        |             |                      |                      
|
| PHOS,ALB,PROT TOTAL) |        |             |                      |                      
 
|
+----------------------+--------+-------------+----------------------+----------------------
+
 documented in this encounter
 
 Results
 CBC, WITH DIFFERENTIAL (2014 11:57 AM PDT)
 
+-------------+----------+-----------------+------------+--------------+
| Component   | Value    | Ref Range       | Performed  | Pathologist  |
|             |          |                 | At         | Signature    |
+-------------+----------+-----------------+------------+--------------+
| WHITE CELL  | 6.6      | 4.4 - 11.8 K/cu | [facility]  |              |
| COUNT       |          |  mm             | LAB -      |              |
|             |          |                 | [place]  |              |
+-------------+----------+-----------------+------------+--------------+
| RED CELL    | 4.83     | 3.8 - 5.3 M/cu  | [facility]  |              |
| COUNT       |          | mm              | LAB -      |              |
|             |          |                 | [place]  |              |
+-------------+----------+-----------------+------------+--------------+
| HEMOGLOBIN  | 14.7     | 11.1 - 15.7     | [facility]  |              |
|             |          | g/dL            | LAB -      |              |
|             |          |                 | [place]  |              |
+-------------+----------+-----------------+------------+--------------+
| HEMATOCRIT  | 42.8     | 32 - 47 %       | [facility]  |              |
|             |          |                 | LAB -      |              |
|             |          |                 | [place]  |              |
+-------------+----------+-----------------+------------+--------------+
| MCV         | 88.6     | 73 - 91 fL      | [facility]  |              |
|             |          |                 | LAB -      |              |
|             |          |                 | [place]  |              |
+-------------+----------+-----------------+------------+--------------+
| MCH         | 30       | 25 - 31 pg      | [facility]  |              |
|             |          |                 | LAB -      |              |
|             |          |                 | [place]  |              |
+-------------+----------+-----------------+------------+--------------+
| MCHC        | 34       | 30 - 36 g/dL    | [facility]  |              |
|             |          |                 | LAB -      |              |
|             |          |                 | [place]  |              |
+-------------+----------+-----------------+------------+--------------+
| PLATELET    | 328      | 140 - 440 K/cu  | [facility]  |              |
| COUNT       |          | mm              | LAB -      |              |
|             |          |                 | [place]  |              |
+-------------+----------+-----------------+------------+--------------+
| NEUTROPHIL  | 63.8     | 35 - 65 %       | [facility]  |              |
| %           |          |                 | LAB -      |              |
|             |          |                 | [place]  |              |
+-------------+----------+-----------------+------------+--------------+
| LYMPHOCYTE  | 25.1 (A) | 28 - 48 %       | [facility]  |              |
| %           |          |                 | LAB -      |              |
|             |          |                 | [place]  |              |
+-------------+----------+-----------------+------------+--------------+
| MONOCYTE %  | 7.2      | 0 - 12 %        | [facility]  |              |
|             |          |                 | LAB -      |              |
|             |          |                 | [place]  |              |
+-------------+----------+-----------------+------------+--------------+
| EOS %       | 3.1      | 0 - 6 %         | [facility]  |              |
|             |          |                 | LAB -      |              |
|             |          |                 | [place]  |              |
+-------------+----------+-----------------+------------+--------------+
 
| BASO %      | 0.8      | 0 - 2 %         | [facility]  |              |
|             |          |                 | LAB -      |              |
|             |          |                 | [place]  |              |
+-------------+----------+-----------------+------------+--------------+
| RDW         | 13.8     | 10.5 - 15.0 %   | [facility]  |              |
|             |          |                 | LAB -      |              |
|             |          |                 | [place]  |              |
+-------------+----------+-----------------+------------+--------------+
 
 
 
+---------------+
| Specimen      |
+---------------+
| Blood - Blood |
+---------------+
 
 
 
+--------------------+----------------------+--------------------+----------------+
| Performing         | Address              | City/State/Zipcode | Phone Number   |
| Organization       |                      |                    |                |
+--------------------+----------------------+--------------------+----------------+
|   [facility]  |   [address] | [place], [place]      |   [phone] |
| [place]          |                      |                    |                |
+--------------------+----------------------+--------------------+----------------+
 COMPLETE METABOLIC SET (NA,K,CL,CO2,BUN,CREAT,GLUC,CA,AST,ALT,BILI TOTAL,ALK PHOS,ALB,PROT 
TOTAL) (2014 11:57 AM PDT)
 
+-------------+--------+-----------------+------------+--------------+
| Component   | Value  | Ref Range       | Performed  | Pathologist  |
|             |        |                 | At         | Signature    |
+-------------+--------+-----------------+------------+--------------+
| GLUCOSE,    | 87     | 70 - 100 mg/dL  | [facility]  |              |
| PLASMA      |        |                 | LAB -      |              |
| (LAB)       |        |                 | [place]  |              |
+-------------+--------+-----------------+------------+--------------+
| BUN, PLASMA | 13     | 6 - 23 mg/dL    | [facility]  |              |
|  (LAB)      |        |                 | LAB -      |              |
|             |        |                 | [place]  |              |
+-------------+--------+-----------------+------------+--------------+
| CREATININE  | 0.75   | 0.40 - 1.05     | [facility]  |              |
| PLASMA      |        | mg/dL           | LAB -      |              |
| (LAB)       |        |                 | [place]  |              |
+-------------+--------+-----------------+------------+--------------+
| TOTAL       | 7.6    | 6.1 - 8.5 g/dL  | [facility]  |              |
| PROTEIN,    |        |                 | LAB -      |              |
| PLASMA      |        |                 | [place]  |              |
| (LAB)       |        |                 |            |              |
+-------------+--------+-----------------+------------+--------------+
| ALBUMIN,    | 4.4    | 3.5 - 5.0 g/dL  | [facility]  |              |
| PLASMA      |        |                 | LAB -      |              |
| (LAB)       |        |                 | [place]  |              |
+-------------+--------+-----------------+------------+--------------+
| CALCIUM,    | 9.9    | 8.4 - 10.2      | [facility]  |              |
| PLASMA      |        | mg/dL           | LAB -      |              |
| (LAB)       |        |                 | [place]  |              |
+-------------+--------+-----------------+------------+--------------+
| BILIRUBIN   | 0.8    | 0 - 1.2         | [facility]  |              |
| TOTAL       |        | Transcutaneous  | LAB -      |              |
 
|             |        | Bilirubinometer | [place]  |              |
+-------------+--------+-----------------+------------+--------------+
| ALK PHOS    | 92 (A) | 135 - 384 U/L   | [facility]  |              |
|             |        |                 | LAB -      |              |
|             |        |                 | [place]  |              |
+-------------+--------+-----------------+------------+--------------+
| AST(SGOT)   | 13     | 13 - 39 U/L     | [facility]  |              |
|             |        |                 | LAB -      |              |
|             |        |                 | [place]  |              |
+-------------+--------+-----------------+------------+--------------+
| SODIUM,     | 140    | 132 - 143       | [facility]  |              |
| PLASMA      |        | mmol/L          | LAB -      |              |
| (LAB)       |        |                 | [place]  |              |
+-------------+--------+-----------------+------------+--------------+
| POTASSIUM,  | 4.0    | 3.6 - 5.1       | [facility]  |              |
| PLASMA      |        | mmol/L          | LAB -      |              |
| (LAB)       |        |                 | [place]  |              |
+-------------+--------+-----------------+------------+--------------+
| CHLORIDE,   | 100    | 95 - 112 mmol/L | [facility]  |              |
| PLASMA      |        |                 | LAB -      |              |
| (LAB)       |        |                 | [place]  |              |
+-------------+--------+-----------------+------------+--------------+
| TOTAL CO2,  | 28     | 19 - 31 mmol/L  | [facility]  |              |
| PLASMA      |        |                 | LAB -      |              |
| (LAB)       |        |                 | [place]  |              |
+-------------+--------+-----------------+------------+--------------+
| ALT (SGPT)  | 9      | 7 - 52 U/L      | [facility]  |              |
|             |        |                 | LAB -      |              |
|             |        |                 | [place]  |              |
+-------------+--------+-----------------+------------+--------------+
| ANION GAP   | 16     | 7 - 21          | [facility]  |              |
|             |        |                 | LAB -      |              |
|             |        |                 | [place]  |              |
+-------------+--------+-----------------+------------+--------------+
| BUN/CREATIN | 17.3   | 6.0 - 28.6      | [facility]  |              |
| INE RATIO   |        |                 | LAB -      |              |
|             |        |                 | [place]  |              |
+-------------+--------+-----------------+------------+--------------+
| GLOBULIN    | 3.2    | 1.8 - 3.5       | [facility]  |              |
|             |        |                 | LAB -      |              |
|             |        |                 | [place]  |              |
+-------------+--------+-----------------+------------+--------------+
| A/G RATIO   | 1.4    | 1.1 - 2.4       | [facility]  |              |
|             |        |                 | LAB -      |              |
|             |        |                 | [place]  |              |
+-------------+--------+-----------------+------------+--------------+
 
 
 
+---------------+
| Specimen      |
+---------------+
| Blood - Blood |
+---------------+
 
 
 
+--------------------+----------------------+--------------------+----------------+
| Performing         | Address              | City/State/Zipcode | Phone Number   |
| Organization       |                      |                    |                |
 
+--------------------+----------------------+--------------------+----------------+
|   [facility]  |   [address] | [place]      |   [phone] |
| [place]          |                      |                    |                |
+--------------------+----------------------+--------------------+----------------+
 documented in this encounter
 
 Visit Diagnoses
 Not on filedocumented in this encounter

## 2019-11-01 NOTE — XMS
Encounter Summary
  Created on: 2019
 
 Bonifacio Nila JB
 External Reference #: [id number]
 : [birth year]
 Sex: Female
 
 Demographics
 
 
+-----------------------+------------------------+
| Address               | [address]            |
|                       | [place]  [address]   |
+-----------------------+------------------------+
| Home Phone            | [phone]        |
+-----------------------+------------------------+
| Preferred Language    | Unknown                |
+-----------------------+------------------------+
| Marital Status        | Single                 |
+-----------------------+------------------------+
| Advent Affiliation | Unknown                |
+-----------------------+------------------------+
| Race                  | White                  |
+-----------------------+------------------------+
| Ethnic Group          | Not  or  |
+-----------------------+------------------------+
 
 
 Author
 
 
+--------------+------------------------------+
| Author       | [facility] |
+--------------+------------------------------+
| Organization | [facility] |
+--------------+------------------------------+
| Address      | Unknown                      |
+--------------+------------------------------+
| Phone        | Unavailable                  |
+--------------+------------------------------+
 
 
 
 Support
 
 
+-----------------+--------------+---------+-----------------+
| Name            | Relationship | Address | Phone           |
+-----------------+--------------+---------+-----------------+
| Kit Valdovinos   | ECON         | Unknown | [phone] |
+-----------------+--------------+---------+-----------------+
| Magdalena Valdovinos | ECON         | Unknown | [phone] |
+-----------------+--------------+---------+-----------------+
 
 
 
 Care Team Providers
 
 
 
+------------------------+------+-----------------+
| Care Team Member Name  | Role | Phone           |
+------------------------+------+-----------------+
| Lily Horton MD | PCP  | [phone] |
+------------------------+------+-----------------+
 
 
 
 Encounter Details
 
 
+--------+-------------+----------------------+---------------------+-------------+
| Date   | Type        | Department           | Care Team           | Description |
+--------+-------------+----------------------+---------------------+-------------+
| / | Documentati |   SOCIAL WORK        |   Coretta Armijo,  |             |
|    | on          | AMBULATORY  [address]  | MS[address]  [address]    |             |
|        |             | [address]  | [address]     |             |
|        |             |  Mailcode: [address]      | [place], [place]        |             |
|        |             | [place], [place]         | [address]          |             |
|        |             | [address]           | [phone]        |             |
|        |             | [phone]         | [phone] (Fax)  |             |
+--------+-------------+----------------------+---------------------+-------------+
 
 
 
 Social History
 
 
+-------------------+-------+-----------+--------+------+
| Tobacco Use       | Types | Packs/Day | Years  | Date |
|                   |       |           | Used   |      |
+-------------------+-------+-----------+--------+------+
| Passive Smoke     |       |           |        |      |
| Exposure - Never  |       |           |        |      |
| Smoker            |       |           |        |      |
+-------------------+-------+-----------+--------+------+
 
 
 
+---------------------+---+---+---+
| Smokeless Tobacco:  |   |   |   |
| Never Used          |   |   |   |
+---------------------+---+---+---+
 
 
 
+-------------+-------------+---------+----------+
| Alcohol Use | Drinks/Week | oz/Week | Comments |
+-------------+-------------+---------+----------+
| Not Asked   |             |         |          |
+-------------+-------------+---------+----------+
 
 
 
+------------------+---------------+
| Sex Assigned at  | Date Recorded |
| Birth            |               |
+------------------+---------------+
 
| Not on file      |               |
+------------------+---------------+
 
 
 
+----------------+-------------+-------------+
| Job Start Date | Occupation  | Industry    |
+----------------+-------------+-------------+
| Not on file    | Not on file | Not on file |
+----------------+-------------+-------------+
 
 
 
+----------------+--------------+------------+
| Travel History | Travel Start | Travel End |
+----------------+--------------+------------+
 
 
 
+-------------------------------------+
| No recent travel history available. |
+-------------------------------------+
 documented as of this encounter
 
 Plan of Treatment
 Not on filedocumented as of this encounter
 
 Visit Diagnoses
 Not on filedocumented in this encounter

## 2019-11-01 NOTE — XMS
Clinical Summary
  Created on: 2019
 
 Bonifacio Nila JB
 External Reference #: [id number]
 : [birth year]
 Sex: Female
 
 Demographics
 
 
+-----------------------+---------------------------+
| Address               | [address]          |
|                       | [place]  [address] |
+-----------------------+---------------------------+
| Home Phone            | [phone]           |
+-----------------------+---------------------------+
| Preferred Language    | Unknown                   |
+-----------------------+---------------------------+
| Marital Status        | Single                    |
+-----------------------+---------------------------+
| Mu-ism Affiliation | Unknown                   |
+-----------------------+---------------------------+
| Race                  | Unknown                   |
+-----------------------+---------------------------+
| Ethnic Group          | Unknown                   |
+-----------------------+---------------------------+
 
 
 Author
 
 
+--------------+------------------------------------------+
| Author       | [facility] (Historical as of  |
|              | 19)                                 |
+--------------+------------------------------------------+
| Organization | [facility] (Historical as of  |
|              | 19)                                 |
+--------------+------------------------------------------+
| Address      | Unknown                                  |
+--------------+------------------------------------------+
| Phone        | Unavailable                              |
+--------------+------------------------------------------+
 
 
 
 Support
 
 
+-----------------+--------------+---------+-----------------+
| Name            | Relationship | Address | Phone           |
+-----------------+--------------+---------+-----------------+
| Magdalena Valdovinos | ECON         | Unknown | [phone] |
+-----------------+--------------+---------+-----------------+
 
 
 
 Care Team Providers
 
 
 
+-----------------------+------+-----------------+
| Care Team Member Name | Role | Phone           |
+-----------------------+------+-----------------+
| Augustine Buck DO      | PP   | [phone] |
+-----------------------+------+-----------------+
 
 
 
 Allergies
 
 
+-------------------+----------------+----------+----------+----------+
| Active Allergy    | Reactions      | Severity | Noted    | Comments |
|                   |                |          | Date     |          |
+-------------------+----------------+----------+----------+----------+
| Sulfa Antibiotics | Swelling, Rash | Medium   | 20 |          |
|                   |                |          | 19       |          |
+-------------------+----------------+----------+----------+----------+
 
 
 
 Current Medications
 
 
+----------------------+---------------------+-------+---------+------+------+-------+
| Prescription         | Sig.                | Disp. | Refills | Star | End  | Statu |
|                      |                     |       |         | t    | Date | s     |
|                      |                     |       |         | Date |      |       |
+----------------------+---------------------+-------+---------+------+------+-------+
|                      | Inject  into the    |       |         |      |      | Activ |
| medroxyPROGESTERone  | muscle every 3      |       |         |      |      | e     |
| Acetate              | (three) months.     |       |         |      |      |       |
| (DEPO-PROVERA IM)    |                     |       |         |      |      |       |
+----------------------+---------------------+-------+---------+------+------+-------+
|   amLODIPine         | Take 5 mg by mouth  |       |         |      |      | Activ |
| (NORVASC) 5 MG       | daily.              |       |         |      |      | e     |
| tablet               |                     |       |         |      |      |       |
+----------------------+---------------------+-------+---------+------+------+-------+
|                      | Take 1 tablet by    |       | 1       | 04/2 |      | Activ |
| lisinopril-hydrochlo | mouth daily.        |       |         | 3/20 |      | e     |
| rothiazide           |                     |       |         | 19   |      |       |
| (ZESTORETIC) 20-12.5 |                     |       |         |      |      |       |
|  MG per tablet       |                     |       |         |      |      |       |
+----------------------+---------------------+-------+---------+------+------+-------+
 
 
 
 Active Problems
 
 
+-----------------+------------+
| Problem         | Noted Date |
+-----------------+------------+
| S/P VSD closure | 2016 |
+-----------------+------------+
 
 
 
 
+------------------------------------------------------------+
|   Overview:   Overview:                                    |
| 8 mm Amplatzer Vascular plug-4 device placement (10/16/14) |
+------------------------------------------------------------+
 
 
 
+-------------------------------------------------+------------+
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
+-------------------------------------------------+------------+
| VSD (ventricular septal defect), perimembranous | 2008 |
+-------------------------------------------------+------------+
 
 
 
+------------------------------------------------------------------+
|   Overview:   Overview: 10/16/2014  S/p 8-mm Amplatzer Vascular  |
| Plug IV, with no significant residual shuntingLeft atrial        |
| isomerism LAE (left atrial enlargement) LVE (left ventricular    |
| enlargement)                                                     |
|LVE (left ventricular enlargement)                                |
+------------------------------------------------------------------+
 
 
 
+--------------------------+------------+
| Transplanted liver (HCC) | 10/12/2006 |
+--------------------------+------------+
 
 
 
+-------------------------------------------+
|   Overview:   Overview:                   |
| For biliary atresia, 2000 at [facility] |
+-------------------------------------------+
 
 
 
 Family History
 
 
+-----------------+----------+------+--------------------+
| Medical History | Relation | Name | Comments           |
+-----------------+----------+------+--------------------+
| Heart disease   | Father   |      | had heart surgery  |
+-----------------+----------+------+--------------------+
| Hypertension    | Father   |      |                    |
+-----------------+----------+------+--------------------+
 
 
 
+----------+------+--------+----------+
| Relation | Name | Status | Comments |
+----------+------+--------+----------+
| Father   |      | Alive  |          |
+----------+------+--------+----------+
| Mother   |      | Alive  |          |
+----------+------+--------+----------+
 
 
 
 
 Social History
 
 
+--------------+-------+-----------+--------+------+
| Tobacco Use  | Types | Packs/Day | Years  | Date |
|              |       |           | Used   |      |
+--------------+-------+-----------+--------+------+
| Never Smoker |       |           |        |      |
+--------------+-------+-----------+--------+------+
 
 
 
+---------------------+---+---+---+
| Smokeless Tobacco:  |   |   |   |
| Current User        |   |   |   |
+---------------------+---+---+---+
 
 
 
+-------------+-----------+---------+----------+
| Alcohol Use | Drinks/We | oz/Week | Comments |
|             | ek        |         |          |
+-------------+-----------+---------+----------+
| No          |           |         |          |
+-------------+-----------+---------+----------+
 
 
 
+------------------+---------------+
| Sex Assigned at  | Date Recorded |
| Birth            |               |
+------------------+---------------+
| Not on file      |               |
+------------------+---------------+
 
 
 
 Last Filed Vital Signs
 
 
+-------------------+----------------------+-------------------------+
| Vital Sign        | Reading              | Time Taken              |
+-------------------+----------------------+-------------------------+
| Blood Pressure    | 124/70               | 2019  2:18 PM PDT |
+-------------------+----------------------+-------------------------+
| Pulse             | 100                  | 2019  2:18 PM PDT |
+-------------------+----------------------+-------------------------+
| Temperature       | -                    | -                       |
+-------------------+----------------------+-------------------------+
| Respiratory Rate  | -                    | -                       |
+-------------------+----------------------+-------------------------+
| Oxygen Saturation | 98%                  | 2019  2:18 PM PDT |
+-------------------+----------------------+-------------------------+
| Inhaled Oxygen    | -                    | -                       |
| Concentration     |                      |                         |
+-------------------+----------------------+-------------------------+
| Weight            | 96.4 kg (212 lb 9.6  | 2019  2:18 PM PDT |
|                   | oz)                  |                         |
+-------------------+----------------------+-------------------------+
 
| Height            | 165.1 cm (5' 5")     | 2019  2:18 PM PDT |
+-------------------+----------------------+-------------------------+
| Body Mass Index   | 35.38                | 2019  2:18 PM PDT |
+-------------------+----------------------+-------------------------+
 
 
 
 Plan of Treatment
 
 
+--------+---------+-----------+----------------------+-------------+
| Date   | Type    | Specialty | Care Team            | Description |
+--------+---------+-----------+----------------------+-------------+
| / | Office  |           |   Renetta Goldberg, |             |
|    | Visit   |           |  MD [address]   |             |
|        |         |           | [address],  |             |
|        |         |           | [place] [address]             |             |
|        |         |           | [phone]         |             |
|        |         |           | [phone] (Fax)   |             |
+--------+---------+-----------+----------------------+-------------+
 
 
 
+----------------------+-----------+-----------+----------+
| Health Maintenance   | Due Date  | Last Done | Comments |
+----------------------+-----------+-----------+----------+
| Well Child Check     | 01/15/200 |           |          |
|                      | 2         |           |          |
+----------------------+-----------+-----------+----------+
| Vaccine: HPV (1 -    | 01/15/201 |           |          |
| Female 3-dose        | 4         |           |          |
| series)              |           |           |          |
+----------------------+-----------+-----------+----------+
| Vaccine:             | 01/15/201 |           |          |
| Dtap/Tdap/Td (1 -    | 8         |           |          |
| Tdap)                |           |           |          |
+----------------------+-----------+-----------+----------+
| Vaccine:             | 01/15/201 |           |          |
| Pneumococcal 19-64   | 8         |           |          |
| Highest Risk (1 of 3 |           |           |          |
|  - PCV13)            |           |           |          |
+----------------------+-----------+-----------+----------+
| Vaccine: Influenza   |  |           |          |
| (#1)                 | 9         |           |          |
+----------------------+-----------+-----------+----------+
 
 
 
 Results
 Not on filefrom Last 3 Months
 
 Insurance
 
 
+--------------------+--------+-------------+------+-------+---------+
| Payer              | Benefi | Subscriber  | Type | Phone | Address |
|                    | t Plan | ID          |      |       |         |
|                    |  /     |             |      |       |         |
|                    | Group  |             |      |       |         |
+--------------------+--------+-------------+------+-------+---------+
 
| [employer] -     | FIRST  | [id number] |      |       |         |
| [employer]           | HEALTH |             |      |       |         |
|                    |  -     |             |      |       |         |
|                    | PACIFI |             |      |       |         |
|                    | CSOURC |             |      |       |         |
|                    | E      |             |      |       |         |
+--------------------+--------+-------------+------+-------+---------+
| [employer]  | PROVID | [id number] | PPO  |       |         |
| PLAN               | ENCE   |             |      |       |         |
|                    | HEALTH |             |      |       |         |
|                    |  PLAN  |             |      |       |         |
+--------------------+--------+-------------+------+-------+---------+
 
 
 
+-----------------+--------+-------------+--------+-------------+----------------------+
| Guarantor Name  | [employer] | Relation to | Date   | Phone       | Billing Address      |
|                 | t Type |  Patient    | of     |             |                      |
|                 |        |             | Birth  |             |                      |
+-----------------+--------+-------------+--------+-------------+----------------------+
| NILA VALDOVINOS | Person | Self        | [birth year] |   Home:     |   [address]   |
|                 | [address] |             |    | [phone] | [place]        |
|                 | [place]    |             |        | [address]        | [address]           |
+-----------------+--------+-------------+--------+-------------+----------------------+

## 2019-11-01 NOTE — XMS
Encounter Summary
  Created on: 2019
 
 Bonifacio Nila JB
 External Reference #: [id number]
 : [birth year]
 Sex: Female
 
 Demographics
 
 
+-----------------------+------------------------+
| Address               | [address]            |
|                       | [place]  [address]   |
+-----------------------+------------------------+
| Home Phone            | [phone]        |
+-----------------------+------------------------+
| Preferred Language    | Unknown                |
+-----------------------+------------------------+
| Marital Status        | Single                 |
+-----------------------+------------------------+
| Lutheran Affiliation | Unknown                |
+-----------------------+------------------------+
| Race                  | White                  |
+-----------------------+------------------------+
| Ethnic Group          | Not  or  |
+-----------------------+------------------------+
 
 
 Author
 
 
+--------------+------------------------------+
| Author       | [facility] |
+--------------+------------------------------+
| Organization | [facility] |
+--------------+------------------------------+
| Address      | Unknown                      |
+--------------+------------------------------+
| Phone        | Unavailable                  |
+--------------+------------------------------+
 
 
 
 Support
 
 
+-----------------+--------------+---------+-----------------+
| Name            | Relationship | Address | Phone           |
+-----------------+--------------+---------+-----------------+
| Kit Valdovinos   | ECON         | Unknown | [phone] |
+-----------------+--------------+---------+-----------------+
| Magdalena Valdovinos | ECON         | Unknown | [phone] |
+-----------------+--------------+---------+-----------------+
 
 
 
 Care Team Providers
 
 
 
+-----------------------+------+-------------+
| Care Team Member Name | Role | Phone       |
+-----------------------+------+-------------+
 PCP  | Unavailable |
+-----------------------+------+-------------+
 
 
 
 Encounter Details
 
 
+--------+-------------+----------------------+----------------------+---------------------+
| Date   | Type        | Department           | Care Team            | Description         |
+--------+-------------+----------------------+----------------------+---------------------+
| / |  |   Pediatric          |   Neema Khalil,   | Transplanted liver  |
|    |             | Gastroenterology at  | MD  [address] | (HCC) (Primary Dx)  |
|        |             | [facility]          |   [place], [place]       |                     |
|        |             | [facility]  | [address]           |                     |
|        |             |  [address] | [phone]         |                     |
|        |             |  [facility]  Mailcode:  | [phone] (Fax)   |                     |
|        |             | [facility]   |                      |                     |
|        |             | [place], [place]         |                      |                     |
|        |             | [address]           |                      |                     |
|        |             | [phone]         |                      |                     |
+--------+-------------+----------------------+----------------------+---------------------+
 
 
 
 Social History
 
 
+----------------+-------+-----------+--------+------+
| Tobacco Use    | Types | Packs/Day | Years  | Date |
|                |       |           | Used   |      |
+----------------+-------+-----------+--------+------+
| Never Assessed |       |           |        |      |
+----------------+-------+-----------+--------+------+
 
 
 
+------------------+---------------+
| Sex Assigned at  | Date Recorded |
| Birth            |               |
+------------------+---------------+
| Not on file      |               |
+------------------+---------------+
 
 
 
+----------------+-------------+-------------+
| Job Start Date | Occupation  | Industry    |
+----------------+-------------+-------------+
| Not on file    | Not on file | Not on file |
+----------------+-------------+-------------+
 
 
 
+----------------+--------------+------------+
 
| Travel History | Travel Start | Travel End |
+----------------+--------------+------------+
 
 
 
+-------------------------------------+
| No recent travel history available. |
+-------------------------------------+
 documented as of this encounter
 
 Plan of Treatment
 Not on filedocumented as of this encounter
 
 Visit Diagnoses
 
 
+--------------------------------------------------------------------+
| Diagnosis                                                          |
+--------------------------------------------------------------------+
|   Transplanted liver (HCC) - Primary  Liver replaced by transplant |
+--------------------------------------------------------------------+
 documented in this encounter

## 2019-11-01 NOTE — NUR
DR GREEN UPDATED REGARDING CONT HYPOTENSION. LAST BP WAS 90/32 ON MONITOR,
82/30 DONE MANUALLY, HR 80'S, RR 20. NO ORDERS AT THIS TIME, CONT TO MONITOR.
PT AWAKE AND DENIES NEEDS/PAIN/NAUSEA.

## 2019-11-01 NOTE — XMS
Encounter Summary
  Created on: 2019
 
 Bonifacio Nila JB
 External Reference #: [id number]
 : [birth year]
 Sex: Female
 
 Demographics
 
 
+-----------------------+------------------------+
| Address               | [address]            |
|                       | [place]  [address]   |
+-----------------------+------------------------+
| Home Phone            | [phone]        |
+-----------------------+------------------------+
| Preferred Language    | Unknown                |
+-----------------------+------------------------+
| Marital Status        | Single                 |
+-----------------------+------------------------+
| [employer] Affiliation | Unknown                |
+-----------------------+------------------------+
| Race                  | White                  |
+-----------------------+------------------------+
| Ethnic Group          | Not  or  |
+-----------------------+------------------------+
 
 
 Author
 
 
+--------------+------------------------------+
| Author       | [facility] |
+--------------+------------------------------+
| Organization | [facility] |
+--------------+------------------------------+
| Address      | Unknown                      |
+--------------+------------------------------+
| Phone        | Unavailable                  |
+--------------+------------------------------+
 
 
 
 Support
 
 
+-----------------+--------------+---------+-----------------+
| Name            | Relationship | Address | Phone           |
+-----------------+--------------+---------+-----------------+
| Kit Valdovinos   | ECON         | Unknown | [phone] |
+-----------------+--------------+---------+-----------------+
| Magdalena Valdovinos | ECON         | Unknown | [phone] |
+-----------------+--------------+---------+-----------------+
 
 
 
 Care Team Providers
 
 
 
+-----------------------+------+-----------------+
| Care Team Member Name | Role | Phone           |
+-----------------------+------+-----------------+
| Lily Horton MD   | PCP  | [phone] |
+-----------------------+------+-----------------+
 
 
 
 Encounter Details
 
 
+--------+----------+----------------------+--------------------+-------------+
| Date   | Type     | Department           | Care Team          | Description |
+--------+----------+----------------------+--------------------+-------------+
| / | Abstract |   Pediatric          |   Ivis Burkett MD |             |
|    |          | Gastroenterology at  |                    |             |
|        |          | [facility]          |                    |             |
|        |          | [facility]  |                    |             |
|        |          |  [address] |                    |             |
|        |          |  [address]  Mailcode:  |                    |             |
|        |          | [address]   |                    |             |
|        |          | [place], [place]         |                    |             |
|        |          | [address]           |                    |             |
|        |          | [phone]         |                    |             |
+--------+----------+----------------------+--------------------+-------------+
 
 
 
 Social History
 
 
+----------------+-------+-----------+--------+------+
| Tobacco Use    | Types | Packs/Day | Years  | Date |
|                |       |           | Used   |      |
+----------------+-------+-----------+--------+------+
| Never Assessed |       |           |        |      |
+----------------+-------+-----------+--------+------+
 
 
 
+------------------+---------------+
| Sex Assigned at  | Date Recorded |
| Birth            |               |
+------------------+---------------+
| Not on file      |               |
+------------------+---------------+
 
 
 
+----------------+-------------+-------------+
| Job Start Date | Occupation  | Industry    |
+----------------+-------------+-------------+
| Not on file    | Not on file | Not on file |
+----------------+-------------+-------------+
 
 
 
+----------------+--------------+------------+
 
| Travel History | Travel Start | Travel End |
+----------------+--------------+------------+
 
 
 
+-------------------------------------+
| No recent travel history available. |
+-------------------------------------+
 documented as of this encounter
 
 Plan of Treatment
 Not on filedocumented as of this encounter
 
 Procedures
 
 
+----------------------+--------+-------------+----------------------+----------------------
+
| Procedure Name       | Priori | Date/Time   | Associated Diagnosis | Comments             
|
|                      | ty     |             |                      |                      
|
+----------------------+--------+-------------+----------------------+----------------------
+
| CBC, WITH            | Routin | 2010  |                      |   Results for this   
|
| DIFFERENTIAL         | e      |  8:30 AM    |                      | procedure are in the 
|
|                      |        | PST         |                      |  results section.    
|
+----------------------+--------+-------------+----------------------+----------------------
+
| COMPLETE METABOLIC   | Routin | 2010  |                      |   Results for this   
|
| SET                  | e      |  8:30 AM    |                      | procedure are in the 
|
| (NA,K,CL,CO2,BUN,CRE |        | PST         |                      |  results section.    
|
| AT,GLUC,CA,AST,ALT,B |        |             |                      |                      
|
| ASHWINI TOTAL,ALK        |        |             |                      |                      
|
| PHOS,ALB,PROT TOTAL) |        |             |                      |                      
|
+----------------------+--------+-------------+----------------------+----------------------
+
| PHOSPHORUS, PLASMA   | Routin | 2010  |                      |   Results for this   
|
|                      | e      |  8:30 AM    |                      | procedure are in the 
|
|                      |        | PST         |                      |  results section.    
|
+----------------------+--------+-------------+----------------------+----------------------
+
| GGT, PLASMA          | Routin | 2010  |                      |   Results for this   
|
|                      | e      |  8:30 AM    |                      | procedure are in the 
|
|                      |        | PST         |                      |  results section.    
|
 
+----------------------+--------+-------------+----------------------+----------------------
+
| BILIRUBIN DIRECT     | Routin | 2010  |                      |   Results for this   
|
|                      | e      |  8:30 AM    |                      | procedure are in the 
|
|                      |        | PST         |                      |  results section.    
|
+----------------------+--------+-------------+----------------------+----------------------
+
| MAGNESIUM, PLASMA    | Routin | 2010  |                      |   Results for this   
|
|                      | e      |  8:30 AM    |                      | procedure are in the 
|
|                      |        | PST         |                      |  results section.    
|
+----------------------+--------+-------------+----------------------+----------------------
+
| TRIGLYCERIDES,       | Routin | 2010  |                      |   Results for this   
|
| PLASMA               | e      |  8:30 AM    |                      | procedure are in the 
|
|                      |        | PST         |                      |  results section.    
|
+----------------------+--------+-------------+----------------------+----------------------
+
| CHOLESTEROL TOTAL,   | Routin | 2010  |                      |   Results for this   
|
| PLASMA               | e      |  8:30 AM    |                      | procedure are in the 
|
|                      |        | PST         |                      |  results section.    
|
+----------------------+--------+-------------+----------------------+----------------------
+
 documented in this encounter
 
 Results
 PHOSPHORUS, PLASMA (2010  8:30 AM PST)
 
+-------------+-------+-----------------+------------+--------------+
| Component   | Value | Ref Range       | Performed  | Pathologist  |
|             |       |                 | At         | Signature    |
+-------------+-------+-----------------+------------+--------------+
| PHOSPHORUS, | 3.6   | 2.5 - 5.0 mg/dL | [facility]  |              |
|  PLASMA     |       |                 | LAB -      |              |
| (LAB)       |       |                 | [place]  |              |
+-------------+-------+-----------------+------------+--------------+
 
 
 
+---------------+
| Specimen      |
+---------------+
| Blood - Blood |
+---------------+
 
 
 
+--------------------+----------------------+--------------------+----------------+
| Performing         | Address              | City/State/Zipcode | Phone Number   |
 
| Organization       |                      |                    |                |
+--------------------+----------------------+--------------------+----------------+
|   [facility]  |   [address] | [place], [place]      |   [phone] |
| [place]          |                      |                    |                |
+--------------------+----------------------+--------------------+----------------+
|   [facility]  |                      | [place], [place]      |                |
| [place]          |                      |                    |                |
+--------------------+----------------------+--------------------+----------------+
 COMPLETE METABOLIC SET (NA,K,CL,CO2,BUN,CREAT,GLUC,CA,AST,ALT,BILI TOTAL,ALK PHOS,ALB,PROT 
TOTAL) (2010  8:30 AM PST)
 
+-------------+---------+-----------------+------------+--------------+
| Component   | Value   | Ref Range       | Performed  | Pathologist  |
|             |         |                 | At         | Signature    |
+-------------+---------+-----------------+------------+--------------+
| GLUCOSE,    | 93      | 60 - 100 mg/dL  | [facility]  |              |
| PLASMA      |         |                 | LAB -      |              |
| (LAB)       |         |                 | [place]  |              |
+-------------+---------+-----------------+------------+--------------+
| BUN, PLASMA | 11      | 6 - 23 mg/dL    | [facility]  |              |
|  (LAB)      |         |                 | LAB -      |              |
|             |         |                 | [place]  |              |
+-------------+---------+-----------------+------------+--------------+
| CREATININE  | 0.52    | 0.5 - 1.5 mg/dL | [facility]  |              |
| PLASMA      |         |                 | LAB -      |              |
| (LAB)       |         |                 | [place]  |              |
+-------------+---------+-----------------+------------+--------------+
| TOTAL       | 6.7     | 6.1 - 8.5 g/dL  | [facility]  |              |
| PROTEIN,    |         |                 | LAB -      |              |
| PLASMA      |         |                 | [place]  |              |
| (LAB)       |         |                 |            |              |
+-------------+---------+-----------------+------------+--------------+
| ALBUMIN,    | 3.9 (A) | 9.5 - 4.8 g/dL  | [facility]  |              |
| PLASMA      |         |                 | LAB -      |              |
| (LAB)       |         |                 | [place]  |              |
+-------------+---------+-----------------+------------+--------------+
| CALCIUM,    | 9.3     | 8.4 - 10.2      | [facility]  |              |
| PLASMA      |         | mg/dL           | LAB -      |              |
| (LAB)       |         |                 | [place]  |              |
+-------------+---------+-----------------+------------+--------------+
| BILIRUBIN   | 0.3     | 0.0 - 1.2       | [facility]  |              |
| TOTAL       |         | Transcutaneous  | LAB -      |              |
|             |         | Bilirubinometer | [place]  |              |
+-------------+---------+-----------------+------------+--------------+
| ALK PHOS    | 130 (A) | 135 - 384 U/L   | [facility]  |              |
|             |         |                 | LAB -      |              |
|             |         |                 | [place]  |              |
+-------------+---------+-----------------+------------+--------------+
| AST(SGOT)   | 17      | 0 - 40 U/L      | [facility]  |              |
|             |         |                 | LAB -      |              |
|             |         |                 | [place]  |              |
+-------------+---------+-----------------+------------+--------------+
| SODIUM,     | 137     | 132 - 143       | [facility]  |              |
| PLASMA      |         | mmol/L          | LAB -      |              |
| (LAB)       |         |                 | [place]  |              |
+-------------+---------+-----------------+------------+--------------+
| POTASSIUM,  | 3.9     | 3.6 - 5.1       | [facility]  |              |
| PLASMA      |         | mmol/L          | LAB -      |              |
| (LAB)       |         |                 | [place]  |              |
+-------------+---------+-----------------+------------+--------------+
 
| CHLORIDE,   | 105     | 95 - 112 mmol/L | [facility]  |              |
| PLASMA      |         |                 | LAB -      |              |
| (LAB)       |         |                 | [place]  |              |
+-------------+---------+-----------------+------------+--------------+
| TOTAL CO2,  | 25      | 19 - 31 mmol/L  | [facility]  |              |
| PLASMA      |         |                 | LAB -      |              |
| (LAB)       |         |                 | [place]  |              |
+-------------+---------+-----------------+------------+--------------+
| ALT (SGPT)  | 17      | 0 - 46 U/L      | [facility]  |              |
|             |         |                 | LAB -      |              |
|             |         |                 | [place]  |              |
+-------------+---------+-----------------+------------+--------------+
 
 
 
+---------------+
| Specimen      |
+---------------+
| Blood - Blood |
+---------------+
 
 
 
+--------------------+----------------------+--------------------+----------------+
| Performing         | Address              | City/State/Zipcode | Phone Number   |
| Organization       |                      |                    |                |
+--------------------+----------------------+--------------------+----------------+
|   [facility]  |   [address] | [place], [place]      |   [phone] |
| [place]          |                      |                    |                |
+--------------------+----------------------+--------------------+----------------+
|   [facility]  |                      | [place], [place]      |                |
| [place]          |                      |                    |                |
+--------------------+----------------------+--------------------+----------------+
 BILIRUBIN DIRECT (2010  8:30 AM PST)
 
+------------+-------+---------------+------------+--------------+
| Component  | Value | Ref Range     | Performed  | Pathologist  |
|            |       |               | At         | Signature    |
+------------+-------+---------------+------------+--------------+
| BILIRUBIN  | 0.1   | 0 - 0.1 mg/dL | [facility]  |              |
| DIRECT     |       |               | LAB -      |              |
|            |       |               | [place]  |              |
+------------+-------+---------------+------------+--------------+
 
 
 
+---------------+
| Specimen      |
+---------------+
| Blood - Blood |
+---------------+
 
 
 
+--------------------+----------------------+--------------------+----------------+
| Performing         | Address              | City/State/Zipcode | Phone Number   |
| Organization       |                      |                    |                |
+--------------------+----------------------+--------------------+----------------+
|   [facility]  |   [address] | [place], [place]      |   [phone] |
| [place]          |                      |                    |                |
 
+--------------------+----------------------+--------------------+----------------+
|   [facility]  |                      | [place], [place]      |                |
| [place]          |                      |                    |                |
+--------------------+----------------------+--------------------+----------------+
 GGT, PLASMA (2010  8:30 AM PST)
 
+-----------+-------+------------+------------+--------------+
| Component | Value | Ref Range  | Performed  | Pathologist  |
|           |       |            | At         | Signature    |
+-----------+-------+------------+------------+--------------+
| GAMMA     | 7     | 5 - 60 U/L | [facility]  |              |
| GLUTAMYL  |       |            | LAB -      |              |
| TRANS     |       |            | [place]  |              |
+-----------+-------+------------+------------+--------------+
 
 
 
+---------------+
| Specimen      |
+---------------+
| Blood - Blood |
+---------------+
 
 
 
+--------------------+----------------------+--------------------+----------------+
| Performing         | Address              | City/State/Zipcode | Phone Number   |
| Organization       |                      |                    |                |
+--------------------+----------------------+--------------------+----------------+
|   [facility]  |   [address] | [place], [place]      |   [phone] |
| [place]          |                      |                    |                |
+--------------------+----------------------+--------------------+----------------+
|   [facility]  |                      | [place], [place]      |                |
| [place]          |                      |                    |                |
+--------------------+----------------------+--------------------+----------------+
 MAGNESIUM, PLASMA (2010  8:30 AM PST)
 
+-------------+-------+-----------------+------------+--------------+
| Component   | Value | Ref Range       | Performed  | Pathologist  |
|             |       |                 | At         | Signature    |
+-------------+-------+-----------------+------------+--------------+
| MAGNESIUM,P | 1.7   | 1.7 - 2.5 mg/dL | [facility]  |              |
| LASMA       |       |                 | LAB -      |              |
|             |       |                 | [place]  |              |
+-------------+-------+-----------------+------------+--------------+
 
 
 
+---------------+
| Specimen      |
+---------------+
| Blood - Blood |
+---------------+
 
 
 
+--------------------+----------------------+--------------------+----------------+
| Performing         | Address              | City/State/Zipcode | Phone Number   |
| Organization       |                      |                    |                |
+--------------------+----------------------+--------------------+----------------+
 
|   [facility]  |   [address] | [place], [place]      |   [phone] |
| [place]          |                      |                    |                |
+--------------------+----------------------+--------------------+----------------+
|   [facility]  |                      | [place], [place]      |                |
| [place]          |                      |                    |                |
+--------------------+----------------------+--------------------+----------------+
 CBC, WITH DIFFERENTIAL (2010  8:30 AM PST)
 
+-------------+-------+-----------------+------------+--------------+
| Component   | Value | Ref Range       | Performed  | Pathologist  |
|             |       |                 | At         | Signature    |
+-------------+-------+-----------------+------------+--------------+
| WHITE CELL  | 7.8   | 4.4 - 11.8 K/cu | [facility]  |              |
| COUNT       |       |  mm             | LAB -      |              |
|             |       |                 | [place]  |              |
+-------------+-------+-----------------+------------+--------------+
| RED CELL    | 4.88  | 3.8 - 5.3 M/cu  | [facility]  |              |
| COUNT       |       | mm              | LAB -      |              |
|             |       |                 | [place]  |              |
+-------------+-------+-----------------+------------+--------------+
| HEMOGLOBIN  | 14.2  | 11.1 - 15.7     | [facility]  |              |
|             |       | g/dL            | LAB -      |              |
|             |       |                 | [place]  |              |
+-------------+-------+-----------------+------------+--------------+
| HEMATOCRIT  | 42.4  | 32 - 47 %       | [facility]  |              |
|             |       |                 | LAB -      |              |
|             |       |                 | [place]  |              |
+-------------+-------+-----------------+------------+--------------+
| MCV         | 86.9  | 73 - 91 fL      | [facility]  |              |
|             |       |                 | LAB -      |              |
|             |       |                 | [place]  |              |
+-------------+-------+-----------------+------------+--------------+
| MCH         | 29    | 25 - 31 pg      | [facility]  |              |
|             |       |                 | LAB -      |              |
|             |       |                 | [place]  |              |
+-------------+-------+-----------------+------------+--------------+
| MCHC        | 33    | 30 - 36 g/dL    | [facility]  |              |
|             |       |                 | LAB -      |              |
|             |       |                 | [place]  |              |
+-------------+-------+-----------------+------------+--------------+
| PLATELET    | 266   | 140 - 440 K/cu  | [facility]  |              |
| COUNT       |       | mm              | LAB -      |              |
|             |       |                 | [place]  |              |
+-------------+-------+-----------------+------------+--------------+
| NEUTROPHIL  | 60.7  | 35 - 65 %       | [facility]  |              |
| %           |       |                 | LAB -      |              |
|             |       |                 | [place]  |              |
+-------------+-------+-----------------+------------+--------------+
| LYMPHOCYTE  | 31.4  | 26 - 48 %       | [facility]  |              |
| %           |       |                 | LAB -      |              |
|             |       |                 | [place]  |              |
+-------------+-------+-----------------+------------+--------------+
| MONOCYTE %  | 3.7   | 0 - 12 %        | [facility]  |              |
|             |       |                 | LAB -      |              |
|             |       |                 | [place]  |              |
+-------------+-------+-----------------+------------+--------------+
| EOS %       | 2.9   | 0 - 6 %         | [facility]  |              |
|             |       |                 | LAB -      |              |
|             |       |                 | [place]  |              |
+-------------+-------+-----------------+------------+--------------+
 
| BASO %      | 1.3   | 0 - 2 %         | [facility]  |              |
|             |       |                 | LAB -      |              |
|             |       |                 | [place]  |              |
+-------------+-------+-----------------+------------+--------------+
| RDW         | 12.6  | 10.5 - 15.0 %   | [facility]  |              |
|             |       |                 | LAB -      |              |
|             |       |                 | [place]  |              |
+-------------+-------+-----------------+------------+--------------+
| MPV         |       | fL              | [facility]  |              |
|             |       |                 | LAB -      |              |
|             |       |                 | [place]  |              |
+-------------+-------+-----------------+------------+--------------+
| NEUTROPHIL  |       | K/cu mm         | [facility]  |              |
| #           |       |                 | LAB -      |              |
|             |       |                 | [place]  |              |
+-------------+-------+-----------------+------------+--------------+
| LYMPHOCYTE  |       | K/cu mm         | [facility]  |              |
| #           |       |                 | LAB -      |              |
|             |       |                 | [place]  |              |
+-------------+-------+-----------------+------------+--------------+
| MONOCYTE #  |       | K/cu mm         | [facility]  |              |
|             |       |                 | LAB -      |              |
|             |       |                 | [place]  |              |
+-------------+-------+-----------------+------------+--------------+
| EOS #       |       | K/cu mm         | [facility]  |              |
|             |       |                 | LAB -      |              |
|             |       |                 | [place]  |              |
+-------------+-------+-----------------+------------+--------------+
| BASO #      |       |                 | [facility]  |              |
|             |       |                 | LAB -      |              |
|             |       |                 | [place]  |              |
+-------------+-------+-----------------+------------+--------------+
 
 
 
+---------------+
| Specimen      |
+---------------+
| Blood - Blood |
+---------------+
 
 
 
+--------------------+----------------------+--------------------+----------------+
| Performing         | Address              | City/State/Zipcode | Phone Number   |
| Organization       |                      |                    |                |
+--------------------+----------------------+--------------------+----------------+
|   [facility]  |   [address] | [place], [place]      |   [phone] |
| [place]          |                      |                    |                |
+--------------------+----------------------+--------------------+----------------+
|   [facility]  |                      | [place], [place]      |                |
| [place]          |                      |                    |                |
+--------------------+----------------------+--------------------+----------------+
 CHOLESTEROL TOTAL, PLASMA (2010  8:30 AM PST)
 
+-------------+-------+---------------+------------+--------------+
| Component   | Value | Ref Range     | Performed  | Pathologist  |
|             |       |               | At         | Signature    |
+-------------+-------+---------------+------------+--------------+
| CHOLESTEROL | 101   | < - 200 mg/dL | [facility]  |              |
 
|   (LAB)     |       |               | LAB -      |              |
|             |       |               | [place]  |              |
+-------------+-------+---------------+------------+--------------+
 
 
 
+---------------+
| Specimen      |
+---------------+
| Blood - Blood |
+---------------+
 
 
 
+--------------------+----------------------+--------------------+----------------+
| Performing         | Address              | City/State/Zipcode | Phone Number   |
| Organization       |                      |                    |                |
+--------------------+----------------------+--------------------+----------------+
|   [facility]  |   [address] | [place], [place]      |   [phone] |
| [place]          |                      |                    |                |
+--------------------+----------------------+--------------------+----------------+
|   [facility]  |                      | [place], [place]      |                |
| [place]          |                      |                    |                |
+--------------------+----------------------+--------------------+----------------+
 TRIGLYCERIDES, PLASMA (2010  8:30 AM PST)
 
+-------------+-------+----------------+------------+--------------+
| Component   | Value | Ref Range      | Performed  | Pathologist  |
|             |       |                | At         | Signature    |
+-------------+-------+----------------+------------+--------------+
| TRIGLYCERID | 83    | 30 - 150 mg/dL | [facility]  |              |
| ES          |       |                | LAB -      |              |
|             |       |                | [place]  |              |
+-------------+-------+----------------+------------+--------------+
 
 
 
+---------------+
| Specimen      |
+---------------+
| Blood - Blood |
+---------------+
 
 
 
+--------------------+----------------------+--------------------+----------------+
| Performing         | Address              | City/State/Zipcode | Phone Number   |
| Organization       |                      |                    |                |
+--------------------+----------------------+--------------------+----------------+
|   [facility]  |   [address] | [place], [place]      |   [phone] |
| [place]          |                      |                    |                |
+--------------------+----------------------+--------------------+----------------+
|   [facility]  |                      | [place], [place]      |                |
| [place]          |                      |                    |                |
+--------------------+----------------------+--------------------+----------------+
 documented in this encounter
 
 Visit Diagnoses
 Not on filedocumented in this encounter

## 2019-11-01 NOTE — XMS
Encounter Summary
  Created on: 2019
 
 Bonifacio Nila JB
 External Reference #: [id number]
 : [birth year]
 Sex: Female
 
 Demographics
 
 
+-----------------------+------------------------+
| Address               | [address]            |
|                       | [place]  [address]   |
+-----------------------+------------------------+
| Home Phone            | [phone]        |
+-----------------------+------------------------+
| Preferred Language    | Unknown                |
+-----------------------+------------------------+
| Marital Status        | Single                 |
+-----------------------+------------------------+
| Oriental orthodox Affiliation | Unknown                |
+-----------------------+------------------------+
| Race                  | White                  |
+-----------------------+------------------------+
| Ethnic Group          | Not  or  |
+-----------------------+------------------------+
 
 
 Author
 
 
+--------------+-------------+
| Organization | Unknown     |
+--------------+-------------+
| Address      | Unknown     |
+--------------+-------------+
| Phone        | Unavailable |
+--------------+-------------+
 
 
 
 Support
 
 
+-----------------+--------------+---------+-----------------+
| Name            | Relationship | Address | Phone           |
+-----------------+--------------+---------+-----------------+
| Kit Valdovinos   | ECON         | Unknown | [phone] |
+-----------------+--------------+---------+-----------------+
| Magdalena Valdovinos | ECON         | Unknown | [phone] |
+-----------------+--------------+---------+-----------------+
 
 
 
 Care Team Providers
 
 
 
+-----------------------+------+-------------+
| Care Team Member Name | Role | Phone       |
+-----------------------+------+-------------+
 PCP  | Unavailable |
+-----------------------+------+-------------+
 
 
 
 Encounter Details
 
 
+--------+-------------+------------+--------------------+-------------+
| Date   | Type        | Department | Care Team          | Description |
+--------+-------------+------------+--------------------+-------------+
| 10/18/ | Transcribed |            |   Dictation, Other | Transcribed |
| 2001   |             |            |                    |             |
+--------+-------------+------------+--------------------+-------------+
 
 
 
 Social History
 
 
+----------------+-------+-----------+--------+------+
| Tobacco Use    | Types | Packs/Day | Years  | Date |
|                |       |           | Used   |      |
+----------------+-------+-----------+--------+------+
| Never Assessed |       |           |        |      |
+----------------+-------+-----------+--------+------+
 
 
 
+------------------+---------------+
| Sex Assigned at  | Date Recorded |
| Birth            |               |
+------------------+---------------+
| Not on file      |               |
+------------------+---------------+
 
 
 
+----------------+-------------+-------------+
| Job Start Date | Occupation  | Industry    |
+----------------+-------------+-------------+
| Not on file    | Not on file | Not on file |
+----------------+-------------+-------------+
 
 
 
+----------------+--------------+------------+
| Travel History | Travel Start | Travel End |
+----------------+--------------+------------+
 
 
 
+-------------------------------------+
| No recent travel history available. |
+-------------------------------------+
 documented as of this encounter
 
 
 Progress Notes
 Interface, Transcription In - 10/02/2006  3:01 AM [facility]                                    [place] [address], [place] [address] [phone] or [phone]
 
 2001
 
 TON BELLO M.D.
 [address] [place]  [address]
 
 RE:   NILA VALDOVINOS
 MR #: [id number]
 DATE OF TRANSPLANT:     2000
 
 Dear Dr. Bello:
 
 Nila returned to the Pediatric Gastroenterology  Clinic at [facility] today with
 her parents.  It was attended by Dr. Taiwo Hyatt  and Shaylee Louise R.N. of the Pediatric Liver Transplant  Team  visiting from [place].  This
 is one of her summer annual visits.  She continues to do extremely well and
 appears to have really nothing to complain of today.
 
 Her  only medication is Prograf 3 mL  b.i.d.   Acyclovir  was  discontinued
 earlier this year.  SHE IS ALLERGIC TO THE SULFA DRUGS.
 
 On examination, she weighs 14.8 kg and  measures 91 cm.  Her blood pressure
 is 103/48 and her pulse is 117.  She is anicteric and quite well nourished.
 Her behavior is normal in the office.   There  is  no  cervical adenopathy.
 She has unlabored respirations.  Her abdomen is soft and flat with no mass,
 tenderness, or organomegaly.  She has well-healed surgical scars.
 
 A  recent  laboratory  test  on  October   15,   2001  showed  normal  CBC,
 comprehensive metabolic panel, GGT, and  magnesium.  Her tacrolimus 12-hour
 trough level was 7.6.  Prior to this it ran about 4.
 
 In summary, Nila is a 29-twhst-aew white  female  who  is 14 months status
 post liver transplantation for extrahepatic  biliary  atresia  at [place].
 She  continues to do extremely well with  an  uncomplicated  posttransplant
 course.  Her Prograf level was able to  be  lowered  over the last 6 months
 without difficulty.  It is unclear why  her  recent  level  of 7.6 occurred
 without a good explanation.  We would  merely repeat the Prograf level next
 month when she has her summer annual  EBV,  serology,  and  PCR level done.
 She  is  having  these  done every 6 months,  in  May  and  2001.
 Otherwise, since she is doing so well  Dr. Hyatt  believes she can have
 her labs drawn every other month instead of every month.
 We would like to have her Varicella titers  rechecked  soon.   In addition,
 she needs a flu shot once a year henceforth.
 
 We will plan to see her again in this clinic in 6 months.
 
 Sincerely,
 
 Eagle De Luna M.D.
 Pediatric Gastroenterology
 
 [facility] / 
 
 [id number] / [id number] / [id number] /
 D: 10/18/2001
 T: 10/19/2001
 C: 2001 ds
 
 cc:
 
 LETTY AMAYA M.D.
 [address]
 [place]  [address]
 
 ADRIANA FLANNERY M.D.
 [address]
 [place]  [address]
 
 TAIWO HYATT M.D.
 [address]
 [place]  [address]
 
 [id number] signed by Interface, Transcription In at 10/02/2006  3:01 AM [facility]
umented in this encounter
 
 Plan of Treatment
 Not on filedocumented as of this encounter
 
 Visit Diagnoses
 Not on filedocumented in this encounter

## 2019-11-01 NOTE — XMS
Encounter Summary
  Created on: 2019
 
 Bonifacio Nila JB
 External Reference #: [id number]
 : [birth year]
 Sex: Female
 
 Demographics
 
 
+-----------------------+------------------------+
| Address               | [address]            |
|                       | [place]  [address]   |
+-----------------------+------------------------+
| Home Phone            | [phone]        |
+-----------------------+------------------------+
| Preferred Language    | Unknown                |
+-----------------------+------------------------+
| Marital Status        | Single                 |
+-----------------------+------------------------+
| Orthodoxy Affiliation | Unknown                |
+-----------------------+------------------------+
| Race                  | White                  |
+-----------------------+------------------------+
| Ethnic Group          | Not  or  |
+-----------------------+------------------------+
 
 
 Author
 
 
+--------------+------------------------------+
| Author       | [facility] |
+--------------+------------------------------+
| Organization | [facility] |
+--------------+------------------------------+
| Address      | Unknown                      |
+--------------+------------------------------+
| Phone        | Unavailable                  |
+--------------+------------------------------+
 
 
 
 Support
 
 
+-----------------+--------------+---------+-----------------+
| Name            | Relationship | Address | Phone           |
+-----------------+--------------+---------+-----------------+
| Kit Valdovinos   | ECON         | Unknown | [phone] |
+-----------------+--------------+---------+-----------------+
| Magdalena Valdovinos | ECON         | Unknown | [phone] |
+-----------------+--------------+---------+-----------------+
 
 
 
 Care Team Providers
 
 
 
+-----------------------+------+-----------------+
| Care Team Member Name | Role | Phone           |
+-----------------------+------+-----------------+
| Lily Horton MD   | PCP  | [phone] |
+-----------------------+------+-----------------+
 
 
 
 Encounter Details
 
 
+--------+-------------+----------------------+--------------------+-------------+
| Date   | Type        | Department           | Care Team          | Description |
+--------+-------------+----------------------+--------------------+-------------+
| / | Documentati |   Pediatric          |   Ivis Burkett MD |             |
|    | on          | Gastroenterology at  |                    |             |
|        |             | [place]          |                    |             |
|        |             | [facility]  |                    |             |
|        |             |  [address] |                    |             |
|        |             |  [address]  Mailcode:  |                    |             |
|        |             |   [place]   |                    |             |
|        |             | [place], [place]         |                    |             |
|        |             | [address]           |                    |             |
|        |             | [phone]         |                    |             |
+--------+-------------+----------------------+--------------------+-------------+
 
 
 
 Social History
 
 
+----------------+-------+-----------+--------+------+
| Tobacco Use    | Types | Packs/Day | Years  | Date |
|                |       |           | Used   |      |
+----------------+-------+-----------+--------+------+
| Never Assessed |       |           |        |      |
+----------------+-------+-----------+--------+------+
 
 
 
+------------------+---------------+
| Sex Assigned at  | Date Recorded |
| Birth            |               |
+------------------+---------------+
| Not on file      |               |
+------------------+---------------+
 
 
 
+----------------+-------------+-------------+
| Job Start Date | Occupation  | Industry    |
+----------------+-------------+-------------+
| Not on file    | Not on file | Not on file |
+----------------+-------------+-------------+
 
 
 
+----------------+--------------+------------+
 
| Travel History | Travel Start | Travel End |
+----------------+--------------+------------+
 
 
 
+-------------------------------------+
| No recent travel history available. |
+-------------------------------------+
 documented as of this encounter
 
 Plan of Treatment
 Not on filedocumented as of this encounter
 
 Visit Diagnoses
 Not on filedocumented in this encounter

## 2019-11-01 NOTE — XMS
Encounter Summary
  Created on: 2019
 
 Bonifacio Nila JB
 External Reference #: [id number]
 : [birth year]
 Sex: Female
 
 Demographics
 
 
+-----------------------+------------------------+
| Address               | [address]            |
|                       | [place]  [address]   |
+-----------------------+------------------------+
| Home Phone            | [phone]        |
+-----------------------+------------------------+
| Preferred Language    | Unknown                |
+-----------------------+------------------------+
| Marital Status        | Single                 |
+-----------------------+------------------------+
| Bahai Affiliation | Unknown                |
+-----------------------+------------------------+
| Race                  | White                  |
+-----------------------+------------------------+
| Ethnic Group          | Not  or  |
+-----------------------+------------------------+
 
 
 Author
 
 
+--------------+------------------------------+
| Author       | [facility] |
+--------------+------------------------------+
| Organization | [facility] |
+--------------+------------------------------+
| Address      | Unknown                      |
+--------------+------------------------------+
| Phone        | Unavailable                  |
+--------------+------------------------------+
 
 
 
 Support
 
 
+-----------------+--------------+---------+-----------------+
| Name            | Relationship | Address | Phone           |
+-----------------+--------------+---------+-----------------+
| Kit Valdovinos   | ECON         | Unknown | [phone] |
+-----------------+--------------+---------+-----------------+
| Magdalena Valdovinos | ECON         | Unknown | [phone] |
+-----------------+--------------+---------+-----------------+
 
 
 
 Care Team Providers
 
 
 
+-----------------------+------+-----------------+
| Care Team Member Name | Role | Phone           |
+-----------------------+------+-----------------+
| Lily Horton MD   | PCP  | [phone] |
+-----------------------+------+-----------------+
 
 
 
 Reason for Visit
 
 
+-----------------+----------+
| Reason          | Comments |
+-----------------+----------+
| Follow-up visit |          |
+-----------------+----------+
 Consultation (Routine)
 
+--------+--------------+---------------+--------------+--------------+---------------+
| Status | Reason       | Specialty     | Diagnoses /  | Referred By  | Referred To   |
|        |              |               | Procedures   | Contact      | Contact       |
+--------+--------------+---------------+--------------+--------------+---------------+
| Closed |   Specialty  | Pediatric     |   Diagnoses  |   Sol,    |   Ped Gastro  |
|        | Services     | Gastroenterol |  Liver       | Lily CARY MD | [facility]  [address]  |
|        | Required     | ogy           | replaced by  |   PEDS       | [address]   |
|        |              |               | transplant   | SPECIALISTS  | [address]       |
|        |              |               | (HCC)        | OF [place] | Mailcode:     |
|        |              |               |              |   [address]   | [facility]         |
|        |              |               |              | [address] | [address]   |
|        |              |               |              |  [address]         | [place], [place]  |
|        |              |               |              | [place],   | [id number]    |
|        |              |               |              | [place] [address]     | Phone:        |
|        |              |               |              | Phone:       | [phone]  |
|        |              |               |              | [phone] |  Fax:         |
|        |              |               |              |   Fax:       | [phone]  |
|        |              |               |              | [phone] |               |
+--------+--------------+---------------+--------------+--------------+---------------+
 
 
 
 
 Encounter Details
 
 
+--------+---------+----------------------+--------------------+---------------------+
| Date   | Type    | Department           | Care Team          | Description         |
+--------+---------+----------------------+--------------------+---------------------+
| / | Office  |   Specialty Clinics  |   Ivis Burkett MD | Transplanted Liver  |
|    | Visit   | at [facility]  [address]  |                    | (HCC) (Primary Dx)  |
|        |         | [facility]      |                    |                     |
|        |         | Mailcode: [facility]       |                    |                     |
|        |         | [facility]          |                    |                     |
|        |         | [place], [place]         |                    |                     |
|        |         | [address]           |                    |                     |
|        |         | [phone]         |                    |                     |
+--------+---------+----------------------+--------------------+---------------------+
 
 
 
 
 Social History
 
 
+----------------+-------+-----------+--------+------+
| Tobacco Use    | Types | Packs/Day | Years  | Date |
|                |       |           | Used   |      |
+----------------+-------+-----------+--------+------+
| Never Assessed |       |           |        |      |
+----------------+-------+-----------+--------+------+
 
 
 
+------------------+---------------+
| Sex Assigned at  | Date Recorded |
| Birth            |               |
+------------------+---------------+
| Not on file      |               |
+------------------+---------------+
 
 
 
+----------------+-------------+-------------+
| Job Start Date | Occupation  | Industry    |
+----------------+-------------+-------------+
| Not on file    | Not on file | Not on file |
+----------------+-------------+-------------+
 
 
 
+----------------+--------------+------------+
| Travel History | Travel Start | Travel End |
+----------------+--------------+------------+
 
 
 
+-------------------------------------+
| No recent travel history available. |
+-------------------------------------+
 documented as of this encounter
 
 Last Filed Vital Signs
 
 
+-------------------+----------------------+----------------------+----------+
| Vital Sign        | Reading              | Time Taken           | Comments |
+-------------------+----------------------+----------------------+----------+
| Blood Pressure    | 123/82               | 2009  9:47 AM  |          |
|                   |                      | PST                  |          |
+-------------------+----------------------+----------------------+----------+
| Pulse             | 84                   | 2009  9:47 AM  |          |
|                   |                      | PST                  |          |
+-------------------+----------------------+----------------------+----------+
| Temperature       | -                    | -                    |          |
+-------------------+----------------------+----------------------+----------+
| Respiratory Rate  | -                    | -                    |          |
+-------------------+----------------------+----------------------+----------+
| Oxygen Saturation | -                    | -                    |          |
+-------------------+----------------------+----------------------+----------+
| Inhaled Oxygen    | -                    | -                    |          |
 
| Concentration     |                      |                      |          |
+-------------------+----------------------+----------------------+----------+
| Weight            | 45 kg (99 lb 3.3 oz) | 2009  9:47 AM  |          |
|                   |                      | PST                  |          |
+-------------------+----------------------+----------------------+----------+
| Height            | 148.3 cm (4' 10.39") | 2009  9:47 AM  |          |
|                   |                      | PST                  |          |
+-------------------+----------------------+----------------------+----------+
| Body Mass Index   | 20.46                | 2009  9:47 AM  |          |
|                   |                      | PST                  |          |
+-------------------+----------------------+----------------------+----------+
 documented in this encounter
 
 Patient Instructions
 Patient Instructions Ivis Burkett MD - 2009 11:01 AM PST1. Continue the Prograf 1 mg
 twice daily
 2. Have her labs done every 3 months
 3. She should continue to have an antibiotic before dental work
 Electronically signed by Ivis Burkett MD at 2009 11:01 AM PST
 documented in this encounter
 
 Progress Notes
 Ivis Burkett MD - 2009 11:04 AM PSTFormatting of this note might be different from t
he original.
 
 Nila Valdovinos is an 9 y.o. female, who is referred by Lily Horton, who returns to [facility] for transplant followup. Dr Aguirre from [place] was an observ
er in clinic today.
 
 Patient Active Problem List: 
   VSD (Ventricular Septal Defect)[745.4Y]
     Comment: With polysplenia 
   Aortic Valve Insufficiency[424.1F]
   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
     Comment: For biliary atresia, 2000 at [facility]
 
 Current outpatient prescriptions prior to encounter 
 Medication Sig Dispense Refill 
   PROGRAF 1 mg Oral Capsule Take 1 Cap by mouth two times daily.   60  3 
 
 Allergies 
 Allergen Reactions 
   Sulfa (Sulfonamide Antibiotics) Hives and Swelling-Facial 
   Varicella  
   Possible reaction with sulfa meds 
 
 HPI Nila was seen in clinic with her mother. They report that she has not had any medical 
problems this year. She has not had any labs done between May and this month: they thought s
he only needed labs every 6 months. 
 She is doing well in 4th grade and plans to do track again this year. :
 
 Review of Systems:  
 General:  No  fevers, fatigue, weight loss   
 Ears, Nose and Throat: negative
 Respiratory:  No cough,
 Cardiovascular:  Will see her cardiologist again this spring
 Gastrointestinal: no abdominal pain
 
 Physical Examination:
 
 
 /82, Pulse 84, Ht 148.3 cm (4' 10.39") (92 %ile), Wt 45 kg (99 lbs 3.3 oz) (91 %ile),
 Weight for age(%)  91.25%, BMI for age(%)  87.31%, Length for age(%)  92.16%. 
 87.31% of growth percentile based on BMI-for-age.
 
 Appearance: alert, active and in no apparent distress.
 Skin:  Multiple warts R hand,  no rashes, petechiae 
 HEENT: normocephalic,PERRLA , no icterus,,nose without discharge, mouth no aphthous lesions
, mucous membranes moist, pharynx unremarkable
 Neck: supple, without thyromegaly
 Chest: clear to auscultation bilaterally.
 CV: regular sinus rhythm, 4/6 systolic murmur heard best lower left sternal border 
 Abdomen: ,  well healed incisions, soft, no distention, no tenderness to palpation, no rebo
und , no guarding, no palpable masses, normal bowel sounds, no hepatosplenomegaly
 Musculoskeletal: grossly intact  without clubbing or edema
 Nodes: no signficant cervical, supraclavicular adenopathy
 
 Lab Results 
 Basename Value Date/Time 
   WBC 8.3 08  3:25 PM 
   HB 14.2 08  3:25 PM 
   HCT 40.8 08  3:25 PM 
    08  3:25 PM 
   MCV 84.6 08  3:25 PM 
   RDW 12.6 08  3:25 PM 
 
 Lab Results 
 Basename Value Date/Time 
   TBILI 0.8 08  3:25 PM 
    08  3:25 PM 
   TP 7.1 08  3:25 PM 
   DIRBILI 0.1 08  3:25 PM 
   ALB 4.2 08  3:25 PM 
   AST 28 08  3:25 PM 
   ALT 24 08  3:25 PM 
 
 Lab Results 
 Basename Value Date/Time 
    3.4 10/9/07  8:00 AM 
 
 Last prograf level from this week is pending
 
  last EBV PCR       ?
 
  
 Assessment: Patient Active Problem List: 
   VSD (Ventricular Septal Defect)[745.4Y]
     Comment: With polysplenia 
   Aortic Valve Insufficiency[424.1F]
   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
     Comment: For biliary atresia, 2000 at [facility]
 
 Plan:  
 1. CBC, primary transplant panel, drug levels-      every  3  months
 2. EBV PCR and serology                                      every   12  months
 3. CMV PCR and serology                                      every  12   months
 
 4. Other labs:   
 5. Imaging:       none
 6. Flu shot from PCP yearly
 7. change in medications:
 
          Prograf  No change- 1mg twice daily
          
        
 8. Continue to give antibiotic prior to dental procedures because of her polysplenia, even 
if not needed for her cardiac lesion
 
 8. Return to clinic    12  months Electronically signed by Ivis Burkett MD at 2009 11
:14 AM PSTdocumented in this encounter
 
 Plan of Treatment
 Not on filedocumented as of this encounter
 
 Procedures
 
 
+----------------+--------+-------------+----------------------+----------------------+
| Procedure Name | Priori | Date/Time   | Associated Diagnosis | Comments             |
|                | ty     |             |                      |                      |
+----------------+--------+-------------+----------------------+----------------------+
| LAB REPORTS    |        | 2009  |                      |   Results for this   |
|                |        | 12:00 AM    |                      | procedure are in the |
|                |        | PST         |                      |  results section.    |
+----------------+--------+-------------+----------------------+----------------------+
 documented in this encounter
 
 Results
 LAB REPORTS (2009 12:00 AM PST)
 
+----------------------------------------------------------+--------------+
| Narrative                                                | Performed At |
+----------------------------------------------------------+--------------+
|   This result has an attachment that is not available.   |              |
+----------------------------------------------------------+--------------+
 
 
 
+------------------------------------------------+
| Procedure Note                                 |
+------------------------------------------------+
|   Dang Bowling - 2009 12:00 AM PST     |
+------------------------------------------------+
 documented in this encounter
 
 Visit Diagnoses
 
 
+--------------------------------------------------------------------+
| Diagnosis                                                          |
+--------------------------------------------------------------------+
|   Transplanted liver (HCC) - Primary  Liver replaced by transplant |
+--------------------------------------------------------------------+
 documented in this encounter

## 2019-11-01 NOTE — XMS
MU2 Ambulatory Summary
  Created on: 2017
 
 Nila Valdovinos
 External Reference #: [id number]
 : [birth year]
 Sex: Female
 
 Demographics
 
 
+-----------------------+------------------------+
| Address               | [address] [place] [address]     |
|                       | [place]  [id number]   |
+-----------------------+------------------------+
| Home Phone            | [phone]          |
+-----------------------+------------------------+
| Preferred Language    | Unknown                |
+-----------------------+------------------------+
| Marital Status        | Never           |
+-----------------------+------------------------+
| Hinduism Affiliation | Unknown                |
+-----------------------+------------------------+
| Race                  | White                  |
+-----------------------+------------------------+
| Ethnic Group          | Not  or  |
+-----------------------+------------------------+
 
 
 Author
 
 
+--------------+----------------------------------------+
| Author       | [facility] |
+--------------+----------------------------------------+
| Organization | [facility] |
+--------------+----------------------------------------+
| Address      | [address]                    |
|              | [place]  [address]              |
+--------------+----------------------------------------+
| Phone        | [phone]                          |
+--------------+----------------------------------------+
 
 
 
 Care Team Providers
 
 
+-----------------------+-------------------+---------------+
| Care Team Member Name | Role              | Phone         |
+-----------------------+-------------------+---------------+
| Lily Horton PCP               | [phone] |
+-----------------------+-------------------+---------------+
 Unavailable       | Unavailable   |
+-----------------------+-------------------+---------------+
 Unavailable       | Unavailable   |
+-----------------------+-------------------+---------------+
 Unavailable       | Unavailable   |
 
+-----------------------+-------------------+---------------+
| Lily Horton      | PreferredProvider | [phone] |
+-----------------------+-------------------+---------------+
 
 
 
 Allergies and Adverse Reactions
 
 
+----------------------+----------+-------+
| Name                 | Reaction | Notes |
+----------------------+----------+-------+
| SULFA (SULFONAMIDES) |          |       |
+----------------------+----------+-------+
 
 
 
 Plan of Treatment
 Not available.
 
 Medications
 
 
+--------+
| Active |
+--------+
 
 
 
+---------------+------------+-----------------+-----+----------+
| Name          | Start Date | Estimated       | SIG | Comments |
|               |            | Completion Date |     |          |
+---------------+------------+-----------------+-----+----------+
| Depo-Provera  |            |                 |     |          |
| intramuscular |            |                 |     |          |
+---------------+------------+-----------------+-----+----------+
 
 
 
+---------+
|  |
+---------+
 
 
 
+-----------------+------------+-----------------+-----------------+----------+
| Name            | Start Date | Expiration Date | SIG             | Comments |
+-----------------+------------+-----------------+-----------------+----------+
| cefprozil 500   | 2016  | 2016        | take 1 tablet   |          |
| mg oral tablet  |            |                 | (500 mg) by     |          |
|                 |            |                 | oral route      |          |
|                 |            |                 | every 12 hours  |          |
|                 |            |                 | for 10 days     |          |
+-----------------+------------+-----------------+-----------------+----------+
| Augmentin       | 2016   | 2016       | take 1 tablet   |          |
| 875-125 mg oral |            |                 | by oral route   |          |
|  tablet         |            |                 | every 12 hours  |          |
|                 |            |                 | for 10 days     |          |
+-----------------+------------+-----------------+-----------------+----------+
| amlodipine 5 mg | 2016  | 2016       | take 1 tablet   |          |
 
|  oral tablet    |            |                 | (5 mg) by oral  |          |
|                 |            |                 | route once      |          |
|                 |            |                 | daily for 30    |          |
|                 |            |                 | days            |          |
+-----------------+------------+-----------------+-----------------+----------+
 
 
 
+--------------+
| Discontinued |
+--------------+
 
 
 
+-----------------+------------+---------------+-----------------+----------+
| Name            | Start Date | Discontinued  | SIG             | Comments |
|                 |            | Date          |                 |          |
+-----------------+------------+---------------+-----------------+----------+
| Sprintec (28)   |            | 2016     | take 1 tablet   |          |
| 0.25-35 mg-mcg  |            |               | by oral route   |          |
| oral tablet     |            |               | once daily for  |          |
|                 |            |               | 30 days         |          |
+-----------------+------------+---------------+-----------------+----------+
 
 
 
 Problem List
 
 
+-----------------------------+--------+------------+
| Description                 | Status | Onset      |
+-----------------------------+--------+------------+
| Liver Transplant            | Active | 2000 |
+-----------------------------+--------+------------+
| Ventricular Septal Defect   | Active |            |
+-----------------------------+--------+------------+
| Aortic stenosis             | Active |            |
+-----------------------------+--------+------------+
| Patent Ductus Arteriosus    | Active |            |
+-----------------------------+--------+------------+
| Biliary atresia             | Active |            |
+-----------------------------+--------+------------+
| Liver Transplant            | Active | 2016  |
+-----------------------------+--------+------------+
| Aortic stenosis             | Active | 2016   |
+-----------------------------+--------+------------+
| Hypertension                | Active | 2016   |
+-----------------------------+--------+------------+
| Dysmenorrhea                | Active | 2016  |
+-----------------------------+--------+------------+
| Eustachian tube dysfunction | Active | 2016  |
+-----------------------------+--------+------------+
 
 
 
 Vital Signs
 
 
+-----+-----+-----+-----+-----+-----+-----+-----+-----+----+-----+-----+-----+-----+
| Ishmael | Lee | BP- | BP- | HR( | RR( | Tem | WT  | HT  | HC | BMI | BSA | BMI | O2  |
 
| e   | e   | Sys | Christine | bpm | rpm | p   |     |     |    |     |     |     | Sat |
|     |     | (mm | (mm | )   | )   |     |     |     |    |     |     | Per | (%) |
|     |     | [Hg | [Hg |     |     |     |     |     |    |     |     | sarahi |     |
|     |     | ]   | ])  |     |     |     |     |     |    |     |     | til |     |
|     |     |     |     |     |     |     |     |     |    |     |     | e   |     |
+-----+-----+-----+-----+-----+-----+-----+-----+-----+----+-----+-----+-----+-----+
| 7 | 1:2 | 120 | 76  | 90  | 32  | 98. | 179 | 52. |    | 46. | 1.7 | 99. | 99  |
| 1/2 | 7:0 |     | mmH | bpm | rpm | 1 F | .5  | 25  |    | 226 | 325 | 3 % | %   |
| 016 | 0   | mmH | g   |     |     |     | lbs | in  |    | 4   |     |     |     |
|     | PM  | g   |     |     |     |     |     |     |    | kg/ | m   |     |     |
|     |     |     |     |     |     |     |     |     |    | m   |     |     |     |
+-----+-----+-----+-----+-----+-----+-----+-----+-----+----+-----+-----+-----+-----+
| 7 | 9:0 | 136 | 100 |     |     |     |     |     |    |     |     |     |     |
| 1/2 | 9:0 |     |     |     |     |     |     |     |    |     |     |     |     |
| 016 | 0   | mmH | mmH |     |     |     |     |     |    |     |     |     |     |
|     | AM  | g   | g   |     |     |     |     |     |    |     |     |     |     |
+-----+-----+-----+-----+-----+-----+-----+-----+-----+----+-----+-----+-----+-----+
|  | 8:5 | 150 | 100 |     |     |     |     |     |    |     |     |     |     |
| /20 | 3:0 |     |     |     |     |     |     |     |    |     |     |     |     |
| 16  | 0   | mmH | mmH |     |     |     |     |     |    |     |     |     |     |
|     | AM  | g   | g   |     |     |     |     |     |    |     |     |     |     |
+-----+-----+-----+-----+-----+-----+-----+-----+-----+----+-----+-----+-----+-----+
|  | 9:1 | 140 | 98  |     |     |     |     |     |    |     |     |     |     |
| 92 | 7:0 |     | mmH |     |     |     |     |     |    |     |     |     |     |
| 016 | 0   | mmH | g   |     |     |     |     |     |    |     |     |     |     |
|     | AM  | g   |     |     |     |     |     |     |    |     |     |     |     |
+-----+-----+-----+-----+-----+-----+-----+-----+-----+----+-----+-----+-----+-----+
|  | 9:4 | 122 | 90  | 76  | 16  | 98. | 171 | 64. |    | 28. | 1.8 | 93. | 98  |
| 1/2 | 6:0 |     | mmH | bpm | rpm | 3 F |     | 5   |    | 90  | 8   | 7 % | %   |
| 016 | 0   | mmH | g   |     |     |     | lbs | in  |    | kg/ | m2  |     |     |
|     | AM  | g   |     |     |     |     |     |     |    | m2  |     |     |     |
+-----+-----+-----+-----+-----+-----+-----+-----+-----+----+-----+-----+-----+-----+
| 6/6 | 9:5 | 162 | 120 | 70  | 16  | 98. | 172 | 64. |    | 28. | 1.8 | 93. | 99  |
| /20 | 8:0 |     |     | bpm | rpm | 2 F |     | 8   |    | 80  | 9   | 6 % | %   |
| 16  | 0   | mmH | mmH |     |     |     | lbs | in  |    | kg/ | m2  |     |     |
|     | AM  | g   | g   |     |     |     |     |     |    | m2  |     |     |     |
+-----+-----+-----+-----+-----+-----+-----+-----+-----+----+-----+-----+-----+-----+
| 5/2 | 1:3 | 140 | 98  | 90  | 30  | 98. | 175 | 64. |    | 29. | 1.9 | 94. | 98  |
| 3/2 | 7:0 |     | mmH | bpm | rpm | 9 F | .5  | 5   |    | 659 | 033 | 7 % | %   |
| 016 | 0   | mmH | g   |     |     |     | lbs | in  |    |     |     |     |     |
|     | PM  | g   |     |     |     |     |     |     |    | kg/ | m   |     |     |
|     |     |     |     |     |     |     |     |     |    | m   |     |     |     |
+-----+-----+-----+-----+-----+-----+-----+-----+-----+----+-----+-----+-----+-----+
| 2/4 | 1:1 | 126 | 68  | 82  | 18  | 97. | 179 | 65  |    | 29. | 1.9 | 95. | 98  |
| /20 | 9:0 |     | mmH | bpm | rpm | 8 F | .5  | in  |    | 87  | 3   | 2 % | %   |
| 16  | 0   | mmH | g   |     |     |     | lbs |     |    | kg/ | m2  |     |     |
|     | PM  | g   |     |     |     |     |     |     |    | m2  |     |     |     |
+-----+-----+-----+-----+-----+-----+-----+-----+-----+----+-----+-----+-----+-----+
| 11/ | 10: | 116 | 74  | 80  | 20  | 97. | 129 | 64  |    | 22. | 1.6 | 87. |     |
| 16/ | 30: |     | mmH | bpm | rpm | 6 F |     | in  |    | 142 | 255 | 6 % |     |
| 201 | 00  | mmH | g   |     |     |     | lbs |     |    | 6   |     |     |     |
| 0   | AM  | g   |     |     |     |     |     |     |    | kg/ | m   |     |     |
|     |     |     |     |     |     |     |     |     |    | m   |     |     |     |
+-----+-----+-----+-----+-----+-----+-----+-----+-----+----+-----+-----+-----+-----+
 
 
 
 Social History
 
 
 
+------------------+-------------+-----------------------------+
| Name             | Description | Comments                    |
+------------------+-------------+-----------------------------+
| Smoker           | Never       |                             |
+------------------+-------------+-----------------------------+
| Parents  |             |                             |
+------------------+-------------+-----------------------------+
| Lives With       |             | (separate households) mom   |
|                  |             | brother Daquan Nichols. Dad |
|                  |             |  nely Pantoja        |
+------------------+-------------+-----------------------------+
 
 
 
 History of Procedures
 
 
+---------------------+----------------------------+--------------+
| Date Ordered        | Description                | Order Status |
+---------------------+----------------------------+--------------+
| 2016 12:00 AM   | HUMAN PAPILLOMA VIRUS      | Reviewed     |
|                     | VACCINE QUADRIV 3 DOSE IM  |              |
+---------------------+----------------------------+--------------+
| 2016 12:00 AM   | INFLUENZA VAC 4 VALENT     | Reviewed     |
|                     | PRSRV FREE 3 YRS PLUS IM   |              |
+---------------------+----------------------------+--------------+
| 2010 12:00 AM | IMMUNIZATION ADMIN         | Reviewed     |
+---------------------+----------------------------+--------------+
| 2016 12:00 AM  | MEASURE BLOOD OXYGEN LEVEL | Reviewed     |
+---------------------+----------------------------+--------------+
| 2016 12:00 AM  | HUMAN PAPILLOMA VIRUS      | Reviewed     |
|                     | NONAVALENT HPV 3 DOSE IM   |              |
+---------------------+----------------------------+--------------+
| 2016 10:15 AM   | URINALYSIS NONAUTO W/O     | Reviewed     |
|                     | SCOPE                      |              |
+---------------------+----------------------------+--------------+
| 2016 12:00 AM   | MEASURE BLOOD OXYGEN LEVEL | Reviewed     |
+---------------------+----------------------------+--------------+
| 2016 12:00 AM   | TYMPANOMETRY               | Reviewed     |
+---------------------+----------------------------+--------------+
| 2016 12:00 AM   | COMPLETE CBC W/AUTO DIFF   | Reviewed     |
|                     | WBC                        |              |
+---------------------+----------------------------+--------------+
| 2016 12:00 AM   | COMPREHEN METABOLIC PANEL  | Reviewed     |
+---------------------+----------------------------+--------------+
| 2016 12:00 AM  | MEASURE BLOOD OXYGEN LEVEL | Reviewed     |
+---------------------+----------------------------+--------------+
| 2016 12:00 AM  | TYMPANOMETRY               | Reviewed     |
+---------------------+----------------------------+--------------+
| 2016 12:00 AM  | MEASURE BLOOD OXYGEN LEVEL | Reviewed     |
+---------------------+----------------------------+--------------+
| 2010 12:00 AM | FLU VACCINE 3 YRS & > IM   | Reviewed     |
+---------------------+----------------------------+--------------+
 
 
 
 Results Summary
 
 
+----------------------+--------------------------------------------+
 
| Date and Description | Results                                    |
+----------------------+--------------------------------------------+
| 2016 10:15 AM    | Glucose. Negative Bilirubin. Negative      |
|                      | Ketones Negative Spec Grav 1.020 PH 6.0    |
|                      | Protein 30+ Urobilinogen 0.2 Nitrites      |
|                      | Negative Leukocyte Est Negative Urine      |
|                      | Color georgette Blood Moderate 2+              |
+----------------------+--------------------------------------------+
| 2016 11:37 AM    | SODIUM 137 POTASSIUM 4.0 CHLORIDE 100      |
|                      | CARBON DIOXIDE 24 ANION GAP 17.0 GLUCOSE   |
|                      | 73 UREA NITROGEN 8 CREATININE, SERUM 0.61  |
|                      | GFR ESTIMATION NOT PERFORMED               |
|                      | BUN/CREAT.RATIO 13.1 CALCIUM 9.7 AST(SGOT) |
|                      |  18 ALT(SGPT) 18 ALKALINE PHOS 108         |
|                      | BILIRUBIN, TOTAL 0.3 PROTEIN 7.9 ALBUMIN   |
|                      | 4.0 GLOBULIN 3.9 A/G RATIO 1.0 WBC 8.8 RBC |
|                      |  4.97 HEMOGLOBIN 14.5 HEMATOCRIT 43.2 MCV  |
|                      | 86.9 RDW 14.4 MCH 29 MCHC 34 PLATELET      |
|                      | COUNT 415 NEUTROPHILS 65.8 LYMPHOCYTES     |
|                      | 22.0 MONOCYTES 7.4 EOSINOPHILS 3.9         |
|                      | BASOPHILS 0.9                              |
+----------------------+--------------------------------------------+
 
 
 
 History Of Immunizations
 
 
+-------+-------+-------+------+-------+-------+-------+-------+-------+-------+-----+
| Name  | Date  | Mfg   | Mfg  | Trade | Lot#  | Route | Inj   | Vis   | Vis   | CVX |
|       | Admin | Name  | Code |  Name |       |       |       | Given | Pub   |     |
+-------+-------+-------+------+-------+-------+-------+-------+-------+-------+-----+
| DTaP  | 3/23/ | Not   | NE   | Not   |       | Not   | Not   |  |  | 999 |
|       | 1999  | Enter |      | Enter |       | Enter | Enter | 001   | 001   |     |
|       |       | ed    |      | ed    |       | ed    | ed    |       |       |     |
+-------+-------+-------+------+-------+-------+-------+-------+-------+-------+-----+
| DTaP  | / | Not   | NE   | Not   |       | Not   | Not   |  |  | 999 |
|       |   | Enter |      | Enter |       | Enter | Enter | 001   | 001   |     |
|       |       | ed    |      | ed    |       | ed    | ed    |       |       |     |
+-------+-------+-------+------+-------+-------+-------+-------+-------+-------+-----+
| DTaP  | / | Not   | NE   | Not   |       | Not   | Not   |  |  | 999 |
|       |   | Enter |      | Enter |       | Enter | Enter | 001   | 001   |     |
|       |       | ed    |      | ed    |       | ed    | ed    |       |       |     |
+-------+-------+-------+------+-------+-------+-------+-------+-------+-------+-----+
| DTaP  | 3/9/2 | Not   | NE   | Not   |       | Not   | Not   |  |  | 999 |
|       | 000   | Enter |      | Enter |       | Enter | Enter | 001   | 001   |     |
|       |       | ed    |      | ed    |       | ed    | ed    |       |       |     |
+-------+-------+-------+------+-------+-------+-------+-------+-------+-------+-----+
| DTaP  |  | Not   | NE   | Not   |       | Not   | Not   |  |  | 999 |
|       | 003   | Enter |      | Enter |       | Enter | Enter | 001   | 001   |     |
|       |       | ed    |      | ed    |       | ed    | ed    |       |       |     |
+-------+-------+-------+------+-------+-------+-------+-------+-------+-------+-----+
| Tdap  |  | Not   | NE   | Not   |       | Not   | Not   |  |  | 999 |
|       | 009   | Enter |      | Enter |       | Enter | Enter | 001   | 001   |     |
|       |       | ed    |      | ed    |       | ed    | ed    |       |       |     |
+-------+-------+-------+------+-------+-------+-------+-------+-------+-------+-----+
| Hib   | 3/23/ | Not   | NE   | Not   |       | Not   | Not   |  |  | 999 |
|       | 1999  | Enter |      | Enter |       | Enter | Enter | 001   | 001   |     |
|       |       | ed    |      | ed    |       | ed    | ed    |       |       |     |
+-------+-------+-------+------+-------+-------+-------+-------+-------+-------+-----+
 
| Hib   | / | Not   | NE   | Not   |       | Not   | Not   |  |  | 999 |
|       | 1999  | Enter |      | Enter |       | Enter | Enter | 001   | 001   |     |
|       |       | ed    |      | ed    |       | ed    | ed    |       |       |     |
+-------+-------+-------+------+-------+-------+-------+-------+-------+-------+-----+
| Hib   | / | Not   | NE   | Not   |       | Not   | Not   |  |  | 999 |
|       | 1999  | Enter |      | Enter |       | Enter | Enter | 001   | 001   |     |
|       |       | ed    |      | ed    |       | ed    | ed    |       |       |     |
+-------+-------+-------+------+-------+-------+-------+-------+-------+-------+-----+
| Hib   | 3/9/2 | Not   | NE   | Not   |       | Not   | Not   |  |  | 999 |
|       | 000   | Enter |      | Enter |       | Enter | Enter | 001   | 001   |     |
|       |       | ed    |      | ed    |       | ed    | ed    |       |       |     |
+-------+-------+-------+------+-------+-------+-------+-------+-------+-------+-----+
| HepB  | 1/15/ | Not   | NE   | Not   |       | Not   | Not   |  |  | 999 |
|       | 1999  | Enter |      | Enter |       | Enter | Enter | 001   | 001   |     |
|       |       | ed    |      | ed    |       | ed    | ed    |       |       |     |
+-------+-------+-------+------+-------+-------+-------+-------+-------+-------+-----+
| HepB  | 3/23/ | Not   | NE   | Not   |       | Not   | Not   |  |  | 999 |
|       | 1999  | Enter |      | Enter |       | Enter | Enter | 001   | 001   |     |
|       |       | ed    |      | ed    |       | ed    | ed    |       |       |     |
+-------+-------+-------+------+-------+-------+-------+-------+-------+-------+-----+
| HepB  | / | Not   | NE   | Not   |       | Not   | Not   |  |  | 999 |
|       | 1999  | Enter |      | Enter |       | Enter | Enter | 001   | 001   |     |
|       |       | ed    |      | ed    |       | ed    | ed    |       |       |     |
+-------+-------+-------+------+-------+-------+-------+-------+-------+-------+-----+
| IPV   | 3/23/ | Not   | NE   | Not   |       | Not   | Not   |  |  | 999 |
|       |   | Enter |      | Enter |       | Enter | Enter | 001   | 001   |     |
|       |       | ed    |      | ed    |       | ed    | ed    |       |       |     |
+-------+-------+-------+------+-------+-------+-------+-------+-------+-------+-----+
| IPV   | / | Not   | NE   | Not   |       | Not   | Not   |  |  | 999 |
|       |   | Enter |      | Enter |       | Enter | Enter | 001   | 001   |     |
|       |       | ed    |      | ed    |       | ed    | ed    |       |       |     |
+-------+-------+-------+------+-------+-------+-------+-------+-------+-------+-----+
| IPV   | / | Not   | NE   | Not   |       | Not   | Not   | 0 |  | 999 |
|       |   | Enter |      | Enter |       | Enter | Enter | 001   | 001   |     |
|       |       | ed    |      | ed    |       | ed    | ed    |       |       |     |
+-------+-------+-------+------+-------+-------+-------+-------+-------+-------+-----+
| IPV   |  | Not   | NE   | Not   |       | Not   | Not   |  |  | 999 |
|       | 003   | Enter |      | Enter |       | Enter | Enter | 001   | 001   |     |
|       |       | ed    |      | ed    |       | ed    | ed    |       |       |     |
+-------+-------+-------+------+-------+-------+-------+-------+-------+-------+-----+
| MMR   | 3/9/2 | Not   | NE   | Not   |       | Not   | Not   |  |  | 999 |
|       | 000   | Enter |      | Enter |       | Enter | Enter | 001   | 001   |     |
|       |       | ed    |      | ed    |       | ed    | ed    |       |       |     |
+-------+-------+-------+------+-------+-------+-------+-------+-------+-------+-----+
| Varic | 3/9/2 | Not   | NE   | Not   |       | Not   | Not   |  |  | 999 |
| violeta  | 000   | Enter |      | Enter |       | Enter | Enter | 001   | 001   |     |
|       |       | ed    |      | ed    |       | ed    | ed    |       |       |     |
+-------+-------+-------+------+-------+-------+-------+-------+-------+-------+-----+
| Hep A |  | Not   | NE   | Not   |       | Not   | Not   |  |  | 999 |
|       | / | Enter |      | Enter |       | Enter | Enter | 001   | 001   |     |
|       |       | ed    |      | ed    |       | ed    | ed    |       |       |     |
+-------+-------+-------+------+-------+-------+-------+-------+-------+-------+-----+
| Hep A |  | Not   | NE   | Not   |       | Not   | Not   |  |  | 999 |
|       | 003   | Enter |      | Enter |       | Enter | Enter | 001   | 001   |     |
|       |       | ed    |      | ed    |       | ed    | ed    |       |       |     |
+-------+-------+-------+------+-------+-------+-------+-------+-------+-------+-----+
| Rotav | 3/23/ | Not   | NE   | Not   |       | Not   | Not   |  |  | 999 |
| irus  |   | Enter |      | Enter |       | Enter | Enter | 001   | 001   |     |
|       |       | ed    |      | ed    |       | ed    | ed    |       |       |     |
+-------+-------+-------+------+-------+-------+-------+-------+-------+-------+-----+
 
| Rotav | / | Not   | NE   | Not   |       | Not   | Not   |  |  | 999 |
| irus  |   | Enter |      | Enter |       | Enter | Enter | 001   | 001   |     |
|       |       | ed    |      | ed    |       | ed    | ed    |       |       |     |
+-------+-------+-------+------+-------+-------+-------+-------+-------+-------+-----+
| Flu   |  | Not   | NE   | Not   |       | Not   | Not   |  |  | 999 |
| 3+    | / | Enter |      | Enter |       | Enter | Enter | 001   | 001   |     |
| years |       | ed    |      | ed    |       | ed    | ed    |       |       |     |
+-------+-------+-------+------+-------+-------+-------+-------+-------+-------+-----+
| Flu   | 2/3/2 | Not   | NE   | Not   |       | Not   | Not   |  |  | 999 |
| 3+    | 005   | Enter |      | Enter |       | Enter | Enter | 001   | 001   |     |
| years |       | ed    |      | ed    |       | ed    | ed    |       |       |     |
+-------+-------+-------+------+-------+-------+-------+-------+-------+-------+-----+
| Flu   | 10/27 | Not   | NE   | Not   |       | Not   | Not   |  |  | 999 |
| 3+    |  | Enter |      | Enter |       | Enter | Enter | 001   | 001   |     |
| years |       | ed    |      | ed    |       | ed    | ed    |       |       |     |
+-------+-------+-------+------+-------+-------+-------+-------+-------+-------+-----+
| Flu   |  | CSL   | CSL  | AFLUR |  | Intra | Right |  | 8/10/ | 999 |
| 3+    | / | Bioth |      | IA    | 8     | muscu |       | / |   |     |
| years |       | erapi |      |       |       | lar   | Delto |       |       |     |
|       |       | es,   |      |       |       |       | id    |       |       |     |
|       |       | Inc.  |      |       |       |       |       |       |       |     |
+-------+-------+-------+------+-------+-------+-------+-------+-------+-------+-----+
| HPV   |  | Merck | MSD  | GARDA |  | Intra | Left  |  | / | 62  |
|       | 016   |  &    |      | DINA   | 47    | muscu | Upper | 016   |   |     |
|       |       | Co.,  |      |       |       | lar   |       |       |       |     |
|       |       | Inc.  |      |       |       |       | Delto |       |       |     |
|       |       |       |      |       |       |       | id    |       |       |     |
+-------+-------+-------+------+-------+-------+-------+-------+-------+-------+-----+
| Flu   |  | sanof | PMC  | Fluzo |  | Intra | Left  |  |  | 150 |
| 3+    | 016   | i     |      | ne    | AB    | muscu | Lower | 016   | 015   |     |
| years |       | paste |      | Quadr |       | lar   |       |       |       |     |
|       |       | ur    |      | ivale |       |       | Delto |       |       |     |
|       |       |       |      | nt    |       |       | id    |       |       |     |
+-------+-------+-------+------+-------+-------+-------+-------+-------+-------+-----+
| HPV   | / | Merck | MSD  | Garda |  | Intra | Left  | / | 3/31/ | 165 |
|       | 2016  |  &    |      | dina 9 | 37    | muscu | Delto | 2016  | 2016  |     |
|       |       | Co.,  |      |       |       | lar   | id    |       |       |     |
|       |       | Inc.  |      |       |       |       |       |       |       |     |
+-------+-------+-------+------+-------+-------+-------+-------+-------+-------+-----+
 
 
 
 History of Past Illness
 
 
+-----------------------------+---------------------+----------+
| Name                        | Date of Onset       | Comments |
+-----------------------------+---------------------+----------+
| Well Child Check            | 2010 10:26AM |          |
+-----------------------------+---------------------+----------+
| Influenza 3YR & UP          | 2010 10:26AM |          |
+-----------------------------+---------------------+----------+
| Ventricular Septal Defect   | 2010 10:26AM |          |
+-----------------------------+---------------------+----------+
| Aortic Stenosis             | 2010 10:26AM |          |
+-----------------------------+---------------------+----------+
| Patent Ductus Arteriosus    | 2010 10:26AM |          |
+-----------------------------+---------------------+----------+
| Biliary atresia             | 2010 10:26AM |          |
+-----------------------------+---------------------+----------+
 
| Ventricular Septal Defect   |                     |          |
+-----------------------------+---------------------+----------+
| Aortic stenosis             |                     |          |
+-----------------------------+---------------------+----------+
| Patent Ductus Arteriosus    |                     |          |
+-----------------------------+---------------------+----------+
| Biliary atresia             |                     |          |
+-----------------------------+---------------------+----------+
| Otitis Media, Acute         |                     |          |
+-----------------------------+---------------------+----------+
| Urinary tract infection     |                     |          |
+-----------------------------+---------------------+----------+
| Gastroenteritis             |                     |          |
+-----------------------------+---------------------+----------+
| Vision problems             |                     |          |
+-----------------------------+---------------------+----------+
| Birth defect                |                     |          |
+-----------------------------+---------------------+----------+
| Jaundice                    |                     |          |
+-----------------------------+---------------------+----------+
| Liver Transplant            | 2016          |          |
+-----------------------------+---------------------+----------+
| Aortic stenosis             | 2016          |          |
+-----------------------------+---------------------+----------+
| Hypertension                | 2016          |          |
+-----------------------------+---------------------+----------+
| Dysmenorrhea                | 2016          |          |
+-----------------------------+---------------------+----------+
| Eustachian tube dysfunction | 2016          |          |
+-----------------------------+---------------------+----------+
| Well Child Check            | 2016  1:11PM |          |
+-----------------------------+---------------------+----------+
| HPV                         | b  2016  1:11PM |          |
+-----------------------------+---------------------+----------+
| Influenza 3YR & UP          | 2016  1:11PM |          |
+-----------------------------+---------------------+----------+
| Ventricular Septal Defect   | 2016  1:11PM |          |
+-----------------------------+---------------------+----------+
| Biliary atresia             | 2016  1:11PM |          |
+-----------------------------+---------------------+----------+
| HPV 9                       | May 23 2016  1:26PM |          |
+-----------------------------+---------------------+----------+
| Otitis Media, Bilateral     | May 23 2016  1:26PM |          |
+-----------------------------+---------------------+----------+
| Ventricular Septal Defect   | May 23 2016  1:26PM |          |
+-----------------------------+---------------------+----------+
| Aortic Stenosis             | May 23 2016  1:26PM |          |
+-----------------------------+---------------------+----------+
| Biliary atresia             | May 23 2016  1:26PM |          |
+-----------------------------+---------------------+----------+
| Liver Transplant            | May 23 2016  1:26PM |          |
+-----------------------------+---------------------+----------+
| Otitis Media, Bilateral     | 2016 10:00AM |          |
+-----------------------------+---------------------+----------+
| Hypertension                | 2016 10:00AM |          |
+-----------------------------+---------------------+----------+
| Liver Transplant            | 2016 10:00AM |          |
+-----------------------------+---------------------+----------+
| Ventricular Septal Defect   | 2016 10:00AM |          |
+-----------------------------+---------------------+----------+
 
| Aortic stenosis             | 2016 10:00AM |          |
+-----------------------------+---------------------+----------+
| Asymptomatic systolic       | 2016  4:28PM |          |
| hypertension in pediatric   |                     |          |
| patient                     |                     |          |
+-----------------------------+---------------------+----------+
| Liver Transplant            | 2016  4:28PM |          |
+-----------------------------+---------------------+----------+
| Eustachian tube dysfunction | 2016  9:34AM |          |
+-----------------------------+---------------------+----------+
| Aortic stenosis             | 2016  9:34AM |          |
+-----------------------------+---------------------+----------+
| Hypertension                | 2016  9:34AM |          |
+-----------------------------+---------------------+----------+
| Liver Transplant            | 2016  9:34AM |          |
+-----------------------------+---------------------+----------+
| Ventricular Septal Defect   | 2016  9:34AM |          |
+-----------------------------+---------------------+----------+
| Patent Ductus Arteriosus    | 2016  9:34AM |          |
+-----------------------------+---------------------+----------+
| Biliary atresia             | 2016  9:34AM |          |
+-----------------------------+---------------------+----------+
| Dysmenorrhea                | 2016  9:34AM |          |
+-----------------------------+---------------------+----------+
| Ventricular Septal Defect   | 2016  1:20PM |          |
+-----------------------------+---------------------+----------+
| Aortic stenosis             | 2016  1:20PM |          |
+-----------------------------+---------------------+----------+
| Dysmenorrhea                | 2016  1:20PM |          |
+-----------------------------+---------------------+----------+
| Liver Transplant            | 2016  1:20PM |          |
+-----------------------------+---------------------+----------+
| Hypertension                | 2016  1:20PM |          |
+-----------------------------+---------------------+----------+
 
 
 
 Payers
 
 
+------------+------------+-----------+----------+------------+---------+------------+
| Insurance  | Company    | Plan Name | Plan     | Policy     | Policy  | Start Date |
| Name       | Name       |           | Number   | Number     | Group   |            |
|            |            |           |          |            | Number  |            |
+------------+------------+-----------+----------+------------+---------+------------+
|            | [employer] | [employer]     | [id number] | [id number]   |         | N/A        |
|            |            |           |          |            |         |            |
|            | Health/ohp |           |          |            |         |            |
+------------+------------+-----------+----------+------------+---------+------------+
|            | [employer]   | [employer]  |          | [id number] |         | Friday,    |
|            |            |           |          | [id number]        |         | December   |
|            |            |           |          |            |         | 2007    |
+------------+------------+-----------+----------+------------+---------+------------+
 
 
 
 History of Encounters
 
 
+------------+------------------+----------------------+
 
| Visit Date | Visit Type       | Provider             |
+------------+------------------+----------------------+
| 2016  | Office Visit     | Lily Horton MD  |
+------------+------------------+----------------------+
| 2016  | Office Visit     | Lily Horton MD  |
+------------+------------------+----------------------+
| 2016   | Acute Illness    |                      |
+------------+------------------+----------------------+
| 2016   | Acute Illness    | Lily Horton MD  |
+------------+------------------+----------------------+
| 2016  | Same Day Appt    | Lily Horton MD  |
+------------+------------------+----------------------+
| 2016   | New Patient      | Isabel BREWSTER |
+------------+------------------+----------------------+
| 2010 | Well Child Check | Lily L. Wyland MD  |
+------------+------------------+----------------------+

## 2019-11-01 NOTE — XMS
Encounter Summary
  Created on: 2019
 
 Bonifacio Nila JB
 External Reference #: [id number]
 : [birth year]
 Sex: Female
 
 Demographics
 
 
+-----------------------+------------------------+
| Address               | [address]            |
|                       | [place]  [address]   |
+-----------------------+------------------------+
| Home Phone            | [phone]        |
+-----------------------+------------------------+
| Preferred Language    | Unknown                |
+-----------------------+------------------------+
| Marital Status        | Single                 |
+-----------------------+------------------------+
| Denominational Affiliation | Unknown                |
+-----------------------+------------------------+
| Race                  | White                  |
+-----------------------+------------------------+
| Ethnic Group          | Not  or  |
+-----------------------+------------------------+
 
 
 Author
 
 
+--------------+------------------------------+
| Author       | [facility] [employer] [facility] |
+--------------+------------------------------+
| Organization | [facility] |
+--------------+------------------------------+
| Address      | Unknown                      |
+--------------+------------------------------+
| Phone        | Unavailable                  |
+--------------+------------------------------+
 
 
 
 Support
 
 
+-----------------+--------------+---------+-----------------+
| Name            | Relationship | Address | Phone           |
+-----------------+--------------+---------+-----------------+
| Kit Valdovinos   | ECON         | Unknown | [phone] |
+-----------------+--------------+---------+-----------------+
| Magdalena Valdovinos | ECON         | Unknown | [phone] |
+-----------------+--------------+---------+-----------------+
 
 
 
 Care Team Providers
 
 
 
+------------------------+------+-----------------+
| Care Team Member Name  | Role | Phone           |
+------------------------+------+-----------------+
| Lily Horton MD | PCP  | [phone] |
+------------------------+------+-----------------+
 
 
 
 Encounter Details
 
 
+--------+-------------+----------------------+--------------+-------------+
| Date   | Type        | Department           | Care Team    | Description |
+--------+-------------+----------------------+--------------+-------------+
| / | Document-Sc |   Health Information |   Unknown  . |             |
|    | anned       |  Services  [address]    |              |             |
|        |             | [address]  |              |             |
|        |             |  Mailcode: [address]     |              |             |
|        |             | [facility]  |              |             |
|        |             | [place], [place]  |              |             |
|        |             | [address]           |              |             |
|        |             | [phone]         |              |             |
+--------+-------------+----------------------+--------------+-------------+
 
 
 
 Social History
 
 
+-------------------+-------+-----------+--------+------+
| Tobacco Use       | Types | Packs/Day | Years  | Date |
|                   |       |           | Used   |      |
+-------------------+-------+-----------+--------+------+
| Passive Smoke     |       |           |        |      |
| Exposure - Never  |       |           |        |      |
| Smoker            |       |           |        |      |
+-------------------+-------+-----------+--------+------+
 
 
 
+---------------------+---+---+---+
| Smokeless Tobacco:  |   |   |   |
| Never Used          |   |   |   |
+---------------------+---+---+---+
 
 
 
+-------------+-------------+---------+----------+
| Alcohol Use | Drinks/Week | oz/Week | Comments |
+-------------+-------------+---------+----------+
| Not Asked   |             |         |          |
+-------------+-------------+---------+----------+
 
 
 
+------------------+---------------+
| Sex Assigned at  | Date Recorded |
| Birth            |               |
 
+------------------+---------------+
| Not on file      |               |
+------------------+---------------+
 
 
 
+----------------+-------------+-------------+
| Job Start Date | Occupation  | Industry    |
+----------------+-------------+-------------+
| Not on file    | Not on file | Not on file |
+----------------+-------------+-------------+
 
 
 
+----------------+--------------+------------+
| Travel History | Travel Start | Travel End |
+----------------+--------------+------------+
 
 
 
+-------------------------------------+
| No recent travel history available. |
+-------------------------------------+
 documented as of this encounter
 
 Plan of Treatment
 Not on filedocumented as of this encounter
 
 Procedures
 
 
+----------------+--------+-------------+----------------------+----------------------+
| Procedure Name | Priori | Date/Time   | Associated Diagnosis | Comments             |
|                | ty     |             |                      |                      |
+----------------+--------+-------------+----------------------+----------------------+
| LAB REPORTS    |        | 2015  |                      |   Results for this   |
|                |        | 12:00 AM    |                      | procedure are in the |
|                |        | PDT         |                      |  results section.    |
+----------------+--------+-------------+----------------------+----------------------+
 documented in this encounter
 
 Results
 LAB REPORTS (2015 12:00 AM PDT)
 
+----------------------------------------------------------+--------------+
| Narrative                                                | Performed At |
+----------------------------------------------------------+--------------+
|   This result has an attachment that is not available.   |              |
+----------------------------------------------------------+--------------+
 documented in this encounter
 
 Visit Diagnoses
 Not on filedocumented in this encounter

## 2019-11-01 NOTE — XMS
Encounter Summary
  Created on: 2019
 
 Bonifacio Nila JB
 External Reference #: [id number]
 : [birth year]
 Sex: Female
 
 Demographics
 
 
+-----------------------+------------------------+
| Address               | [address]            |
|                       | [place]  [address]   |
+-----------------------+------------------------+
| Home Phone            | [phone]        |
+-----------------------+------------------------+
| Preferred Language    | Unknown                |
+-----------------------+------------------------+
| Marital Status        | Single                 |
+-----------------------+------------------------+
| Mosque Affiliation | Unknown                |
+-----------------------+------------------------+
| Race                  | White                  |
+-----------------------+------------------------+
| Ethnic Group          | Not  or  |
+-----------------------+------------------------+
 
 
 Author
 
 
+--------------+------------------------------+
| Author       | [facility] |
+--------------+------------------------------+
| Organization | [facility] |
+--------------+------------------------------+
| Address      | Unknown                      |
+--------------+------------------------------+
| Phone        | Unavailable                  |
+--------------+------------------------------+
 
 
 
 Support
 
 
+-----------------+--------------+---------+-----------------+
| Name            | Relationship | Address | Phone           |
+-----------------+--------------+---------+-----------------+
| Kit Valdovinos   | ECON         | Unknown | [phone] |
+-----------------+--------------+---------+-----------------+
| Magdalena Valdovinos | ECON         | Unknown | [phone] |
+-----------------+--------------+---------+-----------------+
 
 
 
 Care Team Providers
 
 
 
+------------------------+------+-----------------+
| Care Team Member Name  | Role | Phone           |
+------------------------+------+-----------------+
| Lily Horton MD | PCP  | [phone] |
+------------------------+------+-----------------+
 
 
 
 Reason for Visit
 
 
+-------------------+----------+
| Reason            | Comments |
+-------------------+----------+
| Care Coordination |          |
+-------------------+----------+
 
 
 
 Encounter Details
 
 
+--------+-----------+----------------------+----------------------+-------------------+
| Date   | Type      | Department           | Care Team            | Description       |
+--------+-----------+----------------------+----------------------+-------------------+
| / | Telephone |   Pediatric          |   Neema Khalil,   | Care Coordination |
| 2015   |           | Gastroenterology at  | MD  [address] |                   |
|        |           | [place]          |   [place], [place]       |                   |
|        |           | [facility]  | [address]           |                   |
|        |           |  [address] [facility] | [phone]         |                   |
|        |           |  Aide [address]  Mailcode:  | [phone] (Fax)   |                   |
|        |           | [facility]   |                      |                   |
|        |           | [place], [place]         |                      |                   |
|        |           | [address]           |                      |                   |
|        |           | [phone]         |                      |                   |
+--------+-----------+----------------------+----------------------+-------------------+
 
 
 
 Social History
 
 
+-------------------+-------+-----------+--------+------+
| Tobacco Use       | Types | Packs/Day | Years  | Date |
|                   |       |           | Used   |      |
+-------------------+-------+-----------+--------+------+
| Passive Smoke     |       |           |        |      |
| Exposure - Never  |       |           |        |      |
| Smoker            |       |           |        |      |
+-------------------+-------+-----------+--------+------+
 
 
 
+---------------------+---+---+---+
| Smokeless Tobacco:  |   |   |   |
| Never Used          |   |   |   |
+---------------------+---+---+---+
 
 
 
 
+-------------+-------------+---------+----------+
| Alcohol Use | Drinks/Week | oz/Week | Comments |
+-------------+-------------+---------+----------+
| Not Asked   |             |         |          |
+-------------+-------------+---------+----------+
 
 
 
+------------------+---------------+
| Sex Assigned at  | Date Recorded |
| Birth            |               |
+------------------+---------------+
| Not on file      |               |
+------------------+---------------+
 
 
 
+----------------+-------------+-------------+
| Job Start Date | Occupation  | Industry    |
+----------------+-------------+-------------+
| Not on file    | Not on file | Not on file |
+----------------+-------------+-------------+
 
 
 
+----------------+--------------+------------+
| Travel History | Travel Start | Travel End |
+----------------+--------------+------------+
 
 
 
+-------------------------------------+
| No recent travel history available. |
+-------------------------------------+
 documented as of this encounter
 
 Plan of Treatment
 Not on filedocumented as of this encounter
 
 Visit Diagnoses
 Not on filedocumented in this encounter

## 2019-11-01 NOTE — XMS
Encounter Summary
  Created on: 2019
 
 Bonifacio Nila JB
 External Reference #: [id number]
 : [birth year]
 Sex: Female
 
 Demographics
 
 
+-----------------------+------------------------+
| Address               | [address]            |
|                       | [place]  [address]   |
+-----------------------+------------------------+
| Home Phone            | [phone]        |
+-----------------------+------------------------+
| Preferred Language    | Unknown                |
+-----------------------+------------------------+
| Marital Status        | Single                 |
+-----------------------+------------------------+
| Tenriism Affiliation | Unknown                |
+-----------------------+------------------------+
| Race                  | White                  |
+-----------------------+------------------------+
| Ethnic Group          | Not  or  |
+-----------------------+------------------------+
 
 
 Author
 
 
+--------------+------------------------------+
| Author       | [facility] |
+--------------+------------------------------+
| Organization | [facility] |
+--------------+------------------------------+
| Address      | Unknown                      |
+--------------+------------------------------+
| Phone        | Unavailable                  |
+--------------+------------------------------+
 
 
 
 Support
 
 
+-----------------+--------------+---------+-----------------+
| Name            | Relationship | Address | Phone           |
+-----------------+--------------+---------+-----------------+
| Kit Valdovinos   | ECON         | Unknown | [phone] |
+-----------------+--------------+---------+-----------------+
| Magdalena Valdovinos | ECON         | Unknown | [phone] |
+-----------------+--------------+---------+-----------------+
 
 
 
 Care Team Providers
 
 
 
+-----------------------+------+-------------+
| Care Team Member Name | Role | Phone       |
+-----------------------+------+-------------+
 PCP  | Unavailable |
+-----------------------+------+-------------+
 
 
 
 Reason for Visit
 Office Visit - E/M Services (Routine)
 
+--------+--------------+---------------+--------------+--------------+---------------+
| Status | Reason       | Specialty     | Diagnoses /  | Referred By  | Referred To   |
|        |              |               | Procedures   | Contact      | Contact       |
+--------+--------------+---------------+--------------+--------------+---------------+
| Closed |   Specialty  | Pediatric     |              |   Non-[facility]   |   Ped Gastro  |
|        | Services     | Gastroenterol |              | Epic Dept    | [facility]  [address]  |
|        | Required     | ogy           |              |              | [address]   |
|        |              |               |              |              | [address]       |
|        |              |               |              |              | Mailcode:     |
|        |              |               |              |              | [facility]         |
|        |              |               |              |              | [facility]   |
|        |              |               |              |              | [place], [place]  |
|        |              |               |              |              | [address]    |
|        |              |               |              |              | Phone:        |
|        |              |               |              |              | [phone]  |
|        |              |               |              |              |  Fax:         |
|        |              |               |              |              | [phone]  |
+--------+--------------+---------------+--------------+--------------+---------------+
 
 
 
 
 Encounter Details
 
 
+--------+---------+----------------------+----------------------+----------------------+
| Date   | Type    | Department           | Care Team            | Description          |
+--------+---------+----------------------+----------------------+----------------------+
| 07/10/ | Office  |   Specialty Clinics  |   Neema Khalil,   | Immunosuppressed     |
|    | Visit   | at [facility]  [address]  | MD [address] | status (HCC)         |
|        |         | [address]      |   [facility] [place]       | (Primary Dx); S/P    |
|        |         | Mailcode: [facility]       | [address]           | liver transplant     |
|        |         | [place]          | [phone]         | (HCC); High risk     |
|        |         | [facility] [place]         | [phone] (Fax)   | medications (not     |
|        |         | [address]           |                      | anticoagulants)      |
|        |         | [phone]         |                      | long-term use; VSD   |
|        |         |                      |                      | (ventricular septal  |
|        |         |                      |                      | defect); Aortic      |
|        |         |                      |                      | valve insufficiency  |
+--------+---------+----------------------+----------------------+----------------------+
 
 
 
 Social History
 
 
+----------------+-------+-----------+--------+------+
 
| Tobacco Use    | Types | Packs/Day | Years  | Date |
|                |       |           | Used   |      |
+----------------+-------+-----------+--------+------+
| Never Assessed |       |           |        |      |
+----------------+-------+-----------+--------+------+
 
 
 
+------------------+---------------+
| Sex Assigned at  | Date Recorded |
| Birth            |               |
+------------------+---------------+
| Not on file      |               |
+------------------+---------------+
 
 
 
+----------------+-------------+-------------+
| Job Start Date | Occupation  | Industry    |
+----------------+-------------+-------------+
| Not on file    | Not on file | Not on file |
+----------------+-------------+-------------+
 
 
 
+----------------+--------------+------------+
| Travel History | Travel Start | Travel End |
+----------------+--------------+------------+
 
 
 
+-------------------------------------+
| No recent travel history available. |
+-------------------------------------+
 documented as of this encounter
 
 Patient Instructions
 Patient Instructions Neema Khalil MD - 2014  4:33 PM PDT
 
 Electronically signed by Neema Khalil MD at 07/10/2014  6:31 PM PDT
 documented in this encounter
 
 Progress Notes
 Neema Khalil MD - 2014  4:33 PM PDT
 Patient is no show.
 
 Electronically signed by Neema Khalil MD at 07/10/2014  6:32 PM PDTdocumented in this en
counter
 
 Plan of Treatment
 Not on filedocumented as of this encounter
 
 Visit Diagnoses
 
 
+--------------------------------------------------------------------------------------+
| Diagnosis                                                                            |
+--------------------------------------------------------------------------------------+
|   Immunosuppressed status (HCC) - Primary  Unspecified disorder of immune mechanism  |
+--------------------------------------------------------------------------------------+
 
|   S/P liver transplant (HCC)  Liver replaced by transplant                           |
+--------------------------------------------------------------------------------------+
|   High risk medications (not anticoagulants) long-term use  Encounter for long-term  |
| (current) use of other medications                                                   |
+--------------------------------------------------------------------------------------+
|   VSD (ventricular septal defect)  Ventricular septal defect                         |
+--------------------------------------------------------------------------------------+
|   Aortic valve insufficiency  Aortic valve disorders                                 |
+--------------------------------------------------------------------------------------+
 documented in this encounter

## 2019-11-01 NOTE — XMS
Encounter Summary
  Created on: 2019
 
 Bonifacio Nila JB
 External Reference #: [id number]
 : [birth year]
 Sex: Female
 
 Demographics
 
 
+-----------------------+------------------------+
| Address               | [address]            |
|                       | [place]  [address]   |
+-----------------------+------------------------+
| Home Phone            | [phone]        |
+-----------------------+------------------------+
| Preferred Language    | Unknown                |
+-----------------------+------------------------+
| Marital Status        | Single                 |
+-----------------------+------------------------+
| Jainism Affiliation | Unknown                |
+-----------------------+------------------------+
| Race                  | White                  |
+-----------------------+------------------------+
| Ethnic Group          | Not  or  |
+-----------------------+------------------------+
 
 
 Author
 
 
+--------------+------------------------------+
| Author       | [facility] |
+--------------+------------------------------+
| Organization | [facility] |
+--------------+------------------------------+
| Address      | Unknown                      |
+--------------+------------------------------+
| Phone        | Unavailable                  |
+--------------+------------------------------+
 
 
 
 Support
 
 
+-----------------+--------------+---------+-----------------+
| Name            | Relationship | Address | Phone           |
+-----------------+--------------+---------+-----------------+
| Kit Valdovinos   | ECON         | Unknown | [phone] |
+-----------------+--------------+---------+-----------------+
| Magdalena Valdovinos | ECON         | Unknown | [phone] |
+-----------------+--------------+---------+-----------------+
 
 
 
 Care Team Providers
 
 
 
+------------------------+------+-----------------+
| Care Team Member Name  | Role | Phone           |
+------------------------+------+-----------------+
| Lily Horton MD | PCP  | [phone] |
+------------------------+------+-----------------+
 
 
 
 Reason for Visit
 
 
+-------------------+----------+
| Reason            | Comments |
+-------------------+----------+
| Care Coordination |          |
+-------------------+----------+
 
 
 
 Encounter Details
 
 
+--------+-----------+----------------------+----------------------+-------------------+
| Date   | Type      | Department           | Care Team            | Description       |
+--------+-----------+----------------------+----------------------+-------------------+
| / | Telephone |   Pediatric          |   Neema Khalil,   | Care Coordination |
| 2018   |           | Gastroenterology at  | MD  [address] |                   |
|        |           | [place]          |   [place], [place]       |                   |
|        |           | [facility]  | [address]           |                   |
|        |           |  [address] [facility] | [phone]         |                   |
|        |           |  [address]  Mailcode:  | [phone] (Fax)   |                   |
|        |           | [address]   |                      |                   |
|        |           | [place], [place]         |                      |                   |
|        |           | [address]           |                      |                   |
|        |           | [phone]         |                      |                   |
+--------+-----------+----------------------+----------------------+-------------------+
 
 
 
 Social History
 
 
+-------------------+-------+-----------+--------+------+
| Tobacco Use       | Types | Packs/Day | Years  | Date |
|                   |       |           | Used   |      |
+-------------------+-------+-----------+--------+------+
| Passive Smoke     |       |           |        |      |
| Exposure - Never  |       |           |        |      |
| Smoker            |       |           |        |      |
+-------------------+-------+-----------+--------+------+
 
 
 
+---------------------+---+---+---+
| Smokeless Tobacco:  |   |   |   |
| Never Used          |   |   |   |
+---------------------+---+---+---+
 
 
 
 
+-------------+-------------+---------+----------+
| Alcohol Use | Drinks/Week | oz/Week | Comments |
+-------------+-------------+---------+----------+
| Not Asked   |             |         |          |
+-------------+-------------+---------+----------+
 
 
 
+------------------+---------------+
| Sex Assigned at  | Date Recorded |
| Birth            |               |
+------------------+---------------+
| Not on file      |               |
+------------------+---------------+
 
 
 
+----------------+-------------+-------------+
| Job Start Date | Occupation  | Industry    |
+----------------+-------------+-------------+
| Not on file    | Not on file | Not on file |
+----------------+-------------+-------------+
 
 
 
+----------------+--------------+------------+
| Travel History | Travel Start | Travel End |
+----------------+--------------+------------+
 
 
 
+-------------------------------------+
| No recent travel history available. |
+-------------------------------------+
 documented as of this encounter
 
 Plan of Treatment
 Not on filedocumented as of this encounter
 
 Visit Diagnoses
 Not on filedocumented in this encounter

## 2019-11-01 NOTE — XMS
Encounter Summary
  Created on: 2019
 
 Bonifacio Nila BJ
 External Reference #: [id number]
 : [birth year]
 Sex: Female
 
 Demographics
 
 
+-----------------------+------------------------+
| Address               | [address]            |
|                       | [place]  [address]   |
+-----------------------+------------------------+
| Home Phone            | [phone]        |
+-----------------------+------------------------+
| Preferred Language    | Unknown                |
+-----------------------+------------------------+
| Marital Status        | Single                 |
+-----------------------+------------------------+
| Yazidism Affiliation | Unknown                |
+-----------------------+------------------------+
| Race                  | White                  |
+-----------------------+------------------------+
| Ethnic Group          | Not  or  |
+-----------------------+------------------------+
 
 
 Author
 
 
+--------------+------------------------------+
| Author       | [email or web address] |
+--------------+------------------------------+
| Organization | [facility] |
+--------------+------------------------------+
| Address      | Unknown                      |
+--------------+------------------------------+
| Phone        | Unavailable                  |
+--------------+------------------------------+
 
 
 
 Support
 
 
+-----------------+--------------+---------+-----------------+
| Name            | Relationship | Address | Phone           |
+-----------------+--------------+---------+-----------------+
| iKt Valdovinos   | ECON         | Unknown | [phone] |
+-----------------+--------------+---------+-----------------+
| Magdalena Valdovinos | ECON         | Unknown | [phone] |
+-----------------+--------------+---------+-----------------+
 
 
 
 Care Team Providers
 
 
 
+------------------------+------+-----------------+
| Care Team Member Name  | Role | Phone           |
+------------------------+------+-----------------+
| Lily Horton MD | PCP  | [phone] |
+------------------------+------+-----------------+
 
 
 
 Reason for Visit
 
 
+-------------------+----------+
| Reason            | Comments |
+-------------------+----------+
| Care Coordination |          |
+-------------------+----------+
 
 
 
 Encounter Details
 
 
+--------+-----------+----------------------+----------------------+-------------------+
| Date   | Type      | Department           | Care Team            | Description       |
+--------+-----------+----------------------+----------------------+-------------------+
| / | Telephone |   Pediatric          |   Neema Khalil,   | Care Coordination |
| 2017   |           | Gastroenterology at  | MD  [address] |                   |
|        |           | [place]          |   [place], [place]       |                   |
|        |           | [facility]  | [address]           |                   |
|        |           |  [address] [facility] | [phone]         |                   |
|        |           |  [address]  Mailcode:  | [phone] (Fax)   |                   |
|        |           | [address]   |                      |                   |
|        |           | [place], [place]         |                      |                   |
|        |           | [address]           |                      |                   |
|        |           | [phone]         |                      |                   |
+--------+-----------+----------------------+----------------------+-------------------+
 
 
 
 Social History
 
 
+-------------------+-------+-----------+--------+------+
| Tobacco Use       | Types | Packs/Day | Years  | Date |
|                   |       |           | Used   |      |
+-------------------+-------+-----------+--------+------+
| Passive Smoke     |       |           |        |      |
| Exposure - Never  |       |           |        |      |
| Smoker            |       |           |        |      |
+-------------------+-------+-----------+--------+------+
 
 
 
+---------------------+---+---+---+
| Smokeless Tobacco:  |   |   |   |
| Never Used          |   |   |   |
+---------------------+---+---+---+
 
 
 
 
+-------------+-------------+---------+----------+
| Alcohol Use | Drinks/Week | oz/Week | Comments |
+-------------+-------------+---------+----------+
| Not Asked   |             |         |          |
+-------------+-------------+---------+----------+
 
 
 
+------------------+---------------+
| Sex Assigned at  | Date Recorded |
| Birth            |               |
+------------------+---------------+
| Not on file      |               |
+------------------+---------------+
 
 
 
+----------------+-------------+-------------+
| Job Start Date | Occupation  | Industry    |
+----------------+-------------+-------------+
| Not on file    | Not on file | Not on file |
+----------------+-------------+-------------+
 
 
 
+----------------+--------------+------------+
| Travel History | Travel Start | Travel End |
+----------------+--------------+------------+
 
 
 
+-------------------------------------+
| No recent travel history available. |
+-------------------------------------+
 documented as of this encounter
 
 Plan of Treatment
 Not on filedocumented as of this encounter
 
 Visit Diagnoses
 Not on filedocumented in this encounter

## 2019-11-01 NOTE — XMS
Encounter Summary
  Created on: 2019
 
 Bonifacio Nila JB
 External Reference #: [id number]
 : [birth year]
 Sex: Female
 
 Demographics
 
 
+-----------------------+------------------------+
| Address               | [address]            |
|                       | [place]  [address]   |
+-----------------------+------------------------+
| Home Phone            | [phone]        |
+-----------------------+------------------------+
| Preferred Language    | Unknown                |
+-----------------------+------------------------+
| Marital Status        | Single                 |
+-----------------------+------------------------+
| Mu-ism Affiliation | Unknown                |
+-----------------------+------------------------+
| Race                  | White                  |
+-----------------------+------------------------+
| Ethnic Group          | Not  or  |
+-----------------------+------------------------+
 
 
 Author
 
 
+--------------+------------------------------+
| Author       | [facility] |
+--------------+------------------------------+
| Organization | [facility] |
+--------------+------------------------------+
| Address      | Unknown                      |
+--------------+------------------------------+
| Phone        | Unavailable                  |
+--------------+------------------------------+
 
 
 
 Support
 
 
+-----------------+--------------+---------+-----------------+
| Name            | Relationship | Address | Phone           |
+-----------------+--------------+---------+-----------------+
| Kit Valdovinos   | ECON         | Unknown | [phone] |
+-----------------+--------------+---------+-----------------+
| Magdalena Valdovinos | ECON         | Unknown | [phone] |
+-----------------+--------------+---------+-----------------+
 
 
 
 Care Team Providers
 
 
 
+-----------------------+------+-----------------+
| Care Team Member Name | Role | Phone           |
+-----------------------+------+-----------------+
| Lily Horton MD   | PCP  | [phone] |
+-----------------------+------+-----------------+
 
 
 
 Encounter Details
 
 
+--------+-------------+----------------------+--------------------+-------------+
| Date   | Type        | Department           | Care Team          | Description |
+--------+-------------+----------------------+--------------------+-------------+
| / | Documentati |   Pediatric          |   Ivis Burkett MD |             |
|    | on          | Gastroenterology at  |                    |             |
|        |             | [place]          |                    |             |
|        |             | [facility]  |                    |             |
|        |             |  [address] |                    |             |
|        |             |  [address]  Mailcode:  |                    |             |
|        |             |   [place]   |                    |             |
|        |             | [place], [place]         |                    |             |
|        |             | [address]           |                    |             |
|        |             | [phone]         |                    |             |
+--------+-------------+----------------------+--------------------+-------------+
 
 
 
 Social History
 
 
+----------------+-------+-----------+--------+------+
| Tobacco Use    | Types | Packs/Day | Years  | Date |
|                |       |           | Used   |      |
+----------------+-------+-----------+--------+------+
| Never Assessed |       |           |        |      |
+----------------+-------+-----------+--------+------+
 
 
 
+------------------+---------------+
| Sex Assigned at  | Date Recorded |
| Birth            |               |
+------------------+---------------+
| Not on file      |               |
+------------------+---------------+
 
 
 
+----------------+-------------+-------------+
| Job Start Date | Occupation  | Industry    |
+----------------+-------------+-------------+
| Not on file    | Not on file | Not on file |
+----------------+-------------+-------------+
 
 
 
+----------------+--------------+------------+
 
| Travel History | Travel Start | Travel End |
+----------------+--------------+------------+
 
 
 
+-------------------------------------+
| No recent travel history available. |
+-------------------------------------+
 documented as of this encounter
 
 Plan of Treatment
 Not on filedocumented as of this encounter
 
 Visit Diagnoses
 Not on filedocumented in this encounter

## 2019-11-01 NOTE — XMS
Encounter Summary
  Created on: 2019
 
 Bonifacio Nila JB
 External Reference #: [id number]
 : [birth year]
 Sex: Female
 
 Demographics
 
 
+-----------------------+------------------------+
| Address               | [address]            |
|                       | [place]  [address]   |
+-----------------------+------------------------+
| Home Phone            | [phone]        |
+-----------------------+------------------------+
| Preferred Language    | Unknown                |
+-----------------------+------------------------+
| Marital Status        | Single                 |
+-----------------------+------------------------+
| Sikhism Affiliation | Unknown                |
+-----------------------+------------------------+
| Race                  | White                  |
+-----------------------+------------------------+
| Ethnic Group          | Not  or  |
+-----------------------+------------------------+
 
 
 Author
 
 
+--------------+------------------------------+
| Author       | [facility] |
+--------------+------------------------------+
| Organization | [facility] |
+--------------+------------------------------+
| Address      | Unknown                      |
+--------------+------------------------------+
| Phone        | Unavailable                  |
+--------------+------------------------------+
 
 
 
 Support
 
 
+-----------------+--------------+---------+-----------------+
| Name            | Relationship | Address | Phone           |
+-----------------+--------------+---------+-----------------+
| Kit Valdovinos   | ECON         | Unknown | [phone] |
+-----------------+--------------+---------+-----------------+
| Magdalena Valdovinos | ECON         | Unknown | [phone] |
+-----------------+--------------+---------+-----------------+
 
 
 
 Care Team Providers
 
 
 
+------------------------+------+-----------------+
| Care Team Member Name  | Role | Phone           |
+------------------------+------+-----------------+
| Lily Horton MD | PCP  | [phone] |
+------------------------+------+-----------------+
 
 
 
 Reason for Visit
 
 
+-------------+----------+
| Reason      | Comments |
+-------------+----------+
| Lab Results |          |
+-------------+----------+
 
 
 
 Encounter Details
 
 
+--------+-----------+----------------------+----------------------+-------------+
| Date   | Type      | Department           | Care Team            | Description |
+--------+-----------+----------------------+----------------------+-------------+
| / | Telephone |   Pediatric          |   Neema Khalil,   | Lab Results |
| 2016   |           | Gastroenterology at  | MD  [address] |             |
|        |           | [facility]          |   [place], [place]       |             |
|        |           | [facility]  | [address]           |             |
|        |           |  [address] | [phone]         |             |
|        |           |  [address]  Mailcode:  | [phone] (Fax)   |             |
|        |           | [facility]   |                      |             |
|        |           | [place], [place]         |                      |             |
|        |           | [address]           |                      |             |
|        |           | [phone]         |                      |             |
+--------+-----------+----------------------+----------------------+-------------+
 
 
 
 Social History
 
 
+-------------------+-------+-----------+--------+------+
| Tobacco Use       | Types | Packs/Day | Years  | Date |
|                   |       |           | Used   |      |
+-------------------+-------+-----------+--------+------+
| Passive Smoke     |       |           |        |      |
| Exposure - Never  |       |           |        |      |
| Smoker            |       |           |        |      |
+-------------------+-------+-----------+--------+------+
 
 
 
+---------------------+---+---+---+
| Smokeless Tobacco:  |   |   |   |
| Never Used          |   |   |   |
+---------------------+---+---+---+
 
 
 
 
+-------------+-------------+---------+----------+
| Alcohol Use | Drinks/Week | oz/Week | Comments |
+-------------+-------------+---------+----------+
| Not Asked   |             |         |          |
+-------------+-------------+---------+----------+
 
 
 
+------------------+---------------+
| Sex Assigned at  | Date Recorded |
| Birth            |               |
+------------------+---------------+
| Not on file      |               |
+------------------+---------------+
 
 
 
+----------------+-------------+-------------+
| Job Start Date | Occupation  | Industry    |
+----------------+-------------+-------------+
| Not on file    | Not on file | Not on file |
+----------------+-------------+-------------+
 
 
 
+----------------+--------------+------------+
| Travel History | Travel Start | Travel End |
+----------------+--------------+------------+
 
 
 
+-------------------------------------+
| No recent travel history available. |
+-------------------------------------+
 documented as of this encounter
 
 Plan of Treatment
 Not on filedocumented as of this encounter
 
 Visit Diagnoses
 Not on filedocumented in this encounter

## 2019-11-01 NOTE — XMS
Encounter Summary
  Created on: 2019
 
 Bonifacio Nila JB
 External Reference #: [id number]
 : [birth year]
 Sex: Female
 
 Demographics
 
 
+-----------------------+------------------------+
| Address               | [address]            |
|                       | [place]  [address]   |
+-----------------------+------------------------+
| Home Phone            | [phone]        |
+-----------------------+------------------------+
| Preferred Language    | Unknown                |
+-----------------------+------------------------+
| Marital Status        | Single                 |
+-----------------------+------------------------+
| Church Affiliation | Unknown                |
+-----------------------+------------------------+
| Race                  | White                  |
+-----------------------+------------------------+
| Ethnic Group          | Not  or  |
+-----------------------+------------------------+
 
 
 Author
 
 
+--------------+------------------------------+
| Author       | [facility] |
+--------------+------------------------------+
| Organization | [facility] |
+--------------+------------------------------+
| Address      | Unknown                      |
+--------------+------------------------------+
| Phone        | Unavailable                  |
+--------------+------------------------------+
 
 
 
 Support
 
 
+-----------------+--------------+---------+-----------------+
| Name            | Relationship | Address | Phone           |
+-----------------+--------------+---------+-----------------+
| Kit Valdovinos   | ECON         | Unknown | [phone] |
+-----------------+--------------+---------+-----------------+
| Magdalena Valdovinos | ECON         | Unknown | [phone] |
+-----------------+--------------+---------+-----------------+
 
 
 
 Care Team Providers
 
 
 
+-----------------------+------+-----------------+
| Care Team Member Name | Role | Phone           |
+-----------------------+------+-----------------+
| Lily Horton MD   | PCP  | [phone] |
+-----------------------+------+-----------------+
 
 
 
 Reason for Visit
 
 
+--------+-------------------------+
| Reason | Comments                |
+--------+-------------------------+
| Other  | No lab work in one year |
+--------+-------------------------+
 
 
 
 Encounter Details
 
 
+--------+-----------+----------------------+---------------------+---------------------+
| Date   | Type      | Department           | Care Team           | Description         |
+--------+-----------+----------------------+---------------------+---------------------+
| / | Telephone |   Pediatric          |   Monserrat Spann,  | Other (No lab work  |
|    |           | Gastroenterology at  | RN  [address]     | in one year)        |
|        |           | [facility]          | [address]     |                     |
|        |           | [facility]  | [place] [address]  |                     |
|        |           |  [address] |                     |                     |
|        |           |  [address]  Mailcode:  |                     |                     |
|        |           |   [place]   |                     |                     |
|        |           | [place], [place]         |                     |                     |
|        |           | [address]           |                     |                     |
|        |           | [phone]         |                     |                     |
+--------+-----------+----------------------+---------------------+---------------------+
 
 
 
 Social History
 
 
+----------------+-------+-----------+--------+------+
| Tobacco Use    | Types | Packs/Day | Years  | Date |
|                |       |           | Used   |      |
+----------------+-------+-----------+--------+------+
| Never Assessed |       |           |        |      |
+----------------+-------+-----------+--------+------+
 
 
 
+------------------+---------------+
| Sex Assigned at  | Date Recorded |
| Birth            |               |
+------------------+---------------+
| Not on file      |               |
+------------------+---------------+
 
 
 
 
+----------------+-------------+-------------+
| Job Start Date | Occupation  | Industry    |
+----------------+-------------+-------------+
| Not on file    | Not on file | Not on file |
+----------------+-------------+-------------+
 
 
 
+----------------+--------------+------------+
| Travel History | Travel Start | Travel End |
+----------------+--------------+------------+
 
 
 
+-------------------------------------+
| No recent travel history available. |
+-------------------------------------+
 documented as of this encounter
 
 Plan of Treatment
 Not on filedocumented as of this encounter
 
 Visit Diagnoses
 Not on filedocumented in this encounter

## 2019-11-01 NOTE — XMS
Encounter Summary
  Created on: 2019
 
 Bonifacio Nila JB
 External Reference #: [id number]
 : [birth year]
 Sex: Female
 
 Demographics
 
 
+-----------------------+------------------------+
| Address               | [address]            |
|                       | [place]  [address]   |
+-----------------------+------------------------+
| Home Phone            | [phone]        |
+-----------------------+------------------------+
| Preferred Language    | Unknown                |
+-----------------------+------------------------+
| Marital Status        | Single                 |
+-----------------------+------------------------+
| Hinduism Affiliation | Unknown                |
+-----------------------+------------------------+
| Race                  | White                  |
+-----------------------+------------------------+
| Ethnic Group          | Not  or  |
+-----------------------+------------------------+
 
 
 Author
 
 
+--------------+-------------+
| Organization | Unknown     |
+--------------+-------------+
| Address      | Unknown     |
+--------------+-------------+
| Phone        | Unavailable |
+--------------+-------------+
 
 
 
 Support
 
 
+-----------------+--------------+---------+-----------------+
| Name            | Relationship | Address | Phone           |
+-----------------+--------------+---------+-----------------+
| Kit Valdovinos   | ECON         | Unknown | [phone] |
+-----------------+--------------+---------+-----------------+
| Magdalena Valdovinos | ECON         | Unknown | [phone] |
+-----------------+--------------+---------+-----------------+
 
 
 
 Care Team Providers
 
 
 
+-----------------------+------+-------------+
| Care Team Member Name | Role | Phone       |
+-----------------------+------+-------------+
 PCP  | Unavailable |
+-----------------------+------+-------------+
 
 
 
 Encounter Details
 
 
+--------+-------------+------------+--------------------+-------------+
| Date   | Type        | Department | Care Team          | Description |
+--------+-------------+------------+--------------------+-------------+
| / | Transcribed |            |   Dictation, Other | Transcribed |
| 2002   |             |            |                    |             |
+--------+-------------+------------+--------------------+-------------+
 
 
 
 Social History
 
 
+----------------+-------+-----------+--------+------+
| Tobacco Use    | Types | Packs/Day | Years  | Date |
|                |       |           | Used   |      |
+----------------+-------+-----------+--------+------+
| Never Assessed |       |           |        |      |
+----------------+-------+-----------+--------+------+
 
 
 
+------------------+---------------+
| Sex Assigned at  | Date Recorded |
| Birth            |               |
+------------------+---------------+
| Not on file      |               |
+------------------+---------------+
 
 
 
+----------------+-------------+-------------+
| Job Start Date | Occupation  | Industry    |
+----------------+-------------+-------------+
| Not on file    | Not on file | Not on file |
+----------------+-------------+-------------+
 
 
 
+----------------+--------------+------------+
| Travel History | Travel Start | Travel End |
+----------------+--------------+------------+
 
 
 
+-------------------------------------+
| No recent travel history available. |
+-------------------------------------+
 documented as of this encounter
 
 
 Progress Notes
 Interface, Transcription In - 2006  3:05 AM \[facility]\""                                    [place] [address] [place], [place] [address] [phone] or [phone]
 
 2002
 
 Lily Horton M.D.
 [address]
 [place]  [address]
 
 RE:   NILA VALDOVINOS
 MR #: [id number]
 
 Dear Dr. Horton:
 
 I had the pleasure of seeing Nila back  in  the Pediatric Liver Transplant
 Clinic  at [facility] staffed by Dr.Bill Mcrae  and  Dr. Jennifer Webb  from
 [place].  This is a routine followup.   Nila  continues  to do quite well
 with no symptoms.  She is gaining weight well and developing normally.  She
 is presently on Prograf 2 mL (0.5 mg/mL) b.i.d.
 
 Past medical history is significant for an allergy to sulfa medication.  In
 addition, she has a very small VSD that  is  followed  by  Dr. Baljinder Diaz,
 pediatric cardiologist.  The VSD certainly  does  not seem to be giving her
 any trouble and Dr. Diaz does not want to see her for quite sometime.
 
 On examination, she weighs 15.2 kg and  measures  95 cm.  Blood pressure is
 105/58 and heart rate is 119.  She is  anicteric.   She  has large tonsils,
 but  they  are  normal  in  appearance   and   symmetrical.   There  is  no
 lymphadenopathy of the neck or axilla.   She has normal shotty nodes in the
 inguinal areas.  She has unlabored respirations.   Her  abdomen  is benign.
 There is no mass, tenderness, or organomegaly.   She has no clubbing of the
 digits.
 
 Recent  laboratory tests from 2002,  show  normal  electrolytes,
 serum protein, transaminases, bilirubin, and GGT.  Specifically, the GGT is
 11, AST is 27, ALT is 14, bilirubin 0.5, and albumin 4.2.  Her magnesium is
 1.8.  The CBC is normal.  Her tacrolimus level is pending.
 
 Laboratory tests from 2001,  show  a  positive  IgG EBV capsid
 antibody and negative IgM EBV capsid antibody.   The qualitative EBV PCR is
 positive.  A year ago, the serology was  negative  for  EBV.  Thus, she has
 acquired EBV within the last year.  Her  CMV  serology  is negative and the
 CMV PCR is negative.
 
 IMPRESSION:  In summary, Nila is a 2-year-old  white  female  who is 1-1/2
 year  status post liver transplant who  is  doing  quite  well.   Her  last
 tacrolimus level was mildly elevated  at  11.5  on  2001.  Her
 dose was decreased from 3 mL b.i.d. to 2 mL b.i.d. at that point.  Her goal
 Prograf level now is about 3.  Depending  on  her pending Prograf level, we
 may drop her dose to 1.5 mL b.i.d. which  is  closer  to 0.1 mg/kg per day.
 If we do drop the dose, she will need another Prograf level a week later to
 document an appropriate level.
 
 Because she has converted to EBV positivity,  she  needs  to go back on the
 acyclovir.  The dose will be 10mg/kg per dose given q.i.d.  The solution is
 
 40 mg/cc, so she will receive 150 mg  q.i.d.  of  acyclovir until she has 2
 negative EBV PCRs.  She is to repeat  her  EBV  serology  in  May 2002, and
 again in 2002.  We will try to  order the quantitative EBV PCRs next
 time.
 
 She will return back to this clinic in  the  fall for routine followup.  If
 everything is relatively stable at that point, perhaps she can go to yearly
 followup with the Transplant team.
 
 Sincerely,
 
 Eagle De Luna M.D.
 Pediatric Gastroenterology
 
 [facility] / 
 [id number] / [id number] / [id number] /
 D: 2002
 T: 2002
 
 cc:
 
 Cristo Bailey M.D.
 [address]
 [place]  [address]
 
 Simeon Covington M.D.
 [address]
 [place]  [address]
 
 Patel Mcrae M.D.
 [address]
 [place]  [address]
 
 [id number] signed by Interface, Transcription In at 2006  3:05 AM \[facility]\""doc
umented in this encounter
 
 Plan of Treatment
 Not on filedocumented as of this encounter
 
 Visit Diagnoses
 Not on filedocumented in this encounter

## 2019-11-01 NOTE — XMS
Encounter Summary
  Created on: 2019
 
 Bonifacio Nila JB
 External Reference #: [id number]
 : [birth year]
 Sex: Female
 
 Demographics
 
 
+-----------------------+------------------------+
| Address               | [address]            |
|                       | [place]  [address]   |
+-----------------------+------------------------+
| Home Phone            | [phone]        |
+-----------------------+------------------------+
| Preferred Language    | Unknown                |
+-----------------------+------------------------+
| Marital Status        | Single                 |
+-----------------------+------------------------+
| Temple Affiliation | Unknown                |
+-----------------------+------------------------+
| Race                  | White                  |
+-----------------------+------------------------+
| Ethnic Group          | Not  or  |
+-----------------------+------------------------+
 
 
 Author
 
 
+--------------+------------------------------+
| Author       | [facility] |
+--------------+------------------------------+
| Organization | [facility] |
+--------------+------------------------------+
| Address      | Unknown                      |
+--------------+------------------------------+
| Phone        | Unavailable                  |
+--------------+------------------------------+
 
 
 
 Support
 
 
+-----------------+--------------+---------+-----------------+
| Name            | Relationship | Address | Phone           |
+-----------------+--------------+---------+-----------------+
| Kit Valdovinos   | ECON         | Unknown | [phone] |
+-----------------+--------------+---------+-----------------+
| Magdalena Valdovinos | ECON         | Unknown | [phone] |
+-----------------+--------------+---------+-----------------+
 
 
 
 Care Team Providers
 
 
 
+-----------------------+------+-----------------+
| Care Team Member Name | Role | Phone           |
+-----------------------+------+-----------------+
| Lliy Horton MD   | PCP  | [phone] |
+-----------------------+------+-----------------+
 
 
 
 Reason for Visit
 
 
+----------------+----------+
| Reason         | Comments |
+----------------+----------+
| Refill Request |          |
+----------------+----------+
 
 
 
 Encounter Details
 
 
+--------+--------+----------------------+---------------------+----------------+
| Date   | Type   | Department           | Care Team           | Description    |
+--------+--------+----------------------+---------------------+----------------+
| / | Refill |   Pediatric          |   Monserrat Spann,  | Refill Request |
|    |        | Gastroenterology at  | RN  [address]     |                |
|        |        | [facility]          | [address]     |                |
|        |        | [facility]  | [place] [address]  |                |
|        |        |  [address] |                     |                |
|        |        |  [address]  Mailcode:  |                     |                |
|        |        | [facility]  [place]   |                     |                |
|        |        | [place], [place]         |                     |                |
|        |        | [address]           |                     |                |
|        |        | [phone]         |                     |                |
+--------+--------+----------------------+---------------------+----------------+
 
 
 
 Social History
 
 
+----------------+-------+-----------+--------+------+
| Tobacco Use    | Types | Packs/Day | Years  | Date |
|                |       |           | Used   |      |
+----------------+-------+-----------+--------+------+
| Never Assessed |       |           |        |      |
+----------------+-------+-----------+--------+------+
 
 
 
+------------------+---------------+
| Sex Assigned at  | Date Recorded |
| Birth            |               |
+------------------+---------------+
| Not on file      |               |
+------------------+---------------+
 
 
 
 
+----------------+-------------+-------------+
| Job Start Date | Occupation  | Industry    |
+----------------+-------------+-------------+
| Not on file    | Not on file | Not on file |
+----------------+-------------+-------------+
 
 
 
+----------------+--------------+------------+
| Travel History | Travel Start | Travel End |
+----------------+--------------+------------+
 
 
 
+-------------------------------------+
| No recent travel history available. |
+-------------------------------------+
 documented as of this encounter
 
 Plan of Treatment
 Not on filedocumented as of this encounter
 
 Visit Diagnoses
 Not on filedocumented in this encounter

## 2019-11-01 NOTE — XMS
Encounter Summary
  Created on: 2019
 
 Bonifacio Nila JB
 External Reference #: [id number]
 : [birth year]
 Sex: Female
 
 Demographics
 
 
+-----------------------+------------------------+
| Address               | [address]            |
|                       | [place]  [address]   |
+-----------------------+------------------------+
| Home Phone            | [phone]        |
+-----------------------+------------------------+
| Preferred Language    | Unknown                |
+-----------------------+------------------------+
| Marital Status        | Single                 |
+-----------------------+------------------------+
| Confucianist Affiliation | Unknown                |
+-----------------------+------------------------+
| Race                  | White                  |
+-----------------------+------------------------+
| Ethnic Group          | Not  or  |
+-----------------------+------------------------+
 
 
 Author
 
 
+--------------+------------------------------+
| Author       | [facility] |
+--------------+------------------------------+
| Organization | [facility] |
+--------------+------------------------------+
| Address      | Unknown                      |
+--------------+------------------------------+
| Phone        | Unavailable                  |
+--------------+------------------------------+
 
 
 
 Support
 
 
+-----------------+--------------+---------+-----------------+
| Name            | Relationship | Address | Phone           |
+-----------------+--------------+---------+-----------------+
| Kit Valdovinos   | ECON         | Unknown | [phone] |
+-----------------+--------------+---------+-----------------+
| Magdalena Valdovinos | ECON         | Unknown | [phone] |
+-----------------+--------------+---------+-----------------+
 
 
 
 Care Team Providers
 
 
 
+-----------------------+------+-----------------+
| Care Team Member Name | Role | Phone           |
+-----------------------+------+-----------------+
| Lily Horton MD   | PCP  | [phone] |
+-----------------------+------+-----------------+
 
 
 
 Reason for Visit
 
 
+----------------+----------+
| Reason         | Comments |
+----------------+----------+
| Refill Request |          |
+----------------+----------+
 
 
 
 Encounter Details
 
 
+--------+--------+----------------------+---------------------+----------------+
| Date   | Type   | Department           | Care Team           | Description    |
+--------+--------+----------------------+---------------------+----------------+
| / | Refill |   Pediatric          |   Monserrat Spann,  | Refill Request |
|    |        | Gastroenterology at  | RN  [address]     |                |
|        |        | [place]          | [address]     |                |
|        |        | [facility]  | [place] [address]  |                |
|        |        |  [address] |                     |                |
|        |        |  [address]  Mailcode:  |                     |                |
|        |        | [facility]  [place]   |                     |                |
|        |        | [place], [place]         |                     |                |
|        |        | [address]           |                     |                |
|        |        | [phone]         |                     |                |
+--------+--------+----------------------+---------------------+----------------+
 
 
 
 Social History
 
 
+----------------+-------+-----------+--------+------+
| Tobacco Use    | Types | Packs/Day | Years  | Date |
|                |       |           | Used   |      |
+----------------+-------+-----------+--------+------+
| Never Assessed |       |           |        |      |
+----------------+-------+-----------+--------+------+
 
 
 
+------------------+---------------+
| Sex Assigned at  | Date Recorded |
| Birth            |               |
+------------------+---------------+
| Not on file      |               |
+------------------+---------------+
 
 
 
 
+----------------+-------------+-------------+
| Job Start Date | Occupation  | Industry    |
+----------------+-------------+-------------+
| Not on file    | Not on file | Not on file |
+----------------+-------------+-------------+
 
 
 
+----------------+--------------+------------+
| Travel History | Travel Start | Travel End |
+----------------+--------------+------------+
 
 
 
+-------------------------------------+
| No recent travel history available. |
+-------------------------------------+
 documented as of this encounter
 
 Plan of Treatment
 Not on filedocumented as of this encounter
 
 Visit Diagnoses
 Not on filedocumented in this encounter

## 2019-11-01 NOTE — XMS
Encounter Summary
  Created on: 2019
 
 Bonifacio Nila JB
 External Reference #: [id number]
 : [birth year]
 Sex: Female
 
 Demographics
 
 
+-----------------------+------------------------+
| Address               | [address]            |
|                       | [place]  [address]   |
+-----------------------+------------------------+
| Home Phone            | [phone]        |
+-----------------------+------------------------+
| Preferred Language    | Unknown                |
+-----------------------+------------------------+
| Marital Status        | Single                 |
+-----------------------+------------------------+
| Restorationist Affiliation | Unknown                |
+-----------------------+------------------------+
| Race                  | White                  |
+-----------------------+------------------------+
| Ethnic Group          | Not  or  |
+-----------------------+------------------------+
 
 
 Author
 
 
+--------------+------------------------------+
| Author       | [facility] |
+--------------+------------------------------+
| Organization | [facility] |
+--------------+------------------------------+
| Address      | Unknown                      |
+--------------+------------------------------+
| Phone        | Unavailable                  |
+--------------+------------------------------+
 
 
 
 Support
 
 
+-----------------+--------------+---------+-----------------+
| Name            | Relationship | Address | Phone           |
+-----------------+--------------+---------+-----------------+
| Kit Valdovinos   | ECON         | Unknown | [phone] |
+-----------------+--------------+---------+-----------------+
| Magdalena Valdovinos | ECON         | Unknown | [phone] |
+-----------------+--------------+---------+-----------------+
 
 
 
 Care Team Providers
 
 
 
+------------------------+------+-----------------+
| Care Team Member Name  | Role | Phone           |
+------------------------+------+-----------------+
| Lily Horton MD | PCP  | [phone] |
+------------------------+------+-----------------+
 
 
 
 Reason for Referral
 PROC - Dept/Practice Procedure (Routine)
 
+--------+--------+---------------+--------------+--------------+---------------+
| Status | Reason | Specialty     | Diagnoses /  | Referred By  | Referred To   |
|        |        |               | Procedures   | Contact      | Contact       |
+--------+--------+---------------+--------------+--------------+---------------+
| Closed |        | Gastroenterol |   Diagnoses  |   Eroglu,    |   Gas Endo    |
|        |        | ogy           |  Biliary     | MD Neema  | [address] |
|        |        |               | atresia      |  [address]      |  [address]     |
|        |        |               | Transplanted | [address]    | Mailcode:     |
|        |        |               |  liver (HCC) | [place], [place] | [address]  |
|        |        |               |   Procedures |  [address]  | for Health    |
|        |        |               |   [facility]  |  Phone:      | and Healing,  |
|        |        |               |              | [phone] | [address]    |
|        |        |               |              |   Fax:       | [place], [place]  |
|        |        |               |              | [phone] | [address]    |
|        |        |               |              |              | Phone:        |
|        |        |               |              |              | [phone]  |
|        |        |               |              |              |  Fax:         |
|        |        |               |              |              | [phone]  |
+--------+--------+---------------+--------------+--------------+---------------+
 
 
 Consultation (Routine)
 
+------------+--------+------------+--------------+--------------+---------------+
| Status     | Reason | Specialty  | Diagnoses /  | Referred By  | Referred To   |
|            |        |            | Procedures   | Contact      | Contact       |
+------------+--------+------------+--------------+--------------+---------------+
| Authorized |        | Liver      |   Diagnoses  |   Eroglu,    |   Ltx Liver   |
|            |        | Transplant |  Biliary     | MD Neema  | [address]  |
|            |        |            | atresia      |  [address]      | [address]        |
|            |        |            | Other        | [address]    | [address]  |
|            |        |            | secondary    | [place], [place] | Rd  Mailcode: |
|            |        |            | hypertension |  [address]  |  [address]         |
|            |        |            |              |  Phone:      | Physician's   |
|            |        |            | Transplanted | [phone] | [address]  |
|            |        |            |  liver (HCC) |   Fax:       |  [place], [place] |
|            |        |            |   Procedures | [phone] |  [address]   |
|            |        |            |   CONSULT TO |              | Phone:        |
|            |        |            |  HEPATOLOGY  |              | [phone]  |
|            |        |            |              |              |  Fax:         |
|            |        |            |              |              | [phone]  |
+------------+--------+------------+--------------+--------------+---------------+
 
 
 
 
 Reason for Visit
 
 Office Visit - E/M Services (Routine)
 
+--------+--------+---------------+--------------+--------------+---------------+
| Status | Reason | Specialty     | Diagnoses /  | Referred By  | Referred To   |
|        |        |               | Procedures   | Contact      | Contact       |
+--------+--------+---------------+--------------+--------------+---------------+
| Closed |        | Pediatric     |   Diagnoses  |   Sol,    |   Ped Gastro  |
|        |        | Gastroenterol |  Congenital  | Lily Lakhani, | [facility]  [address]   |
|        |        | ogy           | malformation |  [address]    | [address]   |
|        |        |               | s of spleen  | SPECIALISTS  | [address]       |
|        |        |               |  Liver       | [facility] [place] | Mailcode:     |
|        |        |               | transplant   |   [address]    | CDRCP         |
|        |        |               | status       | [address]  | [address]   |
|        |        |               | Procedures   |  [place],  | [place], [place]  |
|        |        |               | includes     | [place] [address]     | [address]    |
|        |        |               | diagnostics  | Phone:       | Phone:        |
|        |        |               |              | [phone] | [phone]  |
|        |        |               |              |   Fax:       |  Fax:         |
|        |        |               |              | [phone] | [phone]  |
+--------+--------+---------------+--------------+--------------+---------------+
 
 
 
 
 Encounter Details
 
 
+--------+---------+----------------------+----------------------+----------------------+
| Date   | Type    | Department           | Care Team            | Description          |
+--------+---------+----------------------+----------------------+----------------------+
| / | Office  |   Pediatric          |   Neema Khalil,   | Biliary atresia      |
| 2016   | Visit   | Gastroenterology at  | MD  [address] | (Primary Dx);        |
|        |         | [place]          |   [place], [place]       | Polysplenia; Other   |
|        |         | [facility]  | [address]           | secondary            |
|        |         |  [address] | [phone]         | hypertension;        |
|        |         |  [address]  Mailcode:  | [phone] (Fax)   | Transplanted liver   |
|        |         | [facility]RCP  [place]   |                      | ([facility]); Nonrheumatic  |
|        |         | [place], [place]         |                      | aortic valve         |
|        |         | [address]           |                      | insufficiency; VSD   |
|        |         | [phone]         |                      | (ventricular septal  |
|        |         |                      |                      | defect),             |
|        |         |                      |                      | perimembranous;      |
|        |         |                      |                      | Interrupted inferior |
|        |         |                      |                      |  vena cava; Aortic   |
|        |         |                      |                      | root dilatation      |
|        |         |                      |                      | ([facility])                |
+--------+---------+----------------------+----------------------+----------------------+
 
 
 
 Social History
 
 
+-------------------+-------+-----------+--------+------+
| Tobacco Use       | Types | Packs/Day | Years  | Date |
|                   |       |           | Used   |      |
+-------------------+-------+-----------+--------+------+
| Passive Smoke     |       |           |        |      |
| Exposure - Never  |       |           |        |      |
| Smoker            |       |           |        |      |
 
+-------------------+-------+-----------+--------+------+
 
 
 
+---------------------+---+---+---+
| Smokeless Tobacco:  |   |   |   |
| Never Used          |   |   |   |
+---------------------+---+---+---+
 
 
 
+-------------+-------------+---------+----------+
| Alcohol Use | Drinks/Week | oz/Week | Comments |
+-------------+-------------+---------+----------+
| Not Asked   |             |         |          |
+-------------+-------------+---------+----------+
 
 
 
+------------------+---------------+
| Sex Assigned at  | Date Recorded |
| Birth            |               |
+------------------+---------------+
| Not on file      |               |
+------------------+---------------+
 
 
 
+----------------+-------------+-------------+
| Job Start Date | Occupation  | Industry    |
+----------------+-------------+-------------+
| Not on file    | Not on file | Not on file |
+----------------+-------------+-------------+
 
 
 
+----------------+--------------+------------+
| Travel History | Travel Start | Travel End |
+----------------+--------------+------------+
 
 
 
+-------------------------------------+
| No recent travel history available. |
+-------------------------------------+
 documented as of this encounter
 
 Last Filed Vital Signs
 
 
+-------------------+----------------------+----------------------+----------------------+
| Vital Sign        | Reading              | Time Taken           | Comments             |
+-------------------+----------------------+----------------------+----------------------+
| Blood Pressure    | 130/93               | 2016  9:24 AM  | right arm adult cuff |
|                   |                      | PDT                  |                      |
+-------------------+----------------------+----------------------+----------------------+
| Pulse             | 72                   | 2016  9:24 AM  |                      |
|                   |                      | PDT                  |                      |
+-------------------+----------------------+----------------------+----------------------+
| Temperature       | 36.7   C (98.1   F)  | 2016  8:59 AM  |                      |
 
|                   |                      | PDT                  |                      |
+-------------------+----------------------+----------------------+----------------------+
| Respiratory Rate  | -                    | -                    |                      |
+-------------------+----------------------+----------------------+----------------------+
| Oxygen Saturation | -                    | -                    |                      |
+-------------------+----------------------+----------------------+----------------------+
| Inhaled Oxygen    | -                    | -                    |                      |
| Concentration     |                      |                      |                      |
+-------------------+----------------------+----------------------+----------------------+
| Weight            | 81.5 kg (179 lb 10.8 | 2016  8:59 AM  |                      |
|                   |  oz)                 | PDT                  |                      |
+-------------------+----------------------+----------------------+----------------------+
| Height            | 164.4 cm (5' 4.72")  | 2016  8:59 AM  |                      |
|                   |                      | PDT                  |                      |
+-------------------+----------------------+----------------------+----------------------+
| Body Mass Index   | 30.16                | 2016  8:59 AM  |                      |
|                   |                      | PDT                  |                      |
+-------------------+----------------------+----------------------+----------------------+
 documented in this encounter
 
 Patient Instructions
 Patient Instructions Neema Khalil MD - 2016  9:03 AM PDTDear family, 
 
 It was nice to see you in the clinic today !  Our recommendations are as below:
 
 Plan: 
 
 - Blood draw for labs: every 3 months
 
 - Next appointment: With Adult GI next summer (can schedule with Fibroscan)
 
 Results of tests:  
 Test results will be sent to you by MyChart. 
 
 Calls:
 Medical emergencies:  call 911
 Non-urgent issues:  Send [employer] message
 Monday - Friday work hours: call  [phone] # [phone]
 After hours, weekends, holidays: call [phone]
 To schedule an appointment: call [phone] # 1
 For refills: call to your pharmacy a week before running out medicine
 
 Electronically signed by Marianne Barba RN at 2016  9:17 AM PDT
 documented in this encounter
 
 Progress Notes
 Neema Khalil MD - 2016  3:40 PM PDTFormatting of this note might be different fro
m the original.
 
 Primary Care Provider: Lily Horton MD
 
 [facility] 
 DOS: 2016
 Visit type: Follow-up 
 
 Patient accompanied by: father
  used: no
 
 CC: 
 - Post-liver transplant care 
 
 - High risk medication management
 
 Patient Active Problem List 
 Diagnosis 
   Transplanted Liver 
   VSD (ventricular septal defect), perimembranous 
   Polysplenia 
   Interrupted inferior vena cava 
   Aortic insufficiency 
   Aortic root dilatation 
 
  PAST Hx/kim: Biliary atresia
 -  s/p OLT at [facility]. She was last seen by Dr Burkett in . 
In , it was noticed that she has not been seen, attempts to reach to family was unsucces
sful. lost to follow up since that time. 
 - Cardiology: last cardiology note is from  from Dr Lily Horton, cardiologist at [facility]. Her cardiac dx includes 1) moderate perimembraneous VSD nearly occluded by aneurysmal
 tissue leaving a tiny VSD, 2) trace central aortic insufficiency, 3) Left atrial isomerism 
(polysplenia). It was recommended her annual follow up for progression of aortic insufficien
cy in which case she may need closure of VSD. No further notes available in our system as julio c minor is lost to follow up since .
 - 10/16/2014 s/p VSD repair, 8-mm Amplatzer Vascular Plug IV with no significant residual s
hunting noted.
 - not on prograf for a long time
 
 INTERVAL HISTORY:
 - no complaints,no abdominal pain, emesis, diarrhea or constipation
 - started BCP, blood pressures were high since then, she will be seen  By cardiologist chelita acosta, they will monitor BPs
 
 REVIEW OF SYSTEMS: 
 General:  No fever, fatigue, or weight loss.  
 Eyes:  No changes in vision, no eye irritation or discharge.  
 ENT:  No nosebleeds, nasal congestion, difficulty swallowing, no mouth sores. 
 Respiratory:  No shortness of breath, cough, wheezing.
 Cardiovascular:  No difficulty breathing, edema, cyanosis.  
 Genitourinary:  No urinary infections.  
 Neurologic:  No seizures. No headaches.  
 Musculoskeletal:  No joint pain, swelling. No back pain.  Full range of motion.
 Skin:  No itching, rash, jaundice.  
 Psychological:  No abnormal anxiety, depression. Attending school regularly. Will be senior
 this year
 Heme: No anemia, abnormal bleeding, easy bruising
 Lymphatic: No enlarged lymph nodes.
 Metabolic/Endo: no glucose intolerance, hypothyroidism
 Allergy/immunology: no seasonal or food allergies, no asthma
 
 All other ROS are negative.
 
  
 Past Medical History 
 Diagnosis Date 
   Biliary atresia  
 
 Past Surgical History 
 Procedure Laterality Date 
   Liver transplant  2000 
   at [facility] 
   Vsd repair, amplatzer device  10/16/2014 
   8-mm Amplatzer Vascular Plug IV, no significant residual shunting 
 
 
 FHx: reviewed, unchanged
 
 SHx: reviewed, will be senior at  this year
 
 Allergies 
 Allergen Reactions 
   Sulfa (Sulfonamide Antibiotics) Hives and Swelling-Facial 
   Varicella Vaccines  
   Possible reaction with sulfa meds 
 
 PHYSICAL EXAMINATION: 
 
 Patient reports a pain level of  0 today. No action required.
 
 Ht 1.644 m (5' 4.72") (58 %*, Z = 0.21), Wt 81.5 kg (179 lb 10.8 oz) (96 %*, Z = 1.70), Jae
ght for age(%)  96% (Z=1.70) , /98, Pulse 84, Temperature 36.7 C (98.1 F), Tempera
ture source Oral, BMI 30.15 kg/(m^2). 
 
 Repeat BP at the end of clinic: 130/93 mmHg
 
 APPEARANCE: alert, active and in no apparent distress. 
 SKIN: no jaundice or rashes. 
 HEENT: normocephalic, PERRLA, mucous membranes moist. 
 NECK: supple, without thyromegaly. 
 CHEST: clear to auscultation bilaterally. 
 CV: no murmurs. 
 ABDOMEN: soft, NTND, no hepatosplenomegaly. 
 BACK: without tenderness. 
 MUSCULOSKELETAL: no muscle wasting, no deformity.
 EXTREMITIES: No edema, clubbing, deformity.
 NEURO: grossly intact
 
  
 ASSESSMENT: 14 yo female with h/o biliary atresia, s/p OLT in , off immunosuppression b
y choice for a long time, congenital heart disease, s/p surgery, now w hypertension. CNI cou
ld be contributor to HTN, despite that she has been off for a long time. Patient says she wi
ll be referred to nephrologist by PCP.
 Recommend fibroscan to evaluate for fibrosis which could have developed despite normal LFts
.
 
 Q44.2   Biliary atresia
 Q89.09  Polysplenia
 I15.8   Other secondary hypertension
 Z94.4   Transplanted liver (HCC)
 I35.1   Nonrheumatic aortic valve insufficiency
 Q21.0   VSD (ventricular septal defect), perimembranous
 Q26.9   Interrupted inferior vena cava
 I77.810  Aortic root dilatation (HCC)
 
 PLAN/RECOMMENDATIONS:  
 - Labs: CBC w diff, CMP, GGT: every 3 months
 - will transfer care to adult hepatology, requested appt to be scheduled for summer 2017 , 
this works for family well.
 - Fibroscan next year when she is seen by adult hepatology
 - she will be seen by cardiology today for follow up
 - PCP referred her to nephrologist for HTN
 
 Health Care Maintenance: 
 - she can receive all vaccines as she is not on immunsupression, especially we recommend he
 
p A, hep B vaccines if she is not already immune.
 - Flu vaccine (intramuscular): every . 
 - Dental care every 6 months
 
 At this visit: 
 Dr. Jorge Aguirre and DHARMESH Webber from [facility] were presen
t as observers during this visit.
 Psychosocial or economic issues that may affect patient's medical care or well being:none 
 Co-morbid chronic medical problems that may affect future procedural sedation risk: NA
 
 Labs/imaging:
 Component
     Latest Ref Rng 2016 
 GLUCOSE, PLASMA (LAB)
     70 - 100 mg/dL 73 
 BUN, PLASMA (LAB)
     6 - 23 mg/dL 8 
 CREATININE PLASMA (LAB)
     0.6 - 1.2 mg/dL 0.61 
 TOTAL PROTEIN, PLASMA 
     6 - 8 g/dL 7.9 
 ALBUMIN, PLASMA (LAB)
     3.5 - 5 g/dL 4 
 CALCIUM, PLASMA (LAB)
     8.4 - 10.2 mg/dL 9.7 
 BILIRUBIN TOTAL
     0 - 1.2 mg/dL 0.3 
 ALK PHOS     30 - 128 U/L 108 
 AST(SGOT)     13 - 39 U/L 18 
 SODIUM, PLASMA (LAB)
     132 - 143 mmol/L 137 
 POTASSIUM, PLASMA  (LAB)
     3.6 - 5.1 mmol/L 4 
 CHLORIDE, PLASMA (LAB)
     95 - 112 mmol/L 100 
 TOTAL CO2, PLASMA (LAB)
     19 - 31 mmol/L 24 
 ALT (SGPT)     7 - 52 U/L 18 
 WHITE CELL COUNT
     4.5 - 11 K/cu mm 8.8 
 RED CELL COUNT
     3.8 - 5.1 M/cu mm 4.97 
 HEMOGLOBIN
     12 - 16 g/dL 14.5 
 HEMATOCRIT
     35 - 45 % 43.2 
 MCV     81 - 99 fL 86.9 
 PLATELET COUNT
     140 - 440 K/cu mm 415 
 
 Neema Khalil MD
 Pediatric Gastroenterology
 Consult line: [phone] 
 
 Electronically signed by Neema Khalil MD at 2016 12:31 PM PDTEroNeema dias MD -
 2016  3:39 PM PDT
 
 Electronically signed by Neema Khalil MD at 2016 12:31 PM PDTdocumented in this en
counter
 
 
 Plan of Treatment
 
 
+-----------+------------+--------+----------------------+---------------------+
| Name      | Type       | Priori | Associated Diagnoses | Order Schedule      |
|           |            | ty     |                      |                     |
+-----------+------------+--------+----------------------+---------------------+
| [facility] | Procedures | Routin |   Biliary atresia    | Ordered: 2016 |
|           |            | e      | Transplanted liver   |                     |
|           |            |        | (HCC)                |                     |
+-----------+------------+--------+----------------------+---------------------+
 documented as of this encounter
 
 Visit Diagnoses
 
 
+------------------------------------------------------------------------------+
| Diagnosis                                                                    |
+------------------------------------------------------------------------------+
|   Biliary atresia - Primary  Congenital biliary atresia                      |
+------------------------------------------------------------------------------+
|   Polysplenia  Congenital anomalies of spleen                                |
+------------------------------------------------------------------------------+
|   Other secondary hypertension                                               |
+------------------------------------------------------------------------------+
|   Transplanted liver (HCC)  Liver replaced by transplant                     |
+------------------------------------------------------------------------------+
|   Nonrheumatic aortic valve insufficiency  Aortic valve disorders            |
+------------------------------------------------------------------------------+
|   VSD (ventricular septal defect), perimembranous  Ventricular septal defect |
+------------------------------------------------------------------------------+
|   Interrupted inferior vena cava  Other congenital anomalies of great veins  |
+------------------------------------------------------------------------------+
|   Aortic root dilatation (HCC)  Thoracic aortic ectasia                      |
+------------------------------------------------------------------------------+
 documented in this encounter

## 2019-11-01 NOTE — XMS
Encounter Summary
  Created on: 2019
 
 Bonifacio Nila JB
 External Reference #: [id number]
 : [birth year]
 Sex: Female
 
 Demographics
 
 
+-----------------------+------------------------+
| Address               | [address]            |
|                       | [place]  [address]   |
+-----------------------+------------------------+
| Home Phone            | [phone]        |
+-----------------------+------------------------+
| Preferred Language    | Unknown                |
+-----------------------+------------------------+
| Marital Status        | Single                 |
+-----------------------+------------------------+
| Yazidism Affiliation | Unknown                |
+-----------------------+------------------------+
| Race                  | White                  |
+-----------------------+------------------------+
| Ethnic Group          | Not  or  |
+-----------------------+------------------------+
 
 
 Author
 
 
+--------------+------------------------------+
| Author       | [facility] |
+--------------+------------------------------+
| Organization | [facility] |
+--------------+------------------------------+
| Address      | Unknown                      |
+--------------+------------------------------+
| Phone        | Unavailable                  |
+--------------+------------------------------+
 
 
 
 Support
 
 
+-----------------+--------------+---------+-----------------+
| Name            | Relationship | Address | Phone           |
+-----------------+--------------+---------+-----------------+
| Kit Valdovinos   | ECON         | Unknown | [phone] |
+-----------------+--------------+---------+-----------------+
| Magdalena Valdovinos | ECON         | Unknown | [phone] |
+-----------------+--------------+---------+-----------------+
 
 
 
 Care Team Providers
 
 
 
+------------------------+------+-----------------+
| Care Team Member Name  | Role | Phone           |
+------------------------+------+-----------------+
| Lily Horton MD | PCP  | [phone] |
+------------------------+------+-----------------+
 
 
 
 Reason for Visit
 
 
+-------------------+----------+
| Reason            | Comments |
+-------------------+----------+
| Care Coordination |          |
+-------------------+----------+
 
 
 
 Encounter Details
 
 
+--------+-----------+----------------------+----------------------+-------------------+
| Date   | Type      | Department           | Care Team            | Description       |
+--------+-----------+----------------------+----------------------+-------------------+
| / | Telephone |   Pediatric          |   Neema Khalil,   | Care Coordination |
| 2016   |           | Gastroenterology at  | MD  [address] |                   |
|        |           | [place]          |   [place], [place]       |                   |
|        |           | [facility]  | [address]           |                   |
|        |           |  [address] [facility] | [phone]         |                   |
|        |           |  [address]  Mailcode:  | [phone] (Fax)   |                   |
|        |           | [address]   |                      |                   |
|        |           | [place], [place]         |                      |                   |
|        |           | [address]           |                      |                   |
|        |           | [phone]         |                      |                   |
+--------+-----------+----------------------+----------------------+-------------------+
 
 
 
 Social History
 
 
+-------------------+-------+-----------+--------+------+
| Tobacco Use       | Types | Packs/Day | Years  | Date |
|                   |       |           | Used   |      |
+-------------------+-------+-----------+--------+------+
| Passive Smoke     |       |           |        |      |
| Exposure - Never  |       |           |        |      |
| Smoker            |       |           |        |      |
+-------------------+-------+-----------+--------+------+
 
 
 
+---------------------+---+---+---+
| Smokeless Tobacco:  |   |   |   |
| Never Used          |   |   |   |
+---------------------+---+---+---+
 
 
 
 
+-------------+-------------+---------+----------+
| Alcohol Use | Drinks/Week | oz/Week | Comments |
+-------------+-------------+---------+----------+
| Not Asked   |             |         |          |
+-------------+-------------+---------+----------+
 
 
 
+------------------+---------------+
| Sex Assigned at  | Date Recorded |
| Birth            |               |
+------------------+---------------+
| Not on file      |               |
+------------------+---------------+
 
 
 
+----------------+-------------+-------------+
| Job Start Date | Occupation  | Industry    |
+----------------+-------------+-------------+
| Not on file    | Not on file | Not on file |
+----------------+-------------+-------------+
 
 
 
+----------------+--------------+------------+
| Travel History | Travel Start | Travel End |
+----------------+--------------+------------+
 
 
 
+-------------------------------------+
| No recent travel history available. |
+-------------------------------------+
 documented as of this encounter
 
 Plan of Treatment
 Not on filedocumented as of this encounter
 
 Visit Diagnoses
 Not on filedocumented in this encounter

## 2019-11-01 NOTE — XMS
Encounter Summary
  Created on: 2019
 
 Bonifacio Nila JB
 External Reference #: [id number]
 : [birth year]
 Sex: Female
 
 Demographics
 
 
+-----------------------+------------------------+
| Address               | [address]            |
|                       | [place]  [address]   |
+-----------------------+------------------------+
| Home Phone            | [phone]        |
+-----------------------+------------------------+
| Preferred Language    | Unknown                |
+-----------------------+------------------------+
| Marital Status        | Single                 |
+-----------------------+------------------------+
| Hindu Affiliation | Unknown                |
+-----------------------+------------------------+
| Race                  | White                  |
+-----------------------+------------------------+
| Ethnic Group          | Not  or  |
+-----------------------+------------------------+
 
 
 Author
 
 
+--------------+------------------------------+
| Author       | [facility] |
+--------------+------------------------------+
| Organization | [facility] |
+--------------+------------------------------+
| Address      | Unknown                      |
+--------------+------------------------------+
| Phone        | Unavailable                  |
+--------------+------------------------------+
 
 
 
 Support
 
 
+-----------------+--------------+---------+-----------------+
| Name            | Relationship | Address | Phone           |
+-----------------+--------------+---------+-----------------+
| Kit Valdovinos   | ECON         | Unknown | [phone] |
+-----------------+--------------+---------+-----------------+
| Magdalena Valdovinos | ECON         | Unknown | [phone] |
+-----------------+--------------+---------+-----------------+
 
 
 
 Care Team Providers
 
 
 
+------------------------+------+-----------------+
| Care Team Member Name  | Role | Phone           |
+------------------------+------+-----------------+
| Lily Horton MD | PCP  | [phone] |
+------------------------+------+-----------------+
 
 
 
 Reason for Visit
 
 
+-------------------+----------+
| Reason            | Comments |
+-------------------+----------+
| Follow-up in      |          |
| outpatient clinic |          |
+-------------------+----------+
 Office Visit - E/M Services (Routine)
 
+--------+--------+---------------+--------------+--------------+---------------+
| Status | Reason | Specialty     | Diagnoses /  | Referred By  | Referred To   |
|        |        |               | Procedures   | Contact      | Contact       |
+--------+--------+---------------+--------------+--------------+---------------+
| Closed |        | Pediatric     |              |   Sol,    |   Ped Gastro  |
|        |        | Gastroenterol |              | Lily Lakhani, | [facility]  [address]  |
|        |        | [address]           |              |  [address] [place]    | [address]   |
|        |        |               |              | SPECIALISTS  | [address]       |
|        |        |               |              | OF [place] | Mailcode:     |
|        |        |               |              |   [address]    | [address]         |
|        |        |               |              | [address]  | [place]   |
|        |        |               |              |  [place],  | [place], [place]  |
|        |        |               |              | [place] [address]     | [address]    |
|        |        |               |              | Phone:       | Phone:        |
|        |        |               |              | [phone] | [phone]  |
|        |        |               |              |   Fax:       |  Fax:         |
|        |        |               |              | [phone] | [phone]  |
+--------+--------+---------------+--------------+--------------+---------------+
 
 
 
 
 Encounter Details
 
 
+--------+---------+----------------------+----------------------+----------------------+
| Date   | Type    | Department           | Care Team            | Description          |
+--------+---------+----------------------+----------------------+----------------------+
| / | Office  |   Pediatric          |   Neema Khalil,   | Polysplenia (Primary |
| 2015   | Visit   | Gastroenterology at  | MD  [address] |  Dx); Transplanted   |
|        |         | [place]          |   [place]       | liver (HCC);         |
|        |         | [facility]  | [address]           | Interrupted inferior |
|        |         |  [address] [facility] | [phone]         |  vena cava; Aortic   |
|        |         |  A[address]  Mailcode:  | [phone] (Fax)   | insufficiency;       |
|        |         | [facility]  [address]   |                      | Aortic root          |
|        |         | [place], [place]         |                      | dilatation (HCC)     |
|        |         | [address]           |                      |                      |
|        |         | [phone]         |                      |                      |
+--------+---------+----------------------+----------------------+----------------------+
 
 
 
 
 Social History
 
 
+-------------------+-------+-----------+--------+------+
| Tobacco Use       | Types | Packs/Day | Years  | Date |
|                   |       |           | Used   |      |
+-------------------+-------+-----------+--------+------+
| Passive Smoke     |       |           |        |      |
| Exposure - Never  |       |           |        |      |
| Smoker            |       |           |        |      |
+-------------------+-------+-----------+--------+------+
 
 
 
+---------------------+---+---+---+
| Smokeless Tobacco:  |   |   |   |
| Never Used          |   |   |   |
+---------------------+---+---+---+
 
 
 
+-------------+-------------+---------+----------+
| Alcohol Use | Drinks/Week | oz/Week | Comments |
+-------------+-------------+---------+----------+
| Not Asked   |             |         |          |
+-------------+-------------+---------+----------+
 
 
 
+------------------+---------------+
| Sex Assigned at  | Date Recorded |
| Birth            |               |
+------------------+---------------+
| Not on file      |               |
+------------------+---------------+
 
 
 
+----------------+-------------+-------------+
| Job Start Date | Occupation  | Industry    |
+----------------+-------------+-------------+
| Not on file    | Not on file | Not on file |
+----------------+-------------+-------------+
 
 
 
+----------------+--------------+------------+
| Travel History | Travel Start | Travel End |
+----------------+--------------+------------+
 
 
 
+-------------------------------------+
| No recent travel history available. |
+-------------------------------------+
 documented as of this encounter
 
 
 Last Filed Vital Signs
 
 
+-------------------+----------------------+----------------------+----------+
| Vital Sign        | Reading              | Time Taken           | Comments |
+-------------------+----------------------+----------------------+----------+
| Blood Pressure    | 138/96               | 2015  9:13 AM  |          |
|                   |                      | PDT                  |          |
+-------------------+----------------------+----------------------+----------+
| Pulse             | 72                   | 2015  9:13 AM  |          |
|                   |                      | PDT                  |          |
+-------------------+----------------------+----------------------+----------+
| Temperature       | -                    | -                    |          |
+-------------------+----------------------+----------------------+----------+
| Respiratory Rate  | -                    | -                    |          |
+-------------------+----------------------+----------------------+----------+
| Oxygen Saturation | -                    | -                    |          |
+-------------------+----------------------+----------------------+----------+
| Inhaled Oxygen    | -                    | -                    |          |
| Concentration     |                      |                      |          |
+-------------------+----------------------+----------------------+----------+
| Weight            | 72.6 kg (160 lb 0.9  | 2015  9:13 AM  |          |
|                   | oz)                  | PDT                  |          |
+-------------------+----------------------+----------------------+----------+
| Height            | 164.8 cm (5' 4.88")  | 2015  9:13 AM  |          |
|                   |                      | PDT                  |          |
+-------------------+----------------------+----------------------+----------+
| Body Mass Index   | 26.73                | 2015  9:13 AM  |          |
|                   |                      | PDT                  |          |
+-------------------+----------------------+----------------------+----------+
 documented in this encounter
 
 Progress Notes
 Neema Khalil MD - 2015  2:07 AM PDTFormatting of this note might be different fro
m the original.
 Primary Care Provider: Lily Horton MD
 
 [facility] 
 DOS: 2015
 Visit type: Follow-up 
 
 Patient accompanied by: mother
  used: no
 
 CC: 
 - Post-liver transplant care 
 - High risk medication management
 
 Patient Active Problem List 
 Diagnosis 
   Transplanted Liver 
   VSD (ventricular septal defect), perimembranous 
   Polysplenia 
   Interrupted inferior vena cava 
   Aortic insufficiency 
   Aortic root dilatation 
 
  PAST Hx/kim: Biliary atresia
 -  s/p OLT at [facility]. She was last seen by Dr Burkett in . 
In , it was noticed that she has not been seen, attempts to reach to family was unsucces
 
sful. lost to follow up since that time. 
 - Cardiology: last cardiology note is from  from Dr Lily Horton, cardiologist at [facility]. Her cardiac dx includes 1) moderate perimembraneous VSD nearly occluded by aneurysmal
 tissue leaving a tiny VSD, 2) trace central aortic insufficiency, 3) Left atrial isomerism 
(polysplenia). It was recommended her annual follow up for progression of aortic insufficien
cy in which case she may need closure of VSD. No further notes available in our system as julio c minor is lost to follow up since .
 - 10/16/2014 s/p VSD repair, 8-mm Amplatzer Vascular Plug IV with no significant residual s
hunting noted.
 
 INTERVAL HISTORY:
 - not taking prograf since 2014
 - no complaints,no abdominal pain, emesis, diarrhea or constipation
 
 REVIEW OF SYSTEMS: 
 General:  No fever, fatigue, or weight loss.  
 Eyes:  No changes in vision, no eye irritation or discharge.  
 ENT:  No nosebleeds, nasal congestion, difficulty swallowing, no mouth sores. 
 Respiratory:  No shortness of breath, cough, wheezing.
 Cardiovascular:  No difficulty breathing, edema, cyanosis.  
 Genitourinary:  No urinary infections.  
 Neurologic:  No seizures. No headaches.  
 Musculoskeletal:  No joint pain, swelling. No back pain.  Full range of motion.
 Skin:  No itching, rash, jaundice.  
 Psychological:  No abnormal anxiety, depression. Attending school regularly.
 Heme: No anemia, abnormal bleeding, easy bruising
 Lymphatic: No enlarged lymph nodes.
 Metabolic/Endo: no glucose intolerance, hypothyroidism
 Allergy/immunology: no seasonal or food allergies, no asthma
 
 All other ROS are negative.
 
  
 Past Medical History 
 Diagnosis Date 
   Biliary atresia  
 
 Past Surgical History 
 Procedure Laterality Date 
   Liver transplant  2000 
   at [facility] 
   Vsd repair, amplatzer device  10/16/2014 
   8-mm Amplatzer Vascular Plug IV, no significant residual shunting 
 
 FHx: reviewed, unchanged
 
 SHx: reviewed, unchanged
 
 Allergies 
 Allergen Reactions 
   Sulfa (Sulfonamide Antibiotics) Hives and Swelling-Facial 
   Varicella Vaccines  
   Possible reaction with sulfa meds 
 
 PHYSICAL EXAMINATION: 
 
 Patient reports a pain level of  0 today. No action required.
 
 Ht 1.648 m (5' 4.88") (62 %*, Z = 0.32), Wt 72.6 kg (160 lb 0.9 oz) (91 %*, Z = 1.36), BP 1
38/96, Pulse 72, BMI 26.73 kg/(m^2).
 
 
 APPEARANCE: alert, active and in no apparent distress. 
 SKIN: no jaundice or rashes. 
 HEENT: normocephalic, PERRLA, mucous membranes moist. 
 NECK: supple, without thyromegaly. 
 CHEST: clear to auscultation bilaterally. 
 CV: no murmurs. 
 ABDOMEN: soft, NTND, no hepatosplenomegaly. 
 BACK: without tenderness. 
 MUSCULOSKELETAL: no muscle wasting, no deformity.
 EXTREMITIES: No edema, clubbing, deformity.
 NEURO: grossly intact
 
  
 ASSESSMENT: 16 yo female with
 
 759.0   Polysplenia
 V42.7   Transplanted liver
 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
 424.1   Aortic insufficiency
 447.71  Aortic root dilatation
 
 Nila is 15 yrs out from her transplant, she continues not to take her prograf. She has not
 shown any signs of rejection. Will continue to monitor her labs and will check an US w dopp
ler to evaluate for liver fibrosis.
 
 PLAN/RECOMMENDATIONS:  
 - check liver US w doppler
 - Labs: CBC w diff, CMP, GGT: every 3 months
 - follow up in liver transplant clinic: 2016, will check transient elastography at [facility]
t time
 
 Health Care Maintenance: 
 - immunizations, she can receive liver vaccines as she is not on immunosuppressive meds.
 - Flu vaccine (intramuscular): every . 
 - Dental care every 6 months
 
 At this visit: 
 Dr. Jorge Aguirre and DHARMESH Whatley from [facility] were present
 as observers during this visit.
 Psychosocial or economic issues that may affect patient's medical care or well being:none 
 Co-morbid chronic medical problems that may affect future procedural sedation risk: NA
 
 Labs/imaging:
 
 Component
     Latest Ref Rng 2015 
 GLUCOSE, PLASMA 
     70 - 100 mg/dL 79 
 ANION GAP
     7 - 21 16.8 
 CREATININE PLASMA 
     0.60 - 1.20 mg/dL 0.66 
 TOTAL PROTEIN
     6.1 - 8.5 g/dL 7.1 
 ALBUMIN, PLASMA
     3.5 - 5.0 g/dL 4.0 
 CALCIUM, PLASMA
     8.4 - 10.2 mg/dL 9.4 
 BILIRUBIN TOTAL
 
     0.0 - 1.2  0.2 
 ALK PHOS
     30 - 128 U/L 88 
 AST(SGOT)
     13 - 39 U/L 20 
 SODIUM, PLASMA 
     132 - 143 mmol/L 137 
 POTASSIUM
     3.6 - 5.1 mmol/L 3.8 
 CHLORIDE, PLASMA 
     95 - 112 mmol/L 102 
 TOTAL CO2
     19 - 31 mmol/L 22 
 ALT (SGPT)
     7 - 52 U/L 10 
 BUN/CREATININE RATIO   6.0 - 28.6 21.2 
 UREA NITROGEN, SERUM   6 - 23 mg/dL 14 
 WHITE CELL COUNT
     4.5 - 11.0 K/cu mm 8.3 
 RED CELL COUNT
     3.8 - 5.1 M/cu mm 4.55 
 HEMOGLOBIN
     12.0 - 16.0 g/dL 13.5 
 HEMATOCRIT
     35 - 45 % 40.2 
 MCV    81 - 99 fL 88.3 
 PLATELET COUNT
     140 - 440 K/cu mm 313 
 CHOLESTEROL  
     0 - 200 mg/dL 123 
 TRIGLYCERIDES
     30 - 150 mg/dL 106 
 LDL CHOLEST
     100 - 215 mg/dL 140 
 GAMMA GLUTAMYL TRANSFERASE
     5 - 60 u/l 7 
 MAGNESIUM,PLASMA
     1.7 - 2.5 mg/dL 1.8 
 
 Neema Khalil MD
 Pediatric Gastroenterology
 Consult line: [phone] 
 
 Electronically signed by Neema Khalil MD at 2015  2:23 AM PDTdocumented in this en
counter
 
 Plan of Treatment
 Not on filedocumented as of this encounter
 
 Visit Diagnoses
 
 
+-----------------------------------------------------------------------------+
| Diagnosis                                                                   |
+-----------------------------------------------------------------------------+
|   Polysplenia - Primary  Congenital anomalies of spleen                     |
+-----------------------------------------------------------------------------+
|   Transplanted liver (HCC)  Liver replaced by transplant                    |
+-----------------------------------------------------------------------------+
|   Interrupted inferior vena cava  Other congenital anomalies of great veins |
 
+-----------------------------------------------------------------------------+
|   Aortic insufficiency  Aortic valve disorders                              |
+-----------------------------------------------------------------------------+
|   Aortic root dilatation (HCC)  Thoracic aortic ectasia                     |
+-----------------------------------------------------------------------------+
 documented in this encounter

## 2019-11-01 NOTE — XMS
Encounter Summary
  Created on: 2019
 
 Bonifacio Nila JB
 External Reference #: [id number]
 : [birth year]
 Sex: Female
 
 Demographics
 
 
+-----------------------+------------------------+
| Address               | [address]            |
|                       | [place]  [address]   |
+-----------------------+------------------------+
| Home Phone            | [phone]        |
+-----------------------+------------------------+
| Preferred Language    | Unknown                |
+-----------------------+------------------------+
| Marital Status        | Single                 |
+-----------------------+------------------------+
| Zoroastrianism Affiliation | Unknown                |
+-----------------------+------------------------+
| Race                  | White                  |
+-----------------------+------------------------+
| Ethnic Group          | Not  or  |
+-----------------------+------------------------+
 
 
 Author
 
 
+--------------+------------------------------+
| Author       | [facility] |
+--------------+------------------------------+
| Organization | [facility] |
+--------------+------------------------------+
| Address      | Unknown                      |
+--------------+------------------------------+
| Phone        | Unavailable                  |
+--------------+------------------------------+
 
 
 
 Support
 
 
+-----------------+--------------+---------+-----------------+
| Name            | Relationship | Address | Phone           |
+-----------------+--------------+---------+-----------------+
| Kit Valdovinos   | ECON         | Unknown | [phone] |
+-----------------+--------------+---------+-----------------+
| Magdalena Valdovinos | ECON         | Unknown | [phone] |
+-----------------+--------------+---------+-----------------+
 
 
 
 Care Team Providers
 
 
 
+-----------------------+------+-------------+
| Care Team Member Name | Role | Phone       |
+-----------------------+------+-------------+
| No Pcp Per Patient    | PCP  | Unavailable |
+-----------------------+------+-------------+
 
 
 
 Encounter Details
 
 
+--------+----------+----------------------+----------------------+-------------+
| Date   | Type     | Department           | Care Team            | Description |
+--------+----------+----------------------+----------------------+-------------+
| 09/15/ | Abstract |   Pediatric          |   Neema Khalil,   |             |
|    |          | Gastroenterology at  | [address] |             |
|        |          | [facility]          |   [place], [place]       |             |
|        |          | [facility]  | [address]           |             |
|        |          |  [address] | [phone]         |             |
|        |          [address]  Mailcode:  | [phone] (Fax)   |             |
|        |          | [facility]  [facility]   |                      |             |
|        |          | [place], [place]         |                      |             |
|        |          | [address]           |                      |             |
|        |          | [phone]         |                      |             |
+--------+----------+----------------------+----------------------+-------------+
 
 
 
 Social History
 
 
+-------------------+-------+-----------+--------+------+
| Tobacco Use       | Types | Packs/Day | Years  | Date |
|                   |       |           | Used   |      |
+-------------------+-------+-----------+--------+------+
| Passive Smoke     |       |           |        |      |
| Exposure - Never  |       |           |        |      |
| Smoker            |       |           |        |      |
+-------------------+-------+-----------+--------+------+
 
 
 
+---------------------+---+---+---+
| Smokeless Tobacco:  |   |   |   |
| Never Used          |   |   |   |
+---------------------+---+---+---+
 
 
 
+-------------+-------------+---------+----------+
| Alcohol Use | Drinks/Week | oz/Week | Comments |
+-------------+-------------+---------+----------+
| Not Asked   |             |         |          |
+-------------+-------------+---------+----------+
 
 
 
+------------------+---------------+
 
| Sex Assigned at  | Date Recorded |
| Birth            |               |
+------------------+---------------+
| Not on file      |               |
+------------------+---------------+
 
 
 
+----------------+-------------+-------------+
| Job Start Date | Occupation  | Industry    |
+----------------+-------------+-------------+
| Not on file    | Not on file | Not on file |
+----------------+-------------+-------------+
 
 
 
+----------------+--------------+------------+
| Travel History | Travel Start | Travel End |
+----------------+--------------+------------+
 
 
 
+-------------------------------------+
| No recent travel history available. |
+-------------------------------------+
 documented as of this encounter
 
 Plan of Treatment
 Not on filedocumented as of this encounter
 
 Procedures
 
 
+----------------------+--------+-------------+----------------------+----------------------
+
| Procedure Name       | Priori | Date/Time   | Associated Diagnosis | Comments             
|
|                      | ty     |             |                      |                      
|
+----------------------+--------+-------------+----------------------+----------------------
+
| CBC, WITH            | Routin | 2014  |                      |   Results for this   
|
| DIFFERENTIAL         | e      | 11:57 AM    |                      | procedure are in the 
|
|                      |        | PDT         |                      |  results section.    
|
+----------------------+--------+-------------+----------------------+----------------------
+
| COMPLETE METABOLIC   | Routin | 2014  |                      |   Results for this   
|
| SET                  | e      | 11:57 AM    |                      | procedure are in the 
|
| (NA,K,CL,CO2,BUN,CRE |        | PDT         |                      |  results section.    
|
| AT,GLUC,CA,AST,ALT,B |        |             |                      |                      
|
| ASHWINI TOTAL,ALK        |        |             |                      |                      
|
| PHOS,ALB,PROT TOTAL) |        |             |                      |                      
 
|
+----------------------+--------+-------------+----------------------+----------------------
+
 documented in this encounter
 
 Results
 CBC, WITH DIFFERENTIAL (2014 11:57 AM PDT)
 
+-------------+----------+-----------------+------------+--------------+
| Component   | Value    | Ref Range       | Performed  | Pathologist  |
|             |          |                 | At         | Signature    |
+-------------+----------+-----------------+------------+--------------+
| WHITE CELL  | 6.6      | 4.4 - 11.8 K/cu | [facility]  |              |
| COUNT       |          |  mm             | LAB -      |              |
|             |          |                 | [place]  |              |
+-------------+----------+-----------------+------------+--------------+
| RED CELL    | 4.83     | 3.8 - 5.3 M/cu  | [facility]  |              |
| COUNT       |          | mm              | LAB -      |              |
|             |          |                 | [place]  |              |
+-------------+----------+-----------------+------------+--------------+
| HEMOGLOBIN  | 14.7     | 11.1 - 15.7     | [facility]  |              |
|             |          | g/dL            | LAB -      |              |
|             |          |                 | [place]  |              |
+-------------+----------+-----------------+------------+--------------+
| HEMATOCRIT  | 42.8     | 32 - 47 %       | [facility]  |              |
|             |          |                 | LAB -      |              |
|             |          |                 | [place]  |              |
+-------------+----------+-----------------+------------+--------------+
| MCV         | 88.6     | 73 - 91 fL      | [facility]  |              |
|             |          |                 | LAB -      |              |
|             |          |                 | [place]  |              |
+-------------+----------+-----------------+------------+--------------+
| MCH         | 30       | 25 - 31 pg      | [facility]  |              |
|             |          |                 | LAB -      |              |
|             |          |                 | [place]  |              |
+-------------+----------+-----------------+------------+--------------+
| MCHC        | 34       | 30 - 36 g/dL    | [facility]  |              |
|             |          |                 | LAB -      |              |
|             |          |                 | [place]  |              |
+-------------+----------+-----------------+------------+--------------+
| PLATELET    | 328      | 140 - 440 K/cu  | [facility]  |              |
| COUNT       |          | mm              | LAB -      |              |
|             |          |                 | [place]  |              |
+-------------+----------+-----------------+------------+--------------+
| NEUTROPHIL  | 63.8     | 35 - 65 %       | [facility]  |              |
| %           |          |                 | LAB -      |              |
|             |          |                 | [place]  |              |
+-------------+----------+-----------------+------------+--------------+
| LYMPHOCYTE  | 25.1 (A) | 28 - 48 %       | [facility]  |              |
| %           |          |                 | LAB -      |              |
|             |          |                 | [place]  |              |
+-------------+----------+-----------------+------------+--------------+
| MONOCYTE %  | 7.2      | 0 - 12 %        | [facility]  |              |
|             |          |                 | LAB -      |              |
|             |          |                 | [place]  |              |
+-------------+----------+-----------------+------------+--------------+
| EOS %       | 3.1      | 0 - 6 %         | [facility]  |              |
|             |          |                 | LAB -      |              |
|             |          |                 | [place]  |              |
+-------------+----------+-----------------+------------+--------------+
 
| BASO %      | 0.8      | 0 - 2 %         | [facility]  |              |
|             |          |                 | LAB -      |              |
|             |          |                 | [place]  |              |
+-------------+----------+-----------------+------------+--------------+
| RDW         | 13.8     | 10.5 - 15.0 %   | [facility]  |              |
|             |          |                 | LAB -      |              |
|             |          |                 | [place]  |              |
+-------------+----------+-----------------+------------+--------------+
 
 
 
+---------------+
| Specimen      |
+---------------+
| Blood - Blood |
+---------------+
 
 
 
+--------------------+----------------------+--------------------+----------------+
| Performing         | Address              | City/State/Zipcode | Phone Number   |
| Organization       |                      |                    |                |
+--------------------+----------------------+--------------------+----------------+
|   [facility]  |   [address] | [place], [place]      |   [phone] |
| [place]          |                      |                    |                |
+--------------------+----------------------+--------------------+----------------+
 COMPLETE METABOLIC SET (NA,K,CL,CO2,BUN,CREAT,GLUC,CA,AST,ALT,BILI TOTAL,ALK PHOS,ALB,PROT 
TOTAL) (2014 11:57 AM PDT)
 
+-------------+--------+-----------------+------------+--------------+
| Component   | Value  | Ref Range       | Performed  | Pathologist  |
|             |        |                 | At         | Signature    |
+-------------+--------+-----------------+------------+--------------+
| GLUCOSE,    | 87     | 70 - 100 mg/dL  | [facility]  |              |
| PLASMA      |        |                 | LAB -      |              |
| (LAB)       |        |                 | [place]  |              |
+-------------+--------+-----------------+------------+--------------+
| BUN, PLASMA | 13     | 6 - 23 mg/dL    | [facility]  |              |
|  (LAB)      |        |                 | LAB -      |              |
|             |        |                 | [place]  |              |
+-------------+--------+-----------------+------------+--------------+
| CREATININE  | 0.75   | 0.40 - 1.05     | [facility]  |              |
| PLASMA      |        | mg/dL           | LAB -      |              |
| (LAB)       |        |                 | [place]  |              |
+-------------+--------+-----------------+------------+--------------+
| TOTAL       | 7.6    | 6.1 - 8.5 g/dL  | [facility]  |              |
| PROTEIN,    |        |                 | LAB -      |              |
| PLASMA      |        |                 | [place]  |              |
| (LAB)       |        |                 |            |              |
+-------------+--------+-----------------+------------+--------------+
| ALBUMIN,    | 4.4    | 3.5 - 5.0 g/dL  | [facility]  |              |
| PLASMA      |        |                 | LAB -      |              |
| (LAB)       |        |                 | [place]  |              |
+-------------+--------+-----------------+------------+--------------+
| CALCIUM,    | 9.9    | 8.4 - 10.2      | [facility]  |              |
| PLASMA      |        | mg/dL           | LAB -      |              |
| (LAB)       |        |                 | [place]  |              |
+-------------+--------+-----------------+------------+--------------+
| BILIRUBIN   | 0.8    | 0 - 1.2         | [facility]  |              |
| TOTAL       |        | Transcutaneous  | LAB -      |              |
 
|             |        | Bilirubinometer | [place]  |              |
+-------------+--------+-----------------+------------+--------------+
| ALK PHOS    | 92 (A) | 135 - 384 U/L   | [facility]  |              |
|             |        |                 | LAB -      |              |
|             |        |                 | [place]  |              |
+-------------+--------+-----------------+------------+--------------+
| AST(SGOT)   | 13     | 13 - 39 U/L     | [facility]  |              |
|             |        |                 | LAB -      |              |
|             |        |                 | [place]  |              |
+-------------+--------+-----------------+------------+--------------+
| SODIUM,     | 140    | 132 - 143       | [facility]  |              |
| PLASMA      |        | mmol/L          | LAB -      |              |
| (LAB)       |        |                 | [place]  |              |
+-------------+--------+-----------------+------------+--------------+
| POTASSIUM,  | 4.0    | 3.6 - 5.1       | [facility]  |              |
| PLASMA      |        | mmol/L          | LAB -      |              |
| (LAB)       |        |                 | [place]  |              |
+-------------+--------+-----------------+------------+--------------+
| CHLORIDE,   | 100    | 95 - 112 mmol/L | [facility]  |              |
| PLASMA      |        |                 | LAB -      |              |
| (LAB)       |        |                 | [place]  |              |
+-------------+--------+-----------------+------------+--------------+
| TOTAL CO2,  | 28     | 19 - 31 mmol/L  | [facility]  |              |
| PLASMA      |        |                 | LAB -      |              |
| (LAB)       |        |                 | [place]  |              |
+-------------+--------+-----------------+------------+--------------+
| ALT (SGPT)  | 9      | 7 - 52 U/L      | [facility]  |              |
|             |        |                 | LAB -      |              |
|             |        |                 | [place]  |              |
+-------------+--------+-----------------+------------+--------------+
| ANION GAP   | 16     | 7 - 21          | [facility]  |              |
|             |        |                 | LAB -      |              |
|             |        |                 | [place]  |              |
+-------------+--------+-----------------+------------+--------------+
| BUN/CREATIN | 17.3   | 6.0 - 28.6      | [facility]  |              |
| INE RATIO   |        |                 | LAB -      |              |
|             |        |                 | [place]  |              |
+-------------+--------+-----------------+------------+--------------+
| GLOBULIN    | 3.2    | 1.8 - 3.5       | [facility]  |              |
|             |        |                 | LAB -      |              |
|             |        |                 | [place]  |              |
+-------------+--------+-----------------+------------+--------------+
| A/G RATIO   | 1.4    | 1.1 - 2.4       | [facility]  |              |
|             |        |                 | LAB -      |              |
|             |        |                 | [place]  |              |
+-------------+--------+-----------------+------------+--------------+
 
 
 
+---------------+
| Specimen      |
+---------------+
| Blood - Blood |
+---------------+
 
 
 
+--------------------+----------------------+--------------------+----------------+
| Performing         | Address              | City/State/Zipcode | Phone Number   |
| Organization       |                      |                    |                |
 
+--------------------+----------------------+--------------------+----------------+
|   [facility]  |   [address] | [place]      |   [phone] |
| [place]          |                      |                    |                |
+--------------------+----------------------+--------------------+----------------+
 documented in this encounter
 
 Visit Diagnoses
 Not on filedocumented in this encounter

## 2019-11-01 NOTE — XMS
Encounter Summary
  Created on: 2019
 
 Bonifacio Nila JB
 External Reference #: [id number]
 : [birth year]
 Sex: Female
 
 Demographics
 
 
+-----------------------+------------------------+
| Address               | [address]            |
|                       | [place]  [address]   |
+-----------------------+------------------------+
| Home Phone            | [phone]        |
+-----------------------+------------------------+
| Preferred Language    | Unknown                |
+-----------------------+------------------------+
| Marital Status        | Single                 |
+-----------------------+------------------------+
| Taoist Affiliation | Unknown                |
+-----------------------+------------------------+
| Race                  | White                  |
+-----------------------+------------------------+
| Ethnic Group          | Not  or  |
+-----------------------+------------------------+
 
 
 Author
 
 
+--------------+------------------------------+
| Author       | [facility] |
+--------------+------------------------------+
| Organization | [facility] |
+--------------+------------------------------+
| Address      | Unknown                      |
+--------------+------------------------------+
| Phone        | Unavailable                  |
+--------------+------------------------------+
 
 
 
 Support
 
 
+-----------------+--------------+---------+-----------------+
| Name            | Relationship | Address | Phone           |
+-----------------+--------------+---------+-----------------+
| iKt Valdovinos   | ECON         | Unknown | [phone] |
+-----------------+--------------+---------+-----------------+
| Magdalena Valdovinos | ECON         | Unknown | [phone] |
+-----------------+--------------+---------+-----------------+
 
 
 
 Care Team Providers
 
 
 
+-----------------------+------+-----------------+
| Care Team Member Name | Role | Phone           |
+-----------------------+------+-----------------+
| Lily Horton MD   | PCP  | [phone] |
+-----------------------+------+-----------------+
 
 
 
 Reason for Visit
 
 
+--------+-------------------------+
| Reason | Comments                |
+--------+-------------------------+
| Other  | No lab work in one year |
+--------+-------------------------+
 
 
 
 Encounter Details
 
 
+--------+-----------+----------------------+---------------------+---------------------+
| Date   | Type      | Department           | Care Team           | Description         |
+--------+-----------+----------------------+---------------------+---------------------+
| / | Telephone |   Pediatric          |   Monserrat Spann,  | Other (No lab work  |
|    |           | Gastroenterology at  | RN  [address]     | in one year)        |
|        |           | [facility]          | [address]     |                     |
|        |           | [facility]  | [place] [address]  |                     |
|        |           |  [address] |                     |                     |
|        |           |  [address]  Mailcode:  |                     |                     |
|        |           |   [facility]   |                     |                     |
|        |           | [place], [place]         |                     |                     |
|        |           | [address]           |                     |                     |
|        |           | [phone]         |                     |                     |
+--------+-----------+----------------------+---------------------+---------------------+
 
 
 
 Social History
 
 
+----------------+-------+-----------+--------+------+
| Tobacco Use    | Types | Packs/Day | Years  | Date |
|                |       |           | Used   |      |
+----------------+-------+-----------+--------+------+
| Never Assessed |       |           |        |      |
+----------------+-------+-----------+--------+------+
 
 
 
+------------------+---------------+
| Sex Assigned at  | Date Recorded |
| Birth            |               |
+------------------+---------------+
| Not on file      |               |
+------------------+---------------+
 
 
 
 
+----------------+-------------+-------------+
| Job Start Date | Occupation  | Industry    |
+----------------+-------------+-------------+
| Not on file    | Not on file | Not on file |
+----------------+-------------+-------------+
 
 
 
+----------------+--------------+------------+
| Travel History | Travel Start | Travel End |
+----------------+--------------+------------+
 
 
 
+-------------------------------------+
| No recent travel history available. |
+-------------------------------------+
 documented as of this encounter
 
 Plan of Treatment
 Not on filedocumented as of this encounter
 
 Visit Diagnoses
 Not on filedocumented in this encounter

## 2019-11-01 NOTE — XMS
Encounter Summary
  Created on: 2019
 
 Bonifacio Nila JB
 External Reference #: [id number]
 : [birth year]
 Sex: Female
 
 Demographics
 
 
+-----------------------+------------------------+
| Address               | [address]            |
|                       | [place]  [address]   |
+-----------------------+------------------------+
| Home Phone            | [phone]        |
+-----------------------+------------------------+
| Preferred Language    | Unknown                |
+-----------------------+------------------------+
| Marital Status        | Single                 |
+-----------------------+------------------------+
| Jain Affiliation | Unknown                |
+-----------------------+------------------------+
| Race                  | White                  |
+-----------------------+------------------------+
| Ethnic Group          | Not  or  |
+-----------------------+------------------------+
 
 
 Author
 
 
+--------------+------------------------------+
| Author       | [facility] [facility] [facility] |
+--------------+------------------------------+
| Organization | [facility] |
+--------------+------------------------------+
| Address      | Unknown                      |
+--------------+------------------------------+
| Phone        | Unavailable                  |
+--------------+------------------------------+
 
 
 
 Support
 
 
+-----------------+--------------+---------+-----------------+
| Name            | Relationship | Address | Phone           |
+-----------------+--------------+---------+-----------------+
| Kit Valdovinos   | ECON         | Unknown | [phone] |
+-----------------+--------------+---------+-----------------+
| Magdalena Valdovinos | ECON         | Unknown | [phone] |
+-----------------+--------------+---------+-----------------+
 
 
 
 Care Team Providers
 
 
 
+-----------------------+------+-------------+
| Care Team Member Name | Role | Phone       |
+-----------------------+------+-------------+
 PCP  | Unavailable |
+-----------------------+------+-------------+
 
 
 
 Reason for Visit
 
 
+--------------+----------+
| Reason       | Comments |
+--------------+----------+
| Social work  |          |
| consultation |          |
+--------------+----------+
 
 
 
 Encounter Details
 
 
+--------+-------------+----------------------+---------------------+--------------+
| Date   | Type        | Department           | Care Team           | Description  |
+--------+-------------+----------------------+---------------------+--------------+
| 07/17/ | Documentati |   SOCIAL WORK        |   Coretta Armijo,  | Social work  |
|    | on          | AMBULATORY  [address]  | MS[address]  [address]    | consultation |
|        |             | [address]  | [address]     |              |
|        |             |  Mailcode: [address]      | [place], [place]        |              |
|        |             | [place], [place]         | [address]          |              |
|        |             | [address]           | [phone]        |              |
|        |             | [phone]         | [phone] (Fax)  |              |
+--------+-------------+----------------------+---------------------+--------------+
 
 
 
 Social History
 
 
+----------------+-------+-----------+--------+------+
| Tobacco Use    | Types | Packs/Day | Years  | Date |
|                |       |           | Used   |      |
+----------------+-------+-----------+--------+------+
| Never Assessed |       |           |        |      |
+----------------+-------+-----------+--------+------+
 
 
 
+------------------+---------------+
| Sex Assigned at  | Date Recorded |
| Birth            |               |
+------------------+---------------+
| Not on file      |               |
+------------------+---------------+
 
 
 
 
+----------------+-------------+-------------+
| Job Start Date | Occupation  | Industry    |
+----------------+-------------+-------------+
| Not on file    | Not on file | Not on file |
+----------------+-------------+-------------+
 
 
 
+----------------+--------------+------------+
| Travel History | Travel Start | Travel End |
+----------------+--------------+------------+
 
 
 
+-------------------------------------+
| No recent travel history available. |
+-------------------------------------+
 documented as of this encounter
 
 Plan of Treatment
 Not on filedocumented as of this encounter
 
 Visit Diagnoses
 Not on filedocumented in this encounter

## 2019-11-01 NOTE — XMS
Encounter Summary
  Created on: 2019
 
 Bonifacio Nila JB
 External Reference #: [id number]
 : [birth year]
 Sex: Female
 
 Demographics
 
 
+-----------------------+------------------------+
| Address               | [address]            |
|                       | [place]  [address]   |
+-----------------------+------------------------+
| Home Phone            | [phone]        |
+-----------------------+------------------------+
| Preferred Language    | Unknown                |
+-----------------------+------------------------+
| Marital Status        | Single                 |
+-----------------------+------------------------+
| Rastafarian Affiliation | Unknown                |
+-----------------------+------------------------+
| Race                  | White                  |
+-----------------------+------------------------+
| Ethnic Group          | Not  or  |
+-----------------------+------------------------+
 
 
 Author
 
 
+--------------+------------------------------+
| Author       | [facility] |
+--------------+------------------------------+
| Organization | [facility] |
+--------------+------------------------------+
| Address      | Unknown                      |
+--------------+------------------------------+
| Phone        | Unavailable                  |
+--------------+------------------------------+
 
 
 
 Support
 
 
+-----------------+--------------+---------+-----------------+
| Name            | Relationship | Address | Phone           |
+-----------------+--------------+---------+-----------------+
| Kit Valdovinos   | ECON         | Unknown | [phone] |
+-----------------+--------------+---------+-----------------+
| Magdalena Valdovinos | ECON         | Unknown | [phone] |
+-----------------+--------------+---------+-----------------+
 
 
 
 Care Team Providers
 
 
 
+------------------------+------+-----------------+
| Care Team Member Name  | Role | Phone           |
+------------------------+------+-----------------+
| Lily Horton MD | PCP  | [phone] |
+------------------------+------+-----------------+
 
 
 
 Encounter Details
 
 
+--------+----------+----------------------+----------------------+-------------+
| Date   | Type     | Department           | Care Team            | Description |
+--------+----------+----------------------+----------------------+-------------+
| 10/27/ | Abstract |   Pediatric          |   Neema Khalil,   |             |
|    |          | Gastroenterology at  | MD  [address] |             |
|        |          | [place]          |   [place], [place]       |             |
|        |          | [facility]  | [address]           |             |
|        |          |  [address] | [phone]         |             |
|        |          |  [address]  Mailcode:  | [phone] (Fax)   |             |
|        |          |   [place]   |                      |             |
|        |          | [place], [place]         |                      |             |
|        |          | [address]           |                      |             |
|        |          | [phone]         |                      |             |
+--------+----------+----------------------+----------------------+-------------+
 
 
 
 Social History
 
 
+-------------------+-------+-----------+--------+------+
| Tobacco Use       | Types | Packs/Day | Years  | Date |
|                   |       |           | Used   |      |
+-------------------+-------+-----------+--------+------+
| Passive Smoke     |       |           |        |      |
| Exposure - Never  |       |           |        |      |
| Smoker            |       |           |        |      |
+-------------------+-------+-----------+--------+------+
 
 
 
+---------------------+---+---+---+
| Smokeless Tobacco:  |   |   |   |
| Never Used          |   |   |   |
+---------------------+---+---+---+
 
 
 
+-------------+-------------+---------+----------+
| Alcohol Use | Drinks/Week | oz/Week | Comments |
+-------------+-------------+---------+----------+
| Not Asked   |             |         |          |
+-------------+-------------+---------+----------+
 
 
 
+------------------+---------------+
 
| Sex Assigned at  | Date Recorded |
| Birth            |               |
+------------------+---------------+
| Not on file      |               |
+------------------+---------------+
 
 
 
+----------------+-------------+-------------+
| Job Start Date | Occupation  | Industry    |
+----------------+-------------+-------------+
| Not on file    | Not on file | Not on file |
+----------------+-------------+-------------+
 
 
 
+----------------+--------------+------------+
| Travel History | Travel Start | Travel End |
+----------------+--------------+------------+
 
 
 
+-------------------------------------+
| No recent travel history available. |
+-------------------------------------+
 documented as of this encounter
 
 Plan of Treatment
 Not on filedocumented as of this encounter
 
 Procedures
 
 
+----------------------+--------+-------------+----------------------+----------------------
+
| Procedure Name       | Priori | Date/Time   | Associated Diagnosis | Comments             
|
|                      | ty     |             |                      |                      
|
+----------------------+--------+-------------+----------------------+----------------------
+
| CMV PCR              | Routin | 10/23/2015  |                      |   Results for this   
|
| QUANTITATION, PLASMA | e      |  9:25 AM    |                      | procedure are in the 
|
|                      |        | PDT         |                      |  results section.    
|
+----------------------+--------+-------------+----------------------+----------------------
+
| FRANCIS CAGLE         | Routin | 10/23/2015  |                      |   Results for this   
|
| PCR,QUANT (PLASMA OR | e      |  9:25 AM    |                      | procedure are in the 
|
|  CSF)                |        | PDT         |                      |  results section.    
|
+----------------------+--------+-------------+----------------------+----------------------
+
 documented in this encounter
 
 Results
 
 CMV PCR QUANTITATION, PLASMA (10/23/2015  9:25 AM PDT)
 
+-------------+--------------+-----------+------------+--------------+
| Component   | Value        | Ref Range | Performed  | Pathologist  |
|             |              |           | At         | Signature    |
+-------------+--------------+-----------+------------+--------------+
| CMV DNA     | <390         |           | [facility]  |              |
| QUANT       |              |           | LAB -      |              |
| COPY/ML     |              |           | [place]  |              |
+-------------+--------------+-----------+------------+--------------+
| CMV, QUANT, | <2.6         |           | [facility]  |              |
|  LOG CPY/ML |              |           | LAB -      |              |
|             |              |           | [place]  |              |
+-------------+--------------+-----------+------------+--------------+
| CMV DNA     | <227         |           | [facility]  |              |
| QUANT IU/ML |              |           | LAB -      |              |
|             |              |           | [place]  |              |
+-------------+--------------+-----------+------------+--------------+
| CMV DNA     | <2.4         |           | [facility]  |              |
| QUANT LOG   |              |           | LAB -      |              |
| IU/ML       |              |           | [place]  |              |
+-------------+--------------+-----------+------------+--------------+
| CMV DNA     | Not detected |           | [facility]  |              |
| QUANT       |              |           | LAB -      |              |
| [facility]      |              |           | [place]  |              |
+-------------+--------------+-----------+------------+--------------+
 
 
 
+---------------+
| Specimen      |
+---------------+
| Blood - Blood |
+---------------+
 
 
 
+--------------------+----------------------+--------------------+----------------+
| Performing         | Address              | City/State/Zipcode | Phone Number   |
| Organization       |                      |                    |                |
+--------------------+----------------------+--------------------+----------------+
|   [facility]  |   [address] | [place], [place]      |   [phone] |
| [place]          |                      |                    |                |
+--------------------+----------------------+--------------------+----------------+
 FRANCIS BARR PCR,QUANT (PLASMA OR CSF) (10/23/2015  9:25 AM PDT)
 
+-------------+--------------+-----------+------------+--------------+
| Component   | Value        | Ref Range | Performed  | Pathologist  |
|             |              |           | At         | Signature    |
+-------------+--------------+-----------+------------+--------------+
| EBV QUANT   | <390         |           | [facility]  |              |
| COPY/ML     |              |           | LAB -      |              |
|             |              |           | [place]  |              |
+-------------+--------------+-----------+------------+--------------+
| EBV QUANT   | Log <2.6     |           | [facility]  |              |
| SPECIMEN    |              |           | LAB -      |              |
|             |              |           | [place]  |              |
+-------------+--------------+-----------+------------+--------------+
| EBV         | Not detected |           | [facility]  |              |
| INTERPRETAT |              |           | LAB -      |              |
 
| ION         |              |           | [place]  |              |
+-------------+--------------+-----------+------------+--------------+
 
 
 
+----------+
| Specimen |
+----------+
| Blood    |
+----------+
 
 
 
+--------------------+----------------------+--------------------+----------------+
| Performing         | Address              | City/State/Zipcode | Phone Number   |
| Organization       |                      |                    |                |
+--------------------+----------------------+--------------------+----------------+
|   [facility]  |   [address] | [place], [place]      |   [phone] |
| [place]          |                      |                    |                |
+--------------------+----------------------+--------------------+----------------+
 documented in this encounter
 
 Visit Diagnoses
 Not on filedocumented in this encounter

## 2019-11-01 NOTE — XMS
Encounter Summary
  Created on: 2019
 
 Bonifacio Nila JB
 External Reference #: [id number]
 : [birth year]
 Sex: Female
 
 Demographics
 
 
+-----------------------+------------------------+
| Address               | [address]            |
|                       | [place]  [address]   |
+-----------------------+------------------------+
| Home Phone            | [phone]        |
+-----------------------+------------------------+
| Preferred Language    | Unknown                |
+-----------------------+------------------------+
| Marital Status        | Single                 |
+-----------------------+------------------------+
| Muslim Affiliation | Unknown                |
+-----------------------+------------------------+
| Race                  | White                  |
+-----------------------+------------------------+
| Ethnic Group          | Not  or  |
+-----------------------+------------------------+
 
 
 Author
 
 
+--------------+------------------------------+
| Author       | [facility] |
+--------------+------------------------------+
| Organization | [facility] |
+--------------+------------------------------+
| Address      | Unknown                      |
+--------------+------------------------------+
| Phone        | Unavailable                  |
+--------------+------------------------------+
 
 
 
 Support
 
 
+-----------------+--------------+---------+-----------------+
| Name            | Relationship | Address | Phone           |
+-----------------+--------------+---------+-----------------+
| Kit Valdovinos   | ECON         | Unknown | [phone] |
+-----------------+--------------+---------+-----------------+
| Magdalena Valdovinos | ECON         | Unknown | [phone] |
+-----------------+--------------+---------+-----------------+
 
 
 
 Care Team Providers
 
 
 
+-----------------------+------+-----------------+
| Care Team Member Name | Role | Phone           |
+-----------------------+------+-----------------+
| Lily Horton MD   | PCP  | [phone] |
+-----------------------+------+-----------------+
 
 
 
 Reason for Visit
 
 
+-------------------+----------+
| Reason            | Comments |
+-------------------+----------+
| Follow-up in      |          |
| outpatient clinic |          |
+-------------------+----------+
 Office Visit - E/M Services (Routine)
 
+--------+--------------+---------------+--------------+--------------+---------------+
| Status | Reason       | Specialty     | Diagnoses /  | Referred By  | Referred To   |
|        |              |               | Procedures   | Contact      | Contact       |
+--------+--------------+---------------+--------------+--------------+---------------+
| Closed |   Specialty  | Pediatric     |   Diagnoses  |   Sol,    |   Ped Gastro  |
|        | Services     | Gastroenterol |  Liver       | Lily CARY MD | [facility]  [address]   |
|        | Required     | ogy           | replaced by  |   PEDS       | [address]   |
|        |              |               | transplant   | SPECIALISTS  | [address]       |
|        |              |               | (HCC)        | OF [place] | Mailcode:     |
|        |              |               |              |   [address]   | [facility]         |
|        |              |               |              | [address] | [address]   |
|        |              |               |              |  [address]         | [place], [place]  |
|        |              |               |              | [place],   | [address]    |
|        |              |               |              | [place] [address]     | Phone:        |
|        |              |               |              | Phone:       | [phone]  |
|        |              |               |              | [phone] |  Fax:         |
|        |              |               |              |   Fax:       | [phone]  |
|        |              |               |              | [phone] |               |
+--------+--------------+---------------+--------------+--------------+---------------+
 
 
 
 
 Encounter Details
 
 
+--------+---------+----------------------+--------------------+----------------------+
| Date   | Type    | Department           | Care Team          | Description          |
+--------+---------+----------------------+--------------------+----------------------+
| 10/22/ | Office  |   Specialty Clinics  |   Ivis Burkett MD | Complications of     |
|    | Visit   | at [facility]  [address] [facility]  |                    | Transplanted Liver;  |
|        |         | [address]      |                    | Transplanted Liver   |
|        |         | Mailcode: [facility]       |                    | (HCC)                |
|        |         | [address]          |                    |                      |
|        |         | [place], [place]         |                    |                      |
|        |         | [address]           |                    |                      |
|        |         | [phone]         |                    |                      |
+--------+---------+----------------------+--------------------+----------------------+
 
 
 
 
 Social History
 
 
+----------------+-------+-----------+--------+------+
| Tobacco Use    | Types | Packs/Day | Years  | Date |
|                |       |           | Used   |      |
+----------------+-------+-----------+--------+------+
| Never Assessed |       |           |        |      |
+----------------+-------+-----------+--------+------+
 
 
 
+------------------+---------------+
| Sex Assigned at  | Date Recorded |
| Birth            |               |
+------------------+---------------+
| Not on file      |               |
+------------------+---------------+
 
 
 
+----------------+-------------+-------------+
| Job Start Date | Occupation  | Industry    |
+----------------+-------------+-------------+
| Not on file    | Not on file | Not on file |
+----------------+-------------+-------------+
 
 
 
+----------------+--------------+------------+
| Travel History | Travel Start | Travel End |
+----------------+--------------+------------+
 
 
 
+-------------------------------------+
| No recent travel history available. |
+-------------------------------------+
 documented as of this encounter
 
 Last Filed Vital Signs
 
 
+-------------------+----------------------+----------------------+----------+
| Vital Sign        | Reading              | Time Taken           | Comments |
+-------------------+----------------------+----------------------+----------+
| Blood Pressure    | 131/78               | 10/22/2009  9:20 AM  |          |
|                   |                      | PDT                  |          |
+-------------------+----------------------+----------------------+----------+
| Pulse             | 98                   | 10/22/2009  9:20 AM  |          |
|                   |                      | PDT                  |          |
+-------------------+----------------------+----------------------+----------+
| Temperature       | -                    | -                    |          |
+-------------------+----------------------+----------------------+----------+
| Respiratory Rate  | -                    | -                    |          |
+-------------------+----------------------+----------------------+----------+
| Oxygen Saturation | -                    | -                    |          |
+-------------------+----------------------+----------------------+----------+
 
| Inhaled Oxygen    | -                    | -                    |          |
| Concentration     |                      |                      |          |
+-------------------+----------------------+----------------------+----------+
| Weight            | 50.4 kg (111 lb 1.8  | 10/22/2009  9:20 AM  |          |
|                   | oz)                  | PDT                  |          |
+-------------------+----------------------+----------------------+----------+
| Height            | 153.3 cm (5' 0.35")  | 10/22/2009  9:20 AM  |          |
|                   |                      | PDT                  |          |
+-------------------+----------------------+----------------------+----------+
| Body Mass Index   | 21.45                | 10/22/2009  9:20 AM  |          |
|                   |                      | PDT                  |          |
+-------------------+----------------------+----------------------+----------+
 documented in this encounter
 
 Patient Instructions
 Patient Instructions Ivis Burkett MD - 10/22/2009 10:25 AM PDTPlan:  
 1. Please have labs done   every  3  Months
 
 2. EBV PCR Quantitative                     every   12  months
 
 3 . Other labs or imaging studies: none
 
 5. Please get Flu shots  from your PCP
  Be sure to get both the seasonal influenza shot and the H1N1 influenza shot
  Your child should not receive Flumist or any live virus vaccines
  Family members should not receive Flumist
  If your child develops flu like symptoms of cough, fever, muscle pains, then call your [facility] doctor immediately to start Tamiflu
 
 7. change in medications:
    non3  
 
 8. Return to clinic in   12  months
 
 Electronically signed by Ivis Burkett MD at 10/22/2009 10:25 AM PDT
 documented in this encounter
 
 Progress Notes
 Ivis Burkett MD - 10/22/2009 11:17 AM PDTFormatting of this note might be different from t
he original.
 
 
 Nila Valdovinos is an 9 y.o. female, who is referred by Lily Horton, who returns to [facility] for transplant followup. Dr Aguirre from [place] was an observ
er in clinic today.
 
 Patient Active Problem List: 
   VSD (Ventricular Septal Defect)[745.4Y]
     Comment: With polysplenia 
   Aortic Valve Insufficiency[424.1F]
   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
     Comment: For biliary atresia, 2000 at [facility]
 
  
 Current outpatient prescriptions prior to encounter 
 Medication Sig Dispense Refill 
   PROGRAF 1 mg Oral Capsule Take 1 Cap by mouth two times daily.   60  3 
 
 Allergies 
 Allergen Reactions 
 
   Sulfa (Sulfonamide Antibiotics) Hives and Swelling-Facial 
   Varicella  
   Possible reaction with sulfa meds 
 
 HPI:Nila was seen in clinic with both of her parents. She reports that she has been doing 
well, and has not had any significant illnesses since her last visit except one UTI. She say
s she misses about one dose of Prograf a week. She has not had liver labs between Feb and -- her mother said "I guess we need to be a little better about them". 
 
 She is seeing her cardiologist every 2 years and last saw him 1 year ago. She gets SBE prop
hylaxis because of polysplenia. Her energy is good and she backpacked 30 miles last summer. 
 
 Has not had a flu shot yet.
 
 lives with parents, and sibling and grade 5. Says school is going well.
 
 Physical Examination:
 
 Ht 153.3 cm (5' 0.35") (93 %ile), Wt 50.4 kg (111 lbs 1.8 oz) (93 %ile), Weight for age(%) 
 92.64%, BMI for age(%)  88.85%, Length for age(%)  93.23%, /78, Pulse 98. 
 88.85% of growth percentile based on BMI-for-age.
 
 Appearance: alert, active and in no apparent distress.
 Skin:  turgor normal, capillary refill brisk, no rashes, petechiae 
 HEENT: normocephalic,  no icterus,nose without discharge, mucous membranes moist, pharynx u
nremarkable
 Neck: supple, without thyromegaly
 Chest: clear to auscultation bilaterally.
 CV: regular sinus rhythm, normal S1 and S2, no murmurs.
 Abdomen: , well healed incisions, soft, no distention, no tenderness to palpation, no rebou
nd, no guarding, no palpable masses, normal bowel sounds, no hepatosplenomegaly
 Musculoskeletal: grossly intact without clubbing or edema
 Neuro: appropriate interactions, symmetric facies, moves all extremities well, 
 Nodes: no signficant cervical, supraclavicular,adenopathy
 
 Last labs 10/20/09
 AST 20, ALT 19, alk phos 220, GGT 10, cr 0.51, rest normal also
 Wbc 5.7, hct 43, MCV 87, plt 320
 EBV PCR pending
 
 Lab Results 
 Component Value Date 
    0.5 09 
    3.4 10/9/07 
 
  
  
 Assessment: Patient Active Problem List: 
   VSD (Ventricular Septal Defect)[745.4Y]
     Comment: With polysplenia 
   Aortic Valve Insufficiency[424.1F]
   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
     Comment: For biliary atresia, 2000 at [facility]
 
 Plan:  
 1. CBC, primary transplant panel, drug levels-Prograf 1     every  3  months
 2. EBV PCR Quantitative                                                             every  
 12  months
 3. CMV PCR Quantitative                                                            every  -
   months
 
 
 4. Other labs:   none
 5. Imaging:       non3
 6. Flu shot and other non-live virus vaccinations from PCP
 
 7. If she develops flu -ross symptoms, she should be treated immediately empirically with Ta
miflu for a 10 day course
 8. change in medications: none
       
 9. Return to clinic    12  months Electronically signed by Ivis Burkett MD at 10/26/2009  1
:01 PM PDTdocumented in this encounter
 
 Plan of Treatment
 Not on filedocumented as of this encounter
 
 Procedures
 
 
+----------------+--------+-------------+----------------------+----------------------+
| Procedure Name | Priori | Date/Time   | Associated Diagnosis | Comments             |
|                | ty     |             |                      |                      |
+----------------+--------+-------------+----------------------+----------------------+
| LAB REPORTS    |        | 10/20/2009  |                      |   Results for this   |
|                |        | 12:00 AM    |                      | procedure are in the |
|                |        | PDT         |                      |  results section.    |
+----------------+--------+-------------+----------------------+----------------------+
 documented in this encounter
 
 Results
 LAB REPORTS (10/20/2009 12:00 AM PDT)
 
+----------------------------------------------------------+--------------+
| Narrative                                                | Performed At |
+----------------------------------------------------------+--------------+
|   This result has an attachment that is not available.   |              |
+----------------------------------------------------------+--------------+
 
 
 
+-----------------------------------------------+
| Procedure Note                                |
+-----------------------------------------------+
|   Dang Bowling - 10/20/2009 12:00 AM PDT    |
|                                               |
+-----------------------------------------------+
 documented in this encounter
 
 Visit Diagnoses
 
 
+----------------------------------------------------------+
| Diagnosis                                                |
+----------------------------------------------------------+
|   Complications of transplanted liver                    |
+----------------------------------------------------------+
|   Transplanted liver (HCC)  Liver replaced by transplant |
+----------------------------------------------------------+
 documented in this encounter

## 2019-11-01 NOTE — XMS
Encounter Summary
  Created on: 2019
 
 Bonifacio Nila JB
 External Reference #: [id number]
 : [birth year]
 Sex: Female
 
 Demographics
 
 
+-----------------------+------------------------+
| Address               | [address]            |
|                       | [place]  [address]   |
+-----------------------+------------------------+
| Home Phone            | [phone]        |
+-----------------------+------------------------+
| Preferred Language    | Unknown                |
+-----------------------+------------------------+
| Marital Status        | Single                 |
+-----------------------+------------------------+
| Sabianism Affiliation | Unknown                |
+-----------------------+------------------------+
| Race                  | White                  |
+-----------------------+------------------------+
| Ethnic Group          | Not  or  |
+-----------------------+------------------------+
 
 
 Author
 
 
+--------------+------------------------------+
| Author       | [facility] |
+--------------+------------------------------+
| Organization | [facility] |
+--------------+------------------------------+
| Address      | Unknown                      |
+--------------+------------------------------+
| Phone        | Unavailable                  |
+--------------+------------------------------+
 
 
 
 Support
 
 
+-----------------+--------------+---------+-----------------+
| Name            | Relationship | Address | Phone           |
+-----------------+--------------+---------+-----------------+
| Kit Valdovinos   | ECON         | Unknown | [phone] |
+-----------------+--------------+---------+-----------------+
| Magdalena Valdovinos | ECON         | Unknown | [phone] |
+-----------------+--------------+---------+-----------------+
 
 
 
 Care Team Providers
 
 
 
+-----------------------+------+-------------+
| Care Team Member Name | Role | Phone       |
+-----------------------+------+-------------+
 PCP  | Unavailable |
+-----------------------+------+-------------+
 
 
 
 Reason for Visit
 Office Visit - E/M Services (Routine)
 
+--------+--------------+---------------+--------------+--------------+---------------+
| Status | Reason       | Specialty     | Diagnoses /  | Referred By  | Referred To   |
|        |              |               | Procedures   | Contact      | Contact       |
+--------+--------------+---------------+--------------+--------------+---------------+
| Closed |   Specialty  | Pediatric     |   Diagnoses  |   [facility]   |   Ped         |
|        | Services     | Endocrinology |              | Epic Dept    | Endocrinology |
|        | Required     |               | Complication |              |  Dc  [address] |
|        |              |               | [address] of         |              |  [address]  |
|        |              |               | transplanted |              | [address]       |
|        |              |               |  liver       |              | Mailcode:     |
|        |              |               | Liver        |              | [address]          |
|        |              |               | replaced by  |              | [address]   |
|        |              |               | transplant   |              | [place], [place]  |
|        |              |               | (HCC)        |              | [address]    |
|        |              |               | Procedures   |              | Phone:        |
|        |              |               | [place]           |              | [phone]  |
|        |              |               | OFFICE/OUTPT |              |  Fax:         |
|        |              |               |              |              | [phone]  |
|        |              |               | VISIT,EST,LE |              |               |
|        |              |               | VL II  NC    |              |               |
|        |              |               | EST PATIENT  |              |               |
|        |              |               | LEVEL V      |              |               |
+--------+--------------+---------------+--------------+--------------+---------------+
 
 
 
 
 Encounter Details
 
 
+--------+---------+----------------------+----------------------+---------------------+
| Date   | Type    | Department           | Care Team            | Description         |
+--------+---------+----------------------+----------------------+---------------------+
| / | Office  |   Specialty Clinics  |   Neema Khalil,   | Transplanted liver  |
|    | Visit   | at [facility]  [address]  | MD  [address] | (HCC) (Primary Dx)  |
|        |         | [address]      |   [facility] [place]       |                     |
|        |         | Mailcode: [address]       | [id number]           |                     |
|        |         | [address]          | [phone]         |                     |
|        |         | [place]         | [phone] (Fax)   |                     |
|        |         | [address]           |                      |                     |
|        |         | [phone]         |                      |                     |
+--------+---------+----------------------+----------------------+---------------------+
 
 
 
 Social History
 
 
 
+----------------+-------+-----------+--------+------+
| Tobacco Use    | Types | Packs/Day | Years  | Date |
|                |       |           | Used   |      |
+----------------+-------+-----------+--------+------+
| Never Assessed |       |           |        |      |
+----------------+-------+-----------+--------+------+
 
 
 
+------------------+---------------+
| Sex Assigned at  | Date Recorded |
| Birth            |               |
+------------------+---------------+
| Not on file      |               |
+------------------+---------------+
 
 
 
+----------------+-------------+-------------+
| Job Start Date | Occupation  | Industry    |
+----------------+-------------+-------------+
| Not on file    | Not on file | Not on file |
+----------------+-------------+-------------+
 
 
 
+----------------+--------------+------------+
| Travel History | Travel Start | Travel End |
+----------------+--------------+------------+
 
 
 
+-------------------------------------+
| No recent travel history available. |
+-------------------------------------+
 documented as of this encounter
 
 Patient Instructions
 Patient Instructions Neema Khalil MD - 2014  8:22 AM PDTElectronically signed by 
Neema Khalil MD at 2014  7:18 PM PDT
 documented in this encounter
 
 Progress Notes
 Neema Khalil MD - 2014  8:23 AM PDT
 Pt is no show for appt. Our SW called to family as she is lost to follow up since :
 
 "I called and spoke with Nila Valdovinos  s mother Magdalena Valdovinos.  She stated that she did
 not realize Nila still had an appointment today.  She was expecting to do labs locally bef
ore coming here for the appointment.  She said she called us several times but the labs were
 never ordered.  I don  t see any record of her calling, but I also don  t see any order f
or labs.  She wants to reschedule and they need the lab order sent to [facility] in [facility].  She also said that the reason they have been absent from clinic for so long was due t
o insurance issues, but they are now insured.  From our phone call it seemed that she was ha
ppy to reschedule".
 
 Plan:
 - send orders for her labs (CBC, CMP, GGT) to local lab
 - schedule a clinic appt with me at [facility]
 - schedule for the next liver transplant clinic (2014)
 
 - follow compliance issues very closely
 
 YEElectronically signed by Neema Khalil MD at 2014  7:19 PM PDTdocumented in this 
encounter
 
 Plan of Treatment
 Not on filedocumented as of this encounter
 
 Visit Diagnoses
 
 
+--------------------------------------------------------------------+
| Diagnosis                                                          |
+--------------------------------------------------------------------+
|   Transplanted liver (HCC) - Primary  Liver replaced by transplant |
+--------------------------------------------------------------------+
 documented in this encounter

## 2019-11-01 NOTE — XMS
Encounter Summary
  Created on: 2019
 
 Bonifacio Nila JB
 External Reference #: [id number]
 : [birth year]
 Sex: Female
 
 Demographics
 
 
+-----------------------+------------------------+
| Address               | [address]            |
|                       | [place]  [address]   |
+-----------------------+------------------------+
| Home Phone            | [phone]        |
+-----------------------+------------------------+
| Preferred Language    | Unknown                |
+-----------------------+------------------------+
| Marital Status        | Single                 |
+-----------------------+------------------------+
| Synagogue Affiliation | Unknown                |
+-----------------------+------------------------+
| Race                  | White                  |
+-----------------------+------------------------+
| Ethnic Group          | Not  or  |
+-----------------------+------------------------+
 
 
 Author
 
 
+--------------+-------------+
| Organization | Unknown     |
+--------------+-------------+
| Address      | Unknown     |
+--------------+-------------+
| Phone        | Unavailable |
+--------------+-------------+
 
 
 
 Support
 
 
+-----------------+--------------+---------+-----------------+
| Name            | Relationship | Address | Phone           |
+-----------------+--------------+---------+-----------------+
| Kit Valdovinos   | ECON         | Unknown | [phone] |
+-----------------+--------------+---------+-----------------+
| Magdalena Valdovinos | ECON         | Unknown | [phone] |
+-----------------+--------------+---------+-----------------+
 
 
 
 Care Team Providers
 
 
 
+-----------------------+------+-----------------+
| Care Team Member Name | Role | Phone           |
+-----------------------+------+-----------------+
| Lily Horton MD   | PCP  | [phone] |
+-----------------------+------+-----------------+
 
 
 
 Encounter Details
 
 
+--------+-------------+------------+---------------------+---------------+
| Date   | Type        | Department | Care Team           | Description   |
+--------+-------------+------------+---------------------+---------------+
| 10/12/ | Office      |            |   Note, Outpatient  | Progress Note |
| 2006   | Visit-Trans |            | Clinic              |               |
|        | cribed      |            |                     |               |
+--------+-------------+------------+---------------------+---------------+
 
 
 
 Social History
 
 
+----------------+-------+-----------+--------+------+
| Tobacco Use    | Types | Packs/Day | Years  | Date |
|                |       |           | Used   |      |
+----------------+-------+-----------+--------+------+
| Never Assessed |       |           |        |      |
+----------------+-------+-----------+--------+------+
 
 
 
+------------------+---------------+
| Sex Assigned at  | Date Recorded |
| Birth            |               |
+------------------+---------------+
| Not on file      |               |
+------------------+---------------+
 
 
 
+----------------+-------------+-------------+
| Job Start Date | Occupation  | Industry    |
+----------------+-------------+-------------+
| Not on file    | Not on file | Not on file |
+----------------+-------------+-------------+
 
 
 
+----------------+--------------+------------+
| Travel History | Travel Start | Travel End |
+----------------+--------------+------------+
 
 
 
+-------------------------------------+
| No recent travel history available. |
+-------------------------------------+
 documented as of this encounter
 
 
 Progress Notes
 Interface, Transcription In - 2006  2:32 AM PST
      [id number]           10/12/254960/12/802753/2006 [id number]
         [id number]  BONIFACIO LEE          N [id number]
 
 Clinic Date:  10/12/2006
 
 Clinic:       Pediatric Liver Transplant Followup Clinic
 
 Problems:  Status post liver transplant for biliary atresia, 2000.
 
 Medication:  Prograf 1-mg capsules 1 p.o. b.i.d.
 
 Allergies:  SULFA, HIVES.
 
 Subjective:  Nila is a 6-year 8-month-old referred in consultation by Dr. Horton for followup of her liver transplant.  Dr. Eddy Nazario was an
 observer in clinic today.  Nila was accompanied by her father.  He reports
 that she was a 5-pound 2-ounce product of a 34-week gestation.  She
 required some oxygen in the  period.  She was found to have
 polysplenia, malrotation, and VSD.  She had biliary atresia and was
 transplanted at about [age] of age.  Her father reports that she
 did wonderfully after her transplant.  She has never had a rejection and
 has had no significant problems.  She is on 1 medication only.
 
 Past Medical History:  She has a VSD that is followed by Dr. Bush at
 [facility].
 
 Review of Systems:  She has no chronic cough, no abdominal pain, no
 diarrhea, no eczema or other allergic problems.  Rest of her review of
 systems is negative.
 
 Family History:  Positive for type 2 diabetes and heart problems in
 adults.
 
 Social History:  She lives with her mother, her father, and her 3-year-old
 brother.  She is in 2nd grade.
 
 Objective:  General:  She is a well-appearing girl in no acute distress.
 Vital Signs:  Her weight is 30.8 kg, 87th percentile for age; her height is
 130 cm which is 75th percentile; her BMI is 18.22, 86th percentile.  Her
 blood pressure is 120/83, slightly high; her heart rate is 80.  HEENT:  She
 is normocephalic.  Eyes:  No icterus.  No periorbital edema.  Pharynx
 unremarkable.  Neck:  Supple.  Chest:  Clear to auscultation.  Cardiac:
 She has a 3 to 4 over 6 systolic murmur that is heard best in the apex.
 Abdomen:  She has well-healed incisions, no hepatosplenomegaly, no masses
 noted.  Extremities:  No clubbing or edema.  Nodes:  No cervical or
 supraclavicular nodes.  Skin:  Clear.
 
 Laboratory Tests:  Most recent tests were done on 2006,
 electrolytes unremarkable, AST 32, ALT 19, alkaline phosphatase 221,
 bilirubin 0.8, albumin 3.2 which in the normal range for that lab.  GGT 9,
 magnesium 1.9.  White count 8.2, hematocrit 42.2, MCV 83, and platelets
 325.  Tacrolimus level 3.2.  In the past in , she developed positive
 antibodies to EBV.  She is also positive for varicella antibody.
 
 Assessment:  Status post liver transplant for biliary atresia.  She is
 doing well and has never had rejection.  Her Prograf level is in the target
 
 range.
 
 Recommendations:
 1.      Recommend she continue labs every 3 months as her previous
       schedule.
 2.      EBV PCR every 6 months.
 3.      No change in her medication at present.  Plan to return to clinic
       once a year unless she has problems.
 
 Ivis Burkett M.D.
 
 [facility] / 
 [id number] / [id number] / [id number] / [id number]
 D: 10/12/2006
 T: 10/15/2006
 
 cc:
 
 Lily Horton M.D.
 [address]
 [place]  [address]
 
 * [facility]
 [facility] [address] [place]
 [place]  [address]
 
 Electronically signed by Ivis Burkett 2006 10:05:03 PM
 Electronically signed by Interface, Transcription In at 2006  2:32 AM PSTdocumented i
n this encounter
 
 Plan of Treatment
 Not on filedocumented as of this encounter
 
 Visit Diagnoses
 Not on filedocumented in this encounter

## 2019-11-01 NOTE — XMS
Encounter Summary
  Created on: 2019
 
 Bonifacio Nila JB
 External Reference #: [id number]
 : [birth year]
 Sex: Female
 
 Demographics
 
 
+-----------------------+------------------------+
| Address               | [address]            |
|                       | [place]  [address]   |
+-----------------------+------------------------+
| Home Phone            | [phone]        |
+-----------------------+------------------------+
| Preferred Language    | Unknown                |
+-----------------------+------------------------+
| Marital Status        | Single                 |
+-----------------------+------------------------+
| Episcopalian Affiliation | Unknown                |
+-----------------------+------------------------+
| Race                  | White                  |
+-----------------------+------------------------+
| Ethnic Group          | Not  or  |
+-----------------------+------------------------+
 
 
 Author
 
 
+--------------+------------------------------+
| Author       | [facility] |
+--------------+------------------------------+
| Organization | [facility] |
+--------------+------------------------------+
| Address      | Unknown                      |
+--------------+------------------------------+
| Phone        | Unavailable                  |
+--------------+------------------------------+
 
 
 
 Support
 
 
+-----------------+--------------+---------+-----------------+
| Name            | Relationship | Address | Phone           |
+-----------------+--------------+---------+-----------------+
| Kit Valdovinos   | ECON         | Unknown | [phone] |
+-----------------+--------------+---------+-----------------+
| Magdalena Valdovinos | ECON         | Unknown | [phone] |
+-----------------+--------------+---------+-----------------+
 
 
 
 Care Team Providers
 
 
 
+-----------------------+------+-----------------+
| Care Team Member Name | Role | Phone           |
+-----------------------+------+-----------------+
| Lily Horton MD   | PCP  | [phone] |
+-----------------------+------+-----------------+
 
 
 
 Encounter Details
 
 
+--------+----------+----------------------+--------------------+-------------+
| Date   | Type     | Department           | Care Team          | Description |
+--------+----------+----------------------+--------------------+-------------+
| 10/27/ | Abstract |   Pediatric          |   Ivis Burkett MD |             |
|    |          | Gastroenterology at  |                    |             |
|        |          | [facility]          |                    |             |
|        |          | [facility]  |                    |             |
|        |          |  [address] |                    |             |
|        |          |  [address]  Mailcode:  |                    |             |
|        |          | [address]   |                    |             |
|        |          | [place], [place]         |                    |             |
|        |          | [address]           |                    |             |
|        |          | [phone]         |                    |             |
+--------+----------+----------------------+--------------------+-------------+
 
 
 
 Social History
 
 
+----------------+-------+-----------+--------+------+
| Tobacco Use    | Types | Packs/Day | Years  | Date |
|                |       |           | Used   |      |
+----------------+-------+-----------+--------+------+
| Never Assessed |       |           |        |      |
+----------------+-------+-----------+--------+------+
 
 
 
+------------------+---------------+
| Sex Assigned at  | Date Recorded |
| Birth            |               |
+------------------+---------------+
| Not on file      |               |
+------------------+---------------+
 
 
 
+----------------+-------------+-------------+
| Job Start Date | Occupation  | Industry    |
+----------------+-------------+-------------+
| Not on file    | Not on file | Not on file |
+----------------+-------------+-------------+
 
 
 
+----------------+--------------+------------+
 
| Travel History | Travel Start | Travel End |
+----------------+--------------+------------+
 
 
 
+-------------------------------------+
| No recent travel history available. |
+-------------------------------------+
 documented as of this encounter
 
 Plan of Treatment
 Not on filedocumented as of this encounter
 
 Procedures
 
 
+----------------------+--------+-------------+----------------------+----------------------
+
| Procedure Name       | Priori | Date/Time   | Associated Diagnosis | Comments             
|
|                      | ty     |             |                      |                      
|
+----------------------+--------+-------------+----------------------+----------------------
+
| OTHER                | Routin | 10/20/2009  |                      |   Results for this   
|
|                      | e      |  8:15 AM    |                      | procedure are in the 
|
|                      |        | PDT         |                      |  results section.    
|
+----------------------+--------+-------------+----------------------+----------------------
+
| CBC, WITH            | Routin | 10/20/2009  |                      |   Results for this   
|
| DIFFERENTIAL         | e      |  8:15 AM    |                      | procedure are in the 
|
|                      |        | PDT         |                      |  results section.    
|
+----------------------+--------+-------------+----------------------+----------------------
+
| MICROALBUMIN/CREATIN | Routin | 10/20/2009  |                      |   Results for this   
|
| INE RATIO (RANDOM    | e      |  8:15 AM    |                      | procedure are in the 
|
| URINE)               |        | PDT         |                      |  results section.    
|
+----------------------+--------+-------------+----------------------+----------------------
+
| COMPLETE METABOLIC   | Routin | 10/20/2009  |                      |   Results for this   
|
| SET                  | e      |  8:15 AM    |                      | procedure are in the 
|
| (NA,K,CL,CO2,BUN,CRE |        | PDT         |                      |  results section.    
|
| AT,GLUC,CA,AST,ALT,B |        |             |                      |                      
|
| ASHWINI TOTAL,ALK        |        |             |                      |                      
|
| PHOS,ALB,PROT TOTAL) |        |             |                      |                      
|
 
+----------------------+--------+-------------+----------------------+----------------------
+
| PHOSPHORUS, PLASMA   | Routin | 10/20/2009  |                      |   Results for this   
|
|                      | e      |  8:15 AM    |                      | procedure are in the 
|
|                      |        | PDT         |                      |  results section.    
|
+----------------------+--------+-------------+----------------------+----------------------
+
| GGT, PLASMA          | Routin | 10/20/2009  |                      |   Results for this   
|
|                      | e      |  8:15 AM    |                      | procedure are in the 
|
|                      |        | PDT         |                      |  results section.    
|
+----------------------+--------+-------------+----------------------+----------------------
+
| BILIRUBIN DIRECT     | Routin | 10/20/2009  |                      |   Results for this   
|
|                      | e      |  8:15 AM    |                      | procedure are in the 
|
|                      |        | PDT         |                      |  results section.    
|
+----------------------+--------+-------------+----------------------+----------------------
+
| MAGNESIUM, PLASMA    | Routin | 10/20/2009  |                      |   Results for this   
|
|                      | e      |  8:15 AM    |                      | procedure are in the 
|
|                      |        | PDT         |                      |  results section.    
|
+----------------------+--------+-------------+----------------------+----------------------
+
| TRIGLYCERIDES,       | Routin | 10/20/2009  |                      |   Results for this   
|
| PLASMA               | e      |  8:15 AM    |                      | procedure are in the 
|
|                      |        | PDT         |                      |  results section.    
|
+----------------------+--------+-------------+----------------------+----------------------
+
| CHOLESTEROL TOTAL,   | Routin | 10/20/2009  |                      |   Results for this   
|
| PLASMA               | e      |  8:15 AM    |                      | procedure are in the 
|
|                      |        | PDT         |                      |  results section.    
|
+----------------------+--------+-------------+----------------------+----------------------
+
| CREATININE, URINE    | Routin | 10/20/2009  |                      |   Results for this   
|
|                      | e      |  8:15 AM    |                      | procedure are in the 
|
|                      |        | PDT         |                      |  results section.    
|
+----------------------+--------+-------------+----------------------+----------------------
+
 documented in this encounter
 
 
 Results
 OTHER (10/20/2009  8:15 AM PDT)
 
+-------------+----------------+--------------+------------+--------------+
| Component   | Value          | Ref Range    | Performed  | Pathologist  |
|             |                |              | At         | Signature    |
+-------------+----------------+--------------+------------+--------------+
| ALKALINE    | 8.3Comment:    | 0 - 30 mg/dL | [facility]  |              |
| PHOS, OTHER | microalb/creat |              | LAB -      |              |
|  CALC       |                |              | [place]  |              |
+-------------+----------------+--------------+------------+--------------+
 
 
 
+----------+
| Specimen |
+----------+
| Urine    |
+----------+
 
 
 
+--------------------+----------------------+--------------------+----------------+
| Performing         | Address              | City/State/Zipcode | Phone Number   |
| Organization       |                      |                    |                |
+--------------------+----------------------+--------------------+----------------+
|   [facility]  |   [address] | [place], [place]      |   [phone] |
| [place]          |                      |                    |                |
+--------------------+----------------------+--------------------+----------------+
|   [facility]  |                      | [place], [place]      |                |
| [place]          |                      |                    |                |
+--------------------+----------------------+--------------------+----------------+
 COMPLETE METABOLIC SET (NA,K,CL,CO2,BUN,CREAT,GLUC,CA,AST,ALT,BILI TOTAL,ALK PHOS,ALB,PROT 
TOTAL) (10/20/2009  8:15 AM PDT)
 
+-------------+-------+-----------------+------------+--------------+
| Component   | Value | Ref Range       | Performed  | Pathologist  |
|             |       |                 | At         | Signature    |
+-------------+-------+-----------------+------------+--------------+
| GLUCOSE,    | 100   | 60 - 100 mg/dL  | [facility]  |              |
| PLASMA      |       |                 | LAB -      |              |
| (LAB)       |       |                 | [place]  |              |
+-------------+-------+-----------------+------------+--------------+
| BUN, PLASMA | 9     | 6 - 23 mg/dL    | [facility]  |              |
|  (LAB)      |       |                 | LAB -      |              |
|             |       |                 | [place]  |              |
+-------------+-------+-----------------+------------+--------------+
| CREATININE  | 0.51  | 0.5 - 1.5 mg/dL | [facility]  |              |
| PLASMA      |       |                 | LAB -      |              |
| (LAB)       |       |                 | [place]  |              |
+-------------+-------+-----------------+------------+--------------+
| TOTAL       | 6.7   | 6.1 - 8.5 g/dL  | [facility]  |              |
| PROTEIN,    |       |                 | LAB -      |              |
| PLASMA      |       |                 | [place]  |              |
| (LAB)       |       |                 |            |              |
+-------------+-------+-----------------+------------+--------------+
| ALBUMIN,    | 3.7   | 2.9 - 5.5 g/dL  | [facility]  |              |
| PLASMA      |       |                 | LAB -      |              |
| (LAB)       |       |                 | [place]  |              |
+-------------+-------+-----------------+------------+--------------+
 
| CALCIUM,    | 9.6   | 8.5 - 10.5      | [facility]  |              |
| PLASMA      |       | mg/dL           | LAB -      |              |
| (LAB)       |       |                 | [place]  |              |
+-------------+-------+-----------------+------------+--------------+
| BILIRUBIN   | 0.5   | 0.0 - 1.2       | [facility]  |              |
| TOTAL       |       | Transcutaneous  | LAB -      |              |
|             |       | Bilirubinometer | [place]  |              |
+-------------+-------+-----------------+------------+--------------+
| ALK PHOS    | 220   | 135 - 384 U/L   | [facility]  |              |
|             |       |                 | LAB -      |              |
|             |       |                 | [place]  |              |
+-------------+-------+-----------------+------------+--------------+
| AST(SGOT)   | 20    | 0 - 40 U/L      | [facility]  |              |
|             |       |                 | LAB -      |              |
|             |       |                 | [place]  |              |
+-------------+-------+-----------------+------------+--------------+
| SODIUM,     | 139   | 132 - 143       | [facility]  |              |
| PLASMA      |       | mmol/L          | LAB -      |              |
| (LAB)       |       |                 | [place]  |              |
+-------------+-------+-----------------+------------+--------------+
| POTASSIUM,  | 4.0   | 3.6 - 5.1       | [facility]  |              |
| PLASMA      |       | mmol/L          | LAB -      |              |
| (LAB)       |       |                 | [place]  |              |
+-------------+-------+-----------------+------------+--------------+
| CHLORIDE,   | 106   | 95 - 112 mmol/L | [facility]  |              |
| PLASMA      |       |                 | LAB -      |              |
| (LAB)       |       |                 | [place]  |              |
+-------------+-------+-----------------+------------+--------------+
| TOTAL CO2,  | 23.2  | 19 - 31 mmol/L  | [facility]  |              |
| PLASMA      |       |                 | LAB -      |              |
| (LAB)       |       |                 | [place]  |              |
+-------------+-------+-----------------+------------+--------------+
| ALT (SGPT)  | 17    | 0 - 46 U/L      | [facility]  |              |
|             |       |                 | LAB -      |              |
|             |       |                 | [place]  |              |
+-------------+-------+-----------------+------------+--------------+
| ANION GAP   | 13.8  | 7 - 21          | [facility]  |              |
|             |       |                 | LAB -      |              |
|             |       |                 | [place]  |              |
+-------------+-------+-----------------+------------+--------------+
| BUN/CREATIN | 17.6  | 6.0 - 28.6      | [facility]  |              |
| INE RATIO   |       |                 | LAB -      |              |
|             |       |                 | [place]  |              |
+-------------+-------+-----------------+------------+--------------+
| A/G RATIO   | 1.2   | 1.1 - 2.4       | [facility]  |              |
|             |       |                 | LAB -      |              |
|             |       |                 | [place]  |              |
+-------------+-------+-----------------+------------+--------------+
 
 
 
+---------------+
| Specimen      |
+---------------+
| Blood - Blood |
+---------------+
 
 
 
+--------------------+----------------------+--------------------+----------------+
 
| Performing         | Address              | City/State/Zipcode | Phone Number   |
| Organization       |                      |                    |                |
+--------------------+----------------------+--------------------+----------------+
|   [facility]  |   [address] | [place], [place]      |   [phone] |
| [place]          |                      |                    |                |
+--------------------+----------------------+--------------------+----------------+
|   [facility]  |                      | [place], [place]      |                |
| [place]          |                      |                    |                |
+--------------------+----------------------+--------------------+----------------+
 CBC, WITH DIFFERENTIAL (10/20/2009  8:15 AM PDT)
 
+-------------+----------+-----------------+------------+--------------+
| Component   | Value    | Ref Range       | Performed  | Pathologist  |
|             |          |                 | At         | Signature    |
+-------------+----------+-----------------+------------+--------------+
| WHITE CELL  | 5.7      | 4.5 - 13.5 K/cu | [facility]  |              |
| COUNT       |          |  mm             | LAB -      |              |
|             |          |                 | [place]  |              |
+-------------+----------+-----------------+------------+--------------+
| RED CELL    | 4.89     | 4.1 - 5.3 M/cu  | [facility]  |              |
| COUNT       |          | mm              | LAB -      |              |
|             |          |                 | [place]  |              |
+-------------+----------+-----------------+------------+--------------+
| HEMOGLOBIN  | 14.1     | 10.6 - 15.2     | [facility]  |              |
|             |          | g/dL            | LAB -      |              |
|             |          |                 | [place]  |              |
+-------------+----------+-----------------+------------+--------------+
| HEMATOCRIT  | 43.0 (A) | 32 - 42 %       | [facility]  |              |
|             |          |                 | LAB -      |              |
|             |          |                 | [place]  |              |
+-------------+----------+-----------------+------------+--------------+
| MCV         | 87.8     | 71 - 89 fL      | [facility]  |              |
|             |          |                 | LAB -      |              |
|             |          |                 | [place]  |              |
+-------------+----------+-----------------+------------+--------------+
| MCH         | 29       | 24 - 30 pg      | [facility]  |              |
|             |          |                 | LAB -      |              |
|             |          |                 | [place]  |              |
+-------------+----------+-----------------+------------+--------------+
| MCHC        | 33       | 30 - 36 g/dL    | [facility]  |              |
|             |          |                 | LAB -      |              |
|             |          |                 | [place]  |              |
+-------------+----------+-----------------+------------+--------------+
| PLATELET    | 320      | 140 - 440 K/cu  | [facility]  |              |
| COUNT       |          | mm              | LAB -      |              |
|             |          |                 | [place]  |              |
+-------------+----------+-----------------+------------+--------------+
| NEUTROPHIL  | 49.7     | 31 - 60 %       | [facility]  |              |
| %           |          |                 | LAB -      |              |
|             |          |                 | [place]  |              |
+-------------+----------+-----------------+------------+--------------+
| LYMPHOCYTE  | 41.0     | 28 - 48 %       | [facility]  |              |
| %           |          |                 | LAB -      |              |
|             |          |                 | [place]  |              |
+-------------+----------+-----------------+------------+--------------+
| MONOCYTE %  | 4.8      | 0 - 12 %        | [facility]  |              |
|             |          |                 | LAB -      |              |
|             |          |                 | [place]  |              |
+-------------+----------+-----------------+------------+--------------+
| EOS %       | 3.8      | 0 - 6 %         | [facility]  |              |
 
|             |          |                 | LAB -      |              |
|             |          |                 | [place]  |              |
+-------------+----------+-----------------+------------+--------------+
| BASO %      | 0.7      | 0 - 2 %         | [facility]  |              |
|             |          |                 | LAB -      |              |
|             |          |                 | [place]  |              |
+-------------+----------+-----------------+------------+--------------+
| RDW         | 13.4     | 10.5 - 15.0 %   | [facility]  |              |
|             |          |                 | LAB -      |              |
|             |          |                 | [place]  |              |
+-------------+----------+-----------------+------------+--------------+
| MPV         |          | fL              | [facility]  |              |
|             |          |                 | LAB -      |              |
|             |          |                 | [place]  |              |
+-------------+----------+-----------------+------------+--------------+
| NEUTROPHIL  |          | K/cu mm         | [facility]  |              |
| #           |          |                 | LAB -      |              |
|             |          |                 | [place]  |              |
+-------------+----------+-----------------+------------+--------------+
| LYMPHOCYTE  |          | K/cu mm         | [facility]  |              |
| #           |          |                 | LAB -      |              |
|             |          |                 | [place]  |              |
+-------------+----------+-----------------+------------+--------------+
| MONOCYTE #  |          | K/cu mm         | [facility]  |              |
|             |          |                 | LAB -      |              |
|             |          |                 | [place]  |              |
+-------------+----------+-----------------+------------+--------------+
| EOS #       |          | K/cu mm         | [facility]  |              |
|             |          |                 | LAB -      |              |
|             |          |                 | [place]  |              |
+-------------+----------+-----------------+------------+--------------+
| BASO #      |          |                 | [facility]  |              |
|             |          |                 | LAB -      |              |
|             |          |                 | [place]  |              |
+-------------+----------+-----------------+------------+--------------+
 
 
 
+---------------+
| Specimen      |
+---------------+
| Blood - Blood |
+---------------+
 
 
 
+--------------------+----------------------+--------------------+----------------+
| Performing         | Address              | City/State/Zipcode | Phone Number   |
| Organization       |                      |                    |                |
+--------------------+----------------------+--------------------+----------------+
|   [facility]  |   [address] | [place], [place]      |   [phone] |
| [place]          |                      |                    |                |
+--------------------+----------------------+--------------------+----------------+
|   [facility]  |                      | [place], [place]      |                |
| [place]          |                      |                    |                |
+--------------------+----------------------+--------------------+----------------+
 GGT, PLASMA (10/20/2009  8:15 AM PDT)
 
+-----------+-------+------------+------------+--------------+
| Component | Value | Ref Range  | Performed  | Pathologist  |
 
|           |       |            | At         | Signature    |
+-----------+-------+------------+------------+--------------+
| GAMMA     | 10    | 5 - 60 U/L | [facility]  |              |
| GLUTAMYL  |       |            | LAB -      |              |
| TRANS     |       |            | [place]  |              |
+-----------+-------+------------+------------+--------------+
 
 
 
+---------------+
| Specimen      |
+---------------+
| Blood - Blood |
+---------------+
 
 
 
+--------------------+----------------------+--------------------+----------------+
| Performing         | Address              | City/State/Zipcode | Phone Number   |
| Organization       |                      |                    |                |
+--------------------+----------------------+--------------------+----------------+
|   [facility]  |   [address] | [place], [place]      |   [phone] |
| [place]          |                      |                    |                |
+--------------------+----------------------+--------------------+----------------+
|   [facility]  |                      | [place], [place]      |                |
| [place]          |                      |                    |                |
+--------------------+----------------------+--------------------+----------------+
 MAGNESIUM, PLASMA (10/20/2009  8:15 AM PDT)
 
+-------------+----------+-----------------+------------+--------------+
| Component   | Value    | Ref Range       | Performed  | Pathologist  |
|             |          |                 | At         | Signature    |
+-------------+----------+-----------------+------------+--------------+
| MAGNESIUM,P | 1.69 (A) | 1.7 - 2.5 mg/dL | [facility]  |              |
| LASMA       |          |                 | LAB -      |              |
|             |          |                 | [place]  |              |
+-------------+----------+-----------------+------------+--------------+
 
 
 
+---------------+
| Specimen      |
+---------------+
| Blood - Blood |
+---------------+
 
 
 
+--------------------+----------------------+--------------------+----------------+
| Performing         | Address              | City/State/Zipcode | Phone Number   |
| Organization       |                      |                    |                |
+--------------------+----------------------+--------------------+----------------+
|   [facility]  |   [address] | [place]      |   [phone] |
| [place]          |                      |                    |                |
+--------------------+----------------------+--------------------+----------------+
|   [facility]  |                      | [place], [place]      |                |
| [place]          |                      |                    |                |
+--------------------+----------------------+--------------------+----------------+
 BILIRUBIN DIRECT (10/20/2009  8:15 AM PDT)
 
 
+------------+-------+-----------------+------------+--------------+
| Component  | Value | Ref Range       | Performed  | Pathologist  |
|            |       |                 | At         | Signature    |
+------------+-------+-----------------+------------+--------------+
| BILIRUBIN  | 0.1   | 0.0 - 0.4 mg/dL | [facility]  |              |
| DIRECT     |       |                 | LAB -      |              |
|            |       |                 | [place]  |              |
+------------+-------+-----------------+------------+--------------+
 
 
 
+---------------+
| Specimen      |
+---------------+
| Blood - Blood |
+---------------+
 
 
 
+--------------------+----------------------+--------------------+----------------+
| Performing         | Address              | City/State/Zipcode | Phone Number   |
| Organization       |                      |                    |                |
+--------------------+----------------------+--------------------+----------------+
|   [facility]  |   [address] | [place], [place]      |   [phone] |
| [place]          |                      |                    |                |
+--------------------+----------------------+--------------------+----------------+
|   [facility]  |                      | [place], [place]      |                |
| [place]          |                      |                    |                |
+--------------------+----------------------+--------------------+----------------+
 TRIGLYCERIDES, PLASMA (10/20/2009  8:15 AM PDT)
 
+-------------+-------+----------------+------------+--------------+
| Component   | Value | Ref Range      | Performed  | Pathologist  |
|             |       |                | At         | Signature    |
+-------------+-------+----------------+------------+--------------+
| TRIGLYCERID | 94    | 30 - 150 mg/dL | [facility]  |              |
| ES          |       |                | LAB -      |              |
|             |       |                | [place]  |              |
+-------------+-------+----------------+------------+--------------+
 
 
 
+---------------+
| Specimen      |
+---------------+
| Blood - Blood |
+---------------+
 
 
 
+--------------------+----------------------+--------------------+----------------+
| Performing         | Address              | City/State/Zipcode | Phone Number   |
| Organization       |                      |                    |                |
+--------------------+----------------------+--------------------+----------------+
|   [facility]  |   [address] | [place], [place]      |   [phone] |
| [place]          |                      |                    |                |
+--------------------+----------------------+--------------------+----------------+
|   [facility]  |                      | [place], [place]      |                |
| [place]          |                      |                    |                |
+--------------------+----------------------+--------------------+----------------+
 
 PHOSPHORUS, PLASMA (10/20/2009  8:15 AM PDT)
 
+-------------+-------+-----------------+------------+--------------+
| Component   | Value | Ref Range       | Performed  | Pathologist  |
|             |       |                 | At         | Signature    |
+-------------+-------+-----------------+------------+--------------+
| PHOSPHORUS, | 4.7   | 2.6 - 6.2 mg/dL | [facility]  |              |
|  PLASMA     |       |                 | LAB -      |              |
| (LAB)       |       |                 | [place]  |              |
+-------------+-------+-----------------+------------+--------------+
 
 
 
+---------------+
| Specimen      |
+---------------+
| Blood - Blood |
+---------------+
 
 
 
+--------------------+----------------------+--------------------+----------------+
| Performing         | Address              | City/State/Zipcode | Phone Number   |
| Organization       |                      |                    |                |
+--------------------+----------------------+--------------------+----------------+
|   [facility]  |   [address] | [place]      |   [phone] |
| [place]          |                      |                    |                |
+--------------------+----------------------+--------------------+----------------+
|   [facility]  |                      | [place], [place]      |                |
| [place]          |                      |                    |                |
+--------------------+----------------------+--------------------+----------------+
 CHOLESTEROL TOTAL, PLASMA (10/20/2009  8:15 AM PDT)
 
+-------------+-------+---------------+------------+--------------+
| Component   | Value | Ref Range     | Performed  | Pathologist  |
|             |       |               | At         | Signature    |
+-------------+-------+---------------+------------+--------------+
| CHOLESTEROL | 142   | < - 200 mg/dL | [facility]  |              |
|   (LAB)     |       |               | LAB -      |              |
|             |       |               | [place]  |              |
+-------------+-------+---------------+------------+--------------+
 
 
 
+---------------+
| Specimen      |
+---------------+
| Blood - Blood |
+---------------+
 
 
 
+--------------------+----------------------+--------------------+----------------+
| Performing         | Address              | City/State/Zipcode | Phone Number   |
| Organization       |                      |                    |                |
+--------------------+----------------------+--------------------+----------------+
|   [facility]  |   [address] | [place]      |   [phone] |
| [place]          |                      |                    |                |
+--------------------+----------------------+--------------------+----------------+
|   [facility]  |                      | [place], [place]      |                |
 
| [place]          |                      |                    |                |
+--------------------+----------------------+--------------------+----------------+
 CREATININE, URINE (10/20/2009  8:15 AM PDT)
 
+-------------+-------+-------------+------------+--------------+
| Component   | Value | Ref Range   | Performed  | Pathologist  |
|             |       |             | At         | Signature    |
+-------------+-------+-------------+------------+--------------+
| CREATININE, | 24    | g/col. time | [facility]  |              |
|  URINE      |       |             | LAB -      |              |
|             |       |             | [place]  |              |
+-------------+-------+-------------+------------+--------------+
 
 
 
+---------------+
| Specimen      |
+---------------+
| Urine - Urine |
+---------------+
 
 
 
+--------------------+----------------------+--------------------+----------------+
| Performing         | Address              | City/State/Zipcode | Phone Number   |
| Organization       |                      |                    |                |
+--------------------+----------------------+--------------------+----------------+
|   [facility]  |   [address] | [place], [place]      |   [phone] |
| [place]          |                      |                    |                |
+--------------------+----------------------+--------------------+----------------+
|   [facility]  |                      | [place], [place]      |                |
| [place]          |                      |                    |                |
+--------------------+----------------------+--------------------+----------------+
 MICROALBUMIN/CREATININE RATIO (RANDOM URINE) (10/20/2009  8:15 AM PDT)
 
+-------------+-------+----------------+------------+--------------+
| Component   | Value | Ref Range      | Performed  | Pathologist  |
|             |       |                | At         | Signature    |
+-------------+-------+----------------+------------+--------------+
| MICROALBUMI | 0.2   | 0.0 - 2.0 mg/L | [facility]  |              |
| N,          |       |                | LAB -      |              |
| URINE-RANDO |       |                | [place]  |              |
| M           |       |                |            |              |
+-------------+-------+----------------+------------+--------------+
 
 
 
+---------------+
| Specimen      |
+---------------+
| Urine - Urine |
+---------------+
 
 
 
+--------------------+----------------------+--------------------+----------------+
| Performing         | Address              | City/State/Zipcode | Phone Number   |
| Organization       |                      |                    |                |
+--------------------+----------------------+--------------------+----------------+
|   [facility]  |   [address] | [place], [place]      |   [phone] |
 
| [place]          |                      |                    |                |
+--------------------+----------------------+--------------------+----------------+
|   [facility]  |                      | [place], [place]      |                |
| [place]          |                      |                    |                |
+--------------------+----------------------+--------------------+----------------+
 documented in this encounter
 
 Visit Diagnoses
 Not on filedocumented in this encounter

## 2019-11-01 NOTE — XMS
Encounter Summary
  Created on: 2019
 
 Bonifacio Nila JB
 External Reference #: [id number]
 : [birth year]
 Sex: Female
 
 Demographics
 
 
+-----------------------+------------------------+
| Address               | [address]            |
|                       | [place]  [address]   |
+-----------------------+------------------------+
| Home Phone            | [phone]        |
+-----------------------+------------------------+
| Preferred Language    | Unknown                |
+-----------------------+------------------------+
| Marital Status        | Single                 |
+-----------------------+------------------------+
| [employer] Affiliation | Unknown                |
+-----------------------+------------------------+
| Race                  | White                  |
+-----------------------+------------------------+
| Ethnic Group          | Not  or  |
+-----------------------+------------------------+
 
 
 Author
 
 
+--------------+------------------------------+
| Author       | [facility] |
+--------------+------------------------------+
| Organization | [facility] |
+--------------+------------------------------+
| Address      | Unknown                      |
+--------------+------------------------------+
| Phone        | Unavailable                  |
+--------------+------------------------------+
 
 
 
 Support
 
 
+-----------------+--------------+---------+-----------------+
| Name            | Relationship | Address | Phone           |
+-----------------+--------------+---------+-----------------+
| Kit Valdovinos   | ECON         | Unknown | [phone] |
+-----------------+--------------+---------+-----------------+
| Magdalena Valdovinos | ECON         | Unknown | [phone] |
+-----------------+--------------+---------+-----------------+
 
 
 
 Care Team Providers
 
 
 
+------------------------+------+-----------------+
| Care Team Member Name  | Role | Phone           |
+------------------------+------+-----------------+
| Lily Horton MD | PCP  | [phone] |
+------------------------+------+-----------------+
 
 
 
 Reason for Visit
 
 
+-------------------+----------+
| Reason            | Comments |
+-------------------+----------+
| Care Coordination |          |
+-------------------+----------+
 
 
 
 Encounter Details
 
 
+--------+-----------+----------------------+----------------------+-------------------+
| Date   | Type      | Department           | Care Team            | Description       |
+--------+-----------+----------------------+----------------------+-------------------+
| / | Telephone |   Pediatric          |   Neema Khalil,   | Care Coordination |
| 2016   |           | Gastroenterology at  | MD  [address] |                   |
|        |           | [facility]          |   [place], [place]       |                   |
|        |           | [facility]  | [address]           |                   |
|        |           |  [address] [facility] | [phone]         |                   |
|        |           |  [address]  Mailcode:  | [phone] (Fax)   |                   |
|        |           | [address]   |                      |                   |
|        |           | [place], [place]         |                      |                   |
|        |           | [address]           |                      |                   |
|        |           | [phone]         |                      |                   |
+--------+-----------+----------------------+----------------------+-------------------+
 
 
 
 Social History
 
 
+-------------------+-------+-----------+--------+------+
| Tobacco Use       | Types | Packs/Day | Years  | Date |
|                   |       |           | Used   |      |
+-------------------+-------+-----------+--------+------+
| Passive Smoke     |       |           |        |      |
| Exposure - Never  |       |           |        |      |
| Smoker            |       |           |        |      |
+-------------------+-------+-----------+--------+------+
 
 
 
+---------------------+---+---+---+
| Smokeless Tobacco:  |   |   |   |
| Never Used          |   |   |   |
+---------------------+---+---+---+
 
 
 
 
+-------------+-------------+---------+----------+
| Alcohol Use | Drinks/Week | oz/Week | Comments |
+-------------+-------------+---------+----------+
| Not Asked   |             |         |          |
+-------------+-------------+---------+----------+
 
 
 
+------------------+---------------+
| Sex Assigned at  | Date Recorded |
| Birth            |               |
+------------------+---------------+
| Not on file      |               |
+------------------+---------------+
 
 
 
+----------------+-------------+-------------+
| Job Start Date | Occupation  | Industry    |
+----------------+-------------+-------------+
| Not on file    | Not on file | Not on file |
+----------------+-------------+-------------+
 
 
 
+----------------+--------------+------------+
| Travel History | Travel Start | Travel End |
+----------------+--------------+------------+
 
 
 
+-------------------------------------+
| No recent travel history available. |
+-------------------------------------+
 documented as of this encounter
 
 Plan of Treatment
 Not on filedocumented as of this encounter
 
 Visit Diagnoses
 Not on filedocumented in this encounter

## 2019-11-01 NOTE — XMS
Encounter Summary
  Created on: 2019
 
 Bonifacio Nila JB
 External Reference #: [id number]
 : [birth year]
 Sex: Female
 
 Demographics
 
 
+-----------------------+------------------------+
| Address               | [address]            |
|                       | [place]  [address]   |
+-----------------------+------------------------+
| Home Phone            | [phone]        |
+-----------------------+------------------------+
| Preferred Language    | Unknown                |
+-----------------------+------------------------+
| Marital Status        | Single                 |
+-----------------------+------------------------+
| [employer] Affiliation | Unknown                |
+-----------------------+------------------------+
| Race                  | White                  |
+-----------------------+------------------------+
| Ethnic Group          | Not  or  |
+-----------------------+------------------------+
 
 
 Author
 
 
+--------------+------------------------------+
| Author       | [facility] [facility] |
+--------------+------------------------------+
| Organization | [facility] |
+--------------+------------------------------+
| Address      | Unknown                      |
+--------------+------------------------------+
| Phone        | Unavailable                  |
+--------------+------------------------------+
 
 
 
 Support
 
 
+-----------------+--------------+---------+-----------------+
| Name            | Relationship | Address | Phone           |
+-----------------+--------------+---------+-----------------+
| Kit Valdovinos   | ECON         | Unknown | [phone] |
+-----------------+--------------+---------+-----------------+
| Magdalena Valdovinos | ECON         | Unknown | [phone] |
+-----------------+--------------+---------+-----------------+
 
 
 
 Care Team Providers
 
 
 
+-----------------------+------+-----------------+
| Care Team Member Name | Role | Phone           |
+-----------------------+------+-----------------+
| Lily Horton MD   | PCP  | [phone] |
+-----------------------+------+-----------------+
 
 
 
 Encounter Details
 
 
+--------+----------+----------------------+--------------------+-------------+
| Date   | Type     | Department           | Care Team          | Description |
+--------+----------+----------------------+--------------------+-------------+
| / | Abstract |   Pediatric          |   Ivis Burkett MD |             |
|    |          | Gastroenterology at  |                    |             |
|        |          | [facility]          |                    |             |
|        |          | [facility]  |                    |             |
|        |          |  [address] |                    |             |
|        |          |  [address]  Mailcode:  |                    |             |
|        |          | [address] [address]   |                    |             |
|        |          | [place], [place]         |                    |             |
|        |          | [address]           |                    |             |
|        |          | [phone]         |                    |             |
+--------+----------+----------------------+--------------------+-------------+
 
 
 
 Social History
 
 
+----------------+-------+-----------+--------+------+
| Tobacco Use    | Types | Packs/Day | Years  | Date |
|                |       |           | Used   |      |
+----------------+-------+-----------+--------+------+
| Never Assessed |       |           |        |      |
+----------------+-------+-----------+--------+------+
 
 
 
+------------------+---------------+
| Sex Assigned at  | Date Recorded |
| Birth            |               |
+------------------+---------------+
| Not on file      |               |
+------------------+---------------+
 
 
 
+----------------+-------------+-------------+
| Job Start Date | Occupation  | Industry    |
+----------------+-------------+-------------+
| Not on file    | Not on file | Not on file |
+----------------+-------------+-------------+
 
 
 
+----------------+--------------+------------+
 
| Travel History | Travel Start | Travel End |
+----------------+--------------+------------+
 
 
 
+-------------------------------------+
| No recent travel history available. |
+-------------------------------------+
 documented as of this encounter
 
 Plan of Treatment
 Not on filedocumented as of this encounter
 
 Visit Diagnoses
 Not on filedocumented in this encounter

## 2019-11-01 NOTE — XMS
Encounter Summary
  Created on: 2019
 
 Bonifacio Nila JB
 External Reference #: [id number]
 : [birth year]
 Sex: Female
 
 Demographics
 
 
+-----------------------+------------------------+
| Address               | [address]            |
|                       | [place]  [address]   |
+-----------------------+------------------------+
| Home Phone            | [phone]        |
+-----------------------+------------------------+
| Preferred Language    | Unknown                |
+-----------------------+------------------------+
| Marital Status        | Single                 |
+-----------------------+------------------------+
| Worship Affiliation | Unknown                |
+-----------------------+------------------------+
| Race                  | White                  |
+-----------------------+------------------------+
| Ethnic Group          | Not  or  |
+-----------------------+------------------------+
 
 
 Author
 
 
+--------------+------------------------------+
| Author       | [facility] |
+--------------+------------------------------+
| Organization | [facility] |
+--------------+------------------------------+
| Address      | Unknown                      |
+--------------+------------------------------+
| Phone        | Unavailable                  |
+--------------+------------------------------+
 
 
 
 Support
 
 
+-----------------+--------------+---------+-----------------+
| Name            | Relationship | Address | Phone           |
+-----------------+--------------+---------+-----------------+
| Kit Valdovinos   | ECON         | Unknown | [phone] |
+-----------------+--------------+---------+-----------------+
| Magdalena Valdovinos | ECON         | Unknown | [phone] |
+-----------------+--------------+---------+-----------------+
 
 
 
 Care Team Providers
 
 
 
+------------------------+------+-----------------+
| Care Team Member Name  | Role | Phone           |
+------------------------+------+-----------------+
| Lily Horton MD | PCP  | [phone] |
+------------------------+------+-----------------+
 
 
 
 Reason for Visit
 
 
+-------------+----------+
| Reason      | Comments |
+-------------+----------+
| Lab Results |          |
+-------------+----------+
 
 
 
 Encounter Details
 
 
+--------+----------+----------------------+----------------------+-------------+
| Date   | Type     | Department           | Care Team            | Description |
+--------+----------+----------------------+----------------------+-------------+
| / | Abstract |   Pediatric          |   Neema Khalil,   | Lab Results |
| 2016   |          | Gastroenterology at  | MD  [address] |             |
|        |          | [facility]          |   [place], [place]       |             |
|        |          | [facility]  | [address]           |             |
|        |          |  [address] | [phone]         |             |
|        |          |  [address]  Mailcode:  | [phone] (Fax)   |             |
|        |          | [facility]   |                      |             |
|        |          | [place], [place]         |                      |             |
|        |          | [address]           |                      |             |
|        |          | [phone]         |                      |             |
+--------+----------+----------------------+----------------------+-------------+
 
 
 
 Social History
 
 
+-------------------+-------+-----------+--------+------+
| Tobacco Use       | Types | Packs/Day | Years  | Date |
|                   |       |           | Used   |      |
+-------------------+-------+-----------+--------+------+
| Passive Smoke     |       |           |        |      |
| Exposure - Never  |       |           |        |      |
| Smoker            |       |           |        |      |
+-------------------+-------+-----------+--------+------+
 
 
 
+---------------------+---+---+---+
| Smokeless Tobacco:  |   |   |   |
| Never Used          |   |   |   |
+---------------------+---+---+---+
 
 
 
 
+-------------+-------------+---------+----------+
| Alcohol Use | Drinks/Week | oz/Week | Comments |
+-------------+-------------+---------+----------+
| Not Asked   |             |         |          |
+-------------+-------------+---------+----------+
 
 
 
+------------------+---------------+
| Sex Assigned at  | Date Recorded |
| Birth            |               |
+------------------+---------------+
| Not on file      |               |
+------------------+---------------+
 
 
 
+----------------+-------------+-------------+
| Job Start Date | Occupation  | Industry    |
+----------------+-------------+-------------+
| Not on file    | Not on file | Not on file |
+----------------+-------------+-------------+
 
 
 
+----------------+--------------+------------+
| Travel History | Travel Start | Travel End |
+----------------+--------------+------------+
 
 
 
+-------------------------------------+
| No recent travel history available. |
+-------------------------------------+
 documented as of this encounter
 
 Plan of Treatment
 Not on filedocumented as of this encounter
 
 Procedures
 
 
+----------------------+--------+------------+----------------------+----------------------+
| Procedure Name       | Priori | Date/Time  | Associated Diagnosis | Comments             |
|                      | ty     |            |                      |                      |
+----------------------+--------+------------+----------------------+----------------------+
| CBC, WITH            | Routin | 2016 |                      |   Results for this   |
| DIFFERENTIAL         | e      |            |                      | procedure are in the |
|                      |        |            |                      |  results section.    |
+----------------------+--------+------------+----------------------+----------------------+
| COMPLETE METABOLIC   | Routin | 2016 |                      |   Results for this   |
| SET                  | e      |            |                      | procedure are in the |
| (NA,K,CL,CO2,BUN,CRE |        |            |                      |  results section.    |
| AT,GLUC,CA,AST,ALT,B |        |            |                      |                      |
| ASHWINI TOTAL,ALK        |        |            |                      |                      |
| PHOS,ALB,PROT TOTAL) |        |            |                      |                      |
+----------------------+--------+------------+----------------------+----------------------+
 documented in this encounter
 
 
 Results
 CBC, WITH DIFFERENTIAL (2016)
 
+-------------+--------+-----------------+------------+--------------+
| Component   | Value  | Ref Range       | Performed  | Pathologist  |
|             |        |                 | At         | Signature    |
+-------------+--------+-----------------+------------+--------------+
| WHITE CELL  | 8.8    | 4.5 - 11 K/cu   | [facility]  |              |
| COUNT       |        | mm              | LAB -      |              |
|             |        |                 | [place]  |              |
+-------------+--------+-----------------+------------+--------------+
| RED CELL    | 4.97   | 3.8 - 5.1 M/cu  | [facility]  |              |
| COUNT       |        | mm              | LAB -      |              |
|             |        |                 | [place]  |              |
+-------------+--------+-----------------+------------+--------------+
| HEMOGLOBIN  | 14.5   | 12 - 16 g/dL    | [facility]  |              |
|             |        |                 | LAB -      |              |
|             |        |                 | [place]  |              |
+-------------+--------+-----------------+------------+--------------+
| HEMATOCRIT  | 43.2   | 35 - 45 %       | [facility]  |              |
|             |        |                 | LAB -      |              |
|             |        |                 | [place]  |              |
+-------------+--------+-----------------+------------+--------------+
| MCV         | 86.9   | 81 - 99 fL      | [facility]  |              |
|             |        |                 | LAB -      |              |
|             |        |                 | [place]  |              |
+-------------+--------+-----------------+------------+--------------+
| MCH         | 29     | 27 - 33 pg      | [facility]  |              |
|             |        |                 | LAB -      |              |
|             |        |                 | [place]  |              |
+-------------+--------+-----------------+------------+--------------+
| MCHC        | 34     | 30 - 36 g/dL    | [facility]  |              |
|             |        |                 | LAB -      |              |
|             |        |                 | [place]  |              |
+-------------+--------+-----------------+------------+--------------+
| PLATELET    | 415    | 140 - 440 K/cu  | [facility]  |              |
| COUNT       |        | mm              | LAB -      |              |
|             |        |                 | [place]  |              |
+-------------+--------+-----------------+------------+--------------+
| NEUTROPHIL  | 65.8   | 39 - 80 %       | [facility]  |              |
| %           |        |                 | LAB -      |              |
|             |        |                 | [place]  |              |
+-------------+--------+-----------------+------------+--------------+
| LYMPHOCYTE  | 22 (A) | 24 - 44 %       | [facility]  |              |
| %           |        |                 | LAB -      |              |
|             |        |                 | [place]  |              |
+-------------+--------+-----------------+------------+--------------+
| MONOCYTE %  | 7.4    | 0 - 12 %        | [facility]  |              |
|             |        |                 | LAB -      |              |
|             |        |                 | [place]  |              |
+-------------+--------+-----------------+------------+--------------+
| EOS %       | 3.9    | 0 - 6 %         | [facility]  |              |
|             |        |                 | LAB -      |              |
|             |        |                 | [place]  |              |
+-------------+--------+-----------------+------------+--------------+
| BASO %      | 0.9    | 0 - 2 %         | [facility]  |              |
|             |        |                 | LAB -      |              |
|             |        |                 | [place]  |              |
+-------------+--------+-----------------+------------+--------------+
 
| RDW         | 14.4   | 10.5 - 15 %     | [facility]  |              |
|             |        |                 | LAB -      |              |
|             |        |                 | [place]  |              |
+-------------+--------+-----------------+------------+--------------+
 
 
 
+---------------+
| Specimen      |
+---------------+
| Blood - Blood |
+---------------+
 
 
 
+--------------------+----------------------+--------------------+----------------+
| Performing         | Address              | City/State/Zipcode | Phone Number   |
| Organization       |                      |                    |                |
+--------------------+----------------------+--------------------+----------------+
|   [facility]  |   [address] | [place], [place]      |   [phone] |
| [place]          |                      |                    |                |
+--------------------+----------------------+--------------------+----------------+
 COMPLETE METABOLIC SET (NA,K,CL,CO2,BUN,CREAT,GLUC,CA,AST,ALT,BILI TOTAL,ALK PHOS,ALB,PROT 
TOTAL) (2016)
 
+-------------+-------+-----------------+------------+--------------+
| Component   | Value | Ref Range       | Performed  | Pathologist  |
|             |       |                 | At         | Signature    |
+-------------+-------+-----------------+------------+--------------+
| GLUCOSE,    | 73    | 70 - 100 mg/dL  | [facility]  |              |
| PLASMA      |       |                 | LAB -      |              |
| (LAB)       |       |                 | [place]  |              |
+-------------+-------+-----------------+------------+--------------+
| BUN, PLASMA | 8     | 6 - 23 mg/dL    | [facility]  |              |
|  (LAB)      |       |                 | LAB -      |              |
|             |       |                 | [place]  |              |
+-------------+-------+-----------------+------------+--------------+
| CREATININE  | 0.61  | 0.6 - 1.2 mg/dL | [facility]  |              |
| PLASMA      |       |                 | LAB -      |              |
| (LAB)       |       |                 | [place]  |              |
+-------------+-------+-----------------+------------+--------------+
| TOTAL       | 7.9   | 6 - 8 g/dL      | [facility]  |              |
| PROTEIN,    |       |                 | LAB -      |              |
| PLASMA      |       |                 | [place]  |              |
| (LAB)       |       |                 |            |              |
+-------------+-------+-----------------+------------+--------------+
| ALBUMIN,    | 4     | 3.5 - 5 g/dL    | [facility]  |              |
| PLASMA      |       |                 | LAB -      |              |
| (LAB)       |       |                 | [place]  |              |
+-------------+-------+-----------------+------------+--------------+
| CALCIUM,    | 9.7   | 8.4 - 10.2      | [facility]  |              |
| PLASMA      |       | mg/dL           | LAB -      |              |
| (LAB)       |       |                 | [place]  |              |
+-------------+-------+-----------------+------------+--------------+
| BILIRUBIN   | 0.3   | 0 - 1.2 mg/dL   | [facility]  |              |
| TOTAL       |       |                 | LAB -      |              |
|             |       |                 | [place]  |              |
+-------------+-------+-----------------+------------+--------------+
| ALK PHOS    | 108   | 30 - 128 U/L    | [facility]  |              |
|             |       |                 | LAB -      |              |
 
|             |       |                 | [place]  |              |
+-------------+-------+-----------------+------------+--------------+
| AST(SGOT)   | 18    | 13 - 39 U/L     | [facility]  |              |
|             |       |                 | LAB -      |              |
|             |       |                 | [place]  |              |
+-------------+-------+-----------------+------------+--------------+
| SODIUM,     | 137   | 132 - 143       | [facility]  |              |
| PLASMA      |       | mmol/L          | LAB -      |              |
| (LAB)       |       |                 | [place]  |              |
+-------------+-------+-----------------+------------+--------------+
| POTASSIUM,  | 4     | 3.6 - 5.1       | [facility]  |              |
| PLASMA      |       | mmol/L          | LAB -      |              |
| (LAB)       |       |                 | [place]  |              |
+-------------+-------+-----------------+------------+--------------+
| CHLORIDE,   | 100   | 95 - 112 mmol/L | [facility]  |              |
| PLASMA      |       |                 | LAB -      |              |
| (LAB)       |       |                 | [place]  |              |
+-------------+-------+-----------------+------------+--------------+
| TOTAL CO2,  | 24    | 19 - 31 mmol/L  | [facility]  |              |
| PLASMA      |       |                 | LAB -      |              |
| (LAB)       |       |                 | [place]  |              |
+-------------+-------+-----------------+------------+--------------+
| ALT (SGPT)  | 18    | 7 - 52 U/L      | [facility]  |              |
|             |       |                 | LAB -      |              |
|             |       |                 | [place]  |              |
+-------------+-------+-----------------+------------+--------------+
 
 
 
+---------------+
| Specimen      |
+---------------+
| Blood - Blood |
+---------------+
 
 
 
+--------------------+----------------------+--------------------+----------------+
| Performing         | Address              | City/State/Zipcode | Phone Number   |
| Organization       |                      |                    |                |
+--------------------+----------------------+--------------------+----------------+
|   [facility]  |   [address] | [place]      |   [phone] |
| [place]          |                      |                    |                |
+--------------------+----------------------+--------------------+----------------+
 documented in this encounter
 
 Visit Diagnoses
 Not on filedocumented in this encounter

## 2019-11-01 NOTE — XMS
Encounter Summary
  Created on: 2019
 
 Bonifacio Nila JB
 External Reference #: [id number]
 : [birth year]
 Sex: Female
 
 Demographics
 
 
+-----------------------+------------------------+
| Address               | [address]            |
|                       | [place]  [address]   |
+-----------------------+------------------------+
| Home Phone            | [phone]        |
+-----------------------+------------------------+
| Preferred Language    | Unknown                |
+-----------------------+------------------------+
| Marital Status        | Single                 |
+-----------------------+------------------------+
| Advent Affiliation | Unknown                |
+-----------------------+------------------------+
| Race                  | White                  |
+-----------------------+------------------------+
| Ethnic Group          | Not  or  |
+-----------------------+------------------------+
 
 
 Author
 
 
+--------------+------------------------------+
| Author       | [email or web address] |
+--------------+------------------------------+
| Organization | [facility] |
+--------------+------------------------------+
| Address      | Unknown                      |
+--------------+------------------------------+
| Phone        | Unavailable                  |
+--------------+------------------------------+
 
 
 
 Support
 
 
+-----------------+--------------+---------+-----------------+
| Name            | Relationship | Address | Phone           |
+-----------------+--------------+---------+-----------------+
| Kit Valdovinos   | ECON         | Unknown | [phone] |
+-----------------+--------------+---------+-----------------+
| Magdalena Valdovinos | ECON         | Unknown | [phone] |
+-----------------+--------------+---------+-----------------+
 
 
 
 Care Team Providers
 
 
 
+-----------------------+------+-----------------+
| Care Team Member Name | Role | Phone           |
+-----------------------+------+-----------------+
| Lily Horton MD   | PCP  | [phone] |
+-----------------------+------+-----------------+
 
 
 
 Encounter Details
 
 
+--------+----------+----------------------+--------------------+-------------+
| Date   | Type     | Department           | Care Team          | Description |
+--------+----------+----------------------+--------------------+-------------+
| / | Abstract |   Pediatric          |   Ivis Burkett MD |             |
|    |          | Gastroenterology at  |                    |             |
|        |          | [facility]          |                    |             |
|        |          | [facility]  |                    |             |
|        |          |  [address] |                    |             |
|        |          |  [address]  Mailcode:  |                    |             |
|        |          | [address] [address]   |                    |             |
|        |          | [place], [place]         |                    |             |
|        |          | [address]           |                    |             |
|        |          | [phone]         |                    |             |
+--------+----------+----------------------+--------------------+-------------+
 
 
 
 Social History
 
 
+----------------+-------+-----------+--------+------+
| Tobacco Use    | Types | Packs/Day | Years  | Date |
|                |       |           | Used   |      |
+----------------+-------+-----------+--------+------+
| Never Assessed |       |           |        |      |
+----------------+-------+-----------+--------+------+
 
 
 
+------------------+---------------+
| Sex Assigned at  | Date Recorded |
| Birth            |               |
+------------------+---------------+
| Not on file      |               |
+------------------+---------------+
 
 
 
+----------------+-------------+-------------+
| Job Start Date | Occupation  | Industry    |
+----------------+-------------+-------------+
| Not on file    | Not on file | Not on file |
+----------------+-------------+-------------+
 
 
 
+----------------+--------------+------------+
 
| Travel History | Travel Start | Travel End |
+----------------+--------------+------------+
 
 
 
+-------------------------------------+
| No recent travel history available. |
+-------------------------------------+
 documented as of this encounter
 
 Plan of Treatment
 Not on filedocumented as of this encounter
 
 Visit Diagnoses
 Not on filedocumented in this encounter

## 2019-11-01 NOTE — XMS
Encounter Summary
  Created on: 2019
 
 Bonifacio Nila JB
 External Reference #: [id number]
 : [birth year]
 Sex: Female
 
 Demographics
 
 
+-----------------------+------------------------+
| Address               | [address]            |
|                       | [place]  [address]   |
+-----------------------+------------------------+
| Home Phone            | [phone]        |
+-----------------------+------------------------+
| Preferred Language    | Unknown                |
+-----------------------+------------------------+
| Marital Status        | Single                 |
+-----------------------+------------------------+
| Restoration Affiliation | Unknown                |
+-----------------------+------------------------+
| Race                  | White                  |
+-----------------------+------------------------+
| Ethnic Group          | Not  or  |
+-----------------------+------------------------+
 
 
 Author
 
 
+--------------+------------------------------+
| Author       | [facility] [employer] [facility] |
+--------------+------------------------------+
| Organization | [facility] |
+--------------+------------------------------+
| Address      | Unknown                      |
+--------------+------------------------------+
| Phone        | Unavailable                  |
+--------------+------------------------------+
 
 
 
 Support
 
 
+-----------------+--------------+---------+-----------------+
| Name            | Relationship | Address | Phone           |
+-----------------+--------------+---------+-----------------+
| Kit Valdovinos   | ECON         | Unknown | [phone] |
+-----------------+--------------+---------+-----------------+
| Magdalena Valdovinos | ECON         | Unknown | [phone] |
+-----------------+--------------+---------+-----------------+
 
 
 
 Care Team Providers
 
 
 
+------------------------+------+-----------------+
| Care Team Member Name  | Role | Phone           |
+------------------------+------+-----------------+
| Lily Horton MD | PCP  | [phone] |
+------------------------+------+-----------------+
 
 
 
 Encounter Details
 
 
+--------+-------------+----------------------+--------------+-------------+
| Date   | Type        | Department           | Care Team    | Description |
+--------+-------------+----------------------+--------------+-------------+
| / | Document-Sc |   Health Information |   Unknown  . |             |
|    | anned       |  Services  [address]    |              |             |
|        |             | [address]  |              |             |
|        |             |  Mailcode: OP[address]     |              |             |
|        |             | [facility]  |              |             |
|        |             | [place], [place]  |              |             |
|        |             | [address]           |              |             |
|        |             | [phone]         |              |             |
+--------+-------------+----------------------+--------------+-------------+
 
 
 
 Social History
 
 
+-------------------+-------+-----------+--------+------+
| Tobacco Use       | Types | Packs/Day | Years  | Date |
|                   |       |           | Used   |      |
+-------------------+-------+-----------+--------+------+
| Passive Smoke     |       |           |        |      |
| Exposure - Never  |       |           |        |      |
| Smoker            |       |           |        |      |
+-------------------+-------+-----------+--------+------+
 
 
 
+---------------------+---+---+---+
| Smokeless Tobacco:  |   |   |   |
| Never Used          |   |   |   |
+---------------------+---+---+---+
 
 
 
+-------------+-------------+---------+----------+
| Alcohol Use | Drinks/Week | oz/Week | Comments |
+-------------+-------------+---------+----------+
| Not Asked   |             |         |          |
+-------------+-------------+---------+----------+
 
 
 
+------------------+---------------+
| Sex Assigned at  | Date Recorded |
| Birth            |               |
 
+------------------+---------------+
| Not on file      |               |
+------------------+---------------+
 
 
 
+----------------+-------------+-------------+
| Job Start Date | Occupation  | Industry    |
+----------------+-------------+-------------+
| Not on file    | Not on file | Not on file |
+----------------+-------------+-------------+
 
 
 
+----------------+--------------+------------+
| Travel History | Travel Start | Travel End |
+----------------+--------------+------------+
 
 
 
+-------------------------------------+
| No recent travel history available. |
+-------------------------------------+
 documented as of this encounter
 
 Plan of Treatment
 Not on filedocumented as of this encounter
 
 Procedures
 
 
+----------------+--------+-------------+----------------------+----------------------+
| Procedure Name | Priori | Date/Time   | Associated Diagnosis | Comments             |
|                | ty     |             |                      |                      |
+----------------+--------+-------------+----------------------+----------------------+
| LAB REPORTS    |        | 2015  |                      |   Results for this   |
|                |        | 12:00 AM    |                      | procedure are in the |
|                |        | PDT         |                      |  results section.    |
+----------------+--------+-------------+----------------------+----------------------+
 documented in this encounter
 
 Results
 LAB REPORTS (2015 12:00 AM PDT)
 
+----------------------------------------------------------+--------------+
| Narrative                                                | Performed At |
+----------------------------------------------------------+--------------+
|   This result has an attachment that is not available.   |              |
+----------------------------------------------------------+--------------+
 documented in this encounter
 
 Visit Diagnoses
 Not on filedocumented in this encounter

## 2019-11-01 NOTE — XMS
Encounter Summary
  Created on: 2019
 
 Bonifacio Nila JB
 External Reference #: [id number]
 : [birth year]
 Sex: Female
 
 Demographics
 
 
+-----------------------+------------------------+
| Address               | [address]            |
|                       | [place]  [address]   |
+-----------------------+------------------------+
| Home Phone            | [phone]        |
+-----------------------+------------------------+
| Preferred Language    | Unknown                |
+-----------------------+------------------------+
| Marital Status        | Single                 |
+-----------------------+------------------------+
| Hindu Affiliation | Unknown                |
+-----------------------+------------------------+
| Race                  | White                  |
+-----------------------+------------------------+
| Ethnic Group          | Not  or  |
+-----------------------+------------------------+
 
 
 Author
 
 
+--------------+------------------------------+
| Author       | On license of [facility] |
+--------------+------------------------------+
| Organization | [facility] |
+--------------+------------------------------+
| Address      | Unknown                      |
+--------------+------------------------------+
| Phone        | Unavailable                  |
+--------------+------------------------------+
 
 
 
 Support
 
 
+-----------------+--------------+---------+-----------------+
| Name            | Relationship | Address | Phone           |
+-----------------+--------------+---------+-----------------+
| Kit Valdovinos   | ECON         | Unknown | [phone] |
+-----------------+--------------+---------+-----------------+
| Magdalena Valdovinos | ECON         | Unknown | [phone] |
+-----------------+--------------+---------+-----------------+
 
 
 
 Care Team Providers
 
 
 
+-----------------------+------+-------------+
| Care Team Member Name | Role | Phone       |
+-----------------------+------+-------------+
 PCP  | Unavailable |
+-----------------------+------+-------------+
 
 
 
 Reason for Visit
 Office Visit - E/M Services (Routine)
 
+--------+--------------+---------------+--------------+--------------+---------------+
| Status | Reason       | Specialty     | Diagnoses /  | Referred By  | Referred To   |
|        |              |               | Procedures   | Contact      | Contact       |
+--------+--------------+---------------+--------------+--------------+---------------+
| Closed |   Specialty  | Pediatric     |   Diagnoses  |   [facility]   |   Ped         |
|        | Services     | Endocrinology |              | Epic Dept    | Endocrinology |
|        | Required     |               | Complication |              |  Dc  [address] |
|        |              |               | s of         |              |  [address]  |
|        |              |               | transplanted |              | [address]       |
|        |              |               |  liver       |              | Mailcode:     |
|        |              |               | Liver        |              | DC7          |
|        |              |               | replaced by  |              | [facility]   |
|        |              |               | transplant   |              | [place], [place]  |
|        |              |               | (HCC)        |              | [address]    |
|        |              |               | Procedures   |              | Phone:        |
|        |              |               | [place]           |              | [phone]  |
|        |              |               | OFFICE/OUTPT |              |  Fax:         |
|        |              |               |              |              | [phone]  |
|        |              |               | VISIT,EST,LE |              |               |
|        |              |               | VL II  [place]    |              |               |
|        |              |               | EST PATIENT  |              |               |
|        |              |               | LEVEL V      |              |               |
+--------+--------------+---------------+--------------+--------------+---------------+
 
 
 
 
 Encounter Details
 
 
+--------+---------+----------------------+----------------------+---------------------+
| Date   | Type    | Department           | Care Team            | Description         |
+--------+---------+----------------------+----------------------+---------------------+
| / | Office  |   Specialty Clinics  |   Neema Khalil,   | Transplanted liver  |
|    | Visit   | at [facility]  [address]  | MD  [address] | (HCC) (Primary Dx)  |
|        |         | [address]      |   [facility] [place]       |                     |
|        |         | Mailcode: DC7       | [id number]           |                     |
|        |         | [place]          | [phone]         |                     |
|        |         | [place]         | [phone] (Fax)   |                     |
|        |         | [address]           |                      |                     |
|        |         | [phone]         |                      |                     |
+--------+---------+----------------------+----------------------+---------------------+
 
 
 
 Social History
 
 
 
+----------------+-------+-----------+--------+------+
| Tobacco Use    | Types | Packs/Day | Years  | Date |
|                |       |           | Used   |      |
+----------------+-------+-----------+--------+------+
| Never Assessed |       |           |        |      |
+----------------+-------+-----------+--------+------+
 
 
 
+------------------+---------------+
| Sex Assigned at  | Date Recorded |
| Birth            |               |
+------------------+---------------+
| Not on file      |               |
+------------------+---------------+
 
 
 
+----------------+-------------+-------------+
| Job Start Date | Occupation  | Industry    |
+----------------+-------------+-------------+
| Not on file    | Not on file | Not on file |
+----------------+-------------+-------------+
 
 
 
+----------------+--------------+------------+
| Travel History | Travel Start | Travel End |
+----------------+--------------+------------+
 
 
 
+-------------------------------------+
| No recent travel history available. |
+-------------------------------------+
 documented as of this encounter
 
 Patient Instructions
 Patient Instructions Neema Khalil MD - 2014  8:22 AM PDTElectronically signed by 
Neema Khalil MD at 2014  7:18 PM PDT
 documented in this encounter
 
 Progress Notes
 Neema Khalil MD - 2014  8:23 AM PDT
 Pt is no show for appt. Our SW called to family as she is lost to follow up since :
 
 "I called and spoke with Nila Valdovinos  s mother Magdalena Valdovinos.  She stated that she did
 not realize Nila still had an appointment today.  She was expecting to do labs locally bef
ore coming here for the appointment.  She said she called us several times but the labs were
 never ordered.  I don  t see any record of her calling, but I also don  t see any order f
or labs.  She wants to reschedule and they need the lab order sent to [facility] in [place].  She also said that the reason they have been absent from clinic for so long was due t
o insurance issues, but they are now insured.  From our phone call it seemed that she was ha
ppy to reschedule".
 
 Plan:
 - send orders for her labs (CBC, CMP, GGT) to local lab
 - schedule a clinic appt with me at [facility]
 - schedule for the next liver transplant clinic (2014)
 
 - follow compliance issues very closely
 
 YEElectronically signed by Neema Khalil MD at 2014  7:19 PM PDTdocumented in this 
encounter
 
 Plan of Treatment
 Not on filedocumented as of this encounter
 
 Visit Diagnoses
 
 
+--------------------------------------------------------------------+
| Diagnosis                                                          |
+--------------------------------------------------------------------+
|   Transplanted liver (HCC) - Primary  Liver replaced by transplant |
+--------------------------------------------------------------------+
 documented in this encounter

## 2019-11-01 NOTE — XMS
Encounter Summary
  Created on: 2019
 
 Bonifacio Nila JB
 External Reference #: [id number]
 : [birth year]
 Sex: Female
 
 Demographics
 
 
+-----------------------+------------------------+
| Address               | [address]            |
|                       | [place]  [address]   |
+-----------------------+------------------------+
| Home Phone            | [phone]        |
+-----------------------+------------------------+
| Preferred Language    | Unknown                |
+-----------------------+------------------------+
| Marital Status        | Single                 |
+-----------------------+------------------------+
| Quaker Affiliation | Unknown                |
+-----------------------+------------------------+
| Race                  | White                  |
+-----------------------+------------------------+
| Ethnic Group          | Not  or  |
+-----------------------+------------------------+
 
 
 Author
 
 
+--------------+------------------------------+
| Author       | [facility] [employer] [facility] |
+--------------+------------------------------+
| Organization | [facility] |
+--------------+------------------------------+
| Address      | Unknown                      |
+--------------+------------------------------+
| Phone        | Unavailable                  |
+--------------+------------------------------+
 
 
 
 Support
 
 
+-----------------+--------------+---------+-----------------+
| Name            | Relationship | Address | Phone           |
+-----------------+--------------+---------+-----------------+
| Kit Valdovinos   | ECON         | Unknown | [phone] |
+-----------------+--------------+---------+-----------------+
| Magdalena Valdovinos | ECON         | Unknown | [phone] |
+-----------------+--------------+---------+-----------------+
 
 
 
 Care Team Providers
 
 
 
+-----------------------+------+-----------------+
| Care Team Member Name | Role | Phone           |
+-----------------------+------+-----------------+
| Lily Horton MD   | PCP  | [phone] |
+-----------------------+------+-----------------+
 
 
 
 Reason for Visit
 
 
+-------------------+----------+
| Reason            | Comments |
+-------------------+----------+
| Follow-up in      |          |
| outpatient clinic |          |
+-------------------+----------+
 Consultation (Routine)
 
+--------+--------------+---------------+--------------+--------------+---------------+
| Status | Reason       | Specialty     | Diagnoses /  | Referred By  | Referred To   |
|        |              |               | Procedures   | Contact      | Contact       |
+--------+--------------+---------------+--------------+--------------+---------------+
| Closed |   Specialty  | Pediatric     |   Diagnoses  |   Sol,    |   Ped Gastro  |
|        | Services     | Gastroenterol |  Liver       | Lily CARY MD | [facility]  [address]   |
|        | Required     | ogy           | replaced by  |   PEDS       | [address]   |
|        |              |               | transplant   | SPECIALISTS  | [address]       |
|        |              |               | (HCC)        | OF [place] | Mailcode:     |
|        |              |               |              |   [address]   | [address]         |
|        |              |               |              | [address] | [address]   |
|        |              |               |              |  [address]         | [place], [place]  |
|        |              |               |              | [place],   | [address]    |
|        |              |               |              | [place] [address]     | Phone:        |
|        |              |               |              | Phone:       | [phone]  |
|        |              |               |              | [phone] |  Fax:         |
|        |              |               |              |   Fax:       | [phone]  |
|        |              |               |              | [phone] |               |
+--------+--------------+---------------+--------------+--------------+---------------+
 
 
 
 
 Encounter Details
 
 
+--------+---------+----------------------+--------------------+---------------------+
| Date   | Type    | Department           | Care Team          | Description         |
+--------+---------+----------------------+--------------------+---------------------+
| / | Office  |   Pediatric          |   Ivis Burkett MD | Transplanted Liver  |
|    | Visit   | Gastroenterology at  |                    | (HCC) (Primary Dx)  |
|        |         | [place]          |                    |                     |
|        |         | [facility]  |                    |                     |
|        |         |  [address] |                    |                     |
|        |         |  [address]  Mailcode:  |                    |                     |
|        |         | [address]  [place]   |                    |                     |
|        |         | [place], [place]         |                    |                     |
|        |         | [address]           |                    |                     |
|        |         | [phone]         |                    |                     |
 
+--------+---------+----------------------+--------------------+---------------------+
 
 
 
 Social History
 
 
+----------------+-------+-----------+--------+------+
| Tobacco Use    | Types | Packs/Day | Years  | Date |
|                |       |           | Used   |      |
+----------------+-------+-----------+--------+------+
| Never Assessed |       |           |        |      |
+----------------+-------+-----------+--------+------+
 
 
 
+------------------+---------------+
| Sex Assigned at  | Date Recorded |
| Birth            |               |
+------------------+---------------+
| Not on file      |               |
+------------------+---------------+
 
 
 
+----------------+-------------+-------------+
| Job Start Date | Occupation  | Industry    |
+----------------+-------------+-------------+
| Not on file    | Not on file | Not on file |
+----------------+-------------+-------------+
 
 
 
+----------------+--------------+------------+
| Travel History | Travel Start | Travel End |
+----------------+--------------+------------+
 
 
 
+-------------------------------------+
| No recent travel history available. |
+-------------------------------------+
 documented as of this encounter
 
 Last Filed Vital Signs
 
 
+-------------------+----------------------+----------------------+----------+
| Vital Sign        | Reading              | Time Taken           | Comments |
+-------------------+----------------------+----------------------+----------+
| Blood Pressure    | 126/80               | 2008 12:49 PM  |          |
|                   |                      | PDT                  |          |
+-------------------+----------------------+----------------------+----------+
| Pulse             | 77                   | 2008 12:49 PM  |          |
|                   |                      | PDT                  |          |
+-------------------+----------------------+----------------------+----------+
| Temperature       | -                    | -                    |          |
+-------------------+----------------------+----------------------+----------+
| Respiratory Rate  | -                    | -                    |          |
+-------------------+----------------------+----------------------+----------+
 
| Oxygen Saturation | -                    | -                    |          |
+-------------------+----------------------+----------------------+----------+
| Inhaled Oxygen    | -                    | -                    |          |
| Concentration     |                      |                      |          |
+-------------------+----------------------+----------------------+----------+
| Weight            | 38.9 kg (85 lb 12.1  | 2008 12:49 PM  |          |
|                   | oz)                  | PDT                  |          |
+-------------------+----------------------+----------------------+----------+
| Height            | 140.6 cm (4' 7.35")  | 2008 12:49 PM  |          |
|                   |                      | PDT                  |          |
+-------------------+----------------------+----------------------+----------+
| Body Mass Index   | 19.68                | 2008 12:49 PM  |          |
|                   |                      | PDT                  |          |
+-------------------+----------------------+----------------------+----------+
 documented in this encounter
 
 Tanmay Burkett, Ivis - 2008  1:12 PM PDTFormatting of this note might be different from the o
riginal.
 
 Nila Valdovinos is an 8 y.o. female, who is referred by Lily Horton, who returns to [facility] clinic for followup of her liver transplant
 
 Patient Active Problem List: 
   VSD (Ventricular Septal Defect)[745.4Y]
     Comment: With polysplenia 
   Aortic Valve Insufficiency[424.1F]
   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
     Comment: For biliary atresia, 2000 at [place]
 
 Current outpatient prescriptions prior to encounter 
 Medication Sig Dispense Refill 
   Tacrolimus (PROGRAF) 1 mg Oral Capsule 1 PO bid  60  1 
 
 No Known Allergies.
 
 HPI: Nila returned to clinic today with her mother. She has been doing very well and has n
ot had any significant illnesses since her last visit, which was in Oct 2006. She has contin
ued to take Prograf but admits to forgetting a dose about every few days. Her mother gives h
er a dose at 5AM but she is at her grandfather's at 5PM and sometimes forgets. 
 
 She did not have a flu shot this year. She changed schools to a private school with only 11
 kids in her class and 50 in the school, and due to decreased exposure, her mother felt she 
didn't need the shot.
 
 She was seen by her cardiologist, Dr Norman, and new aortic insufficiency was noted. Althoug
h her VSD is functionally smaller, it may need closure in the future if the aortic insuffici
ency worsens. She has no exercise restriction but SBE prophylaxis is recommended.
 
 Review of Systems:  
 General:  No  Fevers. Not sure whether EBV PCR was done with last weeks labs
 Ears, Nose and Throat:  No recurrent aphthous ulcers, sore throat
 Respiratory:  No cough, history of pneumonia, or wheezing
 Gastrointestinal:  No diarrhea or abdominal pain except when she is nervous
 Neurologic:  No unusual headaches 
 Skin:  No rashes
 
 grade 3  and runs track, with her best events being the 100 and 1500 M
 
 Physical Examination:
 
 
 /80, Pulse 77, Ht 140.6 cm (4' 7") (83 %ile), Wt 38.900 kg (85 lbs 12.1 oz) (89 %ile)
, Weight for age(%)  88.63%, BMI for age(%)  86.99%, Length for age(%)  82.81%. 
 
 86.99% of growth percentile based on BMI-for-age.
 
 Appearance: alert, active and in no apparent distress.
 Skin:  turgor normal,  capillary refill brisk, no rashes, petechiae 
 HEENT: normocephalic,PERRLA , no icterus,,nose without discharge, mouth no aphthous lesions
, mucous membranes moist, pharynx unremarkable, tonsils moderate sized without exudate
 Neck: supple, without thyromegaly
 Chest: clear to auscultation bilaterally.
 CV: regular sinus rhythm, normal S1 and S2, no  murmurs.
 Abdomen:,  well healed incisions, soft, no distention, no tenderness to palpation, no rebou
nd , no guarding, no palpable masses, normal bowel sounds, no hepatosplenomegaly
 Musculoskeletal: grossly intact  without clubbing or edema
 Neuro: appropriate interactions, symmetric facies, moves all extremities well
 Nodes: no signficant cervical, supraclavicular, or axillary adenopathy
 
 Last labs 08
 AST 28, ALT 24, alk phos 230, bili 0.1/0.8
 Cr 0.5
 GGT9
 Wbc 8.3, hct 40, plt 339
 Prograf level pending
 
 Assessment: Patient Active Problem List: 
   VSD (Ventricular Septal Defect)[745.4Y]
     Comment: With polysplenia 
   Aortic Valve Insufficiency[424.1F]
 
   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
     Comment: For biliary atresia, 2000 at [place]
 
 Doing well. May or may not have had labs in December, but recent labs normal
 
 Plan:  
 1. CBC, primary transplant panel, drug levels-      every  3  months
 2. EBV PCR and serology                                      every   12  months
 3. CMV PCR and serology                                     Not needed
 
 4. Other labs:   
 5. Imaging:       none
 6. Flu shot from PCP- recommend yearly 
 7. Return to transplant clinic about 6 months.
 
 8. .Mother has questions about whether immunosuppression can ever be stopped, and we discus
sed this and they were counselled to be regular with her medication. Advised to get med disp
enser with days of the week to keep at her grandfather's house.
 
 7. change in medications: none
      
 
 Electronically signed by Ivis Burkett at 2008  1:27 PM PDTdocumented in this encounter
 
 Plan of Treatment
 Not on filedocumented as of this encounter
 
 Visit Diagnoses
 
 
 
+--------------------------------------------------------------------+
| Diagnosis                                                          |
+--------------------------------------------------------------------+
|   Transplanted liver (HCC) - Primary  Liver replaced by transplant |
+--------------------------------------------------------------------+
 documented in this encounter

## 2019-11-01 NOTE — XMS
Encounter Summary
  Created on: 2019
 
 Bonifacio Nila JB
 External Reference #: [id number]
 : [birth year]
 Sex: Female
 
 Demographics
 
 
+-----------------------+------------------------+
| Address               | [address]            |
|                       | [place]  [address]   |
+-----------------------+------------------------+
| Home Phone            | [phone]        |
+-----------------------+------------------------+
| Preferred Language    | Unknown                |
+-----------------------+------------------------+
| Marital Status        | Single                 |
+-----------------------+------------------------+
| Adventism Affiliation | Unknown                |
+-----------------------+------------------------+
| Race                  | White                  |
+-----------------------+------------------------+
| Ethnic Group          | Not  or  |
+-----------------------+------------------------+
 
 
 Author
 
 
+--------------+------------------------------+
| Author       | [facility] |
+--------------+------------------------------+
| Organization | [facility] |
+--------------+------------------------------+
| Address      | Unknown                      |
+--------------+------------------------------+
| Phone        | Unavailable                  |
+--------------+------------------------------+
 
 
 
 Support
 
 
+-----------------+--------------+---------+-----------------+
| Name            | Relationship | Address | Phone           |
+-----------------+--------------+---------+-----------------+
| Kit Valdovinos   | ECON         | Unknown | [phone] |
+-----------------+--------------+---------+-----------------+
| Magdalena Valdovinos | ECON         | Unknown | [phone] |
+-----------------+--------------+---------+-----------------+
 
 
 
 Care Team Providers
 
 
 
+------------------------+------+-----------------+
| Care Team Member Name  | Role | Phone           |
+------------------------+------+-----------------+
| Lily Horton MD | PCP  | [phone] |
+------------------------+------+-----------------+
 
 
 
 Encounter Details
 
 
+--------+----------+----------------------+----------------------+-------------+
| Date   | Type     | Department           | Care Team            | Description |
+--------+----------+----------------------+----------------------+-------------+
| 10/26/ | Abstract |   Pediatric          |   Neema Khalil,   |             |
|    |          | Gastroenterology at  | MD  [address] |             |
|        |          | [place]          |   [place], [place]       |             |
|        |          | [facility]  | [address]           |             |
|        |          |  [address] | [phone]         |             |
|        |          |  [address]  Mailcode:  | [phone] (Fax)   |             |
|        |          |   [place]   |                      |             |
|        |          | [place], [place]         |                      |             |
|        |          | [address]           |                      |             |
|        |          | [phone]         |                      |             |
+--------+----------+----------------------+----------------------+-------------+
 
 
 
 Social History
 
 
+-------------------+-------+-----------+--------+------+
| Tobacco Use       | Types | Packs/Day | Years  | Date |
|                   |       |           | Used   |      |
+-------------------+-------+-----------+--------+------+
| Passive Smoke     |       |           |        |      |
| Exposure - Never  |       |           |        |      |
| Smoker            |       |           |        |      |
+-------------------+-------+-----------+--------+------+
 
 
 
+---------------------+---+---+---+
| Smokeless Tobacco:  |   |   |   |
| Never Used          |   |   |   |
+---------------------+---+---+---+
 
 
 
+-------------+-------------+---------+----------+
| Alcohol Use | Drinks/Week | oz/Week | Comments |
+-------------+-------------+---------+----------+
| Not Asked   |             |         |          |
+-------------+-------------+---------+----------+
 
 
 
+------------------+---------------+
 
| Sex Assigned at  | Date Recorded |
| Birth            |               |
+------------------+---------------+
| Not on file      |               |
+------------------+---------------+
 
 
 
+----------------+-------------+-------------+
| Job Start Date | Occupation  | Industry    |
+----------------+-------------+-------------+
| Not on file    | Not on file | Not on file |
+----------------+-------------+-------------+
 
 
 
+----------------+--------------+------------+
| Travel History | Travel Start | Travel End |
+----------------+--------------+------------+
 
 
 
+-------------------------------------+
| No recent travel history available. |
+-------------------------------------+
 documented as of this encounter
 
 Plan of Treatment
 Not on filedocumented as of this encounter
 
 Procedures
 
 
+----------------------+--------+-------------+----------------------+----------------------
+
| Procedure Name       | Priori | Date/Time   | Associated Diagnosis | Comments             
|
|                      | ty     |             |                      |                      
|
+----------------------+--------+-------------+----------------------+----------------------
+
| CHOLESTEROL, TOTAL   | Routin | 10/23/2015  |                      |   Results for this   
|
| (EXTERNAL RESULTS    | e      |  9:25 AM    |                      | procedure are in the 
|
| ONLY)                |        | PDT         |                      |  results section.    
|
+----------------------+--------+-------------+----------------------+----------------------
+
| CBC, WITH            | Routin | 10/23/2015  |                      |   Results for this   
|
| DIFFERENTIAL         | e      |  9:25 AM    |                      | procedure are in the 
|
|                      |        | PDT         |                      |  results section.    
|
+----------------------+--------+-------------+----------------------+----------------------
+
| COMPLETE METABOLIC   | Routin | 10/23/2015  |                      |   Results for this   
|
| SET                  | e      |  9:25 AM    |                      | procedure are in the 
 
|
| (NA,K,CL,CO2,BUN,CRE |        | PDT         |                      |  results section.    
|
| AT,GLUC,CA,AST,ALT,B |        |             |                      |                      
|
| ASHWINI TOTAL,ALK        |        |             |                      |                      
|
| PHOS,ALB,PROT TOTAL) |        |             |                      |                      
|
+----------------------+--------+-------------+----------------------+----------------------
+
| TACROLIMUS, WHOLE    | Routin | 10/23/2015  |                      |   Results for this   
|
| BLOOD                | e      |  9:25 AM    |                      | procedure are in the 
|
|                      |        | PDT         |                      |  results section.    
|
+----------------------+--------+-------------+----------------------+----------------------
+
| PHOSPHORUS, PLASMA   | Routin | 10/23/2015  |                      |   Results for this   
|
|                      | e      |  9:25 AM    |                      | procedure are in the 
|
|                      |        | PDT         |                      |  results section.    
|
+----------------------+--------+-------------+----------------------+----------------------
+
| GGT, PLASMA          | Routin | 10/23/2015  |                      |   Results for this   
|
|                      | e      |  9:25 AM    |                      | procedure are in the 
|
|                      |        | PDT         |                      |  results section.    
|
+----------------------+--------+-------------+----------------------+----------------------
+
| URIC ACID, PLASMA    | Routin | 10/23/2015  |                      |   Results for this   
|
|                      | e      |  9:25 AM    |                      | procedure are in the 
|
|                      |        | PDT         |                      |  results section.    
|
+----------------------+--------+-------------+----------------------+----------------------
+
| MAGNESIUM, PLASMA    | Routin | 10/23/2015  |                      |   Results for this   
|
|                      | e      |  9:25 AM    |                      | procedure are in the 
|
|                      |        | PDT         |                      |  results section.    
|
+----------------------+--------+-------------+----------------------+----------------------
+
| TRIGLYCERIDES,       | Routin | 10/23/2015  |                      |   Results for this   
|
| PLASMA               | e      |  9:25 AM    |                      | procedure are in the 
|
|                      |        | PDT         |                      |  results section.    
|
+----------------------+--------+-------------+----------------------+----------------------
+
| LDH TOTAL, PLASMA    | Routin | 10/23/2015  |                      |   Results for this   
 
|
|                      | e      |  9:25 AM    |                      | procedure are in the 
|
|                      |        | PDT         |                      |  results section.    
|
+----------------------+--------+-------------+----------------------+----------------------
+
 documented in this encounter
 
 Results
 TACROLIMUS, WHOLE BLOOD (10/23/2015  9:25 AM PDT)
 
+-----------+-------+-----------+------------+--------------+
| Component | Value | Ref Range | Performed  | Pathologist  |
|           |       |           | At         | Signature    |
+-----------+-------+-----------+------------+--------------+
|     | <1.0  |           | [facility]  |              |
|           |       |           | LAB -      |              |
|           |       |           | [place]  |              |
+-----------+-------+-----------+------------+--------------+
 
 
 
+---------------+
| Specimen      |
+---------------+
| Blood - Blood |
+---------------+
 
 
 
+--------------------+----------------------+--------------------+----------------+
| Performing         | Address              | City/State/Zipcode | Phone Number   |
| Organization       |                      |                    |                |
+--------------------+----------------------+--------------------+----------------+
|   [facility]  |   [address] | [place]      |   [phone] |
| [place]          |                      |                    |                |
+--------------------+----------------------+--------------------+----------------+
 CBC, WITH DIFFERENTIAL (10/23/2015  9:25 AM PDT)
 
+-------------+-------+-----------------+------------+--------------+
| Component   | Value | Ref Range       | Performed  | Pathologist  |
|             |       |                 | At         | Signature    |
+-------------+-------+-----------------+------------+--------------+
| WHITE CELL  | 5.2   | 4.5 - 11.0 K/cu | [facility]  |              |
| COUNT       |       |  mm             | LAB -      |              |
|             |       |                 | [place]  |              |
+-------------+-------+-----------------+------------+--------------+
| RED CELL    | 4.65  | 3.8 - 5.1 M/cu  | [facility]  |              |
| COUNT       |       | mm              | LAB -      |              |
|             |       |                 | [place]  |              |
+-------------+-------+-----------------+------------+--------------+
| HEMOGLOBIN  | 13.5  | 12 - 16 g/dL    | [facility]  |              |
|             |       |                 | LAB -      |              |
|             |       |                 | [place]  |              |
+-------------+-------+-----------------+------------+--------------+
| HEMATOCRIT  | 41    | 35 - 45 %       | [facility]  |              |
|             |       |                 | LAB -      |              |
|             |       |                 | [place]  |              |
+-------------+-------+-----------------+------------+--------------+
 
| MCV         | 88.2  | 81 - 99 fL      | [facility]  |              |
|             |       |                 | LAB -      |              |
|             |       |                 | [place]  |              |
+-------------+-------+-----------------+------------+--------------+
| MCH         | 29    | 27 - 33 pg      | [facility]  |              |
|             |       |                 | LAB -      |              |
|             |       |                 | [place]  |              |
+-------------+-------+-----------------+------------+--------------+
| MCHC        | 33    | 30 - 36 g/dL    | [facility]  |              |
|             |       |                 | LAB -      |              |
|             |       |                 | [place]  |              |
+-------------+-------+-----------------+------------+--------------+
| PLATELET    | 343   | 140 - 440 K/cu  | [facility]  |              |
| COUNT       |       | mm              | LAB -      |              |
|             |       |                 | [place]  |              |
+-------------+-------+-----------------+------------+--------------+
| NEUTROPHIL  | 54.2  | 39 - 80 %       | [facility]  |              |
| %           |       |                 | LAB -      |              |
|             |       |                 | [place]  |              |
+-------------+-------+-----------------+------------+--------------+
| LYMPHOCYTE  | 32.3  | 24 - 44 %       | [facility]  |              |
| %           |       |                 | LAB -      |              |
|             |       |                 | [place]  |              |
+-------------+-------+-----------------+------------+--------------+
| MONOCYTE %  | 9.8   | 0 - 12 %        | [facility]  |              |
|             |       |                 | LAB -      |              |
|             |       |                 | [place]  |              |
+-------------+-------+-----------------+------------+--------------+
| EOS %       | 2.6   | 0 - 6 %         | [facility]  |              |
|             |       |                 | LAB -      |              |
|             |       |                 | [place]  |              |
+-------------+-------+-----------------+------------+--------------+
| BASO %      | 1.1   | 0 - 2 %         | [facility]  |              |
|             |       |                 | LAB -      |              |
|             |       |                 | [place]  |              |
+-------------+-------+-----------------+------------+--------------+
| RDW         | 13.7  | 10.5 - 15.0 %   | [facility]  |              |
|             |       |                 | LAB -      |              |
|             |       |                 | [place]  |              |
+-------------+-------+-----------------+------------+--------------+
 
 
 
+---------------+
| Specimen      |
+---------------+
| Blood - Blood |
+---------------+
 
 
 
+--------------------+----------------------+--------------------+----------------+
| Performing         | Address              | City/State/Zipcode | Phone Number   |
| Organization       |                      |                    |                |
+--------------------+----------------------+--------------------+----------------+
|   [facility]  |   [address] | [place]      |   [phone] |
| [place]          |                      |                    |                |
+--------------------+----------------------+--------------------+----------------+
 MAGNESIUM, PLASMA (10/23/2015  9:25 AM PDT)
 
 
+-----------+-------+-----------------+------------+--------------+
| Component | Value | Ref Range       | Performed  | Pathologist  |
|           |       |                 | At         | Signature    |
+-----------+-------+-----------------+------------+--------------+
| MAGNESIUM | 1.7   | 1.7 - 2.5 mg/dL | [facility]  |              |
|           |       |                 | LAB -      |              |
|           |       |                 | [place]  |              |
+-----------+-------+-----------------+------------+--------------+
 
 
 
+---------------+
| Specimen      |
+---------------+
| Blood - Blood |
+---------------+
 
 
 
+--------------------+----------------------+--------------------+----------------+
| Performing         | Address              | City/State/Zipcode | Phone Number   |
| Organization       |                      |                    |                |
+--------------------+----------------------+--------------------+----------------+
|   [facility]  |   [address] | [place]      |   [phone] |
| [place]          |                      |                    |                |
+--------------------+----------------------+--------------------+----------------+
 GGT, PLASMA (10/23/2015  9:25 AM PDT)
 
+-----------+-------+------------+------------+--------------+
| Component | Value | Ref Range  | Performed  | Pathologist  |
|           |       |            | At         | Signature    |
+-----------+-------+------------+------------+--------------+
| GAMMA     | 9     | 5 - 60 U/L | [facility]  |              |
| GLUTAMYL  |       |            | LAB -      |              |
| TRANS     |       |            | [place]  |              |
+-----------+-------+------------+------------+--------------+
 
 
 
+---------------+
| Specimen      |
+---------------+
| Blood - Blood |
+---------------+
 
 
 
+--------------------+----------------------+--------------------+----------------+
| Performing         | Address              | City/State/Zipcode | Phone Number   |
| Organization       |                      |                    |                |
+--------------------+----------------------+--------------------+----------------+
|   [facility]  |   [address] | [place], [place]      |   [phone] |
| [place]          |                      |                    |                |
+--------------------+----------------------+--------------------+----------------+
 COMPLETE METABOLIC SET (NA,K,CL,CO2,BUN,CREAT,GLUC,CA,AST,ALT,BILI TOTAL,ALK PHOS,ALB,PROT 
TOTAL) (10/23/2015  9:25 AM PDT)
 
+-------------+-------+-----------------+------------+--------------+
| Component   | Value | Ref Range       | Performed  | Pathologist  |
|             |       |                 | At         | Signature    |
 
+-------------+-------+-----------------+------------+--------------+
| GLUCOSE,    | 83    | 70 - 100 mg/dL  | [facility]  |              |
| PLASMA      |       |                 | LAB -      |              |
| (LAB)       |       |                 | [place]  |              |
+-------------+-------+-----------------+------------+--------------+
| BUN, PLASMA | 13    | 6 - 23 mg/dL    | [facility]  |              |
|  (LAB)      |       |                 | LAB -      |              |
|             |       |                 | [place]  |              |
+-------------+-------+-----------------+------------+--------------+
| CREATININE  | 0.61  | 0.60 - 1.20     | [facility]  |              |
| PLASMA      |       | mg/dL           | LAB -      |              |
| (LAB)       |       |                 | [place]  |              |
+-------------+-------+-----------------+------------+--------------+
| TOTAL       | 7.2   | 6.1 - 8.5 g/dL  | [facility]  |              |
| PROTEIN,    |       |                 | LAB -      |              |
| PLASMA      |       |                 | [place]  |              |
| (LAB)       |       |                 |            |              |
+-------------+-------+-----------------+------------+--------------+
| ALBUMIN,    | 4.1   | 3.5 - 5.0 g/dL  | [facility]  |              |
| PLASMA      |       |                 | LAB -      |              |
| (LAB)       |       |                 | [place]  |              |
+-------------+-------+-----------------+------------+--------------+
| CALCIUM,    | 9.4   | 8.4 - 10.2      | [facility]  |              |
| PLASMA      |       | mg/dL           | LAB -      |              |
| (LAB)       |       |                 | [place]  |              |
+-------------+-------+-----------------+------------+--------------+
| BILIRUBIN   | 0.6   | 0 - 1.2         | [facility]  |              |
| TOTAL       |       | Transcutaneous  | LAB -      |              |
|             |       | Bilirubinometer | [place]  |              |
+-------------+-------+-----------------+------------+--------------+
| ALK PHOS    | 91    | 30 - 128 U/L    | [facility]  |              |
|             |       |                 | LAB -      |              |
|             |       |                 | [place]  |              |
+-------------+-------+-----------------+------------+--------------+
| AST(SGOT)   | 16    | 13 - 39 U/L     | [facility]  |              |
|             |       |                 | LAB -      |              |
|             |       |                 | [place]  |              |
+-------------+-------+-----------------+------------+--------------+
| SODIUM,     | 139   | 132 - 143       | [facility]  |              |
| PLASMA      |       | mmol/L          | LAB -      |              |
| (LAB)       |       |                 | [place]  |              |
+-------------+-------+-----------------+------------+--------------+
| POTASSIUM,  | 4.0   | 3.6 - 5.1       | [facility]  |              |
| PLASMA      |       | mmol/L          | LAB -      |              |
| (LAB)       |       |                 | [place]  |              |
+-------------+-------+-----------------+------------+--------------+
| CHLORIDE,   | 105   | 95 - 112 mmol/L | [facility]  |              |
| PLASMA      |       |                 | LAB -      |              |
| (LAB)       |       |                 | [place]  |              |
+-------------+-------+-----------------+------------+--------------+
| TOTAL CO2,  | 24    | 19 - 31 mmol/L  | [facility]  |              |
| PLASMA      |       |                 | LAB -      |              |
| (LAB)       |       |                 | [place]  |              |
+-------------+-------+-----------------+------------+--------------+
| ALT (SGPT)  | 14    | 7 - 52 U/L      | [facility]  |              |
|             |       |                 | LAB -      |              |
|             |       |                 | [place]  |              |
+-------------+-------+-----------------+------------+--------------+
| ANION GAP   | 14    | 7 - 21          | [facility]  |              |
|             |       |                 | LAB -      |              |
 
|             |       |                 | [place]  |              |
+-------------+-------+-----------------+------------+--------------+
| BUN/CREATIN | 21.3  | 6.0 - 28.6      | [facility]  |              |
| INE RATIO   |       |                 | LAB -      |              |
|             |       |                 | [place]  |              |
+-------------+-------+-----------------+------------+--------------+
| GLOBULIN    | 3.1   | 1.8 - 3.5       | [facility]  |              |
|             |       |                 | LAB -      |              |
|             |       |                 | [place]  |              |
+-------------+-------+-----------------+------------+--------------+
| A/G RATIO   | 1.3   | 1.1 - 2.4       | [facility]  |              |
|             |       |                 | LAB -      |              |
|             |       |                 | [place]  |              |
+-------------+-------+-----------------+------------+--------------+
| BILIRUBIN   | 0.1   | 0 - 0.1 mg/dL   | [facility]  |              |
| DIRECT      |       |                 | LAB -      |              |
|             |       |                 | [place]  |              |
+-------------+-------+-----------------+------------+--------------+
 
 
 
+---------------+
| Specimen      |
+---------------+
| Blood - Blood |
+---------------+
 
 
 
+--------------------+----------------------+--------------------+----------------+
| Performing         | Address              | City/State/Zipcode | Phone Number   |
| Organization       |                      |                    |                |
+--------------------+----------------------+--------------------+----------------+
|   [facility]  |   [address] | [place]      |   [phone] |
| [place]          |                      |                    |                |
+--------------------+----------------------+--------------------+----------------+
 TRIGLYCERIDES, PLASMA (10/23/2015  9:25 AM PDT)
 
+-------------+-------+----------------+------------+--------------+
| Component   | Value | Ref Range      | Performed  | Pathologist  |
|             |       |                | At         | Signature    |
+-------------+-------+----------------+------------+--------------+
| TRIGLYCERID | 54    | 30 - 150 mg/dL | [facility]  |              |
| ES          |       |                | LAB -      |              |
|             |       |                | [place]  |              |
+-------------+-------+----------------+------------+--------------+
 
 
 
+---------------+
| Specimen      |
+---------------+
| Blood - Blood |
+---------------+
 
 
 
+--------------------+----------------------+--------------------+----------------+
| Performing         | Address              | City/State/Zipcode | Phone Number   |
| Organization       |                      |                    |                |
 
+--------------------+----------------------+--------------------+----------------+
|   [facility]  |   [address] | [place]      |   [phone] |
| [place]          |                      |                    |                |
+--------------------+----------------------+--------------------+----------------+
 LDH TOTAL, PLASMA (10/23/2015  9:25 AM PDT)
 
+-------------+-------+------------+------------+--------------+
| Component   | Value | Ref Range  | Performed  | Pathologist  |
|             |       |            | At         | Signature    |
+-------------+-------+------------+------------+--------------+
| LDH (TOTAL) | 144   | 100 - 215  | [facility]  |              |
|             |       | Units/L    | LAB -      |              |
|             |       |            | [place]  |              |
+-------------+-------+------------+------------+--------------+
 
 
 
+---------------+
| Specimen      |
+---------------+
| Blood - Blood |
+---------------+
 
 
 
+--------------------+----------------------+--------------------+----------------+
| Performing         | Address              | City/State/Zipcode | Phone Number   |
| Organization       |                      |                    |                |
+--------------------+----------------------+--------------------+----------------+
|   [facility]  |   [address] | [place], [place]      |   [phone] |
| [place]          |                      |                    |                |
+--------------------+----------------------+--------------------+----------------+
 CHOLESTEROL, TOTAL (EXTERNAL RESULTS ONLY) (10/23/2015  9:25 AM PDT)
 
+-------------+-------+-----------+------------+--------------+
| Component   | Value | Ref Range | Performed  | Pathologist  |
|             |       |           | At         | Signature    |
+-------------+-------+-----------+------------+--------------+
| CHOLESTEROL | 120   | 200 mg/dL | [facility]  |              |
|   (LAB)     |       |           | LAB -      |              |
|             |       |           | [place]  |              |
+-------------+-------+-----------+------------+--------------+
 
 
 
+----------+
| Specimen |
+----------+
| Blood    |
+----------+
 
 
 
+--------------------+----------------------+--------------------+----------------+
| Performing         | Address              | City/State/Zipcode | Phone Number   |
| Organization       |                      |                    |                |
+--------------------+----------------------+--------------------+----------------+
|   [facility]  |   [address] | [place], [place]      |   [phone] |
| [place]          |                      |                    |                |
+--------------------+----------------------+--------------------+----------------+
 
 URIC ACID, PLASMA (10/23/2015  9:25 AM PDT)
 
+-------------+-------+-----------------+------------+--------------+
| Component   | Value | Ref Range       | Performed  | Pathologist  |
|             |       |                 | At         | Signature    |
+-------------+-------+-----------------+------------+--------------+
| URIC ACID,  | 4.8   | 2.3 - 6.6 mg/dL | [facility]  |              |
| PLASMA      |       |                 | LAB -      |              |
| (LAB)       |       |                 | [place]  |              |
+-------------+-------+-----------------+------------+--------------+
 
 
 
+---------------+
| Specimen      |
+---------------+
| Blood - Blood |
+---------------+
 
 
 
+--------------------+----------------------+--------------------+----------------+
| Performing         | Address              | City/State/Zipcode | Phone Number   |
| Organization       |                      |                    |                |
+--------------------+----------------------+--------------------+----------------+
|   [facility]  |   [address] | [place]      |   [phone] |
| [place]          |                      |                    |                |
+--------------------+----------------------+--------------------+----------------+
 PHOSPHORUS, PLASMA (10/23/2015  9:25 AM PDT)
 
+-------------+-------+-----------------+------------+--------------+
| Component   | Value | Ref Range       | Performed  | Pathologist  |
|             |       |                 | At         | Signature    |
+-------------+-------+-----------------+------------+--------------+
| PHOSPHORUS, | 3.0   | 2.5 - 5.0 mg/dL | [facility]  |              |
|  PLASMA     |       |                 | LAB -      |              |
| (LAB)       |       |                 | [place]  |              |
+-------------+-------+-----------------+------------+--------------+
 
 
 
+---------------+
| Specimen      |
+---------------+
| Blood - Blood |
+---------------+
 
 
 
+--------------------+----------------------+--------------------+----------------+
| Performing         | Address              | City/State/Zipcode | Phone Number   |
| Organization       |                      |                    |                |
+--------------------+----------------------+--------------------+----------------+
|   [facility]  |   [address] | [place]      |   [phone] |
| [place]          |                      |                    |                |
+--------------------+----------------------+--------------------+----------------+
 documented in this encounter
 
 Visit Diagnoses
 Not on filedocumented in this encounter

## 2019-11-01 NOTE — XMS
Encounter Summary
  Created on: 2019
 
 Bonifacio Nila JB
 External Reference #: [id number]
 : [birth year]
 Sex: Female
 
 Demographics
 
 
+-----------------------+------------------------+
| Address               | [address]            |
|                       | [place]  [address]   |
+-----------------------+------------------------+
| Home Phone            | [phone]        |
+-----------------------+------------------------+
| Preferred Language    | Unknown                |
+-----------------------+------------------------+
| Marital Status        | Single                 |
+-----------------------+------------------------+
| [employer] Affiliation | Unknown                |
+-----------------------+------------------------+
| Race                  | White                  |
+-----------------------+------------------------+
| Ethnic Group          | Not  or  |
+-----------------------+------------------------+
 
 
 Author
 
 
+--------------+------------------------------+
| Author       | [facility] [facility] |
+--------------+------------------------------+
| Organization | [facility] |
+--------------+------------------------------+
| Address      | Unknown                      |
+--------------+------------------------------+
| Phone        | Unavailable                  |
+--------------+------------------------------+
 
 
 
 Support
 
 
+-----------------+--------------+---------+-----------------+
| Name            | Relationship | Address | Phone           |
+-----------------+--------------+---------+-----------------+
| Kit Valdovinos   | ECON         | Unknown | [phone] |
+-----------------+--------------+---------+-----------------+
| Magdalena Valdovinos | ECON         | Unknown | [phone] |
+-----------------+--------------+---------+-----------------+
 
 
 
 Care Team Providers
 
 
 
+------------------------+------+-----------------+
| Care Team Member Name  | Role | Phone           |
+------------------------+------+-----------------+
| Lily Horton MD | PCP  | [phone] |
+------------------------+------+-----------------+
 
 
 
 Encounter Details
 
 
+--------+----------+----------------------+----------------------+-------------+
| Date   | Type     | Department           | Care Team            | Description |
+--------+----------+----------------------+----------------------+-------------+
| 10/26/ | Abstract |   Pediatric          |   Neema Khalil,   |             |
|    |          | Gastroenterology at  | MD  [address] |             |
|        |          | [place]          |   [place], [place]       |             |
|        |          | [facility]  | [address]           |             |
|        |          |  [address] | [phone]         |             |
|        |          |  [address]  Mailcode:  | [phone] (Fax)   |             |
|        |          |   [place]   |                      |             |
|        |          | [place], [place]         |                      |             |
|        |          | [address]           |                      |             |
|        |          | [phone]         |                      |             |
+--------+----------+----------------------+----------------------+-------------+
 
 
 
 Social History
 
 
+-------------------+-------+-----------+--------+------+
| Tobacco Use       | Types | Packs/Day | Years  | Date |
|                   |       |           | Used   |      |
+-------------------+-------+-----------+--------+------+
| Passive Smoke     |       |           |        |      |
| Exposure - Never  |       |           |        |      |
| Smoker            |       |           |        |      |
+-------------------+-------+-----------+--------+------+
 
 
 
+---------------------+---+---+---+
| Smokeless Tobacco:  |   |   |   |
| Never Used          |   |   |   |
+---------------------+---+---+---+
 
 
 
+-------------+-------------+---------+----------+
| Alcohol Use | Drinks/Week | oz/Week | Comments |
+-------------+-------------+---------+----------+
| Not Asked   |             |         |          |
+-------------+-------------+---------+----------+
 
 
 
+------------------+---------------+
 
| Sex Assigned at  | Date Recorded |
| Birth            |               |
+------------------+---------------+
| Not on file      |               |
+------------------+---------------+
 
 
 
+----------------+-------------+-------------+
| Job Start Date | Occupation  | Industry    |
+----------------+-------------+-------------+
| Not on file    | Not on file | Not on file |
+----------------+-------------+-------------+
 
 
 
+----------------+--------------+------------+
| Travel History | Travel Start | Travel End |
+----------------+--------------+------------+
 
 
 
+-------------------------------------+
| No recent travel history available. |
+-------------------------------------+
 documented as of this encounter
 
 Plan of Treatment
 Not on filedocumented as of this encounter
 
 Procedures
 
 
+----------------------+--------+-------------+----------------------+----------------------
+
| Procedure Name       | Priori | Date/Time   | Associated Diagnosis | Comments             
|
|                      | ty     |             |                      |                      
|
+----------------------+--------+-------------+----------------------+----------------------
+
| CHOLESTEROL, TOTAL   | Routin | 10/23/2015  |                      |   Results for this   
|
| (EXTERNAL RESULTS    | e      |  9:25 AM    |                      | procedure are in the 
|
| ONLY)                |        | PDT         |                      |  results section.    
|
+----------------------+--------+-------------+----------------------+----------------------
+
| CBC, WITH            | Routin | 10/23/2015  |                      |   Results for this   
|
| DIFFERENTIAL         | e      |  9:25 AM    |                      | procedure are in the 
|
|                      |        | PDT         |                      |  results section.    
|
+----------------------+--------+-------------+----------------------+----------------------
+
| COMPLETE METABOLIC   | Routin | 10/23/2015  |                      |   Results for this   
|
| SET                  | e      |  9:25 AM    |                      | procedure are in the 
 
|
| (NA,K,CL,CO2,BUN,CRE |        | PDT         |                      |  results section.    
|
| AT,GLUC,CA,AST,ALT,B |        |             |                      |                      
|
| ASHWINI TOTAL,ALK        |        |             |                      |                      
|
| PHOS,ALB,PROT TOTAL) |        |             |                      |                      
|
+----------------------+--------+-------------+----------------------+----------------------
+
| TACROLIMUS, WHOLE    | Routin | 10/23/2015  |                      |   Results for this   
|
| BLOOD                | e      |  9:25 AM    |                      | procedure are in the 
|
|                      |        | PDT         |                      |  results section.    
|
+----------------------+--------+-------------+----------------------+----------------------
+
| PHOSPHORUS, PLASMA   | Routin | 10/23/2015  |                      |   Results for this   
|
|                      | e      |  9:25 AM    |                      | procedure are in the 
|
|                      |        | PDT         |                      |  results section.    
|
+----------------------+--------+-------------+----------------------+----------------------
+
| GGT, PLASMA          | Routin | 10/23/2015  |                      |   Results for this   
|
|                      | e      |  9:25 AM    |                      | procedure are in the 
|
|                      |        | PDT         |                      |  results section.    
|
+----------------------+--------+-------------+----------------------+----------------------
+
| URIC ACID, PLASMA    | Routin | 10/23/2015  |                      |   Results for this   
|
|                      | e      |  9:25 AM    |                      | procedure are in the 
|
|                      |        | PDT         |                      |  results section.    
|
+----------------------+--------+-------------+----------------------+----------------------
+
| MAGNESIUM, PLASMA    | Routin | 10/23/2015  |                      |   Results for this   
|
|                      | e      |  9:25 AM    |                      | procedure are in the 
|
|                      |        | PDT         |                      |  results section.    
|
+----------------------+--------+-------------+----------------------+----------------------
+
| TRIGLYCERIDES,       | Routin | 10/23/2015  |                      |   Results for this   
|
| PLASMA               | e      |  9:25 AM    |                      | procedure are in the 
|
|                      |        | PDT         |                      |  results section.    
|
+----------------------+--------+-------------+----------------------+----------------------
+
| LDH TOTAL, PLASMA    | Routin | 10/23/2015  |                      |   Results for this   
 
|
|                      | e      |  9:25 AM    |                      | procedure are in the 
|
|                      |        | PDT         |                      |  results section.    
|
+----------------------+--------+-------------+----------------------+----------------------
+
 documented in this encounter
 
 Results
 TACROLIMUS, WHOLE BLOOD (10/23/2015  9:25 AM PDT)
 
+-----------+-------+-----------+------------+--------------+
| Component | Value | Ref Range | Performed  | Pathologist  |
|           |       |           | At         | Signature    |
+-----------+-------+-----------+------------+--------------+
|     | <1.0  |           | [facility]  |              |
|           |       |           | LAB -      |              |
|           |       |           | [place]  |              |
+-----------+-------+-----------+------------+--------------+
 
 
 
+---------------+
| Specimen      |
+---------------+
| Blood - Blood |
+---------------+
 
 
 
+--------------------+----------------------+--------------------+----------------+
| Performing         | Address              | City/State/Zipcode | Phone Number   |
| Organization       |                      |                    |                |
+--------------------+----------------------+--------------------+----------------+
|   [facility]  |   [address] | [place]      |   [phone] |
| [place]          |                      |                    |                |
+--------------------+----------------------+--------------------+----------------+
 CBC, WITH DIFFERENTIAL (10/23/2015  9:25 AM PDT)
 
+-------------+-------+-----------------+------------+--------------+
| Component   | Value | Ref Range       | Performed  | Pathologist  |
|             |       |                 | At         | Signature    |
+-------------+-------+-----------------+------------+--------------+
| WHITE CELL  | 5.2   | 4.5 - 11.0 K/cu | [facility]  |              |
| COUNT       |       |  mm             | LAB -      |              |
|             |       |                 | [place]  |              |
+-------------+-------+-----------------+------------+--------------+
| RED CELL    | 4.65  | 3.8 - 5.1 M/cu  | [facility]  |              |
| COUNT       |       | mm              | LAB -      |              |
|             |       |                 | [place]  |              |
+-------------+-------+-----------------+------------+--------------+
| HEMOGLOBIN  | 13.5  | 12 - 16 g/dL    | [facility]  |              |
|             |       |                 | LAB -      |              |
|             |       |                 | [place]  |              |
+-------------+-------+-----------------+------------+--------------+
| HEMATOCRIT  | 41    | 35 - 45 %       | [facility]  |              |
|             |       |                 | LAB -      |              |
|             |       |                 | [place]  |              |
+-------------+-------+-----------------+------------+--------------+
 
| MCV         | 88.2  | 81 - 99 fL      | [facility]  |              |
|             |       |                 | LAB -      |              |
|             |       |                 | [place]  |              |
+-------------+-------+-----------------+------------+--------------+
| MCH         | 29    | 27 - 33 pg      | [facility]  |              |
|             |       |                 | LAB -      |              |
|             |       |                 | [place]  |              |
+-------------+-------+-----------------+------------+--------------+
| MCHC        | 33    | 30 - 36 g/dL    | [facility]  |              |
|             |       |                 | LAB -      |              |
|             |       |                 | [place]  |              |
+-------------+-------+-----------------+------------+--------------+
| PLATELET    | 343   | 140 - 440 K/cu  | [facility]  |              |
| COUNT       |       | mm              | LAB -      |              |
|             |       |                 | [place]  |              |
+-------------+-------+-----------------+------------+--------------+
| NEUTROPHIL  | 54.2  | 39 - 80 %       | [facility]  |              |
| %           |       |                 | LAB -      |              |
|             |       |                 | [place]  |              |
+-------------+-------+-----------------+------------+--------------+
| LYMPHOCYTE  | 32.3  | 24 - 44 %       | [facility]  |              |
| %           |       |                 | LAB -      |              |
|             |       |                 | [place]  |              |
+-------------+-------+-----------------+------------+--------------+
| MONOCYTE %  | 9.8   | 0 - 12 %        | [facility]  |              |
|             |       |                 | LAB -      |              |
|             |       |                 | [place]  |              |
+-------------+-------+-----------------+------------+--------------+
| EOS %       | 2.6   | 0 - 6 %         | [facility]  |              |
|             |       |                 | LAB -      |              |
|             |       |                 | [place]  |              |
+-------------+-------+-----------------+------------+--------------+
| BASO %      | 1.1   | 0 - 2 %         | [facility]  |              |
|             |       |                 | LAB -      |              |
|             |       |                 | [place]  |              |
+-------------+-------+-----------------+------------+--------------+
| RDW         | 13.7  | 10.5 - 15.0 %   | [facility]  |              |
|             |       |                 | LAB -      |              |
|             |       |                 | [place]  |              |
+-------------+-------+-----------------+------------+--------------+
 
 
 
+---------------+
| Specimen      |
+---------------+
| Blood - Blood |
+---------------+
 
 
 
+--------------------+----------------------+--------------------+----------------+
| Performing         | Address              | City/State/Zipcode | Phone Number   |
| Organization       |                      |                    |                |
+--------------------+----------------------+--------------------+----------------+
|   [facility]  |   [address] | [place]      |   [phone] |
| [place]          |                      |                    |                |
+--------------------+----------------------+--------------------+----------------+
 MAGNESIUM, PLASMA (10/23/2015  9:25 AM PDT)
 
 
+-----------+-------+-----------------+------------+--------------+
| Component | Value | Ref Range       | Performed  | Pathologist  |
|           |       |                 | At         | Signature    |
+-----------+-------+-----------------+------------+--------------+
| MAGNESIUM | 1.7   | 1.7 - 2.5 mg/dL | [facility]  |              |
|           |       |                 | LAB -      |              |
|           |       |                 | [place]  |              |
+-----------+-------+-----------------+------------+--------------+
 
 
 
+---------------+
| Specimen      |
+---------------+
| Blood - Blood |
+---------------+
 
 
 
+--------------------+----------------------+--------------------+----------------+
| Performing         | Address              | City/State/Zipcode | Phone Number   |
| Organization       |                      |                    |                |
+--------------------+----------------------+--------------------+----------------+
|   [facility]  |   [address] | [place]      |   [phone] |
| [place]          |                      |                    |                |
+--------------------+----------------------+--------------------+----------------+
 GGT, PLASMA (10/23/2015  9:25 AM PDT)
 
+-----------+-------+------------+------------+--------------+
| Component | Value | Ref Range  | Performed  | Pathologist  |
|           |       |            | At         | Signature    |
+-----------+-------+------------+------------+--------------+
| GAMMA     | 9     | 5 - 60 U/L | [facility]  |              |
| GLUTAMYL  |       |            | LAB -      |              |
| TRANS     |       |            | [place]  |              |
+-----------+-------+------------+------------+--------------+
 
 
 
+---------------+
| Specimen      |
+---------------+
| Blood - Blood |
+---------------+
 
 
 
+--------------------+----------------------+--------------------+----------------+
| Performing         | Address              | City/State/Zipcode | Phone Number   |
| Organization       |                      |                    |                |
+--------------------+----------------------+--------------------+----------------+
|   [facility]  |   [address] | [place], [place]      |   [phone] |
| [place]          |                      |                    |                |
+--------------------+----------------------+--------------------+----------------+
 COMPLETE METABOLIC SET (NA,K,CL,CO2,BUN,CREAT,GLUC,CA,AST,ALT,BILI TOTAL,ALK PHOS,ALB,PROT 
TOTAL) (10/23/2015  9:25 AM PDT)
 
+-------------+-------+-----------------+------------+--------------+
| Component   | Value | Ref Range       | Performed  | Pathologist  |
|             |       |                 | At         | Signature    |
 
+-------------+-------+-----------------+------------+--------------+
| GLUCOSE,    | 83    | 70 - 100 mg/dL  | [facility]  |              |
| PLASMA      |       |                 | LAB -      |              |
| (LAB)       |       |                 | [place]  |              |
+-------------+-------+-----------------+------------+--------------+
| BUN, PLASMA | 13    | 6 - 23 mg/dL    | [facility]  |              |
|  (LAB)      |       |                 | LAB -      |              |
|             |       |                 | [place]  |              |
+-------------+-------+-----------------+------------+--------------+
| CREATININE  | 0.61  | 0.60 - 1.20     | [facility]  |              |
| PLASMA      |       | mg/dL           | LAB -      |              |
| (LAB)       |       |                 | [place]  |              |
+-------------+-------+-----------------+------------+--------------+
| TOTAL       | 7.2   | 6.1 - 8.5 g/dL  | [facility]  |              |
| PROTEIN,    |       |                 | LAB -      |              |
| PLASMA      |       |                 | [place]  |              |
| (LAB)       |       |                 |            |              |
+-------------+-------+-----------------+------------+--------------+
| ALBUMIN,    | 4.1   | 3.5 - 5.0 g/dL  | [facility]  |              |
| PLASMA      |       |                 | LAB -      |              |
| (LAB)       |       |                 | [place]  |              |
+-------------+-------+-----------------+------------+--------------+
| CALCIUM,    | 9.4   | 8.4 - 10.2      | [facility]  |              |
| PLASMA      |       | mg/dL           | LAB -      |              |
| (LAB)       |       |                 | [place]  |              |
+-------------+-------+-----------------+------------+--------------+
| BILIRUBIN   | 0.6   | 0 - 1.2         | [facility]  |              |
| TOTAL       |       | Transcutaneous  | LAB -      |              |
|             |       | Bilirubinometer | [place]  |              |
+-------------+-------+-----------------+------------+--------------+
| ALK PHOS    | 91    | 30 - 128 U/L    | [facility]  |              |
|             |       |                 | LAB -      |              |
|             |       |                 | [place]  |              |
+-------------+-------+-----------------+------------+--------------+
| AST(SGOT)   | 16    | 13 - 39 U/L     | [facility]  |              |
|             |       |                 | LAB -      |              |
|             |       |                 | [place]  |              |
+-------------+-------+-----------------+------------+--------------+
| SODIUM,     | 139   | 132 - 143       | [facility]  |              |
| PLASMA      |       | mmol/L          | LAB -      |              |
| (LAB)       |       |                 | [place]  |              |
+-------------+-------+-----------------+------------+--------------+
| POTASSIUM,  | 4.0   | 3.6 - 5.1       | [facility]  |              |
| PLASMA      |       | mmol/L          | LAB -      |              |
| (LAB)       |       |                 | [place]  |              |
+-------------+-------+-----------------+------------+--------------+
| CHLORIDE,   | 105   | 95 - 112 mmol/L | [facility]  |              |
| PLASMA      |       |                 | LAB -      |              |
| (LAB)       |       |                 | [place]  |              |
+-------------+-------+-----------------+------------+--------------+
| TOTAL CO2,  | 24    | 19 - 31 mmol/L  | [facility]  |              |
| PLASMA      |       |                 | LAB -      |              |
| (LAB)       |       |                 | [place]  |              |
+-------------+-------+-----------------+------------+--------------+
| ALT (SGPT)  | 14    | 7 - 52 U/L      | [facility]  |              |
|             |       |                 | LAB -      |              |
|             |       |                 | [place]  |              |
+-------------+-------+-----------------+------------+--------------+
| ANION GAP   | 14    | 7 - 21          | [facility]  |              |
|             |       |                 | LAB -      |              |
 
|             |       |                 | [place]  |              |
+-------------+-------+-----------------+------------+--------------+
| BUN/CREATIN | 21.3  | 6.0 - 28.6      | [facility]  |              |
| INE RATIO   |       |                 | LAB -      |              |
|             |       |                 | [place]  |              |
+-------------+-------+-----------------+------------+--------------+
| GLOBULIN    | 3.1   | 1.8 - 3.5       | [facility]  |              |
|             |       |                 | LAB -      |              |
|             |       |                 | [place]  |              |
+-------------+-------+-----------------+------------+--------------+
| A/G RATIO   | 1.3   | 1.1 - 2.4       | [facility]  |              |
|             |       |                 | LAB -      |              |
|             |       |                 | [place]  |              |
+-------------+-------+-----------------+------------+--------------+
| BILIRUBIN   | 0.1   | 0 - 0.1 mg/dL   | [facility]  |              |
| DIRECT      |       |                 | LAB -      |              |
|             |       |                 | [place]  |              |
+-------------+-------+-----------------+------------+--------------+
 
 
 
+---------------+
| Specimen      |
+---------------+
| Blood - Blood |
+---------------+
 
 
 
+--------------------+----------------------+--------------------+----------------+
| Performing         | Address              | City/State/Zipcode | Phone Number   |
| Organization       |                      |                    |                |
+--------------------+----------------------+--------------------+----------------+
|   [facility]  |   [address] | [place]      |   [phone] |
| [place]          |                      |                    |                |
+--------------------+----------------------+--------------------+----------------+
 TRIGLYCERIDES, PLASMA (10/23/2015  9:25 AM PDT)
 
+-------------+-------+----------------+------------+--------------+
| Component   | Value | Ref Range      | Performed  | Pathologist  |
|             |       |                | At         | Signature    |
+-------------+-------+----------------+------------+--------------+
| TRIGLYCERID | 54    | 30 - 150 mg/dL | [facility]  |              |
| ES          |       |                | LAB -      |              |
|             |       |                | [place]  |              |
+-------------+-------+----------------+------------+--------------+
 
 
 
+---------------+
| Specimen      |
+---------------+
| Blood - Blood |
+---------------+
 
 
 
+--------------------+----------------------+--------------------+----------------+
| Performing         | Address              | City/State/Zipcode | Phone Number   |
| Organization       |                      |                    |                |
 
+--------------------+----------------------+--------------------+----------------+
|   [facility]  |   [address] | [place]      |   [phone] |
| [place]          |                      |                    |                |
+--------------------+----------------------+--------------------+----------------+
 LDH TOTAL, PLASMA (10/23/2015  9:25 AM PDT)
 
+-------------+-------+------------+------------+--------------+
| Component   | Value | Ref Range  | Performed  | Pathologist  |
|             |       |            | At         | Signature    |
+-------------+-------+------------+------------+--------------+
| LDH (TOTAL) | 144   | 100 - 215  | [facility]  |              |
|             |       | Units/L    | LAB -      |              |
|             |       |            | [place]  |              |
+-------------+-------+------------+------------+--------------+
 
 
 
+---------------+
| Specimen      |
+---------------+
| Blood - Blood |
+---------------+
 
 
 
+--------------------+----------------------+--------------------+----------------+
| Performing         | Address              | City/State/Zipcode | Phone Number   |
| Organization       |                      |                    |                |
+--------------------+----------------------+--------------------+----------------+
|   [facility]  |   [address] | [place], [place]      |   [phone] |
| [place]          |                      |                    |                |
+--------------------+----------------------+--------------------+----------------+
 CHOLESTEROL, TOTAL (EXTERNAL RESULTS ONLY) (10/23/2015  9:25 AM PDT)
 
+-------------+-------+-----------+------------+--------------+
| Component   | Value | Ref Range | Performed  | Pathologist  |
|             |       |           | At         | Signature    |
+-------------+-------+-----------+------------+--------------+
| CHOLESTEROL | 120   | 200 mg/dL | [facility]  |              |
|   (LAB)     |       |           | LAB -      |              |
|             |       |           | [place]  |              |
+-------------+-------+-----------+------------+--------------+
 
 
 
+----------+
| Specimen |
+----------+
| Blood    |
+----------+
 
 
 
+--------------------+----------------------+--------------------+----------------+
| Performing         | Address              | City/State/Zipcode | Phone Number   |
| Organization       |                      |                    |                |
+--------------------+----------------------+--------------------+----------------+
|   [facility]  |   [address] | [place], [place]      |   [phone] |
| [place]          |                      |                    |                |
+--------------------+----------------------+--------------------+----------------+
 
 URIC ACID, PLASMA (10/23/2015  9:25 AM PDT)
 
+-------------+-------+-----------------+------------+--------------+
| Component   | Value | Ref Range       | Performed  | Pathologist  |
|             |       |                 | At         | Signature    |
+-------------+-------+-----------------+------------+--------------+
| URIC ACID,  | 4.8   | 2.3 - 6.6 mg/dL | [facility]  |              |
| PLASMA      |       |                 | LAB -      |              |
| (LAB)       |       |                 | [place]  |              |
+-------------+-------+-----------------+------------+--------------+
 
 
 
+---------------+
| Specimen      |
+---------------+
| Blood - Blood |
+---------------+
 
 
 
+--------------------+----------------------+--------------------+----------------+
| Performing         | Address              | City/State/Zipcode | Phone Number   |
| Organization       |                      |                    |                |
+--------------------+----------------------+--------------------+----------------+
|   [facility]  |   [address] | [place]      |   [phone] |
| [place]          |                      |                    |                |
+--------------------+----------------------+--------------------+----------------+
 PHOSPHORUS, PLASMA (10/23/2015  9:25 AM PDT)
 
+-------------+-------+-----------------+------------+--------------+
| Component   | Value | Ref Range       | Performed  | Pathologist  |
|             |       |                 | At         | Signature    |
+-------------+-------+-----------------+------------+--------------+
| PHOSPHORUS, | 3.0   | 2.5 - 5.0 mg/dL | [facility]  |              |
|  PLASMA     |       |                 | LAB -      |              |
| (LAB)       |       |                 | [place]  |              |
+-------------+-------+-----------------+------------+--------------+
 
 
 
+---------------+
| Specimen      |
+---------------+
| Blood - Blood |
+---------------+
 
 
 
+--------------------+----------------------+--------------------+----------------+
| Performing         | Address              | City/State/Zipcode | Phone Number   |
| Organization       |                      |                    |                |
+--------------------+----------------------+--------------------+----------------+
|   [facility]  |   [address] | [place]      |   [phone] |
| [place]          |                      |                    |                |
+--------------------+----------------------+--------------------+----------------+
 documented in this encounter
 
 Visit Diagnoses
 Not on filedocumented in this encounter

## 2019-11-01 NOTE — XMS
Encounter Summary
  Created on: 2019
 
 Bonifacio Nila JB
 External Reference #: [id number]
 : [birth year]
 Sex: Female
 
 Demographics
 
 
+-----------------------+------------------------+
| Address               | [address]            |
|                       | [place]  [address]   |
+-----------------------+------------------------+
| Home Phone            | [phone]        |
+-----------------------+------------------------+
| Preferred Language    | Unknown                |
+-----------------------+------------------------+
| Marital Status        | Single                 |
+-----------------------+------------------------+
| Caodaism Affiliation | Unknown                |
+-----------------------+------------------------+
| Race                  | White                  |
+-----------------------+------------------------+
| Ethnic Group          | Not  or  |
+-----------------------+------------------------+
 
 
 Author
 
 
+--------------+------------------------------+
| Author       | [facility] |
+--------------+------------------------------+
| Organization | [facility] |
+--------------+------------------------------+
| Address      | Unknown                      |
+--------------+------------------------------+
| Phone        | Unavailable                  |
+--------------+------------------------------+
 
 
 
 Support
 
 
+-----------------+--------------+---------+-----------------+
| Name            | Relationship | Address | Phone           |
+-----------------+--------------+---------+-----------------+
| Kit Valdovinos   | ECON         | Unknown | [phone] |
+-----------------+--------------+---------+-----------------+
| Magdalena Valdovinos | ECON         | Unknown | [phone] |
+-----------------+--------------+---------+-----------------+
 
 
 
 Care Team Providers
 
 
 
+------------------------+------+-----------------+
| Care Team Member Name  | Role | Phone           |
+------------------------+------+-----------------+
| Lily Horton MD | PCP  | [phone] |
+------------------------+------+-----------------+
 
 
 
 Reason for Visit
 
 
+-------------------+----------+
| Reason            | Comments |
+-------------------+----------+
| Care Coordination |          |
+-------------------+----------+
 
 
 
 Encounter Details
 
 
+--------+-----------+----------------------+----------------------+-------------------+
| Date   | Type      | Department           | Care Team            | Description       |
+--------+-----------+----------------------+----------------------+-------------------+
| / | Telephone |   Pediatric          |   Neema Khalil,   | Care Coordination |
| 2017   |           | Gastroenterology at  | MD  [address] |                   |
|        |           | [facility]          |   [place], [place]       |                   |
|        |           | [facility]  | [address]           |                   |
|        |           |  [address] [facility] | [phone]         |                   |
|        |           |  [address]  Mailcode:  | [phone] (Fax)   |                   |
|        |           | [facility]   |                      |                   |
|        |           | [place], [place]         |                      |                   |
|        |           | [address]           |                      |                   |
|        |           | [phone]         |                      |                   |
+--------+-----------+----------------------+----------------------+-------------------+
 
 
 
 Social History
 
 
+-------------------+-------+-----------+--------+------+
| Tobacco Use       | Types | Packs/Day | Years  | Date |
|                   |       |           | Used   |      |
+-------------------+-------+-----------+--------+------+
| Passive Smoke     |       |           |        |      |
| Exposure - Never  |       |           |        |      |
| Smoker            |       |           |        |      |
+-------------------+-------+-----------+--------+------+
 
 
 
+---------------------+---+---+---+
| Smokeless Tobacco:  |   |   |   |
| Never Used          |   |   |   |
+---------------------+---+---+---+
 
 
 
 
+-------------+-------------+---------+----------+
| Alcohol Use | Drinks/Week | oz/Week | Comments |
+-------------+-------------+---------+----------+
| Not Asked   |             |         |          |
+-------------+-------------+---------+----------+
 
 
 
+------------------+---------------+
| Sex Assigned at  | Date Recorded |
| Birth            |               |
+------------------+---------------+
| Not on file      |               |
+------------------+---------------+
 
 
 
+----------------+-------------+-------------+
| Job Start Date | Occupation  | Industry    |
+----------------+-------------+-------------+
| Not on file    | Not on file | Not on file |
+----------------+-------------+-------------+
 
 
 
+----------------+--------------+------------+
| Travel History | Travel Start | Travel End |
+----------------+--------------+------------+
 
 
 
+-------------------------------------+
| No recent travel history available. |
+-------------------------------------+
 documented as of this encounter
 
 Plan of Treatment
 Not on filedocumented as of this encounter
 
 Visit Diagnoses
 Not on filedocumented in this encounter

## 2019-11-01 NOTE — XMS
Encounter Summary
  Created on: 2019
 
 Bonifacio Nila JB
 External Reference #: [id number]
 : [birth year]
 Sex: Female
 
 Demographics
 
 
+-----------------------+------------------------+
| Address               | [address]            |
|                       | [place]  [address]   |
+-----------------------+------------------------+
| Home Phone            | [phone]        |
+-----------------------+------------------------+
| Preferred Language    | Unknown                |
+-----------------------+------------------------+
| Marital Status        | Single                 |
+-----------------------+------------------------+
| Mosque Affiliation | Unknown                |
+-----------------------+------------------------+
| Race                  | White                  |
+-----------------------+------------------------+
| Ethnic Group          | Not  or  |
+-----------------------+------------------------+
 
 
 Author
 
 
+--------------+------------------------------+
| Author       | [facility] |
+--------------+------------------------------+
| Organization | [facility] |
+--------------+------------------------------+
| Address      | Unknown                      |
+--------------+------------------------------+
| Phone        | Unavailable                  |
+--------------+------------------------------+
 
 
 
 Support
 
 
+-----------------+--------------+---------+-----------------+
| Name            | Relationship | Address | Phone           |
+-----------------+--------------+---------+-----------------+
| Kit Valdovinos   | ECON         | Unknown | [phone] |
+-----------------+--------------+---------+-----------------+
| Magdalena Valdovinos | ECON         | Unknown | [phone] |
+-----------------+--------------+---------+-----------------+
 
 
 
 Care Team Providers
 
 
 
+------------------------+------+-----------------+
| Care Team Member Name  | Role | Phone           |
+------------------------+------+-----------------+
| Lily Horton MD | PCP  | [phone] |
+------------------------+------+-----------------+
 
 
 
 Reason for Visit
 
 
+-------------------+-------------------+
| Reason            | Comments          |
+-------------------+-------------------+
| Care Coordination | lost to follow-up |
+-------------------+-------------------+
 
 
 
 Encounter Details
 
 
+--------+-------------+----------------------+----------------------+---------------------+
| Date   | Type        | Department           | Care Team            | Description         |
+--------+-------------+----------------------+----------------------+---------------------+
| / | Documentati |   Pediatric          |   Neema Khalil,   | Care Coordination   |
| 2017   | on          | Gastroenterology at  | MD  [address] | (lost to follow-up) |
|        |             | [place]          |   [place], [place]       |                     |
|        |             | [facility]  | [address]           |                     |
|        |             |  [address] | [phone]         |                     |
|        |             |  [address]  Mailcode:  | [phone] (Fax)   |                     |
|        |             | [facility]   |                      |                     |
|        |             | [place], [place]         |                      |                     |
|        |             | [address]           |                      |                     |
|        |             | [phone]         |                      |                     |
+--------+-------------+----------------------+----------------------+---------------------+
 
 
 
 Social History
 
 
+-------------------+-------+-----------+--------+------+
| Tobacco Use       | Types | Packs/Day | Years  | Date |
|                   |       |           | Used   |      |
+-------------------+-------+-----------+--------+------+
| Passive Smoke     |       |           |        |      |
| Exposure - Never  |       |           |        |      |
| Smoker            |       |           |        |      |
+-------------------+-------+-----------+--------+------+
 
 
 
+---------------------+---+---+---+
| Smokeless Tobacco:  |   |   |   |
| Never Used          |   |   |   |
+---------------------+---+---+---+
 
 
 
 
+-------------+-------------+---------+----------+
| Alcohol Use | Drinks/Week | oz/Week | Comments |
+-------------+-------------+---------+----------+
| Not Asked   |             |         |          |
+-------------+-------------+---------+----------+
 
 
 
+------------------+---------------+
| Sex Assigned at  | Date Recorded |
| Birth            |               |
+------------------+---------------+
| Not on file      |               |
+------------------+---------------+
 
 
 
+----------------+-------------+-------------+
| Job Start Date | Occupation  | Industry    |
+----------------+-------------+-------------+
| Not on file    | Not on file | Not on file |
+----------------+-------------+-------------+
 
 
 
+----------------+--------------+------------+
| Travel History | Travel Start | Travel End |
+----------------+--------------+------------+
 
 
 
+-------------------------------------+
| No recent travel history available. |
+-------------------------------------+
 documented as of this encounter
 
 Plan of Treatment
 Not on filedocumented as of this encounter
 
 Visit Diagnoses
 Not on filedocumented in this encounter

## 2019-11-01 NOTE — XMS
Encounter Summary
  Created on: 2019
 
 Bonifacio Nila JB
 External Reference #: [id number]
 : [birth year]
 Sex: Female
 
 Demographics
 
 
+-----------------------+------------------------+
| Address               | [address]            |
|                       | [place]  [address]   |
+-----------------------+------------------------+
| Home Phone            | [phone]        |
+-----------------------+------------------------+
| Preferred Language    | Unknown                |
+-----------------------+------------------------+
| Marital Status        | Single                 |
+-----------------------+------------------------+
| Religion Affiliation | Unknown                |
+-----------------------+------------------------+
| Race                  | White                  |
+-----------------------+------------------------+
| Ethnic Group          | Not  or  |
+-----------------------+------------------------+
 
 
 Author
 
 
+--------------+------------------------------+
| Author       | [facility] |
+--------------+------------------------------+
| Organization | [facility] |
+--------------+------------------------------+
| Address      | Unknown                      |
+--------------+------------------------------+
| Phone        | Unavailable                  |
+--------------+------------------------------+
 
 
 
 Support
 
 
+-----------------+--------------+---------+-----------------+
| Name            | Relationship | Address | Phone           |
+-----------------+--------------+---------+-----------------+
| Kit Valdovinos   | ECON         | Unknown | [phone] |
+-----------------+--------------+---------+-----------------+
| Magdalena Valdovinos | ECON         | Unknown | [phone] |
+-----------------+--------------+---------+-----------------+
 
 
 
 Care Team Providers
 
 
 
+-----------------------+------+-----------------+
| Care Team Member Name | Role | Phone           |
+-----------------------+------+-----------------+
| Lily Horton MD   | PCP  | [phone] |
+-----------------------+------+-----------------+
 
 
 
 Reason for Visit
 
 
+--------+-----------------+
| Reason | Comments        |
+--------+-----------------+
| Other  | Need labs drawn |
+--------+-----------------+
 
 
 
 Encounter Details
 
 
+--------+-----------+----------------------+---------------------+-------------------+
| Date   | Type      | Department           | Care Team           | Description       |
+--------+-----------+----------------------+---------------------+-------------------+
| 10/14/ | Telephone |   Pediatric          |   Monserrat Spann,  | Other (Need labs  |
|    |           | Gastroenterology at  | RN  [address] [facility]     | drawn)            |
|        |           | [facility]          | [facility] [address]     |                   |
|        |           | [facility]  | [place] [address]  |                   |
|        |           |  [address] [facility] |                     |                   |
|        |           |  [facility]  Mailcode:  |                     |                   |
|        |           | [facility]  [facility]   |                     |                   |
|        |           | [place], [place]         |                     |                   |
|        |           | [address]           |                     |                   |
|        |           | [phone]         |                     |                   |
+--------+-----------+----------------------+---------------------+-------------------+
 
 
 
 Social History
 
 
+----------------+-------+-----------+--------+------+
| Tobacco Use    | Types | Packs/Day | Years  | Date |
|                |       |           | Used   |      |
+----------------+-------+-----------+--------+------+
| Never Assessed |       |           |        |      |
+----------------+-------+-----------+--------+------+
 
 
 
+------------------+---------------+
| Sex Assigned at  | Date Recorded |
| Birth            |               |
+------------------+---------------+
| Not on file      |               |
+------------------+---------------+
 
 
 
 
+----------------+-------------+-------------+
| Job Start Date | Occupation  | Industry    |
+----------------+-------------+-------------+
| Not on file    | Not on file | Not on file |
+----------------+-------------+-------------+
 
 
 
+----------------+--------------+------------+
| Travel History | Travel Start | Travel End |
+----------------+--------------+------------+
 
 
 
+-------------------------------------+
| No recent travel history available. |
+-------------------------------------+
 documented as of this encounter
 
 Plan of Treatment
 Not on filedocumented as of this encounter
 
 Visit Diagnoses
 Not on filedocumented in this encounter

## 2019-11-01 NOTE — XMS
Encounter Summary
  Created on: 2019
 
 Bonifacio Nila JB
 External Reference #: [id number]
 : [birth year]
 Sex: Female
 
 Demographics
 
 
+-----------------------+------------------------+
| Address               | [address]            |
|                       | [place]  [address]   |
+-----------------------+------------------------+
| Home Phone            | [phone]        |
+-----------------------+------------------------+
| Preferred Language    | Unknown                |
+-----------------------+------------------------+
| Marital Status        | Single                 |
+-----------------------+------------------------+
| Restoration Affiliation | Unknown                |
+-----------------------+------------------------+
| Race                  | White                  |
+-----------------------+------------------------+
| Ethnic Group          | Not  or  |
+-----------------------+------------------------+
 
 
 Author
 
 
+--------------+------------------------------+
| Author       | [facility] |
+--------------+------------------------------+
| Organization | [facility] |
+--------------+------------------------------+
| Address      | Unknown                      |
+--------------+------------------------------+
| Phone        | Unavailable                  |
+--------------+------------------------------+
 
 
 
 Support
 
 
+-----------------+--------------+---------+-----------------+
| Name            | Relationship | Address | Phone           |
+-----------------+--------------+---------+-----------------+
| Kit Valdovinos   | ECON         | Unknown | [phone] |
+-----------------+--------------+---------+-----------------+
| Magdalena Valdovinos | ECON         | Unknown | [phone] |
+-----------------+--------------+---------+-----------------+
 
 
 
 Care Team Providers
 
 
 
+-----------------------+------+-----------------+
| Care Team Member Name | Role | Phone           |
+-----------------------+------+-----------------+
| Lily Horton MD   | PCP  | [phone] |
+-----------------------+------+-----------------+
 
 
 
 Encounter Details
 
 
+--------+-------------+----------------------+---------------------+----------------------+
| Date   | Type        | Department           | Care Team           | Description          |
+--------+-------------+----------------------+---------------------+----------------------+
| / |  |   Pediatric          |   Monserrat Spann,  | Complications of     |
|    |             | Gastroenterology at  | RN  [address]     | Transplanted Liver;  |
|        |             | [facility]          | [address]     | Transplanted Liver   |
|        |             | [facility]  | [place] [address]  | (HCC)                |
|        |             |  [address] |                     |                      |
|        |             |  Aide [address]  Mailcode:  |                     |                      |
|        |             | [facility]  [facility]   |                     |                      |
|        |             | [place], [place]         |                     |                      |
|        |             | [id number]           |                     |                      |
|        |             | [phone]         |                     |                      |
+--------+-------------+----------------------+---------------------+----------------------+
 
 
 
 Social History
 
 
+----------------+-------+-----------+--------+------+
| Tobacco Use    | Types | Packs/Day | Years  | Date |
|                |       |           | Used   |      |
+----------------+-------+-----------+--------+------+
| Never Assessed |       |           |        |      |
+----------------+-------+-----------+--------+------+
 
 
 
+------------------+---------------+
| Sex Assigned at  | Date Recorded |
| Birth            |               |
+------------------+---------------+
| Not on file      |               |
+------------------+---------------+
 
 
 
+----------------+-------------+-------------+
| Job Start Date | Occupation  | Industry    |
+----------------+-------------+-------------+
| Not on file    | Not on file | Not on file |
+----------------+-------------+-------------+
 
 
 
+----------------+--------------+------------+
 
| Travel History | Travel Start | Travel End |
+----------------+--------------+------------+
 
 
 
+-------------------------------------+
| No recent travel history available. |
+-------------------------------------+
 documented as of this encounter
 
 Plan of Treatment
 
 
+----------------------+------+--------+----------------------+---------------------+
| Name                 | Type | Priori | Associated Diagnoses | Order Schedule      |
|                      |      | ty     |                      |                     |
+----------------------+------+--------+----------------------+---------------------+
| LDH TOTAL, PLASMA    | Lab  | Routin |   Complications of   | Ordered: 2009 |
|                      |      | e      | Transplanted Liver   |                     |
|                      |      |        | Transplanted Liver   |                     |
|                      |      |        | (HCC)                |                     |
+----------------------+------+--------+----------------------+---------------------+
| CMV PCR              | Lab  | Routin |   Complications of   | Ordered: 2009 |
| QUANTITATION, PLASMA |      | e      | Transplanted Liver   |                     |
|                      |      |        | Transplanted Liver   |                     |
|                      |      |        | (HCC)                |                     |
+----------------------+------+--------+----------------------+---------------------+
 documented as of this encounter
 
 Procedures
 
 
+----------------------+--------+-------------+----------------------+----------------------
+
| Procedure Name       | Priori | Date/Time   | Associated Diagnosis | Comments             
|
|                      | ty     |             |                      |                      
|
+----------------------+--------+-------------+----------------------+----------------------
+
| CHOLESTEROL TOTAL,   | Routin | 2009  |   Complications of   |   Results for this   
|
| PLASMA               | e      |  3:26 PM    | Transplanted Liver   | procedure are in the 
|
|                      |        | PST         | Transplanted Liver   |  results section.    
|
|                      |        |             | (HCC)                |                      
|
+----------------------+--------+-------------+----------------------+----------------------
+
| COMPLETE METABOLIC   | Routin | 2009  |   Complications of   |   Results for this   
|
| SET                  | e      |  3:20 PM    | Transplanted Liver   | procedure are in the 
|
| (NA,K,CL,CO2,BUN,CRE |        | PST         | Transplanted Liver   |  results section.    
|
| AT,GLUC,CA,AST,ALT,B |        |             | (HCC)                |                      
|
| ASHWINI TOTAL,ALK        |        |             |                      |                      
|
 
| PHOS,ALB,PROT TOTAL) |        |             |                      |                      
|
+----------------------+--------+-------------+----------------------+----------------------
+
| TACROLIMUS, WHOLE    | Routin | 2009  |   Complications of   |   Results for this   
|
| BLOOD                | e      |  3:20 PM    | Transplanted Liver   | procedure are in the 
|
|                      |        | PST         | Transplanted Liver   |  results section.    
|
|                      |        |             | (HCC)                |                      
|
+----------------------+--------+-------------+----------------------+----------------------
+
| PHOSPHORUS, PLASMA   | Routin | 2009  |   Complications of   |   Results for this   
|
|                      | e      |  3:20 PM    | Transplanted Liver   | procedure are in the 
|
|                      |        | PST         | Transplanted Liver   |  results section.    
|
|                      |        |             | (HCC)                |                      
|
+----------------------+--------+-------------+----------------------+----------------------
+
| GGT, PLASMA          | Routin | 2009  |   Complications of   |   Results for this   
|
|                      | e      |  3:20 PM    | Transplanted Liver   | procedure are in the 
|
|                      |        | PST         | Transplanted Liver   |  results section.    
|
|                      |        |             | (HCC)                |                      
|
+----------------------+--------+-------------+----------------------+----------------------
+
| BILIRUBIN DIRECT     | Routin | 2009  |   Complications of   |   Results for this   
|
|                      | e      |  3:20 PM    | Transplanted Liver   | procedure are in the 
|
|                      |        | PST         | Transplanted Liver   |  results section.    
|
|                      |        |             | (HCC)                |                      
|
+----------------------+--------+-------------+----------------------+----------------------
+
| URIC ACID, PLASMA    | Routin | 2009  |   Complications of   |   Results for this   
|
|                      | e      |  3:20 PM    | Transplanted Liver   | procedure are in the 
|
|                      |        | PST         | Transplanted Liver   |  results section.    
|
|                      |        |             | (HCC)                |                      
|
+----------------------+--------+-------------+----------------------+----------------------
+
| MAGNESIUM, PLASMA    | Routin | 2009  |   Complications of   |   Results for this   
|
|                      | e      |  3:20 PM    | Transplanted Liver   | procedure are in the 
|
|                      |        | PST         | Transplanted Liver   |  results section.    
|
 
|                      |        |             | (HCC)                |                      
|
+----------------------+--------+-------------+----------------------+----------------------
+
| FRANCIS-BARR VIRUS   | Routin | 2009  |   Complications of   |   Results for this   
|
| PCR, PLASMA          | e      |             | Transplanted Liver   | procedure are in the 
|
|                      |        |             | Transplanted Liver   |  results section.    
|
|                      |        |             | (HCC)                |                      
|
+----------------------+--------+-------------+----------------------+----------------------
+
| CBC, WITH            | Routin | 2009  |   Complications of   |   Results for this   
|
| DIFFERENTIAL         | e      |             | Transplanted Liver   | procedure are in the 
|
|                      |        |             | Transplanted Liver   |  results section.    
|
|                      |        |             | (HCC)                |                      
|
+----------------------+--------+-------------+----------------------+----------------------
+
 documented in this encounter
 
 Results
 CHOLESTEROL TOTAL, PLASMA (2009  3:26 PM PST)
 
+-------------+-------+---------------+------------+--------------+
| Component   | Value | Ref Range     | Performed  | Pathologist  |
|             |       |               | At         | Signature    |
+-------------+-------+---------------+------------+--------------+
| CHOLESTEROL | 149   | < - 200 mg/dL | NON [facility]   |              |
|   (LAB)     |       |               | LAB        |              |
+-------------+-------+---------------+------------+--------------+
 
 
 
+---------------+
| Specimen      |
+---------------+
| Serum - Blood |
+---------------+
 
 
 
+----------------+---------+--------------------+--------------+
| Performing     | Address | City/State/Zipcode | Phone Number |
| Organization   |         |                    |              |
+----------------+---------+--------------------+--------------+
|   NON [facility] |         |                    |              |
+----------------+---------+--------------------+--------------+
 TACROLIMUS, WHOLE BLOOD (2009  3:20 PM PST)
 
+-------------+---------+--------------+------------+--------------+
| Component   | Value   | Ref Range    | Performed  | Pathologist  |
|             |         |              | At         | Signature    |
+-------------+---------+--------------+------------+--------------+
| TACROLIMUS  | 0.5 (A) | 5 - 18 ng/mL | [facility]  |              |
 
| ()    |         |              | LAB -      |              |
|             |         |              | [place]  |              |
+-------------+---------+--------------+------------+--------------+
 
 
 
+---------------+
| Specimen      |
+---------------+
| Serum - Blood |
+---------------+
 
 
 
+--------------------+----------------------+--------------------+----------------+
| Performing         | Address              | City/State/Zipcode | Phone Number   |
| Organization       |                      |                    |                |
+--------------------+----------------------+--------------------+----------------+
|   [facility]  |   [address] | [place]      |   [phone] |
| [place]          |                      |                    |                |
+--------------------+----------------------+--------------------+----------------+
|   [facility] -  |                      | [place], [place]      |                |
| [place]          |                      |                    |                |
+--------------------+----------------------+--------------------+----------------+
 GGT, PLASMA (2009  3:20 PM PST)
 
+-----------+-------+-----------+------------+--------------+
| Component | Value | Ref Range | Performed  | Pathologist  |
|           |       |           | At         | Signature    |
+-----------+-------+-----------+------------+--------------+
| GAMMA     | 7 (A) | 5 - 6 U/L | NON [facility]   |              |
| GLUTAMYL  |       |           | LAB        |              |
| TRANS     |       |           |            |              |
+-----------+-------+-----------+------------+--------------+
 
 
 
+---------------+
| Specimen      |
+---------------+
| Serum - Blood |
+---------------+
 
 
 
+----------------+---------+--------------------+--------------+
| Performing     | Address | City/State/Zipcode | Phone Number |
| Organization   |         |                    |              |
+----------------+---------+--------------------+--------------+
|   NON [facility] |         |                    |              |
+----------------+---------+--------------------+--------------+
 BILIRUBIN DIRECT (2009  3:20 PM PST)
 
+------------+-------+-----------------+------------+--------------+
| Component  | Value | Ref Range       | Performed  | Pathologist  |
|            |       |                 | At         | Signature    |
+------------+-------+-----------------+------------+--------------+
| BILIRUBIN  | 0.1   | 0.0 - 0.4 mg/dL | [facility]  |              |
| DIRECT     |       |                 | LAB -      |              |
|            |       |                 | [place]  |              |
 
+------------+-------+-----------------+------------+--------------+
 
 
 
+---------------+
| Specimen      |
+---------------+
| Serum - Blood |
+---------------+
 
 
 
+--------------------+----------------------+--------------------+----------------+
| Performing         | Address              | City/State/Zipcode | Phone Number   |
| Organization       |                      |                    |                |
+--------------------+----------------------+--------------------+----------------+
|   [facility]  |   [address] | [place], [place]      |   [phone] |
| [place]          |                      |                    |                |
+--------------------+----------------------+--------------------+----------------+
|   [facility] -  |                      | [place], [place]      |                |
| [place]          |                      |                    |                |
+--------------------+----------------------+--------------------+----------------+
 URIC ACID, PLASMA (2009  3:20 PM PST)
 
+-------------+---------+-----------+------------+--------------+
| Component   | Value   | Ref Range | Performed  | Pathologist  |
|             |         |           | At         | Signature    |
+-------------+---------+-----------+------------+--------------+
| URIC ACID,  | pending | mg/dL     | [facility]  |              |
| PLASMA      |         |           | LAB -      |              |
| (LAB)       |         |           | [place]  |              |
+-------------+---------+-----------+------------+--------------+
 
 
 
+---------------+
| Specimen      |
+---------------+
| Serum - Blood |
+---------------+
 
 
 
+--------------------+----------------------+--------------------+----------------+
| Performing         | Address              | City/State/Zipcode | Phone Number   |
| Organization       |                      |                    |                |
+--------------------+----------------------+--------------------+----------------+
|   [facility]  |   [address] | [place], [place]      |   [phone] |
| [place]          |                      |                    |                |
+--------------------+----------------------+--------------------+----------------+
|   [facility]  |                      | [place], [place]      |                |
| [place]          |                      |                    |                |
+--------------------+----------------------+--------------------+----------------+
 PHOSPHORUS, PLASMA (2009  3:20 PM PST)
 
+-------------+-------+-----------------+------------+--------------+
| Component   | Value | Ref Range       | Performed  | Pathologist  |
|             |       |                 | At         | Signature    |
+-------------+-------+-----------------+------------+--------------+
| PHOSPHORUS, | 4.8   | 2.6 - 6.2 mg/dL | [facility]  |              |
 
|  PLASMA     |       |                 | LAB -      |              |
| (LAB)       |       |                 | [place]  |              |
+-------------+-------+-----------------+------------+--------------+
 
 
 
+---------------+
| Specimen      |
+---------------+
| Serum - Blood |
+---------------+
 
 
 
+--------------------+----------------------+--------------------+----------------+
| Performing         | Address              | City/State/Zipcode | Phone Number   |
| Organization       |                      |                    |                |
+--------------------+----------------------+--------------------+----------------+
|   [facility]  |   [address] | [place], [place]      |   [phone] |
| [place]          |                      |                    |                |
+--------------------+----------------------+--------------------+----------------+
|   [facility]  |                      | [place], [place]      |                |
| [place]          |                      |                    |                |
+--------------------+----------------------+--------------------+----------------+
 MAGNESIUM, PLASMA (2009  3:20 PM PST)
 
+-------------+-------+-----------------+------------+--------------+
| Component   | Value | Ref Range       | Performed  | Pathologist  |
|             |       |                 | At         | Signature    |
+-------------+-------+-----------------+------------+--------------+
| MAGNESIUM,P | 1.99  | 1.7 - 2.5 mg/dL | [facility]  |              |
| LASMA       |       |                 | LAB -      |              |
|             |       |                 | [place]  |              |
+-------------+-------+-----------------+------------+--------------+
 
 
 
+---------------+
| Specimen      |
+---------------+
| Serum - Blood |
+---------------+
 
 
 
+--------------------+----------------------+--------------------+----------------+
| Performing         | Address              | City/State/Zipcode | Phone Number   |
| Organization       |                      |                    |                |
+--------------------+----------------------+--------------------+----------------+
|   [facility]  |   [address] | [place], [place]      |   [phone] |
| [place]          |                      |                    |                |
+--------------------+----------------------+--------------------+----------------+
|   [facility]  |                      | [place], [place]      |                |
| [place]          |                      |                    |                |
+--------------------+----------------------+--------------------+----------------+
 COMPLETE METABOLIC SET (NA,K,CL,CO2,BUN,CREAT,GLUC,CA,AST,ALT,BILI TOTAL,ALK PHOS,ALB,PROT 
TOTAL) (2009  3:20 PM PST)
 
+-------------+-------+-----------------+------------+--------------+
| Component   | Value | Ref Range       | Performed  | Pathologist  |
 
|             |       |                 | At         | Signature    |
+-------------+-------+-----------------+------------+--------------+
| GLUCOSE,    | 88    | 60 - 100 mg/dL  | [facility]  |              |
| PLASMA      |       |                 | LAB -      |              |
| (LAB)       |       |                 | [place]  |              |
+-------------+-------+-----------------+------------+--------------+
| BUN, PLASMA | 13    | 6 - 23 mg/dL    | [facility]  |              |
|  (LAB)      |       |                 | LAB -      |              |
|             |       |                 | [place]  |              |
+-------------+-------+-----------------+------------+--------------+
| CREATININE  | 0.56  | 0.5 - 1.5 mg/dL | [facility]  |              |
| PLASMA      |       |                 | LAB -      |              |
| (LAB)       |       |                 | [place]  |              |
+-------------+-------+-----------------+------------+--------------+
| TOTAL       | 7.3   | 6.1 - 8.5 g/dL  | [facility]  |              |
| PROTEIN,    |       |                 | LAB -      |              |
| PLASMA      |       |                 | [place]  |              |
| (LAB)       |       |                 |            |              |
+-------------+-------+-----------------+------------+--------------+
| ALBUMIN,    | 4.3   | 2.9 - 5.5 g/dL  | [facility]  |              |
| PLASMA      |       |                 | LAB -      |              |
| (LAB)       |       |                 | [place]  |              |
+-------------+-------+-----------------+------------+--------------+
| CALCIUM,    | 10.0  | 8.5 - 10.5      | [facility]  |              |
| PLASMA      |       | mg/dL           | LAB -      |              |
| (LAB)       |       |                 | [place]  |              |
+-------------+-------+-----------------+------------+--------------+
| BILIRUBIN   | 0.4   | 0.0 - 1.2       | [facility]  |              |
| TOTAL       |       | Transcutaneous  | LAB -      |              |
|             |       | Bilirubinometer | [place]  |              |
+-------------+-------+-----------------+------------+--------------+
| ALK PHOS    | 273   | 135 - 384 U/L   | [facility]  |              |
|             |       |                 | LAB -      |              |
|             |       |                 | [place]  |              |
+-------------+-------+-----------------+------------+--------------+
| AST(SGOT)   | 26    | 0 - 40 U/L      | [facility]  |              |
|             |       |                 | LAB -      |              |
|             |       |                 | [place]  |              |
+-------------+-------+-----------------+------------+--------------+
| SODIUM,     | 140   | 132 - 143       | [facility]  |              |
| PLASMA      |       | mmol/L          | LAB -      |              |
| (LAB)       |       |                 | [place]  |              |
+-------------+-------+-----------------+------------+--------------+
| POTASSIUM,  | 3.9   | 3.6 - 5.1       | [facility]  |              |
| PLASMA      |       | mmol/L          | LAB -      |              |
| (LAB)       |       |                 | [place]  |              |
+-------------+-------+-----------------+------------+--------------+
| CHLORIDE,   | 107   | 95 - 112 mmol/L | [facility]  |              |
| PLASMA      |       |                 | LAB -      |              |
| (LAB)       |       |                 | [place]  |              |
+-------------+-------+-----------------+------------+--------------+
| TOTAL CO2,  | 22.4  | 19 - 31 mmol/L  | [facility]  |              |
| PLASMA      |       |                 | LAB -      |              |
| (LAB)       |       |                 | [place]  |              |
+-------------+-------+-----------------+------------+--------------+
| ALT (SGPT)  | 20    | 0 - 46 U/L      | [facility]  |              |
|             |       |                 | LAB -      |              |
|             |       |                 | [place]  |              |
+-------------+-------+-----------------+------------+--------------+
| ANION GAP   | 14.5  | 7.0 - 21        | [facility]  |              |
 
|             |       |                 | LAB -      |              |
|             |       |                 | [place]  |              |
+-------------+-------+-----------------+------------+--------------+
| BUN/CREATIN | 23.2  | 6.0 - 28.6      | [facility]  |              |
| INE RATIO   |       |                 | LAB -      |              |
|             |       |                 | [place]  |              |
+-------------+-------+-----------------+------------+--------------+
| GLOBULIN    | 3.0   | 1.8 - 3.5       | [facility]  |              |
|             |       |                 | LAB -      |              |
|             |       |                 | [place]  |              |
+-------------+-------+-----------------+------------+--------------+
| A/G RATIO   | 1.4   | 1.1 - 2.4       | [facility]  |              |
|             |       |                 | LAB -      |              |
|             |       |                 | [place]  |              |
+-------------+-------+-----------------+------------+--------------+
| BILIRUBIN   | 0.1   | 0.0 - 0.4 mg/dL | [facility]  |              |
| DIRECT      |       |                 | LAB -      |              |
|             |       |                 | [place]  |              |
+-------------+-------+-----------------+------------+--------------+
| GAMMA       | 7     | 5 - 60 U/L      | [facility]  |              |
| GLUTAMYL    |       |                 | LAB -      |              |
| TRANS       |       |                 | [place]  |              |
+-------------+-------+-----------------+------------+--------------+
 
 
 
+---------------+
| Specimen      |
+---------------+
| Serum - Blood |
+---------------+
 
 
 
+--------------------+----------------------+--------------------+----------------+
| Performing         | Address              | City/State/Zipcode | Phone Number   |
| Organization       |                      |                    |                |
+--------------------+----------------------+--------------------+----------------+
|   [facility]  |   [address] | [place], [place]      |   [phone] |
| [place]          |                      |                    |                |
+--------------------+----------------------+--------------------+----------------+
|   [facility]  |                      | [place], [place]      |                |
| [place]          |                      |                    |                |
+--------------------+----------------------+--------------------+----------------+
 FRANCIS-BARR VIRUS PCR (2009)
 
+-------------+-------+-----------+------------+--------------+
| Component   | Value | Ref Range | Performed  | Pathologist  |
|             |       |           | At         | Signature    |
+-------------+-------+-----------+------------+--------------+
| EBV QUANT   | <2.6  | <2.6      | [facility]  |              |
| INTERPRETAT |       |           | LAB -      |              |
| ION         |       |           | [place]  |              |
+-------------+-------+-----------+------------+--------------+
 
 
 
+----------+
| Specimen |
+----------+
 
| Serum    |
+----------+
 
 
 
+--------------------+----------------------+--------------------+----------------+
| Performing         | Address              | City/State/Zipcode | Phone Number   |
| Organization       |                      |                    |                |
+--------------------+----------------------+--------------------+----------------+
|   [facility]  |   [address] | [place], [place]      |   [phone] |
| [place]          |                      |                    |                |
+--------------------+----------------------+--------------------+----------------+
|   [facility]  |                      | [place], [place]      |                |
| [place]          |                      |                    |                |
+--------------------+----------------------+--------------------+----------------+
 CBC, WITH DIFFERENTIAL (2009)
 
+-------------+-------+-----------------+------------+--------------+
| Component   | Value | Ref Range       | Performed  | Pathologist  |
|             |       |                 | At         | Signature    |
+-------------+-------+-----------------+------------+--------------+
| WHITE CELL  | 8.6   | 4.5 - 13.5 K/cu | [facility]  |              |
| COUNT       |       |  mm             | LAB -      |              |
|             |       |                 | [place]  |              |
+-------------+-------+-----------------+------------+--------------+
| RED CELL    | 4.92  | 4.1 - 5.3 M/cu  | [facility]  |              |
| COUNT       |       | mm              | LAB -      |              |
|             |       |                 | [place]  |              |
+-------------+-------+-----------------+------------+--------------+
| HEMOGLOBIN  | 14.5  | 10.6 - 15.2     | [facility]  |              |
|             |       |                 | LAB -      |              |
|             |       |                 | [place]  |              |
+-------------+-------+-----------------+------------+--------------+
| HEMATOCRIT  | 41.5  | 32 - 42 %       | [facility]  |              |
|             |       |                 | LAB -      |              |
|             |       |                 | [place]  |              |
+-------------+-------+-----------------+------------+--------------+
| MCV         | 84.3  | 71 - 89 fL      | [facility]  |              |
|             |       |                 | LAB -      |              |
|             |       |                 | [place]  |              |
+-------------+-------+-----------------+------------+--------------+
| MCH         | 29    | 24 - 30 pg      | [facility]  |              |
|             |       |                 | LAB -      |              |
|             |       |                 | [place]  |              |
+-------------+-------+-----------------+------------+--------------+
| MCHC        | 35    | 30 - 36 g/dL    | [facility]  |              |
|             |       |                 | LAB -      |              |
|             |       |                 | [place]  |              |
+-------------+-------+-----------------+------------+--------------+
| PLATELET    | 368   | 140 - 440 K/cu  | [facility]  |              |
| COUNT       |       | mm              | LAB -      |              |
|             |       |                 | [place]  |              |
+-------------+-------+-----------------+------------+--------------+
| NEUTROPHIL  | 59.3  | 31 - 60 %       | [facility]  |              |
| %           |       |                 | LAB -      |              |
|             |       |                 | [place]  |              |
+-------------+-------+-----------------+------------+--------------+
| LYMPHOCYTE  | 30.1  | 28 - 48 %       | [facility]  |              |
| %           |       |                 | LAB -      |              |
|             |       |                 | [place]  |              |
 
+-------------+-------+-----------------+------------+--------------+
| MONOCYTE %  | 7.2   | 0 - 12 %        | [facility]  |              |
|             |       |                 | LAB -      |              |
|             |       |                 | [place]  |              |
+-------------+-------+-----------------+------------+--------------+
| EOS %       | 2.0   | 0 - 6 %         | [facility]  |              |
|             |       |                 | LAB -      |              |
|             |       |                 | [place]  |              |
+-------------+-------+-----------------+------------+--------------+
| BASO %      | 0.7   | 0 - 2 %         | [facility]  |              |
|             |       |                 | LAB -      |              |
|             |       |                 | [place]  |              |
+-------------+-------+-----------------+------------+--------------+
| RDW         | 13.4  | 10.5 - 15.8 %   | [facility]  |              |
|             |       |                 | LAB -      |              |
|             |       |                 | [place]  |              |
+-------------+-------+-----------------+------------+--------------+
| MPV         |       | fL              | [facility]  |              |
|             |       |                 | LAB -      |              |
|             |       |                 | [place]  |              |
+-------------+-------+-----------------+------------+--------------+
| NEUTROPHIL  |       | K/cu mm         | [facility]  |              |
| #           |       |                 | LAB -      |              |
|             |       |                 | [place]  |              |
+-------------+-------+-----------------+------------+--------------+
| LYMPHOCYTE  |       | K/cu mm         | [facility]  |              |
| #           |       |                 | LAB -      |              |
|             |       |                 | [place]  |              |
+-------------+-------+-----------------+------------+--------------+
| MONOCYTE #  |       | K/cu mm         | [facility]  |              |
|             |       |                 | LAB -      |              |
|             |       |                 | [place]  |              |
+-------------+-------+-----------------+------------+--------------+
| EOS #       |       | K/cu mm         | [facility]  |              |
|             |       |                 | LAB -      |              |
|             |       |                 | [place]  |              |
+-------------+-------+-----------------+------------+--------------+
| BASO #      |       |                 | [facility]  |              |
|             |       |                 | LAB -      |              |
|             |       |                 | [place]  |              |
+-------------+-------+-----------------+------------+--------------+
 
 
 
+---------------+
| Specimen      |
+---------------+
| Serum - Blood |
+---------------+
 
 
 
+--------------------+----------------------+--------------------+----------------+
| Performing         | Address              | City/State/Zipcode | Phone Number   |
| Organization       |                      |                    |                |
+--------------------+----------------------+--------------------+----------------+
|   [facility]  |   [address] | [place], [place]      |   [phone] |
| [place]          |                      |                    |                |
+--------------------+----------------------+--------------------+----------------+
|   [facility]  |                      | [place], [place]      |                |
 
| [place]          |                      |                    |                |
+--------------------+----------------------+--------------------+----------------+
 documented in this encounter
 
 Visit Diagnoses
 
 
+----------------------------------------------------------+
| Diagnosis                                                |
+----------------------------------------------------------+
|   Complications of transplanted liver                    |
+----------------------------------------------------------+
|   Transplanted liver (HCC)  Liver replaced by transplant |
+----------------------------------------------------------+
 documented in this encounter

## 2019-11-01 NOTE — XMS
Encounter Summary
  Created on: 2019
 
 Bonifacio Nila JB
 External Reference #: [id number]
 : [birth year]
 Sex: Female
 
 Demographics
 
 
+-----------------------+------------------------+
| Address               | [address]            |
|                       | [place]  [address]   |
+-----------------------+------------------------+
| Home Phone            | [phone]        |
+-----------------------+------------------------+
| Preferred Language    | Unknown                |
+-----------------------+------------------------+
| Marital Status        | Single                 |
+-----------------------+------------------------+
| Church Affiliation | Unknown                |
+-----------------------+------------------------+
| Race                  | White                  |
+-----------------------+------------------------+
| Ethnic Group          | Not  or  |
+-----------------------+------------------------+
 
 
 Author
 
 
+--------------+-------------+
| Organization | Unknown     |
+--------------+-------------+
| Address      | Unknown     |
+--------------+-------------+
| Phone        | Unavailable |
+--------------+-------------+
 
 
 
 Support
 
 
+-----------------+--------------+---------+-----------------+
| Name            | Relationship | Address | Phone           |
+-----------------+--------------+---------+-----------------+
| Kit Valdovinos   | ECON         | Unknown | [phone] |
+-----------------+--------------+---------+-----------------+
| Magdalena Valdovinos | ECON         | Unknown | [phone] |
+-----------------+--------------+---------+-----------------+
 
 
 
 Care Team Providers
 
 
 
+-----------------------+------+-----------------+
| Care Team Member Name | Role | Phone           |
+-----------------------+------+-----------------+
| Lily Horton MD   | PCP  | [phone] |
+-----------------------+------+-----------------+
 
 
 
 Encounter Details
 
 
+--------+-------------+------------+--------------------+-------------+
| Date   | Type        | Department | Care Team          | Description |
+--------+-------------+------------+--------------------+-------------+
| / | Transcribed |            |   Dictation, Other | Transcribed |
| 2000   |             |            |                    |             |
+--------+-------------+------------+--------------------+-------------+
 
 
 
 Social History
 
 
+----------------+-------+-----------+--------+------+
| Tobacco Use    | Types | Packs/Day | Years  | Date |
|                |       |           | Used   |      |
+----------------+-------+-----------+--------+------+
| Never Assessed |       |           |        |      |
+----------------+-------+-----------+--------+------+
 
 
 
+------------------+---------------+
| Sex Assigned at  | Date Recorded |
| Birth            |               |
+------------------+---------------+
| Not on file      |               |
+------------------+---------------+
 
 
 
+----------------+-------------+-------------+
| Job Start Date | Occupation  | Industry    |
+----------------+-------------+-------------+
| Not on file    | Not on file | Not on file |
+----------------+-------------+-------------+
 
 
 
+----------------+--------------+------------+
| Travel History | Travel Start | Travel End |
+----------------+--------------+------------+
 
 
 
+-------------------------------------+
| No recent travel history available. |
+-------------------------------------+
 documented as of this encounter
 
 
 Progress Notes
 Interface, Transcription In - 2006  1:05 AM [facility]                                    [place]
[address] [address] [phone] or [phone]
 
 2000
 
 Lily Horton M.D.
 [address] [place] [address]  [id number]
 
 RE:   NILA VALDOVINOS
 MR #: [id number]
 
 Dear Dr. Horton:
 
 I had the pleasure of seeing Nila at  [facility]
 today in the Pediatric Liver Transplant  Clinic  and  presenting  her  once
 again  to members of the Pediatric Liver  Transplant  team  from  [place],
 which included Dr. Marcos Aguirre.  Nila  has been doing reasonably well over
 the last six months during the interim  since her interim since her initial
 presentation to the transplant team.   She  has  grown  and  gained weight,
 although in a subdued fashion.  Her mother has few complaints.  She is on a
 regular diet and has dark brown stools.   Evidently her VSD is closing, and
 she is followed by Dr. Baljinder Bush, pediatric cardiologist.
 
 His present medications include Zantac  1  mL  b.i.d.,  Bactrim    teaspoon
 q.d., Actigall 1.5 cc t.i.d., iron 0.8  q.d.,  vitamin  A  and  D capsule 1
 q.d., vitamin E capsule 1 q.d., and vitamin K 2.5 mg q.d.
 
 On physical examination, she weighs  8.37  kg  and  measures  70.4 cm.  Her
 blood pressure is 104/69.  She is anicteric,  although her skin is slightly
 pigmented.   She  has thin extremities  and  a  protuberant  abdomen.   The
 vasculature over her abdomen is prominent.   She  has a very large and hard
 liver,  left lobe even more so than  the  right  lobe  and  a  spleen  that
 descends  at least 6 to 7 cm below the  left  costal  margin.   Ascites  is
 questionable.
 
 Her most recent laboratory tests on 2000, show an AST of 137, ALT
 99,  albumin 3.0, a direct bilirubin  of  1.0,  and  a  platelet  count  of
 116,000.  Her GGT is 571.  Nila has a blood type of O negative.
 
 In  summary, Nila is a 15-month-old  white  female  who  has  extrahepatic
 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
 disease and evidence of portal hypertension  and  failure to thrive.  Given
 the details of the situation, Dr. Aguirre wishes to move ahead and continue her
 listing as a 2B.  Members of the transplant  team  will investigate further
 and firm up plans to have a living-related donor on the wings in case Nila
 deteriorates quickly at some point in  the future requiring transplantation
 sooner  than  later.   In  the  meantime,  we  will  continue  the  present
 medications.  With her next blood draw,  she  needs  a  PT,  INR,  and PTT.
 Mother will try to remember to request  this  the  next  time  she comes to
 either your office or your laboratory  facility  for  the  next blood draw.
 Finally, mother will coordinate a visit  to both Dr. Bush and Dr. Bailey
 in the next month or two.  Please let me know if you have any questions.
 
 Sincerely,
 
 
 Eagle De Luna M.D.
 Pediatric Gastroenterology
 
 [facility] / 
 [id number] / [id number] / [id number] / [id number]
 D: 2000
 T: 2000
 
 cc:
 
 Marcos Aguirre M.D.
 [facility]
 [address]
 [place], [place]
 
 Cristo Bailey M.D.
 [address] [place] [address]  [id number]
 
 [id number] signed by Interface, Transcription In at 2006  1:05 AM PSTdocume
nted in this encounter
 
 Plan of Treatment
 Not on filedocumented as of this encounter
 
 Visit Diagnoses
 Not on filedocumented in this encounter

## 2019-11-01 NOTE — XMS
Encounter Summary
  Created on: 2019
 
 Bonifacio Nila JB
 External Reference #: [id number]
 : [birth year]
 Sex: Female
 
 Demographics
 
 
+-----------------------+------------------------+
| Address               | [address]            |
|                       | [place]  [address]   |
+-----------------------+------------------------+
| Home Phone            | [phone]        |
+-----------------------+------------------------+
| Preferred Language    | Unknown                |
+-----------------------+------------------------+
| Marital Status        | Single                 |
+-----------------------+------------------------+
| Latter day Affiliation | Unknown                |
+-----------------------+------------------------+
| Race                  | White                  |
+-----------------------+------------------------+
| Ethnic Group          | Not  or  |
+-----------------------+------------------------+
 
 
 Author
 
 
+--------------+------------------------------+
| Author       | [facility] |
+--------------+------------------------------+
| Organization | [facility] |
+--------------+------------------------------+
| Address      | Unknown                      |
+--------------+------------------------------+
| Phone        | Unavailable                  |
+--------------+------------------------------+
 
 
 
 Support
 
 
+-----------------+--------------+---------+-----------------+
| Name            | Relationship | Address | Phone           |
+-----------------+--------------+---------+-----------------+
| Kit Valdovinos   | ECON         | Unknown | [phone] |
+-----------------+--------------+---------+-----------------+
| Magdalena Valdovinos | ECON         | Unknown | [phone] |
+-----------------+--------------+---------+-----------------+
 
 
 
 Care Team Providers
 
 
 
+------------------------+------+-----------------+
| Care Team Member Name  | Role | Phone           |
+------------------------+------+-----------------+
| Lily Horton MD | PCP  | [phone] |
+------------------------+------+-----------------+
 
 
 
 Reason for Visit
 
 
+-------------------+----------+
| Reason            | Comments |
+-------------------+----------+
| Care Coordination |          |
+-------------------+----------+
 
 
 
 Encounter Details
 
 
+--------+-----------+----------------------+----------------------+-------------------+
| Date   | Type      | Department           | Care Team            | Description       |
+--------+-----------+----------------------+----------------------+-------------------+
| / | Telephone |   Pediatric          |   Neema Khalil,   | Care Coordination |
| 2016   |           | Gastroenterology at  | MD  [address] |                   |
|        |           | [place]          |   [place], [place]       |                   |
|        |           | [facility]  | [address]           |                   |
|        |           |  [address] [facility] | [phone]         |                   |
|        |           |  [address]  Mailcode:  | [phone] (Fax)   |                   |
|        |           | [address]   |                      |                   |
|        |           | [place], [place]         |                      |                   |
|        |           | [address]           |                      |                   |
|        |           | [phone]         |                      |                   |
+--------+-----------+----------------------+----------------------+-------------------+
 
 
 
 Social History
 
 
+-------------------+-------+-----------+--------+------+
| Tobacco Use       | Types | Packs/Day | Years  | Date |
|                   |       |           | Used   |      |
+-------------------+-------+-----------+--------+------+
| Passive Smoke     |       |           |        |      |
| Exposure - Never  |       |           |        |      |
| Smoker            |       |           |        |      |
+-------------------+-------+-----------+--------+------+
 
 
 
+---------------------+---+---+---+
| Smokeless Tobacco:  |   |   |   |
| Never Used          |   |   |   |
+---------------------+---+---+---+
 
 
 
 
+-------------+-------------+---------+----------+
| Alcohol Use | Drinks/Week | oz/Week | Comments |
+-------------+-------------+---------+----------+
| Not Asked   |             |         |          |
+-------------+-------------+---------+----------+
 
 
 
+------------------+---------------+
| Sex Assigned at  | Date Recorded |
| Birth            |               |
+------------------+---------------+
| Not on file      |               |
+------------------+---------------+
 
 
 
+----------------+-------------+-------------+
| Job Start Date | Occupation  | Industry    |
+----------------+-------------+-------------+
| Not on file    | Not on file | Not on file |
+----------------+-------------+-------------+
 
 
 
+----------------+--------------+------------+
| Travel History | Travel Start | Travel End |
+----------------+--------------+------------+
 
 
 
+-------------------------------------+
| No recent travel history available. |
+-------------------------------------+
 documented as of this encounter
 
 Plan of Treatment
 Not on filedocumented as of this encounter
 
 Visit Diagnoses
 Not on filedocumented in this encounter

## 2019-11-01 NOTE — XMS
Encounter Summary
  Created on: 2019
 
 Bonifacio Nila JB
 External Reference #: [id number]
 : [birth year]
 Sex: Female
 
 Demographics
 
 
+-----------------------+------------------------+
| Address               | [address]            |
|                       | [place]  [address]   |
+-----------------------+------------------------+
| Home Phone            | [phone]        |
+-----------------------+------------------------+
| Preferred Language    | Unknown                |
+-----------------------+------------------------+
| Marital Status        | Single                 |
+-----------------------+------------------------+
| Anabaptism Affiliation | Unknown                |
+-----------------------+------------------------+
| Race                  | White                  |
+-----------------------+------------------------+
| Ethnic Group          | Not  or  |
+-----------------------+------------------------+
 
 
 Author
 
 
+--------------+------------------------------+
| Author       | [facility] |
+--------------+------------------------------+
| Organization | [facility] |
+--------------+------------------------------+
| Address      | Unknown                      |
+--------------+------------------------------+
| Phone        | Unavailable                  |
+--------------+------------------------------+
 
 
 
 Support
 
 
+-----------------+--------------+---------+-----------------+
| Name            | Relationship | Address | Phone           |
+-----------------+--------------+---------+-----------------+
| Kit Valdovinos   | ECON         | Unknown | [phone] |
+-----------------+--------------+---------+-----------------+
| Magdalena Valdovinos | ECON         | Unknown | [phone] |
+-----------------+--------------+---------+-----------------+
 
 
 
 Care Team Providers
 
 
 
+------------------------+------+-----------------+
| Care Team Member Name  | Role | Phone           |
+------------------------+------+-----------------+
| Lily Horton MD | PCP  | [phone] |
+------------------------+------+-----------------+
 
 
 
 Reason for Visit
 
 
+-------------------+----------+
| Reason            | Comments |
+-------------------+----------+
| Care Coordination |          |
+-------------------+----------+
 
 
 
 Encounter Details
 
 
+--------+-----------+----------------------+----------------------+-------------------+
| Date   | Type      | Department           | Care Team            | Description       |
+--------+-----------+----------------------+----------------------+-------------------+
| / | Telephone |   Pediatric          |   Neema Khalil,   | Care Coordination |
| 2018   |           | Gastroenterology at  | MD  [address] |                   |
|        |           | [place]          |   [place], [place]       |                   |
|        |           | [facility]  | [address]           |                   |
|        |           |  [address] [facility] | [phone]         |                   |
|        |           |  [address]  Mailcode:  | [phone] (Fax)   |                   |
|        |           | [address]   |                      |                   |
|        |           | [place], [place]         |                      |                   |
|        |           | [address]           |                      |                   |
|        |           | [phone]         |                      |                   |
+--------+-----------+----------------------+----------------------+-------------------+
 
 
 
 Social History
 
 
+-------------------+-------+-----------+--------+------+
| Tobacco Use       | Types | Packs/Day | Years  | Date |
|                   |       |           | Used   |      |
+-------------------+-------+-----------+--------+------+
| Passive Smoke     |       |           |        |      |
| Exposure - Never  |       |           |        |      |
| Smoker            |       |           |        |      |
+-------------------+-------+-----------+--------+------+
 
 
 
+---------------------+---+---+---+
| Smokeless Tobacco:  |   |   |   |
| Never Used          |   |   |   |
+---------------------+---+---+---+
 
 
 
 
+-------------+-------------+---------+----------+
| Alcohol Use | Drinks/Week | oz/Week | Comments |
+-------------+-------------+---------+----------+
| Not Asked   |             |         |          |
+-------------+-------------+---------+----------+
 
 
 
+------------------+---------------+
| Sex Assigned at  | Date Recorded |
| Birth            |               |
+------------------+---------------+
| Not on file      |               |
+------------------+---------------+
 
 
 
+----------------+-------------+-------------+
| Job Start Date | Occupation  | Industry    |
+----------------+-------------+-------------+
| Not on file    | Not on file | Not on file |
+----------------+-------------+-------------+
 
 
 
+----------------+--------------+------------+
| Travel History | Travel Start | Travel End |
+----------------+--------------+------------+
 
 
 
+-------------------------------------+
| No recent travel history available. |
+-------------------------------------+
 documented as of this encounter
 
 Plan of Treatment
 Not on filedocumented as of this encounter
 
 Visit Diagnoses
 Not on filedocumented in this encounter

## 2019-11-01 NOTE — NUR
PT ABLE TO MELISSA 100% OF DINNER, DENIES NAUSEA, DIARRHEA AND VOMITING. PT IS
ALERT AND ORIENTED SITTING UP IN BED WITH WORKING ON LAPTOP COMPUTER. PT
REMAINS SLIGHTLY HYPOTENSIVE AT 98/44 (57). PT DENIES ANY LIGHTHEADEDNESS.

## 2019-11-01 NOTE — XMS
Encounter Summary
  Created on: 2019
 
 Bonifacio Nila JB
 External Reference #: [id number]
 : [birth year]
 Sex: Female
 
 Demographics
 
 
+-----------------------+------------------------+
| Address               | [address]            |
|                       | [place]  [address]   |
+-----------------------+------------------------+
| Home Phone            | [phone]        |
+-----------------------+------------------------+
| Preferred Language    | Unknown                |
+-----------------------+------------------------+
| Marital Status        | Single                 |
+-----------------------+------------------------+
| Buddhism Affiliation | Unknown                |
+-----------------------+------------------------+
| Race                  | White                  |
+-----------------------+------------------------+
| Ethnic Group          | Not  or  |
+-----------------------+------------------------+
 
 
 Author
 
 
+--------------+------------------------------+
| Author       | [facility] |
+--------------+------------------------------+
| Organization | [facility] |
+--------------+------------------------------+
| Address      | Unknown                      |
+--------------+------------------------------+
| Phone        | Unavailable                  |
+--------------+------------------------------+
 
 
 
 Support
 
 
+-----------------+--------------+---------+-----------------+
| Name            | Relationship | Address | Phone           |
+-----------------+--------------+---------+-----------------+
| Kit Valdovinos   | ECON         | Unknown | [phone] |
+-----------------+--------------+---------+-----------------+
| Magdalena Valdovinos | ECON         | Unknown | [phone] |
+-----------------+--------------+---------+-----------------+
 
 
 
 Care Team Providers
 
 
 
+-----------------------+------+-----------------+
| Care Team Member Name | Role | Phone           |
+-----------------------+------+-----------------+
| Lily Horton MD   | PCP  | [phone] |
+-----------------------+------+-----------------+
 
 
 
 Reason for Visit
 
 
+----------------------+----------+
| Reason               | Comments |
+----------------------+----------+
| Lab findings,        |          |
| teaching, guidance,  |          |
| and counseling       |          |
+----------------------+----------+
 
 
 
 Encounter Details
 
 
+--------+-----------+----------------------+--------------------+----------------------+
| Date   | Type      | Department           | Care Team          | Description          |
+--------+-----------+----------------------+--------------------+----------------------+
| / | Telephone |   Pediatric          |   Ivis Burkett MD | Lab findings,        |
|    |           | Gastroenterology at  |                    | teaching, guidance,  |
|        |           | [place]          |                    | and counseling       |
|        |           | [facility]  |                    |                      |
|        |           |  [address] |                    |                      |
|        |           |  [address]  Mailcode:  |                    |                      |
|        |           |   [place]   |                    |                      |
|        |           | [place], [place]         |                    |                      |
|        |           | [address]           |                    |                      |
|        |           | [phone]         |                    |                      |
+--------+-----------+----------------------+--------------------+----------------------+
 
 
 
 Social History
 
 
+----------------+-------+-----------+--------+------+
| Tobacco Use    | Types | Packs/Day | Years  | Date |
|                |       |           | Used   |      |
+----------------+-------+-----------+--------+------+
| Never Assessed |       |           |        |      |
+----------------+-------+-----------+--------+------+
 
 
 
+------------------+---------------+
| Sex Assigned at  | Date Recorded |
| Birth            |               |
+------------------+---------------+
| Not on file      |               |
 
+------------------+---------------+
 
 
 
+----------------+-------------+-------------+
| Job Start Date | Occupation  | Industry    |
+----------------+-------------+-------------+
| Not on file    | Not on file | Not on file |
+----------------+-------------+-------------+
 
 
 
+----------------+--------------+------------+
| Travel History | Travel Start | Travel End |
+----------------+--------------+------------+
 
 
 
+-------------------------------------+
| No recent travel history available. |
+-------------------------------------+
 documented as of this encounter
 
 Plan of Treatment
 Not on filedocumented as of this encounter
 
 Visit Diagnoses
 Not on filedocumented in this encounter

## 2019-11-01 NOTE — XMS
Encounter Summary
  Created on: 2019
 
 Bonifacio Nila JB
 External Reference #: [id number]
 : [birth year]
 Sex: Female
 
 Demographics
 
 
+-----------------------+------------------------+
| Address               | [address]            |
|                       | [place]  [address]   |
+-----------------------+------------------------+
| Home Phone            | [phone]        |
+-----------------------+------------------------+
| Preferred Language    | Unknown                |
+-----------------------+------------------------+
| Marital Status        | Single                 |
+-----------------------+------------------------+
| Orthodoxy Affiliation | Unknown                |
+-----------------------+------------------------+
| Race                  | White                  |
+-----------------------+------------------------+
| Ethnic Group          | Not  or  |
+-----------------------+------------------------+
 
 
 Author
 
 
+--------------+------------------------------+
| Author       | [facility] |
+--------------+------------------------------+
| Organization | [facility] |
+--------------+------------------------------+
| Address      | Unknown                      |
+--------------+------------------------------+
| Phone        | Unavailable                  |
+--------------+------------------------------+
 
 
 
 Support
 
 
+-----------------+--------------+---------+-----------------+
| Name            | Relationship | Address | Phone           |
+-----------------+--------------+---------+-----------------+
| Kit Valdovinos   | ECON         | Unknown | [phone] |
+-----------------+--------------+---------+-----------------+
| Magdalena Valdovinos | ECON         | Unknown | [phone] |
+-----------------+--------------+---------+-----------------+
 
 
 
 Care Team Providers
 
 
 
+------------------------+------+-----------------+
| Care Team Member Name  | Role | Phone           |
+------------------------+------+-----------------+
| Lily Horton MD | PCP  | [phone] |
+------------------------+------+-----------------+
 
 
 
 Reason for Visit
 
 
+-------------------+----------+
| Reason            | Comments |
+-------------------+----------+
| Care Coordination |          |
+-------------------+----------+
 
 
 
 Encounter Details
 
 
+--------+-----------+----------------------+----------------------+-------------------+
| Date   | Type      | Department           | Care Team            | Description       |
+--------+-----------+----------------------+----------------------+-------------------+
| / | Telephone |   Pediatric          |   Neema Khalil,   | Care Coordination |
| 2016   |           | Gastroenterology at  | MD  [address] |                   |
|        |           | [place]          |   [place], [place]       |                   |
|        |           | [facility]  | [address]           |                   |
|        |           |  [address] [facility] | [phone]         |                   |
|        |           |  [address]  Mailcode:  | [phone] (Fax)   |                   |
|        |           | [address]   |                      |                   |
|        |           | [place], [place]         |                      |                   |
|        |           | [address]           |                      |                   |
|        |           | [phone]         |                      |                   |
+--------+-----------+----------------------+----------------------+-------------------+
 
 
 
 Social History
 
 
+-------------------+-------+-----------+--------+------+
| Tobacco Use       | Types | Packs/Day | Years  | Date |
|                   |       |           | Used   |      |
+-------------------+-------+-----------+--------+------+
| Passive Smoke     |       |           |        |      |
| Exposure - Never  |       |           |        |      |
| Smoker            |       |           |        |      |
+-------------------+-------+-----------+--------+------+
 
 
 
+---------------------+---+---+---+
| Smokeless Tobacco:  |   |   |   |
| Never Used          |   |   |   |
+---------------------+---+---+---+
 
 
 
 
+-------------+-------------+---------+----------+
| Alcohol Use | Drinks/Week | oz/Week | Comments |
+-------------+-------------+---------+----------+
| Not Asked   |             |         |          |
+-------------+-------------+---------+----------+
 
 
 
+------------------+---------------+
| Sex Assigned at  | Date Recorded |
| Birth            |               |
+------------------+---------------+
| Not on file      |               |
+------------------+---------------+
 
 
 
+----------------+-------------+-------------+
| Job Start Date | Occupation  | Industry    |
+----------------+-------------+-------------+
| Not on file    | Not on file | Not on file |
+----------------+-------------+-------------+
 
 
 
+----------------+--------------+------------+
| Travel History | Travel Start | Travel End |
+----------------+--------------+------------+
 
 
 
+-------------------------------------+
| No recent travel history available. |
+-------------------------------------+
 documented as of this encounter
 
 Plan of Treatment
 Not on filedocumented as of this encounter
 
 Visit Diagnoses
 Not on filedocumented in this encounter

## 2019-11-01 NOTE — XMS
Encounter Summary
  Created on: 2019
 
 Bonifacio Nila JB
 External Reference #: [id number]
 : [birth year]
 Sex: Female
 
 Demographics
 
 
+-----------------------+------------------------+
| Address               | [address]            |
|                       | [place]  [address]   |
+-----------------------+------------------------+
| Home Phone            | [phone]        |
+-----------------------+------------------------+
| Preferred Language    | Unknown                |
+-----------------------+------------------------+
| Marital Status        | Single                 |
+-----------------------+------------------------+
| Anabaptism Affiliation | Unknown                |
+-----------------------+------------------------+
| Race                  | White                  |
+-----------------------+------------------------+
| Ethnic Group          | Not  or  |
+-----------------------+------------------------+
 
 
 Author
 
 
+--------------+------------------------------+
| Author       | [facility] |
+--------------+------------------------------+
| Organization | [facility] |
+--------------+------------------------------+
| Address      | Unknown                      |
+--------------+------------------------------+
| Phone        | Unavailable                  |
+--------------+------------------------------+
 
 
 
 Support
 
 
+-----------------+--------------+---------+-----------------+
| Name            | Relationship | Address | Phone           |
+-----------------+--------------+---------+-----------------+
| Kit Valdovinos   | ECON         | Unknown | [phone] |
+-----------------+--------------+---------+-----------------+
| Magdalena Valdovinos | ECON         | Unknown | [phone] |
+-----------------+--------------+---------+-----------------+
 
 
 
 Care Team Providers
 
 
 
+------------------------+------+-----------------+
| Care Team Member Name  | Role | Phone           |
+------------------------+------+-----------------+
| Lily Horton MD | PCP  | [phone] |
+------------------------+------+-----------------+
 
 
 
 Encounter Details
 
 
+--------+----------+----------------------+----------------------+-------------+
| Date   | Type     | Department           | Care Team            | Description |
+--------+----------+----------------------+----------------------+-------------+
| / | Abstract |   Pediatric          |   Neema Khalil,   |             |
|    |          | Gastroenterology at  | MD  [address] |             |
|        |          | [place]          |   [place], [place]       |             |
|        |          | [facility]  | [address]           |             |
|        |          |  [address] | [phone]         |             |
|        |          |  [address]  Mailcode:  | [phone] (Fax)   |             |
|        |          |   [place]   |                      |             |
|        |          | [place], [place]         |                      |             |
|        |          | [address]           |                      |             |
|        |          | [phone]         |                      |             |
+--------+----------+----------------------+----------------------+-------------+
 
 
 
 Social History
 
 
+-------------------+-------+-----------+--------+------+
| Tobacco Use       | Types | Packs/Day | Years  | Date |
|                   |       |           | Used   |      |
+-------------------+-------+-----------+--------+------+
| Passive Smoke     |       |           |        |      |
| Exposure - Never  |       |           |        |      |
| Smoker            |       |           |        |      |
+-------------------+-------+-----------+--------+------+
 
 
 
+---------------------+---+---+---+
| Smokeless Tobacco:  |   |   |   |
| Never Used          |   |   |   |
+---------------------+---+---+---+
 
 
 
+-------------+-------------+---------+----------+
| Alcohol Use | Drinks/Week | oz/Week | Comments |
+-------------+-------------+---------+----------+
| Not Asked   |             |         |          |
+-------------+-------------+---------+----------+
 
 
 
+------------------+---------------+
 
| Sex Assigned at  | Date Recorded |
| Birth            |               |
+------------------+---------------+
| Not on file      |               |
+------------------+---------------+
 
 
 
+----------------+-------------+-------------+
| Job Start Date | Occupation  | Industry    |
+----------------+-------------+-------------+
| Not on file    | Not on file | Not on file |
+----------------+-------------+-------------+
 
 
 
+----------------+--------------+------------+
| Travel History | Travel Start | Travel End |
+----------------+--------------+------------+
 
 
 
+-------------------------------------+
| No recent travel history available. |
+-------------------------------------+
 documented as of this encounter
 
 Plan of Treatment
 Not on filedocumented as of this encounter
 
 Procedures
 
 
+----------------------+--------+-------------+----------------------+----------------------
+
| Procedure Name       | Priori | Date/Time   | Associated Diagnosis | Comments             
|
|                      | ty     |             |                      |                      
|
+----------------------+--------+-------------+----------------------+----------------------
+
| CHOLESTEROL, TOTAL   | Routin | 2015  |                      |   Results for this   
|
| (EXTERNAL RESULTS    | e      |  3:11 PM    |                      | procedure are in the 
|
| ONLY)                |        | PST         |                      |  results section.    
|
+----------------------+--------+-------------+----------------------+----------------------
+
| CBC, WITH            | Routin | 2015  |                      |   Results for this   
|
| DIFFERENTIAL         | e      |  3:11 PM    |                      | procedure are in the 
|
|                      |        | PST         |                      |  results section.    
|
+----------------------+--------+-------------+----------------------+----------------------
+
| COMPLETE METABOLIC   | Routin | 2015  |                      |   Results for this   
|
| SET                  | e      |  3:11 PM    |                      | procedure are in the 
 
|
| (NA,K,CL,CO2,BUN,CRE |        | PST         |                      |  results section.    
|
| AT,GLUC,CA,AST,ALT,B |        |             |                      |                      
|
| ASHWINI TOTAL,ALK        |        |             |                      |                      
|
| PHOS,ALB,PROT TOTAL) |        |             |                      |                      
|
+----------------------+--------+-------------+----------------------+----------------------
+
| PHOSPHORUS, PLASMA   | Routin | 2015  |                      |   Results for this   
|
|                      | e      |  3:11 PM    |                      | procedure are in the 
|
|                      |        | PST         |                      |  results section.    
|
+----------------------+--------+-------------+----------------------+----------------------
+
| GGT, PLASMA          | Routin | 2015  |                      |   Results for this   
|
|                      | e      |  3:11 PM    |                      | procedure are in the 
|
|                      |        | PST         |                      |  results section.    
|
+----------------------+--------+-------------+----------------------+----------------------
+
| URIC ACID, PLASMA    | Routin | 2015  |                      |   Results for this   
|
|                      | e      |  3:11 PM    |                      | procedure are in the 
|
|                      |        | PST         |                      |  results section.    
|
+----------------------+--------+-------------+----------------------+----------------------
+
| MAGNESIUM, PLASMA    | Routin | 2015  |                      |   Results for this   
|
|                      | e      |  3:11 PM    |                      | procedure are in the 
|
|                      |        | PST         |                      |  results section.    
|
+----------------------+--------+-------------+----------------------+----------------------
+
| TRIGLYCERIDES,       | Routin | 2015  |                      |   Results for this   
|
| PLASMA               | e      |  3:11 PM    |                      | procedure are in the 
|
|                      |        | PST         |                      |  results section.    
|
+----------------------+--------+-------------+----------------------+----------------------
+
| LDH TOTAL, PLASMA    | Routin | 2015  |                      |   Results for this   
|
|                      | e      |  3:11 PM    |                      | procedure are in the 
|
|                      |        | PST         |                      |  results section.    
|
+----------------------+--------+-------------+----------------------+----------------------
+
 documented in this encounter
 
 
 Results
 CBC, WITH DIFFERENTIAL (2015  3:11 PM PST)
 
+-------------+----------+-----------------+------------+--------------+
| Component   | Value    | Ref Range       | Performed  | Pathologist  |
|             |          |                 | At         | Signature    |
+-------------+----------+-----------------+------------+--------------+
| WHITE CELL  | 10.7     | 4.5 - 11.0 K/cu | [facility]  |              |
| COUNT       |          |  mm             | LAB -      |              |
|             |          |                 | [place]  |              |
+-------------+----------+-----------------+------------+--------------+
| RED CELL    | 4.62     | 3.8 - 5.1 M/cu  | [facility]  |              |
| COUNT       |          | mm              | LAB -      |              |
|             |          |                 | [place]  |              |
+-------------+----------+-----------------+------------+--------------+
| HEMOGLOBIN  | 14.3     | 12 - 16 g/dL    | [facility]  |              |
|             |          |                 | LAB -      |              |
|             |          |                 | [place]  |              |
+-------------+----------+-----------------+------------+--------------+
| HEMATOCRIT  | 41.4     | 35 - 45 %       | [facility]  |              |
|             |          |                 | LAB -      |              |
|             |          |                 | [place]  |              |
+-------------+----------+-----------------+------------+--------------+
| MCV         | 89.5     | 81 - 99 fL      | [facility]  |              |
|             |          |                 | LAB -      |              |
|             |          |                 | [place]  |              |
+-------------+----------+-----------------+------------+--------------+
| MCH         | 31       | 27 - 33 pg      | [facility]  |              |
|             |          |                 | LAB -      |              |
|             |          |                 | [place]  |              |
+-------------+----------+-----------------+------------+--------------+
| MCHC        | 35       | 30 - 36 g/dL    | [facility]  |              |
|             |          |                 | LAB -      |              |
|             |          |                 | [place]  |              |
+-------------+----------+-----------------+------------+--------------+
| PLATELET    | 370      | 140 - 440 K/cu  | [facility]  |              |
| COUNT       |          | mm              | LAB -      |              |
|             |          |                 | [place]  |              |
+-------------+----------+-----------------+------------+--------------+
| NEUTROPHIL  | 70.4     | 39 - 80 %       | [facility]  |              |
| %           |          |                 | LAB -      |              |
|             |          |                 | [place]  |              |
+-------------+----------+-----------------+------------+--------------+
| LYMPHOCYTE  | 20.5 (A) | 24 - 44 %       | [facility]  |              |
| %           |          |                 | LAB -      |              |
|             |          |                 | [place]  |              |
+-------------+----------+-----------------+------------+--------------+
| MONOCYTE %  | 6.7      | 0 - 12 %        | [facility]  |              |
|             |          |                 | LAB -      |              |
|             |          |                 | [place]  |              |
+-------------+----------+-----------------+------------+--------------+
| EOS %       | 2.1      | 0 - 6 %         | [facility]  |              |
|             |          |                 | LAB -      |              |
|             |          |                 | [place]  |              |
+-------------+----------+-----------------+------------+--------------+
| BASO %      | 0.6      | 0 - 2 %         | [facility]  |              |
|             |          |                 | LAB -      |              |
|             |          |                 | [place]  |              |
+-------------+----------+-----------------+------------+--------------+
 
| RDW         | 13       | 10.5 - 15 %     | [facility]  |              |
|             |          |                 | LAB -      |              |
|             |          |                 | [place]  |              |
+-------------+----------+-----------------+------------+--------------+
 
 
 
+---------------+
| Specimen      |
+---------------+
| Blood - Blood |
+---------------+
 
 
 
+--------------------+----------------------+--------------------+----------------+
| Performing         | Address              | City/State/Zipcode | Phone Number   |
| Organization       |                      |                    |                |
+--------------------+----------------------+--------------------+----------------+
|   [facility]  |   [address] | [place], [place]      |   [phone] |
| [place]          |                      |                    |                |
+--------------------+----------------------+--------------------+----------------+
 MAGNESIUM, PLASMA (2015  3:11 PM PST)
 
+-----------+-------+-----------------+------------+--------------+
| Component | Value | Ref Range       | Performed  | Pathologist  |
|           |       |                 | At         | Signature    |
+-----------+-------+-----------------+------------+--------------+
| MAGNESIUM | 1.8   | 1.7 - 2.5 mg/dL | [facility]  |              |
|           |       |                 | LAB -      |              |
|           |       |                 | [place]  |              |
+-----------+-------+-----------------+------------+--------------+
 
 
 
+---------------+
| Specimen      |
+---------------+
| Blood - Blood |
+---------------+
 
 
 
+--------------------+----------------------+--------------------+----------------+
| Performing         | Address              | City/State/Zipcode | Phone Number   |
| Organization       |                      |                    |                |
+--------------------+----------------------+--------------------+----------------+
|   [facility]  |   [address] | [place]      |   [phone] |
| [place]          |                      |                    |                |
+--------------------+----------------------+--------------------+----------------+
 GGT, PLASMA (2015  3:11 PM PST)
 
+-----------+-------+------------+------------+--------------+
| Component | Value | Ref Range  | Performed  | Pathologist  |
|           |       |            | At         | Signature    |
+-----------+-------+------------+------------+--------------+
| GAMMA     | 7     | 5 - 60 U/L | [facility]  |              |
| GLUTAMYL  |       |            | LAB -      |              |
| TRANS     |       |            | [place]  |              |
+-----------+-------+------------+------------+--------------+
 
 
 
 
+---------------+
| Specimen      |
+---------------+
| Blood - Blood |
+---------------+
 
 
 
+--------------------+----------------------+--------------------+----------------+
| Performing         | Address              | City/State/Zipcode | Phone Number   |
| Organization       |                      |                    |                |
+--------------------+----------------------+--------------------+----------------+
|   [facility]  |   [address] | [place]      |   [phone] |
| [place]          |                      |                    |                |
+--------------------+----------------------+--------------------+----------------+
 COMPLETE METABOLIC SET (NA,K,CL,CO2,BUN,CREAT,GLUC,CA,AST,ALT,BILI TOTAL,ALK PHOS,ALB,PROT 
TOTAL) (2015  3:11 PM PST)
 
+-------------+--------+-----------------+------------+--------------+
| Component   | Value  | Ref Range       | Performed  | Pathologist  |
|             |        |                 | At         | Signature    |
+-------------+--------+-----------------+------------+--------------+
| GLUCOSE,    | 77     | 70 - 100 mg/dL  | [facility]  |              |
| PLASMA      |        |                 | LAB -      |              |
| (LAB)       |        |                 | [place]  |              |
+-------------+--------+-----------------+------------+--------------+
| BUN, PLASMA | 14     | 6 - 23 mg/dL    | [facility]  |              |
|  (LAB)      |        |                 | LAB -      |              |
|             |        |                 | [place]  |              |
+-------------+--------+-----------------+------------+--------------+
| CREATININE  | 0.66   | 0.60 - 1.20     | [facility]  |              |
| PLASMA      |        | mg/dL           | LAB -      |              |
| (LAB)       |        |                 | [place]  |              |
+-------------+--------+-----------------+------------+--------------+
| TOTAL       | 7.4    | 6.1 - 8.5 g/dL  | [facility]  |              |
| PROTEIN,    |        |                 | LAB -      |              |
| PLASMA      |        |                 | [place]  |              |
| (LAB)       |        |                 |            |              |
+-------------+--------+-----------------+------------+--------------+
| ALBUMIN,    | 4.2    | 3.5 - 5.0 g/dL  | [facility]  |              |
| PLASMA      |        |                 | LAB -      |              |
| (LAB)       |        |                 | [place]  |              |
+-------------+--------+-----------------+------------+--------------+
| CALCIUM,    | 9.9    | 8.4 - 10.2      | [facility]  |              |
| PLASMA      |        | mg/dL           | LAB -      |              |
| (LAB)       |        |                 | [place]  |              |
+-------------+--------+-----------------+------------+--------------+
| BILIRUBIN   | 0.3    | 0 - 1.2         | [facility]  |              |
| TOTAL       |        | Transcutaneous  | LAB -      |              |
|             |        | Bilirubinometer | [place]  |              |
+-------------+--------+-----------------+------------+--------------+
| ALK PHOS    | 82     | 30 - 128 U/L    | [facility]  |              |
|             |        |                 | LAB -      |              |
|             |        |                 | [place]  |              |
+-------------+--------+-----------------+------------+--------------+
| AST(SGOT)   | 12 (A) | 13 - 39 U/L     | [facility]  |              |
|             |        |                 | LAB -      |              |
 
|             |        |                 | [place]  |              |
+-------------+--------+-----------------+------------+--------------+
| SODIUM,     | 139    | 132 - 143       | [facility]  |              |
| PLASMA      |        | mmol/L          | LAB -      |              |
| (LAB)       |        |                 | [place]  |              |
+-------------+--------+-----------------+------------+--------------+
| POTASSIUM,  | 4.2    | 3.6 - 5.1       | [facility]  |              |
| PLASMA      |        | mmol/L          | LAB -      |              |
| (LAB)       |        |                 | [place]  |              |
+-------------+--------+-----------------+------------+--------------+
| CHLORIDE,   | 102    | 95 - 112 mmol/L | [facility]  |              |
| PLASMA      |        |                 | LAB -      |              |
| (LAB)       |        |                 | [place]  |              |
+-------------+--------+-----------------+------------+--------------+
| TOTAL CO2,  | 28     | 19 - 31 mmol/L  | [facility]  |              |
| PLASMA      |        |                 | LAB -      |              |
| (LAB)       |        |                 | [place]  |              |
+-------------+--------+-----------------+------------+--------------+
| ALT (SGPT)  | 10     | 7 - 52 U/L      | [facility]  |              |
|             |        |                 | LAB -      |              |
|             |        |                 | [place]  |              |
+-------------+--------+-----------------+------------+--------------+
| ANION GAP   | 13.2   | 7 - 21          | [facility]  |              |
|             |        |                 | LAB -      |              |
|             |        |                 | [place]  |              |
+-------------+--------+-----------------+------------+--------------+
| BUN/CREATIN | 21.2   | 6.0 - 28.6      | [facility]  |              |
| INE RATIO   |        |                 | LAB -      |              |
|             |        |                 | [place]  |              |
+-------------+--------+-----------------+------------+--------------+
| GLOBULIN    | 3.2    | 1.8 - 3.5       | [facility]  |              |
|             |        |                 | LAB -      |              |
|             |        |                 | [place]  |              |
+-------------+--------+-----------------+------------+--------------+
| A/G RATIO   | 1.3    | 1.1 - 2.4       | [facility]  |              |
|             |        |                 | LAB -      |              |
|             |        |                 | [place]  |              |
+-------------+--------+-----------------+------------+--------------+
| BILIRUBIN   | 0.1    | 0.0 - 0.1 mg/dL | [facility]  |              |
| DIRECT      |        |                 | LAB -      |              |
|             |        |                 | [place]  |              |
+-------------+--------+-----------------+------------+--------------+
 
 
 
+---------------+
| Specimen      |
+---------------+
| Blood - Blood |
+---------------+
 
 
 
+--------------------+----------------------+--------------------+----------------+
| Performing         | Address              | City/State/Zipcode | Phone Number   |
| Organization       |                      |                    |                |
+--------------------+----------------------+--------------------+----------------+
|   [facility]  |   [address] | [place]      |   [phone] |
| [place]          |                      |                    |                |
+--------------------+----------------------+--------------------+----------------+
 
 TRIGLYCERIDES, PLASMA (2015  3:11 PM PST)
 
+-------------+-------+----------------+------------+--------------+
| Component   | Value | Ref Range      | Performed  | Pathologist  |
|             |       |                | At         | Signature    |
+-------------+-------+----------------+------------+--------------+
| TRIGLYCERID | 86    | 30 - 150 mg/dL | [facility]  |              |
| ES          |       |                | LAB -      |              |
|             |       |                | [place]  |              |
+-------------+-------+----------------+------------+--------------+
 
 
 
+---------------+
| Specimen      |
+---------------+
| Blood - Blood |
+---------------+
 
 
 
+--------------------+----------------------+--------------------+----------------+
| Performing         | Address              | City/State/Zipcode | Phone Number   |
| Organization       |                      |                    |                |
+--------------------+----------------------+--------------------+----------------+
|   [facility]  |   [address] | [place], [place]      |   [phone] |
| [place]          |                      |                    |                |
+--------------------+----------------------+--------------------+----------------+
 LDH TOTAL, PLASMA (2015  3:11 PM PST)
 
+-------------+-------+------------+------------+--------------+
| Component   | Value | Ref Range  | Performed  | Pathologist  |
|             |       |            | At         | Signature    |
+-------------+-------+------------+------------+--------------+
| LDH (TOTAL) | 129   | 100 - 215  | [facility]  |              |
|             |       | Units/L    | LAB -      |              |
|             |       |            | [place]  |              |
+-------------+-------+------------+------------+--------------+
 
 
 
+---------------+
| Specimen      |
+---------------+
| Blood - Blood |
+---------------+
 
 
 
+--------------------+----------------------+--------------------+----------------+
| Performing         | Address              | City/State/Zipcode | Phone Number   |
| Organization       |                      |                    |                |
+--------------------+----------------------+--------------------+----------------+
|   [facility]  |   [address] | [place], [place]      |   [phone] |
| [place]          |                      |                    |                |
+--------------------+----------------------+--------------------+----------------+
 CHOLESTEROL, TOTAL (EXTERNAL RESULTS ONLY) (2015  3:11 PM PST)
 
+-------------+-------+-----------+------------+--------------+
| Component   | Value | Ref Range | Performed  | Pathologist  |
 
|             |       |           | At         | Signature    |
+-------------+-------+-----------+------------+--------------+
| CHOLESTEROL | 129   | 200 mg/dL | [facility]  |              |
|   (LAB)     |       |           | LAB -      |              |
|             |       |           | [place]  |              |
+-------------+-------+-----------+------------+--------------+
 
 
 
+----------+
| Specimen |
+----------+
| Blood    |
+----------+
 
 
 
+--------------------+----------------------+--------------------+----------------+
| Performing         | Address              | City/State/Zipcode | Phone Number   |
| Organization       |                      |                    |                |
+--------------------+----------------------+--------------------+----------------+
|   [facility]  |   [address] | [place]      |   [phone] |
| [place]          |                      |                    |                |
+--------------------+----------------------+--------------------+----------------+
 URIC ACID, PLASMA (2015  3:11 PM PST)
 
+-------------+-------+-----------------+------------+--------------+
| Component   | Value | Ref Range       | Performed  | Pathologist  |
|             |       |                 | At         | Signature    |
+-------------+-------+-----------------+------------+--------------+
| URIC ACID,  | 3.6   | 2.3 - 6.6 mg/dL | [facility]  |              |
| PLASMA      |       |                 | LAB -      |              |
| (LAB)       |       |                 | [place]  |              |
+-------------+-------+-----------------+------------+--------------+
 
 
 
+---------------+
| Specimen      |
+---------------+
| Blood - Blood |
+---------------+
 
 
 
+--------------------+----------------------+--------------------+----------------+
| Performing         | Address              | City/State/Zipcode | Phone Number   |
| Organization       |                      |                    |                |
+--------------------+----------------------+--------------------+----------------+
|   [facility]  |   [address] | [place], [place]      |   [phone] |
| [place]          |                      |                    |                |
+--------------------+----------------------+--------------------+----------------+
 PHOSPHORUS, PLASMA (2015  3:11 PM PST)
 
+-------------+-------+-----------------+------------+--------------+
| Component   | Value | Ref Range       | Performed  | Pathologist  |
|             |       |                 | At         | Signature    |
+-------------+-------+-----------------+------------+--------------+
| PHOSPHORUS, | 3.9   | 2.5 - 5.0 mg/dL | [facility]  |              |
|  PLASMA     |       |                 | LAB -      |              |
 
| (LAB)       |       |                 | [place]  |              |
+-------------+-------+-----------------+------------+--------------+
 
 
 
+---------------+
| Specimen      |
+---------------+
| Blood - Blood |
+---------------+
 
 
 
+--------------------+----------------------+--------------------+----------------+
| Performing         | Address              | City/State/Zipcode | Phone Number   |
| Organization       |                      |                    |                |
+--------------------+----------------------+--------------------+----------------+
|   [facility]  |   [address] | [place]      |   [phone] |
| [place]          |                      |                    |                |
+--------------------+----------------------+--------------------+----------------+
 documented in this encounter
 
 Visit Diagnoses
 Not on filedocumented in this encounter

## 2019-11-01 NOTE — XMS
Encounter Summary
  Created on: 2019
 
 Bonifacio Nila JB
 External Reference #: [id number]
 : [birth year]
 Sex: Female
 
 Demographics
 
 
+-----------------------+------------------------+
| Address               | [address]            |
|                       | [place]  [address]   |
+-----------------------+------------------------+
| Home Phone            | [phone]        |
+-----------------------+------------------------+
| Preferred Language    | Unknown                |
+-----------------------+------------------------+
| Marital Status        | Single                 |
+-----------------------+------------------------+
| Oriental orthodox Affiliation | Unknown                |
+-----------------------+------------------------+
| Race                  | White                  |
+-----------------------+------------------------+
| Ethnic Group          | Not  or  |
+-----------------------+------------------------+
 
 
 Author
 
 
+--------------+------------------------------+
| Author       | [facility] |
+--------------+------------------------------+
| Organization | [facility] |
+--------------+------------------------------+
| Address      | Unknown                      |
+--------------+------------------------------+
| Phone        | Unavailable                  |
+--------------+------------------------------+
 
 
 
 Support
 
 
+-----------------+--------------+---------+-----------------+
| Name            | Relationship | Address | Phone           |
+-----------------+--------------+---------+-----------------+
| Kit Valdovinos   | ECON         | Unknown | [phone] |
+-----------------+--------------+---------+-----------------+
| Magdalena Valdovinos | ECON         | Unknown | [phone] |
+-----------------+--------------+---------+-----------------+
 
 
 
 Care Team Providers
 
 
 
+-----------------------+------+-----------------+
| Care Team Member Name | Role | Phone           |
+-----------------------+------+-----------------+
| Lily Horton MD   | PCP  | [phone] |
+-----------------------+------+-----------------+
 
 
 
 Encounter Details
 
 
+--------+----------+----------------------+--------------------+-------------+
| Date   | Type     | Department           | Care Team          | Description |
+--------+----------+----------------------+--------------------+-------------+
| / | Abstract |   Pediatric          |   Ivis Burkett MD |             |
|    |          | Gastroenterology at  |                    |             |
|        |          | [facility]          |                    |             |
|        |          | [facility]  |                    |             |
|        |          |  [address] |                    |             |
|        |          |  [address]  Mailcode:  |                    |             |
|        |          | [address]   |                    |             |
|        |          | [place], [place]         |                    |             |
|        |          | [address]           |                    |             |
|        |          | [phone]         |                    |             |
+--------+----------+----------------------+--------------------+-------------+
 
 
 
 Social History
 
 
+----------------+-------+-----------+--------+------+
| Tobacco Use    | Types | Packs/Day | Years  | Date |
|                |       |           | Used   |      |
+----------------+-------+-----------+--------+------+
| Never Assessed |       |           |        |      |
+----------------+-------+-----------+--------+------+
 
 
 
+------------------+---------------+
| Sex Assigned at  | Date Recorded |
| Birth            |               |
+------------------+---------------+
| Not on file      |               |
+------------------+---------------+
 
 
 
+----------------+-------------+-------------+
| Job Start Date | Occupation  | Industry    |
+----------------+-------------+-------------+
| Not on file    | Not on file | Not on file |
+----------------+-------------+-------------+
 
 
 
+----------------+--------------+------------+
 
| Travel History | Travel Start | Travel End |
+----------------+--------------+------------+
 
 
 
+-------------------------------------+
| No recent travel history available. |
+-------------------------------------+
 documented as of this encounter
 
 Plan of Treatment
 Not on filedocumented as of this encounter
 
 Procedures
 
 
+----------------------+--------+-------------+----------------------+----------------------
+
| Procedure Name       | Priori | Date/Time   | Associated Diagnosis | Comments             
|
|                      | ty     |             |                      |                      
|
+----------------------+--------+-------------+----------------------+----------------------
+
| CBC, WITH            | Routin | 2010  |                      |   Results for this   
|
| DIFFERENTIAL         | e      |  8:30 AM    |                      | procedure are in the 
|
|                      |        | PST         |                      |  results section.    
|
+----------------------+--------+-------------+----------------------+----------------------
+
| COMPLETE METABOLIC   | Routin | 2010  |                      |   Results for this   
|
| SET                  | e      |  8:30 AM    |                      | procedure are in the 
|
| (NA,K,CL,CO2,BUN,CRE |        | PST         |                      |  results section.    
|
| AT,GLUC,CA,AST,ALT,B |        |             |                      |                      
|
| ASHWINI TOTAL,ALK        |        |             |                      |                      
|
| PHOS,ALB,PROT TOTAL) |        |             |                      |                      
|
+----------------------+--------+-------------+----------------------+----------------------
+
| PHOSPHORUS, PLASMA   | Routin | 2010  |                      |   Results for this   
|
|                      | e      |  8:30 AM    |                      | procedure are in the 
|
|                      |        | PST         |                      |  results section.    
|
+----------------------+--------+-------------+----------------------+----------------------
+
| GGT, PLASMA          | Routin | 2010  |                      |   Results for this   
|
|                      | e      |  8:30 AM    |                      | procedure are in the 
|
|                      |        | PST         |                      |  results section.    
|
 
+----------------------+--------+-------------+----------------------+----------------------
+
| BILIRUBIN DIRECT     | Routin | 2010  |                      |   Results for this   
|
|                      | e      |  8:30 AM    |                      | procedure are in the 
|
|                      |        | PST         |                      |  results section.    
|
+----------------------+--------+-------------+----------------------+----------------------
+
| MAGNESIUM, PLASMA    | Routin | 2010  |                      |   Results for this   
|
|                      | e      |  8:30 AM    |                      | procedure are in the 
|
|                      |        | PST         |                      |  results section.    
|
+----------------------+--------+-------------+----------------------+----------------------
+
| TRIGLYCERIDES,       | Routin | 2010  |                      |   Results for this   
|
| PLASMA               | e      |  8:30 AM    |                      | procedure are in the 
|
|                      |        | PST         |                      |  results section.    
|
+----------------------+--------+-------------+----------------------+----------------------
+
| CHOLESTEROL TOTAL,   | Routin | 2010  |                      |   Results for this   
|
| PLASMA               | e      |  8:30 AM    |                      | procedure are in the 
|
|                      |        | PST         |                      |  results section.    
|
+----------------------+--------+-------------+----------------------+----------------------
+
 documented in this encounter
 
 Results
 PHOSPHORUS, PLASMA (2010  8:30 AM PST)
 
+-------------+-------+-----------------+------------+--------------+
| Component   | Value | Ref Range       | Performed  | Pathologist  |
|             |       |                 | At         | Signature    |
+-------------+-------+-----------------+------------+--------------+
| PHOSPHORUS, | 3.6   | 2.5 - 5.0 mg/dL | [facility]  |              |
|  PLASMA     |       |                 | LAB -      |              |
| (LAB)       |       |                 | [place]  |              |
+-------------+-------+-----------------+------------+--------------+
 
 
 
+---------------+
| Specimen      |
+---------------+
| Blood - Blood |
+---------------+
 
 
 
+--------------------+----------------------+--------------------+----------------+
| Performing         | Address              | City/State/Zipcode | Phone Number   |
 
| Organization       |                      |                    |                |
+--------------------+----------------------+--------------------+----------------+
|   [facility]  |   [address] | [place], [place]      |   [phone] |
| [place]          |                      |                    |                |
+--------------------+----------------------+--------------------+----------------+
|   [facility]  |                      | [place], [place]      |                |
| [place]          |                      |                    |                |
+--------------------+----------------------+--------------------+----------------+
 COMPLETE METABOLIC SET (NA,K,CL,CO2,BUN,CREAT,GLUC,CA,AST,ALT,BILI TOTAL,ALK PHOS,ALB,PROT 
TOTAL) (2010  8:30 AM PST)
 
+-------------+---------+-----------------+------------+--------------+
| Component   | Value   | Ref Range       | Performed  | Pathologist  |
|             |         |                 | At         | Signature    |
+-------------+---------+-----------------+------------+--------------+
| GLUCOSE,    | 93      | 60 - 100 mg/dL  | [facility]  |              |
| PLASMA      |         |                 | LAB -      |              |
| (LAB)       |         |                 | [place]  |              |
+-------------+---------+-----------------+------------+--------------+
| BUN, PLASMA | 11      | 6 - 23 mg/dL    | [facility]  |              |
|  (LAB)      |         |                 | LAB -      |              |
|             |         |                 | [place]  |              |
+-------------+---------+-----------------+------------+--------------+
| CREATININE  | 0.52    | 0.5 - 1.5 mg/dL | [facility]  |              |
| PLASMA      |         |                 | LAB -      |              |
| (LAB)       |         |                 | [place]  |              |
+-------------+---------+-----------------+------------+--------------+
| TOTAL       | 6.7     | 6.1 - 8.5 g/dL  | [facility]  |              |
| PROTEIN,    |         |                 | LAB -      |              |
| PLASMA      |         |                 | [place]  |              |
| (LAB)       |         |                 |            |              |
+-------------+---------+-----------------+------------+--------------+
| ALBUMIN,    | 3.9 (A) | 9.5 - 4.8 g/dL  | [facility]  |              |
| PLASMA      |         |                 | LAB -      |              |
| (LAB)       |         |                 | [place]  |              |
+-------------+---------+-----------------+------------+--------------+
| CALCIUM,    | 9.3     | 8.4 - 10.2      | [facility]  |              |
| PLASMA      |         | mg/dL           | LAB -      |              |
| (LAB)       |         |                 | [place]  |              |
+-------------+---------+-----------------+------------+--------------+
| BILIRUBIN   | 0.3     | 0.0 - 1.2       | [facility]  |              |
| TOTAL       |         | Transcutaneous  | LAB -      |              |
|             |         | Bilirubinometer | [place]  |              |
+-------------+---------+-----------------+------------+--------------+
| ALK PHOS    | 130 (A) | 135 - 384 U/L   | [facility]  |              |
|             |         |                 | LAB -      |              |
|             |         |                 | [place]  |              |
+-------------+---------+-----------------+------------+--------------+
| AST(SGOT)   | 17      | 0 - 40 U/L      | [facility]  |              |
|             |         |                 | LAB -      |              |
|             |         |                 | [place]  |              |
+-------------+---------+-----------------+------------+--------------+
| SODIUM,     | 137     | 132 - 143       | [facility]  |              |
| PLASMA      |         | mmol/L          | LAB -      |              |
| (LAB)       |         |                 | [place]  |              |
+-------------+---------+-----------------+------------+--------------+
| POTASSIUM,  | 3.9     | 3.6 - 5.1       | [facility]  |              |
| PLASMA      |         | mmol/L          | LAB -      |              |
| (LAB)       |         |                 | [place]  |              |
+-------------+---------+-----------------+------------+--------------+
 
| CHLORIDE,   | 105     | 95 - 112 mmol/L | [facility]  |              |
| PLASMA      |         |                 | LAB -      |              |
| (LAB)       |         |                 | [place]  |              |
+-------------+---------+-----------------+------------+--------------+
| TOTAL CO2,  | 25      | 19 - 31 mmol/L  | [facility]  |              |
| PLASMA      |         |                 | LAB -      |              |
| (LAB)       |         |                 | [place]  |              |
+-------------+---------+-----------------+------------+--------------+
| ALT (SGPT)  | 17      | 0 - 46 U/L      | [facility]  |              |
|             |         |                 | LAB -      |              |
|             |         |                 | [place]  |              |
+-------------+---------+-----------------+------------+--------------+
 
 
 
+---------------+
| Specimen      |
+---------------+
| Blood - Blood |
+---------------+
 
 
 
+--------------------+----------------------+--------------------+----------------+
| Performing         | Address              | City/State/Zipcode | Phone Number   |
| Organization       |                      |                    |                |
+--------------------+----------------------+--------------------+----------------+
|   [facility]  |   [address] | [place], [place]      |   [phone] |
| [place]          |                      |                    |                |
+--------------------+----------------------+--------------------+----------------+
|   [facility]  |                      | [place], [place]      |                |
| [place]          |                      |                    |                |
+--------------------+----------------------+--------------------+----------------+
 BILIRUBIN DIRECT (2010  8:30 AM PST)
 
+------------+-------+---------------+------------+--------------+
| Component  | Value | Ref Range     | Performed  | Pathologist  |
|            |       |               | At         | Signature    |
+------------+-------+---------------+------------+--------------+
| BILIRUBIN  | 0.1   | 0 - 0.1 mg/dL | [facility]  |              |
| DIRECT     |       |               | LAB -      |              |
|            |       |               | [place]  |              |
+------------+-------+---------------+------------+--------------+
 
 
 
+---------------+
| Specimen      |
+---------------+
| Blood - Blood |
+---------------+
 
 
 
+--------------------+----------------------+--------------------+----------------+
| Performing         | Address              | City/State/Zipcode | Phone Number   |
| Organization       |                      |                    |                |
+--------------------+----------------------+--------------------+----------------+
|   [facility]  |   [address] | [place], [place]      |   [phone] |
| [place]          |                      |                    |                |
 
+--------------------+----------------------+--------------------+----------------+
|   [facility]  |                      | [place], [place]      |                |
| [place]          |                      |                    |                |
+--------------------+----------------------+--------------------+----------------+
 GGT, PLASMA (2010  8:30 AM PST)
 
+-----------+-------+------------+------------+--------------+
| Component | Value | Ref Range  | Performed  | Pathologist  |
|           |       |            | At         | Signature    |
+-----------+-------+------------+------------+--------------+
| GAMMA     | 7     | 5 - 60 U/L | [facility]  |              |
| GLUTAMYL  |       |            | LAB -      |              |
| TRANS     |       |            | [place]  |              |
+-----------+-------+------------+------------+--------------+
 
 
 
+---------------+
| Specimen      |
+---------------+
| Blood - Blood |
+---------------+
 
 
 
+--------------------+----------------------+--------------------+----------------+
| Performing         | Address              | City/State/Zipcode | Phone Number   |
| Organization       |                      |                    |                |
+--------------------+----------------------+--------------------+----------------+
|   [facility]  |   [address] | [place], [place]      |   [phone] |
| [place]          |                      |                    |                |
+--------------------+----------------------+--------------------+----------------+
|   [facility]  |                      | [place], [place]      |                |
| [place]          |                      |                    |                |
+--------------------+----------------------+--------------------+----------------+
 MAGNESIUM, PLASMA (2010  8:30 AM PST)
 
+-------------+-------+-----------------+------------+--------------+
| Component   | Value | Ref Range       | Performed  | Pathologist  |
|             |       |                 | At         | Signature    |
+-------------+-------+-----------------+------------+--------------+
| MAGNESIUM,P | 1.7   | 1.7 - 2.5 mg/dL | [facility]  |              |
| LASMA       |       |                 | LAB -      |              |
|             |       |                 | [place]  |              |
+-------------+-------+-----------------+------------+--------------+
 
 
 
+---------------+
| Specimen      |
+---------------+
| Blood - Blood |
+---------------+
 
 
 
+--------------------+----------------------+--------------------+----------------+
| Performing         | Address              | City/State/Zipcode | Phone Number   |
| Organization       |                      |                    |                |
+--------------------+----------------------+--------------------+----------------+
 
|   [facility]  |   [address] | [place], [place]      |   [phone] |
| [place]          |                      |                    |                |
+--------------------+----------------------+--------------------+----------------+
|   [facility]  |                      | [place], [place]      |                |
| [place]          |                      |                    |                |
+--------------------+----------------------+--------------------+----------------+
 CBC, WITH DIFFERENTIAL (2010  8:30 AM PST)
 
+-------------+-------+-----------------+------------+--------------+
| Component   | Value | Ref Range       | Performed  | Pathologist  |
|             |       |                 | At         | Signature    |
+-------------+-------+-----------------+------------+--------------+
| WHITE CELL  | 7.8   | 4.4 - 11.8 K/cu | [facility]  |              |
| COUNT       |       |  mm             | LAB -      |              |
|             |       |                 | [place]  |              |
+-------------+-------+-----------------+------------+--------------+
| RED CELL    | 4.88  | 3.8 - 5.3 M/cu  | [facility]  |              |
| COUNT       |       | mm              | LAB -      |              |
|             |       |                 | [place]  |              |
+-------------+-------+-----------------+------------+--------------+
| HEMOGLOBIN  | 14.2  | 11.1 - 15.7     | [facility]  |              |
|             |       | g/dL            | LAB -      |              |
|             |       |                 | [place]  |              |
+-------------+-------+-----------------+------------+--------------+
| HEMATOCRIT  | 42.4  | 32 - 47 %       | [facility]  |              |
|             |       |                 | LAB -      |              |
|             |       |                 | [place]  |              |
+-------------+-------+-----------------+------------+--------------+
| MCV         | 86.9  | 73 - 91 fL      | [facility]  |              |
|             |       |                 | LAB -      |              |
|             |       |                 | [place]  |              |
+-------------+-------+-----------------+------------+--------------+
| MCH         | 29    | 25 - 31 pg      | [facility]  |              |
|             |       |                 | LAB -      |              |
|             |       |                 | [place]  |              |
+-------------+-------+-----------------+------------+--------------+
| MCHC        | 33    | 30 - 36 g/dL    | [facility]  |              |
|             |       |                 | LAB -      |              |
|             |       |                 | [place]  |              |
+-------------+-------+-----------------+------------+--------------+
| PLATELET    | 266   | 140 - 440 K/cu  | [facility]  |              |
| COUNT       |       | mm              | LAB -      |              |
|             |       |                 | [place]  |              |
+-------------+-------+-----------------+------------+--------------+
| NEUTROPHIL  | 60.7  | 35 - 65 %       | [facility]  |              |
| %           |       |                 | LAB -      |              |
|             |       |                 | [place]  |              |
+-------------+-------+-----------------+------------+--------------+
| LYMPHOCYTE  | 31.4  | 26 - 48 %       | [facility]  |              |
| %           |       |                 | LAB -      |              |
|             |       |                 | [place]  |              |
+-------------+-------+-----------------+------------+--------------+
| MONOCYTE %  | 3.7   | 0 - 12 %        | [facility]  |              |
|             |       |                 | LAB -      |              |
|             |       |                 | [place]  |              |
+-------------+-------+-----------------+------------+--------------+
| EOS %       | 2.9   | 0 - 6 %         | [facility]  |              |
|             |       |                 | LAB -      |              |
|             |       |                 | [place]  |              |
+-------------+-------+-----------------+------------+--------------+
 
| BASO %      | 1.3   | 0 - 2 %         | [facility]  |              |
|             |       |                 | LAB -      |              |
|             |       |                 | [place]  |              |
+-------------+-------+-----------------+------------+--------------+
| RDW         | 12.6  | 10.5 - 15.0 %   | [facility]  |              |
|             |       |                 | LAB -      |              |
|             |       |                 | [place]  |              |
+-------------+-------+-----------------+------------+--------------+
| MPV         |       | fL              | [facility]  |              |
|             |       |                 | LAB -      |              |
|             |       |                 | [place]  |              |
+-------------+-------+-----------------+------------+--------------+
| NEUTROPHIL  |       | K/cu mm         | [facility]  |              |
| #           |       |                 | LAB -      |              |
|             |       |                 | [place]  |              |
+-------------+-------+-----------------+------------+--------------+
| LYMPHOCYTE  |       | K/cu mm         | [facility]  |              |
| #           |       |                 | LAB -      |              |
|             |       |                 | [place]  |              |
+-------------+-------+-----------------+------------+--------------+
| MONOCYTE #  |       | K/cu mm         | [facility]  |              |
|             |       |                 | LAB -      |              |
|             |       |                 | [place]  |              |
+-------------+-------+-----------------+------------+--------------+
| EOS #       |       | K/cu mm         | [facility]  |              |
|             |       |                 | LAB -      |              |
|             |       |                 | [place]  |              |
+-------------+-------+-----------------+------------+--------------+
| BASO #      |       |                 | [facility]  |              |
|             |       |                 | LAB -      |              |
|             |       |                 | [place]  |              |
+-------------+-------+-----------------+------------+--------------+
 
 
 
+---------------+
| Specimen      |
+---------------+
| Blood - Blood |
+---------------+
 
 
 
+--------------------+----------------------+--------------------+----------------+
| Performing         | Address              | City/State/Zipcode | Phone Number   |
| Organization       |                      |                    |                |
+--------------------+----------------------+--------------------+----------------+
|   [facility]  |   [address] | [place], [place]      |   [phone] |
| [place]          |                      |                    |                |
+--------------------+----------------------+--------------------+----------------+
|   [facility]  |                      | [place], [place]      |                |
| [place]          |                      |                    |                |
+--------------------+----------------------+--------------------+----------------+
 CHOLESTEROL TOTAL, PLASMA (2010  8:30 AM PST)
 
+-------------+-------+---------------+------------+--------------+
| Component   | Value | Ref Range     | Performed  | Pathologist  |
|             |       |               | At         | Signature    |
+-------------+-------+---------------+------------+--------------+
| CHOLESTEROL | 101   | < - 200 mg/dL | [facility]  |              |
 
|   (LAB)     |       |               | LAB -      |              |
|             |       |               | [place]  |              |
+-------------+-------+---------------+------------+--------------+
 
 
 
+---------------+
| Specimen      |
+---------------+
| Blood - Blood |
+---------------+
 
 
 
+--------------------+----------------------+--------------------+----------------+
| Performing         | Address              | City/State/Zipcode | Phone Number   |
| Organization       |                      |                    |                |
+--------------------+----------------------+--------------------+----------------+
|   [facility]  |   [address] | [place], [place]      |   [phone] |
| [place]          |                      |                    |                |
+--------------------+----------------------+--------------------+----------------+
|   [facility]  |                      | [place], [place]      |                |
| [place]          |                      |                    |                |
+--------------------+----------------------+--------------------+----------------+
 TRIGLYCERIDES, PLASMA (2010  8:30 AM PST)
 
+-------------+-------+----------------+------------+--------------+
| Component   | Value | Ref Range      | Performed  | Pathologist  |
|             |       |                | At         | Signature    |
+-------------+-------+----------------+------------+--------------+
| TRIGLYCERID | 83    | 30 - 150 mg/dL | [facility]  |              |
| ES          |       |                | LAB -      |              |
|             |       |                | [place]  |              |
+-------------+-------+----------------+------------+--------------+
 
 
 
+---------------+
| Specimen      |
+---------------+
| Blood - Blood |
+---------------+
 
 
 
+--------------------+----------------------+--------------------+----------------+
| Performing         | Address              | City/State/Zipcode | Phone Number   |
| Organization       |                      |                    |                |
+--------------------+----------------------+--------------------+----------------+
|   [facility]  |   [address] | [place], [place]      |   [phone] |
| [place]          |                      |                    |                |
+--------------------+----------------------+--------------------+----------------+
|   [facility]  |                      | [place], [place]      |                |
| [place]          |                      |                    |                |
+--------------------+----------------------+--------------------+----------------+
 documented in this encounter
 
 Visit Diagnoses
 Not on filedocumented in this encounter

## 2019-11-01 NOTE — XMS
Encounter Summary
  Created on: 2019
 
 Bonifacio Nila JB
 External Reference #: [id number]
 : [birth year]
 Sex: Female
 
 Demographics
 
 
+-----------------------+------------------------+
| Address               | [address]            |
|                       | [place]  [address]   |
+-----------------------+------------------------+
| Home Phone            | [phone]        |
+-----------------------+------------------------+
| Preferred Language    | Unknown                |
+-----------------------+------------------------+
| Marital Status        | Single                 |
+-----------------------+------------------------+
| Christianity Affiliation | Unknown                |
+-----------------------+------------------------+
| Race                  | White                  |
+-----------------------+------------------------+
| Ethnic Group          | Not  or  |
+-----------------------+------------------------+
 
 
 Author
 
 
+--------------+------------------------------+
| Author       | Counts include 234 beds at the [facility] |
+--------------+------------------------------+
| Organization | [facility] |
+--------------+------------------------------+
| Address      | Unknown                      |
+--------------+------------------------------+
| Phone        | Unavailable                  |
+--------------+------------------------------+
 
 
 
 Support
 
 
+-----------------+--------------+---------+-----------------+
| Name            | Relationship | Address | Phone           |
+-----------------+--------------+---------+-----------------+
| Kit Valdovinos   | ECON         | Unknown | [phone] |
+-----------------+--------------+---------+-----------------+
| Magdalena Valdovinos | ECON         | Unknown | [phone] |
+-----------------+--------------+---------+-----------------+
 
 
 
 Care Team Providers
 
 
 
+------------------------+------+-----------------+
| Care Team Member Name  | Role | Phone           |
+------------------------+------+-----------------+
| Lily Horton MD | PCP  | [phone] |
+------------------------+------+-----------------+
 
 
 
 Encounter Details
 
 
+--------+-------------+----------------------+----------------------+-------------+
| Date   | Type        | Department           | Care Team            | Description |
+--------+-------------+----------------------+----------------------+-------------+
| / | Documentati |   Pediatric          |   Neema Khalil,   |             |
|    | on          | Gastroenterology at  | MD [address] |             |
|        |             | [facility]          |   [place], [place]       |             |
|        |             | [facility]  | [address]           |             |
|        |             |  [address] | [phone]         |             |
|        |             |  [address]  Mailcode:  | [phone] (Fax)   |             |
|        |             | [address]   |                      |             |
|        |             | [place], [place]         |                      |             |
|        |             | [address]           |                      |             |
|        |             | [phone]         |                      |             |
+--------+-------------+----------------------+----------------------+-------------+
 
 
 
 Social History
 
 
+-------------------+-------+-----------+--------+------+
| Tobacco Use       | Types | Packs/Day | Years  | Date |
|                   |       |           | Used   |      |
+-------------------+-------+-----------+--------+------+
| Passive Smoke     |       |           |        |      |
| Exposure - Never  |       |           |        |      |
| Smoker            |       |           |        |      |
+-------------------+-------+-----------+--------+------+
 
 
 
+---------------------+---+---+---+
| Smokeless Tobacco:  |   |   |   |
| Never Used          |   |   |   |
+---------------------+---+---+---+
 
 
 
+-------------+-------------+---------+----------+
| Alcohol Use | Drinks/Week | oz/Week | Comments |
+-------------+-------------+---------+----------+
| Not Asked   |             |         |          |
+-------------+-------------+---------+----------+
 
 
 
+------------------+---------------+
 
| Sex Assigned at  | Date Recorded |
| Birth            |               |
+------------------+---------------+
| Not on file      |               |
+------------------+---------------+
 
 
 
+----------------+-------------+-------------+
| Job Start Date | Occupation  | Industry    |
+----------------+-------------+-------------+
| Not on file    | Not on file | Not on file |
+----------------+-------------+-------------+
 
 
 
+----------------+--------------+------------+
| Travel History | Travel Start | Travel End |
+----------------+--------------+------------+
 
 
 
+-------------------------------------+
| No recent travel history available. |
+-------------------------------------+
 documented as of this encounter
 
 Plan of Treatment
 Not on filedocumented as of this encounter
 
 Visit Diagnoses
 Not on filedocumented in this encounter

## 2019-11-01 NOTE — XMS
Encounter Summary
  Created on: 2019
 
 Bonifacio Nila JB
 External Reference #: [id number]
 : [birth year]
 Sex: Female
 
 Demographics
 
 
+-----------------------+------------------------+
| Address               | [address]            |
|                       | [place]  [address]   |
+-----------------------+------------------------+
| Home Phone            | [phone]        |
+-----------------------+------------------------+
| Preferred Language    | Unknown                |
+-----------------------+------------------------+
| Marital Status        | Single                 |
+-----------------------+------------------------+
| Adventism Affiliation | Unknown                |
+-----------------------+------------------------+
| Race                  | White                  |
+-----------------------+------------------------+
| Ethnic Group          | Not  or  |
+-----------------------+------------------------+
 
 
 Author
 
 
+--------------+------------------------------+
| Author       | [facility] |
+--------------+------------------------------+
| Organization | [facility] |
+--------------+------------------------------+
| Address      | Unknown                      |
+--------------+------------------------------+
| Phone        | Unavailable                  |
+--------------+------------------------------+
 
 
 
 Support
 
 
+-----------------+--------------+---------+-----------------+
| Name            | Relationship | Address | Phone           |
+-----------------+--------------+---------+-----------------+
| Kit Valdovinos   | ECON         | Unknown | [phone] |
+-----------------+--------------+---------+-----------------+
| Magdalena Valdovinos | ECON         | Unknown | [phone] |
+-----------------+--------------+---------+-----------------+
 
 
 
 Care Team Providers
 
 
 
+-----------------------+------+-----------------+
| Care Team Member Name | Role | Phone           |
+-----------------------+------+-----------------+
| Lily Horton MD   | PCP  | [phone] |
+-----------------------+------+-----------------+
 
 
 
 Reason for Visit
 
 
+--------+-------------------------+
| Reason | Comments                |
+--------+-------------------------+
| Other  | No lab work in one year |
+--------+-------------------------+
 
 
 
 Encounter Details
 
 
+--------+-----------+----------------------+---------------------+---------------------+
| Date   | Type      | Department           | Care Team           | Description         |
+--------+-----------+----------------------+---------------------+---------------------+
| / | Telephone |   Pediatric          |   Monserrat Spann,  | Other (No lab work  |
|    |           | Gastroenterology at  | RN  [address]     | in one year)        |
|        |           | [facility]          | [address]     |                     |
|        |           | [facility]  | [place] [address]  |                     |
|        |           |  [address] |                     |                     |
|        |           |  [address]  Mailcode:  |                     |                     |
|        |           |   [facility]   |                     |                     |
|        |           | [place], [place]         |                     |                     |
|        |           | [address]           |                     |                     |
|        |           | [phone]         |                     |                     |
+--------+-----------+----------------------+---------------------+---------------------+
 
 
 
 Social History
 
 
+----------------+-------+-----------+--------+------+
| Tobacco Use    | Types | Packs/Day | Years  | Date |
|                |       |           | Used   |      |
+----------------+-------+-----------+--------+------+
| Never Assessed |       |           |        |      |
+----------------+-------+-----------+--------+------+
 
 
 
+------------------+---------------+
| Sex Assigned at  | Date Recorded |
| Birth            |               |
+------------------+---------------+
| Not on file      |               |
+------------------+---------------+
 
 
 
 
+----------------+-------------+-------------+
| Job Start Date | Occupation  | Industry    |
+----------------+-------------+-------------+
| Not on file    | Not on file | Not on file |
+----------------+-------------+-------------+
 
 
 
+----------------+--------------+------------+
| Travel History | Travel Start | Travel End |
+----------------+--------------+------------+
 
 
 
+-------------------------------------+
| No recent travel history available. |
+-------------------------------------+
 documented as of this encounter
 
 Plan of Treatment
 Not on filedocumented as of this encounter
 
 Visit Diagnoses
 Not on filedocumented in this encounter

## 2019-11-01 NOTE — XMS
Encounter Summary
  Created on: 2019
 
 Bonifacio Nila JB
 External Reference #: [id number]
 : [birth year]
 Sex: Female
 
 Demographics
 
 
+-----------------------+------------------------+
| Address               | [address]            |
|                       | [place]  [address]   |
+-----------------------+------------------------+
| Home Phone            | [phone]        |
+-----------------------+------------------------+
| Preferred Language    | Unknown                |
+-----------------------+------------------------+
| Marital Status        | Single                 |
+-----------------------+------------------------+
| Gnosticist Affiliation | Unknown                |
+-----------------------+------------------------+
| Race                  | White                  |
+-----------------------+------------------------+
| Ethnic Group          | Not  or  |
+-----------------------+------------------------+
 
 
 Author
 
 
+--------------+-------------+
| Organization | Unknown     |
+--------------+-------------+
| Address      | Unknown     |
+--------------+-------------+
| Phone        | Unavailable |
+--------------+-------------+
 
 
 
 Support
 
 
+-----------------+--------------+---------+-----------------+
| Name            | Relationship | Address | Phone           |
+-----------------+--------------+---------+-----------------+
| Kit Valdovinos   | ECON         | Unknown | [phone] |
+-----------------+--------------+---------+-----------------+
| Magdalena Valdovinos | ECON         | Unknown | [phone] |
+-----------------+--------------+---------+-----------------+
 
 
 
 Care Team Providers
 
 
 
+-----------------------+------+-----------------+
| Care Team Member Name | Role | Phone           |
+-----------------------+------+-----------------+
| Lily Horton MD   | PCP  | [phone] |
+-----------------------+------+-----------------+
 
 
 
 Encounter Details
 
 
+--------+-------------+------------+--------------------+-------------+
| Date   | Type        | Department | Care Team          | Description |
+--------+-------------+------------+--------------------+-------------+
| 10/28/ | Transcribed |            |   Dictation, Other | Transcribed |
|    |             |            |                    |             |
+--------+-------------+------------+--------------------+-------------+
 
 
 
 Social History
 
 
+----------------+-------+-----------+--------+------+
| Tobacco Use    | Types | Packs/Day | Years  | Date |
|                |       |           | Used   |      |
+----------------+-------+-----------+--------+------+
| Never Assessed |       |           |        |      |
+----------------+-------+-----------+--------+------+
 
 
 
+------------------+---------------+
| Sex Assigned at  | Date Recorded |
| Birth            |               |
+------------------+---------------+
| Not on file      |               |
+------------------+---------------+
 
 
 
+----------------+-------------+-------------+
| Job Start Date | Occupation  | Industry    |
+----------------+-------------+-------------+
| Not on file    | Not on file | Not on file |
+----------------+-------------+-------------+
 
 
 
+----------------+--------------+------------+
| Travel History | Travel Start | Travel End |
+----------------+--------------+------------+
 
 
 
+-------------------------------------+
| No recent travel history available. |
+-------------------------------------+
 documented as of this encounter
 
 
 Progress Notes
 Interface, Transcription In - 12/10/2006  3:10 AM [address] [address] [phone] or [phone]
 
 2000
 
 Lily Horton M.D.
 [address] [place] [address] 14
 [place]   [address]
 
 RE:   NILA VALDOVINOS
 MR #: [id number]
 
 Dear Sol:
 
 Nila was evaluated at [facility] in the Pediatric Liver Transplantation Clinic
 for the first time today by members of the [place] [facility]
 [facility] which included Jorge Aguirre M.D. and Becca Marsh R.N.,
 Pediatric Liver Transplant Coordinator.  I presented Nila to the team in
 place of Dr. Bailey.  Briefly as you know, Nila is an 7-year-old female
 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
 heart disease.  This included left atrial isomerism, interrupted IVC, left
 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
 There were some other mild abnormalities found on angiography on 1999, (this information has been forwarded from the Cardiologist at
 [facility] to the cardiologists at [place]).  She was eventually
 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  It required revision two days later, and she seemed to respond
 well to this with a fall in her bilirubin.  By , her bilirubin was 1.8
 and she was doing reasonably well.  She is doing reasonably well with
 colored stools.  Unfortunately in August, she developed jaundice with no
 fever.  Her white count was 20,000 and her delta bilirubin was elevated
 She was admitted to [facility] for a treatment of possible cholangitis.
 Unfortunately, her bilirubin did not come down and it has become clear that
 her Kasai procedure has failed.  Additional problems that have developed
 over the last few months include poor weight gain and growth requiring
 nocturnal supplementation, variceal bleeding, and apparent cirrhosis with
 resultant portal hypertension.  Approximately two weeks ago, she developed
 hematemesis and melena and required two transfusions with packed red blood
 cells.  Endoscopy showed incipient esophageal varices.
 
 She is on multiple medications including Actigall, Zantac, vitamin A, D, E,
 K, and iron.
 
 Her parents live in [place], [place] approximately four hours from
 [place].  They and the rest of the family are quite committed Nila's
 medical care.
 On examination today, she weighs 5.76 kg and measures 65 cm.  She is mildly
 icteric with an NG tube in place.  Her overall appearance is petite.  Her
 respirations are unlabored.  Her abdomen is soft and full with a large,
 hard nodular liver that extends 4-5 cm below the right costal margin.
 Spleen is also enlarged in the left upper quadrant descending approximately
 6 cm below the left costal margin.  Her umbilicus protrudes slightly and
 there certainly is a suggestion of ascites.  Her extremities are rather
 thin.  She is an alert and responsive child.
 
 
 Recent laboratory tests show a white blood count of 8.8, hematocrit of 27%,
 platelet count of 168k, sodium of 126, chloride 97, potassium 4.6, CO2 15,
 glucose 72, calcium 8.9, BUN 11, creatinine 0.2, , , alkaline
 phosphatase 1.017, gamma-GT 1250, total bilirubin 6.1 (mostly direct), and
 an albumin of 3.4.  Recent vitamin levels show adequate A and K but
 deficiency in D and E.  Her doses of D and E were recently to treat these
 deficiencies.
 
 In summary, Nila is an 7-year-old white female with rather progressed
 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
 biliary atresia and a failed Kasai procedure.  She is at an increased risk
 of even more progressive liver disease over the next 3 to 12 months
 resulting in death without liver transplantation.  She clearly needs to be
 listed and the transplant team intends to do this soon.  During the later
 half of the visit today at [facility], the family spent conversing with
 transplant coordinator regarding many of the practical aspects of getting
 one listed for liver transplantation.  In addition to category transplants,
 the living-related donor program was discussed.  Assuming a suitable donor
 can be identified, Nila is a good candidate for this program given the
 distance that the family lives from the Transplant Center and the degree of
 liver disease present.
 
 Dr. Bailey will continue to follow Nila closely and act as the liaison
 between the family and the Transplant Center.  Please let me know if you
 have any questions.
 
 Eagle De Luna M.D.
 
 [facility] / 
 [id number] / [id number] / [id number] /
 D: 2000
 T: 2000
 
 cc:
 
 Kevon Bailey M.D.
 [address] [place] [address]   [id number]
 
 Jorge Aguirre M.D.
 [address] [place] [address]   [id number]
 
 Allen Covington M.D.
 [address] [place] [address]   [id number]
 
 [id number] signed by Interface, Transcription In at 12/10/2006  3:10 AM PSTdocume
nted in this encounter
 
 Plan of Treatment
 Not on filedocumented as of this encounter
 
 Visit Diagnoses
 Not on filedocumented in this encounter

## 2019-11-01 NOTE — XMS
Encounter Summary
  Created on: 2019
 
 Bonifacio Nila JB
 External Reference #: [id number]
 : [birth year]
 Sex: Female
 
 Demographics
 
 
+-----------------------+------------------------+
| Address               | [address]            |
|                       | [place]  [address]   |
+-----------------------+------------------------+
| Home Phone            | [phone]        |
+-----------------------+------------------------+
| Preferred Language    | Unknown                |
+-----------------------+------------------------+
| Marital Status        | Single                 |
+-----------------------+------------------------+
| Alevism Affiliation | Unknown                |
+-----------------------+------------------------+
| Race                  | White                  |
+-----------------------+------------------------+
| Ethnic Group          | Not  or  |
+-----------------------+------------------------+
 
 
 Author
 
 
+--------------+------------------------------+
| Author       | [facility] |
+--------------+------------------------------+
| Organization | [facility] |
+--------------+------------------------------+
| Address      | Unknown                      |
+--------------+------------------------------+
| Phone        | Unavailable                  |
+--------------+------------------------------+
 
 
 
 Support
 
 
+-----------------+--------------+---------+-----------------+
| Name            | Relationship | Address | Phone           |
+-----------------+--------------+---------+-----------------+
| Kit Valdovinos   | ECON         | Unknown | [phone] |
+-----------------+--------------+---------+-----------------+
| Magdalena Valdovinos | ECON         | Unknown | [phone] |
+-----------------+--------------+---------+-----------------+
 
 
 
 Care Team Providers
 
 
 
+------------------------+------+-----------------+
| Care Team Member Name  | Role | Phone           |
+------------------------+------+-----------------+
| Lily Horton MD | PCP  | [phone] |
+------------------------+------+-----------------+
 
 
 
 Reason for Visit
 
 
+-------------------+----------+
| Reason            | Comments |
+-------------------+----------+
| Care Coordination | [place] |
+-------------------+----------+
 
 
 
 Encounter Details
 
 
+--------+-------------+----------------------+----------------------+--------------------+
| Date   | Type        | Department           | Care Team            | Description        |
+--------+-------------+----------------------+----------------------+--------------------+
| / | Documentati |   Pediatric          |   Neema Khalil,   | Care Coordination  |
| 2017   | on          | Gastroenterology at  | MD  [address] | ([place])         |
|        |             | [place]          |   [place], [place]       |                    |
|        |             | [facility]  | [address]           |                    |
|        |             |  [address] | [phone]         |                    |
|        |             |  [address]  Mailcode:  | [phone] (Fax)   |                    |
|        |             | [facility]   |                      |                    |
|        |             | [place], [place]         |                      |                    |
|        |             | [address]           |                      |                    |
|        |             | [phone]         |                      |                    |
+--------+-------------+----------------------+----------------------+--------------------+
 
 
 
 Social History
 
 
+-------------------+-------+-----------+--------+------+
| Tobacco Use       | Types | Packs/Day | Years  | Date |
|                   |       |           | Used   |      |
+-------------------+-------+-----------+--------+------+
| Passive Smoke     |       |           |        |      |
| Exposure - Never  |       |           |        |      |
| Smoker            |       |           |        |      |
+-------------------+-------+-----------+--------+------+
 
 
 
+---------------------+---+---+---+
| Smokeless Tobacco:  |   |   |   |
| Never Used          |   |   |   |
+---------------------+---+---+---+
 
 
 
 
+-------------+-------------+---------+----------+
| Alcohol Use | Drinks/Week | oz/Week | Comments |
+-------------+-------------+---------+----------+
| Not Asked   |             |         |          |
+-------------+-------------+---------+----------+
 
 
 
+------------------+---------------+
| Sex Assigned at  | Date Recorded |
| Birth            |               |
+------------------+---------------+
| Not on file      |               |
+------------------+---------------+
 
 
 
+----------------+-------------+-------------+
| Job Start Date | Occupation  | Industry    |
+----------------+-------------+-------------+
| Not on file    | Not on file | Not on file |
+----------------+-------------+-------------+
 
 
 
+----------------+--------------+------------+
| Travel History | Travel Start | Travel End |
+----------------+--------------+------------+
 
 
 
+-------------------------------------+
| No recent travel history available. |
+-------------------------------------+
 documented as of this encounter
 
 Plan of Treatment
 Not on filedocumented as of this encounter
 
 Visit Diagnoses
 Not on filedocumented in this encounter

## 2019-11-01 NOTE — XMS
Encounter Summary
  Created on: 2019
 
 Bonifacio Nila JB
 External Reference #: [id number]
 : [birth year]
 Sex: Female
 
 Demographics
 
 
+-----------------------+------------------------+
| Address               | [address]            |
|                       | [place]  [address]   |
+-----------------------+------------------------+
| Home Phone            | [phone]        |
+-----------------------+------------------------+
| Preferred Language    | Unknown                |
+-----------------------+------------------------+
| Marital Status        | Single                 |
+-----------------------+------------------------+
| Alevism Affiliation | Unknown                |
+-----------------------+------------------------+
| Race                  | White                  |
+-----------------------+------------------------+
| Ethnic Group          | Not  or  |
+-----------------------+------------------------+
 
 
 Author
 
 
+--------------+------------------------------+
| Author       | [facility] |
+--------------+------------------------------+
| Organization | [facility] |
+--------------+------------------------------+
| Address      | Unknown                      |
+--------------+------------------------------+
| Phone        | Unavailable                  |
+--------------+------------------------------+
 
 
 
 Support
 
 
+-----------------+--------------+---------+-----------------+
| Name            | Relationship | Address | Phone           |
+-----------------+--------------+---------+-----------------+
| Kit Valdovinos   | ECON         | Unknown | [phone] |
+-----------------+--------------+---------+-----------------+
| Magdalena Valdovinos | ECON         | Unknown | [phone] |
+-----------------+--------------+---------+-----------------+
 
 
 
 Care Team Providers
 
 
 
+-----------------------+------+-----------------+
| Care Team Member Name | Role | Phone           |
+-----------------------+------+-----------------+
| Lily Horton MD   | PCP  | [phone] |
+-----------------------+------+-----------------+
 
 
 
 Encounter Details
 
 
+--------+----------+----------------------+--------------------+-------------+
| Date   | Type     | Department           | Care Team          | Description |
+--------+----------+----------------------+--------------------+-------------+
| / | Abstract |   Pediatric          |   Ivis Burkett MD |             |
|    |          | Gastroenterology at  |                    |             |
|        |          | [place]          |                    |             |
|        |          | [facility]  |                    |             |
|        |          |  [address] |                    |             |
|        |          |  [address]  Mailcode:  |                    |             |
|        |          | [address]   |                    |             |
|        |          | [place], [place]         |                    |             |
|        |          | [address]           |                    |             |
|        |          | [phone]         |                    |             |
+--------+----------+----------------------+--------------------+-------------+
 
 
 
 Social History
 
 
+----------------+-------+-----------+--------+------+
| Tobacco Use    | Types | Packs/Day | Years  | Date |
|                |       |           | Used   |      |
+----------------+-------+-----------+--------+------+
| Never Assessed |       |           |        |      |
+----------------+-------+-----------+--------+------+
 
 
 
+------------------+---------------+
| Sex Assigned at  | Date Recorded |
| Birth            |               |
+------------------+---------------+
| Not on file      |               |
+------------------+---------------+
 
 
 
+----------------+-------------+-------------+
| Job Start Date | Occupation  | Industry    |
+----------------+-------------+-------------+
| Not on file    | Not on file | Not on file |
+----------------+-------------+-------------+
 
 
 
+----------------+--------------+------------+
 
| Travel History | Travel Start | Travel End |
+----------------+--------------+------------+
 
 
 
+-------------------------------------+
| No recent travel history available. |
+-------------------------------------+
 documented as of this encounter
 
 Plan of Treatment
 Not on filedocumented as of this encounter
 
 Procedures
 
 
+----------------------+--------+-------------+----------------------+----------------------
+
| Procedure Name       | Priori | Date/Time   | Associated Diagnosis | Comments             
|
|                      | ty     |             |                      |                      
|
+----------------------+--------+-------------+----------------------+----------------------
+
| CBC, WITH            | Routin | 2010  |                      |   Results for this   
|
| DIFFERENTIAL         | e      |  8:30 AM    |                      | procedure are in the 
|
|                      |        | PST         |                      |  results section.    
|
+----------------------+--------+-------------+----------------------+----------------------
+
| COMPLETE METABOLIC   | Routin | 2010  |                      |   Results for this   
|
| SET                  | e      |  8:30 AM    |                      | procedure are in the 
|
| (NA,K,CL,CO2,BUN,CRE |        | PST         |                      |  results section.    
|
| AT,GLUC,CA,AST,ALT,B |        |             |                      |                      
|
| ASHWINI TOTAL,ALK        |        |             |                      |                      
|
| PHOS,ALB,PROT TOTAL) |        |             |                      |                      
|
+----------------------+--------+-------------+----------------------+----------------------
+
| PHOSPHORUS, PLASMA   | Routin | 2010  |                      |   Results for this   
|
|                      | e      |  8:30 AM    |                      | procedure are in the 
|
|                      |        | PST         |                      |  results section.    
|
+----------------------+--------+-------------+----------------------+----------------------
+
| GGT, PLASMA          | Routin | 2010  |                      |   Results for this   
|
|                      | e      |  8:30 AM    |                      | procedure are in the 
|
|                      |        | PST         |                      |  results section.    
|
 
+----------------------+--------+-------------+----------------------+----------------------
+
| BILIRUBIN DIRECT     | Routin | 2010  |                      |   Results for this   
|
|                      | e      |  8:30 AM    |                      | procedure are in the 
|
|                      |        | PST         |                      |  results section.    
|
+----------------------+--------+-------------+----------------------+----------------------
+
| MAGNESIUM, PLASMA    | Routin | 2010  |                      |   Results for this   
|
|                      | e      |  8:30 AM    |                      | procedure are in the 
|
|                      |        | PST         |                      |  results section.    
|
+----------------------+--------+-------------+----------------------+----------------------
+
| TRIGLYCERIDES,       | Routin | 2010  |                      |   Results for this   
|
| PLASMA               | e      |  8:30 AM    |                      | procedure are in the 
|
|                      |        | PST         |                      |  results section.    
|
+----------------------+--------+-------------+----------------------+----------------------
+
| CHOLESTEROL TOTAL,   | Routin | 2010  |                      |   Results for this   
|
| PLASMA               | e      |  8:30 AM    |                      | procedure are in the 
|
|                      |        | PST         |                      |  results section.    
|
+----------------------+--------+-------------+----------------------+----------------------
+
 documented in this encounter
 
 Results
 PHOSPHORUS, PLASMA (2010  8:30 AM PST)
 
+-------------+-------+-----------------+------------+--------------+
| Component   | Value | Ref Range       | Performed  | Pathologist  |
|             |       |                 | At         | Signature    |
+-------------+-------+-----------------+------------+--------------+
| PHOSPHORUS, | 3.6   | 2.5 - 5.0 mg/dL | [facility]  |              |
|  PLASMA     |       |                 | LAB -      |              |
| (LAB)       |       |                 | [place]  |              |
+-------------+-------+-----------------+------------+--------------+
 
 
 
+---------------+
| Specimen      |
+---------------+
| Blood - Blood |
+---------------+
 
 
 
+--------------------+----------------------+--------------------+----------------+
| Performing         | Address              | City/State/Zipcode | Phone Number   |
 
| Organization       |                      |                    |                |
+--------------------+----------------------+--------------------+----------------+
|   [facility]  |   [address] | [place], [place]      |   [phone] |
| [place]          |                      |                    |                |
+--------------------+----------------------+--------------------+----------------+
|   [facility]  |                      | [place], [place]      |                |
| [place]          |                      |                    |                |
+--------------------+----------------------+--------------------+----------------+
 COMPLETE METABOLIC SET (NA,K,CL,CO2,BUN,CREAT,GLUC,CA,AST,ALT,BILI TOTAL,ALK PHOS,ALB,PROT 
TOTAL) (2010  8:30 AM PST)
 
+-------------+---------+-----------------+------------+--------------+
| Component   | Value   | Ref Range       | Performed  | Pathologist  |
|             |         |                 | At         | Signature    |
+-------------+---------+-----------------+------------+--------------+
| GLUCOSE,    | 93      | 60 - 100 mg/dL  | [facility]  |              |
| PLASMA      |         |                 | LAB -      |              |
| (LAB)       |         |                 | [place]  |              |
+-------------+---------+-----------------+------------+--------------+
| BUN, PLASMA | 11      | 6 - 23 mg/dL    | [facility]  |              |
|  (LAB)      |         |                 | LAB -      |              |
|             |         |                 | [place]  |              |
+-------------+---------+-----------------+------------+--------------+
| CREATININE  | 0.52    | 0.5 - 1.5 mg/dL | [facility]  |              |
| PLASMA      |         |                 | LAB -      |              |
| (LAB)       |         |                 | [place]  |              |
+-------------+---------+-----------------+------------+--------------+
| TOTAL       | 6.7     | 6.1 - 8.5 g/dL  | [facility]  |              |
| PROTEIN,    |         |                 | LAB -      |              |
| PLASMA      |         |                 | [place]  |              |
| (LAB)       |         |                 |            |              |
+-------------+---------+-----------------+------------+--------------+
| ALBUMIN,    | 3.9 (A) | 9.5 - 4.8 g/dL  | [facility]  |              |
| PLASMA      |         |                 | LAB -      |              |
| (LAB)       |         |                 | [place]  |              |
+-------------+---------+-----------------+------------+--------------+
| CALCIUM,    | 9.3     | 8.4 - 10.2      | [facility]  |              |
| PLASMA      |         | mg/dL           | LAB -      |              |
| (LAB)       |         |                 | [place]  |              |
+-------------+---------+-----------------+------------+--------------+
| BILIRUBIN   | 0.3     | 0.0 - 1.2       | [facility]  |              |
| TOTAL       |         | Transcutaneous  | LAB -      |              |
|             |         | Bilirubinometer | [place]  |              |
+-------------+---------+-----------------+------------+--------------+
| ALK PHOS    | 130 (A) | 135 - 384 U/L   | [facility]  |              |
|             |         |                 | LAB -      |              |
|             |         |                 | [place]  |              |
+-------------+---------+-----------------+------------+--------------+
| AST(SGOT)   | 17      | 0 - 40 U/L      | [facility]  |              |
|             |         |                 | LAB -      |              |
|             |         |                 | [place]  |              |
+-------------+---------+-----------------+------------+--------------+
| SODIUM,     | 137     | 132 - 143       | [facility]  |              |
| PLASMA      |         | mmol/L          | LAB -      |              |
| (LAB)       |         |                 | [place]  |              |
+-------------+---------+-----------------+------------+--------------+
| POTASSIUM,  | 3.9     | 3.6 - 5.1       | [facility]  |              |
| PLASMA      |         | mmol/L          | LAB -      |              |
| (LAB)       |         |                 | [place]  |              |
+-------------+---------+-----------------+------------+--------------+
 
| CHLORIDE,   | 105     | 95 - 112 mmol/L | [facility]  |              |
| PLASMA      |         |                 | LAB -      |              |
| (LAB)       |         |                 | [place]  |              |
+-------------+---------+-----------------+------------+--------------+
| TOTAL CO2,  | 25      | 19 - 31 mmol/L  | [facility]  |              |
| PLASMA      |         |                 | LAB -      |              |
| (LAB)       |         |                 | [place]  |              |
+-------------+---------+-----------------+------------+--------------+
| ALT (SGPT)  | 17      | 0 - 46 U/L      | [facility]  |              |
|             |         |                 | LAB -      |              |
|             |         |                 | [place]  |              |
+-------------+---------+-----------------+------------+--------------+
 
 
 
+---------------+
| Specimen      |
+---------------+
| Blood - Blood |
+---------------+
 
 
 
+--------------------+----------------------+--------------------+----------------+
| Performing         | Address              | City/State/Zipcode | Phone Number   |
| Organization       |                      |                    |                |
+--------------------+----------------------+--------------------+----------------+
|   [facility]  |   [address] | [place], [place]      |   [phone] |
| [place]          |                      |                    |                |
+--------------------+----------------------+--------------------+----------------+
|   [facility]  |                      | [place], [place]      |                |
| [place]          |                      |                    |                |
+--------------------+----------------------+--------------------+----------------+
 BILIRUBIN DIRECT (2010  8:30 AM PST)
 
+------------+-------+---------------+------------+--------------+
| Component  | Value | Ref Range     | Performed  | Pathologist  |
|            |       |               | At         | Signature    |
+------------+-------+---------------+------------+--------------+
| BILIRUBIN  | 0.1   | 0 - 0.1 mg/dL | [facility]  |              |
| DIRECT     |       |               | LAB -      |              |
|            |       |               | [place]  |              |
+------------+-------+---------------+------------+--------------+
 
 
 
+---------------+
| Specimen      |
+---------------+
| Blood - Blood |
+---------------+
 
 
 
+--------------------+----------------------+--------------------+----------------+
| Performing         | Address              | City/State/Zipcode | Phone Number   |
| Organization       |                      |                    |                |
+--------------------+----------------------+--------------------+----------------+
|   [facility]  |   [address] | [place], [place]      |   [phone] |
| [place]          |                      |                    |                |
 
+--------------------+----------------------+--------------------+----------------+
|   [facility]  |                      | [place], [place]      |                |
| [place]          |                      |                    |                |
+--------------------+----------------------+--------------------+----------------+
 GGT, PLASMA (2010  8:30 AM PST)
 
+-----------+-------+------------+------------+--------------+
| Component | Value | Ref Range  | Performed  | Pathologist  |
|           |       |            | At         | Signature    |
+-----------+-------+------------+------------+--------------+
| GAMMA     | 7     | 5 - 60 U/L | [facility]  |              |
| GLUTAMYL  |       |            | LAB -      |              |
| TRANS     |       |            | [place]  |              |
+-----------+-------+------------+------------+--------------+
 
 
 
+---------------+
| Specimen      |
+---------------+
| Blood - Blood |
+---------------+
 
 
 
+--------------------+----------------------+--------------------+----------------+
| Performing         | Address              | City/State/Zipcode | Phone Number   |
| Organization       |                      |                    |                |
+--------------------+----------------------+--------------------+----------------+
|   [facility]  |   [address] | [place], [place]      |   [phone] |
| [place]          |                      |                    |                |
+--------------------+----------------------+--------------------+----------------+
|   [facility]  |                      | [place], [place]      |                |
| [place]          |                      |                    |                |
+--------------------+----------------------+--------------------+----------------+
 MAGNESIUM, PLASMA (2010  8:30 AM PST)
 
+-------------+-------+-----------------+------------+--------------+
| Component   | Value | Ref Range       | Performed  | Pathologist  |
|             |       |                 | At         | Signature    |
+-------------+-------+-----------------+------------+--------------+
| MAGNESIUM,P | 1.7   | 1.7 - 2.5 mg/dL | [facility]  |              |
| LASMA       |       |                 | LAB -      |              |
|             |       |                 | [place]  |              |
+-------------+-------+-----------------+------------+--------------+
 
 
 
+---------------+
| Specimen      |
+---------------+
| Blood - Blood |
+---------------+
 
 
 
+--------------------+----------------------+--------------------+----------------+
| Performing         | Address              | City/State/Zipcode | Phone Number   |
| Organization       |                      |                    |                |
+--------------------+----------------------+--------------------+----------------+
 
|   [facility]  |   [address] | [place], [place]      |   [phone] |
| [place]          |                      |                    |                |
+--------------------+----------------------+--------------------+----------------+
|   [facility]  |                      | [place], [place]      |                |
| [place]          |                      |                    |                |
+--------------------+----------------------+--------------------+----------------+
 CBC, WITH DIFFERENTIAL (2010  8:30 AM PST)
 
+-------------+-------+-----------------+------------+--------------+
| Component   | Value | Ref Range       | Performed  | Pathologist  |
|             |       |                 | At         | Signature    |
+-------------+-------+-----------------+------------+--------------+
| WHITE CELL  | 7.8   | 4.4 - 11.8 K/cu | [facility]  |              |
| COUNT       |       |  mm             | LAB -      |              |
|             |       |                 | [place]  |              |
+-------------+-------+-----------------+------------+--------------+
| RED CELL    | 4.88  | 3.8 - 5.3 M/cu  | [facility]  |              |
| COUNT       |       | mm              | LAB -      |              |
|             |       |                 | [place]  |              |
+-------------+-------+-----------------+------------+--------------+
| HEMOGLOBIN  | 14.2  | 11.1 - 15.7     | [facility]  |              |
|             |       | g/dL            | LAB -      |              |
|             |       |                 | [place]  |              |
+-------------+-------+-----------------+------------+--------------+
| HEMATOCRIT  | 42.4  | 32 - 47 %       | [facility]  |              |
|             |       |                 | LAB -      |              |
|             |       |                 | [place]  |              |
+-------------+-------+-----------------+------------+--------------+
| MCV         | 86.9  | 73 - 91 fL      | [facility]  |              |
|             |       |                 | LAB -      |              |
|             |       |                 | [place]  |              |
+-------------+-------+-----------------+------------+--------------+
| MCH         | 29    | 25 - 31 pg      | [facility]  |              |
|             |       |                 | LAB -      |              |
|             |       |                 | [place]  |              |
+-------------+-------+-----------------+------------+--------------+
| MCHC        | 33    | 30 - 36 g/dL    | [facility]  |              |
|             |       |                 | LAB -      |              |
|             |       |                 | [place]  |              |
+-------------+-------+-----------------+------------+--------------+
| PLATELET    | 266   | 140 - 440 K/cu  | [facility]  |              |
| COUNT       |       | mm              | LAB -      |              |
|             |       |                 | [place]  |              |
+-------------+-------+-----------------+------------+--------------+
| NEUTROPHIL  | 60.7  | 35 - 65 %       | [facility]  |              |
| %           |       |                 | LAB -      |              |
|             |       |                 | [place]  |              |
+-------------+-------+-----------------+------------+--------------+
| LYMPHOCYTE  | 31.4  | 26 - 48 %       | [facility]  |              |
| %           |       |                 | LAB -      |              |
|             |       |                 | [place]  |              |
+-------------+-------+-----------------+------------+--------------+
| MONOCYTE %  | 3.7   | 0 - 12 %        | [facility]  |              |
|             |       |                 | LAB -      |              |
|             |       |                 | [place]  |              |
+-------------+-------+-----------------+------------+--------------+
| EOS %       | 2.9   | 0 - 6 %         | [facility]  |              |
|             |       |                 | LAB -      |              |
|             |       |                 | [place]  |              |
+-------------+-------+-----------------+------------+--------------+
 
| BASO %      | 1.3   | 0 - 2 %         | [facility]  |              |
|             |       |                 | LAB -      |              |
|             |       |                 | [place]  |              |
+-------------+-------+-----------------+------------+--------------+
| RDW         | 12.6  | 10.5 - 15.0 %   | [facility]  |              |
|             |       |                 | LAB -      |              |
|             |       |                 | [place]  |              |
+-------------+-------+-----------------+------------+--------------+
| MPV         |       | fL              | [facility]  |              |
|             |       |                 | LAB -      |              |
|             |       |                 | [place]  |              |
+-------------+-------+-----------------+------------+--------------+
| NEUTROPHIL  |       | K/cu mm         | [facility]  |              |
| #           |       |                 | LAB -      |              |
|             |       |                 | [place]  |              |
+-------------+-------+-----------------+------------+--------------+
| LYMPHOCYTE  |       | K/cu mm         | [facility]  |              |
| #           |       |                 | LAB -      |              |
|             |       |                 | [place]  |              |
+-------------+-------+-----------------+------------+--------------+
| MONOCYTE #  |       | K/cu mm         | [facility]  |              |
|             |       |                 | LAB -      |              |
|             |       |                 | [place]  |              |
+-------------+-------+-----------------+------------+--------------+
| EOS #       |       | K/cu mm         | [facility]  |              |
|             |       |                 | LAB -      |              |
|             |       |                 | [place]  |              |
+-------------+-------+-----------------+------------+--------------+
| BASO #      |       |                 | [facility]  |              |
|             |       |                 | LAB -      |              |
|             |       |                 | [place]  |              |
+-------------+-------+-----------------+------------+--------------+
 
 
 
+---------------+
| Specimen      |
+---------------+
| Blood - Blood |
+---------------+
 
 
 
+--------------------+----------------------+--------------------+----------------+
| Performing         | Address              | City/State/Zipcode | Phone Number   |
| Organization       |                      |                    |                |
+--------------------+----------------------+--------------------+----------------+
|   [facility]  |   [address] | [place], [place]      |   [phone] |
| [place]          |                      |                    |                |
+--------------------+----------------------+--------------------+----------------+
|   [facility]  |                      | [place], [place]      |                |
| [place]          |                      |                    |                |
+--------------------+----------------------+--------------------+----------------+
 CHOLESTEROL TOTAL, PLASMA (2010  8:30 AM PST)
 
+-------------+-------+---------------+------------+--------------+
| Component   | Value | Ref Range     | Performed  | Pathologist  |
|             |       |               | At         | Signature    |
+-------------+-------+---------------+------------+--------------+
| CHOLESTEROL | 101   | < - 200 mg/dL | [facility]  |              |
 
|   (LAB)     |       |               | LAB -      |              |
|             |       |               | [place]  |              |
+-------------+-------+---------------+------------+--------------+
 
 
 
+---------------+
| Specimen      |
+---------------+
| Blood - Blood |
+---------------+
 
 
 
+--------------------+----------------------+--------------------+----------------+
| Performing         | Address              | City/State/Zipcode | Phone Number   |
| Organization       |                      |                    |                |
+--------------------+----------------------+--------------------+----------------+
|   [facility]  |   [address] | [place], [place]      |   [phone] |
| [place]          |                      |                    |                |
+--------------------+----------------------+--------------------+----------------+
|   [facility]  |                      | [place], [place]      |                |
| [place]          |                      |                    |                |
+--------------------+----------------------+--------------------+----------------+
 TRIGLYCERIDES, PLASMA (2010  8:30 AM PST)
 
+-------------+-------+----------------+------------+--------------+
| Component   | Value | Ref Range      | Performed  | Pathologist  |
|             |       |                | At         | Signature    |
+-------------+-------+----------------+------------+--------------+
| TRIGLYCERID | 83    | 30 - 150 mg/dL | [facility]  |              |
| ES          |       |                | LAB -      |              |
|             |       |                | [place]  |              |
+-------------+-------+----------------+------------+--------------+
 
 
 
+---------------+
| Specimen      |
+---------------+
| Blood - Blood |
+---------------+
 
 
 
+--------------------+----------------------+--------------------+----------------+
| Performing         | Address              | City/State/Zipcode | Phone Number   |
| Organization       |                      |                    |                |
+--------------------+----------------------+--------------------+----------------+
|   [facility]  |   [address] | [place], [place]      |   [phone] |
| [place]          |                      |                    |                |
+--------------------+----------------------+--------------------+----------------+
|   [facility]  |                      | [place], [place]      |                |
| [place]          |                      |                    |                |
+--------------------+----------------------+--------------------+----------------+
 documented in this encounter
 
 Visit Diagnoses
 Not on filedocumented in this encounter

## 2019-11-01 NOTE — XMS
Encounter Summary
  Created on: 2019
 
 Bonifacio Nila JB
 External Reference #: [id number]
 : [birth year]
 Sex: Female
 
 Demographics
 
 
+-----------------------+------------------------+
| Address               | [address]            |
|                       | [place]  [address]   |
+-----------------------+------------------------+
| Home Phone            | [phone]        |
+-----------------------+------------------------+
| Preferred Language    | Unknown                |
+-----------------------+------------------------+
| Marital Status        | Single                 |
+-----------------------+------------------------+
| Muslim Affiliation | Unknown                |
+-----------------------+------------------------+
| Race                  | White                  |
+-----------------------+------------------------+
| Ethnic Group          | Not  or  |
+-----------------------+------------------------+
 
 
 Author
 
 
+--------------+------------------------------+
| Author       | [facility] |
+--------------+------------------------------+
| Organization | [facility] |
+--------------+------------------------------+
| Address      | Unknown                      |
+--------------+------------------------------+
| Phone        | Unavailable                  |
+--------------+------------------------------+
 
 
 
 Support
 
 
+-----------------+--------------+---------+-----------------+
| Name            | Relationship | Address | Phone           |
+-----------------+--------------+---------+-----------------+
| Kit Valdovinos   | ECON         | Unknown | [phone] |
+-----------------+--------------+---------+-----------------+
| Magdalena Valdovinos | ECON         | Unknown | [phone] |
+-----------------+--------------+---------+-----------------+
 
 
 
 Care Team Providers
 
 
 
+-----------------------+------+-------------+
| Care Team Member Name | Role | Phone       |
+-----------------------+------+-------------+
 PCP  | Unavailable |
+-----------------------+------+-------------+
 
 
 
 Encounter Details
 
 
+--------+-------------+----------------------+----------------------+-------------+
| Date   | Type        | Department           | Care Team            | Description |
+--------+-------------+----------------------+----------------------+-------------+
| / | Documentati |   Pediatric          |   Neema Khalil,   |             |
|    | on          | Gastroenterology at  | [address] |             |
|        |             | [place]          |   [facility] [place]       |             |
|        |             | [facility]  | [address]           |             |
|        |             |  [address] | [phone]         |             |
|        |             |  [address]  Mailcode:  | [phone] (Fax)   |             |
|        |             | [facility]  [place]   |                      |             |
|        |             | [place], [place]         |                      |             |
|        |             | [address]           |                      |             |
|        |             | [phone]         |                      |             |
+--------+-------------+----------------------+----------------------+-------------+
 
 
 
 Social History
 
 
+----------------+-------+-----------+--------+------+
| Tobacco Use    | Types | Packs/Day | Years  | Date |
|                |       |           | Used   |      |
+----------------+-------+-----------+--------+------+
| Never Assessed |       |           |        |      |
+----------------+-------+-----------+--------+------+
 
 
 
+------------------+---------------+
| Sex Assigned at  | Date Recorded |
| Birth            |               |
+------------------+---------------+
| Not on file      |               |
+------------------+---------------+
 
 
 
+----------------+-------------+-------------+
| Job Start Date | Occupation  | Industry    |
+----------------+-------------+-------------+
| Not on file    | Not on file | Not on file |
+----------------+-------------+-------------+
 
 
 
+----------------+--------------+------------+
 
| Travel History | Travel Start | Travel End |
+----------------+--------------+------------+
 
 
 
+-------------------------------------+
| No recent travel history available. |
+-------------------------------------+
 documented as of this encounter
 
 Plan of Treatment
 Not on filedocumented as of this encounter
 
 Visit Diagnoses
 Not on filedocumented in this encounter

## 2019-11-01 NOTE — XMS
Encounter Summary
  Created on: 2019
 
 Bonifacio Nila JB
 External Reference #: [id number]
 : [birth year]
 Sex: Female
 
 Demographics
 
 
+-----------------------+------------------------+
| Address               | [address]            |
|                       | [place]  [address]   |
+-----------------------+------------------------+
| Home Phone            | [phone]        |
+-----------------------+------------------------+
| Preferred Language    | Unknown                |
+-----------------------+------------------------+
| Marital Status        | Single                 |
+-----------------------+------------------------+
| Gnosticism Affiliation | Unknown                |
+-----------------------+------------------------+
| Race                  | White                  |
+-----------------------+------------------------+
| Ethnic Group          | Not  or  |
+-----------------------+------------------------+
 
 
 Author
 
 
+--------------+------------------------------+
| Author       | [facility] |
+--------------+------------------------------+
| Organization | [facility] |
+--------------+------------------------------+
| Address      | Unknown                      |
+--------------+------------------------------+
| Phone        | Unavailable                  |
+--------------+------------------------------+
 
 
 
 Support
 
 
+-----------------+--------------+---------+-----------------+
| Name            | Relationship | Address | Phone           |
+-----------------+--------------+---------+-----------------+
| Kit Valdovinos   | ECON         | Unknown | [phone] |
+-----------------+--------------+---------+-----------------+
| Magdalena Valdovinos | ECON         | Unknown | [phone] |
+-----------------+--------------+---------+-----------------+
 
 
 
 Care Team Providers
 
 
 
+-----------------------+------+-----------------+
| Care Team Member Name | Role | Phone           |
+-----------------------+------+-----------------+
| Lily Horton MD   | PCP  | [phone] |
+-----------------------+------+-----------------+
 
 
 
 Encounter Details
 
 
+--------+----------+----------------------+--------------------+-------------+
| Date   | Type     | Department           | Care Team          | Description |
+--------+----------+----------------------+--------------------+-------------+
| / | Abstract |   Pediatric          |   Ivis Burkett MD |             |
|    |          | Gastroenterology at  |                    |             |
|        |          | [place]          |                    |             |
|        |          | [facility]  |                    |             |
|        |          |  [address] |                    |             |
|        |          |  [address]  Mailcode:  |                    |             |
|        |          | [address]   |                    |             |
|        |          | [place], [place]         |                    |             |
|        |          | [address]           |                    |             |
|        |          | [phone]         |                    |             |
+--------+----------+----------------------+--------------------+-------------+
 
 
 
 Social History
 
 
+----------------+-------+-----------+--------+------+
| Tobacco Use    | Types | Packs/Day | Years  | Date |
|                |       |           | Used   |      |
+----------------+-------+-----------+--------+------+
| Never Assessed |       |           |        |      |
+----------------+-------+-----------+--------+------+
 
 
 
+------------------+---------------+
| Sex Assigned at  | Date Recorded |
| Birth            |               |
+------------------+---------------+
| Not on file      |               |
+------------------+---------------+
 
 
 
+----------------+-------------+-------------+
| Job Start Date | Occupation  | Industry    |
+----------------+-------------+-------------+
| Not on file    | Not on file | Not on file |
+----------------+-------------+-------------+
 
 
 
+----------------+--------------+------------+
 
| Travel History | Travel Start | Travel End |
+----------------+--------------+------------+
 
 
 
+-------------------------------------+
| No recent travel history available. |
+-------------------------------------+
 documented as of this encounter
 
 Plan of Treatment
 Not on filedocumented as of this encounter
 
 Procedures
 
 
+----------------------+--------+-------------+----------------------+----------------------
+
| Procedure Name       | Priori | Date/Time   | Associated Diagnosis | Comments             
|
|                      | ty     |             |                      |                      
|
+----------------------+--------+-------------+----------------------+----------------------
+
| CBC, WITH            | Routin | 2008  |                      |   Results for this   
|
| DIFFERENTIAL         | e      |  3:25 PM    |                      | procedure are in the 
|
|                      |        | PDT         |                      |  results section.    
|
+----------------------+--------+-------------+----------------------+----------------------
+
| COMPLETE METABOLIC   | Routin | 2008  |                      |   Results for this   
|
| SET                  | e      |  3:25 PM    |                      | procedure are in the 
|
| (NA,K,CL,CO2,BUN,CRE |        | PDT         |                      |  results section.    
|
| AT,GLUC,CA,AST,ALT,B |        |             |                      |                      
|
| ASHWINI TOTAL,ALK        |        |             |                      |                      
|
| PHOS,ALB,PROT TOTAL) |        |             |                      |                      
|
+----------------------+--------+-------------+----------------------+----------------------
+
| TACROLIMUS, WHOLE    | Routin | 2008  |                      |                      
|
| BLOOD                | e      |  3:25 PM    |                      |                      
|
|                      |        | PDT         |                      |                      
|
+----------------------+--------+-------------+----------------------+----------------------
+
| PHOSPHORUS, PLASMA   | Routin | 2008  |                      |   Results for this   
|
|                      | e      |  3:25 PM    |                      | procedure are in the 
|
|                      |        | PDT         |                      |  results section.    
|
 
+----------------------+--------+-------------+----------------------+----------------------
+
| GGT, PLASMA          | Routin | 2008  |                      |   Results for this   
|
|                      | e      |  3:25 PM    |                      | procedure are in the 
|
|                      |        | PDT         |                      |  results section.    
|
+----------------------+--------+-------------+----------------------+----------------------
+
| MAGNESIUM, PLASMA    | Routin | 2008  |                      |   Results for this   
|
|                      | e      |  3:25 PM    |                      | procedure are in the 
|
|                      |        | PDT         |                      |  results section.    
|
+----------------------+--------+-------------+----------------------+----------------------
+
| TRIGLYCERIDES,       | Routin | 2008  |                      |   Results for this   
|
| PLASMA               | e      |  3:25 PM    |                      | procedure are in the 
|
|                      |        | PDT         |                      |  results section.    
|
+----------------------+--------+-------------+----------------------+----------------------
+
| CHOLESTEROL TOTAL,   | Routin | 2008  |                      |   Results for this   
|
| PLASMA               | e      |  3:25 PM    |                      | procedure are in the 
|
|                      |        | PDT         |                      |  results section.    
|
+----------------------+--------+-------------+----------------------+----------------------
+
 documented in this encounter
 
 Results
 COMPLETE METABOLIC SET (NA,K,CL,CO2,BUN,CREAT,GLUC,CA,AST,ALT,BILI TOTAL,ALK PHOS,ALB,PROT 
TOTAL) (2008  3:25 PM PDT)
 
+-------------+-------+-----------------+------------+--------------+
| Component   | Value | Ref Range       | Performed  | Pathologist  |
|             |       |                 | At         | Signature    |
+-------------+-------+-----------------+------------+--------------+
| GLUCOSE,    | 82    | 60 - 100 mg/dL  | NON [facility]   |              |
| PLASMA      |       |                 | LAB        |              |
| (LAB)       |       |                 |            |              |
+-------------+-------+-----------------+------------+--------------+
| BUN, PLASMA | 11.1  | 7 - 21 mg/dL    | NON [facility]   |              |
|  (LAB)      |       |                 | LAB        |              |
+-------------+-------+-----------------+------------+--------------+
| CREATININE  | 0.51  | 0.5 - 1.5 mg/dL | NON [facility]   |              |
| PLASMA      |       |                 | LAB        |              |
| (LAB)       |       |                 |            |              |
+-------------+-------+-----------------+------------+--------------+
| TOTAL       | 7.1   | 6.1 - 8.5 g/dL  | NON [facility]   |              |
| PROTEIN,    |       |                 | LAB        |              |
| PLASMA      |       |                 |            |              |
| (LAB)       |       |                 |            |              |
+-------------+-------+-----------------+------------+--------------+
 
| ALBUMIN,    | 4.2   | 2.9 - 5.5 g/dL  | NON [facility]   |              |
| PLASMA      |       |                 | LAB        |              |
| (LAB)       |       |                 |            |              |
+-------------+-------+-----------------+------------+--------------+
| CALCIUM,    | 9.4   | 8.5 - 10.5      | NON [facility]   |              |
| PLASMA      |       | mg/dL           | LAB        |              |
| (LAB)       |       |                 |            |              |
+-------------+-------+-----------------+------------+--------------+
| BILIRUBIN   | 0.8   | 0.0 - 1.2       | NON [facility]   |              |
| TOTAL       |       | Transcutaneous  | LAB        |              |
|             |       | Bilirubinometer |            |              |
+-------------+-------+-----------------+------------+--------------+
| ALK PHOS    | 230   | 135 - 384 U/L   | NON [facility]   |              |
|             |       |                 | LAB        |              |
+-------------+-------+-----------------+------------+--------------+
| AST(SGOT)   | 28    | 0 - 40 U/L      | NON [facility]   |              |
|             |       |                 | LAB        |              |
+-------------+-------+-----------------+------------+--------------+
| SODIUM,     | 136   | 132 - 143       | NON [facility]   |              |
| PLASMA      |       | mmol/L          | LAB        |              |
| (LAB)       |       |                 |            |              |
+-------------+-------+-----------------+------------+--------------+
| POTASSIUM,  | 3.8   | 3.6 - 5.1       | NON [facility]   |              |
| PLASMA      |       | mmol/L          | LAB        |              |
| (LAB)       |       |                 |            |              |
+-------------+-------+-----------------+------------+--------------+
| CHLORIDE,   | 107   | 95 - 112 mmol/L | NON [facility]   |              |
| PLASMA      |       |                 | LAB        |              |
| (LAB)       |       |                 |            |              |
+-------------+-------+-----------------+------------+--------------+
| TOTAL CO2,  | 21.7  | 19 - 31 mmol/L  | NON [facility]   |              |
| PLASMA      |       |                 | LAB        |              |
| (LAB)       |       |                 |            |              |
+-------------+-------+-----------------+------------+--------------+
| ALT (SGPT)  | 24    | 0 - 46 U/L      | NON [facility]   |              |
|             |       |                 | LAB        |              |
+-------------+-------+-----------------+------------+--------------+
| BILIRUBIN   | 0.1   | 0.0 - 0.4 mg/dL | NON [facility]   |              |
| DIRECT      |       |                 | LAB        |              |
+-------------+-------+-----------------+------------+--------------+
 
 
 
+----------+
| Specimen |
+----------+
|          |
+----------+
 
 
 
+----------------+---------+--------------------+--------------+
| Performing     | Address | City/State/Zipcode | Phone Number |
| Organization   |         |                    |              |
+----------------+---------+--------------------+--------------+
|   [facility] |         |                    |              |
+----------------+---------+--------------------+--------------+
 CBC, WITH DIFFERENTIAL (2008  3:25 PM PDT)
 
+-------------+----------+-----------------+------------+--------------+
 
| Component   | Value    | Ref Range       | Performed  | Pathologist  |
|             |          |                 | At         | Signature    |
+-------------+----------+-----------------+------------+--------------+
| WHITE CELL  | 8.3      | 4.5 - 13.5 K/cu | [facility]   |              |
| COUNT       |          |  mm             | LAB        |              |
+-------------+----------+-----------------+------------+--------------+
| RED CELL    | 4.65     | 4.1 - 5.3 M/cu  | [facility]   |              |
| COUNT       |          | mm              | LAB        |              |
+-------------+----------+-----------------+------------+--------------+
| HEMOGLOBIN  | 14.2     | 10.6 - 15.1     | NON [facility]   |              |
|             |          | g/dL            | LAB        |              |
+-------------+----------+-----------------+------------+--------------+
| HEMATOCRIT  | 40.8     | 32 - 42 %       | NON [facility]   |              |
|             |          |                 | LAB        |              |
+-------------+----------+-----------------+------------+--------------+
| MCV         | 84.6     | 71 - 89 fL      | NON [facility]   |              |
|             |          |                 | LAB        |              |
+-------------+----------+-----------------+------------+--------------+
| MCH         | 31 (A)   | 24 - 30 pg      | NON [facility]   |              |
|             |          |                 | LAB        |              |
+-------------+----------+-----------------+------------+--------------+
| MCHC        | 35       | 30 - 36 g/dL    | [facility]   |              |
|             |          |                 | LAB        |              |
+-------------+----------+-----------------+------------+--------------+
| PLATELET    | 339      | 140 - 440 K/cu  | [facility]   |              |
| COUNT       |          | mm              | LAB        |              |
+-------------+----------+-----------------+------------+--------------+
| NEUTROPHIL  | 43.3     | 31 - 60 %       | [facility]   |              |
| %           |          |                 | LAB        |              |
+-------------+----------+-----------------+------------+--------------+
| LYMPHOCYTE  | 41.1     | 28 - 48 %       | [facility]   |              |
| %           |          |                 | LAB        |              |
+-------------+----------+-----------------+------------+--------------+
| MONOCYTE %  | 12.2 (A) | 0 - 12 %        | [facility]   |              |
|             |          |                 | LAB        |              |
+-------------+----------+-----------------+------------+--------------+
| EOS %       | 3.2      | 0 - 6 %         | [facility]   |              |
|             |          |                 | LAB        |              |
+-------------+----------+-----------------+------------+--------------+
| BASO %      | 0.2      | 0 - 2 %         | NON [facility]   |              |
|             |          |                 | LAB        |              |
+-------------+----------+-----------------+------------+--------------+
| RDW         | 12.6     | 10.5 - 15.0 %   | [facility]   |              |
|             |          |                 | LAB        |              |
+-------------+----------+-----------------+------------+--------------+
| MPV         |          | fL              | [facility]   |              |
|             |          |                 | LAB        |              |
+-------------+----------+-----------------+------------+--------------+
| NEUTROPHIL  |          | K/cu mm         | [facility]   |              |
| #           |          |                 | LAB        |              |
+-------------+----------+-----------------+------------+--------------+
| LYMPHOCYTE  |          | K/cu mm         | [facility]   |              |
| #           |          |                 | LAB        |              |
+-------------+----------+-----------------+------------+--------------+
| MONOCYTE #  |          | K/cu mm         | [facility]   |              |
|             |          |                 | LAB        |              |
+-------------+----------+-----------------+------------+--------------+
| EOS #       |          | K/cu mm         | NON [facility]   |              |
|             |          |                 | LAB        |              |
+-------------+----------+-----------------+------------+--------------+
 
| BASO #      |          |                 | [facility]   |              |
|             |          |                 | LAB        |              |
+-------------+----------+-----------------+------------+--------------+
 
 
 
+----------+
| Specimen |
+----------+
|          |
+----------+
 
 
 
+----------------+---------+--------------------+--------------+
| Performing     | Address | City/State/Zipcode | Phone Number |
| Organization   |         |                    |              |
+----------------+---------+--------------------+--------------+
|   [facility] |         |                    |              |
+----------------+---------+--------------------+--------------+
 MAGNESIUM, PLASMA (2008  3:25 PM PDT)
 
+-------------+-------+-----------------+------------+--------------+
| Component   | Value | Ref Range       | Performed  | Pathologist  |
|             |       |                 | At         | Signature    |
+-------------+-------+-----------------+------------+--------------+
| MAGNESIUM,P | 2.0   | 1.7 - 2.5 mg/dL | NON [facility]   |              |
| LASMA       |       |                 | LAB        |              |
+-------------+-------+-----------------+------------+--------------+
 
 
 
+----------+
| Specimen |
+----------+
|          |
+----------+
 
 
 
+----------------+---------+--------------------+--------------+
| Performing     | Address | City/State/Zipcode | Phone Number |
| Organization   |         |                    |              |
+----------------+---------+--------------------+--------------+
|   [facility] |         |                    |              |
+----------------+---------+--------------------+--------------+
 TACROLIMUS, WHOLE BLOOD (2008  3:25 PM PDT)
 
+-------------+-------+-----------+------------+--------------+
| Component   | Value | Ref Range | Performed  | Pathologist  |
|             |       |           | At         | Signature    |
+-------------+-------+-----------+------------+--------------+
| TACROLIMUS  |       | ng/mL     | [facility]   |              |
| ()    |       |           | LAB        |              |
+-------------+-------+-----------+------------+--------------+
 
 
 
+----------+
| Specimen |
 
+----------+
|          |
+----------+
 
 
 
+----------------+---------+--------------------+--------------+
| Performing     | Address | City/State/Zipcode | Phone Number |
| Organization   |         |                    |              |
+----------------+---------+--------------------+--------------+
|   [facility] |         |                    |              |
+----------------+---------+--------------------+--------------+
 GGT, PLASMA (2008  3:25 PM PDT)
 
+-----------+-------+------------+------------+--------------+
| Component | Value | Ref Range  | Performed  | Pathologist  |
|           |       |            | At         | Signature    |
+-----------+-------+------------+------------+--------------+
| GAMMA     | 9     | 5 - 60 U/L | NON [facility]   |              |
| GLUTAMYL  |       |            | LAB        |              |
| TRANS     |       |            |            |              |
+-----------+-------+------------+------------+--------------+
 
 
 
+----------+
| Specimen |
+----------+
|          |
+----------+
 
 
 
+----------------+---------+--------------------+--------------+
| Performing     | Address | City/State/Zipcode | Phone Number |
| Organization   |         |                    |              |
+----------------+---------+--------------------+--------------+
|   [facility] |         |                    |              |
+----------------+---------+--------------------+--------------+
 CHOLESTEROL TOTAL, PLASMA (2008  3:25 PM PDT)
 
+-------------+-------+---------------+------------+--------------+
| Component   | Value | Ref Range     | Performed  | Pathologist  |
|             |       |               | At         | Signature    |
+-------------+-------+---------------+------------+--------------+
| CHOLESTEROL | 145   | < - 200 mg/dL | [facility]   |              |
|   (LAB)     |       |               | LAB        |              |
+-------------+-------+---------------+------------+--------------+
 
 
 
+----------+
| Specimen |
+----------+
|          |
+----------+
 
 
 
+----------------+---------+--------------------+--------------+
 
| Performing     | Address | City/State/Zipcode | Phone Number |
| Organization   |         |                    |              |
+----------------+---------+--------------------+--------------+
|   [facility] |         |                    |              |
+----------------+---------+--------------------+--------------+
 TRIGLYCERIDES, PLASMA (2008  3:25 PM PDT)
 
+-------------+-------+----------------+------------+--------------+
| Component   | Value | Ref Range      | Performed  | Pathologist  |
|             |       |                | At         | Signature    |
+-------------+-------+----------------+------------+--------------+
| TRIGLYCERID | 120   | 30 - 150 mg/dL | NON [facility]   |              |
| ES          |       |                | LAB        |              |
+-------------+-------+----------------+------------+--------------+
 
 
 
+----------+
| Specimen |
+----------+
|          |
+----------+
 
 
 
+----------------+---------+--------------------+--------------+
| Performing     | Address | City/State/Zipcode | Phone Number |
| Organization   |         |                    |              |
+----------------+---------+--------------------+--------------+
|   [facility] |         |                    |              |
+----------------+---------+--------------------+--------------+
 PHOSPHORUS, PLASMA (2008  3:25 PM PDT)
 
+-------------+-------+-----------------+------------+--------------+
| Component   | Value | Ref Range       | Performed  | Pathologist  |
|             |       |                 | At         | Signature    |
+-------------+-------+-----------------+------------+--------------+
| PHOSPHORUS, | 4.5   | 2.6 - 6.2 mg/dL | NON [facility]   |              |
|  PLASMA     |       |                 | LAB        |              |
| (LAB)       |       |                 |            |              |
+-------------+-------+-----------------+------------+--------------+
 
 
 
+----------+
| Specimen |
+----------+
|          |
+----------+
 
 
 
+----------------+---------+--------------------+--------------+
| Performing     | Address | City/State/Zipcode | Phone Number |
| Organization   |         |                    |              |
+----------------+---------+--------------------+--------------+
|   [facility] |         |                    |              |
+----------------+---------+--------------------+--------------+
 documented in this encounter
 
 
 Visit Diagnoses
 Not on filedocumented in this encounter

## 2019-11-01 NOTE — XMS
Encounter Summary
  Created on: 2019
 
 Bonifacio Nila JB
 External Reference #: [id number]
 : [birth year]
 Sex: Female
 
 Demographics
 
 
+-----------------------+------------------------+
| Address               | [address]            |
|                       | [place]  [address]   |
+-----------------------+------------------------+
| Home Phone            | [phone]        |
+-----------------------+------------------------+
| Preferred Language    | Unknown                |
+-----------------------+------------------------+
| Marital Status        | Single                 |
+-----------------------+------------------------+
| Mormon Affiliation | Unknown                |
+-----------------------+------------------------+
| Race                  | White                  |
+-----------------------+------------------------+
| Ethnic Group          | Not  or  |
+-----------------------+------------------------+
 
 
 Author
 
 
+--------------+------------------------------+
| Author       | [facility] |
+--------------+------------------------------+
| Organization | [facility] |
+--------------+------------------------------+
| Address      | Unknown                      |
+--------------+------------------------------+
| Phone        | Unavailable                  |
+--------------+------------------------------+
 
 
 
 Support
 
 
+-----------------+--------------+---------+-----------------+
| Name            | Relationship | Address | Phone           |
+-----------------+--------------+---------+-----------------+
| Kit Valdovinos   | ECON         | Unknown | [phone] |
+-----------------+--------------+---------+-----------------+
| Magdalena Valdovinos | ECON         | Unknown | [phone] |
+-----------------+--------------+---------+-----------------+
 
 
 
 Care Team Providers
 
 
 
+-----------------------+------+-------------+
| Care Team Member Name | Role | Phone       |
+-----------------------+------+-------------+
 PCP  | Unavailable |
+-----------------------+------+-------------+
 
 
 
 Reason for Visit
 
 
+--------------+----------+
| Reason       | Comments |
+--------------+----------+
| Social work  |          |
| consultation |          |
+--------------+----------+
 
 
 
 Encounter Details
 
 
+--------+-------------+----------------------+---------------------+--------------+
| Date   | Type        | Department           | Care Team           | Description  |
+--------+-------------+----------------------+---------------------+--------------+
| 03/13/ | Documentati |   SOCIAL WORK        |   Coretta Armijo,  | Social work  |
|    | on          | AMBULATORY  [address]  | MS[address]  [address]    | consultation |
|        |             | [address]  | [address]     |              |
|        |             |  Mailcode: [address]      | [place], [place]        |              |
|        |             | [place]         | [address]          |              |
|        |             | [address]           | [phone]        |              |
|        |             | [phone]         | [phone] (Fax)  |              |
+--------+-------------+----------------------+---------------------+--------------+
 
 
 
 Social History
 
 
+----------------+-------+-----------+--------+------+
| Tobacco Use    | Types | Packs/Day | Years  | Date |
|                |       |           | Used   |      |
+----------------+-------+-----------+--------+------+
| Never Assessed |       |           |        |      |
+----------------+-------+-----------+--------+------+
 
 
 
+------------------+---------------+
| Sex Assigned at  | Date Recorded |
| Birth            |               |
+------------------+---------------+
| Not on file      |               |
+------------------+---------------+
 
 
 
 
+----------------+-------------+-------------+
| Job Start Date | Occupation  | Industry    |
+----------------+-------------+-------------+
| Not on file    | Not on file | Not on file |
+----------------+-------------+-------------+
 
 
 
+----------------+--------------+------------+
| Travel History | Travel Start | Travel End |
+----------------+--------------+------------+
 
 
 
+-------------------------------------+
| No recent travel history available. |
+-------------------------------------+
 documented as of this encounter
 
 Plan of Treatment
 Not on filedocumented as of this encounter
 
 Visit Diagnoses
 Not on filedocumented in this encounter

## 2019-11-01 NOTE — XMS
Encounter Summary
  Created on: 2019
 
 Bonifacio Nila JB
 External Reference #: [id number]
 : [birth year]
 Sex: Female
 
 Demographics
 
 
+-----------------------+------------------------+
| Address               | [address]            |
|                       | [place]  [address]   |
+-----------------------+------------------------+
| Home Phone            | [phone]        |
+-----------------------+------------------------+
| Preferred Language    | Unknown                |
+-----------------------+------------------------+
| Marital Status        | Single                 |
+-----------------------+------------------------+
| Sikhism Affiliation | Unknown                |
+-----------------------+------------------------+
| Race                  | White                  |
+-----------------------+------------------------+
| Ethnic Group          | Not  or  |
+-----------------------+------------------------+
 
 
 Author
 
 
+--------------+------------------------------+
| Author       | [facility] |
+--------------+------------------------------+
| Organization | [facility] |
+--------------+------------------------------+
| Address      | Unknown                      |
+--------------+------------------------------+
| Phone        | Unavailable                  |
+--------------+------------------------------+
 
 
 
 Support
 
 
+-----------------+--------------+---------+-----------------+
| Name            | Relationship | Address | Phone           |
+-----------------+--------------+---------+-----------------+
| Kit Valdovinos   | ECON         | Unknown | [phone] |
+-----------------+--------------+---------+-----------------+
| Magdalena Valdovinos | ECON         | Unknown | [phone] |
+-----------------+--------------+---------+-----------------+
 
 
 
 Care Team Providers
 
 
 
+------------------------+------+-----------------+
| Care Team Member Name  | Role | Phone           |
+------------------------+------+-----------------+
| Lily Horton MD | PCP  | [phone] |
+------------------------+------+-----------------+
 
 
 
 Encounter Details
 
 
+--------+----------+----------------------+----------------------+-------------+
| Date   | Type     | Department           | Care Team            | Description |
+--------+----------+----------------------+----------------------+-------------+
| 10/27/ | Abstract |   Pediatric          |   Neema Khalil,   |             |
|    |          | Gastroenterology at  | MD  [address] |             |
|        |          | [place]          |   [place], [place]       |             |
|        |          | [facility]  | [address]           |             |
|        |          |  [address] | [phone]         |             |
|        |          |  [address]  Mailcode:  | [phone] (Fax)   |             |
|        |          |   [place]   |                      |             |
|        |          | [place], [place]         |                      |             |
|        |          | [address]           |                      |             |
|        |          | [phone]         |                      |             |
+--------+----------+----------------------+----------------------+-------------+
 
 
 
 Social History
 
 
+-------------------+-------+-----------+--------+------+
| Tobacco Use       | Types | Packs/Day | Years  | Date |
|                   |       |           | Used   |      |
+-------------------+-------+-----------+--------+------+
| Passive Smoke     |       |           |        |      |
| Exposure - Never  |       |           |        |      |
| Smoker            |       |           |        |      |
+-------------------+-------+-----------+--------+------+
 
 
 
+---------------------+---+---+---+
| Smokeless Tobacco:  |   |   |   |
| Never Used          |   |   |   |
+---------------------+---+---+---+
 
 
 
+-------------+-------------+---------+----------+
| Alcohol Use | Drinks/Week | oz/Week | Comments |
+-------------+-------------+---------+----------+
| Not Asked   |             |         |          |
+-------------+-------------+---------+----------+
 
 
 
+------------------+---------------+
 
| Sex Assigned at  | Date Recorded |
| Birth            |               |
+------------------+---------------+
| Not on file      |               |
+------------------+---------------+
 
 
 
+----------------+-------------+-------------+
| Job Start Date | Occupation  | Industry    |
+----------------+-------------+-------------+
| Not on file    | Not on file | Not on file |
+----------------+-------------+-------------+
 
 
 
+----------------+--------------+------------+
| Travel History | Travel Start | Travel End |
+----------------+--------------+------------+
 
 
 
+-------------------------------------+
| No recent travel history available. |
+-------------------------------------+
 documented as of this encounter
 
 Plan of Treatment
 Not on filedocumented as of this encounter
 
 Procedures
 
 
+----------------------+--------+-------------+----------------------+----------------------
+
| Procedure Name       | Priori | Date/Time   | Associated Diagnosis | Comments             
|
|                      | ty     |             |                      |                      
|
+----------------------+--------+-------------+----------------------+----------------------
+
| CMV PCR              | Routin | 10/23/2015  |                      |   Results for this   
|
| QUANTITATION, PLASMA | e      |  9:25 AM    |                      | procedure are in the 
|
|                      |        | PDT         |                      |  results section.    
|
+----------------------+--------+-------------+----------------------+----------------------
+
| FRANCIS CAGLE         | Routin | 10/23/2015  |                      |   Results for this   
|
| PCR,QUANT (PLASMA OR | e      |  9:25 AM    |                      | procedure are in the 
|
|  CSF)                |        | PDT         |                      |  results section.    
|
+----------------------+--------+-------------+----------------------+----------------------
+
 documented in this encounter
 
 Results
 
 CMV PCR QUANTITATION, PLASMA (10/23/2015  9:25 AM PDT)
 
+-------------+--------------+-----------+------------+--------------+
| Component   | Value        | Ref Range | Performed  | Pathologist  |
|             |              |           | At         | Signature    |
+-------------+--------------+-----------+------------+--------------+
| CMV DNA     | <390         |           | [facility]  |              |
| QUANT       |              |           | LAB -      |              |
| COPY/ML     |              |           | [place]  |              |
+-------------+--------------+-----------+------------+--------------+
| CMV, QUANT, | <2.6         |           | [facility]  |              |
|  LOG CPY/ML |              |           | LAB -      |              |
|             |              |           | [place]  |              |
+-------------+--------------+-----------+------------+--------------+
| CMV DNA     | <227         |           | [facility]  |              |
| QUANT IU/ML |              |           | LAB -      |              |
|             |              |           | [place]  |              |
+-------------+--------------+-----------+------------+--------------+
| CMV DNA     | <2.4         |           | [facility]  |              |
| QUANT LOG   |              |           | LAB -      |              |
| IU/ML       |              |           | [place]  |              |
+-------------+--------------+-----------+------------+--------------+
| CMV DNA     | Not detected |           | [facility]  |              |
| QUANT       |              |           | LAB -      |              |
| [facility]      |              |           | [place]  |              |
+-------------+--------------+-----------+------------+--------------+
 
 
 
+---------------+
| Specimen      |
+---------------+
| Blood - Blood |
+---------------+
 
 
 
+--------------------+----------------------+--------------------+----------------+
| Performing         | Address              | City/State/Zipcode | Phone Number   |
| Organization       |                      |                    |                |
+--------------------+----------------------+--------------------+----------------+
|   [facility]  |   [address] | [place], [place]      |   [phone] |
| [place]          |                      |                    |                |
+--------------------+----------------------+--------------------+----------------+
 FRANCIS BARR PCR,QUANT (PLASMA OR CSF) (10/23/2015  9:25 AM PDT)
 
+-------------+--------------+-----------+------------+--------------+
| Component   | Value        | Ref Range | Performed  | Pathologist  |
|             |              |           | At         | Signature    |
+-------------+--------------+-----------+------------+--------------+
| EBV QUANT   | <390         |           | [facility]  |              |
| COPY/ML     |              |           | LAB -      |              |
|             |              |           | [place]  |              |
+-------------+--------------+-----------+------------+--------------+
| EBV QUANT   | Log <2.6     |           | [facility]  |              |
| SPECIMEN    |              |           | LAB -      |              |
|             |              |           | [place]  |              |
+-------------+--------------+-----------+------------+--------------+
| EBV         | Not detected |           | [facility]  |              |
| INTERPRETAT |              |           | LAB -      |              |
 
| ION         |              |           | [place]  |              |
+-------------+--------------+-----------+------------+--------------+
 
 
 
+----------+
| Specimen |
+----------+
| Blood    |
+----------+
 
 
 
+--------------------+----------------------+--------------------+----------------+
| Performing         | Address              | City/State/Zipcode | Phone Number   |
| Organization       |                      |                    |                |
+--------------------+----------------------+--------------------+----------------+
|   [facility]  |   [address] | [place], [place]      |   [phone] |
| [place]          |                      |                    |                |
+--------------------+----------------------+--------------------+----------------+
 documented in this encounter
 
 Visit Diagnoses
 Not on filedocumented in this encounter

## 2019-11-01 NOTE — XMS
Encounter Summary
  Created on: 2019
 
 Bonifacio Nila JB
 External Reference #: [id number]
 : [birth year]
 Sex: Female
 
 Demographics
 
 
+-----------------------+------------------------+
| Address               | [address]            |
|                       | [place]  [address]   |
+-----------------------+------------------------+
| Home Phone            | [phone]        |
+-----------------------+------------------------+
| Preferred Language    | Unknown                |
+-----------------------+------------------------+
| Marital Status        | Single                 |
+-----------------------+------------------------+
| Gnosticism Affiliation | Unknown                |
+-----------------------+------------------------+
| Race                  | White                  |
+-----------------------+------------------------+
| Ethnic Group          | Not  or  |
+-----------------------+------------------------+
 
 
 Author
 
 
+--------------+------------------------------+
| Author       | [facility] |
+--------------+------------------------------+
| Organization | [facility] |
+--------------+------------------------------+
| Address      | Unknown                      |
+--------------+------------------------------+
| Phone        | Unavailable                  |
+--------------+------------------------------+
 
 
 
 Support
 
 
+-----------------+--------------+---------+-----------------+
| Name            | Relationship | Address | Phone           |
+-----------------+--------------+---------+-----------------+
| Kit Valdovinos   | ECON         | Unknown | [phone] |
+-----------------+--------------+---------+-----------------+
| Magdalena Valdovinos | ECON         | Unknown | [phone] |
+-----------------+--------------+---------+-----------------+
 
 
 
 Care Team Providers
 
 
 
+------------------------+------+-----------------+
| Care Team Member Name  | Role | Phone           |
+------------------------+------+-----------------+
| Lily Horton MD | PCP  | [phone] |
+------------------------+------+-----------------+
 
 
 
 Reason for Visit
 
 
+-------------------+----------+
| Reason            | Comments |
+-------------------+----------+
| Follow-up in      |          |
| outpatient clinic |          |
+-------------------+----------+
 Office Visit - E/M Services (Routine)
 
+--------+--------+---------------+--------------+--------------+---------------+
| Status | Reason | Specialty     | Diagnoses /  | Referred By  | Referred To   |
|        |        |               | Procedures   | Contact      | Contact       |
+--------+--------+---------------+--------------+--------------+---------------+
| Closed |        | Pediatric     |              |   Sol,    |   Ped Gastro  |
|        |        | Gastroenterol |              | Lily Lakhani, | [facility]  [address]  |
|        |        | [address]           |              |  [address]    | [address]   |
|        |        |               |              | SPECIALISTS  | [address]       |
|        |        |               |              | OF [place] | Mailcode:     |
|        |        |               |              |   [address]    | [facility]         |
|        |        |               |              | [address]  | [address]   |
|        |        |               |              |  [place],  | [place], [place]  |
|        |        |               |              | [place] [address]     | [address]    |
|        |        |               |              | Phone:       | Phone:        |
|        |        |               |              | [phone] | [phone]  |
|        |        |               |              |   Fax:       |  Fax:         |
|        |        |               |              | [phone] | [phone]  |
+--------+--------+---------------+--------------+--------------+---------------+
 
 
 
 
 Encounter Details
 
 
+--------+---------+----------------------+----------------------+----------------------+
| Date   | Type    | Department           | Care Team            | Description          |
+--------+---------+----------------------+----------------------+----------------------+
| / | Office  |   Pediatric          |   Neema Khalil,   | Polysplenia (Primary |
| 2015   | Visit   | Gastroenterology at  | MD  [address] |  Dx); Transplanted   |
|        |         | [place]          |   [place], [place]       | liver (HCC);         |
|        |         | [facility]  | [address]           | Interrupted inferior |
|        |         |  [address] [facility] | [phone]         |  vena cava; Aortic   |
|        |         |  [address]  Mailcode:  | [phone] (Fax)   | insufficiency;       |
|        |         | [facility]  [facility]   |                      | Aortic root          |
|        |         | [place], [place]         |                      | dilatation (HCC)     |
|        |         | [address]           |                      |                      |
|        |         | [phone]         |                      |                      |
+--------+---------+----------------------+----------------------+----------------------+
 
 
 
 
 Social History
 
 
+-------------------+-------+-----------+--------+------+
| Tobacco Use       | Types | Packs/Day | Years  | Date |
|                   |       |           | Used   |      |
+-------------------+-------+-----------+--------+------+
| Passive Smoke     |       |           |        |      |
| Exposure - Never  |       |           |        |      |
| Smoker            |       |           |        |      |
+-------------------+-------+-----------+--------+------+
 
 
 
+---------------------+---+---+---+
| Smokeless Tobacco:  |   |   |   |
| Never Used          |   |   |   |
+---------------------+---+---+---+
 
 
 
+-------------+-------------+---------+----------+
| Alcohol Use | Drinks/Week | oz/Week | Comments |
+-------------+-------------+---------+----------+
| Not Asked   |             |         |          |
+-------------+-------------+---------+----------+
 
 
 
+------------------+---------------+
| Sex Assigned at  | Date Recorded |
| Birth            |               |
+------------------+---------------+
| Not on file      |               |
+------------------+---------------+
 
 
 
+----------------+-------------+-------------+
| Job Start Date | Occupation  | Industry    |
+----------------+-------------+-------------+
| Not on file    | Not on file | Not on file |
+----------------+-------------+-------------+
 
 
 
+----------------+--------------+------------+
| Travel History | Travel Start | Travel End |
+----------------+--------------+------------+
 
 
 
+-------------------------------------+
| No recent travel history available. |
+-------------------------------------+
 documented as of this encounter
 
 
 Last Filed Vital Signs
 
 
+-------------------+----------------------+----------------------+----------+
| Vital Sign        | Reading              | Time Taken           | Comments |
+-------------------+----------------------+----------------------+----------+
| Blood Pressure    | 138/96               | 2015  9:13 AM  |          |
|                   |                      | PDT                  |          |
+-------------------+----------------------+----------------------+----------+
| Pulse             | 72                   | 2015  9:13 AM  |          |
|                   |                      | PDT                  |          |
+-------------------+----------------------+----------------------+----------+
| Temperature       | -                    | -                    |          |
+-------------------+----------------------+----------------------+----------+
| Respiratory Rate  | -                    | -                    |          |
+-------------------+----------------------+----------------------+----------+
| Oxygen Saturation | -                    | -                    |          |
+-------------------+----------------------+----------------------+----------+
| Inhaled Oxygen    | -                    | -                    |          |
| Concentration     |                      |                      |          |
+-------------------+----------------------+----------------------+----------+
| Weight            | 72.6 kg (160 lb 0.9  | 2015  9:13 AM  |          |
|                   | oz)                  | PDT                  |          |
+-------------------+----------------------+----------------------+----------+
| Height            | 164.8 cm (5' 4.88")  | 2015  9:13 AM  |          |
|                   |                      | PDT                  |          |
+-------------------+----------------------+----------------------+----------+
| Body Mass Index   | 26.73                | 2015  9:13 AM  |          |
|                   |                      | PDT                  |          |
+-------------------+----------------------+----------------------+----------+
 documented in this encounter
 
 Progress Notes
 Neema Khalil MD - 2015  2:07 AM PDTFormatting of this note might be different fro
m the original.
 Primary Care Provider: Lily Horton MD
 
 [facility] 
 DOS: 2015
 Visit type: Follow-up 
 
 Patient accompanied by: mother
  used: no
 
 CC: 
 - Post-liver transplant care 
 - High risk medication management
 
 Patient Active Problem List 
 Diagnosis 
   Transplanted Liver 
   VSD (ventricular septal defect), perimembranous 
   Polysplenia 
   Interrupted inferior vena cava 
   Aortic insufficiency 
   Aortic root dilatation 
 
  PAST Hx/kim: Biliary atresia
 -  s/p OLT at [facility]. She was last seen by Dr Burkett in . 
In , it was noticed that she has not been seen, attempts to reach to family was unsucces
 
sful. lost to follow up since that time. 
 - Cardiology: last cardiology note is from  from Dr Lily Horton, cardiologist at [facility]. Her cardiac dx includes 1) moderate perimembraneous VSD nearly occluded by aneurysmal
 tissue leaving a tiny VSD, 2) trace central aortic insufficiency, 3) Left atrial isomerism 
(polysplenia). It was recommended her annual follow up for progression of aortic insufficien
cy in which case she may need closure of VSD. No further notes available in our system as julio c minor is lost to follow up since .
 - 10/16/2014 s/p VSD repair, 8-mm Amplatzer Vascular Plug IV with no significant residual s
hunting noted.
 
 INTERVAL HISTORY:
 - not taking prograf since 2014
 - no complaints,no abdominal pain, emesis, diarrhea or constipation
 
 REVIEW OF SYSTEMS: 
 General:  No fever, fatigue, or weight loss.  
 Eyes:  No changes in vision, no eye irritation or discharge.  
 ENT:  No nosebleeds, nasal congestion, difficulty swallowing, no mouth sores. 
 Respiratory:  No shortness of breath, cough, wheezing.
 Cardiovascular:  No difficulty breathing, edema, cyanosis.  
 Genitourinary:  No urinary infections.  
 Neurologic:  No seizures. No headaches.  
 Musculoskeletal:  No joint pain, swelling. No back pain.  Full range of motion.
 Skin:  No itching, rash, jaundice.  
 Psychological:  No abnormal anxiety, depression. Attending school regularly.
 Heme: No anemia, abnormal bleeding, easy bruising
 Lymphatic: No enlarged lymph nodes.
 Metabolic/Endo: no glucose intolerance, hypothyroidism
 Allergy/immunology: no seasonal or food allergies, no asthma
 
 All other ROS are negative.
 
  
 Past Medical History 
 Diagnosis Date 
   Biliary atresia  
 
 Past Surgical History 
 Procedure Laterality Date 
   Liver transplant  2000 
   at [facility] 
   Vsd repair, amplatzer device  10/16/2014 
   8-mm Amplatzer Vascular Plug IV, no significant residual shunting 
 
 FHx: reviewed, unchanged
 
 SHx: reviewed, unchanged
 
 Allergies 
 Allergen Reactions 
   Sulfa (Sulfonamide Antibiotics) Hives and Swelling-Facial 
   Varicella Vaccines  
   Possible reaction with sulfa meds 
 
 PHYSICAL EXAMINATION: 
 
 Patient reports a pain level of  0 today. No action required.
 
 Ht 1.648 m (5' 4.88") (62 %*, Z = 0.32), Wt 72.6 kg (160 lb 0.9 oz) (91 %*, Z = 1.36), BP 1
38/96, Pulse 72, BMI 26.73 kg/(m^2).
 
 
 APPEARANCE: alert, active and in no apparent distress. 
 SKIN: no jaundice or rashes. 
 HEENT: normocephalic, PERRLA, mucous membranes moist. 
 NECK: supple, without thyromegaly. 
 CHEST: clear to auscultation bilaterally. 
 CV: no murmurs. 
 ABDOMEN: soft, NTND, no hepatosplenomegaly. 
 BACK: without tenderness. 
 MUSCULOSKELETAL: no muscle wasting, no deformity.
 EXTREMITIES: No edema, clubbing, deformity.
 NEURO: grossly intact
 
  
 ASSESSMENT: 14 yo female with
 
 759.0   Polysplenia
 V42.7   Transplanted liver
 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
 424.1   Aortic insufficiency
 447.71  Aortic root dilatation
 
 Nila is 15 yrs out from her transplant, she continues not to take her prograf. She has not
 shown any signs of rejection. Will continue to monitor her labs and will check an US w dopp
ler to evaluate for liver fibrosis.
 
 PLAN/RECOMMENDATIONS:  
 - check liver US w doppler
 - Labs: CBC w diff, CMP, GGT: every 3 months
 - follow up in liver transplant clinic: 2016, will check transient elastography at [facility]
t time
 
 Health Care Maintenance: 
 - immunizations, she can receive liver vaccines as she is not on immunosuppressive meds.
 - Flu vaccine (intramuscular): every . 
 - Dental care every 6 months
 
 At this visit: 
 Dr. Jorge Aguirre and DHARMESH Whatley from [facility] were present
 as observers during this visit.
 Psychosocial or economic issues that may affect patient's medical care or well being:none 
 Co-morbid chronic medical problems that may affect future procedural sedation risk: NA
 
 Labs/imaging:
 
 Component
     Latest Ref Rng 2015 
 GLUCOSE, PLASMA 
     70 - 100 mg/dL 79 
 ANION GAP
     7 - 21 16.8 
 CREATININE PLASMA 
     0.60 - 1.20 mg/dL 0.66 
 TOTAL PROTEIN
     6.1 - 8.5 g/dL 7.1 
 ALBUMIN, PLASMA
     3.5 - 5.0 g/dL 4.0 
 CALCIUM, PLASMA
     8.4 - 10.2 mg/dL 9.4 
 BILIRUBIN TOTAL
 
     0.0 - 1.2  0.2 
 ALK PHOS
     30 - 128 U/L 88 
 AST(SGOT)
     13 - 39 U/L 20 
 SODIUM, PLASMA 
     132 - 143 mmol/L 137 
 POTASSIUM
     3.6 - 5.1 mmol/L 3.8 
 CHLORIDE, PLASMA 
     95 - 112 mmol/L 102 
 TOTAL CO2
     19 - 31 mmol/L 22 
 ALT (SGPT)
     7 - 52 U/L 10 
 BUN/CREATININE RATIO   6.0 - 28.6 21.2 
 UREA NITROGEN, SERUM   6 - 23 mg/dL 14 
 WHITE CELL COUNT
     4.5 - 11.0 K/cu mm 8.3 
 RED CELL COUNT
     3.8 - 5.1 M/cu mm 4.55 
 HEMOGLOBIN
     12.0 - 16.0 g/dL 13.5 
 HEMATOCRIT
     35 - 45 % 40.2 
 MCV    81 - 99 fL 88.3 
 PLATELET COUNT
     140 - 440 K/cu mm 313 
 CHOLESTEROL  
     0 - 200 mg/dL 123 
 TRIGLYCERIDES
     30 - 150 mg/dL 106 
 LDL CHOLEST
     100 - 215 mg/dL 140 
 GAMMA GLUTAMYL TRANSFERASE
     5 - 60 u/l 7 
 MAGNESIUM,PLASMA
     1.7 - 2.5 mg/dL 1.8 
 
 Neema Khalil MD
 Pediatric Gastroenterology
 Consult line: [phone] 
 
 Electronically signed by Neema Khalil MD at 2015  2:23 AM PDTdocumented in this en
counter
 
 Plan of Treatment
 Not on filedocumented as of this encounter
 
 Visit Diagnoses
 
 
+-----------------------------------------------------------------------------+
| Diagnosis                                                                   |
+-----------------------------------------------------------------------------+
|   Polysplenia - Primary  Congenital anomalies of spleen                     |
+-----------------------------------------------------------------------------+
|   Transplanted liver (HCC)  Liver replaced by transplant                    |
+-----------------------------------------------------------------------------+
|   Interrupted inferior vena cava  Other congenital anomalies of great veins |
 
+-----------------------------------------------------------------------------+
|   Aortic insufficiency  Aortic valve disorders                              |
+-----------------------------------------------------------------------------+
|   Aortic root dilatation (HCC)  Thoracic aortic ectasia                     |
+-----------------------------------------------------------------------------+
 documented in this encounter

## 2019-11-01 NOTE — XMS
Encounter Summary
  Created on: 2019
 
 Bonifacio Nila JB
 External Reference #: [id number]
 : [birth year]
 Sex: Female
 
 Demographics
 
 
+-----------------------+------------------------+
| Address               | [address]            |
|                       | [place]  [address]   |
+-----------------------+------------------------+
| Home Phone            | [phone]        |
+-----------------------+------------------------+
| Preferred Language    | Unknown                |
+-----------------------+------------------------+
| Marital Status        | Single                 |
+-----------------------+------------------------+
| Denominational Affiliation | Unknown                |
+-----------------------+------------------------+
| Race                  | White                  |
+-----------------------+------------------------+
| Ethnic Group          | Not  or  |
+-----------------------+------------------------+
 
 
 Author
 
 
+--------------+------------------------------+
| Author       | [facility] |
+--------------+------------------------------+
| Organization | [facility] |
+--------------+------------------------------+
| Address      | Unknown                      |
+--------------+------------------------------+
| Phone        | Unavailable                  |
+--------------+------------------------------+
 
 
 
 Support
 
 
+-----------------+--------------+---------+-----------------+
| Name            | Relationship | Address | Phone           |
+-----------------+--------------+---------+-----------------+
| Kit Valdovinos   | ECON         | Unknown | [phone] |
+-----------------+--------------+---------+-----------------+
| Magdalena Valdovinos | ECON         | Unknown | [phone] |
+-----------------+--------------+---------+-----------------+
 
 
 
 Care Team Providers
 
 
 
+------------------------+------+-----------------+
| Care Team Member Name  | Role | Phone           |
+------------------------+------+-----------------+
| Lily Horton MD | PCP  | [phone] |
+------------------------+------+-----------------+
 
 
 
 Reason for Visit
 
 
+--------------+----------+
| Reason       | Comments |
+--------------+----------+
| Test Results |          |
+--------------+----------+
 
 
 
 Encounter Details
 
 
+--------+-------------+----------------------+----------------------+--------------+
| Date   | Type        | Department           | Care Team            | Description  |
+--------+-------------+----------------------+----------------------+--------------+
| / | Documentati |   Pediatric          |   Neema Khalil,   | Test Results |
| 2015   | on          | Gastroenterology at  | MD  [address] |              |
|        |             | [place]          |   [place], [place]       |              |
|        |             | [facility]  | [address]           |              |
|        |             |  [address] | [phone]         |              |
|        |             |  [address]  Mailcode:  | [phone] (Fax)   |              |
|        |             | [facility]  [address]   |                      |              |
|        |             | [place], [place]         |                      |              |
|        |             | [address]           |                      |              |
|        |             | [phone]         |                      |              |
+--------+-------------+----------------------+----------------------+--------------+
 
 
 
 Social History
 
 
+-------------------+-------+-----------+--------+------+
| Tobacco Use       | Types | Packs/Day | Years  | Date |
|                   |       |           | Used   |      |
+-------------------+-------+-----------+--------+------+
| Passive Smoke     |       |           |        |      |
| Exposure - Never  |       |           |        |      |
| Smoker            |       |           |        |      |
+-------------------+-------+-----------+--------+------+
 
 
 
+---------------------+---+---+---+
| Smokeless Tobacco:  |   |   |   |
| Never Used          |   |   |   |
+---------------------+---+---+---+
 
 
 
 
+-------------+-------------+---------+----------+
| Alcohol Use | Drinks/Week | oz/Week | Comments |
+-------------+-------------+---------+----------+
| Not Asked   |             |         |          |
+-------------+-------------+---------+----------+
 
 
 
+------------------+---------------+
| Sex Assigned at  | Date Recorded |
| Birth            |               |
+------------------+---------------+
| Not on file      |               |
+------------------+---------------+
 
 
 
+----------------+-------------+-------------+
| Job Start Date | Occupation  | Industry    |
+----------------+-------------+-------------+
| Not on file    | Not on file | Not on file |
+----------------+-------------+-------------+
 
 
 
+----------------+--------------+------------+
| Travel History | Travel Start | Travel End |
+----------------+--------------+------------+
 
 
 
+-------------------------------------+
| No recent travel history available. |
+-------------------------------------+
 documented as of this encounter
 
 Plan of Treatment
 Not on filedocumented as of this encounter
 
 Visit Diagnoses
 Not on filedocumented in this encounter

## 2019-11-01 NOTE — XMS
Encounter Summary
  Created on: 2019
 
 Bonifacio Nila JB
 External Reference #: [id number]
 : [birth year]
 Sex: Female
 
 Demographics
 
 
+-----------------------+------------------------+
| Address               | [address]            |
|                       | [place]  [address]   |
+-----------------------+------------------------+
| Home Phone            | [phone]        |
+-----------------------+------------------------+
| Preferred Language    | Unknown                |
+-----------------------+------------------------+
| Marital Status        | Single                 |
+-----------------------+------------------------+
| Jew Affiliation | Unknown                |
+-----------------------+------------------------+
| Race                  | White                  |
+-----------------------+------------------------+
| Ethnic Group          | Not  or  |
+-----------------------+------------------------+
 
 
 Author
 
 
+--------------+------------------------------+
| Author       | [facility] |
+--------------+------------------------------+
| Organization | [facility] |
+--------------+------------------------------+
| Address      | Unknown                      |
+--------------+------------------------------+
| Phone        | Unavailable                  |
+--------------+------------------------------+
 
 
 
 Support
 
 
+-----------------+--------------+---------+-----------------+
| Name            | Relationship | Address | Phone           |
+-----------------+--------------+---------+-----------------+
| Kit Valdovinos   | ECON         | Unknown | [phone] |
+-----------------+--------------+---------+-----------------+
| Magdalena Valdovinos | ECON         | Unknown | [phone] |
+-----------------+--------------+---------+-----------------+
 
 
 
 Care Team Providers
 
 
 
+-----------------------+------+-------------+
| Care Team Member Name | Role | Phone       |
+-----------------------+------+-------------+
| No Pcp Per Patient    | PCP  | Unavailable |
+-----------------------+------+-------------+
 
 
 
 Encounter Details
 
 
+--------+----------+----------------------+----------------------+-------------+
| Date   | Type     | Department           | Care Team            | Description |
+--------+----------+----------------------+----------------------+-------------+
| 09/15/ | Abstract |   Pediatric          |   Neema Khalil,   |             |
|    |          | Gastroenterology at  | MD [address] |             |
|        |          | [facility]          |   [place], [place]       |             |
|        |          | [facility]  | [address]           |             |
|        |          |  [address] | [phone]         |             |
|        |          [address]  Mailcode:  | [phone] (Fax)   |             |
|        |          | [facility]  [facility]   |                      |             |
|        |          | [place], [place]         |                      |             |
|        |          | [address]           |                      |             |
|        |          | [phone]         |                      |             |
+--------+----------+----------------------+----------------------+-------------+
 
 
 
 Social History
 
 
+-------------------+-------+-----------+--------+------+
| Tobacco Use       | Types | Packs/Day | Years  | Date |
|                   |       |           | Used   |      |
+-------------------+-------+-----------+--------+------+
| Passive Smoke     |       |           |        |      |
| Exposure - Never  |       |           |        |      |
| Smoker            |       |           |        |      |
+-------------------+-------+-----------+--------+------+
 
 
 
+---------------------+---+---+---+
| Smokeless Tobacco:  |   |   |   |
| Never Used          |   |   |   |
+---------------------+---+---+---+
 
 
 
+-------------+-------------+---------+----------+
| Alcohol Use | Drinks/Week | oz/Week | Comments |
+-------------+-------------+---------+----------+
| Not Asked   |             |         |          |
+-------------+-------------+---------+----------+
 
 
 
+------------------+---------------+
 
| Sex Assigned at  | Date Recorded |
| Birth            |               |
+------------------+---------------+
| Not on file      |               |
+------------------+---------------+
 
 
 
+----------------+-------------+-------------+
| Job Start Date | Occupation  | Industry    |
+----------------+-------------+-------------+
| Not on file    | Not on file | Not on file |
+----------------+-------------+-------------+
 
 
 
+----------------+--------------+------------+
| Travel History | Travel Start | Travel End |
+----------------+--------------+------------+
 
 
 
+-------------------------------------+
| No recent travel history available. |
+-------------------------------------+
 documented as of this encounter
 
 Plan of Treatment
 Not on filedocumented as of this encounter
 
 Procedures
 
 
+----------------------+--------+-------------+----------------------+----------------------
+
| Procedure Name       | Priori | Date/Time   | Associated Diagnosis | Comments             
|
|                      | ty     |             |                      |                      
|
+----------------------+--------+-------------+----------------------+----------------------
+
| CBC, WITH            | Routin | 2014  |                      |   Results for this   
|
| DIFFERENTIAL         | e      | 11:57 AM    |                      | procedure are in the 
|
|                      |        | PDT         |                      |  results section.    
|
+----------------------+--------+-------------+----------------------+----------------------
+
| COMPLETE METABOLIC   | Routin | 2014  |                      |   Results for this   
|
| SET                  | e      | 11:57 AM    |                      | procedure are in the 
|
| (NA,K,CL,CO2,BUN,CRE |        | PDT         |                      |  results section.    
|
| AT,GLUC,CA,AST,ALT,B |        |             |                      |                      
|
| ASHWINI TOTAL,ALK        |        |             |                      |                      
|
| PHOS,ALB,PROT TOTAL) |        |             |                      |                      
 
|
+----------------------+--------+-------------+----------------------+----------------------
+
 documented in this encounter
 
 Results
 CBC, WITH DIFFERENTIAL (2014 11:57 AM PDT)
 
+-------------+----------+-----------------+------------+--------------+
| Component   | Value    | Ref Range       | Performed  | Pathologist  |
|             |          |                 | At         | Signature    |
+-------------+----------+-----------------+------------+--------------+
| WHITE CELL  | 6.6      | 4.4 - 11.8 K/cu | [facility]  |              |
| COUNT       |          |  mm             | LAB -      |              |
|             |          |                 | [place]  |              |
+-------------+----------+-----------------+------------+--------------+
| RED CELL    | 4.83     | 3.8 - 5.3 M/cu  | [facility]  |              |
| COUNT       |          | mm              | LAB -      |              |
|             |          |                 | [place]  |              |
+-------------+----------+-----------------+------------+--------------+
| HEMOGLOBIN  | 14.7     | 11.1 - 15.7     | [facility]  |              |
|             |          | g/dL            | LAB -      |              |
|             |          |                 | [place]  |              |
+-------------+----------+-----------------+------------+--------------+
| HEMATOCRIT  | 42.8     | 32 - 47 %       | [facility]  |              |
|             |          |                 | LAB -      |              |
|             |          |                 | [place]  |              |
+-------------+----------+-----------------+------------+--------------+
| MCV         | 88.6     | 73 - 91 fL      | [facility]  |              |
|             |          |                 | LAB -      |              |
|             |          |                 | [place]  |              |
+-------------+----------+-----------------+------------+--------------+
| MCH         | 30       | 25 - 31 pg      | [facility]  |              |
|             |          |                 | LAB -      |              |
|             |          |                 | [place]  |              |
+-------------+----------+-----------------+------------+--------------+
| MCHC        | 34       | 30 - 36 g/dL    | [facility]  |              |
|             |          |                 | LAB -      |              |
|             |          |                 | [place]  |              |
+-------------+----------+-----------------+------------+--------------+
| PLATELET    | 328      | 140 - 440 K/cu  | [facility]  |              |
| COUNT       |          | mm              | LAB -      |              |
|             |          |                 | [place]  |              |
+-------------+----------+-----------------+------------+--------------+
| NEUTROPHIL  | 63.8     | 35 - 65 %       | [facility]  |              |
| %           |          |                 | LAB -      |              |
|             |          |                 | [place]  |              |
+-------------+----------+-----------------+------------+--------------+
| LYMPHOCYTE  | 25.1 (A) | 28 - 48 %       | [facility]  |              |
| %           |          |                 | LAB -      |              |
|             |          |                 | [place]  |              |
+-------------+----------+-----------------+------------+--------------+
| MONOCYTE %  | 7.2      | 0 - 12 %        | [facility]  |              |
|             |          |                 | LAB -      |              |
|             |          |                 | [place]  |              |
+-------------+----------+-----------------+------------+--------------+
| EOS %       | 3.1      | 0 - 6 %         | [facility]  |              |
|             |          |                 | LAB -      |              |
|             |          |                 | [place]  |              |
+-------------+----------+-----------------+------------+--------------+
 
| BASO %      | 0.8      | 0 - 2 %         | [facility]  |              |
|             |          |                 | LAB -      |              |
|             |          |                 | [place]  |              |
+-------------+----------+-----------------+------------+--------------+
| RDW         | 13.8     | 10.5 - 15.0 %   | [facility]  |              |
|             |          |                 | LAB -      |              |
|             |          |                 | [place]  |              |
+-------------+----------+-----------------+------------+--------------+
 
 
 
+---------------+
| Specimen      |
+---------------+
| Blood - Blood |
+---------------+
 
 
 
+--------------------+----------------------+--------------------+----------------+
| Performing         | Address              | City/State/Zipcode | Phone Number   |
| Organization       |                      |                    |                |
+--------------------+----------------------+--------------------+----------------+
|   [facility]  |   [address] | [place], [place]      |   [phone] |
| [place]          |                      |                    |                |
+--------------------+----------------------+--------------------+----------------+
 COMPLETE METABOLIC SET (NA,K,CL,CO2,BUN,CREAT,GLUC,CA,AST,ALT,BILI TOTAL,ALK PHOS,ALB,PROT 
TOTAL) (2014 11:57 AM PDT)
 
+-------------+--------+-----------------+------------+--------------+
| Component   | Value  | Ref Range       | Performed  | Pathologist  |
|             |        |                 | At         | Signature    |
+-------------+--------+-----------------+------------+--------------+
| GLUCOSE,    | 87     | 70 - 100 mg/dL  | [facility]  |              |
| PLASMA      |        |                 | LAB -      |              |
| (LAB)       |        |                 | [place]  |              |
+-------------+--------+-----------------+------------+--------------+
| BUN, PLASMA | 13     | 6 - 23 mg/dL    | [facility]  |              |
|  (LAB)      |        |                 | LAB -      |              |
|             |        |                 | [place]  |              |
+-------------+--------+-----------------+------------+--------------+
| CREATININE  | 0.75   | 0.40 - 1.05     | [facility]  |              |
| PLASMA      |        | mg/dL           | LAB -      |              |
| (LAB)       |        |                 | [place]  |              |
+-------------+--------+-----------------+------------+--------------+
| TOTAL       | 7.6    | 6.1 - 8.5 g/dL  | [facility]  |              |
| PROTEIN,    |        |                 | LAB -      |              |
| PLASMA      |        |                 | [place]  |              |
| (LAB)       |        |                 |            |              |
+-------------+--------+-----------------+------------+--------------+
| ALBUMIN,    | 4.4    | 3.5 - 5.0 g/dL  | [facility]  |              |
| PLASMA      |        |                 | LAB -      |              |
| (LAB)       |        |                 | [place]  |              |
+-------------+--------+-----------------+------------+--------------+
| CALCIUM,    | 9.9    | 8.4 - 10.2      | [facility]  |              |
| PLASMA      |        | mg/dL           | LAB -      |              |
| (LAB)       |        |                 | [place]  |              |
+-------------+--------+-----------------+------------+--------------+
| BILIRUBIN   | 0.8    | 0 - 1.2         | [facility]  |              |
| TOTAL       |        | Transcutaneous  | LAB -      |              |
 
|             |        | Bilirubinometer | [place]  |              |
+-------------+--------+-----------------+------------+--------------+
| ALK PHOS    | 92 (A) | 135 - 384 U/L   | [facility]  |              |
|             |        |                 | LAB -      |              |
|             |        |                 | [place]  |              |
+-------------+--------+-----------------+------------+--------------+
| AST(SGOT)   | 13     | 13 - 39 U/L     | [facility]  |              |
|             |        |                 | LAB -      |              |
|             |        |                 | [place]  |              |
+-------------+--------+-----------------+------------+--------------+
| SODIUM,     | 140    | 132 - 143       | [facility]  |              |
| PLASMA      |        | mmol/L          | LAB -      |              |
| (LAB)       |        |                 | [place]  |              |
+-------------+--------+-----------------+------------+--------------+
| POTASSIUM,  | 4.0    | 3.6 - 5.1       | [facility]  |              |
| PLASMA      |        | mmol/L          | LAB -      |              |
| (LAB)       |        |                 | [place]  |              |
+-------------+--------+-----------------+------------+--------------+
| CHLORIDE,   | 100    | 95 - 112 mmol/L | [facility]  |              |
| PLASMA      |        |                 | LAB -      |              |
| (LAB)       |        |                 | [place]  |              |
+-------------+--------+-----------------+------------+--------------+
| TOTAL CO2,  | 28     | 19 - 31 mmol/L  | [facility]  |              |
| PLASMA      |        |                 | LAB -      |              |
| (LAB)       |        |                 | [place]  |              |
+-------------+--------+-----------------+------------+--------------+
| ALT (SGPT)  | 9      | 7 - 52 U/L      | [facility]  |              |
|             |        |                 | LAB -      |              |
|             |        |                 | [place]  |              |
+-------------+--------+-----------------+------------+--------------+
| ANION GAP   | 16     | 7 - 21          | [facility]  |              |
|             |        |                 | LAB -      |              |
|             |        |                 | [place]  |              |
+-------------+--------+-----------------+------------+--------------+
| BUN/CREATIN | 17.3   | 6.0 - 28.6      | [facility]  |              |
| INE RATIO   |        |                 | LAB -      |              |
|             |        |                 | [place]  |              |
+-------------+--------+-----------------+------------+--------------+
| GLOBULIN    | 3.2    | 1.8 - 3.5       | [facility]  |              |
|             |        |                 | LAB -      |              |
|             |        |                 | [place]  |              |
+-------------+--------+-----------------+------------+--------------+
| A/G RATIO   | 1.4    | 1.1 - 2.4       | [facility]  |              |
|             |        |                 | LAB -      |              |
|             |        |                 | [place]  |              |
+-------------+--------+-----------------+------------+--------------+
 
 
 
+---------------+
| Specimen      |
+---------------+
| Blood - Blood |
+---------------+
 
 
 
+--------------------+----------------------+--------------------+----------------+
| Performing         | Address              | City/State/Zipcode | Phone Number   |
| Organization       |                      |                    |                |
 
+--------------------+----------------------+--------------------+----------------+
|   [facility]  |   [address] | [place]      |   [phone] |
| [place]          |                      |                    |                |
+--------------------+----------------------+--------------------+----------------+
 documented in this encounter
 
 Visit Diagnoses
 Not on filedocumented in this encounter

## 2019-11-01 NOTE — XMS
Encounter Summary
  Created on: 2019
 
 Bonifacio Nila JB
 External Reference #: [id number]
 : [birth year]
 Sex: Female
 
 Demographics
 
 
+-----------------------+------------------------+
| Address               | [address]            |
|                       | [place]  [address]   |
+-----------------------+------------------------+
| Home Phone            | [phone]        |
+-----------------------+------------------------+
| Preferred Language    | Unknown                |
+-----------------------+------------------------+
| Marital Status        | Single                 |
+-----------------------+------------------------+
| Roman Catholic Affiliation | Unknown                |
+-----------------------+------------------------+
| Race                  | White                  |
+-----------------------+------------------------+
| Ethnic Group          | Not  or  |
+-----------------------+------------------------+
 
 
 Author
 
 
+--------------+------------------------------+
| Author       | [facility] |
+--------------+------------------------------+
| Organization | [facility] |
+--------------+------------------------------+
| Address      | Unknown                      |
+--------------+------------------------------+
| Phone        | Unavailable                  |
+--------------+------------------------------+
 
 
 
 Support
 
 
+-----------------+--------------+---------+-----------------+
| Name            | Relationship | Address | Phone           |
+-----------------+--------------+---------+-----------------+
| Kit Valdovinos   | ECON         | Unknown | [phone] |
+-----------------+--------------+---------+-----------------+
| Magdalena Valdovinos | ECON         | Unknown | [phone] |
+-----------------+--------------+---------+-----------------+
 
 
 
 Care Team Providers
 
 
 
+------------------------+------+-----------------+
| Care Team Member Name  | Role | Phone           |
+------------------------+------+-----------------+
| Lily Horton MD | PCP  | [phone] |
+------------------------+------+-----------------+
 
 
 
 Reason for Visit
 
 
+-------------------+----------+
| Reason            | Comments |
+-------------------+----------+
| Care Coordination |          |
+-------------------+----------+
 
 
 
 Encounter Details
 
 
+--------+-----------+----------------------+----------------------+-------------------+
| Date   | Type      | Department           | Care Team            | Description       |
+--------+-----------+----------------------+----------------------+-------------------+
| / | Telephone |   Pediatric          |   Neema Khalil,   | Care Coordination |
| 2016   |           | Gastroenterology at  | MD  [address] |                   |
|        |           | [facility]          |   [place], [place]       |                   |
|        |           | [facility]  | [address]           |                   |
|        |           |  [address] [facility] | [phone]         |                   |
|        |           |  [address] [facility]  Mailcode:  | [phone] (Fax)   |                   |
|        |           | [facility]   |                      |                   |
|        |           | [place], [place]         |                      |                   |
|        |           | [address]           |                      |                   |
|        |           | [phone]         |                      |                   |
+--------+-----------+----------------------+----------------------+-------------------+
 
 
 
 Social History
 
 
+-------------------+-------+-----------+--------+------+
| Tobacco Use       | Types | Packs/Day | Years  | Date |
|                   |       |           | Used   |      |
+-------------------+-------+-----------+--------+------+
| Passive Smoke     |       |           |        |      |
| Exposure - Never  |       |           |        |      |
| Smoker            |       |           |        |      |
+-------------------+-------+-----------+--------+------+
 
 
 
+---------------------+---+---+---+
| Smokeless Tobacco:  |   |   |   |
| Never Used          |   |   |   |
+---------------------+---+---+---+
 
 
 
 
+-------------+-------------+---------+----------+
| Alcohol Use | Drinks/Week | oz/Week | Comments |
+-------------+-------------+---------+----------+
| Not Asked   |             |         |          |
+-------------+-------------+---------+----------+
 
 
 
+------------------+---------------+
| Sex Assigned at  | Date Recorded |
| Birth            |               |
+------------------+---------------+
| Not on file      |               |
+------------------+---------------+
 
 
 
+----------------+-------------+-------------+
| Job Start Date | Occupation  | Industry    |
+----------------+-------------+-------------+
| Not on file    | Not on file | Not on file |
+----------------+-------------+-------------+
 
 
 
+----------------+--------------+------------+
| Travel History | Travel Start | Travel End |
+----------------+--------------+------------+
 
 
 
+-------------------------------------+
| No recent travel history available. |
+-------------------------------------+
 documented as of this encounter
 
 Plan of Treatment
 Not on filedocumented as of this encounter
 
 Visit Diagnoses
 Not on filedocumented in this encounter

## 2019-11-01 NOTE — XMS
Encounter Summary
  Created on: 2019
 
 Bonifacio Nila JB
 External Reference #: [id number]
 : [birth year]
 Sex: Female
 
 Demographics
 
 
+-----------------------+------------------------+
| Address               | [address]            |
|                       | [place]  [address]   |
+-----------------------+------------------------+
| Home Phone            | [phone]        |
+-----------------------+------------------------+
| Preferred Language    | Unknown                |
+-----------------------+------------------------+
| Marital Status        | Single                 |
+-----------------------+------------------------+
| Advent Affiliation | Unknown                |
+-----------------------+------------------------+
| Race                  | White                  |
+-----------------------+------------------------+
| Ethnic Group          | Not  or  |
+-----------------------+------------------------+
 
 
 Author
 
 
+--------------+------------------------------+
| Author       | [facility] [employer] [facility] |
+--------------+------------------------------+
| Organization | [facility] |
+--------------+------------------------------+
| Address      | Unknown                      |
+--------------+------------------------------+
| Phone        | Unavailable                  |
+--------------+------------------------------+
 
 
 
 Support
 
 
+-----------------+--------------+---------+-----------------+
| Name            | Relationship | Address | Phone           |
+-----------------+--------------+---------+-----------------+
| Kit Valdovinos   | ECON         | Unknown | [phone] |
+-----------------+--------------+---------+-----------------+
| Magdalena Valdovinos | ECON         | Unknown | [phone] |
+-----------------+--------------+---------+-----------------+
 
 
 
 Care Team Providers
 
 
 
+-----------------------+------+-----------------+
| Care Team Member Name | Role | Phone           |
+-----------------------+------+-----------------+
| Lily Horton MD   | PCP  | [phone] |
+-----------------------+------+-----------------+
 
 
 
 Reason for Visit
 
 
+-----------------+----------+
| Reason          | Comments |
+-----------------+----------+
| Follow-up visit |          |
+-----------------+----------+
 Consultation (Routine)
 
+--------+--------------+---------------+--------------+--------------+---------------+
| Status | Reason       | Specialty     | Diagnoses /  | Referred By  | Referred To   |
|        |              |               | Procedures   | Contact      | Contact       |
+--------+--------------+---------------+--------------+--------------+---------------+
| Closed |   Specialty  | Pediatric     |   Diagnoses  |   Sol,    |   Ped Gastro  |
|        | Services     | Gastroenterol |  Liver       | Lily CARY MD | [facility]  [address]  |
|        | Required     | ogy           | replaced by  |   PEDS       | [address]   |
|        |              |               | transplant   | SPECIALISTS  | [address]       |
|        |              |               | (HCC)        | OF [place] | Mailcode:     |
|        |              |               |              |   [address]   | [facility]         |
|        |              |               |              | [address] | [address]   |
|        |              |               |              |  [address]         | [place], [place]  |
|        |              |               |              | [place],   | [address]    |
|        |              |               |              | [place] [address]     | Phone:        |
|        |              |               |              | Phone:       | [phone]  |
|        |              |               |              | [phone] |  Fax:         |
|        |              |               |              |   Fax:       | [phone]  |
|        |              |               |              | [phone] |               |
+--------+--------------+---------------+--------------+--------------+---------------+
 
 
 
 
 Encounter Details
 
 
+--------+---------+----------------------+--------------------+---------------------+
| Date   | Type    | Department           | Care Team          | Description         |
+--------+---------+----------------------+--------------------+---------------------+
| / | Office  |   Specialty Clinics  |   Ivis Burkett MD | Transplanted Liver  |
|    | Visit   | at [facility]  [address]  |                    | (HCC) (Primary Dx)  |
|        |         | [facility]      |                    |                     |
|        |         | Mailcode: [facility]       |                    |                     |
|        |         | [facility]          |                    |                     |
|        |         | [place], [place]         |                    |                     |
|        |         | [id number]           |                    |                     |
|        |         | [phone]         |                    |                     |
+--------+---------+----------------------+--------------------+---------------------+
 
 
 
 
 Social History
 
 
+----------------+-------+-----------+--------+------+
| Tobacco Use    | Types | Packs/Day | Years  | Date |
|                |       |           | Used   |      |
+----------------+-------+-----------+--------+------+
| Never Assessed |       |           |        |      |
+----------------+-------+-----------+--------+------+
 
 
 
+------------------+---------------+
| Sex Assigned at  | Date Recorded |
| Birth            |               |
+------------------+---------------+
| Not on file      |               |
+------------------+---------------+
 
 
 
+----------------+-------------+-------------+
| Job Start Date | Occupation  | Industry    |
+----------------+-------------+-------------+
| Not on file    | Not on file | Not on file |
+----------------+-------------+-------------+
 
 
 
+----------------+--------------+------------+
| Travel History | Travel Start | Travel End |
+----------------+--------------+------------+
 
 
 
+-------------------------------------+
| No recent travel history available. |
+-------------------------------------+
 documented as of this encounter
 
 Last Filed Vital Signs
 
 
+-------------------+----------------------+----------------------+----------+
| Vital Sign        | Reading              | Time Taken           | Comments |
+-------------------+----------------------+----------------------+----------+
| Blood Pressure    | 123/82               | 2009  9:47 AM  |          |
|                   |                      | PST                  |          |
+-------------------+----------------------+----------------------+----------+
| Pulse             | 84                   | 2009  9:47 AM  |          |
|                   |                      | PST                  |          |
+-------------------+----------------------+----------------------+----------+
| Temperature       | -                    | -                    |          |
+-------------------+----------------------+----------------------+----------+
| Respiratory Rate  | -                    | -                    |          |
+-------------------+----------------------+----------------------+----------+
| Oxygen Saturation | -                    | -                    |          |
+-------------------+----------------------+----------------------+----------+
| Inhaled Oxygen    | -                    | -                    |          |
 
| Concentration     |                      |                      |          |
+-------------------+----------------------+----------------------+----------+
| Weight            | 45 kg (99 lb 3.3 oz) | 2009  9:47 AM  |          |
|                   |                      | PST                  |          |
+-------------------+----------------------+----------------------+----------+
| Height            | 148.3 cm (4' 10.39") | 2009  9:47 AM  |          |
|                   |                      | PST                  |          |
+-------------------+----------------------+----------------------+----------+
| Body Mass Index   | 20.46                | 2009  9:47 AM  |          |
|                   |                      | PST                  |          |
+-------------------+----------------------+----------------------+----------+
 documented in this encounter
 
 Patient Instructions
 Patient Instructions Ivis Burkett MD - 2009 11:01 AM PST1. Continue the Prograf 1 mg
 twice daily
 2. Have her labs done every 3 months
 3. She should continue to have an antibiotic before dental work
 Electronically signed by Ivis Burkett MD at 2009 11:01 AM PST
 documented in this encounter
 
 Progress Notes
 Ivis Burkett MD - 2009 11:04 AM PSTFormatting of this note might be different from t
he original.
 
 Nila Valdovinos is an 9 y.o. female, who is referred by Lily Horton, who returns to [facility] for transplant followup. Dr Aguirre from [place] was an observ
er in clinic today.
 
 Patient Active Problem List: 
   VSD (Ventricular Septal Defect)[745.4Y]
     Comment: With polysplenia 
   Aortic Valve Insufficiency[424.1F]
   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
     Comment: For biliary atresia, 2000 at [place]
 
 Current outpatient prescriptions prior to encounter 
 Medication Sig Dispense Refill 
   PROGRAF 1 mg Oral Capsule Take 1 Cap by mouth two times daily.   60  3 
 
 Allergies 
 Allergen Reactions 
   Sulfa (Sulfonamide Antibiotics) Hives and Swelling-Facial 
   Varicella  
   Possible reaction with sulfa meds 
 
 HPI Nila was seen in clinic with her mother. They report that she has not had any medical 
problems this year. She has not had any labs done between May and this month: they thought s
he only needed labs every 6 months. 
 She is doing well in 4th grade and plans to do track again this year. :
 
 Review of Systems:  
 General:  No  fevers, fatigue, weight loss   
 Ears, Nose and Throat: negative
 Respiratory:  No cough,
 Cardiovascular:  Will see her cardiologist again this spring
 Gastrointestinal: no abdominal pain
 
 Physical Examination:
 
 
 /82, Pulse 84, Ht 148.3 cm (4' 10.39") (92 %ile), Wt 45 kg (99 lbs 3.3 oz) (91 %ile),
 Weight for age(%)  91.25%, BMI for age(%)  87.31%, Length for age(%)  92.16%. 
 87.31% of growth percentile based on BMI-for-age.
 
 Appearance: alert, active and in no apparent distress.
 Skin:  Multiple warts R hand,  no rashes, petechiae 
 HEENT: normocephalic,PERRLA , no icterus,,nose without discharge, mouth no aphthous lesions
, mucous membranes moist, pharynx unremarkable
 Neck: supple, without thyromegaly
 Chest: clear to auscultation bilaterally.
 CV: regular sinus rhythm, 4/6 systolic murmur heard best lower left sternal border 
 Abdomen: ,  well healed incisions, soft, no distention, no tenderness to palpation, no rebo
und , no guarding, no palpable masses, normal bowel sounds, no hepatosplenomegaly
 Musculoskeletal: grossly intact  without clubbing or edema
 Nodes: no signficant cervical, supraclavicular adenopathy
 
 Lab Results 
 Basename Value Date/Time 
   WBC 8.3 08  3:25 PM 
   HB 14.2 08  3:25 PM 
   HCT 40.8 08  3:25 PM 
    08  3:25 PM 
   MCV 84.6 08  3:25 PM 
   RDW 12.6 08  3:25 PM 
 
 Lab Results 
 Basename Value Date/Time 
   TBILI 0.8 08  3:25 PM 
    08  3:25 PM 
   TP 7.1 08  3:25 PM 
   DIRBILI 0.1 08  3:25 PM 
   ALB 4.2 08  3:25 PM 
   AST 28 08  3:25 PM 
   ALT 24 08  3:25 PM 
 
 Lab Results 
 Basename Value Date/Time 
    3.4 10/9/07  8:00 AM 
 
 Last prograf level from this week is pending
 
  last EBV PCR       ?
 
  
 Assessment: Patient Active Problem List: 
   VSD (Ventricular Septal Defect)[745.4Y]
     Comment: With polysplenia 
   Aortic Valve Insufficiency[424.1F]
   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
     Comment: For biliary atresia, 2000 at [place]
 
 Plan:  
 1. CBC, primary transplant panel, drug levels-      every  3  months
 2. EBV PCR and serology                                      every   12  months
 3. CMV PCR and serology                                      every  12   months
 
 4. Other labs:   
 5. Imaging:       none
 6. Flu shot from PCP yearly
 7. change in medications:
 
          Prograf  No change- 1mg twice daily
          
        
 8. Continue to give antibiotic prior to dental procedures because of her polysplenia, even 
if not needed for her cardiac lesion
 
 8. Return to clinic    12  months Electronically signed by Ivis Burkett MD at 2009 11
:14 AM PSTdocumented in this encounter
 
 Plan of Treatment
 Not on filedocumented as of this encounter
 
 Procedures
 
 
+----------------+--------+-------------+----------------------+----------------------+
| Procedure Name | Priori | Date/Time   | Associated Diagnosis | Comments             |
|                | ty     |             |                      |                      |
+----------------+--------+-------------+----------------------+----------------------+
| LAB REPORTS    |        | 2009  |                      |   Results for this   |
|                |        | 12:00 AM    |                      | procedure are in the |
|                |        | PST         |                      |  results section.    |
+----------------+--------+-------------+----------------------+----------------------+
 documented in this encounter
 
 Results
 LAB REPORTS (2009 12:00 AM PST)
 
+----------------------------------------------------------+--------------+
| Narrative                                                | Performed At |
+----------------------------------------------------------+--------------+
|   This result has an attachment that is not available.   |              |
+----------------------------------------------------------+--------------+
 
 
 
+------------------------------------------------+
| Procedure Note                                 |
+------------------------------------------------+
|   Dang Bowling - 2009 12:00 AM PST     |
+------------------------------------------------+
 documented in this encounter
 
 Visit Diagnoses
 
 
+--------------------------------------------------------------------+
| Diagnosis                                                          |
+--------------------------------------------------------------------+
|   Transplanted liver (HCC) - Primary  Liver replaced by transplant |
+--------------------------------------------------------------------+
 documented in this encounter

## 2019-11-01 NOTE — XMS
Encounter Summary
  Created on: 2019
 
 Bonifacio Nila JB
 External Reference #: [id number]
 : [birth year]
 Sex: Female
 
 Demographics
 
 
+-----------------------+------------------------+
| Address               | [address]            |
|                       | [place]  [address]   |
+-----------------------+------------------------+
| Home Phone            | [phone]        |
+-----------------------+------------------------+
| Preferred Language    | Unknown                |
+-----------------------+------------------------+
| Marital Status        | Single                 |
+-----------------------+------------------------+
| Cheondoism Affiliation | Unknown                |
+-----------------------+------------------------+
| Race                  | White                  |
+-----------------------+------------------------+
| Ethnic Group          | Not  or  |
+-----------------------+------------------------+
 
 
 Author
 
 
+--------------+------------------------------+
| Author       | [facility] |
+--------------+------------------------------+
| Organization | [facility] |
+--------------+------------------------------+
| Address      | Unknown                      |
+--------------+------------------------------+
| Phone        | Unavailable                  |
+--------------+------------------------------+
 
 
 
 Support
 
 
+-----------------+--------------+---------+-----------------+
| Name            | Relationship | Address | Phone           |
+-----------------+--------------+---------+-----------------+
| Kit Valdovinos   | ECON         | Unknown | [phone] |
+-----------------+--------------+---------+-----------------+
| Magdalena Valdovinos | ECON         | Unknown | [phone] |
+-----------------+--------------+---------+-----------------+
 
 
 
 Care Team Providers
 
 
 
+-----------------------+------+-----------------+
| Care Team Member Name | Role | Phone           |
+-----------------------+------+-----------------+
| Lily Horton MD   | PCP  | [phone] |
+-----------------------+------+-----------------+
 
 
 
 Reason for Visit
 
 
+-------------------+----------+
| Reason            | Comments |
+-------------------+----------+
| Follow-up in      |          |
| outpatient clinic |          |
+-------------------+----------+
 Consultation (Routine)
 
+--------+--------------+---------------+--------------+--------------+---------------+
| Status | Reason       | Specialty     | Diagnoses /  | Referred By  | Referred To   |
|        |              |               | Procedures   | Contact      | Contact       |
+--------+--------------+---------------+--------------+--------------+---------------+
| Closed |   Specialty  | Pediatric     |   Diagnoses  |   Sol,    |   Ped Gastro  |
|        | Services     | Gastroenterol |  Liver       | Lily CARY MD | [facility]  [address]   |
|        | Required     | ogy           | replaced by  |   PEDS       | [address]   |
|        |              |               | transplant   | SPECIALISTS  | [address]       |
|        |              |               | (HCC)        | OF [place] | Mailcode:     |
|        |              |               |              |   [address]   | [address]         |
|        |              |               |              | [address] | [place]   |
|        |              |               |              |  [address]         | [place], [place]  |
|        |              |               |              | [place],   | [address]    |
|        |              |               |              | [place] [address]     | Phone:        |
|        |              |               |              | Phone:       | [phone]  |
|        |              |               |              | [phone] |  Fax:         |
|        |              |               |              |   Fax:       | [phone]  |
|        |              |               |              | [phone] |               |
+--------+--------------+---------------+--------------+--------------+---------------+
 
 
 
 
 Encounter Details
 
 
+--------+---------+----------------------+--------------------+---------------------+
| Date   | Type    | Department           | Care Team          | Description         |
+--------+---------+----------------------+--------------------+---------------------+
| / | Office  |   Pediatric          |   Ivis Burkett MD | Transplanted Liver  |
|    | Visit   | Gastroenterology at  |                    | (HCC) (Primary Dx)  |
|        |         | [place]          |                    |                     |
|        |         | [facility]  |                    |                     |
|        |         |  [address] |                    |                     |
|        |         |  [address]  Mailcode:  |                    |                     |
|        |         | [address]  [place]   |                    |                     |
|        |         | [place], [place]         |                    |                     |
|        |         | [address]           |                    |                     |
|        |         | [phone]         |                    |                     |
 
+--------+---------+----------------------+--------------------+---------------------+
 
 
 
 Social History
 
 
+----------------+-------+-----------+--------+------+
| Tobacco Use    | Types | Packs/Day | Years  | Date |
|                |       |           | Used   |      |
+----------------+-------+-----------+--------+------+
| Never Assessed |       |           |        |      |
+----------------+-------+-----------+--------+------+
 
 
 
+------------------+---------------+
| Sex Assigned at  | Date Recorded |
| Birth            |               |
+------------------+---------------+
| Not on file      |               |
+------------------+---------------+
 
 
 
+----------------+-------------+-------------+
| Job Start Date | Occupation  | Industry    |
+----------------+-------------+-------------+
| Not on file    | Not on file | Not on file |
+----------------+-------------+-------------+
 
 
 
+----------------+--------------+------------+
| Travel History | Travel Start | Travel End |
+----------------+--------------+------------+
 
 
 
+-------------------------------------+
| No recent travel history available. |
+-------------------------------------+
 documented as of this encounter
 
 Last Filed Vital Signs
 
 
+-------------------+----------------------+----------------------+----------+
| Vital Sign        | Reading              | Time Taken           | Comments |
+-------------------+----------------------+----------------------+----------+
| Blood Pressure    | 126/80               | 2008 12:49 PM  |          |
|                   |                      | PDT                  |          |
+-------------------+----------------------+----------------------+----------+
| Pulse             | 77                   | 2008 12:49 PM  |          |
|                   |                      | PDT                  |          |
+-------------------+----------------------+----------------------+----------+
| Temperature       | -                    | -                    |          |
+-------------------+----------------------+----------------------+----------+
| Respiratory Rate  | -                    | -                    |          |
+-------------------+----------------------+----------------------+----------+
 
| Oxygen Saturation | -                    | -                    |          |
+-------------------+----------------------+----------------------+----------+
| Inhaled Oxygen    | -                    | -                    |          |
| Concentration     |                      |                      |          |
+-------------------+----------------------+----------------------+----------+
| Weight            | 38.9 kg (85 lb 12.1  | 2008 12:49 PM  |          |
|                   | oz)                  | PDT                  |          |
+-------------------+----------------------+----------------------+----------+
| Height            | 140.6 cm (4' 7.35")  | 2008 12:49 PM  |          |
|                   |                      | PDT                  |          |
+-------------------+----------------------+----------------------+----------+
| Body Mass Index   | 19.68                | 2008 12:49 PM  |          |
|                   |                      | PDT                  |          |
+-------------------+----------------------+----------------------+----------+
 documented in this encounter
 
 Tanmay Burkett, Ivis - 2008  1:12 PM PDTFormatting of this note might be different from the o
riginal.
 
 Nila Valdovinos is an 8 y.o. female, who is referred by Lily Horton, who returns to [facility] for followup of her liver transplant
 
 Patient Active Problem List: 
   VSD (Ventricular Septal Defect)[745.4Y]
     Comment: With polysplenia 
   Aortic Valve Insufficiency[424.1F]
   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
     Comment: For biliary atresia, 2000 at [place]
 
 Current outpatient prescriptions prior to encounter 
 Medication Sig Dispense Refill 
   Tacrolimus (PROGRAF) 1 mg Oral Capsule 1 PO bid  60  1 
 
 No Known Allergies.
 
 HPI: Nila returned to clinic today with her mother. She has been doing very well and has n
ot had any significant illnesses since her last visit, which was in Oct 2006. She has contin
ued to take Prograf but admits to forgetting a dose about every few days. Her mother gives h
er a dose at 5AM but she is at her grandfather's at 5PM and sometimes forgets. 
 
 She did not have a flu shot this year. She changed schools to a private school with only 11
 kids in her class and 50 in the school, and due to decreased exposure, her mother felt she 
didn't need the shot.
 
 She was seen by her cardiologist, Dr Norman, and new aortic insufficiency was noted. Althoug
h her VSD is functionally smaller, it may need closure in the future if the aortic insuffici
ency worsens. She has no exercise restriction but SBE prophylaxis is recommended.
 
 Review of Systems:  
 General:  No  Fevers. Not sure whether EBV PCR was done with last weeks labs
 Ears, Nose and Throat:  No recurrent aphthous ulcers, sore throat
 Respiratory:  No cough, history of pneumonia, or wheezing
 Gastrointestinal:  No diarrhea or abdominal pain except when she is nervous
 Neurologic:  No unusual headaches 
 Skin:  No rashes
 
 grade 3  and runs track, with her best events being the 100 and 1500 M
 
 Physical Examination:
 
 
 /80, Pulse 77, Ht 140.6 cm (4' 7") (83 %ile), Wt 38.900 kg (85 lbs 12.1 oz) (89 %ile)
, Weight for age(%)  88.63%, BMI for age(%)  86.99%, Length for age(%)  82.81%. 
 
 86.99% of growth percentile based on BMI-for-age.
 
 Appearance: alert, active and in no apparent distress.
 Skin:  turgor normal,  capillary refill brisk, no rashes, petechiae 
 HEENT: normocephalic,PERRLA , no icterus,,nose without discharge, mouth no aphthous lesions
, mucous membranes moist, pharynx unremarkable, tonsils moderate sized without exudate
 Neck: supple, without thyromegaly
 Chest: clear to auscultation bilaterally.
 CV: regular sinus rhythm, normal S1 and S2, no  murmurs.
 Abdomen:,  well healed incisions, soft, no distention, no tenderness to palpation, no rebou
nd , no guarding, no palpable masses, normal bowel sounds, no hepatosplenomegaly
 Musculoskeletal: grossly intact  without clubbing or edema
 Neuro: appropriate interactions, symmetric facies, moves all extremities well
 Nodes: no signficant cervical, supraclavicular, or axillary adenopathy
 
 Last labs 08
 AST 28, ALT 24, alk phos 230, bili 0.1/0.8
 Cr 0.5
 GGT9
 Wbc 8.3, hct 40, plt 339
 Prograf level pending
 
 Assessment: Patient Active Problem List: 
   VSD (Ventricular Septal Defect)[745.4Y]
     Comment: With polysplenia 
   Aortic Valve Insufficiency[424.1F]
 
   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
     Comment: For biliary atresia, 2000 at [place]
 
 Doing well. May or may not have had labs in December, but recent labs normal
 
 Plan:  
 1. CBC, primary transplant panel, drug levels-      every  3  months
 2. EBV PCR and serology                                      every   12  months
 3. CMV PCR and serology                                     Not needed
 
 4. Other labs:   
 5. Imaging:       none
 6. Flu shot from PCP- recommend yearly 
 7. Return to transplant clinic about 6 months.
 
 8. .Mother has questions about whether immunosuppression can ever be stopped, and we discus
sed this and they were counselled to be regular with her medication. Advised to get med disp
enser with days of the week to keep at her grandfather's house.
 
 7. change in medications: none
      
 
 Electronically signed by Ivis Burkett at 2008  1:27 PM PDTdocumented in this encounter
 
 Plan of Treatment
 Not on filedocumented as of this encounter
 
 Visit Diagnoses
 
 
 
+--------------------------------------------------------------------+
| Diagnosis                                                          |
+--------------------------------------------------------------------+
|   Transplanted liver (HCC) - Primary  Liver replaced by transplant |
+--------------------------------------------------------------------+
 documented in this encounter

## 2019-11-01 NOTE — XMS
Encounter Summary
  Created on: 2019
 
 Bonifacio Nila JB
 External Reference #: [id number]
 : [birth year]
 Sex: Female
 
 Demographics
 
 
+-----------------------+------------------------+
| Address               | [address]            |
|                       | [place]  [address]   |
+-----------------------+------------------------+
| Home Phone            | [phone]        |
+-----------------------+------------------------+
| Preferred Language    | Unknown                |
+-----------------------+------------------------+
| Marital Status        | Single                 |
+-----------------------+------------------------+
| Roman Catholic Affiliation | Unknown                |
+-----------------------+------------------------+
| Race                  | White                  |
+-----------------------+------------------------+
| Ethnic Group          | Not  or  |
+-----------------------+------------------------+
 
 
 Author
 
 
+--------------+------------------------------+
| Author       | [facility] |
+--------------+------------------------------+
| Organization | [facility] |
+--------------+------------------------------+
| Address      | Unknown                      |
+--------------+------------------------------+
| Phone        | Unavailable                  |
+--------------+------------------------------+
 
 
 
 Support
 
 
+-----------------+--------------+---------+-----------------+
| Name            | Relationship | Address | Phone           |
+-----------------+--------------+---------+-----------------+
| Kit Valdovinos   | ECON         | Unknown | [phone] |
+-----------------+--------------+---------+-----------------+
| Magdalena Valdovinos | ECON         | Unknown | [phone] |
+-----------------+--------------+---------+-----------------+
 
 
 
 Care Team Providers
 
 
 
+------------------------+------+-----------------+
| Care Team Member Name  | Role | Phone           |
+------------------------+------+-----------------+
| Lily Horton MD | PCP  | [phone] |
+------------------------+------+-----------------+
 
 
 
 Encounter Details
 
 
+--------+-------------+----------------------+----------------------+-------------+
| Date   | Type        | Department           | Care Team            | Description |
+--------+-------------+----------------------+----------------------+-------------+
| / | Documentati |   Pediatric          |   Neema Khalil,   |             |
|    | on          | Gastroenterology at  | [address] |             |
|        |             | [place]          |   [place], [place]       |             |
|        |             | [facility]  | [address]           |             |
|        |             |  [address] | [phone]         |             |
|        |             |  [address]  Mailcode:  | [phone] (Fax)   |             |
|        |             | [address]   |                      |             |
|        |             | [place], [place]         |                      |             |
|        |             | [address]           |                      |             |
|        |             | [phone]         |                      |             |
+--------+-------------+----------------------+----------------------+-------------+
 
 
 
 Social History
 
 
+-------------------+-------+-----------+--------+------+
| Tobacco Use       | Types | Packs/Day | Years  | Date |
|                   |       |           | Used   |      |
+-------------------+-------+-----------+--------+------+
| Passive Smoke     |       |           |        |      |
| Exposure - Never  |       |           |        |      |
| Smoker            |       |           |        |      |
+-------------------+-------+-----------+--------+------+
 
 
 
+---------------------+---+---+---+
| Smokeless Tobacco:  |   |   |   |
| Never Used          |   |   |   |
+---------------------+---+---+---+
 
 
 
+-------------+-------------+---------+----------+
| Alcohol Use | Drinks/Week | oz/Week | Comments |
+-------------+-------------+---------+----------+
| Not Asked   |             |         |          |
+-------------+-------------+---------+----------+
 
 
 
+------------------+---------------+
 
| Sex Assigned at  | Date Recorded |
| Birth            |               |
+------------------+---------------+
| Not on file      |               |
+------------------+---------------+
 
 
 
+----------------+-------------+-------------+
| Job Start Date | Occupation  | Industry    |
+----------------+-------------+-------------+
| Not on file    | Not on file | Not on file |
+----------------+-------------+-------------+
 
 
 
+----------------+--------------+------------+
| Travel History | Travel Start | Travel End |
+----------------+--------------+------------+
 
 
 
+-------------------------------------+
| No recent travel history available. |
+-------------------------------------+
 documented as of this encounter
 
 Plan of Treatment
 Not on filedocumented as of this encounter
 
 Visit Diagnoses
 Not on filedocumented in this encounter

## 2019-11-01 NOTE — XMS
Encounter Summary
  Created on: 2019
 
 Bonifacio Nila JB
 External Reference #: [id number]
 : [birth year]
 Sex: Female
 
 Demographics
 
 
+-----------------------+------------------------+
| Address               | [address]            |
|                       | [place]  [address]   |
+-----------------------+------------------------+
| Home Phone            | [phone]        |
+-----------------------+------------------------+
| Preferred Language    | Unknown                |
+-----------------------+------------------------+
| Marital Status        | Single                 |
+-----------------------+------------------------+
| Shinto Affiliation | Unknown                |
+-----------------------+------------------------+
| Race                  | White                  |
+-----------------------+------------------------+
| Ethnic Group          | Not  or  |
+-----------------------+------------------------+
 
 
 Author
 
 
+--------------+------------------------------+
| Author       | [facility] |
+--------------+------------------------------+
| Organization | [facility] |
+--------------+------------------------------+
| Address      | Unknown                      |
+--------------+------------------------------+
| Phone        | Unavailable                  |
+--------------+------------------------------+
 
 
 
 Support
 
 
+-----------------+--------------+---------+-----------------+
| Name            | Relationship | Address | Phone           |
+-----------------+--------------+---------+-----------------+
| Kit Valdovinos   | ECON         | Unknown | [phone] |
+-----------------+--------------+---------+-----------------+
| Magdalena Valdovinos | ECON         | Unknown | [phone] |
+-----------------+--------------+---------+-----------------+
 
 
 
 Care Team Providers
 
 
 
+-----------------------+------+-----------------+
| Care Team Member Name | Role | Phone           |
+-----------------------+------+-----------------+
| Lily Horton MD   | PCP  | [phone] |
+-----------------------+------+-----------------+
 
 
 
 Encounter Details
 
 
+--------+----------+----------------------+--------------------+-------------+
| Date   | Type     | Department           | Care Team          | Description |
+--------+----------+----------------------+--------------------+-------------+
| 10/27/ | Abstract |   Pediatric          |   Ivis Burkett MD |             |
|    |          | Gastroenterology at  |                    |             |
|        |          | [facility]          |                    |             |
|        |          | [facility]  |                    |             |
|        |          |  [address] |                    |             |
|        |          |  [address]  Mailcode:  |                    |             |
|        |          | [address]   |                    |             |
|        |          | [place], [place]         |                    |             |
|        |          | [address]           |                    |             |
|        |          | [phone]         |                    |             |
+--------+----------+----------------------+--------------------+-------------+
 
 
 
 Social History
 
 
+----------------+-------+-----------+--------+------+
| Tobacco Use    | Types | Packs/Day | Years  | Date |
|                |       |           | Used   |      |
+----------------+-------+-----------+--------+------+
| Never Assessed |       |           |        |      |
+----------------+-------+-----------+--------+------+
 
 
 
+------------------+---------------+
| Sex Assigned at  | Date Recorded |
| Birth            |               |
+------------------+---------------+
| Not on file      |               |
+------------------+---------------+
 
 
 
+----------------+-------------+-------------+
| Job Start Date | Occupation  | Industry    |
+----------------+-------------+-------------+
| Not on file    | Not on file | Not on file |
+----------------+-------------+-------------+
 
 
 
+----------------+--------------+------------+
 
| Travel History | Travel Start | Travel End |
+----------------+--------------+------------+
 
 
 
+-------------------------------------+
| No recent travel history available. |
+-------------------------------------+
 documented as of this encounter
 
 Plan of Treatment
 Not on filedocumented as of this encounter
 
 Procedures
 
 
+----------------------+--------+-------------+----------------------+----------------------
+
| Procedure Name       | Priori | Date/Time   | Associated Diagnosis | Comments             
|
|                      | ty     |             |                      |                      
|
+----------------------+--------+-------------+----------------------+----------------------
+
| OTHER                | Routin | 10/20/2009  |                      |   Results for this   
|
|                      | e      |  8:15 AM    |                      | procedure are in the 
|
|                      |        | PDT         |                      |  results section.    
|
+----------------------+--------+-------------+----------------------+----------------------
+
| CBC, WITH            | Routin | 10/20/2009  |                      |   Results for this   
|
| DIFFERENTIAL         | e      |  8:15 AM    |                      | procedure are in the 
|
|                      |        | PDT         |                      |  results section.    
|
+----------------------+--------+-------------+----------------------+----------------------
+
| MICROALBUMIN/CREATIN | Routin | 10/20/2009  |                      |   Results for this   
|
| INE RATIO (RANDOM    | e      |  8:15 AM    |                      | procedure are in the 
|
| URINE)               |        | PDT         |                      |  results section.    
|
+----------------------+--------+-------------+----------------------+----------------------
+
| COMPLETE METABOLIC   | Routin | 10/20/2009  |                      |   Results for this   
|
| SET                  | e      |  8:15 AM    |                      | procedure are in the 
|
| (NA,K,CL,CO2,BUN,CRE |        | PDT         |                      |  results section.    
|
| AT,GLUC,CA,AST,ALT,B |        |             |                      |                      
|
| ASHWINI TOTAL,ALK        |        |             |                      |                      
|
| PHOS,ALB,PROT TOTAL) |        |             |                      |                      
|
 
+----------------------+--------+-------------+----------------------+----------------------
+
| PHOSPHORUS, PLASMA   | Routin | 10/20/2009  |                      |   Results for this   
|
|                      | e      |  8:15 AM    |                      | procedure are in the 
|
|                      |        | PDT         |                      |  results section.    
|
+----------------------+--------+-------------+----------------------+----------------------
+
| GGT, PLASMA          | Routin | 10/20/2009  |                      |   Results for this   
|
|                      | e      |  8:15 AM    |                      | procedure are in the 
|
|                      |        | PDT         |                      |  results section.    
|
+----------------------+--------+-------------+----------------------+----------------------
+
| BILIRUBIN DIRECT     | Routin | 10/20/2009  |                      |   Results for this   
|
|                      | e      |  8:15 AM    |                      | procedure are in the 
|
|                      |        | PDT         |                      |  results section.    
|
+----------------------+--------+-------------+----------------------+----------------------
+
| MAGNESIUM, PLASMA    | Routin | 10/20/2009  |                      |   Results for this   
|
|                      | e      |  8:15 AM    |                      | procedure are in the 
|
|                      |        | PDT         |                      |  results section.    
|
+----------------------+--------+-------------+----------------------+----------------------
+
| TRIGLYCERIDES,       | Routin | 10/20/2009  |                      |   Results for this   
|
| PLASMA               | e      |  8:15 AM    |                      | procedure are in the 
|
|                      |        | PDT         |                      |  results section.    
|
+----------------------+--------+-------------+----------------------+----------------------
+
| CHOLESTEROL TOTAL,   | Routin | 10/20/2009  |                      |   Results for this   
|
| PLASMA               | e      |  8:15 AM    |                      | procedure are in the 
|
|                      |        | PDT         |                      |  results section.    
|
+----------------------+--------+-------------+----------------------+----------------------
+
| CREATININE, URINE    | Routin | 10/20/2009  |                      |   Results for this   
|
|                      | e      |  8:15 AM    |                      | procedure are in the 
|
|                      |        | PDT         |                      |  results section.    
|
+----------------------+--------+-------------+----------------------+----------------------
+
 documented in this encounter
 
 
 Results
 OTHER (10/20/2009  8:15 AM PDT)
 
+-------------+----------------+--------------+------------+--------------+
| Component   | Value          | Ref Range    | Performed  | Pathologist  |
|             |                |              | At         | Signature    |
+-------------+----------------+--------------+------------+--------------+
| ALKALINE    | 8.3Comment:    | 0 - 30 mg/dL | [facility]  |              |
| PHOS, OTHER | microalb/creat |              | LAB -      |              |
|  CALC       |                |              | [place]  |              |
+-------------+----------------+--------------+------------+--------------+
 
 
 
+----------+
| Specimen |
+----------+
| Urine    |
+----------+
 
 
 
+--------------------+----------------------+--------------------+----------------+
| Performing         | Address              | City/State/Zipcode | Phone Number   |
| Organization       |                      |                    |                |
+--------------------+----------------------+--------------------+----------------+
|   [facility]  |   [address] | [place], [place]      |   [phone] |
| [place]          |                      |                    |                |
+--------------------+----------------------+--------------------+----------------+
|   [facility]  |                      | [place], [place]      |                |
| [place]          |                      |                    |                |
+--------------------+----------------------+--------------------+----------------+
 COMPLETE METABOLIC SET (NA,K,CL,CO2,BUN,CREAT,GLUC,CA,AST,ALT,BILI TOTAL,ALK PHOS,ALB,PROT 
TOTAL) (10/20/2009  8:15 AM PDT)
 
+-------------+-------+-----------------+------------+--------------+
| Component   | Value | Ref Range       | Performed  | Pathologist  |
|             |       |                 | At         | Signature    |
+-------------+-------+-----------------+------------+--------------+
| GLUCOSE,    | 100   | 60 - 100 mg/dL  | [facility]  |              |
| PLASMA      |       |                 | LAB -      |              |
| (LAB)       |       |                 | [place]  |              |
+-------------+-------+-----------------+------------+--------------+
| BUN, PLASMA | 9     | 6 - 23 mg/dL    | [facility]  |              |
|  (LAB)      |       |                 | LAB -      |              |
|             |       |                 | [place]  |              |
+-------------+-------+-----------------+------------+--------------+
| CREATININE  | 0.51  | 0.5 - 1.5 mg/dL | [facility]  |              |
| PLASMA      |       |                 | LAB -      |              |
| (LAB)       |       |                 | [place]  |              |
+-------------+-------+-----------------+------------+--------------+
| TOTAL       | 6.7   | 6.1 - 8.5 g/dL  | [facility]  |              |
| PROTEIN,    |       |                 | LAB -      |              |
| PLASMA      |       |                 | [place]  |              |
| (LAB)       |       |                 |            |              |
+-------------+-------+-----------------+------------+--------------+
| ALBUMIN,    | 3.7   | 2.9 - 5.5 g/dL  | [facility]  |              |
| PLASMA      |       |                 | LAB -      |              |
| (LAB)       |       |                 | [place]  |              |
+-------------+-------+-----------------+------------+--------------+
 
| CALCIUM,    | 9.6   | 8.5 - 10.5      | [facility]  |              |
| PLASMA      |       | mg/dL           | LAB -      |              |
| (LAB)       |       |                 | [place]  |              |
+-------------+-------+-----------------+------------+--------------+
| BILIRUBIN   | 0.5   | 0.0 - 1.2       | [facility]  |              |
| TOTAL       |       | Transcutaneous  | LAB -      |              |
|             |       | Bilirubinometer | [place]  |              |
+-------------+-------+-----------------+------------+--------------+
| ALK PHOS    | 220   | 135 - 384 U/L   | [facility]  |              |
|             |       |                 | LAB -      |              |
|             |       |                 | [place]  |              |
+-------------+-------+-----------------+------------+--------------+
| AST(SGOT)   | 20    | 0 - 40 U/L      | [facility]  |              |
|             |       |                 | LAB -      |              |
|             |       |                 | [place]  |              |
+-------------+-------+-----------------+------------+--------------+
| SODIUM,     | 139   | 132 - 143       | [facility]  |              |
| PLASMA      |       | mmol/L          | LAB -      |              |
| (LAB)       |       |                 | [place]  |              |
+-------------+-------+-----------------+------------+--------------+
| POTASSIUM,  | 4.0   | 3.6 - 5.1       | [facility]  |              |
| PLASMA      |       | mmol/L          | LAB -      |              |
| (LAB)       |       |                 | [place]  |              |
+-------------+-------+-----------------+------------+--------------+
| CHLORIDE,   | 106   | 95 - 112 mmol/L | [facility]  |              |
| PLASMA      |       |                 | LAB -      |              |
| (LAB)       |       |                 | [place]  |              |
+-------------+-------+-----------------+------------+--------------+
| TOTAL CO2,  | 23.2  | 19 - 31 mmol/L  | [facility]  |              |
| PLASMA      |       |                 | LAB -      |              |
| (LAB)       |       |                 | [place]  |              |
+-------------+-------+-----------------+------------+--------------+
| ALT (SGPT)  | 17    | 0 - 46 U/L      | [facility]  |              |
|             |       |                 | LAB -      |              |
|             |       |                 | [place]  |              |
+-------------+-------+-----------------+------------+--------------+
| ANION GAP   | 13.8  | 7 - 21          | [facility]  |              |
|             |       |                 | LAB -      |              |
|             |       |                 | [place]  |              |
+-------------+-------+-----------------+------------+--------------+
| BUN/CREATIN | 17.6  | 6.0 - 28.6      | [facility]  |              |
| INE RATIO   |       |                 | LAB -      |              |
|             |       |                 | [place]  |              |
+-------------+-------+-----------------+------------+--------------+
| A/G RATIO   | 1.2   | 1.1 - 2.4       | [facility]  |              |
|             |       |                 | LAB -      |              |
|             |       |                 | [place]  |              |
+-------------+-------+-----------------+------------+--------------+
 
 
 
+---------------+
| Specimen      |
+---------------+
| Blood - Blood |
+---------------+
 
 
 
+--------------------+----------------------+--------------------+----------------+
 
| Performing         | Address              | City/State/Zipcode | Phone Number   |
| Organization       |                      |                    |                |
+--------------------+----------------------+--------------------+----------------+
|   [facility]  |   [address] | [place], [place]      |   [phone] |
| [place]          |                      |                    |                |
+--------------------+----------------------+--------------------+----------------+
|   [facility]  |                      | [place], [place]      |                |
| [place]          |                      |                    |                |
+--------------------+----------------------+--------------------+----------------+
 CBC, WITH DIFFERENTIAL (10/20/2009  8:15 AM PDT)
 
+-------------+----------+-----------------+------------+--------------+
| Component   | Value    | Ref Range       | Performed  | Pathologist  |
|             |          |                 | At         | Signature    |
+-------------+----------+-----------------+------------+--------------+
| WHITE CELL  | 5.7      | 4.5 - 13.5 K/cu | [facility]  |              |
| COUNT       |          |  mm             | LAB -      |              |
|             |          |                 | [place]  |              |
+-------------+----------+-----------------+------------+--------------+
| RED CELL    | 4.89     | 4.1 - 5.3 M/cu  | [facility]  |              |
| COUNT       |          | mm              | LAB -      |              |
|             |          |                 | [place]  |              |
+-------------+----------+-----------------+------------+--------------+
| HEMOGLOBIN  | 14.1     | 10.6 - 15.2     | [facility]  |              |
|             |          | g/dL            | LAB -      |              |
|             |          |                 | [place]  |              |
+-------------+----------+-----------------+------------+--------------+
| HEMATOCRIT  | 43.0 (A) | 32 - 42 %       | [facility]  |              |
|             |          |                 | LAB -      |              |
|             |          |                 | [place]  |              |
+-------------+----------+-----------------+------------+--------------+
| MCV         | 87.8     | 71 - 89 fL      | [facility]  |              |
|             |          |                 | LAB -      |              |
|             |          |                 | [place]  |              |
+-------------+----------+-----------------+------------+--------------+
| MCH         | 29       | 24 - 30 pg      | [facility]  |              |
|             |          |                 | LAB -      |              |
|             |          |                 | [place]  |              |
+-------------+----------+-----------------+------------+--------------+
| MCHC        | 33       | 30 - 36 g/dL    | [facility]  |              |
|             |          |                 | LAB -      |              |
|             |          |                 | [place]  |              |
+-------------+----------+-----------------+------------+--------------+
| PLATELET    | 320      | 140 - 440 K/cu  | [facility]  |              |
| COUNT       |          | mm              | LAB -      |              |
|             |          |                 | [place]  |              |
+-------------+----------+-----------------+------------+--------------+
| NEUTROPHIL  | 49.7     | 31 - 60 %       | [facility]  |              |
| %           |          |                 | LAB -      |              |
|             |          |                 | [place]  |              |
+-------------+----------+-----------------+------------+--------------+
| LYMPHOCYTE  | 41.0     | 28 - 48 %       | [facility]  |              |
| %           |          |                 | LAB -      |              |
|             |          |                 | [place]  |              |
+-------------+----------+-----------------+------------+--------------+
| MONOCYTE %  | 4.8      | 0 - 12 %        | [facility]  |              |
|             |          |                 | LAB -      |              |
|             |          |                 | [place]  |              |
+-------------+----------+-----------------+------------+--------------+
| EOS %       | 3.8      | 0 - 6 %         | [facility]  |              |
 
|             |          |                 | LAB -      |              |
|             |          |                 | [place]  |              |
+-------------+----------+-----------------+------------+--------------+
| BASO %      | 0.7      | 0 - 2 %         | [facility]  |              |
|             |          |                 | LAB -      |              |
|             |          |                 | [place]  |              |
+-------------+----------+-----------------+------------+--------------+
| RDW         | 13.4     | 10.5 - 15.0 %   | [facility]  |              |
|             |          |                 | LAB -      |              |
|             |          |                 | [place]  |              |
+-------------+----------+-----------------+------------+--------------+
| MPV         |          | fL              | [facility]  |              |
|             |          |                 | LAB -      |              |
|             |          |                 | [place]  |              |
+-------------+----------+-----------------+------------+--------------+
| NEUTROPHIL  |          | K/cu mm         | [facility]  |              |
| #           |          |                 | LAB -      |              |
|             |          |                 | [place]  |              |
+-------------+----------+-----------------+------------+--------------+
| LYMPHOCYTE  |          | K/cu mm         | [facility]  |              |
| #           |          |                 | LAB -      |              |
|             |          |                 | [place]  |              |
+-------------+----------+-----------------+------------+--------------+
| MONOCYTE #  |          | K/cu mm         | [facility]  |              |
|             |          |                 | LAB -      |              |
|             |          |                 | [place]  |              |
+-------------+----------+-----------------+------------+--------------+
| EOS #       |          | K/cu mm         | [facility]  |              |
|             |          |                 | LAB -      |              |
|             |          |                 | [place]  |              |
+-------------+----------+-----------------+------------+--------------+
| BASO #      |          |                 | [facility]  |              |
|             |          |                 | LAB -      |              |
|             |          |                 | [place]  |              |
+-------------+----------+-----------------+------------+--------------+
 
 
 
+---------------+
| Specimen      |
+---------------+
| Blood - Blood |
+---------------+
 
 
 
+--------------------+----------------------+--------------------+----------------+
| Performing         | Address              | City/State/Zipcode | Phone Number   |
| Organization       |                      |                    |                |
+--------------------+----------------------+--------------------+----------------+
|   [facility]  |   [address] | [place], [place]      |   [phone] |
| [place]          |                      |                    |                |
+--------------------+----------------------+--------------------+----------------+
|   [facility]  |                      | [place], [place]      |                |
| [place]          |                      |                    |                |
+--------------------+----------------------+--------------------+----------------+
 GGT, PLASMA (10/20/2009  8:15 AM PDT)
 
+-----------+-------+------------+------------+--------------+
| Component | Value | Ref Range  | Performed  | Pathologist  |
 
|           |       |            | At         | Signature    |
+-----------+-------+------------+------------+--------------+
| GAMMA     | 10    | 5 - 60 U/L | [facility]  |              |
| GLUTAMYL  |       |            | LAB -      |              |
| TRANS     |       |            | [place]  |              |
+-----------+-------+------------+------------+--------------+
 
 
 
+---------------+
| Specimen      |
+---------------+
| Blood - Blood |
+---------------+
 
 
 
+--------------------+----------------------+--------------------+----------------+
| Performing         | Address              | City/State/Zipcode | Phone Number   |
| Organization       |                      |                    |                |
+--------------------+----------------------+--------------------+----------------+
|   [facility]  |   [address] | [place], [place]      |   [phone] |
| [place]          |                      |                    |                |
+--------------------+----------------------+--------------------+----------------+
|   [facility]  |                      | [place], [place]      |                |
| [place]          |                      |                    |                |
+--------------------+----------------------+--------------------+----------------+
 MAGNESIUM, PLASMA (10/20/2009  8:15 AM PDT)
 
+-------------+----------+-----------------+------------+--------------+
| Component   | Value    | Ref Range       | Performed  | Pathologist  |
|             |          |                 | At         | Signature    |
+-------------+----------+-----------------+------------+--------------+
| MAGNESIUM,P | 1.69 (A) | 1.7 - 2.5 mg/dL | [facility]  |              |
| LASMA       |          |                 | LAB -      |              |
|             |          |                 | [place]  |              |
+-------------+----------+-----------------+------------+--------------+
 
 
 
+---------------+
| Specimen      |
+---------------+
| Blood - Blood |
+---------------+
 
 
 
+--------------------+----------------------+--------------------+----------------+
| Performing         | Address              | City/State/Zipcode | Phone Number   |
| Organization       |                      |                    |                |
+--------------------+----------------------+--------------------+----------------+
|   [facility]  |   [address] | [place]      |   [phone] |
| [place]          |                      |                    |                |
+--------------------+----------------------+--------------------+----------------+
|   [facility]  |                      | [place], [place]      |                |
| [place]          |                      |                    |                |
+--------------------+----------------------+--------------------+----------------+
 BILIRUBIN DIRECT (10/20/2009  8:15 AM PDT)
 
 
+------------+-------+-----------------+------------+--------------+
| Component  | Value | Ref Range       | Performed  | Pathologist  |
|            |       |                 | At         | Signature    |
+------------+-------+-----------------+------------+--------------+
| BILIRUBIN  | 0.1   | 0.0 - 0.4 mg/dL | [facility]  |              |
| DIRECT     |       |                 | LAB -      |              |
|            |       |                 | [place]  |              |
+------------+-------+-----------------+------------+--------------+
 
 
 
+---------------+
| Specimen      |
+---------------+
| Blood - Blood |
+---------------+
 
 
 
+--------------------+----------------------+--------------------+----------------+
| Performing         | Address              | City/State/Zipcode | Phone Number   |
| Organization       |                      |                    |                |
+--------------------+----------------------+--------------------+----------------+
|   [facility]  |   [address] | [place], [place]      |   [phone] |
| [place]          |                      |                    |                |
+--------------------+----------------------+--------------------+----------------+
|   [facility]  |                      | [place], [place]      |                |
| [place]          |                      |                    |                |
+--------------------+----------------------+--------------------+----------------+
 TRIGLYCERIDES, PLASMA (10/20/2009  8:15 AM PDT)
 
+-------------+-------+----------------+------------+--------------+
| Component   | Value | Ref Range      | Performed  | Pathologist  |
|             |       |                | At         | Signature    |
+-------------+-------+----------------+------------+--------------+
| TRIGLYCERID | 94    | 30 - 150 mg/dL | [facility]  |              |
| ES          |       |                | LAB -      |              |
|             |       |                | [place]  |              |
+-------------+-------+----------------+------------+--------------+
 
 
 
+---------------+
| Specimen      |
+---------------+
| Blood - Blood |
+---------------+
 
 
 
+--------------------+----------------------+--------------------+----------------+
| Performing         | Address              | City/State/Zipcode | Phone Number   |
| Organization       |                      |                    |                |
+--------------------+----------------------+--------------------+----------------+
|   [facility]  |   [address] | [place], [place]      |   [phone] |
| [place]          |                      |                    |                |
+--------------------+----------------------+--------------------+----------------+
|   [facility]  |                      | [place], [place]      |                |
| [place]          |                      |                    |                |
+--------------------+----------------------+--------------------+----------------+
 
 PHOSPHORUS, PLASMA (10/20/2009  8:15 AM PDT)
 
+-------------+-------+-----------------+------------+--------------+
| Component   | Value | Ref Range       | Performed  | Pathologist  |
|             |       |                 | At         | Signature    |
+-------------+-------+-----------------+------------+--------------+
| PHOSPHORUS, | 4.7   | 2.6 - 6.2 mg/dL | [facility]  |              |
|  PLASMA     |       |                 | LAB -      |              |
| (LAB)       |       |                 | [place]  |              |
+-------------+-------+-----------------+------------+--------------+
 
 
 
+---------------+
| Specimen      |
+---------------+
| Blood - Blood |
+---------------+
 
 
 
+--------------------+----------------------+--------------------+----------------+
| Performing         | Address              | City/State/Zipcode | Phone Number   |
| Organization       |                      |                    |                |
+--------------------+----------------------+--------------------+----------------+
|   [facility]  |   [address] | [place]      |   [phone] |
| [place]          |                      |                    |                |
+--------------------+----------------------+--------------------+----------------+
|   [facility]  |                      | [place], [place]      |                |
| [place]          |                      |                    |                |
+--------------------+----------------------+--------------------+----------------+
 CHOLESTEROL TOTAL, PLASMA (10/20/2009  8:15 AM PDT)
 
+-------------+-------+---------------+------------+--------------+
| Component   | Value | Ref Range     | Performed  | Pathologist  |
|             |       |               | At         | Signature    |
+-------------+-------+---------------+------------+--------------+
| CHOLESTEROL | 142   | < - 200 mg/dL | [facility]  |              |
|   (LAB)     |       |               | LAB -      |              |
|             |       |               | [place]  |              |
+-------------+-------+---------------+------------+--------------+
 
 
 
+---------------+
| Specimen      |
+---------------+
| Blood - Blood |
+---------------+
 
 
 
+--------------------+----------------------+--------------------+----------------+
| Performing         | Address              | City/State/Zipcode | Phone Number   |
| Organization       |                      |                    |                |
+--------------------+----------------------+--------------------+----------------+
|   [facility]  |   [address] | [place]      |   [phone] |
| [place]          |                      |                    |                |
+--------------------+----------------------+--------------------+----------------+
|   [facility]  |                      | [place], [place]      |                |
 
| [place]          |                      |                    |                |
+--------------------+----------------------+--------------------+----------------+
 CREATININE, URINE (10/20/2009  8:15 AM PDT)
 
+-------------+-------+-------------+------------+--------------+
| Component   | Value | Ref Range   | Performed  | Pathologist  |
|             |       |             | At         | Signature    |
+-------------+-------+-------------+------------+--------------+
| CREATININE, | 24    | g/col. time | [facility]  |              |
|  URINE      |       |             | LAB -      |              |
|             |       |             | [place]  |              |
+-------------+-------+-------------+------------+--------------+
 
 
 
+---------------+
| Specimen      |
+---------------+
| Urine - Urine |
+---------------+
 
 
 
+--------------------+----------------------+--------------------+----------------+
| Performing         | Address              | City/State/Zipcode | Phone Number   |
| Organization       |                      |                    |                |
+--------------------+----------------------+--------------------+----------------+
|   [facility]  |   [address] | [place], [place]      |   [phone] |
| [place]          |                      |                    |                |
+--------------------+----------------------+--------------------+----------------+
|   [facility]  |                      | [place], [place]      |                |
| [place]          |                      |                    |                |
+--------------------+----------------------+--------------------+----------------+
 MICROALBUMIN/CREATININE RATIO (RANDOM URINE) (10/20/2009  8:15 AM PDT)
 
+-------------+-------+----------------+------------+--------------+
| Component   | Value | Ref Range      | Performed  | Pathologist  |
|             |       |                | At         | Signature    |
+-------------+-------+----------------+------------+--------------+
| MICROALBUMI | 0.2   | 0.0 - 2.0 mg/L | [facility]  |              |
| N,          |       |                | LAB -      |              |
| URINE-RANDO |       |                | [place]  |              |
| M           |       |                |            |              |
+-------------+-------+----------------+------------+--------------+
 
 
 
+---------------+
| Specimen      |
+---------------+
| Urine - Urine |
+---------------+
 
 
 
+--------------------+----------------------+--------------------+----------------+
| Performing         | Address              | City/State/Zipcode | Phone Number   |
| Organization       |                      |                    |                |
+--------------------+----------------------+--------------------+----------------+
|   [facility]  |   [address] | [place], [place]      |   [phone] |
 
| [place]          |                      |                    |                |
+--------------------+----------------------+--------------------+----------------+
|   [facility]  |                      | [place], [place]      |                |
| [place]          |                      |                    |                |
+--------------------+----------------------+--------------------+----------------+
 documented in this encounter
 
 Visit Diagnoses
 Not on filedocumented in this encounter

## 2019-11-01 NOTE — XMS
Encounter Summary
  Created on: 2019
 
 Bonifacio Nila JB
 External Reference #: [id number]
 : [birth year]
 Sex: Female
 
 Demographics
 
 
+-----------------------+------------------------+
| Address               | [address]            |
|                       | [place]  [address]   |
+-----------------------+------------------------+
| Home Phone            | [phone]        |
+-----------------------+------------------------+
| Preferred Language    | Unknown                |
+-----------------------+------------------------+
| Marital Status        | Single                 |
+-----------------------+------------------------+
| Yazidism Affiliation | Unknown                |
+-----------------------+------------------------+
| Race                  | White                  |
+-----------------------+------------------------+
| Ethnic Group          | Not  or  |
+-----------------------+------------------------+
 
 
 Author
 
 
+--------------+------------------------------+
| Author       | [facility] [employer] [facility] |
+--------------+------------------------------+
| Organization | [facility] |
+--------------+------------------------------+
| Address      | Unknown                      |
+--------------+------------------------------+
| Phone        | Unavailable                  |
+--------------+------------------------------+
 
 
 
 Support
 
 
+-----------------+--------------+---------+-----------------+
| Name            | Relationship | Address | Phone           |
+-----------------+--------------+---------+-----------------+
| Kit Valdovinos   | ECON         | Unknown | [phone] |
+-----------------+--------------+---------+-----------------+
| Magdalena Valdovinos | ECON         | Unknown | [phone] |
+-----------------+--------------+---------+-----------------+
 
 
 
 Care Team Providers
 
 
 
+-----------------------+------+-------------+
| Care Team Member Name | Role | Phone       |
+-----------------------+------+-------------+
 PCP  | Unavailable |
+-----------------------+------+-------------+
 
 
 
 Reason for Visit
 Office Visit - E/M Services (Routine)
 
+--------+--------------+---------------+--------------+--------------+---------------+
| Status | Reason       | Specialty     | Diagnoses /  | Referred By  | Referred To   |
|        |              |               | Procedures   | Contact      | Contact       |
+--------+--------------+---------------+--------------+--------------+---------------+
| Closed |   Specialty  | Pediatric     |              |   Non-[facility]   |   Ped Gastro  |
|        | Services     | Gastroenterol |              | Epic Dept    | [facility]  [address]  |
|        | Required     | ogy           |              |              | [address]   |
|        |              |               |              |              | [address]       |
|        |              |               |              |              | Mailcode:     |
|        |              |               |              |              | [facility]         |
|        |              |               |              |              | [facility]   |
|        |              |               |              |              | [place], [place]  |
|        |              |               |              |              | [id number]    |
|        |              |               |              |              | Phone:        |
|        |              |               |              |              | [phone]  |
|        |              |               |              |              |  Fax:         |
|        |              |               |              |              | [phone]  |
+--------+--------------+---------------+--------------+--------------+---------------+
 
 
 
 
 Encounter Details
 
 
+--------+---------+----------------------+----------------------+----------------------+
| Date   | Type    | Department           | Care Team            | Description          |
+--------+---------+----------------------+----------------------+----------------------+
| 07/10/ | Office  |   Specialty Clinics  |   Neema Khalil,   | Immunosuppressed     |
|    | Visit   | at [facility]  [address]  | [address] | status (HCC)         |
|        |         | [address]      |   [facility] [place]       | (Primary Dx); S/P    |
|        |         | Mailcode: [facility]       | [id number]           | liver transplant     |
|        |         | [place]          | [phone]         | (HCC); High risk     |
|        |         | [facility] [place]         | [phone] (Fax)   | medications (not     |
|        |         | [phone]           |                      | anticoagulants)      |
|        |         | [phone]         |                      | long-term use; VSD   |
|        |         |                      |                      | (ventricular septal  |
|        |         |                      |                      | defect); Aortic      |
|        |         |                      |                      | valve insufficiency  |
+--------+---------+----------------------+----------------------+----------------------+
 
 
 
 Social History
 
 
+----------------+-------+-----------+--------+------+
 
| Tobacco Use    | Types | Packs/Day | Years  | Date |
|                |       |           | Used   |      |
+----------------+-------+-----------+--------+------+
| Never Assessed |       |           |        |      |
+----------------+-------+-----------+--------+------+
 
 
 
+------------------+---------------+
| Sex Assigned at  | Date Recorded |
| Birth            |               |
+------------------+---------------+
| Not on file      |               |
+------------------+---------------+
 
 
 
+----------------+-------------+-------------+
| Job Start Date | Occupation  | Industry    |
+----------------+-------------+-------------+
| Not on file    | Not on file | Not on file |
+----------------+-------------+-------------+
 
 
 
+----------------+--------------+------------+
| Travel History | Travel Start | Travel End |
+----------------+--------------+------------+
 
 
 
+-------------------------------------+
| No recent travel history available. |
+-------------------------------------+
 documented as of this encounter
 
 Patient Instructions
 Patient Instructions Neema Khalil MD - 2014  4:33 PM PDT
 
 Electronically signed by Neema Khalil MD at 07/10/2014  6:31 PM PDT
 documented in this encounter
 
 Progress Notes
 Neema Khalil MD - 2014  4:33 PM PDT
 Patient is no show.
 
 Electronically signed by Neema Khalil MD at 07/10/2014  6:32 PM PDTdocumented in this en
counter
 
 Plan of Treatment
 Not on filedocumented as of this encounter
 
 Visit Diagnoses
 
 
+--------------------------------------------------------------------------------------+
| Diagnosis                                                                            |
+--------------------------------------------------------------------------------------+
|   Immunosuppressed status (HCC) - Primary  Unspecified disorder of immune mechanism  |
+--------------------------------------------------------------------------------------+
 
|   S/P liver transplant (HCC)  Liver replaced by transplant                           |
+--------------------------------------------------------------------------------------+
|   High risk medications (not anticoagulants) long-term use  Encounter for long-term  |
| (current) use of other medications                                                   |
+--------------------------------------------------------------------------------------+
|   VSD (ventricular septal defect)  Ventricular septal defect                         |
+--------------------------------------------------------------------------------------+
|   Aortic valve insufficiency  Aortic valve disorders                                 |
+--------------------------------------------------------------------------------------+
 documented in this encounter

## 2019-11-01 NOTE — XMS
Encounter Summary
  Created on: 2019
 
 Bonifacio Nila JB
 External Reference #: [id number]
 : [birth year]
 Sex: Female
 
 Demographics
 
 
+-----------------------+------------------------+
| Address               | [address]            |
|                       | [place]  [address]   |
+-----------------------+------------------------+
| Home Phone            | [phone]        |
+-----------------------+------------------------+
| Preferred Language    | Unknown                |
+-----------------------+------------------------+
| Marital Status        | Single                 |
+-----------------------+------------------------+
| Sikhism Affiliation | Unknown                |
+-----------------------+------------------------+
| Race                  | White                  |
+-----------------------+------------------------+
| Ethnic Group          | Not  or  |
+-----------------------+------------------------+
 
 
 Author
 
 
+--------------+------------------------------+
| Author       | [facility] |
+--------------+------------------------------+
| Organization | [facility] |
+--------------+------------------------------+
| Address      | Unknown                      |
+--------------+------------------------------+
| Phone        | Unavailable                  |
+--------------+------------------------------+
 
 
 
 Support
 
 
+-----------------+--------------+---------+-----------------+
| Name            | Relationship | Address | Phone           |
+-----------------+--------------+---------+-----------------+
| Kit Valdovinos   | ECON         | Unknown | [phone] |
+-----------------+--------------+---------+-----------------+
| Magdalena Valdovinos | ECON         | Unknown | [phone] |
+-----------------+--------------+---------+-----------------+
 
 
 
 Care Team Providers
 
 
 
+-----------------------+------+-------------+
| Care Team Member Name | Role | Phone       |
+-----------------------+------+-------------+
 PCP  | Unavailable |
+-----------------------+------+-------------+
 
 
 
 Reason for Visit
 Office Visit - E/M Services (Routine)
 
+--------+--------------+---------------+--------------+--------------+---------------+
| Status | Reason       | Specialty     | Diagnoses /  | Referred By  | Referred To   |
|        |              |               | Procedures   | Contact      | Contact       |
+--------+--------------+---------------+--------------+--------------+---------------+
| Closed |   Specialty  | Pediatric     |   Diagnoses  |   [facility]   |   Ped         |
|        | Services     | Endocrinology |              | Epic Dept    | Endocrinology |
|        | Required     |               | Complication |              |  Dc  [address] |
|        |              |               | s of         |              |  [address]  |
|        |              |               | transplanted |              | [address]       |
|        |              |               |  liver       |              | Mailcode:     |
|        |              |               | Liver        |              | DC7          |
|        |              |               | replaced by  |              | [facility]   |
|        |              |               | transplant   |              | [place], [place]  |
|        |              |               | (HCC)        |              | [address]    |
|        |              |               | Procedures   |              | Phone:        |
|        |              |               | GA           |              | [phone]  |
|        |              |               | OFFICE/OUTPT |              |  Fax:         |
|        |              |               |              |              | [phone]  |
|        |              |               | VISIT,EST,LE |              |               |
|        |              |               | VL II  GA    |              |               |
|        |              |               | EST PATIENT  |              |               |
|        |              |               | LEVEL V      |              |               |
+--------+--------------+---------------+--------------+--------------+---------------+
 
 
 
 
 Encounter Details
 
 
+--------+---------+----------------------+----------------------+---------------------+
| Date   | Type    | Department           | Care Team            | Description         |
+--------+---------+----------------------+----------------------+---------------------+
| / | Office  |   Specialty Clinics  |   Neema Khalil,   | Transplanted liver  |
|    | Visit   | at [facility]  [address]  | MD  [address] | (HCC) (Primary Dx)  |
|        |         | [address]      |   [facility] [place]       |                     |
|        |         | Mailcode: DC7       | [address]           |                     |
|        |         | [place]          | [phone]         |                     |
|        |         | [place]         | [phone] (Fax)   |                     |
|        |         | [address]           |                      |                     |
|        |         | [phone]         |                      |                     |
+--------+---------+----------------------+----------------------+---------------------+
 
 
 
 Social History
 
 
 
+----------------+-------+-----------+--------+------+
| Tobacco Use    | Types | Packs/Day | Years  | Date |
|                |       |           | Used   |      |
+----------------+-------+-----------+--------+------+
| Never Assessed |       |           |        |      |
+----------------+-------+-----------+--------+------+
 
 
 
+------------------+---------------+
| Sex Assigned at  | Date Recorded |
| Birth            |               |
+------------------+---------------+
| Not on file      |               |
+------------------+---------------+
 
 
 
+----------------+-------------+-------------+
| Job Start Date | Occupation  | Industry    |
+----------------+-------------+-------------+
| Not on file    | Not on file | Not on file |
+----------------+-------------+-------------+
 
 
 
+----------------+--------------+------------+
| Travel History | Travel Start | Travel End |
+----------------+--------------+------------+
 
 
 
+-------------------------------------+
| No recent travel history available. |
+-------------------------------------+
 documented as of this encounter
 
 Patient Instructions
 Patient Instructions Neema Khalil MD - 2014  8:22 AM PDTElectronically signed by 
Neema Khalil MD at 2014  7:18 PM PDT
 documented in this encounter
 
 Progress Notes
 Neema Khalil MD - 2014  8:23 AM PDT
 Pt is no show for appt. Our SW called to family as she is lost to follow up since :
 
 "I called and spoke with Nila Valdovinos  s mother Magdalena Valdovinos.  She stated that she did
 not realize Nila still had an appointment today.  She was expecting to do labs locally bef
ore coming here for the appointment.  She said she called us several times but the labs were
 never ordered.  I don  t see any record of her calling, but I also don  t see any order f
or labs.  She wants to reschedule and they need the lab order sent to [facility] in [facility].  She also said that the reason they have been absent from clinic for so long was due t
o insurance issues, but they are now insured.  From our phone call it seemed that she was ha
ppy to reschedule".
 
 Plan:
 - send orders for her labs (CBC, CMP, GGT) to local lab
 - schedule a clinic appt with me at [facility]
 - schedule for the next liver transplant clinic (2014)
 
 - follow compliance issues very closely
 
 YEElectronically signed by Neema Khalil MD at 2014  7:19 PM PDTdocumented in this 
encounter
 
 Plan of Treatment
 Not on filedocumented as of this encounter
 
 Visit Diagnoses
 
 
+--------------------------------------------------------------------+
| Diagnosis                                                          |
+--------------------------------------------------------------------+
|   Transplanted liver (HCC) - Primary  Liver replaced by transplant |
+--------------------------------------------------------------------+
 documented in this encounter

## 2019-11-01 NOTE — XMS
Encounter Summary
  Created on: 2019
 
 Bonifacio Nila JB
 External Reference #: [id number]
 : [birth year]
 Sex: Female
 
 Demographics
 
 
+-----------------------+------------------------+
| Address               | [address]            |
|                       | [place]  [address]   |
+-----------------------+------------------------+
| Home Phone            | [phone]        |
+-----------------------+------------------------+
| Preferred Language    | Unknown                |
+-----------------------+------------------------+
| Marital Status        | Single                 |
+-----------------------+------------------------+
| Buddhism Affiliation | Unknown                |
+-----------------------+------------------------+
| Race                  | White                  |
+-----------------------+------------------------+
| Ethnic Group          | Not  or  |
+-----------------------+------------------------+
 
 
 Author
 
 
+--------------+------------------------------+
| Author       | [facility] |
+--------------+------------------------------+
| Organization | [facility] |
+--------------+------------------------------+
| Address      | Unknown                      |
+--------------+------------------------------+
| Phone        | Unavailable                  |
+--------------+------------------------------+
 
 
 
 Support
 
 
+-----------------+--------------+---------+-----------------+
| Name            | Relationship | Address | Phone           |
+-----------------+--------------+---------+-----------------+
| Kit Valdovinos   | ECON         | Unknown | [phone] |
+-----------------+--------------+---------+-----------------+
| Magdalena Valdovinos | ECON         | Unknown | [phone] |
+-----------------+--------------+---------+-----------------+
 
 
 
 Care Team Providers
 
 
 
+------------------------+------+-----------------+
| Care Team Member Name  | Role | Phone           |
+------------------------+------+-----------------+
| Lily Horton MD | PCP  | [phone] |
+------------------------+------+-----------------+
 
 
 
 Reason for Visit
 
 
+------------------+----------+
| Reason           | Comments |
+------------------+----------+
| Medical Records  |          |
| Review           |          |
+------------------+----------+
 
 
 
 Encounter Details
 
 
+--------+-------------+----------------------+---------------------+------------------+
| Date   | Type        | Department           | Care Team           | Description      |
+--------+-------------+----------------------+---------------------+------------------+
| / | Documentati |   [facility] [place] [facility] |   Lab, Gi Procedure | Medical Records  |
|    | on          |  [place]  [address] |                     | Review           |
|        |             |  [address]  Mailcode: |                     |                  |
|        |             |  [address]  [facility]    |                     |                  |
|        |             | [facility],  |                     |                  |
|        |             | [address]           |                     |                  |
|        |             | [place], [place]         |                     |                  |
|        |             | [address]           |                     |                  |
|        |             | [phone]         |                     |                  |
+--------+-------------+----------------------+---------------------+------------------+
 
 
 
 Social History
 
 
+-------------------+-------+-----------+--------+------+
| Tobacco Use       | Types | Packs/Day | Years  | Date |
|                   |       |           | Used   |      |
+-------------------+-------+-----------+--------+------+
| Passive Smoke     |       |           |        |      |
| Exposure - Never  |       |           |        |      |
| Smoker            |       |           |        |      |
+-------------------+-------+-----------+--------+------+
 
 
 
+---------------------+---+---+---+
| Smokeless Tobacco:  |   |   |   |
| Never Used          |   |   |   |
+---------------------+---+---+---+
 
 
 
 
+-------------+-------------+---------+----------+
| Alcohol Use | Drinks/Week | oz/Week | Comments |
+-------------+-------------+---------+----------+
| Not Asked   |             |         |          |
+-------------+-------------+---------+----------+
 
 
 
+------------------+---------------+
| Sex Assigned at  | Date Recorded |
| Birth            |               |
+------------------+---------------+
| Not on file      |               |
+------------------+---------------+
 
 
 
+----------------+-------------+-------------+
| Job Start Date | Occupation  | Industry    |
+----------------+-------------+-------------+
| Not on file    | Not on file | Not on file |
+----------------+-------------+-------------+
 
 
 
+----------------+--------------+------------+
| Travel History | Travel Start | Travel End |
+----------------+--------------+------------+
 
 
 
+-------------------------------------+
| No recent travel history available. |
+-------------------------------------+
 documented as of this encounter
 
 Plan of Treatment
 Not on filedocumented as of this encounter
 
 Visit Diagnoses
 Not on filedocumented in this encounter

## 2019-11-01 NOTE — XMS
Encounter Summary
  Created on: 2019
 
 Bonifacio Nila JB
 External Reference #: [id number]
 : [birth year]
 Sex: Female
 
 Demographics
 
 
+-----------------------+------------------------+
| Address               | [address]            |
|                       | [place]  [address]   |
+-----------------------+------------------------+
| Home Phone            | [phone]        |
+-----------------------+------------------------+
| Preferred Language    | Unknown                |
+-----------------------+------------------------+
| Marital Status        | Single                 |
+-----------------------+------------------------+
| Uatsdin Affiliation | Unknown                |
+-----------------------+------------------------+
| Race                  | White                  |
+-----------------------+------------------------+
| Ethnic Group          | Not  or  |
+-----------------------+------------------------+
 
 
 Author
 
 
+--------------+------------------------------+
| Author       | [facility] |
+--------------+------------------------------+
| Organization | [facility] |
+--------------+------------------------------+
| Address      | Unknown                      |
+--------------+------------------------------+
| Phone        | Unavailable                  |
+--------------+------------------------------+
 
 
 
 Support
 
 
+-----------------+--------------+---------+-----------------+
| Name            | Relationship | Address | Phone           |
+-----------------+--------------+---------+-----------------+
| Kit Valdovinos   | ECON         | Unknown | [phone] |
+-----------------+--------------+---------+-----------------+
| Magdalena Valdovinos | ECON         | Unknown | [phone] |
+-----------------+--------------+---------+-----------------+
 
 
 
 Care Team Providers
 
 
 
+-----------------------+------+-----------------+
| Care Team Member Name | Role | Phone           |
+-----------------------+------+-----------------+
| Lily Horton MD   | PCP  | [phone] |
+-----------------------+------+-----------------+
 
 
 
 Encounter Details
 
 
+--------+----------+----------------------+--------------------+-------------+
| Date   | Type     | Department           | Care Team          | Description |
+--------+----------+----------------------+--------------------+-------------+
| / | Abstract |   Pediatric          |   Ivis Burkett MD |             |
|    |          | Gastroenterology at  |                    |             |
|        |          | [facility]          |                    |             |
|        |          | [facility]  |                    |             |
|        |          |  [address] |                    |             |
|        |          |  [address]  Mailcode:  |                    |             |
|        |          | [address]   |                    |             |
|        |          | [place], [place]         |                    |             |
|        |          | [address]           |                    |             |
|        |          | [phone]         |                    |             |
+--------+----------+----------------------+--------------------+-------------+
 
 
 
 Social History
 
 
+----------------+-------+-----------+--------+------+
| Tobacco Use    | Types | Packs/Day | Years  | Date |
|                |       |           | Used   |      |
+----------------+-------+-----------+--------+------+
| Never Assessed |       |           |        |      |
+----------------+-------+-----------+--------+------+
 
 
 
+------------------+---------------+
| Sex Assigned at  | Date Recorded |
| Birth            |               |
+------------------+---------------+
| Not on file      |               |
+------------------+---------------+
 
 
 
+----------------+-------------+-------------+
| Job Start Date | Occupation  | Industry    |
+----------------+-------------+-------------+
| Not on file    | Not on file | Not on file |
+----------------+-------------+-------------+
 
 
 
+----------------+--------------+------------+
 
| Travel History | Travel Start | Travel End |
+----------------+--------------+------------+
 
 
 
+-------------------------------------+
| No recent travel history available. |
+-------------------------------------+
 documented as of this encounter
 
 Plan of Treatment
 Not on filedocumented as of this encounter
 
 Visit Diagnoses
 Not on filedocumented in this encounter

## 2019-11-01 NOTE — XMS
Encounter Summary
  Created on: 2019
 
 Bonifacio Nila JB
 External Reference #: [id number]
 : [birth year]
 Sex: Female
 
 Demographics
 
 
+-----------------------+------------------------+
| Address               | [address]            |
|                       | [place]  [address]   |
+-----------------------+------------------------+
| Home Phone            | [phone]        |
+-----------------------+------------------------+
| Preferred Language    | Unknown                |
+-----------------------+------------------------+
| Marital Status        | Single                 |
+-----------------------+------------------------+
| Voodoo Affiliation | Unknown                |
+-----------------------+------------------------+
| Race                  | White                  |
+-----------------------+------------------------+
| Ethnic Group          | Not  or  |
+-----------------------+------------------------+
 
 
 Author
 
 
+--------------+------------------------------+
| Author       | [facility] |
+--------------+------------------------------+
| Organization | [facility] |
+--------------+------------------------------+
| Address      | Unknown                      |
+--------------+------------------------------+
| Phone        | Unavailable                  |
+--------------+------------------------------+
 
 
 
 Support
 
 
+-----------------+--------------+---------+-----------------+
| Name            | Relationship | Address | Phone           |
+-----------------+--------------+---------+-----------------+
| Kit Valdovinos   | ECON         | Unknown | [phone] |
+-----------------+--------------+---------+-----------------+
| Magdalena Valdovinos | ECON         | Unknown | [phone] |
+-----------------+--------------+---------+-----------------+
 
 
 
 Care Team Providers
 
 
 
+-----------------------+------+-------------+
| Care Team Member Name | Role | Phone       |
+-----------------------+------+-------------+
 PCP  | Unavailable |
+-----------------------+------+-------------+
 
 
 
 Reason for Visit
 
 
+-----------+-----------------+
| Reason    | Comments        |
+-----------+-----------------+
| Lab Order | faxed lab order |
+-----------+-----------------+
 
 
 
 Encounter Details
 
 
+--------+-----------+----------------------+----------------------+----------------------+
| Date   | Type      | Department           | Care Team            | Description          |
+--------+-----------+----------------------+----------------------+----------------------+
| / | Telephone |   Pediatric          |   Neema Khalil,   | Lab Order (faxed lab |
|    |           | Gastroenterology at  | [address] |  order)              |
|        |           | [place]          |   [place], [place]       |                      |
|        |           | [facility]  | [address]           |                      |
|        |           |  [address] | [phone]         |                      |
|        |           |  [address]  Mailcode:  | [phone] (Fax)   |                      |
|        |           | [facility]  [place]   |                      |                      |
|        |           | [place], [place]         |                      |                      |
|        |           | [address]           |                      |                      |
|        |           | [phone]         |                      |                      |
+--------+-----------+----------------------+----------------------+----------------------+
 
 
 
 Social History
 
 
+----------------+-------+-----------+--------+------+
| Tobacco Use    | Types | Packs/Day | Years  | Date |
|                |       |           | Used   |      |
+----------------+-------+-----------+--------+------+
| Never Assessed |       |           |        |      |
+----------------+-------+-----------+--------+------+
 
 
 
+------------------+---------------+
| Sex Assigned at  | Date Recorded |
| Birth            |               |
+------------------+---------------+
| Not on file      |               |
+------------------+---------------+
 
 
 
 
+----------------+-------------+-------------+
| Job Start Date | Occupation  | Industry    |
+----------------+-------------+-------------+
| Not on file    | Not on file | Not on file |
+----------------+-------------+-------------+
 
 
 
+----------------+--------------+------------+
| Travel History | Travel Start | Travel End |
+----------------+--------------+------------+
 
 
 
+-------------------------------------+
| No recent travel history available. |
+-------------------------------------+
 documented as of this encounter
 
 Plan of Treatment
 Not on filedocumented as of this encounter
 
 Procedures
 
 
+----------------------+--------+-------------+----------------------+----------------------
+
| Procedure Name       | Priori | Date/Time   | Associated Diagnosis | Comments             
|
|                      | ty     |             |                      |                      
|
+----------------------+--------+-------------+----------------------+----------------------
+
| CBC, WITH            | Routin | 2014  |   Transplanted liver |   Results for this   
|
| DIFFERENTIAL         | e      |  4:50 PM    |  (HCC)  Encounter    | procedure are in the 
|
|                      |        | PDT         | for therapeutic drug |  results section.    
|
|                      |        |             |  monitoring          |                      
|
+----------------------+--------+-------------+----------------------+----------------------
+
| COMPLETE METABOLIC   | Routin | 2014  |   Transplanted liver |   Results for this   
|
| SET                  | e      |  4:50 PM    |  (HCC)  Encounter    | procedure are in the 
|
| (NA,K,CL,CO2,BUN,CRE |        | PDT         | for therapeutic drug |  results section.    
|
| AT,GLUC,CA,AST,ALT,B |        |             |  monitoring          |                      
|
| ASHWINI TOTAL,ALK        |        |             |                      |                      
|
| PHOS,ALB,PROT TOTAL) |        |             |                      |                      
|
+----------------------+--------+-------------+----------------------+----------------------
+
| GGT, PLASMA          | Routin | 2014  |   Transplanted liver |   Results for this   
 
|
|                      | e      |  4:50 PM    |  (HCC)  Encounter    | procedure are in the 
|
|                      |        | PDT         | for therapeutic drug |  results section.    
|
|                      |        |             |  monitoring          |                      
|
+----------------------+--------+-------------+----------------------+----------------------
+
| MAGNESIUM, PLASMA    | Routin | 2014  |   Transplanted liver |   Results for this   
|
|                      | e      |  4:50 PM    |  (HCC)  Encounter    | procedure are in the 
|
|                      |        | PDT         | for therapeutic drug |  results section.    
|
|                      |        |             |  monitoring          |                      
|
+----------------------+--------+-------------+----------------------+----------------------
+
 documented in this encounter
 
 Results
 MAGNESIUM, PLASMA (2014  4:50 PM PDT)
 
+-----------+-------+-----------------+------------+--------------+
| Component | Value | Ref Range       | Performed  | Pathologist  |
|           |       |                 | At         | Signature    |
+-----------+-------+-----------------+------------+--------------+
| MAGNESIUM | 1.8   | 1.7 - 2.5 mg/dL | [facility]  |              |
|           |       |                 | LAB -      |              |
|           |       |                 | [place]  |              |
+-----------+-------+-----------------+------------+--------------+
 
 
 
+---------------+
| Specimen      |
+---------------+
| Blood - Blood |
+---------------+
 
 
 
+--------------------+----------------------+--------------------+----------------+
| Performing         | Address              | City/State/Zipcode | Phone Number   |
| Organization       |                      |                    |                |
+--------------------+----------------------+--------------------+----------------+
|   [facility]  |   [address] | [place]      |   [phone] |
| [place]          |                      |                    |                |
+--------------------+----------------------+--------------------+----------------+
 GGT, PLASMA (2014  4:50 PM PDT)
 
+-----------+-------+------------+------------+--------------+
| Component | Value | Ref Range  | Performed  | Pathologist  |
|           |       |            | At         | Signature    |
+-----------+-------+------------+------------+--------------+
| GAMMA     | 8     | 5 - 60 U/L | [facility]  |              |
| GLUTAMYL  |       |            | LAB -      |              |
| TRANS     |       |            | [place]  |              |
+-----------+-------+------------+------------+--------------+
 
 
 
 
+---------------+
| Specimen      |
+---------------+
| Blood - Blood |
+---------------+
 
 
 
+--------------------+----------------------+--------------------+----------------+
| Performing         | Address              | City/State/Zipcode | Phone Number   |
| Organization       |                      |                    |                |
+--------------------+----------------------+--------------------+----------------+
|   [facility]  |   [address] | [place], [place]      |   [phone] |
| [place]          |                      |                    |                |
+--------------------+----------------------+--------------------+----------------+
 COMPLETE METABOLIC SET (NA,K,CL,CO2,BUN,CREAT,GLUC,CA,AST,ALT,BILI TOTAL,ALK PHOS,ALB,PROT 
TOTAL) (2014  4:50 PM PDT)
 
+-------------+--------+-----------------+------------+--------------+
| Component   | Value  | Ref Range       | Performed  | Pathologist  |
|             |        |                 | At         | Signature    |
+-------------+--------+-----------------+------------+--------------+
| GLUCOSE,    | 82     | 70 - 100 mg/dL  | [facility]  |              |
| PLASMA      |        |                 | LAB -      |              |
| (LAB)       |        |                 | [place]  |              |
+-------------+--------+-----------------+------------+--------------+
| BUN, PLASMA | 18     | 6 - 23 mg/dL    | [facility]  |              |
|  (LAB)      |        |                 | LAB -      |              |
|             |        |                 | [place]  |              |
+-------------+--------+-----------------+------------+--------------+
| CREATININE  | 0.81   | 0.40 - 1.05     | [facility]  |              |
| PLASMA      |        | mg/dL           | LAB -      |              |
| (LAB)       |        |                 | [place]  |              |
+-------------+--------+-----------------+------------+--------------+
| TOTAL       | 7.3    | 6.1 - 8.5 g/dL  | [facility]  |              |
| PROTEIN,    |        |                 | LAB -      |              |
| PLASMA      |        |                 | [place]  |              |
| (LAB)       |        |                 |            |              |
+-------------+--------+-----------------+------------+--------------+
| ALBUMIN,    | 4.2    | 3.5 - 5.0 g/dL  | [facility]  |              |
| PLASMA      |        |                 | LAB -      |              |
| (LAB)       |        |                 | [place]  |              |
+-------------+--------+-----------------+------------+--------------+
| CALCIUM,    | 9.6    | 8.4 - 10.2      | [facility]  |              |
| PLASMA      |        | mg/dL           | LAB -      |              |
| (LAB)       |        |                 | [place]  |              |
+-------------+--------+-----------------+------------+--------------+
| BILIRUBIN   | 0.3    | 0 - 1.2         | [facility]  |              |
| TOTAL       |        | Transcutaneous  | LAB -      |              |
|             |        | Bilirubinometer | [place]  |              |
+-------------+--------+-----------------+------------+--------------+
| ALK PHOS    | 78 (A) | 135 - 384 U/L   | [facility]  |              |
|             |        |                 | LAB -      |              |
|             |        |                 | [place]  |              |
+-------------+--------+-----------------+------------+--------------+
| AST(SGOT)   | 15     | 13 - 39 U/L     | [facility]  |              |
|             |        |                 | LAB -      |              |
 
|             |        |                 | [place]  |              |
+-------------+--------+-----------------+------------+--------------+
| SODIUM,     | 136    | 132 - 143       | [facility]  |              |
| PLASMA      |        | mmol/L          | LAB -      |              |
| (LAB)       |        |                 | [place]  |              |
+-------------+--------+-----------------+------------+--------------+
| POTASSIUM,  | 3.6    | 3.6 - 5.1       | [facility]  |              |
| PLASMA      |        | mmol/L          | LAB -      |              |
| (LAB)       |        |                 | [place]  |              |
+-------------+--------+-----------------+------------+--------------+
| CHLORIDE,   | 102    | 95 - 112 mmol/L | [facility]  |              |
| PLASMA      |        |                 | LAB -      |              |
| (LAB)       |        |                 | [place]  |              |
+-------------+--------+-----------------+------------+--------------+
| TOTAL CO2,  | 25     | 19 - 31 mmol/L  | [facility]  |              |
| PLASMA      |        |                 | LAB -      |              |
| (LAB)       |        |                 | [place]  |              |
+-------------+--------+-----------------+------------+--------------+
| ALT (SGPT)  | 12     | 7 - 52 U/L      | [facility]  |              |
|             |        |                 | LAB -      |              |
|             |        |                 | [place]  |              |
+-------------+--------+-----------------+------------+--------------+
| ANION GAP   | 12.6   | 7 - 21          | [facility]  |              |
|             |        |                 | LAB -      |              |
|             |        |                 | [place]  |              |
+-------------+--------+-----------------+------------+--------------+
| BUN/CREATIN | 22.2   | 6.0 - 28.6      | [facility]  |              |
| INE RATIO   |        |                 | LAB -      |              |
|             |        |                 | [place]  |              |
+-------------+--------+-----------------+------------+--------------+
| GLOBULIN    | 3.1    | 1.8 - 3.5       | [facility]  |              |
|             |        |                 | LAB -      |              |
|             |        |                 | [place]  |              |
+-------------+--------+-----------------+------------+--------------+
| A/G RATIO   | 1.4    | 1.1 - 2.4       | [facility]  |              |
|             |        |                 | LAB -      |              |
|             |        |                 | [place]  |              |
+-------------+--------+-----------------+------------+--------------+
 
 
 
+---------------+
| Specimen      |
+---------------+
| Blood - Blood |
+---------------+
 
 
 
+--------------------+----------------------+--------------------+----------------+
| Performing         | Address              | City/State/Zipcode | Phone Number   |
| Organization       |                      |                    |                |
+--------------------+----------------------+--------------------+----------------+
|   [facility]  |   [address] | [place], [place]      |   [phone] |
| [place]          |                      |                    |                |
+--------------------+----------------------+--------------------+----------------+
 CBC, WITH DIFFERENTIAL (2014  4:50 PM PDT)
 
+-------------+----------+-----------------+------------+--------------+
| Component   | Value    | Ref Range       | Performed  | Pathologist  |
 
|             |          |                 | At         | Signature    |
+-------------+----------+-----------------+------------+--------------+
| WHITE CELL  | 7.8      | 4.4 - 11.8 K/cu | [facility]  |              |
| COUNT       |          |  mm             | LAB -      |              |
|             |          |                 | [place]  |              |
+-------------+----------+-----------------+------------+--------------+
| RED CELL    | 4.55     | 3.8 - 5.3 M/cu  | [facility]  |              |
| COUNT       |          | mm              | LAB -      |              |
|             |          |                 | [place]  |              |
+-------------+----------+-----------------+------------+--------------+
| HEMOGLOBIN  | 14       | 11.1 - 15.7     | [facility]  |              |
|             |          | g/dL            | LAB -      |              |
|             |          |                 | [place]  |              |
+-------------+----------+-----------------+------------+--------------+
| HEMATOCRIT  | 40.7     | 32 - 47 %       | [facility]  |              |
|             |          |                 | LAB -      |              |
|             |          |                 | [place]  |              |
+-------------+----------+-----------------+------------+--------------+
| MCV         | 89.4     | 73 - 91 fL      | [facility]  |              |
|             |          |                 | LAB -      |              |
|             |          |                 | [place]  |              |
+-------------+----------+-----------------+------------+--------------+
| MCH         | 31       | 25 - 31 pg      | [facility]  |              |
|             |          |                 | LAB -      |              |
|             |          |                 | [place]  |              |
+-------------+----------+-----------------+------------+--------------+
| MCHC        | 34       | 30 - 36 g/dL    | [facility]  |              |
|             |          |                 | LAB -      |              |
|             |          |                 | [place]  |              |
+-------------+----------+-----------------+------------+--------------+
| PLATELET    | 296      | 140 - 440 K/cu  | [facility]  |              |
| COUNT       |          | mm              | LAB -      |              |
|             |          |                 | [place]  |              |
+-------------+----------+-----------------+------------+--------------+
| NEUTROPHIL  | 63.5     | 35 - 65 %       | [facility]  |              |
| %           |          |                 | LAB -      |              |
|             |          |                 | [place]  |              |
+-------------+----------+-----------------+------------+--------------+
| LYMPHOCYTE  | 25.8 (A) | 28 - 48 %       | [facility]  |              |
| %           |          |                 | LAB -      |              |
|             |          |                 | [place]  |              |
+-------------+----------+-----------------+------------+--------------+
| MONOCYTE %  | 7.9      | 0 - 12 %        | [facility]  |              |
|             |          |                 | LAB -      |              |
|             |          |                 | [place]  |              |
+-------------+----------+-----------------+------------+--------------+
| EOS %       | 2.2      | 0 - 6 %         | [facility]  |              |
|             |          |                 | LAB -      |              |
|             |          |                 | [place]  |              |
+-------------+----------+-----------------+------------+--------------+
| BASO %      | 0.6      | 0 - 2 %         | [facility]  |              |
|             |          |                 | LAB -      |              |
|             |          |                 | [place]  |              |
+-------------+----------+-----------------+------------+--------------+
| RDW         | 13.4     | 10.5 - 15 %     | [facility]  |              |
|             |          |                 | LAB -      |              |
|             |          |                 | [place]  |              |
+-------------+----------+-----------------+------------+--------------+
 
 
 
 
+---------------+
| Specimen      |
+---------------+
| Blood - Blood |
+---------------+
 
 
 
+--------------------+----------------------+--------------------+----------------+
| Performing         | Address              | City/State/Zipcode | Phone Number   |
| Organization       |                      |                    |                |
+--------------------+----------------------+--------------------+----------------+
|   [facility]  |   [address] | [place]      |   [phone] |
| [place]          |                      |                    |                |
+--------------------+----------------------+--------------------+----------------+
 documented in this encounter
 
 Visit Diagnoses
 
 
+--------------------------------------------------------------------+
| Diagnosis                                                          |
+--------------------------------------------------------------------+
|   Transplanted liver (HCC) - Primary  Liver replaced by transplant |
+--------------------------------------------------------------------+
|   Encounter for therapeutic drug monitoring                        |
+--------------------------------------------------------------------+
 documented in this encounter

## 2019-11-01 NOTE — XMS
Encounter Summary
  Created on: 2019
 
 Bonifacio Nila JB
 External Reference #: [id number]
 : [birth year]
 Sex: Female
 
 Demographics
 
 
+-----------------------+------------------------+
| Address               | [address]            |
|                       | [place]  [address]   |
+-----------------------+------------------------+
| Home Phone            | [phone]        |
+-----------------------+------------------------+
| Preferred Language    | Unknown                |
+-----------------------+------------------------+
| Marital Status        | Single                 |
+-----------------------+------------------------+
| [employer] Affiliation | Unknown                |
+-----------------------+------------------------+
| Race                  | White                  |
+-----------------------+------------------------+
| Ethnic Group          | Not  or  |
+-----------------------+------------------------+
 
 
 Author
 
 
+--------------+------------------------------+
| Author       | [facility] |
+--------------+------------------------------+
| Organization | [facility] |
+--------------+------------------------------+
| Address      | Unknown                      |
+--------------+------------------------------+
| Phone        | Unavailable                  |
+--------------+------------------------------+
 
 
 
 Support
 
 
+-----------------+--------------+---------+-----------------+
| Name            | Relationship | Address | Phone           |
+-----------------+--------------+---------+-----------------+
| Kit Valdovinos   | ECON         | Unknown | [phone] |
+-----------------+--------------+---------+-----------------+
| Magdalena Valdovinos | ECON         | Unknown | [phone] |
+-----------------+--------------+---------+-----------------+
 
 
 
 Care Team Providers
 
 
 
+-----------------------+------+-----------------+
| Care Team Member Name | Role | Phone           |
+-----------------------+------+-----------------+
| Lily Horton MD   | PCP  | [phone] |
+-----------------------+------+-----------------+
 
 
 
 Reason for Visit
 
 
+----------------------+----------+
| Reason               | Comments |
+----------------------+----------+
| Lab findings,        |          |
| teaching, guidance,  |          |
| and counseling       |          |
+----------------------+----------+
 
 
 
 Encounter Details
 
 
+--------+-----------+----------------------+--------------------+----------------------+
| Date   | Type      | Department           | Care Team          | Description          |
+--------+-----------+----------------------+--------------------+----------------------+
| / | Telephone |   Pediatric          |   Ivis Burkett MD | Lab findings,        |
|    |           | Gastroenterology at  |                    | teaching, guidance,  |
|        |           | [place]          |                    | and counseling       |
|        |           | [facility]  |                    |                      |
|        |           |  [address] |                    |                      |
|        |           |  [address]  Mailcode:  |                    |                      |
|        |           |   [place]   |                    |                      |
|        |           | [place], [place]         |                    |                      |
|        |           | [id number]           |                    |                      |
|        |           | [phone]         |                    |                      |
+--------+-----------+----------------------+--------------------+----------------------+
 
 
 
 Social History
 
 
+----------------+-------+-----------+--------+------+
| Tobacco Use    | Types | Packs/Day | Years  | Date |
|                |       |           | Used   |      |
+----------------+-------+-----------+--------+------+
| Never Assessed |       |           |        |      |
+----------------+-------+-----------+--------+------+
 
 
 
+------------------+---------------+
| Sex Assigned at  | Date Recorded |
| Birth            |               |
+------------------+---------------+
| Not on file      |               |
 
+------------------+---------------+
 
 
 
+----------------+-------------+-------------+
| Job Start Date | Occupation  | Industry    |
+----------------+-------------+-------------+
| Not on file    | Not on file | Not on file |
+----------------+-------------+-------------+
 
 
 
+----------------+--------------+------------+
| Travel History | Travel Start | Travel End |
+----------------+--------------+------------+
 
 
 
+-------------------------------------+
| No recent travel history available. |
+-------------------------------------+
 documented as of this encounter
 
 Plan of Treatment
 Not on filedocumented as of this encounter
 
 Visit Diagnoses
 Not on filedocumented in this encounter

## 2019-11-01 NOTE — XMS
Encounter Summary
  Created on: 2019
 
 Bonifacio Nila JB
 External Reference #: [id number]
 : [birth year]
 Sex: Female
 
 Demographics
 
 
+-----------------------+------------------------+
| Address               | [address]            |
|                       | [place]  [address]   |
+-----------------------+------------------------+
| Home Phone            | [phone]        |
+-----------------------+------------------------+
| Preferred Language    | Unknown                |
+-----------------------+------------------------+
| Marital Status        | Single                 |
+-----------------------+------------------------+
| Protestant Affiliation | Unknown                |
+-----------------------+------------------------+
| Race                  | White                  |
+-----------------------+------------------------+
| Ethnic Group          | Not  or  |
+-----------------------+------------------------+
 
 
 Author
 
 
+--------------+------------------------------+
| Author       | [facility] |
+--------------+------------------------------+
| Organization | [facility] |
+--------------+------------------------------+
| Address      | Unknown                      |
+--------------+------------------------------+
| Phone        | Unavailable                  |
+--------------+------------------------------+
 
 
 
 Support
 
 
+-----------------+--------------+---------+-----------------+
| Name            | Relationship | Address | Phone           |
+-----------------+--------------+---------+-----------------+
| Kit Valdovinos   | ECON         | Unknown | [phone] |
+-----------------+--------------+---------+-----------------+
| Magdalena Valdovinos | ECON         | Unknown | [phone] |
+-----------------+--------------+---------+-----------------+
 
 
 
 Care Team Providers
 
 
 
+-----------------------+------+-----------------+
| Care Team Member Name | Role | Phone           |
+-----------------------+------+-----------------+
| Lily Horton MD   | PCP  | [phone] |
+-----------------------+------+-----------------+
 
 
 
 Encounter Details
 
 
+--------+----------+----------------------+--------------------+-------------+
| Date   | Type     | Department           | Care Team          | Description |
+--------+----------+----------------------+--------------------+-------------+
| 10/12/ | Abstract |   Pediatric          |   Ivis Burkett MD |             |
|    |          | Gastroenterology at  |                    |             |
|        |          | [facility]          |                    |             |
|        |          | [facility]  |                    |             |
|        |          |  [address] |                    |             |
|        |          |  [address]  Mailcode:  |                    |             |
|        |          | [address]   |                    |             |
|        |          | [place], [place]         |                    |             |
|        |          | [address]           |                    |             |
|        |          | [phone]         |                    |             |
+--------+----------+----------------------+--------------------+-------------+
 
 
 
 Social History
 
 
+----------------+-------+-----------+--------+------+
| Tobacco Use    | Types | Packs/Day | Years  | Date |
|                |       |           | Used   |      |
+----------------+-------+-----------+--------+------+
| Never Assessed |       |           |        |      |
+----------------+-------+-----------+--------+------+
 
 
 
+------------------+---------------+
| Sex Assigned at  | Date Recorded |
| Birth            |               |
+------------------+---------------+
| Not on file      |               |
+------------------+---------------+
 
 
 
+----------------+-------------+-------------+
| Job Start Date | Occupation  | Industry    |
+----------------+-------------+-------------+
| Not on file    | Not on file | Not on file |
+----------------+-------------+-------------+
 
 
 
+----------------+--------------+------------+
 
| Travel History | Travel Start | Travel End |
+----------------+--------------+------------+
 
 
 
+-------------------------------------+
| No recent travel history available. |
+-------------------------------------+
 documented as of this encounter
 
 Plan of Treatment
 Not on filedocumented as of this encounter
 
 Procedures
 
 
+----------------------+--------+-------------+----------------------+----------------------
+
| Procedure Name       | Priori | Date/Time   | Associated Diagnosis | Comments             
|
|                      | ty     |             |                      |                      
|
+----------------------+--------+-------------+----------------------+----------------------
+
| CULTURE, URINE BACTI | Routin | 2009  |                      |   Results for this   
|
|                      | e      |  8:23 PM    |                      | procedure are in the 
|
|                      |        | PDT         |                      |  results section.    
|
+----------------------+--------+-------------+----------------------+----------------------
+
| CBC, WITH            | Routin | 2009  |                      |   Results for this   
|
| DIFFERENTIAL         | e      |  7:59 PM    |                      | procedure are in the 
|
|                      |        | PDT         |                      |  results section.    
|
+----------------------+--------+-------------+----------------------+----------------------
+
| CULTURE, BLOOD BACTI | Routin | 2009  |                      |   Results for this   
|
|  & YEAST             | e      |  7:57 PM    |                      | procedure are in the 
|
|                      |        | PDT         |                      |  results section.    
|
+----------------------+--------+-------------+----------------------+----------------------
+
| COMPLETE METABOLIC   | Routin | 2009  |                      |   Results for this   
|
| SET                  | e      |  7:52 PM    |                      | procedure are in the 
|
| (NA,K,CL,CO2,BUN,CRE |        | PDT         |                      |  results section.    
|
| AT,GLUC,CA,AST,ALT,B |        |             |                      |                      
|
| ASHWINI TOTAL,ALK        |        |             |                      |                      
|
| PHOS,ALB,PROT TOTAL) |        |             |                      |                      
|
 
+----------------------+--------+-------------+----------------------+----------------------
+
| UA, DIPSTICK ONLY    | Routin | 2009  |                      |   Results for this   
|
|                      | e      |             |                      | procedure are in the 
|
|                      |        |             |                      |  results section.    
|
+----------------------+--------+-------------+----------------------+----------------------
+
 documented in this encounter
 
 Results
 CULTURE, URINE BACTI (2009  8:23 PM PDT)
 
+-----------+------------------------+-----------+------------+--------------+
| Component | Value                  | Ref Range | Performed  | Pathologist  |
|           |                        |           | At         | Signature    |
+-----------+------------------------+-----------+------------+--------------+
| CULTURE   | negComment: Day 1,     |           | [facility]  |              |
| RESULT    | 09 am No growth   |           | LAB -      |              |
|           | after overnight        |           | [place]  |              |
|           | incubation             |           |            |              |
+-----------+------------------------+-----------+------------+--------------+
| CULTURE   | nuxedComment: Days 2,  |           | [facility]  |              |
| RESULT    | - AM, 20,000     |           | LAB -      |              |
|           | CFU/ML mixed autumn     |           | [place]  |              |
|           | bacteria and/or yeast  |           |            |              |
|           | isolated probably      |           |            |              |
|           | represent urethral,    |           |            |              |
|           | perineal, or other     |           |            |              |
|           | contaminating autumn    |           |            |              |
+-----------+------------------------+-----------+------------+--------------+
 
 
 
+----------+
| Specimen |
+----------+
| Urine    |
+----------+
 
 
 
+------------------------------------------------------------------------+-----------------+
| Narrative                                                              | Performed At    |
+------------------------------------------------------------------------+-----------------+
|   Day 1 no aerobic or anaerobic growth as of 09   07:22 AM  Day 2 |   [facility] |
|  no aerobic or anaerobic growth as of 09   07:16 AM  Day 3 no     |  - [place]    |
| aerobic or anaerobic growth as of 09   06:51 AM        Day 6 - No |                 |
|  growth after 6 days incubation                                        |                 |
+------------------------------------------------------------------------+-----------------+
 
 
 
+--------------------+----------------------+--------------------+----------------+
| Performing         | Address              | City/State/Zipcode | Phone Number   |
| Organization       |                      |                    |                |
+--------------------+----------------------+--------------------+----------------+
|   [facility]  |   [address] | [place], [place]      |   [phone] |
 
| [place]          |                      |                    |                |
+--------------------+----------------------+--------------------+----------------+
|   [facility]  |                      | [place], [place]      |                |
| [place]          |                      |                    |                |
+--------------------+----------------------+--------------------+----------------+
 CBC, WITH DIFFERENTIAL (2009  7:59 PM PDT)
 
+-------------+----------+-----------------+------------+--------------+
| Component   | Value    | Ref Range       | Performed  | Pathologist  |
|             |          |                 | At         | Signature    |
+-------------+----------+-----------------+------------+--------------+
| WHITE CELL  | 8.6      | 4.5 - 13.5 K/cu | [facility]  |              |
| COUNT       |          |  mm             | LAB -      |              |
|             |          |                 | [place]  |              |
+-------------+----------+-----------------+------------+--------------+
| RED CELL    | 5.48 (A) | 4.1 - 5.3 M/cu  | [facility]  |              |
| COUNT       |          | mm              | LAB -      |              |
|             |          |                 | [place]  |              |
+-------------+----------+-----------------+------------+--------------+
| HEMOGLOBIN  | 16.4 (A) | 10.6 - 15.2     | [facility]  |              |
|             |          | g/dL            | LAB -      |              |
|             |          |                 | [place]  |              |
+-------------+----------+-----------------+------------+--------------+
| HEMATOCRIT  | 46.5 (A) | 32 - 42 %       | [facility]  |              |
|             |          |                 | LAB -      |              |
|             |          |                 | [place]  |              |
+-------------+----------+-----------------+------------+--------------+
| MCV         | 84.9     | 71 - 89 fL      | [facility]  |              |
|             |          |                 | LAB -      |              |
|             |          |                 | [place]  |              |
+-------------+----------+-----------------+------------+--------------+
| MCH         | 30       | 24 - 30 pg      | [facility]  |              |
|             |          |                 | LAB -      |              |
|             |          |                 | [place]  |              |
+-------------+----------+-----------------+------------+--------------+
| MCHC        | 35       | 30 - 36 g/dL    | [facility]  |              |
|             |          |                 | LAB -      |              |
|             |          |                 | [place]  |              |
+-------------+----------+-----------------+------------+--------------+
| PLATELET    | 359      | 140 - 440 K/cu  | [facility]  |              |
| COUNT       |          | mm              | LAB -      |              |
|             |          |                 | [place]  |              |
+-------------+----------+-----------------+------------+--------------+
| NEUTROPHIL  | 71.7 (A) | 31 - 60 %       | [facility]  |              |
| %           |          |                 | LAB -      |              |
|             |          |                 | [place]  |              |
+-------------+----------+-----------------+------------+--------------+
| LYMPHOCYTE  | 17.7 (A) | 28 - 48 %       | [facility]  |              |
| %           |          |                 | LAB -      |              |
|             |          |                 | [place]  |              |
+-------------+----------+-----------------+------------+--------------+
| MONOCYTE %  | 8.3      | 0 - 12 %        | [facility]  |              |
|             |          |                 | LAB -      |              |
|             |          |                 | [place]  |              |
+-------------+----------+-----------------+------------+--------------+
| EOS %       | 0.5      | 0 - 6 %         | [facility]  |              |
|             |          |                 | LAB -      |              |
|             |          |                 | [place]  |              |
+-------------+----------+-----------------+------------+--------------+
| BASO %      | 1.8      | 0 - 2 %         | [facility]  |              |
 
|             |          |                 | LAB -      |              |
|             |          |                 | [place]  |              |
+-------------+----------+-----------------+------------+--------------+
| RDW         | 12.0     | 10.5 - 15.0 %   | [facility]  |              |
|             |          |                 | LAB -      |              |
|             |          |                 | [place]  |              |
+-------------+----------+-----------------+------------+--------------+
| MPV         |          | fL              | [facility]  |              |
|             |          |                 | LAB -      |              |
|             |          |                 | [place]  |              |
+-------------+----------+-----------------+------------+--------------+
| NEUTROPHIL  |          | K/cu mm         | [facility]  |              |
| #           |          |                 | LAB -      |              |
|             |          |                 | [place]  |              |
+-------------+----------+-----------------+------------+--------------+
| LYMPHOCYTE  |          | K/cu mm         | [facility]  |              |
| #           |          |                 | LAB -      |              |
|             |          |                 | [place]  |              |
+-------------+----------+-----------------+------------+--------------+
| MONOCYTE #  |          | K/cu mm         | [facility]  |              |
|             |          |                 | LAB -      |              |
|             |          |                 | [place]  |              |
+-------------+----------+-----------------+------------+--------------+
| EOS #       |          | K/cu mm         | [facility]  |              |
|             |          |                 | LAB -      |              |
|             |          |                 | [place]  |              |
+-------------+----------+-----------------+------------+--------------+
| BASO #      |          |                 | [facility]  |              |
|             |          |                 | LAB -      |              |
|             |          |                 | [place]  |              |
+-------------+----------+-----------------+------------+--------------+
 
 
 
+---------------+
| Specimen      |
+---------------+
| Blood - Blood |
+---------------+
 
 
 
+--------------------+----------------------+--------------------+----------------+
| Performing         | Address              | City/State/Zipcode | Phone Number   |
| Organization       |                      |                    |                |
+--------------------+----------------------+--------------------+----------------+
|   [facility]  |   [address] | [place], [place]      |   [phone] |
| [place]          |                      |                    |                |
+--------------------+----------------------+--------------------+----------------+
|   [facility]  |                      | [place], [place]      |                |
| [place]          |                      |                    |                |
+--------------------+----------------------+--------------------+----------------+
 CULTURE, BLOOD BACTI & YEAST (2009  7:57 PM PDT)
 
+-----------+-------+-----------+------------+--------------+
| Component | Value | Ref Range | Performed  | Pathologist  |
|           |       |           | At         | Signature    |
+-----------+-------+-----------+------------+--------------+
| CULTURE   | Neg   |           | [facility]  |              |
| RESULT    |       |           | LAB -      |              |
 
|           |       |           | [place]  |              |
+-----------+-------+-----------+------------+--------------+
 
 
 
+----------+
| Specimen |
+----------+
| Blood    |
+----------+
 
 
 
+------------------------------------------------------------------------+-----------------+
| Narrative                                                              | Performed At    |
+------------------------------------------------------------------------+-----------------+
|   Day 1 no aerobic or anaerobic growth as of 09   07:22 AM  Day 2 |   [facility] |
|  no aerobic or anaerobic growth as of 09   07:16 AM  Day 3 no     |  - [place]    |
| aerobic or anaerobic growth as of 09   06:51 AM        Day 6 - No |                 |
|  growth after 6 days incubation                                        |                 |
+------------------------------------------------------------------------+-----------------+
 
 
 
+--------------------+----------------------+--------------------+----------------+
| Performing         | Address              | City/State/Zipcode | Phone Number   |
| Organization       |                      |                    |                |
+--------------------+----------------------+--------------------+----------------+
|   [facility]  |   [address] | [place], [place]      |   [phone] |
| [place]          |                      |                    |                |
+--------------------+----------------------+--------------------+----------------+
|   [facility]  |                      | [place], [place]      |                |
| [place]          |                      |                    |                |
+--------------------+----------------------+--------------------+----------------+
 COMPLETE METABOLIC SET (NA,K,CL,CO2,BUN,CREAT,GLUC,CA,AST,ALT,BILI TOTAL,ALK PHOS,ALB,PROT 
TOTAL) (2009  7:52 PM PDT)
 
+-------------+-------+-----------------+------------+--------------+
| Component   | Value | Ref Range       | Performed  | Pathologist  |
|             |       |                 | At         | Signature    |
+-------------+-------+-----------------+------------+--------------+
| GLUCOSE,    | 99    | 60 - 100 mg/dL  | [facility]  |              |
| PLASMA      |       |                 | LAB -      |              |
| (LAB)       |       |                 | [place]  |              |
+-------------+-------+-----------------+------------+--------------+
| BUN, PLASMA | 14.8  | 7 - 21 mg/dL    | [facility]  |              |
|  (LAB)      |       |                 | LAB -      |              |
|             |       |                 | [place]  |              |
+-------------+-------+-----------------+------------+--------------+
| CREATININE  | 0.70  | 0.5 - 1.5 mg/dL | [facility]  |              |
| PLASMA      |       |                 | LAB -      |              |
| (LAB)       |       |                 | [place]  |              |
+-------------+-------+-----------------+------------+--------------+
| TOTAL       |       | g/dL            | [facility]  |              |
| PROTEIN,    |       |                 | LAB -      |              |
| PLASMA      |       |                 | [place]  |              |
| (LAB)       |       |                 |            |              |
+-------------+-------+-----------------+------------+--------------+
| ALBUMIN,    |       | g/dL            | [facility]  |              |
| PLASMA      |       |                 | LAB -      |              |
 
| (LAB)       |       |                 | [place]  |              |
+-------------+-------+-----------------+------------+--------------+
| CALCIUM,    |       | mg/dL           | [facility]  |              |
| PLASMA      |       |                 | LAB -      |              |
| (LAB)       |       |                 | [place]  |              |
+-------------+-------+-----------------+------------+--------------+
| BILIRUBIN   |       | Transcutaneous  | [facility]  |              |
| TOTAL       |       | Bilirubinometer | LAB -      |              |
|             |       |                 | [place]  |              |
+-------------+-------+-----------------+------------+--------------+
| ALK PHOS    |       | U/L             | [facility]  |              |
|             |       |                 | LAB -      |              |
|             |       |                 | [place]  |              |
+-------------+-------+-----------------+------------+--------------+
| AST(SGOT)   |       | U/L             | [facility]  |              |
|             |       |                 | LAB -      |              |
|             |       |                 | [place]  |              |
+-------------+-------+-----------------+------------+--------------+
| SODIUM,     | 138   | 132 - 143       | [facility]  |              |
| PLASMA      |       | mmol/L          | LAB -      |              |
| (LAB)       |       |                 | [place]  |              |
+-------------+-------+-----------------+------------+--------------+
| POTASSIUM,  | 3.8   | 3.6 - 5.1       | [facility]  |              |
| PLASMA      |       | mmol/L          | LAB -      |              |
| (LAB)       |       |                 | [place]  |              |
+-------------+-------+-----------------+------------+--------------+
| CHLORIDE,   | 103   | 95 - 112 mmol/L | [facility]  |              |
| PLASMA      |       |                 | LAB -      |              |
| (LAB)       |       |                 | [place]  |              |
+-------------+-------+-----------------+------------+--------------+
| TOTAL CO2,  | 24.0  | 19 - 31 mmol/L  | [facility]  |              |
| PLASMA      |       |                 | LAB -      |              |
| (LAB)       |       |                 | [place]  |              |
+-------------+-------+-----------------+------------+--------------+
| ALT (SGPT)  |       | U/L             | [facility]  |              |
|             |       |                 | LAB -      |              |
|             |       |                 | [place]  |              |
+-------------+-------+-----------------+------------+--------------+
 
 
 
+---------------+
| Specimen      |
+---------------+
| Blood - Blood |
+---------------+
 
 
 
+--------------------+----------------------+--------------------+----------------+
| Performing         | Address              | City/State/Zipcode | Phone Number   |
| Organization       |                      |                    |                |
+--------------------+----------------------+--------------------+----------------+
|   [facility]  |   [address] | [place], [place]      |   [phone] |
| [place]          |                      |                    |                |
+--------------------+----------------------+--------------------+----------------+
|   [facility]  |                      | [place], [place]      |                |
| [place]          |                      |                    |                |
+--------------------+----------------------+--------------------+----------------+
 UA, DIPSTICK ONLY (2009)
 
 
+-------------+-------------+----------------+------------+--------------+
| Component   | Value       | Ref Range      | Performed  | Pathologist  |
|             |             |                | At         | Signature    |
+-------------+-------------+----------------+------------+--------------+
| COLOR(UR)   | yellow      |                | [facility]  |              |
|             |             |                | LAB -      |              |
|             |             |                | [place]  |              |
+-------------+-------------+----------------+------------+--------------+
| APPEARANCE  | hazy        |                | [facility]  |              |
|             |             |                | LAB -      |              |
|             |             |                | [place]  |              |
+-------------+-------------+----------------+------------+--------------+
| LEUKOCYTE   | Neg         | Negative       | [facility]  |              |
| ESTERASE    |             |                | LAB -      |              |
|             |             |                | [place]  |              |
+-------------+-------------+----------------+------------+--------------+
| NITRITES    | Neg         | Negative       | [facility]  |              |
|             |             |                | LAB -      |              |
|             |             |                | [place]  |              |
+-------------+-------------+----------------+------------+--------------+
| UROBILINOGE | 1           | 0.2 SARTHAK    | [facility]  |              |
| N           |             | UNITS          | LAB -      |              |
|             |             |                | [place]  |              |
+-------------+-------------+----------------+------------+--------------+
| PROTEIN(LAB | 30          | Negative to    | [facility]  |              |
| )           |             | Trace mg/dL    | LAB -      |              |
|             |             |                | [place]  |              |
+-------------+-------------+----------------+------------+--------------+
| PH(UR)      | 7           | 5 - 9          | [facility]  |              |
|             |             |                | LAB -      |              |
|             |             |                | [place]  |              |
+-------------+-------------+----------------+------------+--------------+
| BLOOD       | 10          | Negative       | [facility]  |              |
|             |             |                | LAB -      |              |
|             |             |                | [place]  |              |
+-------------+-------------+----------------+------------+--------------+
| SPECIFIC    | 1.033 (A)   | 1.005 - 1.03   | [facility]  |              |
| GRAVITY     |             |                | LAB -      |              |
|             |             |                | [place]  |              |
+-------------+-------------+----------------+------------+--------------+
| KETONES     | 150         | Negative mg/dL | [facility]  |              |
|             |             |                | LAB -      |              |
|             |             |                | [place]  |              |
+-------------+-------------+----------------+------------+--------------+
| BILIRUBIN   | neg         | Negative       | [facility]  |              |
|             |             |                | LAB -      |              |
|             |             |                | [place]  |              |
+-------------+-------------+----------------+------------+--------------+
| GLUCOSE(UR) | normal      | Negative to    | [facility]  |              |
|             |             | Trace mg/dL    | LAB -      |              |
|             |             |                | [place]  |              |
+-------------+-------------+----------------+------------+--------------+
| WHITE CELL  | 5 (A)       | 0 - 4 K/cu mm  | [facility]  |              |
| COUNT       |             |                | LAB -      |              |
|             |             |                | [place]  |              |
+-------------+-------------+----------------+------------+--------------+
| RED CELLS   | 15 (A)      | 0 - 4 /hpf     | [facility]  |              |
|             |             |                | LAB -      |              |
|             |             |                | [place]  |              |
 
+-------------+-------------+----------------+------------+--------------+
| EPITHELIAL  | squamous 1+ | Not applicable | [facility]  |              |
| CELLS       |             |                | LAB -      |              |
|             |             |                | [place]  |              |
+-------------+-------------+----------------+------------+--------------+
| CRYSTALS    | neg         |                | [facility]  |              |
|             |             |                | LAB -      |              |
|             |             |                | [place]  |              |
+-------------+-------------+----------------+------------+--------------+
| BACTERIA    | 2+          | /hpf           | [facility]  |              |
|             |             |                | LAB -      |              |
|             |             |                | [place]  |              |
+-------------+-------------+----------------+------------+--------------+
 
 
 
+---------------+
| Specimen      |
+---------------+
| Urine - Urine |
+---------------+
 
 
 
+--------------------+----------------------+--------------------+----------------+
| Performing         | Address              | City/State/Zipcode | Phone Number   |
| Organization       |                      |                    |                |
+--------------------+----------------------+--------------------+----------------+
|   [facility]  |   [address] | [place]      |   [phone] |
| [place]          |                      |                    |                |
+--------------------+----------------------+--------------------+----------------+
|   [facility]  |                      | [place], [place]      |                |
| [place]          |                      |                    |                |
+--------------------+----------------------+--------------------+----------------+
 documented in this encounter
 
 Visit Diagnoses
 Not on filedocumented in this encounter

## 2019-11-01 NOTE — XMS
Encounter Summary
  Created on: 2019
 
 Bonifacio Nila JB
 External Reference #: [id number]
 : [birth year]
 Sex: Female
 
 Demographics
 
 
+-----------------------+------------------------+
| Address               | [address]            |
|                       | [place]  [address]   |
+-----------------------+------------------------+
| Home Phone            | [phone]        |
+-----------------------+------------------------+
| Preferred Language    | Unknown                |
+-----------------------+------------------------+
| Marital Status        | Single                 |
+-----------------------+------------------------+
| Christianity Affiliation | Unknown                |
+-----------------------+------------------------+
| Race                  | White                  |
+-----------------------+------------------------+
| Ethnic Group          | Not  or  |
+-----------------------+------------------------+
 
 
 Author
 
 
+--------------+------------------------------+
| Author       | [facility] |
+--------------+------------------------------+
| Organization | [facility] |
+--------------+------------------------------+
| Address      | Unknown                      |
+--------------+------------------------------+
| Phone        | Unavailable                  |
+--------------+------------------------------+
 
 
 
 Support
 
 
+-----------------+--------------+---------+-----------------+
| Name            | Relationship | Address | Phone           |
+-----------------+--------------+---------+-----------------+
| Kit Valdovinos   | ECON         | Unknown | [phone] |
+-----------------+--------------+---------+-----------------+
| Magdalena Valdovinos | ECON         | Unknown | [phone] |
+-----------------+--------------+---------+-----------------+
 
 
 
 Care Team Providers
 
 
 
+------------------------+------+-----------------+
| Care Team Member Name  | Role | Phone           |
+------------------------+------+-----------------+
| Lily Horton MD | PCP  | [phone] |
+------------------------+------+-----------------+
 
 
 
 Encounter Details
 
 
+--------+----------+----------------------+----------------------+-------------+
| Date   | Type     | Department           | Care Team            | Description |
+--------+----------+----------------------+----------------------+-------------+
| / | Abstract |   Pediatric          |   Neema Khalil,   |             |
|    |          | Gastroenterology at  | MD  [address] |             |
|        |          | [place]          |   [place]       |             |
|        |          | [facility]  | [address]           |             |
|        |          |  [address] | [phone]         |             |
|        |          |  [address]  Mailcode:  | [phone] (Fax)   |             |
|        |          |   [place]   |                      |             |
|        |          | [place], [place]         |                      |             |
|        |          | [address]           |                      |             |
|        |          | [phone]         |                      |             |
+--------+----------+----------------------+----------------------+-------------+
 
 
 
 Social History
 
 
+-------------------+-------+-----------+--------+------+
| Tobacco Use       | Types | Packs/Day | Years  | Date |
|                   |       |           | Used   |      |
+-------------------+-------+-----------+--------+------+
| Passive Smoke     |       |           |        |      |
| Exposure - Never  |       |           |        |      |
| Smoker            |       |           |        |      |
+-------------------+-------+-----------+--------+------+
 
 
 
+---------------------+---+---+---+
| Smokeless Tobacco:  |   |   |   |
| Never Used          |   |   |   |
+---------------------+---+---+---+
 
 
 
+-------------+-------------+---------+----------+
| Alcohol Use | Drinks/Week | oz/Week | Comments |
+-------------+-------------+---------+----------+
| Not Asked   |             |         |          |
+-------------+-------------+---------+----------+
 
 
 
+------------------+---------------+
 
| Sex Assigned at  | Date Recorded |
| Birth            |               |
+------------------+---------------+
| Not on file      |               |
+------------------+---------------+
 
 
 
+----------------+-------------+-------------+
| Job Start Date | Occupation  | Industry    |
+----------------+-------------+-------------+
| Not on file    | Not on file | Not on file |
+----------------+-------------+-------------+
 
 
 
+----------------+--------------+------------+
| Travel History | Travel Start | Travel End |
+----------------+--------------+------------+
 
 
 
+-------------------------------------+
| No recent travel history available. |
+-------------------------------------+
 documented as of this encounter
 
 Plan of Treatment
 Not on filedocumented as of this encounter
 
 Procedures
 
 
+----------------------+--------+-------------+----------------------+----------------------
+
| Procedure Name       | Priori | Date/Time   | Associated Diagnosis | Comments             
|
|                      | ty     |             |                      |                      
|
+----------------------+--------+-------------+----------------------+----------------------
+
| CHOLESTEROL, TOTAL   | Routin | 2015  |                      |   Results for this   
|
| (EXTERNAL RESULTS    | e      |  3:11 PM    |                      | procedure are in the 
|
| ONLY)                |        | PST         |                      |  results section.    
|
+----------------------+--------+-------------+----------------------+----------------------
+
| CBC, WITH            | Routin | 2015  |                      |   Results for this   
|
| DIFFERENTIAL         | e      |  3:11 PM    |                      | procedure are in the 
|
|                      |        | PST         |                      |  results section.    
|
+----------------------+--------+-------------+----------------------+----------------------
+
| COMPLETE METABOLIC   | Routin | 2015  |                      |   Results for this   
|
| SET                  | e      |  3:11 PM    |                      | procedure are in the 
 
|
| (NA,K,CL,CO2,BUN,CRE |        | PST         |                      |  results section.    
|
| AT,GLUC,CA,AST,ALT,B |        |             |                      |                      
|
| ASHWINI TOTAL,ALK        |        |             |                      |                      
|
| PHOS,ALB,PROT TOTAL) |        |             |                      |                      
|
+----------------------+--------+-------------+----------------------+----------------------
+
| PHOSPHORUS, PLASMA   | Routin | 2015  |                      |   Results for this   
|
|                      | e      |  3:11 PM    |                      | procedure are in the 
|
|                      |        | PST         |                      |  results section.    
|
+----------------------+--------+-------------+----------------------+----------------------
+
| GGT, PLASMA          | Routin | 2015  |                      |   Results for this   
|
|                      | e      |  3:11 PM    |                      | procedure are in the 
|
|                      |        | PST         |                      |  results section.    
|
+----------------------+--------+-------------+----------------------+----------------------
+
| URIC ACID, PLASMA    | Routin | 2015  |                      |   Results for this   
|
|                      | e      |  3:11 PM    |                      | procedure are in the 
|
|                      |        | PST         |                      |  results section.    
|
+----------------------+--------+-------------+----------------------+----------------------
+
| MAGNESIUM, PLASMA    | Routin | 2015  |                      |   Results for this   
|
|                      | e      |  3:11 PM    |                      | procedure are in the 
|
|                      |        | PST         |                      |  results section.    
|
+----------------------+--------+-------------+----------------------+----------------------
+
| TRIGLYCERIDES,       | Routin | 2015  |                      |   Results for this   
|
| PLASMA               | e      |  3:11 PM    |                      | procedure are in the 
|
|                      |        | PST         |                      |  results section.    
|
+----------------------+--------+-------------+----------------------+----------------------
+
| LDH TOTAL, PLASMA    | Routin | 2015  |                      |   Results for this   
|
|                      | e      |  3:11 PM    |                      | procedure are in the 
|
|                      |        | PST         |                      |  results section.    
|
+----------------------+--------+-------------+----------------------+----------------------
+
 documented in this encounter
 
 
 Results
 CBC, WITH DIFFERENTIAL (2015  3:11 PM PST)
 
+-------------+----------+-----------------+------------+--------------+
| Component   | Value    | Ref Range       | Performed  | Pathologist  |
|             |          |                 | At         | Signature    |
+-------------+----------+-----------------+------------+--------------+
| WHITE CELL  | 10.7     | 4.5 - 11.0 K/cu | [facility]  |              |
| COUNT       |          |  mm             | LAB -      |              |
|             |          |                 | [place]  |              |
+-------------+----------+-----------------+------------+--------------+
| RED CELL    | 4.62     | 3.8 - 5.1 M/cu  | [facility]  |              |
| COUNT       |          | mm              | LAB -      |              |
|             |          |                 | [place]  |              |
+-------------+----------+-----------------+------------+--------------+
| HEMOGLOBIN  | 14.3     | 12 - 16 g/dL    | [facility]  |              |
|             |          |                 | LAB -      |              |
|             |          |                 | [place]  |              |
+-------------+----------+-----------------+------------+--------------+
| HEMATOCRIT  | 41.4     | 35 - 45 %       | [facility]  |              |
|             |          |                 | LAB -      |              |
|             |          |                 | [place]  |              |
+-------------+----------+-----------------+------------+--------------+
| MCV         | 89.5     | 81 - 99 fL      | [facility]  |              |
|             |          |                 | LAB -      |              |
|             |          |                 | [place]  |              |
+-------------+----------+-----------------+------------+--------------+
| MCH         | 31       | 27 - 33 pg      | [facility]  |              |
|             |          |                 | LAB -      |              |
|             |          |                 | [place]  |              |
+-------------+----------+-----------------+------------+--------------+
| MCHC        | 35       | 30 - 36 g/dL    | [facility]  |              |
|             |          |                 | LAB -      |              |
|             |          |                 | [place]  |              |
+-------------+----------+-----------------+------------+--------------+
| PLATELET    | 370      | 140 - 440 K/cu  | [facility]  |              |
| COUNT       |          | mm              | LAB -      |              |
|             |          |                 | [place]  |              |
+-------------+----------+-----------------+------------+--------------+
| NEUTROPHIL  | 70.4     | 39 - 80 %       | [facility]  |              |
| %           |          |                 | LAB -      |              |
|             |          |                 | [place]  |              |
+-------------+----------+-----------------+------------+--------------+
| LYMPHOCYTE  | 20.5 (A) | 24 - 44 %       | [facility]  |              |
| %           |          |                 | LAB -      |              |
|             |          |                 | [place]  |              |
+-------------+----------+-----------------+------------+--------------+
| MONOCYTE %  | 6.7      | 0 - 12 %        | [facility]  |              |
|             |          |                 | LAB -      |              |
|             |          |                 | [place]  |              |
+-------------+----------+-----------------+------------+--------------+
| EOS %       | 2.1      | 0 - 6 %         | [facility]  |              |
|             |          |                 | LAB -      |              |
|             |          |                 | [place]  |              |
+-------------+----------+-----------------+------------+--------------+
| BASO %      | 0.6      | 0 - 2 %         | [facility]  |              |
|             |          |                 | LAB -      |              |
|             |          |                 | [place]  |              |
+-------------+----------+-----------------+------------+--------------+
 
| RDW         | 13       | 10.5 - 15 %     | [facility]  |              |
|             |          |                 | LAB -      |              |
|             |          |                 | [place]  |              |
+-------------+----------+-----------------+------------+--------------+
 
 
 
+---------------+
| Specimen      |
+---------------+
| Blood - Blood |
+---------------+
 
 
 
+--------------------+----------------------+--------------------+----------------+
| Performing         | Address              | City/State/Zipcode | Phone Number   |
| Organization       |                      |                    |                |
+--------------------+----------------------+--------------------+----------------+
|   [facility]  |   [address] | [place], [place]      |   [phone] |
| [place]          |                      |                    |                |
+--------------------+----------------------+--------------------+----------------+
 MAGNESIUM, PLASMA (2015  3:11 PM PST)
 
+-----------+-------+-----------------+------------+--------------+
| Component | Value | Ref Range       | Performed  | Pathologist  |
|           |       |                 | At         | Signature    |
+-----------+-------+-----------------+------------+--------------+
| MAGNESIUM | 1.8   | 1.7 - 2.5 mg/dL | [facility]  |              |
|           |       |                 | LAB -      |              |
|           |       |                 | [place]  |              |
+-----------+-------+-----------------+------------+--------------+
 
 
 
+---------------+
| Specimen      |
+---------------+
| Blood - Blood |
+---------------+
 
 
 
+--------------------+----------------------+--------------------+----------------+
| Performing         | Address              | City/State/Zipcode | Phone Number   |
| Organization       |                      |                    |                |
+--------------------+----------------------+--------------------+----------------+
|   [facility]  |   [address] | [place]      |   [phone] |
| [place]          |                      |                    |                |
+--------------------+----------------------+--------------------+----------------+
 GGT, PLASMA (2015  3:11 PM PST)
 
+-----------+-------+------------+------------+--------------+
| Component | Value | Ref Range  | Performed  | Pathologist  |
|           |       |            | At         | Signature    |
+-----------+-------+------------+------------+--------------+
| GAMMA     | 7     | 5 - 60 U/L | [facility]  |              |
| GLUTAMYL  |       |            | LAB -      |              |
| TRANS     |       |            | [place]  |              |
+-----------+-------+------------+------------+--------------+
 
 
 
 
+---------------+
| Specimen      |
+---------------+
| Blood - Blood |
+---------------+
 
 
 
+--------------------+----------------------+--------------------+----------------+
| Performing         | Address              | City/State/Zipcode | Phone Number   |
| Organization       |                      |                    |                |
+--------------------+----------------------+--------------------+----------------+
|   [facility]  |   [address] | [place]      |   [phone] |
| [place]          |                      |                    |                |
+--------------------+----------------------+--------------------+----------------+
 COMPLETE METABOLIC SET (NA,K,CL,CO2,BUN,CREAT,GLUC,CA,AST,ALT,BILI TOTAL,ALK PHOS,ALB,PROT 
TOTAL) (2015  3:11 PM PST)
 
+-------------+--------+-----------------+------------+--------------+
| Component   | Value  | Ref Range       | Performed  | Pathologist  |
|             |        |                 | At         | Signature    |
+-------------+--------+-----------------+------------+--------------+
| GLUCOSE,    | 77     | 70 - 100 mg/dL  | [facility]  |              |
| PLASMA      |        |                 | LAB -      |              |
| (LAB)       |        |                 | [place]  |              |
+-------------+--------+-----------------+------------+--------------+
| BUN, PLASMA | 14     | 6 - 23 mg/dL    | [facility]  |              |
|  (LAB)      |        |                 | LAB -      |              |
|             |        |                 | [place]  |              |
+-------------+--------+-----------------+------------+--------------+
| CREATININE  | 0.66   | 0.60 - 1.20     | [facility]  |              |
| PLASMA      |        | mg/dL           | LAB -      |              |
| (LAB)       |        |                 | [place]  |              |
+-------------+--------+-----------------+------------+--------------+
| TOTAL       | 7.4    | 6.1 - 8.5 g/dL  | [facility]  |              |
| PROTEIN,    |        |                 | LAB -      |              |
| PLASMA      |        |                 | [place]  |              |
| (LAB)       |        |                 |            |              |
+-------------+--------+-----------------+------------+--------------+
| ALBUMIN,    | 4.2    | 3.5 - 5.0 g/dL  | [facility]  |              |
| PLASMA      |        |                 | LAB -      |              |
| (LAB)       |        |                 | [place]  |              |
+-------------+--------+-----------------+------------+--------------+
| CALCIUM,    | 9.9    | 8.4 - 10.2      | [facility]  |              |
| PLASMA      |        | mg/dL           | LAB -      |              |
| (LAB)       |        |                 | [place]  |              |
+-------------+--------+-----------------+------------+--------------+
| BILIRUBIN   | 0.3    | 0 - 1.2         | [facility]  |              |
| TOTAL       |        | Transcutaneous  | LAB -      |              |
|             |        | Bilirubinometer | [place]  |              |
+-------------+--------+-----------------+------------+--------------+
| ALK PHOS    | 82     | 30 - 128 U/L    | [facility]  |              |
|             |        |                 | LAB -      |              |
|             |        |                 | [place]  |              |
+-------------+--------+-----------------+------------+--------------+
| AST(SGOT)   | 12 (A) | 13 - 39 U/L     | [facility]  |              |
|             |        |                 | LAB -      |              |
 
|             |        |                 | [place]  |              |
+-------------+--------+-----------------+------------+--------------+
| SODIUM,     | 139    | 132 - 143       | [facility]  |              |
| PLASMA      |        | mmol/L          | LAB -      |              |
| (LAB)       |        |                 | [place]  |              |
+-------------+--------+-----------------+------------+--------------+
| POTASSIUM,  | 4.2    | 3.6 - 5.1       | [facility]  |              |
| PLASMA      |        | mmol/L          | LAB -      |              |
| (LAB)       |        |                 | [place]  |              |
+-------------+--------+-----------------+------------+--------------+
| CHLORIDE,   | 102    | 95 - 112 mmol/L | [facility]  |              |
| PLASMA      |        |                 | LAB -      |              |
| (LAB)       |        |                 | [place]  |              |
+-------------+--------+-----------------+------------+--------------+
| TOTAL CO2,  | 28     | 19 - 31 mmol/L  | [facility]  |              |
| PLASMA      |        |                 | LAB -      |              |
| (LAB)       |        |                 | [place]  |              |
+-------------+--------+-----------------+------------+--------------+
| ALT (SGPT)  | 10     | 7 - 52 U/L      | [facility]  |              |
|             |        |                 | LAB -      |              |
|             |        |                 | [place]  |              |
+-------------+--------+-----------------+------------+--------------+
| ANION GAP   | 13.2   | 7 - 21          | [facility]  |              |
|             |        |                 | LAB -      |              |
|             |        |                 | [place]  |              |
+-------------+--------+-----------------+------------+--------------+
| BUN/CREATIN | 21.2   | 6.0 - 28.6      | [facility]  |              |
| INE RATIO   |        |                 | LAB -      |              |
|             |        |                 | [place]  |              |
+-------------+--------+-----------------+------------+--------------+
| GLOBULIN    | 3.2    | 1.8 - 3.5       | [facility]  |              |
|             |        |                 | LAB -      |              |
|             |        |                 | [place]  |              |
+-------------+--------+-----------------+------------+--------------+
| A/G RATIO   | 1.3    | 1.1 - 2.4       | [facility]  |              |
|             |        |                 | LAB -      |              |
|             |        |                 | [place]  |              |
+-------------+--------+-----------------+------------+--------------+
| BILIRUBIN   | 0.1    | 0.0 - 0.1 mg/dL | [facility]  |              |
| DIRECT      |        |                 | LAB -      |              |
|             |        |                 | [place]  |              |
+-------------+--------+-----------------+------------+--------------+
 
 
 
+---------------+
| Specimen      |
+---------------+
| Blood - Blood |
+---------------+
 
 
 
+--------------------+----------------------+--------------------+----------------+
| Performing         | Address              | City/State/Zipcode | Phone Number   |
| Organization       |                      |                    |                |
+--------------------+----------------------+--------------------+----------------+
|   [facility]  |   [address] | [place]      |   [phone] |
| [place]          |                      |                    |                |
+--------------------+----------------------+--------------------+----------------+
 
 TRIGLYCERIDES, PLASMA (2015  3:11 PM PST)
 
+-------------+-------+----------------+------------+--------------+
| Component   | Value | Ref Range      | Performed  | Pathologist  |
|             |       |                | At         | Signature    |
+-------------+-------+----------------+------------+--------------+
| TRIGLYCERID | 86    | 30 - 150 mg/dL | [facility]  |              |
| ES          |       |                | LAB -      |              |
|             |       |                | [place]  |              |
+-------------+-------+----------------+------------+--------------+
 
 
 
+---------------+
| Specimen      |
+---------------+
| Blood - Blood |
+---------------+
 
 
 
+--------------------+----------------------+--------------------+----------------+
| Performing         | Address              | City/State/Zipcode | Phone Number   |
| Organization       |                      |                    |                |
+--------------------+----------------------+--------------------+----------------+
|   [facility]  |   [address] | [place], [place]      |   [phone] |
| [place]          |                      |                    |                |
+--------------------+----------------------+--------------------+----------------+
 LDH TOTAL, PLASMA (2015  3:11 PM PST)
 
+-------------+-------+------------+------------+--------------+
| Component   | Value | Ref Range  | Performed  | Pathologist  |
|             |       |            | At         | Signature    |
+-------------+-------+------------+------------+--------------+
| LDH (TOTAL) | 129   | 100 - 215  | [facility]  |              |
|             |       | Units/L    | LAB -      |              |
|             |       |            | [place]  |              |
+-------------+-------+------------+------------+--------------+
 
 
 
+---------------+
| Specimen      |
+---------------+
| Blood - Blood |
+---------------+
 
 
 
+--------------------+----------------------+--------------------+----------------+
| Performing         | Address              | City/State/Zipcode | Phone Number   |
| Organization       |                      |                    |                |
+--------------------+----------------------+--------------------+----------------+
|   [facility]  |   [address] | [place], [place]      |   [phone] |
| [place]          |                      |                    |                |
+--------------------+----------------------+--------------------+----------------+
 CHOLESTEROL, TOTAL (EXTERNAL RESULTS ONLY) (2015  3:11 PM PST)
 
+-------------+-------+-----------+------------+--------------+
| Component   | Value | Ref Range | Performed  | Pathologist  |
 
|             |       |           | At         | Signature    |
+-------------+-------+-----------+------------+--------------+
| CHOLESTEROL | 129   | 200 mg/dL | [facility]  |              |
|   (LAB)     |       |           | LAB -      |              |
|             |       |           | [place]  |              |
+-------------+-------+-----------+------------+--------------+
 
 
 
+----------+
| Specimen |
+----------+
| Blood    |
+----------+
 
 
 
+--------------------+----------------------+--------------------+----------------+
| Performing         | Address              | City/State/Zipcode | Phone Number   |
| Organization       |                      |                    |                |
+--------------------+----------------------+--------------------+----------------+
|   [facility]  |   [address] | [place]      |   [phone] |
| [place]          |                      |                    |                |
+--------------------+----------------------+--------------------+----------------+
 URIC ACID, PLASMA (2015  3:11 PM PST)
 
+-------------+-------+-----------------+------------+--------------+
| Component   | Value | Ref Range       | Performed  | Pathologist  |
|             |       |                 | At         | Signature    |
+-------------+-------+-----------------+------------+--------------+
| URIC ACID,  | 3.6   | 2.3 - 6.6 mg/dL | [facility]  |              |
| PLASMA      |       |                 | LAB -      |              |
| (LAB)       |       |                 | [place]  |              |
+-------------+-------+-----------------+------------+--------------+
 
 
 
+---------------+
| Specimen      |
+---------------+
| Blood - Blood |
+---------------+
 
 
 
+--------------------+----------------------+--------------------+----------------+
| Performing         | Address              | City/State/Zipcode | Phone Number   |
| Organization       |                      |                    |                |
+--------------------+----------------------+--------------------+----------------+
|   [facility]  |   [address] | [place], [place]      |   [phone] |
| [place]          |                      |                    |                |
+--------------------+----------------------+--------------------+----------------+
 PHOSPHORUS, PLASMA (2015  3:11 PM PST)
 
+-------------+-------+-----------------+------------+--------------+
| Component   | Value | Ref Range       | Performed  | Pathologist  |
|             |       |                 | At         | Signature    |
+-------------+-------+-----------------+------------+--------------+
| PHOSPHORUS, | 3.9   | 2.5 - 5.0 mg/dL | [facility]  |              |
|  PLASMA     |       |                 | LAB -      |              |
 
| (LAB)       |       |                 | [place]  |              |
+-------------+-------+-----------------+------------+--------------+
 
 
 
+---------------+
| Specimen      |
+---------------+
| Blood - Blood |
+---------------+
 
 
 
+--------------------+----------------------+--------------------+----------------+
| Performing         | Address              | City/State/Zipcode | Phone Number   |
| Organization       |                      |                    |                |
+--------------------+----------------------+--------------------+----------------+
|   [facility]  |   [address] | [place]      |   [phone] |
| [place]          |                      |                    |                |
+--------------------+----------------------+--------------------+----------------+
 documented in this encounter
 
 Visit Diagnoses
 Not on filedocumented in this encounter

## 2019-11-01 NOTE — XMS
Encounter Summary
  Created on: 2019
 
 Bonifacio Nila JB
 External Reference #: [id number]
 : [birth year]
 Sex: Female
 
 Demographics
 
 
+-----------------------+------------------------+
| Address               | [address]            |
|                       | [place]  [address]   |
+-----------------------+------------------------+
| Home Phone            | [phone]        |
+-----------------------+------------------------+
| Preferred Language    | Unknown                |
+-----------------------+------------------------+
| Marital Status        | Single                 |
+-----------------------+------------------------+
| Congregational Affiliation | Unknown                |
+-----------------------+------------------------+
| Race                  | White                  |
+-----------------------+------------------------+
| Ethnic Group          | Not  or  |
+-----------------------+------------------------+
 
 
 Author
 
 
+--------------+------------------------------+
| Author       | [facility] |
+--------------+------------------------------+
| Organization | [facility] |
+--------------+------------------------------+
| Address      | Unknown                      |
+--------------+------------------------------+
| Phone        | Unavailable                  |
+--------------+------------------------------+
 
 
 
 Support
 
 
+-----------------+--------------+---------+-----------------+
| Name            | Relationship | Address | Phone           |
+-----------------+--------------+---------+-----------------+
| Kit Valdovinos   | ECON         | Unknown | [phone] |
+-----------------+--------------+---------+-----------------+
| Magdalena Valdovinos | ECON         | Unknown | [phone] |
+-----------------+--------------+---------+-----------------+
 
 
 
 Care Team Providers
 
 
 
+------------------------+------+-----------------+
| Care Team Member Name  | Role | Phone           |
+------------------------+------+-----------------+
| Lily Horton MD | PCP  | [phone] |
+------------------------+------+-----------------+
 
 
 
 Encounter Details
 
 
+--------+-------------+----------------------+--------------------+-------------+
| Date   | Type        | Department           | Care Team          | Description |
+--------+-------------+----------------------+--------------------+-------------+
| 10/19/ | Document-Sc |   Health Information |   Other, Faculty   |             |
| 2017   | anned       |  Services  [id number]    | [phone]       |             |
|        |             | [address]  |                    |             |
|        |             |  Mailcode: [id number]     |                    |             |
|        |             | [facility]  |                    |             |
|        |             | [place], [place]  |                    |             |
|        |             | [address]           |                    |             |
|        |             | [phone]         |                    |             |
+--------+-------------+----------------------+--------------------+-------------+
 
 
 
 Social History
 
 
+-------------------+-------+-----------+--------+------+
| Tobacco Use       | Types | Packs/Day | Years  | Date |
|                   |       |           | Used   |      |
+-------------------+-------+-----------+--------+------+
| Passive Smoke     |       |           |        |      |
| Exposure - Never  |       |           |        |      |
| Smoker            |       |           |        |      |
+-------------------+-------+-----------+--------+------+
 
 
 
+---------------------+---+---+---+
| Smokeless Tobacco:  |   |   |   |
| Never Used          |   |   |   |
+---------------------+---+---+---+
 
 
 
+-------------+-------------+---------+----------+
| Alcohol Use | Drinks/Week | oz/Week | Comments |
+-------------+-------------+---------+----------+
| Not Asked   |             |         |          |
+-------------+-------------+---------+----------+
 
 
 
+------------------+---------------+
| Sex Assigned at  | Date Recorded |
| Birth            |               |
 
+------------------+---------------+
| Not on file      |               |
+------------------+---------------+
 
 
 
+----------------+-------------+-------------+
| Job Start Date | Occupation  | Industry    |
+----------------+-------------+-------------+
| Not on file    | Not on file | Not on file |
+----------------+-------------+-------------+
 
 
 
+----------------+--------------+------------+
| Travel History | Travel Start | Travel End |
+----------------+--------------+------------+
 
 
 
+-------------------------------------+
| No recent travel history available. |
+-------------------------------------+
 documented as of this encounter
 
 Plan of Treatment
 Not on filedocumented as of this encounter
 
 Procedures
 
 
+----------------+--------+-------------+----------------------+----------------------+
| Procedure Name | Priori | Date/Time   | Associated Diagnosis | Comments             |
|                | ty     |             |                      |                      |
+----------------+--------+-------------+----------------------+----------------------+
| LAB REPORTS    |        | 10/19/2017  |                      |   Results for this   |
|                |        | 12:00 AM    |                      | procedure are in the |
|                |        | PDT         |                      |  results section.    |
+----------------+--------+-------------+----------------------+----------------------+
 documented in this encounter
 
 Results
 LAB REPORTS (10/19/2017 12:00 AM PDT)
 
+----------------------------------------------------------+--------------+
| Narrative                                                | Performed At |
+----------------------------------------------------------+--------------+
|   This result has an attachment that is not available.   |              |
+----------------------------------------------------------+--------------+
 documented in this encounter
 
 Visit Diagnoses
 Not on filedocumented in this encounter

## 2019-11-01 NOTE — XMS
Encounter Summary
  Created on: 2019
 
 Bonifacio Nila JB
 External Reference #: [id number]
 : [birth year]
 Sex: Female
 
 Demographics
 
 
+-----------------------+------------------------+
| Address               | [address]            |
|                       | [place]  [address]   |
+-----------------------+------------------------+
| Home Phone            | [phone]        |
+-----------------------+------------------------+
| Preferred Language    | Unknown                |
+-----------------------+------------------------+
| Marital Status        | Single                 |
+-----------------------+------------------------+
| Hindu Affiliation | Unknown                |
+-----------------------+------------------------+
| Race                  | White                  |
+-----------------------+------------------------+
| Ethnic Group          | Not  or  |
+-----------------------+------------------------+
 
 
 Author
 
 
+--------------+------------------------------+
| Author       | [facility] |
+--------------+------------------------------+
| Organization | [facility] |
+--------------+------------------------------+
| Address      | Unknown                      |
+--------------+------------------------------+
| Phone        | Unavailable                  |
+--------------+------------------------------+
 
 
 
 Support
 
 
+-----------------+--------------+---------+-----------------+
| Name            | Relationship | Address | Phone           |
+-----------------+--------------+---------+-----------------+
| Kit Valdovinos   | ECON         | Unknown | [phone] |
+-----------------+--------------+---------+-----------------+
| Magdalena Valdovinos | ECON         | Unknown | [phone] |
+-----------------+--------------+---------+-----------------+
 
 
 
 Care Team Providers
 
 
 
+------------------------+------+-----------------+
| Care Team Member Name  | Role | Phone           |
+------------------------+------+-----------------+
| Lily Horton MD | PCP  | [phone] |
+------------------------+------+-----------------+
 
 
 
 Reason for Visit
 
 
+-------------------+----------+
| Reason            | Comments |
+-------------------+----------+
| Care Coordination | [facility] |
+-------------------+----------+
 
 
 
 Encounter Details
 
 
+--------+-------------+----------------------+----------------------+--------------------+
| Date   | Type        | Department           | Care Team            | Description        |
+--------+-------------+----------------------+----------------------+--------------------+
| / | Documentati |   Pediatric          |   Neema Khalil,   | Care Coordination  |
| 2017   | on          | Gastroenterology at  | MD  [address] | ([place])         |
|        |             | [facility]          |   [place], [place]       |                    |
|        |             | [facility]  | [address]           |                    |
|        |             |  [address] | [phone]         |                    |
|        |             |  [address]  Mailcode:  | [phone] (Fax)   |                    |
|        |             | [facility]   |                      |                    |
|        |             | [place], [place]         |                      |                    |
|        |             | [address]           |                      |                    |
|        |             | [phone]         |                      |                    |
+--------+-------------+----------------------+----------------------+--------------------+
 
 
 
 Social History
 
 
+-------------------+-------+-----------+--------+------+
| Tobacco Use       | Types | Packs/Day | Years  | Date |
|                   |       |           | Used   |      |
+-------------------+-------+-----------+--------+------+
| Passive Smoke     |       |           |        |      |
| Exposure - Never  |       |           |        |      |
| Smoker            |       |           |        |      |
+-------------------+-------+-----------+--------+------+
 
 
 
+---------------------+---+---+---+
| Smokeless Tobacco:  |   |   |   |
| Never Used          |   |   |   |
+---------------------+---+---+---+
 
 
 
 
+-------------+-------------+---------+----------+
| Alcohol Use | Drinks/Week | oz/Week | Comments |
+-------------+-------------+---------+----------+
| Not Asked   |             |         |          |
+-------------+-------------+---------+----------+
 
 
 
+------------------+---------------+
| Sex Assigned at  | Date Recorded |
| Birth            |               |
+------------------+---------------+
| Not on file      |               |
+------------------+---------------+
 
 
 
+----------------+-------------+-------------+
| Job Start Date | Occupation  | Industry    |
+----------------+-------------+-------------+
| Not on file    | Not on file | Not on file |
+----------------+-------------+-------------+
 
 
 
+----------------+--------------+------------+
| Travel History | Travel Start | Travel End |
+----------------+--------------+------------+
 
 
 
+-------------------------------------+
| No recent travel history available. |
+-------------------------------------+
 documented as of this encounter
 
 Plan of Treatment
 Not on filedocumented as of this encounter
 
 Visit Diagnoses
 Not on filedocumented in this encounter

## 2019-11-01 NOTE — XMS
Encounter Summary
  Created on: 2019
 
 Bonifacio Nila JB
 External Reference #: [id number]
 : [birth year]
 Sex: Female
 
 Demographics
 
 
+-----------------------+------------------------+
| Address               | [address]            |
|                       | [place]  [address]   |
+-----------------------+------------------------+
| Home Phone            | [phone]        |
+-----------------------+------------------------+
| Preferred Language    | Unknown                |
+-----------------------+------------------------+
| Marital Status        | Single                 |
+-----------------------+------------------------+
| Gnosticism Affiliation | Unknown                |
+-----------------------+------------------------+
| Race                  | White                  |
+-----------------------+------------------------+
| Ethnic Group          | Not  or  |
+-----------------------+------------------------+
 
 
 Author
 
 
+--------------+------------------------------+
| Author       | [facility] |
+--------------+------------------------------+
| Organization | [facility] |
+--------------+------------------------------+
| Address      | Unknown                      |
+--------------+------------------------------+
| Phone        | Unavailable                  |
+--------------+------------------------------+
 
 
 
 Support
 
 
+-----------------+--------------+---------+-----------------+
| Name            | Relationship | Address | Phone           |
+-----------------+--------------+---------+-----------------+
| Kit Valdovinos   | ECON         | Unknown | [phone] |
+-----------------+--------------+---------+-----------------+
| Magdalena Valdovinos | ECON         | Unknown | [phone] |
+-----------------+--------------+---------+-----------------+
 
 
 
 Care Team Providers
 
 
 
+------------------------+------+-----------------+
| Care Team Member Name  | Role | Phone           |
+------------------------+------+-----------------+
| Lily Horton MD | PCP  | [phone] |
+------------------------+------+-----------------+
 
 
 
 Reason for Visit
 
 
+----------+------------------------------+
| Reason   | Comments                     |
+----------+------------------------------+
| Lab Draw | past due for transplant labs |
+----------+------------------------------+
 
 
 
 Encounter Details
 
 
+--------+-----------+----------------------+----------------------+----------------------+
| Date   | Type      | Department           | Care Team            | Description          |
+--------+-----------+----------------------+----------------------+----------------------+
| / | Telephone |   Pediatric          |   Neema Khalil,   | Lab Draw (past due   |
| 2015   |           | Gastroenterology at  | MD  [address] | for transplant labs) |
|        |           | [facility]          |   [place], [place]       |                      |
|        |           | [facility]  | [address]           |                      |
|        |           |  [address] | [phone]         |                      |
|        |           |  [address]  Mailcode:  | [phone] (Fax)   |                      |
|        |           | [facility]   |                      |                      |
|        |           | [place], [place]         |                      |                      |
|        |           | [address]           |                      |                      |
|        |           | [phone]         |                      |                      |
+--------+-----------+----------------------+----------------------+----------------------+
 
 
 
 Social History
 
 
+-------------------+-------+-----------+--------+------+
| Tobacco Use       | Types | Packs/Day | Years  | Date |
|                   |       |           | Used   |      |
+-------------------+-------+-----------+--------+------+
| Passive Smoke     |       |           |        |      |
| Exposure - Never  |       |           |        |      |
| Smoker            |       |           |        |      |
+-------------------+-------+-----------+--------+------+
 
 
 
+---------------------+---+---+---+
| Smokeless Tobacco:  |   |   |   |
| Never Used          |   |   |   |
+---------------------+---+---+---+
 
 
 
 
+-------------+-------------+---------+----------+
| Alcohol Use | Drinks/Week | oz/Week | Comments |
+-------------+-------------+---------+----------+
| Not Asked   |             |         |          |
+-------------+-------------+---------+----------+
 
 
 
+------------------+---------------+
| Sex Assigned at  | Date Recorded |
| Birth            |               |
+------------------+---------------+
| Not on file      |               |
+------------------+---------------+
 
 
 
+----------------+-------------+-------------+
| Job Start Date | Occupation  | Industry    |
+----------------+-------------+-------------+
| Not on file    | Not on file | Not on file |
+----------------+-------------+-------------+
 
 
 
+----------------+--------------+------------+
| Travel History | Travel Start | Travel End |
+----------------+--------------+------------+
 
 
 
+-------------------------------------+
| No recent travel history available. |
+-------------------------------------+
 documented as of this encounter
 
 Plan of Treatment
 Not on filedocumented as of this encounter
 
 Visit Diagnoses
 Not on filedocumented in this encounter

## 2019-11-01 NOTE — NUR
WITH AMBULATION PT HAD ELEVATED HR , PT DENIES ANY SYMPTOMS WITH THIS.
PT DENIES NAUSEA, SOB, AND PAIN. PT ABLE TO AMBULATE WITH STAND BY ASSIST
ONLY.

## 2019-11-01 NOTE — XMS
Encounter Summary
  Created on: 2019
 
 Bonifacio Nila JB
 External Reference #: [id number]
 : [birth year]
 Sex: Female
 
 Demographics
 
 
+-----------------------+------------------------+
| Address               | [address]            |
|                       | [place]  [address]   |
+-----------------------+------------------------+
| Home Phone            | [phone]        |
+-----------------------+------------------------+
| Preferred Language    | Unknown                |
+-----------------------+------------------------+
| Marital Status        | Single                 |
+-----------------------+------------------------+
| [employer] Affiliation | Unknown                |
+-----------------------+------------------------+
| Race                  | White                  |
+-----------------------+------------------------+
| Ethnic Group          | Not  or  |
+-----------------------+------------------------+
 
 
 Author
 
 
+--------------+------------------------------+
| Author       | [facility] |
+--------------+------------------------------+
| Organization | [facility] |
+--------------+------------------------------+
| Address      | Unknown                      |
+--------------+------------------------------+
| Phone        | Unavailable                  |
+--------------+------------------------------+
 
 
 
 Support
 
 
+-----------------+--------------+---------+-----------------+
| Name            | Relationship | Address | Phone           |
+-----------------+--------------+---------+-----------------+
| Kit Valdovinos   | ECON         | Unknown | [phone] |
+-----------------+--------------+---------+-----------------+
| Magdalena Valdovinos | ECON         | Unknown | [phone] |
+-----------------+--------------+---------+-----------------+
 
 
 
 Care Team Providers
 
 
 
+-----------------------+------+-------------+
| Care Team Member Name | Role | Phone       |
+-----------------------+------+-------------+
 PCP  | Unavailable |
+-----------------------+------+-------------+
 
 
 
 Reason for Visit
 
 
+--------------+----------+
| Reason       | Comments |
+--------------+----------+
| Social work  |          |
| consultation |          |
+--------------+----------+
 
 
 
 Encounter Details
 
 
+--------+-------------+----------------------+---------------------+--------------+
| Date   | Type        | Department           | Care Team           | Description  |
+--------+-------------+----------------------+---------------------+--------------+
| 07/17/ | Documentati |   SOCIAL WORK        |   Coretta Armijo,  | Social work  |
|    | on          | AMBULATORY  [address]  | [facility]  [address]    | consultation |
|        |             | [address]  | [address]     |              |
|        |             |  Mailcode: [address]      | [place], [place]        |              |
|        |             | [place], [place]         | [address]          |              |
|        |             | [address]           | [phone]        |              |
|        |             | [phone]         | [phone] (Fax)  |              |
+--------+-------------+----------------------+---------------------+--------------+
 
 
 
 Social History
 
 
+----------------+-------+-----------+--------+------+
| Tobacco Use    | Types | Packs/Day | Years  | Date |
|                |       |           | Used   |      |
+----------------+-------+-----------+--------+------+
| Never Assessed |       |           |        |      |
+----------------+-------+-----------+--------+------+
 
 
 
+------------------+---------------+
| Sex Assigned at  | Date Recorded |
| Birth            |               |
+------------------+---------------+
| Not on file      |               |
+------------------+---------------+
 
 
 
 
+----------------+-------------+-------------+
| Job Start Date | Occupation  | Industry    |
+----------------+-------------+-------------+
| Not on file    | Not on file | Not on file |
+----------------+-------------+-------------+
 
 
 
+----------------+--------------+------------+
| Travel History | Travel Start | Travel End |
+----------------+--------------+------------+
 
 
 
+-------------------------------------+
| No recent travel history available. |
+-------------------------------------+
 documented as of this encounter
 
 Plan of Treatment
 Not on filedocumented as of this encounter
 
 Visit Diagnoses
 Not on filedocumented in this encounter

## 2019-11-01 NOTE — XMS
Encounter Summary
  Created on: 2019
 
 Bonifacio Nila JB
 External Reference #: [id number]
 : [birth year]
 Sex: Female
 
 Demographics
 
 
+-----------------------+------------------------+
| Address               | [address]            |
|                       | [place]  [address]   |
+-----------------------+------------------------+
| Home Phone            | [phone]        |
+-----------------------+------------------------+
| Preferred Language    | Unknown                |
+-----------------------+------------------------+
| Marital Status        | Single                 |
+-----------------------+------------------------+
| Gnosticist Affiliation | Unknown                |
+-----------------------+------------------------+
| Race                  | White                  |
+-----------------------+------------------------+
| Ethnic Group          | Not  or  |
+-----------------------+------------------------+
 
 
 Author
 
 
+--------------+------------------------------+
| Author       | [facility] |
+--------------+------------------------------+
| Organization | [facility] |
+--------------+------------------------------+
| Address      | Unknown                      |
+--------------+------------------------------+
| Phone        | Unavailable                  |
+--------------+------------------------------+
 
 
 
 Support
 
 
+-----------------+--------------+---------+-----------------+
| Name            | Relationship | Address | Phone           |
+-----------------+--------------+---------+-----------------+
| Kit Valdovinos   | ECON         | Unknown | [phone] |
+-----------------+--------------+---------+-----------------+
| Magdalena Valdovinos | ECON         | Unknown | [phone] |
+-----------------+--------------+---------+-----------------+
 
 
 
 Care Team Providers
 
 
 
+------------------------+------+-----------------+
| Care Team Member Name  | Role | Phone           |
+------------------------+------+-----------------+
| Lily Horton MD | PCP  | [phone] |
+------------------------+------+-----------------+
 
 
 
 Reason for Visit
 
 
+-------------+----------+
| Reason      | Comments |
+-------------+----------+
| Lab Results |          |
+-------------+----------+
 
 
 
 Encounter Details
 
 
+--------+----------+----------------------+----------------------+-------------+
| Date   | Type     | Department           | Care Team            | Description |
+--------+----------+----------------------+----------------------+-------------+
| 10/23/ | Abstract |   Pediatric          |   Neema Khalil,   | Lab Results |
| 2017   |          | Gastroenterology at  | MD  [address] |             |
|        |          | [place]          |   [place], [place]       |             |
|        |          | [facility]  | [address]           |             |
|        |          |  [address] | [phone]         |             |
|        |          |  A[address]  Mailcode:  | [phone] (Fax)   |             |
|        |          | [facility]   |                      |             |
|        |          | [place], [place]         |                      |             |
|        |          | [address]           |                      |             |
|        |          | [phone]         |                      |             |
+--------+----------+----------------------+----------------------+-------------+
 
 
 
 Social History
 
 
+-------------------+-------+-----------+--------+------+
| Tobacco Use       | Types | Packs/Day | Years  | Date |
|                   |       |           | Used   |      |
+-------------------+-------+-----------+--------+------+
| Passive Smoke     |       |           |        |      |
| Exposure - Never  |       |           |        |      |
| Smoker            |       |           |        |      |
+-------------------+-------+-----------+--------+------+
 
 
 
+---------------------+---+---+---+
| Smokeless Tobacco:  |   |   |   |
| Never Used          |   |   |   |
+---------------------+---+---+---+
 
 
 
 
+-------------+-------------+---------+----------+
| Alcohol Use | Drinks/Week | oz/Week | Comments |
+-------------+-------------+---------+----------+
| Not Asked   |             |         |          |
+-------------+-------------+---------+----------+
 
 
 
+------------------+---------------+
| Sex Assigned at  | Date Recorded |
| Birth            |               |
+------------------+---------------+
| Not on file      |               |
+------------------+---------------+
 
 
 
+----------------+-------------+-------------+
| Job Start Date | Occupation  | Industry    |
+----------------+-------------+-------------+
| Not on file    | Not on file | Not on file |
+----------------+-------------+-------------+
 
 
 
+----------------+--------------+------------+
| Travel History | Travel Start | Travel End |
+----------------+--------------+------------+
 
 
 
+-------------------------------------+
| No recent travel history available. |
+-------------------------------------+
 documented as of this encounter
 
 Plan of Treatment
 Not on filedocumented as of this encounter
 
 Procedures
 
 
+----------------------+--------+------------+----------------------+----------------------+
| Procedure Name       | Priori | Date/Time  | Associated Diagnosis | Comments             |
|                      | ty     |            |                      |                      |
+----------------------+--------+------------+----------------------+----------------------+
| CBC, WITH            | Routin | 10/19/2017 |                      |   Results for this   |
| DIFFERENTIAL         | e      |            |                      | procedure are in the |
|                      |        |            |                      |  results section.    |
+----------------------+--------+------------+----------------------+----------------------+
| COMPLETE METABOLIC   | Routin | 10/19/2017 |                      |   Results for this   |
| SET                  | e      |            |                      | procedure are in the |
| (NA,K,CL,CO2,BUN,CRE |        |            |                      |  results section.    |
| AT,GLUC,CA,AST,ALT,B |        |            |                      |                      |
| ASHWINI TOTAL,ALK        |        |            |                      |                      |
| PHOS,ALB,PROT TOTAL) |        |            |                      |                      |
+----------------------+--------+------------+----------------------+----------------------+
| TACROLIMUS, WHOLE    | Routin | 10/19/2017 |                      |   Results for this   |
 
| BLOOD                | e      |            |                      | procedure are in the |
|                      |        |            |                      |  results section.    |
+----------------------+--------+------------+----------------------+----------------------+
| GGT, PLASMA          | Routin | 10/19/2017 |                      |   Results for this   |
|                      | e      |            |                      | procedure are in the |
|                      |        |            |                      |  results section.    |
+----------------------+--------+------------+----------------------+----------------------+
| MAGNESIUM, PLASMA    | Routin | 10/19/2017 |                      |   Results for this   |
|                      | e      |            |                      | procedure are in the |
|                      |        |            |                      |  results section.    |
+----------------------+--------+------------+----------------------+----------------------+
 documented in this encounter
 
 Results
 MAGNESIUM, PLASMA (10/19/2017)
 
+-----------+-------+-----------------+------------+--------------+
| Component | Value | Ref Range       | Performed  | Pathologist  |
|           |       |                 | At         | Signature    |
+-----------+-------+-----------------+------------+--------------+
| MAGNESIUM | 1.9   | 1.7 - 2.5 mg/dL | [facility]  |              |
|           |       |                 | LAB -      |              |
|           |       |                 | [place]  |              |
+-----------+-------+-----------------+------------+--------------+
 
 
 
+----------------+
| Specimen       |
+----------------+
| Blood - Blood  |
| (substance)    |
+----------------+
 
 
 
+--------------------+----------------------+--------------------+----------------+
| Performing         | Address              | City/State/Zipcode | Phone Number   |
| Organization       |                      |                    |                |
+--------------------+----------------------+--------------------+----------------+
|   [facility]  |   [address] | [place]      |   [phone] |
| [place]          |                      |                    |                |
+--------------------+----------------------+--------------------+----------------+
 GGT, PLASMA (10/19/2017)
 
+-----------+-------+------------+------------+--------------+
| Component | Value | Ref Range  | Performed  | Pathologist  |
|           |       |            | At         | Signature    |
+-----------+-------+------------+------------+--------------+
| GAMMA     | 14    | 5 - 60 U/L | [facility]  |              |
| GLUTAMYL  |       |            | LAB -      |              |
| TRANS     |       |            | [place]  |              |
+-----------+-------+------------+------------+--------------+
 
 
 
+----------------+
| Specimen       |
+----------------+
| Blood - Blood  |
 
| (substance)    |
+----------------+
 
 
 
+--------------------+----------------------+--------------------+----------------+
| Performing         | Address              | City/State/Zipcode | Phone Number   |
| Organization       |                      |                    |                |
+--------------------+----------------------+--------------------+----------------+
|   [facility]  |   [address] | [place]      |   [phone] |
| [place]          |                      |                    |                |
+--------------------+----------------------+--------------------+----------------+
 TACROLIMUS, WHOLE BLOOD (10/19/2017)
 
+-------------+-------+-----------+------------+--------------+
| Component   | Value | Ref Range | Performed  | Pathologist  |
|             |       |           | At         | Signature    |
+-------------+-------+-----------+------------+--------------+
| TACROLIMUS  | <1    | ng/mL     | [facility]  |              |
| ()    |       |           | LAB -      |              |
|             |       |           | [place]  |              |
+-------------+-------+-----------+------------+--------------+
 
 
 
+----------------+
| Specimen       |
+----------------+
| Blood - Blood  |
| (substance)    |
+----------------+
 
 
 
+--------------------+----------------------+--------------------+----------------+
| Performing         | Address              | City/State/Zipcode | Phone Number   |
| Organization       |                      |                    |                |
+--------------------+----------------------+--------------------+----------------+
|   [facility]  |   [address] | [place], [place]      |   [phone] |
| [place]          |                      |                    |                |
+--------------------+----------------------+--------------------+----------------+
 COMPLETE METABOLIC SET (NA,K,CL,CO2,BUN,CREAT,GLUC,CA,AST,ALT,BILI TOTAL,ALK PHOS,ALB,PROT 
TOTAL) (10/19/2017)
 
+-------------+-------+-----------------+------------+--------------+
| Component   | Value | Ref Range       | Performed  | Pathologist  |
|             |       |                 | At         | Signature    |
+-------------+-------+-----------------+------------+--------------+
| GLUCOSE,    | 83    | 70 - 100 mg/dL  | [facility]  |              |
| PLASMA      |       |                 | LAB -      |              |
| (LAB)       |       |                 | [place]  |              |
+-------------+-------+-----------------+------------+--------------+
| BUN, PLASMA | 13    | 6 - 23 mg/dL    | [facility]  |              |
|  (LAB)      |       |                 | LAB -      |              |
|             |       |                 | [place]  |              |
+-------------+-------+-----------------+------------+--------------+
| CREATININE  | 0.74  | 0.6 - 1.26      | [facility]  |              |
| PLASMA      |       | mg/dL           | LAB -      |              |
| (LAB)       |       |                 | [place]  |              |
+-------------+-------+-----------------+------------+--------------+
 
| TOTAL       | 7.7   | 6 - 8 g/dL      | [facility]  |              |
| PROTEIN,    |       |                 | LAB -      |              |
| PLASMA      |       |                 | [place]  |              |
| (LAB)       |       |                 |            |              |
+-------------+-------+-----------------+------------+--------------+
| ALBUMIN,    | 4.3   | 3.5 - 5 g/dL    | [facility]  |              |
| PLASMA      |       |                 | LAB -      |              |
| (LAB)       |       |                 | [place]  |              |
+-------------+-------+-----------------+------------+--------------+
| CALCIUM,    | 10.1  | 8.4 - 10.2      | [facility]  |              |
| PLASMA      |       | mg/dL           | LAB -      |              |
| (LAB)       |       |                 | [place]  |              |
+-------------+-------+-----------------+------------+--------------+
| BILIRUBIN   | 0.4   | 0 - 1.2 mg/dL   | [facility]  |              |
| TOTAL       |       |                 | LAB -      |              |
|             |       |                 | [place]  |              |
+-------------+-------+-----------------+------------+--------------+
| ALK PHOS    | 106   | 42 - 145 U/L    | [facility]  |              |
|             |       |                 | LAB -      |              |
|             |       |                 | [place]  |              |
+-------------+-------+-----------------+------------+--------------+
| AST(SGOT)   | 17    | 13 - 39 U/L     | [facility]  |              |
|             |       |                 | LAB -      |              |
|             |       |                 | [place]  |              |
+-------------+-------+-----------------+------------+--------------+
| SODIUM,     | 139   | 132 - 143       | [facility]  |              |
| PLASMA      |       | mmol/L          | LAB -      |              |
| (LAB)       |       |                 | [place]  |              |
+-------------+-------+-----------------+------------+--------------+
| POTASSIUM,  | 3.8   | 3.6 - 5.1       | [facility]  |              |
| PLASMA      |       | mmol/L          | LAB -      |              |
| (LAB)       |       |                 | [place]  |              |
+-------------+-------+-----------------+------------+--------------+
| CHLORIDE,   | 104   | 95 - 112 mmol/L | [facility]  |              |
| PLASMA      |       |                 | LAB -      |              |
| (LAB)       |       |                 | [place]  |              |
+-------------+-------+-----------------+------------+--------------+
| TOTAL CO2,  | 23    | 19 - 31 mmol/L  | [facility]  |              |
| PLASMA      |       |                 | LAB -      |              |
| (LAB)       |       |                 | [place]  |              |
+-------------+-------+-----------------+------------+--------------+
| ALT (SGPT)  | 23    | 7 - 52 U/L      | [facility]  |              |
|             |       |                 | LAB -      |              |
|             |       |                 | [place]  |              |
+-------------+-------+-----------------+------------+--------------+
 
 
 
+----------------+
| Specimen       |
+----------------+
| Blood - Blood  |
| (substance)    |
+----------------+
 
 
 
+--------------------+----------------------+--------------------+----------------+
| Performing         | Address              | City/State/Zipcode | Phone Number   |
| Organization       |                      |                    |                |
 
+--------------------+----------------------+--------------------+----------------+
|   [facility]  |   [address] | [place], [place]      |   [phone] |
| [place]          |                      |                    |                |
+--------------------+----------------------+--------------------+----------------+
 CBC, WITH DIFFERENTIAL (10/19/2017)
 
+-------------+-------+-----------------+------------+--------------+
| Component   | Value | Ref Range       | Performed  | Pathologist  |
|             |       |                 | At         | Signature    |
+-------------+-------+-----------------+------------+--------------+
| WHITE CELL  | 8.2   | 4.5 - 11 K/cu   | [facility]  |              |
| COUNT       |       | mm              | LAB -      |              |
|             |       |                 | [place]  |              |
+-------------+-------+-----------------+------------+--------------+
| RED CELL    | 4.91  | 3.8 - 5.1 M/cu  | [facility]  |              |
| COUNT       |       | mm              | LAB -      |              |
|             |       |                 | [place]  |              |
+-------------+-------+-----------------+------------+--------------+
| HEMOGLOBIN  | 14.8  | 12 - 16 g/dL    | [facility]  |              |
|             |       |                 | LAB -      |              |
|             |       |                 | [place]  |              |
+-------------+-------+-----------------+------------+--------------+
| HEMATOCRIT  | 43.5  | 35 - 45 %       | [facility]  |              |
|             |       |                 | LAB -      |              |
|             |       |                 | [place]  |              |
+-------------+-------+-----------------+------------+--------------+
| MCV         | 88.7  | 81 - 99 fL      | [facility]  |              |
|             |       |                 | LAB -      |              |
|             |       |                 | [place]  |              |
+-------------+-------+-----------------+------------+--------------+
| MCH         | 30    | 27 - 33 pg      | [facility]  |              |
|             |       |                 | LAB -      |              |
|             |       |                 | [place]  |              |
+-------------+-------+-----------------+------------+--------------+
| MCHC        | 34    | 30 - 36 g/dL    | [facility]  |              |
|             |       |                 | LAB -      |              |
|             |       |                 | [place]  |              |
+-------------+-------+-----------------+------------+--------------+
| PLATELET    | 330   | 140 - 440 K/cu  | [facility]  |              |
| COUNT       |       | mm              | LAB -      |              |
|             |       |                 | [place]  |              |
+-------------+-------+-----------------+------------+--------------+
| NEUTROPHIL  | 61.3  | 39 - 80 %       | [facility]  |              |
| %           |       |                 | LAB -      |              |
|             |       |                 | [place]  |              |
+-------------+-------+-----------------+------------+--------------+
| LYMPHOCYTE  | 26.5  | 24 - 44 %       | [facility]  |              |
| %           |       |                 | LAB -      |              |
|             |       |                 | [place]  |              |
+-------------+-------+-----------------+------------+--------------+
| MONOCYTE %  | 9.8   | 0 - 12 %        | [facility]  |              |
|             |       |                 | LAB -      |              |
|             |       |                 | [place]  |              |
+-------------+-------+-----------------+------------+--------------+
| EOS %       | 1.4   | 0 - 6 %         | [facility]  |              |
|             |       |                 | LAB -      |              |
|             |       |                 | [place]  |              |
+-------------+-------+-----------------+------------+--------------+
| BASO %      | 1     | 0 - 2 %         | [facility]  |              |
|             |       |                 | LAB -      |              |
 
|             |       |                 | [place]  |              |
+-------------+-------+-----------------+------------+--------------+
| RDW         | 13.6  | 10.5 - 15 %     | [facility]  |              |
|             |       |                 | LAB -      |              |
|             |       |                 | [place]  |              |
+-------------+-------+-----------------+------------+--------------+
 
 
 
+----------------+
| Specimen       |
+----------------+
| Blood - Blood  |
| (substance)    |
+----------------+
 
 
 
+--------------------+----------------------+--------------------+----------------+
| Performing         | Address              | City/State/Zipcode | Phone Number   |
| Organization       |                      |                    |                |
+--------------------+----------------------+--------------------+----------------+
|   [facility]  |   [address] | [place]      |   [phone] |
| [place]          |                      |                    |                |
+--------------------+----------------------+--------------------+----------------+
 documented in this encounter
 
 Visit Diagnoses
 Not on filedocumented in this encounter

## 2019-11-01 NOTE — XMS
Encounter Summary
  Created on: 2019
 
 Bonifacio Nila JB
 External Reference #: [id number]
 : [birth year]
 Sex: Female
 
 Demographics
 
 
+-----------------------+------------------------+
| Address               | [address]            |
|                       | [place]  [address]   |
+-----------------------+------------------------+
| Home Phone            | [phone]        |
+-----------------------+------------------------+
| Preferred Language    | Unknown                |
+-----------------------+------------------------+
| Marital Status        | Single                 |
+-----------------------+------------------------+
| Muslim Affiliation | Unknown                |
+-----------------------+------------------------+
| Race                  | White                  |
+-----------------------+------------------------+
| Ethnic Group          | Not  or  |
+-----------------------+------------------------+
 
 
 Author
 
 
+--------------+------------------------------+
| Author       | [facility] |
+--------------+------------------------------+
| Organization | [facility] |
+--------------+------------------------------+
| Address      | Unknown                      |
+--------------+------------------------------+
| Phone        | Unavailable                  |
+--------------+------------------------------+
 
 
 
 Support
 
 
+-----------------+--------------+---------+-----------------+
| Name            | Relationship | Address | Phone           |
+-----------------+--------------+---------+-----------------+
| Kit Valdovinos   | ECON         | Unknown | [phone] |
+-----------------+--------------+---------+-----------------+
| Magdalena Valdovinos | ECON         | Unknown | [phone] |
+-----------------+--------------+---------+-----------------+
 
 
 
 Care Team Providers
 
 
 
+------------------------+------+-----------------+
| Care Team Member Name  | Role | Phone           |
+------------------------+------+-----------------+
| Lily Horton MD | PCP  | [phone] |
+------------------------+------+-----------------+
 
 
 
 Encounter Details
 
 
+--------+-------------+----------------------+--------------+-------------+
| Date   | Type        | Department           | Care Team    | Description |
+--------+-------------+----------------------+--------------+-------------+
| / | Document-Sc |   UNKNOWN DEPARTMENT |   Unknown  . |             |
| 2016   | anned       |   [address]        |              |             |
|        |             | [address]      |              |             |
|        |             | [place], [place]         |              |             |
|        |             | [address]           |              |             |
+--------+-------------+----------------------+--------------+-------------+
 
 
 
 Social History
 
 
+-------------------+-------+-----------+--------+------+
| Tobacco Use       | Types | Packs/Day | Years  | Date |
|                   |       |           | Used   |      |
+-------------------+-------+-----------+--------+------+
| Passive Smoke     |       |           |        |      |
| Exposure - Never  |       |           |        |      |
| Smoker            |       |           |        |      |
+-------------------+-------+-----------+--------+------+
 
 
 
+---------------------+---+---+---+
| Smokeless Tobacco:  |   |   |   |
| Never Used          |   |   |   |
+---------------------+---+---+---+
 
 
 
+-------------+-------------+---------+----------+
| Alcohol Use | Drinks/Week | oz/Week | Comments |
+-------------+-------------+---------+----------+
| Not Asked   |             |         |          |
+-------------+-------------+---------+----------+
 
 
 
+------------------+---------------+
| Sex Assigned at  | Date Recorded |
| Birth            |               |
+------------------+---------------+
| Not on file      |               |
+------------------+---------------+
 
 
 
 
+----------------+-------------+-------------+
| Job Start Date | Occupation  | Industry    |
+----------------+-------------+-------------+
| Not on file    | Not on file | Not on file |
+----------------+-------------+-------------+
 
 
 
+----------------+--------------+------------+
| Travel History | Travel Start | Travel End |
+----------------+--------------+------------+
 
 
 
+-------------------------------------+
| No recent travel history available. |
+-------------------------------------+
 documented as of this encounter
 
 Plan of Treatment
 Not on filedocumented as of this encounter
 
 Procedures
 
 
+----------------+--------+-------------+----------------------+----------------------+
| Procedure Name | Priori | Date/Time   | Associated Diagnosis | Comments             |
|                | ty     |             |                      |                      |
+----------------+--------+-------------+----------------------+----------------------+
| LAB REPORTS    |        | 2016  |                      |   Results for this   |
|                |        | 12:00 AM    |                      | procedure are in the |
|                |        | PDT         |                      |  results section.    |
+----------------+--------+-------------+----------------------+----------------------+
 documented in this encounter
 
 Results
 LAB REPORTS (2016 12:00 AM PDT)
 
+----------------------------------------------------------+--------------+
| Narrative                                                | Performed At |
+----------------------------------------------------------+--------------+
|   This result has an attachment that is not available.   |              |
+----------------------------------------------------------+--------------+
 documented in this encounter
 
 Visit Diagnoses
 Not on filedocumented in this encounter

## 2019-11-01 NOTE — XMS
Encounter Summary
  Created on: 2019
 
 Bonifacio Nila JB
 External Reference #: [id number]
 : [birth year]
 Sex: Female
 
 Demographics
 
 
+-----------------------+------------------------+
| Address               | [address]            |
|                       | [place]  [address]   |
+-----------------------+------------------------+
| Home Phone            | [phone]        |
+-----------------------+------------------------+
| Preferred Language    | Unknown                |
+-----------------------+------------------------+
| Marital Status        | Single                 |
+-----------------------+------------------------+
| Gnosticism Affiliation | Unknown                |
+-----------------------+------------------------+
| Race                  | White                  |
+-----------------------+------------------------+
| Ethnic Group          | Not  or  |
+-----------------------+------------------------+
 
 
 Author
 
 
+--------------+------------------------------+
| Author       | [facility] |
+--------------+------------------------------+
| Organization | [facility] |
+--------------+------------------------------+
| Address      | Unknown                      |
+--------------+------------------------------+
| Phone        | Unavailable                  |
+--------------+------------------------------+
 
 
 
 Support
 
 
+-----------------+--------------+---------+-----------------+
| Name            | Relationship | Address | Phone           |
+-----------------+--------------+---------+-----------------+
| Kit Valdovinos   | ECON         | Unknown | [phone] |
+-----------------+--------------+---------+-----------------+
| Magdalena Valdovinos | ECON         | Unknown | [phone] |
+-----------------+--------------+---------+-----------------+
 
 
 
 Care Team Providers
 
 
 
+------------------------+------+-----------------+
| Care Team Member Name  | Role | Phone           |
+------------------------+------+-----------------+
| Lily Horton MD | PCP  | [phone] |
+------------------------+------+-----------------+
 
 
 
 Reason for Visit
 
 
+-------------------+----------+
| Reason            | Comments |
+-------------------+----------+
| Care Coordination |          |
+-------------------+----------+
 
 
 
 Encounter Details
 
 
+--------+-----------+----------------------+----------------------+-------------------+
| Date   | Type      | Department           | Care Team            | Description       |
+--------+-----------+----------------------+----------------------+-------------------+
| / | Telephone |   Pediatric          |   Neema Khalil,   | Care Coordination |
| 2016   |           | Gastroenterology at  | MD  [address] |                   |
|        |           | [place]          |   [place], [place]       |                   |
|        |           | [facility]  | [address]           |                   |
|        |           |  [address] [facility] | [phone]         |                   |
|        |           |  [address]  Mailcode:  | [phone] (Fax)   |                   |
|        |           | [address]   |                      |                   |
|        |           | [place], [place]         |                      |                   |
|        |           | [address]           |                      |                   |
|        |           | [phone]         |                      |                   |
+--------+-----------+----------------------+----------------------+-------------------+
 
 
 
 Social History
 
 
+-------------------+-------+-----------+--------+------+
| Tobacco Use       | Types | Packs/Day | Years  | Date |
|                   |       |           | Used   |      |
+-------------------+-------+-----------+--------+------+
| Passive Smoke     |       |           |        |      |
| Exposure - Never  |       |           |        |      |
| Smoker            |       |           |        |      |
+-------------------+-------+-----------+--------+------+
 
 
 
+---------------------+---+---+---+
| Smokeless Tobacco:  |   |   |   |
| Never Used          |   |   |   |
+---------------------+---+---+---+
 
 
 
 
+-------------+-------------+---------+----------+
| Alcohol Use | Drinks/Week | oz/Week | Comments |
+-------------+-------------+---------+----------+
| Not Asked   |             |         |          |
+-------------+-------------+---------+----------+
 
 
 
+------------------+---------------+
| Sex Assigned at  | Date Recorded |
| Birth            |               |
+------------------+---------------+
| Not on file      |               |
+------------------+---------------+
 
 
 
+----------------+-------------+-------------+
| Job Start Date | Occupation  | Industry    |
+----------------+-------------+-------------+
| Not on file    | Not on file | Not on file |
+----------------+-------------+-------------+
 
 
 
+----------------+--------------+------------+
| Travel History | Travel Start | Travel End |
+----------------+--------------+------------+
 
 
 
+-------------------------------------+
| No recent travel history available. |
+-------------------------------------+
 documented as of this encounter
 
 Plan of Treatment
 Not on filedocumented as of this encounter
 
 Visit Diagnoses
 Not on filedocumented in this encounter

## 2019-11-01 NOTE — XMS
Encounter Summary
  Created on: 2019
 
 Bonifacio Nila JB
 External Reference #: [id number]
 : [birth year]
 Sex: Female
 
 Demographics
 
 
+-----------------------+------------------------+
| Address               | [address]            |
|                       | [place]  [address]   |
+-----------------------+------------------------+
| Home Phone            | [phone]        |
+-----------------------+------------------------+
| Preferred Language    | Unknown                |
+-----------------------+------------------------+
| Marital Status        | Single                 |
+-----------------------+------------------------+
| Buddhist Affiliation | Unknown                |
+-----------------------+------------------------+
| Race                  | White                  |
+-----------------------+------------------------+
| Ethnic Group          | Not  or  |
+-----------------------+------------------------+
 
 
 Author
 
 
+--------------+------------------------------+
| Author       | [facility] |
+--------------+------------------------------+
| Organization | [facility] |
+--------------+------------------------------+
| Address      | Unknown                      |
+--------------+------------------------------+
| Phone        | Unavailable                  |
+--------------+------------------------------+
 
 
 
 Support
 
 
+-----------------+--------------+---------+-----------------+
| Name            | Relationship | Address | Phone           |
+-----------------+--------------+---------+-----------------+
| Kit Valdovinos   | ECON         | Unknown | [phone] |
+-----------------+--------------+---------+-----------------+
| Magdalena Valdovinos | ECON         | Unknown | [phone] |
+-----------------+--------------+---------+-----------------+
 
 
 
 Care Team Providers
 
 
 
+------------------------+------+-----------------+
| Care Team Member Name  | Role | Phone           |
+------------------------+------+-----------------+
| Lily Horton MD | PCP  | [phone] |
+------------------------+------+-----------------+
 
 
 
 Reason for Visit
 
 
+-------------------+------------------+
| Reason            | Comments         |
+-------------------+------------------+
| Care Coordination | Pedro request |
+-------------------+------------------+
 
 
 
 Encounter Details
 
 
+--------+-----------+----------------------+----------------------+--------------------+
| Date   | Type      | Department           | Care Team            | Description        |
+--------+-----------+----------------------+----------------------+--------------------+
| / | Telephone |   Pediatric          |   Neema Khalil,   | Care Coordination  |
| 2017   |           | Gastroenterology at  | MD  [address] | ([facility]) |
|        |           | [place]          |   [place], [place]       |                    |
|        |           | [facility]  | [address]           |                    |
|        |           |  [address] | [phone]         |                    |
|        |           |  [address]  Mailcode:  | [phone] (Fax)   |                    |
|        |           | [address]   |                      |                    |
|        |           | [facility] [place]         |                      |                    |
|        |           | [address]           |                      |                    |
|        |           | [phone]         |                      |                    |
+--------+-----------+----------------------+----------------------+--------------------+
 
 
 
 Social History
 
 
+-------------------+-------+-----------+--------+------+
| Tobacco Use       | Types | Packs/Day | Years  | Date |
|                   |       |           | Used   |      |
+-------------------+-------+-----------+--------+------+
| Passive Smoke     |       |           |        |      |
| Exposure - Never  |       |           |        |      |
| Smoker            |       |           |        |      |
+-------------------+-------+-----------+--------+------+
 
 
 
+---------------------+---+---+---+
| Smokeless Tobacco:  |   |   |   |
| Never Used          |   |   |   |
+---------------------+---+---+---+
 
 
 
 
+-------------+-------------+---------+----------+
| Alcohol Use | Drinks/Week | oz/Week | Comments |
+-------------+-------------+---------+----------+
| Not Asked   |             |         |          |
+-------------+-------------+---------+----------+
 
 
 
+------------------+---------------+
| Sex Assigned at  | Date Recorded |
| Birth            |               |
+------------------+---------------+
| Not on file      |               |
+------------------+---------------+
 
 
 
+----------------+-------------+-------------+
| Job Start Date | Occupation  | Industry    |
+----------------+-------------+-------------+
| Not on file    | Not on file | Not on file |
+----------------+-------------+-------------+
 
 
 
+----------------+--------------+------------+
| Travel History | Travel Start | Travel End |
+----------------+--------------+------------+
 
 
 
+-------------------------------------+
| No recent travel history available. |
+-------------------------------------+
 documented as of this encounter
 
 Plan of Treatment
 Not on filedocumented as of this encounter
 
 Visit Diagnoses
 Not on filedocumented in this encounter

## 2019-11-01 NOTE — XMS
Encounter Summary
  Created on: 2019
 
 Bonifacio Nila JB
 External Reference #: [id number]
 : [birth year]
 Sex: Female
 
 Demographics
 
 
+-----------------------+------------------------+
| Address               | [address]            |
|                       | [place]  [address]   |
+-----------------------+------------------------+
| Home Phone            | [phone]        |
+-----------------------+------------------------+
| Preferred Language    | Unknown                |
+-----------------------+------------------------+
| Marital Status        | Single                 |
+-----------------------+------------------------+
| Rastafarian Affiliation | Unknown                |
+-----------------------+------------------------+
| Race                  | White                  |
+-----------------------+------------------------+
| Ethnic Group          | Not  or  |
+-----------------------+------------------------+
 
 
 Author
 
 
+--------------+------------------------------+
| Author       | [email or web address] |
+--------------+------------------------------+
| Organization | [facility] |
+--------------+------------------------------+
| Address      | Unknown                      |
+--------------+------------------------------+
| Phone        | Unavailable                  |
+--------------+------------------------------+
 
 
 
 Support
 
 
+-----------------+--------------+---------+-----------------+
| Name            | Relationship | Address | Phone           |
+-----------------+--------------+---------+-----------------+
| Kit Valdovinos   | ECON         | Unknown | [phone] |
+-----------------+--------------+---------+-----------------+
| Magdalena Valdovinos | ECON         | Unknown | [phone] |
+-----------------+--------------+---------+-----------------+
 
 
 
 Care Team Providers
 
 
 
+------------------------+------+-----------------+
| Care Team Member Name  | Role | Phone           |
+------------------------+------+-----------------+
| Lily Horton MD | PCP  | [phone] |
+------------------------+------+-----------------+
 
 
 
 Reason for Visit
 
 
+-------------------+----------+
| Reason            | Comments |
+-------------------+----------+
| Care Coordination |          |
+-------------------+----------+
 
 
 
 Encounter Details
 
 
+--------+-----------+----------------------+----------------------+-------------------+
| Date   | Type      | Department           | Care Team            | Description       |
+--------+-----------+----------------------+----------------------+-------------------+
| / | Telephone |   Pediatric          |   Neema Khalil,   | Care Coordination |
| 2016   |           | Gastroenterology at  | MD  [address] |                   |
|        |           | [place]          |   [place], [place]       |                   |
|        |           | [facility]  | [address]           |                   |
|        |           |  [address] | [phone]         |                   |
|        |           |  [address]  Mailcode:  | [phone] (Fax)   |                   |
|        |           | [address] [address]   |                      |                   |
|        |           | [place], [place]         |                      |                   |
|        |           | [address]           |                      |                   |
|        |           | [phone]         |                      |                   |
+--------+-----------+----------------------+----------------------+-------------------+
 
 
 
 Social History
 
 
+-------------------+-------+-----------+--------+------+
| Tobacco Use       | Types | Packs/Day | Years  | Date |
|                   |       |           | Used   |      |
+-------------------+-------+-----------+--------+------+
| Passive Smoke     |       |           |        |      |
| Exposure - Never  |       |           |        |      |
| Smoker            |       |           |        |      |
+-------------------+-------+-----------+--------+------+
 
 
 
+---------------------+---+---+---+
| Smokeless Tobacco:  |   |   |   |
| Never Used          |   |   |   |
+---------------------+---+---+---+
 
 
 
 
+-------------+-------------+---------+----------+
| Alcohol Use | Drinks/Week | oz/Week | Comments |
+-------------+-------------+---------+----------+
| Not Asked   |             |         |          |
+-------------+-------------+---------+----------+
 
 
 
+------------------+---------------+
| Sex Assigned at  | Date Recorded |
| Birth            |               |
+------------------+---------------+
| Not on file      |               |
+------------------+---------------+
 
 
 
+----------------+-------------+-------------+
| Job Start Date | Occupation  | Industry    |
+----------------+-------------+-------------+
| Not on file    | Not on file | Not on file |
+----------------+-------------+-------------+
 
 
 
+----------------+--------------+------------+
| Travel History | Travel Start | Travel End |
+----------------+--------------+------------+
 
 
 
+-------------------------------------+
| No recent travel history available. |
+-------------------------------------+
 documented as of this encounter
 
 Plan of Treatment
 Not on filedocumented as of this encounter
 
 Visit Diagnoses
 Not on filedocumented in this encounter

## 2019-11-01 NOTE — XMS
Encounter Summary
  Created on: 2019
 
 Bonifacio Nila JB
 External Reference #: [id number]
 : [birth year]
 Sex: Female
 
 Demographics
 
 
+-----------------------+------------------------+
| Address               | [address]            |
|                       | [place]  [address]   |
+-----------------------+------------------------+
| Home Phone            | [phone]        |
+-----------------------+------------------------+
| Preferred Language    | Unknown                |
+-----------------------+------------------------+
| Marital Status        | Single                 |
+-----------------------+------------------------+
| Roman Catholic Affiliation | Unknown                |
+-----------------------+------------------------+
| Race                  | White                  |
+-----------------------+------------------------+
| Ethnic Group          | Not  or  |
+-----------------------+------------------------+
 
 
 Author
 
 
+--------------+------------------------------+
| Author       | [facility] [employer] [facility] |
+--------------+------------------------------+
| Organization | [facility] |
+--------------+------------------------------+
| Address      | Unknown                      |
+--------------+------------------------------+
| Phone        | Unavailable                  |
+--------------+------------------------------+
 
 
 
 Support
 
 
+-----------------+--------------+---------+-----------------+
| Name            | Relationship | Address | Phone           |
+-----------------+--------------+---------+-----------------+
| Kit Valdovinos   | ECON         | Unknown | [phone] |
+-----------------+--------------+---------+-----------------+
| Magdalena Valdovinos | ECON         | Unknown | [phone] |
+-----------------+--------------+---------+-----------------+
 
 
 
 Care Team Providers
 
 
 
+------------------------+------+-----------------+
| Care Team Member Name  | Role | Phone           |
+------------------------+------+-----------------+
| Lily Horton MD | PCP  | [phone] |
+------------------------+------+-----------------+
 
 
 
 Encounter Details
 
 
+--------+-------------+----------------------+--------------------+-------------+
| Date   | Type        | Department           | Care Team          | Description |
+--------+-------------+----------------------+--------------------+-------------+
| 10/19/ | Document-Sc |   Health Information |   Other, Faculty   |             |
| 2017   | anned       |  Services  [id number]    | [phone]       |             |
|        |             | [address]  |                    |             |
|        |             |  Mailcode: [id number]     |                    |             |
|        |             | [facility]  |                    |             |
|        |             | [place], [place]  |                    |             |
|        |             | [address]           |                    |             |
|        |             | [phone]         |                    |             |
+--------+-------------+----------------------+--------------------+-------------+
 
 
 
 Social History
 
 
+-------------------+-------+-----------+--------+------+
| Tobacco Use       | Types | Packs/Day | Years  | Date |
|                   |       |           | Used   |      |
+-------------------+-------+-----------+--------+------+
| Passive Smoke     |       |           |        |      |
| Exposure - Never  |       |           |        |      |
| Smoker            |       |           |        |      |
+-------------------+-------+-----------+--------+------+
 
 
 
+---------------------+---+---+---+
| Smokeless Tobacco:  |   |   |   |
| Never Used          |   |   |   |
+---------------------+---+---+---+
 
 
 
+-------------+-------------+---------+----------+
| Alcohol Use | Drinks/Week | oz/Week | Comments |
+-------------+-------------+---------+----------+
| Not Asked   |             |         |          |
+-------------+-------------+---------+----------+
 
 
 
+------------------+---------------+
| Sex Assigned at  | Date Recorded |
| Birth            |               |
 
+------------------+---------------+
| Not on file      |               |
+------------------+---------------+
 
 
 
+----------------+-------------+-------------+
| Job Start Date | Occupation  | Industry    |
+----------------+-------------+-------------+
| Not on file    | Not on file | Not on file |
+----------------+-------------+-------------+
 
 
 
+----------------+--------------+------------+
| Travel History | Travel Start | Travel End |
+----------------+--------------+------------+
 
 
 
+-------------------------------------+
| No recent travel history available. |
+-------------------------------------+
 documented as of this encounter
 
 Plan of Treatment
 Not on filedocumented as of this encounter
 
 Procedures
 
 
+----------------+--------+-------------+----------------------+----------------------+
| Procedure Name | Priori | Date/Time   | Associated Diagnosis | Comments             |
|                | ty     |             |                      |                      |
+----------------+--------+-------------+----------------------+----------------------+
| LAB REPORTS    |        | 10/19/2017  |                      |   Results for this   |
|                |        | 12:00 AM    |                      | procedure are in the |
|                |        | PDT         |                      |  results section.    |
+----------------+--------+-------------+----------------------+----------------------+
 documented in this encounter
 
 Results
 LAB REPORTS (10/19/2017 12:00 AM PDT)
 
+----------------------------------------------------------+--------------+
| Narrative                                                | Performed At |
+----------------------------------------------------------+--------------+
|   This result has an attachment that is not available.   |              |
+----------------------------------------------------------+--------------+
 documented in this encounter
 
 Visit Diagnoses
 Not on filedocumented in this encounter

## 2019-11-01 NOTE — XMS
Encounter Summary
  Created on: 2019
 
 Bonifacio Nila JB
 External Reference #: [id number]
 : [birth year]
 Sex: Female
 
 Demographics
 
 
+-----------------------+------------------------+
| Address               | [address]            |
|                       | [place]  [address]   |
+-----------------------+------------------------+
| Home Phone            | [phone]        |
+-----------------------+------------------------+
| Preferred Language    | Unknown                |
+-----------------------+------------------------+
| Marital Status        | Single                 |
+-----------------------+------------------------+
| Voodoo Affiliation | Unknown                |
+-----------------------+------------------------+
| Race                  | White                  |
+-----------------------+------------------------+
| Ethnic Group          | Not  or  |
+-----------------------+------------------------+
 
 
 Author
 
 
+--------------+------------------------------+
| Author       | [facility] |
+--------------+------------------------------+
| Organization | [facility] |
+--------------+------------------------------+
| Address      | Unknown                      |
+--------------+------------------------------+
| Phone        | Unavailable                  |
+--------------+------------------------------+
 
 
 
 Support
 
 
+-----------------+--------------+---------+-----------------+
| Name            | Relationship | Address | Phone           |
+-----------------+--------------+---------+-----------------+
| Kit Valdovinos   | ECON         | Unknown | [phone] |
+-----------------+--------------+---------+-----------------+
| Magdalena Valdovinos | ECON         | Unknown | [phone] |
+-----------------+--------------+---------+-----------------+
 
 
 
 Care Team Providers
 
 
 
+------------------------+------+-----------------+
| Care Team Member Name  | Role | Phone           |
+------------------------+------+-----------------+
| Lily Horton MD | PCP  | [phone] |
+------------------------+------+-----------------+
 
 
 
 Reason for Visit
 
 
+-------------------+----------+
| Reason            | Comments |
+-------------------+----------+
| Care Coordination |          |
+-------------------+----------+
 
 
 
 Encounter Details
 
 
+--------+-----------+----------------------+----------------------+-------------------+
| Date   | Type      | Department           | Care Team            | Description       |
+--------+-----------+----------------------+----------------------+-------------------+
| / | Telephone |   Pediatric          |   Neema Khalil,   | Care Coordination |
| 2015   |           | Gastroenterology at  | MD  [address] |                   |
|        |           | [place]          |   [place], [place]       |                   |
|        |           | [facility]  | [address]           |                   |
|        |           |  [address] [facility] | [phone]         |                   |
|        |           |  [address]  Mailcode:  | [phone] (Fax)   |                   |
|        |           | [address] [address]   |                      |                   |
|        |           | [place], [place]         |                      |                   |
|        |           | [address]           |                      |                   |
|        |           | [phone]         |                      |                   |
+--------+-----------+----------------------+----------------------+-------------------+
 
 
 
 Social History
 
 
+-------------------+-------+-----------+--------+------+
| Tobacco Use       | Types | Packs/Day | Years  | Date |
|                   |       |           | Used   |      |
+-------------------+-------+-----------+--------+------+
| Passive Smoke     |       |           |        |      |
| Exposure - Never  |       |           |        |      |
| Smoker            |       |           |        |      |
+-------------------+-------+-----------+--------+------+
 
 
 
+---------------------+---+---+---+
| Smokeless Tobacco:  |   |   |   |
| Never Used          |   |   |   |
+---------------------+---+---+---+
 
 
 
 
+-------------+-------------+---------+----------+
| Alcohol Use | Drinks/Week | oz/Week | Comments |
+-------------+-------------+---------+----------+
| Not Asked   |             |         |          |
+-------------+-------------+---------+----------+
 
 
 
+------------------+---------------+
| Sex Assigned at  | Date Recorded |
| Birth            |               |
+------------------+---------------+
| Not on file      |               |
+------------------+---------------+
 
 
 
+----------------+-------------+-------------+
| Job Start Date | Occupation  | Industry    |
+----------------+-------------+-------------+
| Not on file    | Not on file | Not on file |
+----------------+-------------+-------------+
 
 
 
+----------------+--------------+------------+
| Travel History | Travel Start | Travel End |
+----------------+--------------+------------+
 
 
 
+-------------------------------------+
| No recent travel history available. |
+-------------------------------------+
 documented as of this encounter
 
 Plan of Treatment
 Not on filedocumented as of this encounter
 
 Visit Diagnoses
 Not on filedocumented in this encounter

## 2019-11-01 NOTE — XMS
Encounter Summary
  Created on: 2019
 
 Bonifacio Nila JB
 External Reference #: [id number]
 : [birth year]
 Sex: Female
 
 Demographics
 
 
+-----------------------+------------------------+
| Address               | [address]            |
|                       | [place]  [address]   |
+-----------------------+------------------------+
| Home Phone            | [phone]        |
+-----------------------+------------------------+
| Preferred Language    | Unknown                |
+-----------------------+------------------------+
| Marital Status        | Single                 |
+-----------------------+------------------------+
| Anabaptism Affiliation | Unknown                |
+-----------------------+------------------------+
| Race                  | White                  |
+-----------------------+------------------------+
| Ethnic Group          | Not  or  |
+-----------------------+------------------------+
 
 
 Author
 
 
+--------------+------------------------------+
| Author       | [facility] |
+--------------+------------------------------+
| Organization | [facility] |
+--------------+------------------------------+
| Address      | Unknown                      |
+--------------+------------------------------+
| Phone        | Unavailable                  |
+--------------+------------------------------+
 
 
 
 Support
 
 
+-----------------+--------------+---------+-----------------+
| Name            | Relationship | Address | Phone           |
+-----------------+--------------+---------+-----------------+
| Kit Valdovinos   | ECON         | Unknown | [phone] |
+-----------------+--------------+---------+-----------------+
| Magdalena Valdovinos | ECON         | Unknown | [phone] |
+-----------------+--------------+---------+-----------------+
 
 
 
 Care Team Providers
 
 
 
+-----------------------+------+-----------------+
| Care Team Member Name | Role | Phone           |
+-----------------------+------+-----------------+
| Lily Horton MD   | PCP  | [phone] |
+-----------------------+------+-----------------+
 
 
 
 Reason for Visit
 
 
+-------------------+----------+
| Reason            | Comments |
+-------------------+----------+
| Follow-up in      |          |
| outpatient clinic |          |
+-------------------+----------+
 Office Visit - E/M Services (Routine)
 
+--------+--------------+---------------+--------------+--------------+---------------+
| Status | Reason       | Specialty     | Diagnoses /  | Referred By  | Referred To   |
|        |              |               | Procedures   | Contact      | Contact       |
+--------+--------------+---------------+--------------+--------------+---------------+
| Closed |   Specialty  | Pediatric     |   Diagnoses  |   Sol,    |   Ped Gastro  |
|        | Services     | Gastroenterol |  Liver       | Lily CARY MD | [facility]  [address]   |
|        | Required     | ogy           | replaced by  |   PEDS       | [facility]   |
|        |              |               | transplant   | SPECIALISTS  | [address]       |
|        |              |               | (HCC)        | OF [place] | Mailcode:     |
|        |              |               |              |   [address]   | [facility]         |
|        |              |               |              | [address] | [address]   |
|        |              |               |              |  [address]         | [place], [place]  |
|        |              |               |              | [place],   | [address]    |
|        |              |               |              | [place] [address]     | Phone:        |
|        |              |               |              | Phone:       | [phone]  |
|        |              |               |              | [phone] |  Fax:         |
|        |              |               |              |   Fax:       | [phone]  |
|        |              |               |              | [phone] |               |
+--------+--------------+---------------+--------------+--------------+---------------+
 
 
 
 
 Encounter Details
 
 
+--------+---------+----------------------+--------------------+----------------------+
| Date   | Type    | Department           | Care Team          | Description          |
+--------+---------+----------------------+--------------------+----------------------+
| 10/22/ | Office  |   Specialty Clinics  |   Ivis Burkett MD | Complications of     |
|    | Visit   | at [facility]  [address] [facility] [address]  |                    | Transplanted Liver;  |
|        |         | [address]      |                    | Transplanted Liver   |
|        |         | Mailcode: [facility]       |                    | (HCC)                |
|        |         | [address]          |                    |                      |
|        |         | [place], [place]         |                    |                      |
|        |         | [address]           |                    |                      |
|        |         | [phone]         |                    |                      |
+--------+---------+----------------------+--------------------+----------------------+
 
 
 
 
 Social History
 
 
+----------------+-------+-----------+--------+------+
| Tobacco Use    | Types | Packs/Day | Years  | Date |
|                |       |           | Used   |      |
+----------------+-------+-----------+--------+------+
| Never Assessed |       |           |        |      |
+----------------+-------+-----------+--------+------+
 
 
 
+------------------+---------------+
| Sex Assigned at  | Date Recorded |
| Birth            |               |
+------------------+---------------+
| Not on file      |               |
+------------------+---------------+
 
 
 
+----------------+-------------+-------------+
| Job Start Date | Occupation  | Industry    |
+----------------+-------------+-------------+
| Not on file    | Not on file | Not on file |
+----------------+-------------+-------------+
 
 
 
+----------------+--------------+------------+
| Travel History | Travel Start | Travel End |
+----------------+--------------+------------+
 
 
 
+-------------------------------------+
| No recent travel history available. |
+-------------------------------------+
 documented as of this encounter
 
 Last Filed Vital Signs
 
 
+-------------------+----------------------+----------------------+----------+
| Vital Sign        | Reading              | Time Taken           | Comments |
+-------------------+----------------------+----------------------+----------+
| Blood Pressure    | 131/78               | 10/22/2009  9:20 AM  |          |
|                   |                      | PDT                  |          |
+-------------------+----------------------+----------------------+----------+
| Pulse             | 98                   | 10/22/2009  9:20 AM  |          |
|                   |                      | PDT                  |          |
+-------------------+----------------------+----------------------+----------+
| Temperature       | -                    | -                    |          |
+-------------------+----------------------+----------------------+----------+
| Respiratory Rate  | -                    | -                    |          |
+-------------------+----------------------+----------------------+----------+
| Oxygen Saturation | -                    | -                    |          |
+-------------------+----------------------+----------------------+----------+
 
| Inhaled Oxygen    | -                    | -                    |          |
| Concentration     |                      |                      |          |
+-------------------+----------------------+----------------------+----------+
| Weight            | 50.4 kg (111 lb 1.8  | 10/22/2009  9:20 AM  |          |
|                   | oz)                  | PDT                  |          |
+-------------------+----------------------+----------------------+----------+
| Height            | 153.3 cm (5' 0.35")  | 10/22/2009  9:20 AM  |          |
|                   |                      | PDT                  |          |
+-------------------+----------------------+----------------------+----------+
| Body Mass Index   | 21.45                | 10/22/2009  9:20 AM  |          |
|                   |                      | PDT                  |          |
+-------------------+----------------------+----------------------+----------+
 documented in this encounter
 
 Patient Instructions
 Patient Instructions Ivis Burkett MD - 10/22/2009 10:25 AM PDTPlan:  
 1. Please have labs done   every  3  Months
 
 2. EBV PCR Quantitative                     every   12  months
 
 3 . Other labs or imaging studies: none
 
 5. Please get Flu shots  from your PCP
  Be sure to get both the seasonal influenza shot and the H1N1 influenza shot
  Your child should not receive Flumist or any live virus vaccines
  Family members should not receive Flumist
  If your child develops flu like symptoms of cough, fever, muscle pains, then call your [facility] doctor immediately to start Tamiflu
 
 7. change in medications:
    non3  
 
 8. Return to clinic in   12  months
 
 Electronically signed by Ivis Burkett MD at 10/22/2009 10:25 AM PDT
 documented in this encounter
 
 Progress Notes
 Ivis Burkett MD - 10/22/2009 11:17 AM PDTFormatting of this note might be different from t
he original.
 
 
 Nila Valdovinos is an 9 y.o. female, who is referred by Lily Horton, who returns to [facility] for transplant followup. Dr Aguirre from [facility] was an observ
er in clinic today.
 
 Patient Active Problem List: 
   VSD (Ventricular Septal Defect)[745.4Y]
     Comment: With polysplenia 
   Aortic Valve Insufficiency[424.1F]
   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
     Comment: For biliary atresia, 2000 at [facility]
 
  
 Current outpatient prescriptions prior to encounter 
 Medication Sig Dispense Refill 
   PROGRAF 1 mg Oral Capsule Take 1 Cap by mouth two times daily.   60  3 
 
 Allergies 
 Allergen Reactions 
 
   Sulfa (Sulfonamide Antibiotics) Hives and Swelling-Facial 
   Varicella  
   Possible reaction with sulfa meds 
 
 HPI:Nila was seen in clinic with both of her parents. She reports that she has been doing 
well, and has not had any significant illnesses since her last visit except one UTI. She say
s she misses about one dose of Prograf a week. She has not had liver labs between Feb and -- her mother said "I guess we need to be a little better about them". 
 
 She is seeing her cardiologist every 2 years and last saw him 1 year ago. She gets SBE prop
hylaxis because of polysplenia. Her energy is good and she backpacked 30 miles last summer. 
 
 Has not had a flu shot yet.
 
 lives with parents, and sibling and grade 5. Says school is going well.
 
 Physical Examination:
 
 Ht 153.3 cm (5' 0.35") (93 %ile), Wt 50.4 kg (111 lbs 1.8 oz) (93 %ile), Weight for age(%) 
 92.64%, BMI for age(%)  88.85%, Length for age(%)  93.23%, /78, Pulse 98. 
 88.85% of growth percentile based on BMI-for-age.
 
 Appearance: alert, active and in no apparent distress.
 Skin:  turgor normal, capillary refill brisk, no rashes, petechiae 
 HEENT: normocephalic,  no icterus,nose without discharge, mucous membranes moist, pharynx u
nremarkable
 Neck: supple, without thyromegaly
 Chest: clear to auscultation bilaterally.
 CV: regular sinus rhythm, normal S1 and S2, no murmurs.
 Abdomen: , well healed incisions, soft, no distention, no tenderness to palpation, no rebou
nd, no guarding, no palpable masses, normal bowel sounds, no hepatosplenomegaly
 Musculoskeletal: grossly intact without clubbing or edema
 Neuro: appropriate interactions, symmetric facies, moves all extremities well, 
 Nodes: no signficant cervical, supraclavicular,adenopathy
 
 Last labs 10/20/09
 AST 20, ALT 19, alk phos 220, GGT 10, cr 0.51, rest normal also
 Wbc 5.7, hct 43, MCV 87, plt 320
 EBV PCR pending
 
 Lab Results 
 Component Value Date 
    0.5 09 
    3.4 10/9/07 
 
  
  
 Assessment: Patient Active Problem List: 
   VSD (Ventricular Septal Defect)[745.4Y]
     Comment: With polysplenia 
   Aortic Valve Insufficiency[424.1F]
   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
     Comment: For biliary atresia, 2000 at [facility]
 
 Plan:  
 1. CBC, primary transplant panel, drug levels-Prograf 1     every  3  months
 2. EBV PCR Quantitative                                                             every  
 12  months
 3. CMV PCR Quantitative                                                            every  -
   months
 
 
 4. Other labs:   none
 5. Imaging:       non3
 6. Flu shot and other non-live virus vaccinations from PCP
 
 7. If she develops flu -ross symptoms, she should be treated immediately empirically with Ta
miflu for a 10 day course
 8. change in medications: none
       
 9. Return to clinic    12  months Electronically signed by Ivis Burkett MD at 10/26/2009  1
:01 PM PDTdocumented in this encounter
 
 Plan of Treatment
 Not on filedocumented as of this encounter
 
 Procedures
 
 
+----------------+--------+-------------+----------------------+----------------------+
| Procedure Name | Priori | Date/Time   | Associated Diagnosis | Comments             |
|                | ty     |             |                      |                      |
+----------------+--------+-------------+----------------------+----------------------+
| LAB REPORTS    |        | 10/20/2009  |                      |   Results for this   |
|                |        | 12:00 AM    |                      | procedure are in the |
|                |        | PDT         |                      |  results section.    |
+----------------+--------+-------------+----------------------+----------------------+
 documented in this encounter
 
 Results
 LAB REPORTS (10/20/2009 12:00 AM PDT)
 
+----------------------------------------------------------+--------------+
| Narrative                                                | Performed At |
+----------------------------------------------------------+--------------+
|   This result has an attachment that is not available.   |              |
+----------------------------------------------------------+--------------+
 
 
 
+-----------------------------------------------+
| Procedure Note                                |
+-----------------------------------------------+
|   Dang Bowling - 10/20/2009 12:00 AM PDT    |
|                                               |
+-----------------------------------------------+
 documented in this encounter
 
 Visit Diagnoses
 
 
+----------------------------------------------------------+
| Diagnosis                                                |
+----------------------------------------------------------+
|   Complications of transplanted liver                    |
+----------------------------------------------------------+
|   Transplanted liver (HCC)  Liver replaced by transplant |
+----------------------------------------------------------+
 documented in this encounter

## 2019-11-01 NOTE — XMS
Encounter Summary
  Created on: 2019
 
 Bonifacio Nila JB
 External Reference #: [id number]
 : [birth year]
 Sex: Female
 
 Demographics
 
 
+-----------------------+------------------------+
| Address               | [address]            |
|                       | [place]  [address]   |
+-----------------------+------------------------+
| Home Phone            | [phone]        |
+-----------------------+------------------------+
| Preferred Language    | Unknown                |
+-----------------------+------------------------+
| Marital Status        | Single                 |
+-----------------------+------------------------+
| [employer] Affiliation | Unknown                |
+-----------------------+------------------------+
| Race                  | White                  |
+-----------------------+------------------------+
| Ethnic Group          | Not  or  |
+-----------------------+------------------------+
 
 
 Author
 
 
+--------------+------------------------------+
| Author       | Replaced by [facility] |
+--------------+------------------------------+
| Organization | [facility] |
+--------------+------------------------------+
| Address      | Unknown                      |
+--------------+------------------------------+
| Phone        | Unavailable                  |
+--------------+------------------------------+
 
 
 
 Support
 
 
+-----------------+--------------+---------+-----------------+
| Name            | Relationship | Address | Phone           |
+-----------------+--------------+---------+-----------------+
| Kit Valdovinos   | ECON         | Unknown | [phone] |
+-----------------+--------------+---------+-----------------+
| Magdalena Valdovinos | ECON         | Unknown | [phone] |
+-----------------+--------------+---------+-----------------+
 
 
 
 Care Team Providers
 
 
 
+-----------------------+------+-------------+
| Care Team Member Name | Role | Phone       |
+-----------------------+------+-------------+
 PCP  | Unavailable |
+-----------------------+------+-------------+
 
 
 
 Reason for Visit
 
 
+-----------+-----------------+
| Reason    | Comments        |
+-----------+-----------------+
| Lab Order | faxed lab order |
+-----------+-----------------+
 
 
 
 Encounter Details
 
 
+--------+-----------+----------------------+----------------------+----------------------+
| Date   | Type      | Department           | Care Team            | Description          |
+--------+-----------+----------------------+----------------------+----------------------+
| / | Telephone |   Pediatric          |   Neema Khalil,   | Lab Order (faxed lab |
|    |           | Gastroenterology at  | [address] |  order)              |
|        |           | [facility]          |   [place], [place]       |                      |
|        |           | [facility]  | [address]           |                      |
|        |           |  [address] | [phone]         |                      |
|        |           |  [address]  Mailcode:  | [phone] (Fax)   |                      |
|        |           | [facility]  [place]   |                      |                      |
|        |           | [place], [place]         |                      |                      |
|        |           | [address]           |                      |                      |
|        |           | [phone]         |                      |                      |
+--------+-----------+----------------------+----------------------+----------------------+
 
 
 
 Social History
 
 
+----------------+-------+-----------+--------+------+
| Tobacco Use    | Types | Packs/Day | Years  | Date |
|                |       |           | Used   |      |
+----------------+-------+-----------+--------+------+
| Never Assessed |       |           |        |      |
+----------------+-------+-----------+--------+------+
 
 
 
+------------------+---------------+
| Sex Assigned at  | Date Recorded |
| Birth            |               |
+------------------+---------------+
| Not on file      |               |
+------------------+---------------+
 
 
 
 
+----------------+-------------+-------------+
| Job Start Date | Occupation  | Industry    |
+----------------+-------------+-------------+
| Not on file    | Not on file | Not on file |
+----------------+-------------+-------------+
 
 
 
+----------------+--------------+------------+
| Travel History | Travel Start | Travel End |
+----------------+--------------+------------+
 
 
 
+-------------------------------------+
| No recent travel history available. |
+-------------------------------------+
 documented as of this encounter
 
 Plan of Treatment
 Not on filedocumented as of this encounter
 
 Procedures
 
 
+----------------------+--------+-------------+----------------------+----------------------
+
| Procedure Name       | Priori | Date/Time   | Associated Diagnosis | Comments             
|
|                      | ty     |             |                      |                      
|
+----------------------+--------+-------------+----------------------+----------------------
+
| CBC, WITH            | Routin | 2014  |   Transplanted liver |   Results for this   
|
| DIFFERENTIAL         | e      |  4:50 PM    |  (HCC)  Encounter    | procedure are in the 
|
|                      |        | PDT         | for therapeutic drug |  results section.    
|
|                      |        |             |  monitoring          |                      
|
+----------------------+--------+-------------+----------------------+----------------------
+
| COMPLETE METABOLIC   | Routin | 2014  |   Transplanted liver |   Results for this   
|
| SET                  | e      |  4:50 PM    |  (HCC)  Encounter    | procedure are in the 
|
| (NA,K,CL,CO2,BUN,CRE |        | PDT         | for therapeutic drug |  results section.    
|
| AT,GLUC,CA,AST,ALT,B |        |             |  monitoring          |                      
|
| ASHWINI TOTAL,ALK        |        |             |                      |                      
|
| PHOS,ALB,PROT TOTAL) |        |             |                      |                      
|
+----------------------+--------+-------------+----------------------+----------------------
+
| GGT, PLASMA          | Routin | 2014  |   Transplanted liver |   Results for this   
 
|
|                      | e      |  4:50 PM    |  (HCC)  Encounter    | procedure are in the 
|
|                      |        | PDT         | for therapeutic drug |  results section.    
|
|                      |        |             |  monitoring          |                      
|
+----------------------+--------+-------------+----------------------+----------------------
+
| MAGNESIUM, PLASMA    | Routin | 2014  |   Transplanted liver |   Results for this   
|
|                      | e      |  4:50 PM    |  (HCC)  Encounter    | procedure are in the 
|
|                      |        | PDT         | for therapeutic drug |  results section.    
|
|                      |        |             |  monitoring          |                      
|
+----------------------+--------+-------------+----------------------+----------------------
+
 documented in this encounter
 
 Results
 MAGNESIUM, PLASMA (2014  4:50 PM PDT)
 
+-----------+-------+-----------------+------------+--------------+
| Component | Value | Ref Range       | Performed  | Pathologist  |
|           |       |                 | At         | Signature    |
+-----------+-------+-----------------+------------+--------------+
| MAGNESIUM | 1.8   | 1.7 - 2.5 mg/dL | [facility]  |              |
|           |       |                 | LAB -      |              |
|           |       |                 | [place]  |              |
+-----------+-------+-----------------+------------+--------------+
 
 
 
+---------------+
| Specimen      |
+---------------+
| Blood - Blood |
+---------------+
 
 
 
+--------------------+----------------------+--------------------+----------------+
| Performing         | Address              | City/State/Zipcode | Phone Number   |
| Organization       |                      |                    |                |
+--------------------+----------------------+--------------------+----------------+
|   [facility]  |   [address] | [place]      |   [phone] |
| [place]          |                      |                    |                |
+--------------------+----------------------+--------------------+----------------+
 GGT, PLASMA (2014  4:50 PM PDT)
 
+-----------+-------+------------+------------+--------------+
| Component | Value | Ref Range  | Performed  | Pathologist  |
|           |       |            | At         | Signature    |
+-----------+-------+------------+------------+--------------+
| GAMMA     | 8     | 5 - 60 U/L | [facility]  |              |
| GLUTAMYL  |       |            | LAB -      |              |
| TRANS     |       |            | [place]  |              |
+-----------+-------+------------+------------+--------------+
 
 
 
 
+---------------+
| Specimen      |
+---------------+
| Blood - Blood |
+---------------+
 
 
 
+--------------------+----------------------+--------------------+----------------+
| Performing         | Address              | City/State/Zipcode | Phone Number   |
| Organization       |                      |                    |                |
+--------------------+----------------------+--------------------+----------------+
|   [facility]  |   [address] | [place], [place]      |   [phone] |
| [place]          |                      |                    |                |
+--------------------+----------------------+--------------------+----------------+
 COMPLETE METABOLIC SET (NA,K,CL,CO2,BUN,CREAT,GLUC,CA,AST,ALT,BILI TOTAL,ALK PHOS,ALB,PROT 
TOTAL) (2014  4:50 PM PDT)
 
+-------------+--------+-----------------+------------+--------------+
| Component   | Value  | Ref Range       | Performed  | Pathologist  |
|             |        |                 | At         | Signature    |
+-------------+--------+-----------------+------------+--------------+
| GLUCOSE,    | 82     | 70 - 100 mg/dL  | [facility]  |              |
| PLASMA      |        |                 | LAB -      |              |
| (LAB)       |        |                 | [place]  |              |
+-------------+--------+-----------------+------------+--------------+
| BUN, PLASMA | 18     | 6 - 23 mg/dL    | [facility]  |              |
|  (LAB)      |        |                 | LAB -      |              |
|             |        |                 | [place]  |              |
+-------------+--------+-----------------+------------+--------------+
| CREATININE  | 0.81   | 0.40 - 1.05     | [facility]  |              |
| PLASMA      |        | mg/dL           | LAB -      |              |
| (LAB)       |        |                 | [place]  |              |
+-------------+--------+-----------------+------------+--------------+
| TOTAL       | 7.3    | 6.1 - 8.5 g/dL  | [facility]  |              |
| PROTEIN,    |        |                 | LAB -      |              |
| PLASMA      |        |                 | [place]  |              |
| (LAB)       |        |                 |            |              |
+-------------+--------+-----------------+------------+--------------+
| ALBUMIN,    | 4.2    | 3.5 - 5.0 g/dL  | [facility]  |              |
| PLASMA      |        |                 | LAB -      |              |
| (LAB)       |        |                 | [place]  |              |
+-------------+--------+-----------------+------------+--------------+
| CALCIUM,    | 9.6    | 8.4 - 10.2      | [facility]  |              |
| PLASMA      |        | mg/dL           | LAB -      |              |
| (LAB)       |        |                 | [place]  |              |
+-------------+--------+-----------------+------------+--------------+
| BILIRUBIN   | 0.3    | 0 - 1.2         | [facility]  |              |
| TOTAL       |        | Transcutaneous  | LAB -      |              |
|             |        | Bilirubinometer | [place]  |              |
+-------------+--------+-----------------+------------+--------------+
| ALK PHOS    | 78 (A) | 135 - 384 U/L   | [facility]  |              |
|             |        |                 | LAB -      |              |
|             |        |                 | [place]  |              |
+-------------+--------+-----------------+------------+--------------+
| AST(SGOT)   | 15     | 13 - 39 U/L     | [facility]  |              |
|             |        |                 | LAB -      |              |
 
|             |        |                 | [place]  |              |
+-------------+--------+-----------------+------------+--------------+
| SODIUM,     | 136    | 132 - 143       | [facility]  |              |
| PLASMA      |        | mmol/L          | LAB -      |              |
| (LAB)       |        |                 | [place]  |              |
+-------------+--------+-----------------+------------+--------------+
| POTASSIUM,  | 3.6    | 3.6 - 5.1       | [facility]  |              |
| PLASMA      |        | mmol/L          | LAB -      |              |
| (LAB)       |        |                 | [place]  |              |
+-------------+--------+-----------------+------------+--------------+
| CHLORIDE,   | 102    | 95 - 112 mmol/L | [facility]  |              |
| PLASMA      |        |                 | LAB -      |              |
| (LAB)       |        |                 | [place]  |              |
+-------------+--------+-----------------+------------+--------------+
| TOTAL CO2,  | 25     | 19 - 31 mmol/L  | [facility]  |              |
| PLASMA      |        |                 | LAB -      |              |
| (LAB)       |        |                 | [place]  |              |
+-------------+--------+-----------------+------------+--------------+
| ALT (SGPT)  | 12     | 7 - 52 U/L      | [facility]  |              |
|             |        |                 | LAB -      |              |
|             |        |                 | [place]  |              |
+-------------+--------+-----------------+------------+--------------+
| ANION GAP   | 12.6   | 7 - 21          | [facility]  |              |
|             |        |                 | LAB -      |              |
|             |        |                 | [place]  |              |
+-------------+--------+-----------------+------------+--------------+
| BUN/CREATIN | 22.2   | 6.0 - 28.6      | [facility]  |              |
| INE RATIO   |        |                 | LAB -      |              |
|             |        |                 | [place]  |              |
+-------------+--------+-----------------+------------+--------------+
| GLOBULIN    | 3.1    | 1.8 - 3.5       | [facility]  |              |
|             |        |                 | LAB -      |              |
|             |        |                 | [place]  |              |
+-------------+--------+-----------------+------------+--------------+
| A/G RATIO   | 1.4    | 1.1 - 2.4       | [facility]  |              |
|             |        |                 | LAB -      |              |
|             |        |                 | [place]  |              |
+-------------+--------+-----------------+------------+--------------+
 
 
 
+---------------+
| Specimen      |
+---------------+
| Blood - Blood |
+---------------+
 
 
 
+--------------------+----------------------+--------------------+----------------+
| Performing         | Address              | City/State/Zipcode | Phone Number   |
| Organization       |                      |                    |                |
+--------------------+----------------------+--------------------+----------------+
|   [facility]  |   [address] | [place], [place]      |   [phone] |
| [place]          |                      |                    |                |
+--------------------+----------------------+--------------------+----------------+
 CBC, WITH DIFFERENTIAL (2014  4:50 PM PDT)
 
+-------------+----------+-----------------+------------+--------------+
| Component   | Value    | Ref Range       | Performed  | Pathologist  |
 
|             |          |                 | At         | Signature    |
+-------------+----------+-----------------+------------+--------------+
| WHITE CELL  | 7.8      | 4.4 - 11.8 K/cu | [facility]  |              |
| COUNT       |          |  mm             | LAB -      |              |
|             |          |                 | [place]  |              |
+-------------+----------+-----------------+------------+--------------+
| RED CELL    | 4.55     | 3.8 - 5.3 M/cu  | [facility]  |              |
| COUNT       |          | mm              | LAB -      |              |
|             |          |                 | [place]  |              |
+-------------+----------+-----------------+------------+--------------+
| HEMOGLOBIN  | 14       | 11.1 - 15.7     | [facility]  |              |
|             |          | g/dL            | LAB -      |              |
|             |          |                 | [place]  |              |
+-------------+----------+-----------------+------------+--------------+
| HEMATOCRIT  | 40.7     | 32 - 47 %       | [facility]  |              |
|             |          |                 | LAB -      |              |
|             |          |                 | [place]  |              |
+-------------+----------+-----------------+------------+--------------+
| MCV         | 89.4     | 73 - 91 fL      | [facility]  |              |
|             |          |                 | LAB -      |              |
|             |          |                 | [place]  |              |
+-------------+----------+-----------------+------------+--------------+
| MCH         | 31       | 25 - 31 pg      | [facility]  |              |
|             |          |                 | LAB -      |              |
|             |          |                 | [place]  |              |
+-------------+----------+-----------------+------------+--------------+
| MCHC        | 34       | 30 - 36 g/dL    | [facility]  |              |
|             |          |                 | LAB -      |              |
|             |          |                 | [place]  |              |
+-------------+----------+-----------------+------------+--------------+
| PLATELET    | 296      | 140 - 440 K/cu  | [facility]  |              |
| COUNT       |          | mm              | LAB -      |              |
|             |          |                 | [place]  |              |
+-------------+----------+-----------------+------------+--------------+
| NEUTROPHIL  | 63.5     | 35 - 65 %       | [facility]  |              |
| %           |          |                 | LAB -      |              |
|             |          |                 | [place]  |              |
+-------------+----------+-----------------+------------+--------------+
| LYMPHOCYTE  | 25.8 (A) | 28 - 48 %       | [facility]  |              |
| %           |          |                 | LAB -      |              |
|             |          |                 | [place]  |              |
+-------------+----------+-----------------+------------+--------------+
| MONOCYTE %  | 7.9      | 0 - 12 %        | [facility]  |              |
|             |          |                 | LAB -      |              |
|             |          |                 | [place]  |              |
+-------------+----------+-----------------+------------+--------------+
| EOS %       | 2.2      | 0 - 6 %         | [facility]  |              |
|             |          |                 | LAB -      |              |
|             |          |                 | [place]  |              |
+-------------+----------+-----------------+------------+--------------+
| BASO %      | 0.6      | 0 - 2 %         | [facility]  |              |
|             |          |                 | LAB -      |              |
|             |          |                 | [place]  |              |
+-------------+----------+-----------------+------------+--------------+
| RDW         | 13.4     | 10.5 - 15 %     | [facility]  |              |
|             |          |                 | LAB -      |              |
|             |          |                 | [place]  |              |
+-------------+----------+-----------------+------------+--------------+
 
 
 
 
+---------------+
| Specimen      |
+---------------+
| Blood - Blood |
+---------------+
 
 
 
+--------------------+----------------------+--------------------+----------------+
| Performing         | Address              | City/State/Zipcode | Phone Number   |
| Organization       |                      |                    |                |
+--------------------+----------------------+--------------------+----------------+
|   [facility]  |   [address] | [place]      |   [phone] |
| [place]          |                      |                    |                |
+--------------------+----------------------+--------------------+----------------+
 documented in this encounter
 
 Visit Diagnoses
 
 
+--------------------------------------------------------------------+
| Diagnosis                                                          |
+--------------------------------------------------------------------+
|   Transplanted liver (HCC) - Primary  Liver replaced by transplant |
+--------------------------------------------------------------------+
|   Encounter for therapeutic drug monitoring                        |
+--------------------------------------------------------------------+
 documented in this encounter

## 2019-11-01 NOTE — XMS
Clinical Summary
  Created on: 2019
 
 BonifacioNila spencer
 External Reference #: [id number]
 : [birth year]
 Sex: Female
 
 Demographics
 
 
+-----------------------+---------------------------+
| Address               | [address]          |
|                       | [place]  [address] |
+-----------------------+---------------------------+
| Home Phone            | [phone]           |
+-----------------------+---------------------------+
| Preferred Language    | Unknown                   |
+-----------------------+---------------------------+
| Marital Status        | Single                    |
+-----------------------+---------------------------+
| [employer]an Affiliation | Unknown                   |
+-----------------------+---------------------------+
| Race                  | Unknown                   |
+-----------------------+---------------------------+
| Ethnic Group          | Unknown                   |
+-----------------------+---------------------------+
 
 
 Author
 
 
+--------------+--------------------------------------------+
| Author       | [facility]  |
|              | [facility] [place]                                |
+--------------+--------------------------------------------+
| Organization | [facility]  |
|              | and [place]                                |
+--------------+--------------------------------------------+
| Address      | Unknown                                    |
+--------------+--------------------------------------------+
| Phone        | Unavailable                                |
+--------------+--------------------------------------------+
 
 
 
 Support
 
 
+-----------------+--------------+-------------------+-----------------+
| Name            | Relationship | Address           | Phone           |
+-----------------+--------------+-------------------+-----------------+
| Magdalena Valdovinos | ECON         | [address]     | [phone] |
|                 |              | [place]   |                 |
|                 |              | [address]        |                 |
+-----------------+--------------+-------------------+-----------------+
 
 
 
 
 Care Team Providers
 
 
+-----------------------+------+-----------------+
| Care Team Member Name | Role | Phone           |
+-----------------------+------+-----------------+
| Augustine Buck DO      | PCP  | [phone] |
+-----------------------+------+-----------------+
 
 
 
 Allergies
 
 
+-------------------+----------------+----------+----------+----------+
| Active Allergy    | Reactions      | Severity | Noted    | Comments |
|                   |                |          | Date     |          |
+-------------------+----------------+----------+----------+----------+
| Sulfa Antibiotics | Swelling, Rash | Medium   | 20 |          |
|                   |                |          | 19       |          |
+-------------------+----------------+----------+----------+----------+
 
 
 
 Medications
 
 
+----------------------+---------------------+-----------+---------+------+------+-------+
| Medication           | Sig                 | Dispensed | Refills | Star | End  | Statu |
|                      |                     |           |         | t    | Date | s     |
|                      |                     |           |         | Date |      |       |
+----------------------+---------------------+-----------+---------+------+------+-------+
|                      | Inject  into the    |           | 0       | 05/0 |      | Activ |
| medroxyPROGESTERone  | muscle every 3      |           |         | / |      | e     |
| Acetate              | (three) months.     |           |         | 19   |      |       |
| (DEPO-PROVERA IM)    |                     |           |         |      |      |       |
+----------------------+---------------------+-----------+---------+------+------+-------+
|   amLODIPine         | Take 5 mg by mouth  |           | 0       | 05/0 |      | Activ |
| (NORVASC) 5 mg       | daily.              |           |         |  |      | e     |
| tablet               |                     |           |         | 19   |      |       |
+----------------------+---------------------+-----------+---------+------+------+-------+
|                      | Take 1 tablet by    |           | 1       | 04/2 |      | Activ |
| lisinopril-hydrochlo | mouth daily.        |           |         | 3/20 |      | e     |
| rothiazide           |                     |           |         | 19   |      |       |
| (PRINZIDE,ZESTORETIC |                     |           |         |      |      |       |
| ) 20-12.5 MG per     |                     |           |         |      |      |       |
| tablet               |                     |           |         |      |      |       |
+----------------------+---------------------+-----------+---------+------+------+-------+
 
 
 
 Active Problems
 
 
+-----------------+------------+
| Problem         | Noted Date |
+-----------------+------------+
| S/P VSD closure | 2016 |
+-----------------+------------+
 
 
 
 
+------------------------------------------------------------+
|   Overview:   Overview:                                    |
| 8 mm Amplatzer Vascular plug-4 device placement (10/16/14) |
+------------------------------------------------------------+
 
 
 
+-------------------------------------------------+------------+
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
+-------------------------------------------------+------------+
| VSD (ventricular septal defect), perimembranous | 2008 |
+-------------------------------------------------+------------+
 
 
 
+-------------------------------------------------------------------+
|   Overview:   Overview:  10/16/2014  S/p 8-mm Amplatzer Vascular  |
| Plug IV, with no significant residual shunting Left atrial        |
| isomerism  LAE (left atrial enlargement)  LVE (left ventricular   |
| enlargement)                                                      |
|LVE (left ventricular enlargement)                                 |
+-------------------------------------------------------------------+
 
 
 
+--------------------+------------+
| Transplanted liver | 10/12/2006 |
+--------------------+------------+
 
 
 
+-------------------------------------------+
|   Overview:   Overview:                   |
| For biliary atresia, 2000 at [facility] |
+-------------------------------------------+
 
 
 
 Family History
 
 
+-----------------+----------+------+--------------------+
| Medical History | Relation | Name | Comments           |
+-----------------+----------+------+--------------------+
| Heart disease   | Father   |      | had heart surgery  |
+-----------------+----------+------+--------------------+
| Hypertension    | Father   |      |                    |
+-----------------+----------+------+--------------------+
 
 
 
+----------+------+--------+----------+
| Relation | Name | Status | Comments |
+----------+------+--------+----------+
| Father   |      | Alive  |          |
+----------+------+--------+----------+
| Father   |      |        |          |
 
+----------+------+--------+----------+
| Mother   |      | Alive  |          |
+----------+------+--------+----------+
 
 
 
 Social History
 
 
+--------------+-------+-----------+--------+------+
| Tobacco Use  | Types | Packs/Day | Years  | Date |
|              |       |           | Used   |      |
+--------------+-------+-----------+--------+------+
| Never Smoker |       |           |        |      |
+--------------+-------+-----------+--------+------+
 
 
 
+------------------+---------------+
| Sex Assigned at  | Date Recorded |
| Birth            |               |
+------------------+---------------+
| Not on file      |               |
+------------------+---------------+
 
 
 
+----------------+-------------+-------------+
| Job Start Date | Occupation  | Industry    |
+----------------+-------------+-------------+
| Not on file    | Not on file | Not on file |
+----------------+-------------+-------------+
 
 
 
+----------------+--------------+------------+
| Travel History | Travel Start | Travel End |
+----------------+--------------+------------+
 
 
 
+-------------------------------------+
| No recent travel history available. |
+-------------------------------------+
 
 
 
 Last Filed Vital Signs
 
 
+-------------------+----------------------+---------------------+
| Vital Sign        | Reading              | Time Taken          |
+-------------------+----------------------+---------------------+
| Blood Pressure    | 124/70               | 2019 1455 PDT |
+-------------------+----------------------+---------------------+
| Pulse             | 100                  | 2019 PDT |
+-------------------+----------------------+---------------------+
| Temperature       | -                    | -                   |
+-------------------+----------------------+---------------------+
| Respiratory Rate  | -                    | -                   |
 
+-------------------+----------------------+---------------------+
| Oxygen Saturation | -                    | -                   |
+-------------------+----------------------+---------------------+
| Inhaled Oxygen    | -                    | -                   |
| Concentration     |                      |                     |
+-------------------+----------------------+---------------------+
| Weight            | 96.4 kg (212 lb 9.6  | 2019 1455 PDT |
|                   | oz)                  |                     |
+-------------------+----------------------+---------------------+
| Height            | 165.1 cm (5' 5")     | 20195 PDT |
+-------------------+----------------------+---------------------+
| Body Mass Index   | 35.38                | 20195 PDT |
+-------------------+----------------------+---------------------+
 
 
 
 Plan of Treatment
 
 
+--------+---------+------------+----------------------+-------------+
| Date   | Type    | Specialty  | Care Team            | Description |
+--------+---------+------------+----------------------+-------------+
| / | Office  | Cardiology |   Renetta Goldberg, |             |
|    | Visit   |            |  MD [address]   |             |
|        |         |            | [address]  [place], [place]  |             |
|        |         |            | [id number] [phone]  |             |
|        |         |            |  [phone] (Fax)  |             |
+--------+---------+------------+----------------------+-------------+
 
 
 
+---------------------+-----------+-----------+----------+
| Health Maintenance  | Due Date  | Last Done | Comments |
+---------------------+-----------+-----------+----------+
| Well Child Check    | 01/15/200 |           |          |
|                     | 2         |           |          |
+---------------------+-----------+-----------+----------+
| Vaccine: HPV (1 -   | 01/15/201 |           |          |
| Female 3-dose       | 4         |           |          |
| series)             |           |           |          |
+---------------------+-----------+-----------+----------+
| Vaccine:            | 01/15/201 |           |          |
| Dtap/Tdap/Td (1 -   | 8         |           |          |
| Tdap)               |           |           |          |
+---------------------+-----------+-----------+----------+
| Vaccine: Influenza  |  |           |          |
| (#1)                | 9         |           |          |
+---------------------+-----------+-----------+----------+
 
 
 
 Results
 Not on filefrom Last 3 Months
 
 Insurance
 
 
+--------------------+--------+-------------+--------+-------------+---------+------+
| Payer              | Benefi | Subscriber  | Effect | Phone       | Address | Type |
|                    | t Plan | ID          | david    |             |         |      |
 
|                    |  /     |             | Dates  |             |         |      |
|                    | Group  |             |        |             |         |      |
+--------------------+--------+-------------+--------+-------------+---------+------+
| [employer]       | FIRST  | [id number] |  | [phone] |         | PPO  |
|                    | HEALTH |             | 018-Pr | 7           |         |      |
|                    |  OTHER |             | esent  |             |         |      |
+--------------------+--------+-------------+--------+-------------+---------+------+
| [employer]  | PHP    | [id number] | 20 | [phone] |         | PPO  |
| PLAN               | PEBB   |             | 19-Pre | 5           |         |      |
|                    | [place]W |             | sent   |             |         |      |
|                    | JON[place]    |             |        |             |         |      |
+--------------------+--------+-------------+--------+-------------+---------+------+
 
 
 
+-----------------+--------+-------------+--------+-------------+----------------------+
| Guarantor Name  | Accoun | Relation to | Date   | Phone       | Billing Address      |
|                 | t Type |  Patient    | of     |             |                      |
|                 |        |             | Birth  |             |                      |
+-----------------+--------+-------------+--------+-------------+----------------------+
| Nila Valdovinos | Person | Self        | [birth year]             |   [address]   |
|                 | [place] |             | 2000   | [phone] | [place]        |
|                 | [place]    |             |        | [address] (Home)    | [address]           |
+-----------------+--------+-------------+--------+-------------+----------------------+
 
 
 
 Advance Directives
 Patient has advance care planning documents on file. For more information, please contact:MILAGRO
[facility] [place] [address]

## 2019-11-01 NOTE — XMS
Encounter Summary
  Created on: 2019
 
 Bonifacio Nila JB
 External Reference #: [id number]
 : [birth year]
 Sex: Female
 
 Demographics
 
 
+-----------------------+------------------------+
| Address               | [address]            |
|                       | [place]  [address]   |
+-----------------------+------------------------+
| Home Phone            | [phone]        |
+-----------------------+------------------------+
| Preferred Language    | Unknown                |
+-----------------------+------------------------+
| Marital Status        | Single                 |
+-----------------------+------------------------+
| Spiritism Affiliation | Unknown                |
+-----------------------+------------------------+
| Race                  | White                  |
+-----------------------+------------------------+
| Ethnic Group          | Not  or  |
+-----------------------+------------------------+
 
 
 Author
 
 
+--------------+------------------------------+
| Author       | [facility] |
+--------------+------------------------------+
| Organization | [facility] |
+--------------+------------------------------+
| Address      | Unknown                      |
+--------------+------------------------------+
| Phone        | Unavailable                  |
+--------------+------------------------------+
 
 
 
 Support
 
 
+-----------------+--------------+---------+-----------------+
| Name            | Relationship | Address | Phone           |
+-----------------+--------------+---------+-----------------+
| Kit Valdovinos   | ECON         | Unknown | [phone] |
+-----------------+--------------+---------+-----------------+
| Magdalena Valdovinos | ECON         | Unknown | [phone] |
+-----------------+--------------+---------+-----------------+
 
 
 
 Care Team Providers
 
 
 
+------------------------+------+-----------------+
| Care Team Member Name  | Role | Phone           |
+------------------------+------+-----------------+
| Lily Horton MD | PCP  | [phone] |
+------------------------+------+-----------------+
 
 
 
 Encounter Details
 
 
+--------+------+----------------------+-----------+---------------------+
| Date   | Type | Department           | Care Team | Description         |
+--------+------+----------------------+-----------+---------------------+
| / | Lab  |   [facility]  |           | Transplanted liver  |
|    |      | 7th Floor  [address]   |           | (HCC); High risk    |
|        |      | [address]  |           | medications (not    |
|        |      |  [place], [place]        |           | anticoagulants)     |
|        |      | [id number]           |           | long-term use       |
|        |      | [phone]         |           |                     |
+--------+------+----------------------+-----------+---------------------+
 
 
 
 Social History
 
 
+-------------------+-------+-----------+--------+------+
| Tobacco Use       | Types | Packs/Day | Years  | Date |
|                   |       |           | Used   |      |
+-------------------+-------+-----------+--------+------+
| Passive Smoke     |       |           |        |      |
| Exposure - Never  |       |           |        |      |
| Smoker            |       |           |        |      |
+-------------------+-------+-----------+--------+------+
 
 
 
+---------------------+---+---+---+
| Smokeless Tobacco:  |   |   |   |
| Never Used          |   |   |   |
+---------------------+---+---+---+
 
 
 
+-------------+-------------+---------+----------+
| Alcohol Use | Drinks/Week | oz/Week | Comments |
+-------------+-------------+---------+----------+
| Not Asked   |             |         |          |
+-------------+-------------+---------+----------+
 
 
 
+------------------+---------------+
| Sex Assigned at  | Date Recorded |
| Birth            |               |
+------------------+---------------+
| Not on file      |               |
 
+------------------+---------------+
 
 
 
+----------------+-------------+-------------+
| Job Start Date | Occupation  | Industry    |
+----------------+-------------+-------------+
| Not on file    | Not on file | Not on file |
+----------------+-------------+-------------+
 
 
 
+----------------+--------------+------------+
| Travel History | Travel Start | Travel End |
+----------------+--------------+------------+
 
 
 
+-------------------------------------+
| No recent travel history available. |
+-------------------------------------+
 documented as of this encounter
 
 Plan of Treatment
 Not on filedocumented as of this encounter
 
 Procedures
 
 
+----------------------+--------+-------------+----------------------+----------------------
+
| Procedure Name       | Priori | Date/Time   | Associated Diagnosis | Comments             
|
|                      | ty     |             |                      |                      
|
+----------------------+--------+-------------+----------------------+----------------------
+
| COMPLETE METABOLIC   | Routin | 2014  |   Transplanted liver |   Results for this   
|
| SET                  | e      |  5:27 PM    |  (HCC)  High risk    | procedure are in the 
|
| (NA,K,CL,CO2,BUN,CRE |        | PST         | medications (not     |  results section.    
|
| AT,GLUC,CA,AST,ALT,B |        |             | anticoagulants)      |                      
|
| ASHWINI TOTAL,ALK        |        |             | long-term use        |                      
|
| PHOS,ALB,PROT TOTAL) |        |             |                      |                      
|
+----------------------+--------+-------------+----------------------+----------------------
+
| TACROLIMUS, WHOLE    | Routin | 2014  |   Transplanted liver |   Results for this   
|
| BLOOD                | e      |  5:27 PM    |  (HCC)  High risk    | procedure are in the 
|
|                      |        | PST         | medications (not     |  results section.    
|
|                      |        |             | anticoagulants)      |                      
|
|                      |        |             | long-term use        |                      
 
|
+----------------------+--------+-------------+----------------------+----------------------
+
| PHOSPHORUS, PLASMA   | Routin | 2014  |   Transplanted liver |   Results for this   
|
|                      | e      |  5:27 PM    |  (HCC)  High risk    | procedure are in the 
|
|                      |        | PST         | medications (not     |  results section.    
|
|                      |        |             | anticoagulants)      |                      
|
|                      |        |             | long-term use        |                      
|
+----------------------+--------+-------------+----------------------+----------------------
+
| BILIRUBIN DIRECT     | Routin | 2014  |   Transplanted liver |   Results for this   
|
|                      | e      |  5:27 PM    |  (HCC)  High risk    | procedure are in the 
|
|                      |        | PST         | medications (not     |  results section.    
|
|                      |        |             | anticoagulants)      |                      
|
|                      |        |             | long-term use        |                      
|
+----------------------+--------+-------------+----------------------+----------------------
+
| URIC ACID, PLASMA    | Routin | 2014  |   Transplanted liver |   Results for this   
|
|                      | e      |  5:27 PM    |  (HCC)  High risk    | procedure are in the 
|
|                      |        | PST         | medications (not     |  results section.    
|
|                      |        |             | anticoagulants)      |                      
|
|                      |        |             | long-term use        |                      
|
+----------------------+--------+-------------+----------------------+----------------------
+
| MAGNESIUM, PLASMA    | Routin | 2014  |   Transplanted liver |   Results for this   
|
|                      | e      |  5:27 PM    |  (HCC)  High risk    | procedure are in the 
|
|                      |        | PST         | medications (not     |  results section.    
|
|                      |        |             | anticoagulants)      |                      
|
|                      |        |             | long-term use        |                      
|
+----------------------+--------+-------------+----------------------+----------------------
+
| LDH TOTAL, PLASMA    | Routin | 2014  |   Transplanted liver |   Results for this   
|
|                      | e      |  5:27 PM    |  (HCC)  High risk    | procedure are in the 
|
|                      |        | PST         | medications (not     |  results section.    
|
|                      |        |             | anticoagulants)      |                      
|
|                      |        |             | long-term use        |                      
 
|
+----------------------+--------+-------------+----------------------+----------------------
+
| CHOLESTEROL TOTAL,   | Routin | 2014  |   Transplanted liver |   Results for this   
|
| PLASMA               | e      |  5:27 PM    |  (HCC)  High risk    | procedure are in the 
|
|                      |        | PST         | medications (not     |  results section.    
|
|                      |        |             | anticoagulants)      |                      
|
|                      |        |             | long-term use        |                      
|
+----------------------+--------+-------------+----------------------+----------------------
+
 documented in this encounter
 
 Results
 TACROLIMUS, WHOLE BLOOD (2014  5:27 PM PST)
 
+-------------+----------+-------------+-------------+--------------+
| Component   | Value    | Ref Range   | Performed   | Pathologist  |
|             |          |             | At          | Signature    |
+-------------+----------+-------------+-------------+--------------+
| TACROLIMUS  | <2.0 (L) | 5.0 - 15.0  | [facility]        |              |
| ()    |          | ng/mL       | LABORATORY  |              |
|             |          |             | SERVICES,   |              |
|             |          |             | SPECIAL IMM |              |
|             |          |             |  + COAG     |              |
+-------------+----------+-------------+-------------+--------------+
 
 
 
+---------------+
| Specimen      |
+---------------+
| Blood - Blood |
+---------------+
 
 
 
+------------------------------------------------------------------------+----------------+
| Narrative                                                              | Performed At   |
+------------------------------------------------------------------------+----------------+
|         Therapeutic range is based on a whole blood specimen drawn 12  |   [facility]         |
| hours post-dose or prior to next dose (the trough). Some other factors | LABORATORY     |
|  influencing therapeutic range, dose administered, and result          | SERVICES,      |
| interpretation include time since transplantation, the organ           | SPECIAL IMM +  |
| transplanted, co-administration of other immunosuppressants and        | COAG           |
| interaction with other drugs that may increase or decrease Tacrolimus  |                |
| concentrations.                                                        |                |
+------------------------------------------------------------------------+----------------+
 
 
 
+--------------------+------------------------+--------------------+--------------+
| Performing         | Address                | City/State/Zipcode | Phone Number |
| Organization       |                        |                    |              |
+--------------------+------------------------+--------------------+--------------+
|   [facility]  |   [address]  | [place] [address] |              |
 
| SERVICES, SPECIAL  | [address]                |                    |              |
| IMM + COAG         |                        |                    |              |
+--------------------+------------------------+--------------------+--------------+
 CHOLESTEROL TOTAL, PLASMA (2014  5:27 PM PST)
 
+-------------+-------+------------+-------------+--------------+
| Component   | Value | Ref Range  | Performed   | Pathologist  |
|             |       |            | At          | Signature    |
+-------------+-------+------------+-------------+--------------+
| CHOLESTEROL | 136   | <200 mg/dL | [facility]        |              |
|   (LAB)     |       |            | LABORATORY  |              |
|             |       |            | SERVICES,   |              |
|             |       |            | CORE        |              |
+-------------+-------+------------+-------------+--------------+
 
 
 
+---------------+
| Specimen      |
+---------------+
| Blood - Blood |
+---------------+
 
 
 
+--------------------+------------------------+--------------------+--------------+
| Performing         | Address                | City/State/Zipcode | Phone Number |
| Organization       |                        |                    |              |
+--------------------+------------------------+--------------------+--------------+
|   [facility]  |   [address]  | [place] [address] |              |
| SERVICES, CORE     | [address]                |                    |              |
+--------------------+------------------------+--------------------+--------------+
 LDH TOTAL, PLASMA (2014  5:27 PM PST)
 
+------------+---------+---------------+-------------+--------------+
| Component  | Value   | Ref Range     | Performed   | Pathologist  |
|            |         |               | At          | Signature    |
+------------+---------+---------------+-------------+--------------+
| LD TOTAL,  | 154 (L) | 175 - 285 U/L | [facility]        |              |
| PLASMA     |         |               | LABORATORY  |              |
|            |         |               | SERVICES,   |              |
|            |         |               | CORE        |              |
+------------+---------+---------------+-------------+--------------+
| LD CMNT    | No Hemo |               | [facility]        |              |
|            |         |               | LABORATORY  |              |
|            |         |               | SERVICES,   |              |
|            |         |               | CORE        |              |
+------------+---------+---------------+-------------+--------------+
 
 
 
+---------------+
| Specimen      |
+---------------+
| Blood - Blood |
+---------------+
 
 
 
+-----------------------------------------------------------------------+----------------+
 
| Narrative                                                             | Performed At   |
+-----------------------------------------------------------------------+----------------+
|      New reference range effective 2013 for LD Total performed  |   [facility]         |
| in Core Lab only.                                                     | LABORATORY     |
|                                                                       | SERVICES, CORE |
+-----------------------------------------------------------------------+----------------+
 
 
 
+--------------------+------------------------+--------------------+--------------+
| Performing         | Address                | City/State/Zipcode | Phone Number |
| Organization       |                        |                    |              |
+--------------------+------------------------+--------------------+--------------+
|   [facility]  |   [address]  | [place] [address] |              |
| SERVICES, CORE     | [address]                |                    |              |
+--------------------+------------------------+--------------------+--------------+
 BILIRUBIN DIRECT (2014  5:27 PM PST)
 
+-------------+---------+-----------------+-------------+--------------+
| Component   | Value   | Ref Range       | Performed   | Pathologist  |
|             |         |                 | At          | Signature    |
+-------------+---------+-----------------+-------------+--------------+
| BILIRUBIN   | <0.1    | 0.0 - 0.3 mg/dL | [facility]        |              |
| DIRECT      |         |                 | LABORATORY  |              |
|             |         |                 | SERVICES,   |              |
|             |         |                 | CORE        |              |
+-------------+---------+-----------------+-------------+--------------+
| BILI D CMNT | No Hemo |                 | [facility]        |              |
|             |         |                 | LABORATORY  |              |
|             |         |                 | SERVICES,   |              |
|             |         |                 | CORE        |              |
+-------------+---------+-----------------+-------------+--------------+
 
 
 
+---------------+
| Specimen      |
+---------------+
| Blood - Blood |
+---------------+
 
 
 
+--------------------+------------------------+--------------------+--------------+
| Performing         | Address                | City/State/Zipcode | Phone Number |
| Organization       |                        |                    |              |
+--------------------+------------------------+--------------------+--------------+
|   [facility]  |   [address]  | [place] [address] |              |
| SERVICES, CORE     | [address]                |                    |              |
+--------------------+------------------------+--------------------+--------------+
 URIC ACID, PLASMA (2014  5:27 PM PST)
 
+-------------+-------+-----------------+-------------+--------------+
| Component   | Value | Ref Range       | Performed   | Pathologist  |
|             |       |                 | At          | Signature    |
+-------------+-------+-----------------+-------------+--------------+
| URIC ACID,  | 3.7   | 2.5 - 6.2 mg/dL | [facility]        |              |
| PLASMA      |       |                 | LABORATORY  |              |
| (LAB)       |       |                 | SERVICES,   |              |
|             |       |                 | CORE        |              |
 
+-------------+-------+-----------------+-------------+--------------+
 
 
 
+---------------+
| Specimen      |
+---------------+
| Blood - Blood |
+---------------+
 
 
 
+--------------------+------------------------+--------------------+--------------+
| Performing         | Address                | City/State/Zipcode | Phone Number |
| Organization       |                        |                    |              |
+--------------------+------------------------+--------------------+--------------+
|   [facility]  |   [address]  | [place] [address] |              |
| SERVICES, CORE     | [address]                |                    |              |
+--------------------+------------------------+--------------------+--------------+
 PHOSPHORUS, PLASMA (2014  5:27 PM PST)
 
+-------------+-------+-----------------+-------------+--------------+
| Component   | Value | Ref Range       | Performed   | Pathologist  |
|             |       |                 | At          | Signature    |
+-------------+-------+-----------------+-------------+--------------+
| PHOSPHORUS, | 3.3   | 2.4 - 4.7 mg/dL | [facility]        |              |
|  PLASMA     |       |                 | LABORATORY  |              |
| (LAB)       |       |                 | SERVICES,   |              |
|             |       |                 | CORE        |              |
+-------------+-------+-----------------+-------------+--------------+
 
 
 
+---------------+
| Specimen      |
+---------------+
| Blood - Blood |
+---------------+
 
 
 
+--------------------+------------------------+--------------------+--------------+
| Performing         | Address                | City/State/Zipcode | Phone Number |
| Organization       |                        |                    |              |
+--------------------+------------------------+--------------------+--------------+
|   [facility]  |   [address]  | [place] [address] |              |
| SERVICES, CORE     | [address]                |                    |              |
+--------------------+------------------------+--------------------+--------------+
 MAGNESIUM, PLASMA (2014  5:27 PM PST)
 
+-------------+---------+-----------------+-------------+--------------+
| Component   | Value   | Ref Range       | Performed   | Pathologist  |
|             |         |                 | At          | Signature    |
+-------------+---------+-----------------+-------------+--------------+
| MAGNESIUM,P | 1.6 (L) | 1.8 - 2.5 mg/dL | [facility]        |              |
| LASMA       |         |                 | LABORATORY  |              |
|             |         |                 | SERVICES,   |              |
|             |         |                 | CORE        |              |
+-------------+---------+-----------------+-------------+--------------+
 
 
 
 
+---------------+
| Specimen      |
+---------------+
| Blood - Blood |
+---------------+
 
 
 
+--------------------+------------------------+--------------------+--------------+
| Performing         | Address                | City/State/Zipcode | Phone Number |
| Organization       |                        |                    |              |
+--------------------+------------------------+--------------------+--------------+
|   [facility]  |   [address]  | [place] [address] |              |
| SERVICES, CORE     | [address]                |                    |              |
+--------------------+------------------------+--------------------+--------------+
 COMPLETE METABOLIC SET (NA,K,CL,CO2,BUN,CREAT,GLUC,CA,AST,ALT,BILI TOTAL,ALK PHOS,ALB,PROT 
TOTAL) (2014  5:27 PM PST)
 
+-------------+---------+-----------------+-------------+--------------+
| Component   | Value   | Ref Range       | Performed   | Pathologist  |
|             |         |                 | At          | Signature    |
+-------------+---------+-----------------+-------------+--------------+
| GLUCOSE,    | 81      | 60 - 99 mg/dL   | [facility]        |              |
| PLASMA      |         |                 | LABORATORY  |              |
| (LAB)       |         |                 | SERVICES,   |              |
|             |         |                 | CORE        |              |
+-------------+---------+-----------------+-------------+--------------+
| BUN, PLASMA | 13      | 6 - 20 mg/dL    | [facility]        |              |
|  (LAB)      |         |                 | LABORATORY  |              |
|             |         |                 | SERVICES,   |              |
|             |         |                 | CORE        |              |
+-------------+---------+-----------------+-------------+--------------+
| CREATININE  | 0.76    | 0.46 - 0.81     | [facility]        |              |
| PLASMA      |         | mg/dL           | LABORATORY  |              |
| (LAB)       |         |                 | SERVICES,   |              |
|             |         |                 | CORE        |              |
+-------------+---------+-----------------+-------------+--------------+
| SODIUM,     | 139     | 136 - 145       | [facility]        |              |
| PLASMA      |         | mmol/L          | LABORATORY  |              |
| (LAB)       |         |                 | SERVICES,   |              |
|             |         |                 | CORE        |              |
+-------------+---------+-----------------+-------------+--------------+
| POTASSIUM,  | 3.9     | 3.4 - 5.0       | [facility]        |              |
| PLASMA      |         | mmol/L          | LABORATORY  |              |
| (LAB)       |         |                 | SERVICES,   |              |
|             |         |                 | CORE        |              |
+-------------+---------+-----------------+-------------+--------------+
| CHLORIDE,   | 105     | 97 - 108 mmol/L | [facility]        |              |
| PLASMA      |         |                 | LABORATORY  |              |
| (LAB)       |         |                 | SERVICES,   |              |
|             |         |                 | CORE        |              |
+-------------+---------+-----------------+-------------+--------------+
| TOTAL CO2,  | 30      | 21 - 32 mmol/L  | [facility]        |              |
| PLASMA      |         |                 | LABORATORY  |              |
| (LAB)       |         |                 | SERVICES,   |              |
|             |         |                 | CORE        |              |
+-------------+---------+-----------------+-------------+--------------+
| CALCIUM,    | 9.4     | 8.6 - 10.2      | [facility]        |              |
 
| PLASMA      |         | mg/dL           | LABORATORY  |              |
| (LAB)       |         |                 | SERVICES,   |              |
|             |         |                 | CORE        |              |
+-------------+---------+-----------------+-------------+--------------+
| BILIRUBIN   | 0.2 (L) | 0.3 - 1.2 mg/dL | [facility]        |              |
| TOTAL       |         |                 | LABORATORY  |              |
|             |         |                 | SERVICES,   |              |
|             |         |                 | CORE        |              |
+-------------+---------+-----------------+-------------+--------------+
| TOTAL       | 7.6     | 6.2 - 8.5 g/dL  | [facility]        |              |
| PROTEIN,    |         |                 | LABORATORY  |              |
| PLASMA      |         |                 | SERVICES,   |              |
| (LAB)       |         |                 | CORE        |              |
+-------------+---------+-----------------+-------------+--------------+
| ALBUMIN,    | 3.7     | 3.5 - 4.7 g/dL  | [facility]        |              |
| PLASMA      |         |                 | LABORATORY  |              |
| (LAB)       |         |                 | SERVICES,   |              |
|             |         |                 | CORE        |              |
+-------------+---------+-----------------+-------------+--------------+
| ALK PHOS    | 101     | 42 - 110 U/L    | [facility]        |              |
|             |         |                 | LABORATORY  |              |
|             |         |                 | SERVICES,   |              |
|             |         |                 | CORE        |              |
+-------------+---------+-----------------+-------------+--------------+
| AST(SGOT)   | 15 (L)  | 18 - 36 U/L     | [facility]        |              |
|             |         |                 | LABORATORY  |              |
|             |         |                 | SERVICES,   |              |
|             |         |                 | CORE        |              |
+-------------+---------+-----------------+-------------+--------------+
| ALT (SGPT)  | 18      | 12 - 60 U/L     | [facility]        |              |
|             |         |                 | LABORATORY  |              |
|             |         |                 | SERVICES,   |              |
|             |         |                 | CORE        |              |
+-------------+---------+-----------------+-------------+--------------+
| ANION       | 4       | 4 - 11 mmol/L   | [facility]        |              |
| GAP(ALB     |         |                 | LABORATORY  |              |
| CORRECTED)  |         |                 | SERVICES,   |              |
|             |         |                 | CORE        |              |
+-------------+---------+-----------------+-------------+--------------+
| POTASSIUM   | No Hemo |                 | [facility]        |              |
| CMNT        |         |                 | LABORATORY  |              |
|             |         |                 | SERVICES,   |              |
|             |         |                 | CORE        |              |
+-------------+---------+-----------------+-------------+--------------+
| BILI T CMNT | No Hemo |                 | [facility]        |              |
|             |         |                 | LABORATORY  |              |
|             |         |                 | SERVICES,   |              |
|             |         |                 | CORE        |              |
+-------------+---------+-----------------+-------------+--------------+
| AST CMNT    | No Hemo |                 | [facility]        |              |
|             |         |                 | LABORATORY  |              |
|             |         |                 | SERVICES,   |              |
|             |         |                 | CORE        |              |
+-------------+---------+-----------------+-------------+--------------+
| ANION GAP   | 4       | mmol/L          | [facility]        |              |
|             |         |                 | LABORATORY  |              |
|             |         |                 | SERVICES,   |              |
|             |         |                 | CORE        |              |
+-------------+---------+-----------------+-------------+--------------+
 
 
 
 
+---------------+
| Specimen      |
+---------------+
| Blood - Blood |
+---------------+
 
 
 
+--------------------+------------------------+--------------------+--------------+
| Performing         | Address                | City/State/Zipcode | Phone Number |
| Organization       |                        |                    |              |
+--------------------+------------------------+--------------------+--------------+
|   [facility]  |   [address]  | [place] [address] |              |
| SERVICES, CORE     | [address]                |                    |              |
+--------------------+------------------------+--------------------+--------------+
 documented in this encounter
 
 Visit Diagnoses
 
 
+--------------------------------------------------------------------------------------+
| Diagnosis                                                                            |
+--------------------------------------------------------------------------------------+
|   Transplanted liver (HCC)  Liver replaced by transplant                             |
+--------------------------------------------------------------------------------------+
|   High risk medications (not anticoagulants) long-term use  Encounter for long-term  |
| (current) use of other medications                                                   |
+--------------------------------------------------------------------------------------+
 documented in this encounter

## 2019-11-01 NOTE — XMS
Encounter Summary
  Created on: 2019
 
 Bonifacio Nila JB
 External Reference #: [id number]
 : [birth year]
 Sex: Female
 
 Demographics
 
 
+-----------------------+------------------------+
| Address               | [address]            |
|                       | [place]  [address]   |
+-----------------------+------------------------+
| Home Phone            | [phone]        |
+-----------------------+------------------------+
| Preferred Language    | Unknown                |
+-----------------------+------------------------+
| Marital Status        | Single                 |
+-----------------------+------------------------+
| Scientology Affiliation | Unknown                |
+-----------------------+------------------------+
| Race                  | White                  |
+-----------------------+------------------------+
| Ethnic Group          | Not  or  |
+-----------------------+------------------------+
 
 
 Author
 
 
+--------------+------------------------------+
| Author       | [facility] |
+--------------+------------------------------+
| Organization | [facility] |
+--------------+------------------------------+
| Address      | Unknown                      |
+--------------+------------------------------+
| Phone        | Unavailable                  |
+--------------+------------------------------+
 
 
 
 Support
 
 
+-----------------+--------------+---------+-----------------+
| Name            | Relationship | Address | Phone           |
+-----------------+--------------+---------+-----------------+
| Kit Valdovinos   | ECON         | Unknown | [phone] |
+-----------------+--------------+---------+-----------------+
| Magdalena Valdovinos | ECON         | Unknown | [phone] |
+-----------------+--------------+---------+-----------------+
 
 
 
 Care Team Providers
 
 
 
+------------------------+------+-----------------+
| Care Team Member Name  | Role | Phone           |
+------------------------+------+-----------------+
| Lily Horton MD | PCP  | [phone] |
+------------------------+------+-----------------+
 
 
 
 Encounter Details
 
 
+--------+-------------+----------------------+----------------------+-------------+
| Date   | Type        | Department           | Care Team            | Description |
+--------+-------------+----------------------+----------------------+-------------+
| 06/15/ | Documentati |   Pediatric          |   Neema Khalil,   |             |
|    | on          | Gastroenterology at  | [address] |             |
|        |             | [place]          |   [place], [place]       |             |
|        |             | [facility]  | [address]           |             |
|        |             |  [address] | [phone]         |             |
|        |             |  [address]  Mailcode:  | [phone] (Fax)   |             |
|        |             | [facility]   |                      |             |
|        |             | [place], [place]         |                      |             |
|        |             | [address]           |                      |             |
|        |             | [phone]         |                      |             |
+--------+-------------+----------------------+----------------------+-------------+
 
 
 
 Social History
 
 
+-------------------+-------+-----------+--------+------+
| Tobacco Use       | Types | Packs/Day | Years  | Date |
|                   |       |           | Used   |      |
+-------------------+-------+-----------+--------+------+
| Passive Smoke     |       |           |        |      |
| Exposure - Never  |       |           |        |      |
| Smoker            |       |           |        |      |
+-------------------+-------+-----------+--------+------+
 
 
 
+---------------------+---+---+---+
| Smokeless Tobacco:  |   |   |   |
| Never Used          |   |   |   |
+---------------------+---+---+---+
 
 
 
+-------------+-------------+---------+----------+
| Alcohol Use | Drinks/Week | oz/Week | Comments |
+-------------+-------------+---------+----------+
| Not Asked   |             |         |          |
+-------------+-------------+---------+----------+
 
 
 
+------------------+---------------+
 
| Sex Assigned at  | Date Recorded |
| Birth            |               |
+------------------+---------------+
| Not on file      |               |
+------------------+---------------+
 
 
 
+----------------+-------------+-------------+
| Job Start Date | Occupation  | Industry    |
+----------------+-------------+-------------+
| Not on file    | Not on file | Not on file |
+----------------+-------------+-------------+
 
 
 
+----------------+--------------+------------+
| Travel History | Travel Start | Travel End |
+----------------+--------------+------------+
 
 
 
+-------------------------------------+
| No recent travel history available. |
+-------------------------------------+
 documented as of this encounter
 
 Plan of Treatment
 Not on filedocumented as of this encounter
 
 Procedures
 
 
+----------------------+--------+-------------+----------------------+----------------------
+
| Procedure Name       | Priori | Date/Time   | Associated Diagnosis | Comments             
|
|                      | ty     |             |                      |                      
|
+----------------------+--------+-------------+----------------------+----------------------
+
| LIPID SET (TRIG, T   | Routin | 2015  |                      |   Results for this   
|
| CHOL, HDL, CALC LDL) | e      |  2:24 PM    |                      | procedure are in the 
|
|                      |        | PDT         |                      |  results section.    
|
+----------------------+--------+-------------+----------------------+----------------------
+
| CBC, WITH            | Routin | 2015  |                      |   Results for this   
|
| DIFFERENTIAL         | e      |  2:19 PM    |                      | procedure are in the 
|
|                      |        | PDT         |                      |  results section.    
|
+----------------------+--------+-------------+----------------------+----------------------
+
| COMPLETE METABOLIC   | Routin | 2015  |                      |   Results for this   
|
| SET                  | e      |  2:19 PM    |                      | procedure are in the 
 
|
| (NA,K,CL,CO2,BUN,CRE |        | PDT         |                      |  results section.    
|
| AT,GLUC,CA,AST,ALT,B |        |             |                      |                      
|
| ASHWINI TOTAL,ALK        |        |             |                      |                      
|
| PHOS,ALB,PROT TOTAL) |        |             |                      |                      
|
+----------------------+--------+-------------+----------------------+----------------------
+
| GGT, PLASMA          | Routin | 2015  |                      |   Results for this   
|
|                      | e      |  2:19 PM    |                      | procedure are in the 
|
|                      |        | PDT         |                      |  results section.    
|
+----------------------+--------+-------------+----------------------+----------------------
+
| MAGNESIUM, PLASMA    | Routin | 2015  |                      |   Results for this   
|
|                      | e      |  2:19 PM    |                      | procedure are in the 
|
|                      |        | PDT         |                      |  results section.    
|
+----------------------+--------+-------------+----------------------+----------------------
+
 documented in this encounter
 
 Results
 LIPID SET (TRIG, T CHOL, HDL, CALC LDL) (2015  2:24 PM PDT)
 
+-------------+-------+-----------------+------------+--------------+
| Component   | Value | Ref Range       | Performed  | Pathologist  |
|             |       |                 | At         | Signature    |
+-------------+-------+-----------------+------------+--------------+
| CHOLESTEROL | 123   | 0 - 200 mg/dL   | [facility]  |              |
|   (LAB)     |       |                 | LAB -      |              |
|             |       |                 | [place]  |              |
+-------------+-------+-----------------+------------+--------------+
| TRIGLYCERID | 106   | 30 - 150 mg/dL  | [facility]  |              |
| ES          |       |                 | LAB -      |              |
|             |       |                 | [place]  |              |
+-------------+-------+-----------------+------------+--------------+
| LDL         | 140   | 100 - 215 mg/dL | [facility]  |              |
| CHOLESTEROL |       |                 | LAB -      |              |
| ,           |       |                 | [place]  |              |
| CALCULATED  |       |                 |            |              |
+-------------+-------+-----------------+------------+--------------+
 
 
 
+---------------+
| Specimen      |
+---------------+
| Blood - Blood |
+---------------+
 
 
 
 
+--------------------+----------------------+--------------------+----------------+
| Performing         | Address              | City/State/Zipcode | Phone Number   |
| Organization       |                      |                    |                |
+--------------------+----------------------+--------------------+----------------+
|   [facility]  |   [address] | [place]      |   [phone] |
| [place]          |                      |                    |                |
+--------------------+----------------------+--------------------+----------------+
 MAGNESIUM, PLASMA (2015  2:19 PM PDT)
 
+-------------+-------+-----------------+------------+--------------+
| Component   | Value | Ref Range       | Performed  | Pathologist  |
|             |       |                 | At         | Signature    |
+-------------+-------+-----------------+------------+--------------+
| MAGNESIUM,P | 1.8   | 1.7 - 2.5 mg/dL | [facility]  |              |
| LASMA       |       |                 | LAB -      |              |
|             |       |                 | [place]  |              |
+-------------+-------+-----------------+------------+--------------+
 
 
 
+---------------+
| Specimen      |
+---------------+
| Blood - Blood |
+---------------+
 
 
 
+--------------------+----------------------+--------------------+----------------+
| Performing         | Address              | City/State/Zipcode | Phone Number   |
| Organization       |                      |                    |                |
+--------------------+----------------------+--------------------+----------------+
|   [facility]  |   [address] | [place], [place]      |   [phone] |
| [place]          |                      |                    |                |
+--------------------+----------------------+--------------------+----------------+
 GGT, PLASMA (2015  2:19 PM PDT)
 
+-------------+-------+------------+------------+--------------+
| Component   | Value | Ref Range  | Performed  | Pathologist  |
|             |       |            | At         | Signature    |
+-------------+-------+------------+------------+--------------+
| GAMMA       | 7     | 5 - 60 u/l | [facility]  |              |
| GLUTAMYL    |       |            | LAB -      |              |
| TRANSFERASE |       |            | [place]  |              |
+-------------+-------+------------+------------+--------------+
 
 
 
+---------------+
| Specimen      |
+---------------+
| Blood - Blood |
+---------------+
 
 
 
+--------------------+----------------------+--------------------+----------------+
| Performing         | Address              | City/State/Zipcode | Phone Number   |
| Organization       |                      |                    |                |
+--------------------+----------------------+--------------------+----------------+
 
|   [facility]  |   [address] | [place], [place]      |   [phone] |
| [place]          |                      |                    |                |
+--------------------+----------------------+--------------------+----------------+
 COMPLETE METABOLIC SET (NA,K,CL,CO2,BUN,CREAT,GLUC,CA,AST,ALT,BILI TOTAL,ALK PHOS,ALB,PROT 
TOTAL) (2015  2:19 PM PDT)
 
+-------------+-------+-----------------+------------+--------------+
| Component   | Value | Ref Range       | Performed  | Pathologist  |
|             |       |                 | At         | Signature    |
+-------------+-------+-----------------+------------+--------------+
| GLUCOSE,    | 79    | 70 - 100 mg/dL  | [facility]  |              |
| PLASMA      |       |                 | LAB -      |              |
| (LAB)       |       |                 | [place]  |              |
+-------------+-------+-----------------+------------+--------------+
| ANION GAP   | 16.8  | 7 - 21          | [facility]  |              |
|             |       |                 | LAB -      |              |
|             |       |                 | [place]  |              |
+-------------+-------+-----------------+------------+--------------+
| CREATININE  | 0.66  | 0.60 - 1.20     | [facility]  |              |
| PLASMA      |       | mg/dL           | LAB -      |              |
| (LAB)       |       |                 | [place]  |              |
+-------------+-------+-----------------+------------+--------------+
| TOTAL       | 7.1   | 6.1 - 8.5 g/dL  | [facility]  |              |
| PROTEIN,    |       |                 | LAB -      |              |
| PLASMA      |       |                 | [place]  |              |
| (LAB)       |       |                 |            |              |
+-------------+-------+-----------------+------------+--------------+
| ALBUMIN,    | 4.0   | 3.5 - 5.0 g/dL  | [facility]  |              |
| PLASMA      |       |                 | LAB -      |              |
| (LAB)       |       |                 | [place]  |              |
+-------------+-------+-----------------+------------+--------------+
| CALCIUM,    | 9.4   | 8.4 - 10.2      | [facility]  |              |
| PLASMA      |       | mg/dL           | LAB -      |              |
| (LAB)       |       |                 | [place]  |              |
+-------------+-------+-----------------+------------+--------------+
| BILIRUBIN   | 0.2   | 0.0 - 1.2       | [facility]  |              |
| TOTAL       |       | Transcutaneous  | LAB -      |              |
|             |       | Bilirubinometer | [place]  |              |
+-------------+-------+-----------------+------------+--------------+
| ALK PHOS    | 88    | 30 - 128 U/L    | [facility]  |              |
|             |       |                 | LAB -      |              |
|             |       |                 | [place]  |              |
+-------------+-------+-----------------+------------+--------------+
| AST(SGOT)   | 20    | 13 - 39 U/L     | [facility]  |              |
|             |       |                 | LAB -      |              |
|             |       |                 | [place]  |              |
+-------------+-------+-----------------+------------+--------------+
| SODIUM,     | 137   | 132 - 143       | [facility]  |              |
| PLASMA      |       | mmol/L          | LAB -      |              |
| (LAB)       |       |                 | [place]  |              |
+-------------+-------+-----------------+------------+--------------+
| POTASSIUM,  | 3.8   | 3.6 - 5.1       | [facility]  |              |
| PLASMA      |       | mmol/L          | LAB -      |              |
| (LAB)       |       |                 | [place]  |              |
+-------------+-------+-----------------+------------+--------------+
| CHLORIDE,   | 102   | 95 - 112 mmol/L | [facility]  |              |
| PLASMA      |       |                 | LAB -      |              |
| (LAB)       |       |                 | [place]  |              |
+-------------+-------+-----------------+------------+--------------+
| TOTAL CO2,  | 22    | 19 - 31 mmol/L  | [facility]  |              |
 
| PLASMA      |       |                 | LAB -      |              |
| (LAB)       |       |                 | [place]  |              |
+-------------+-------+-----------------+------------+--------------+
| ALT (SGPT)  | 10    | 7 - 52 U/L      | [facility]  |              |
|             |       |                 | LAB -      |              |
|             |       |                 | [place]  |              |
+-------------+-------+-----------------+------------+--------------+
| BUN/CREATIN | 21.2  | 6.0 - 28.6      | [facility]  |              |
| INE RATIO   |       |                 | LAB -      |              |
|             |       |                 | [place]  |              |
+-------------+-------+-----------------+------------+--------------+
| UREA        | 14    | 6 - 23 mg/dL    | [facility]  |              |
| NITROGEN,   |       |                 | LAB -      |              |
| SERUM       |       |                 | [place]  |              |
+-------------+-------+-----------------+------------+--------------+
| GLOBULIN    | 3.1   | 1.8 - 3.5       | [facility]  |              |
|             |       |                 | LAB -      |              |
|             |       |                 | [place]  |              |
+-------------+-------+-----------------+------------+--------------+
| A/G RATIO   | 1.3   | 1.1 - 2.4       | [facility]  |              |
|             |       |                 | LAB -      |              |
|             |       |                 | [place]  |              |
+-------------+-------+-----------------+------------+--------------+
 
 
 
+---------------+
| Specimen      |
+---------------+
| Blood - Blood |
+---------------+
 
 
 
+--------------------+----------------------+--------------------+----------------+
| Performing         | Address              | City/State/Zipcode | Phone Number   |
| Organization       |                      |                    |                |
+--------------------+----------------------+--------------------+----------------+
|   [facility]  |   [address] | [place]      |   [phone] |
| [place]          |                      |                    |                |
+--------------------+----------------------+--------------------+----------------+
 CBC, WITH DIFFERENTIAL (2015  2:19 PM PDT)
 
+-------------+----------+-----------------+------------+--------------+
| Component   | Value    | Ref Range       | Performed  | Pathologist  |
|             |          |                 | At         | Signature    |
+-------------+----------+-----------------+------------+--------------+
| WHITE CELL  | 8.3      | 4.5 - 11.0 K/cu | [facility]  |              |
| COUNT       |          |  mm             | LAB -      |              |
|             |          |                 | [place]  |              |
+-------------+----------+-----------------+------------+--------------+
| RED CELL    | 4.55     | 3.8 - 5.1 M/cu  | [facility]  |              |
| COUNT       |          | mm              | LAB -      |              |
|             |          |                 | [place]  |              |
+-------------+----------+-----------------+------------+--------------+
| HEMOGLOBIN  | 13.5     | 12.0 - 16.0     | [facility]  |              |
|             |          | g/dL            | LAB -      |              |
|             |          |                 | [place]  |              |
+-------------+----------+-----------------+------------+--------------+
| HEMATOCRIT  | 40.2     | 35 - 45 %       | [facility]  |              |
 
|             |          |                 | LAB -      |              |
|             |          |                 | [place]  |              |
+-------------+----------+-----------------+------------+--------------+
| MCV         | 88.3     | 81 - 99 fL      | [facility]  |              |
|             |          |                 | LAB -      |              |
|             |          |                 | [place]  |              |
+-------------+----------+-----------------+------------+--------------+
| MCH         | 30       | 27 - 33 pg      | [facility]  |              |
|             |          |                 | LAB -      |              |
|             |          |                 | [place]  |              |
+-------------+----------+-----------------+------------+--------------+
| MCHC        | 34       | 30 - 36 g/dL    | [facility]  |              |
|             |          |                 | LAB -      |              |
|             |          |                 | [place]  |              |
+-------------+----------+-----------------+------------+--------------+
| PLATELET    | 313      | 140 - 440 K/cu  | [facility]  |              |
| COUNT       |          | mm              | LAB -      |              |
|             |          |                 | [place]  |              |
+-------------+----------+-----------------+------------+--------------+
| NEUTROPHIL  | 63.8     | 39 - 80 %       | [facility]  |              |
| %           |          |                 | LAB -      |              |
|             |          |                 | [place]  |              |
+-------------+----------+-----------------+------------+--------------+
| LYMPHOCYTE  | 21.5 (A) | 24 - 44 %       | [facility]  |              |
| %           |          |                 | LAB -      |              |
|             |          |                 | [place]  |              |
+-------------+----------+-----------------+------------+--------------+
| MONOCYTE %  | 12.6 (A) | 0 - 12 %        | [facility]  |              |
|             |          |                 | LAB -      |              |
|             |          |                 | [place]  |              |
+-------------+----------+-----------------+------------+--------------+
| EOS %       | 1.6      | 0 - 6 %         | [facility]  |              |
|             |          |                 | LAB -      |              |
|             |          |                 | [place]  |              |
+-------------+----------+-----------------+------------+--------------+
| BASO %      | 0.5      | 0 - 2 %         | [facility]  |              |
|             |          |                 | LAB -      |              |
|             |          |                 | [place]  |              |
+-------------+----------+-----------------+------------+--------------+
| RDW         | 13.5     | 10.5 - 15.0 %   | [facility]  |              |
|             |          |                 | LAB -      |              |
|             |          |                 | [place]  |              |
+-------------+----------+-----------------+------------+--------------+
 
 
 
+---------------+
| Specimen      |
+---------------+
| Blood - Blood |
+---------------+
 
 
 
+--------------------+----------------------+--------------------+----------------+
| Performing         | Address              | City/State/Zipcode | Phone Number   |
| Organization       |                      |                    |                |
+--------------------+----------------------+--------------------+----------------+
|   [facility]  |   [address] | [place]      |   [phone] |
| [place]          |                      |                    |                |
 
+--------------------+----------------------+--------------------+----------------+
 documented in this encounter
 
 Visit Diagnoses
 Not on filedocumented in this encounter

## 2019-11-01 NOTE — XMS
Encounter Summary
  Created on: 2019
 
 Bonifacio Nila JB
 External Reference #: [id number]
 : [birth year]
 Sex: Female
 
 Demographics
 
 
+-----------------------+------------------------+
| Address               | [address]            |
|                       | [place]  [address]   |
+-----------------------+------------------------+
| Home Phone            | [phone]        |
+-----------------------+------------------------+
| Preferred Language    | Unknown                |
+-----------------------+------------------------+
| Marital Status        | Single                 |
+-----------------------+------------------------+
| [employer] Affiliation | Unknown                |
+-----------------------+------------------------+
| Race                  | White                  |
+-----------------------+------------------------+
| Ethnic Group          | Not  or  |
+-----------------------+------------------------+
 
 
 Author
 
 
+--------------+------------------------------+
| Author       | [facility] [employer] [facility] |
+--------------+------------------------------+
| Organization | [facility] |
+--------------+------------------------------+
| Address      | Unknown                      |
+--------------+------------------------------+
| Phone        | Unavailable                  |
+--------------+------------------------------+
 
 
 
 Support
 
 
+-----------------+--------------+---------+-----------------+
| Name            | Relationship | Address | Phone           |
+-----------------+--------------+---------+-----------------+
| Kit Valdovinos   | ECON         | Unknown | [phone] |
+-----------------+--------------+---------+-----------------+
| Magdalena Valdovinos | ECON         | Unknown | [phone] |
+-----------------+--------------+---------+-----------------+
 
 
 
 Care Team Providers
 
 
 
+-----------------------+------+-----------------+
| Care Team Member Name | Role | Phone           |
+-----------------------+------+-----------------+
| Lily Horton MD   | PCP  | [phone] |
+-----------------------+------+-----------------+
 
 
 
 Encounter Details
 
 
+--------+----------+----------------------+--------------------+-------------+
| Date   | Type     | Department           | Care Team          | Description |
+--------+----------+----------------------+--------------------+-------------+
| / | Abstract |   Pediatric          |   Ivis Burkett MD |             |
|    |          | Gastroenterology at  |                    |             |
|        |          | [facility]          |                    |             |
|        |          | [facility]  |                    |             |
|        |          |  [address] |                    |             |
|        |          |  [address]  Mailcode:  |                    |             |
|        |          | [address]   |                    |             |
|        |          | [place], [place]         |                    |             |
|        |          | [address]           |                    |             |
|        |          | [phone]         |                    |             |
+--------+----------+----------------------+--------------------+-------------+
 
 
 
 Social History
 
 
+----------------+-------+-----------+--------+------+
| Tobacco Use    | Types | Packs/Day | Years  | Date |
|                |       |           | Used   |      |
+----------------+-------+-----------+--------+------+
| Never Assessed |       |           |        |      |
+----------------+-------+-----------+--------+------+
 
 
 
+------------------+---------------+
| Sex Assigned at  | Date Recorded |
| Birth            |               |
+------------------+---------------+
| Not on file      |               |
+------------------+---------------+
 
 
 
+----------------+-------------+-------------+
| Job Start Date | Occupation  | Industry    |
+----------------+-------------+-------------+
| Not on file    | Not on file | Not on file |
+----------------+-------------+-------------+
 
 
 
+----------------+--------------+------------+
 
| Travel History | Travel Start | Travel End |
+----------------+--------------+------------+
 
 
 
+-------------------------------------+
| No recent travel history available. |
+-------------------------------------+
 documented as of this encounter
 
 Plan of Treatment
 Not on filedocumented as of this encounter
 
 Visit Diagnoses
 Not on filedocumented in this encounter

## 2019-11-01 NOTE — XMS
Encounter Summary
  Created on: 2019
 
 Bonifacio Nila JB
 External Reference #: [id number]
 : [birth year]
 Sex: Female
 
 Demographics
 
 
+-----------------------+------------------------+
| Address               | [address]            |
|                       | [place]  [address]   |
+-----------------------+------------------------+
| Home Phone            | [phone]        |
+-----------------------+------------------------+
| Preferred Language    | Unknown                |
+-----------------------+------------------------+
| Marital Status        | Single                 |
+-----------------------+------------------------+
| Mu-ism Affiliation | Unknown                |
+-----------------------+------------------------+
| Race                  | White                  |
+-----------------------+------------------------+
| Ethnic Group          | Not  or  |
+-----------------------+------------------------+
 
 
 Author
 
 
+--------------+------------------------------+
| Author       | [facility] [employer] [facility] |
+--------------+------------------------------+
| Organization | [facility] |
+--------------+------------------------------+
| Address      | Unknown                      |
+--------------+------------------------------+
| Phone        | Unavailable                  |
+--------------+------------------------------+
 
 
 
 Support
 
 
+-----------------+--------------+---------+-----------------+
| Name            | Relationship | Address | Phone           |
+-----------------+--------------+---------+-----------------+
| Kit Valdovinos   | ECON         | Unknown | [phone] |
+-----------------+--------------+---------+-----------------+
| Magdalena Valdovinos | ECON         | Unknown | [phone] |
+-----------------+--------------+---------+-----------------+
 
 
 
 Care Team Providers
 
 
 
+------------------------+------+-----------------+
| Care Team Member Name  | Role | Phone           |
+------------------------+------+-----------------+
| Lily Horton MD | PCP  | [phone] |
+------------------------+------+-----------------+
 
 
 
 Reason for Visit
 
 
+-------------------+-------------------+
| Reason            | Comments          |
+-------------------+-------------------+
| Care Coordination | lost to follow-up |
+-------------------+-------------------+
 
 
 
 Encounter Details
 
 
+--------+-------------+----------------------+----------------------+---------------------+
| Date   | Type        | Department           | Care Team            | Description         |
+--------+-------------+----------------------+----------------------+---------------------+
| / | Documentati |   Pediatric          |   Neema Khalil,   | Care Coordination   |
| 2017   | on          | Gastroenterology at  | MD  [address] | (lost to follow-up) |
|        |             | [facility]          |   [place], [place]       |                     |
|        |             | [facility]  | [address]           |                     |
|        |             |  [address] | [phone]         |                     |
|        |             |  [address]  Mailcode:  | [phone] (Fax)   |                     |
|        |             | [address]   |                      |                     |
|        |             | [place], [place]         |                      |                     |
|        |             | [address]           |                      |                     |
|        |             | [phone]         |                      |                     |
+--------+-------------+----------------------+----------------------+---------------------+
 
 
 
 Social History
 
 
+-------------------+-------+-----------+--------+------+
| Tobacco Use       | Types | Packs/Day | Years  | Date |
|                   |       |           | Used   |      |
+-------------------+-------+-----------+--------+------+
| Passive Smoke     |       |           |        |      |
| Exposure - Never  |       |           |        |      |
| Smoker            |       |           |        |      |
+-------------------+-------+-----------+--------+------+
 
 
 
+---------------------+---+---+---+
| Smokeless Tobacco:  |   |   |   |
| Never Used          |   |   |   |
+---------------------+---+---+---+
 
 
 
 
+-------------+-------------+---------+----------+
| Alcohol Use | Drinks/Week | oz/Week | Comments |
+-------------+-------------+---------+----------+
| Not Asked   |             |         |          |
+-------------+-------------+---------+----------+
 
 
 
+------------------+---------------+
| Sex Assigned at  | Date Recorded |
| Birth            |               |
+------------------+---------------+
| Not on file      |               |
+------------------+---------------+
 
 
 
+----------------+-------------+-------------+
| Job Start Date | Occupation  | Industry    |
+----------------+-------------+-------------+
| Not on file    | Not on file | Not on file |
+----------------+-------------+-------------+
 
 
 
+----------------+--------------+------------+
| Travel History | Travel Start | Travel End |
+----------------+--------------+------------+
 
 
 
+-------------------------------------+
| No recent travel history available. |
+-------------------------------------+
 documented as of this encounter
 
 Plan of Treatment
 Not on filedocumented as of this encounter
 
 Visit Diagnoses
 Not on filedocumented in this encounter

## 2019-11-01 NOTE — XMS
Encounter Summary
  Created on: 2019
 
 Bonifacio Nila JB
 External Reference #: [id number]
 : [birth year]
 Sex: Female
 
 Demographics
 
 
+-----------------------+------------------------+
| Address               | [address]            |
|                       | [place]  [address]   |
+-----------------------+------------------------+
| Home Phone            | [phone]        |
+-----------------------+------------------------+
| Preferred Language    | Unknown                |
+-----------------------+------------------------+
| Marital Status        | Single                 |
+-----------------------+------------------------+
| Buddhist Affiliation | Unknown                |
+-----------------------+------------------------+
| Race                  | White                  |
+-----------------------+------------------------+
| Ethnic Group          | Not  or  |
+-----------------------+------------------------+
 
 
 Author
 
 
+--------------+-------------+
| Organization | Unknown     |
+--------------+-------------+
| Address      | Unknown     |
+--------------+-------------+
| Phone        | Unavailable |
+--------------+-------------+
 
 
 
 Support
 
 
+-----------------+--------------+---------+-----------------+
| Name            | Relationship | Address | Phone           |
+-----------------+--------------+---------+-----------------+
| Kit Valdovinos   | ECON         | Unknown | [phone] |
+-----------------+--------------+---------+-----------------+
| Magdalena Valdovinos | ECON         | Unknown | [phone] |
+-----------------+--------------+---------+-----------------+
 
 
 
 Care Team Providers
 
 
 
+-----------------------+------+-------------+
| Care Team Member Name | Role | Phone       |
+-----------------------+------+-------------+
 PCP  | Unavailable |
+-----------------------+------+-------------+
 
 
 
 Encounter Details
 
 
+--------+-------------+------------+--------------------+-------------+
| Date   | Type        | Department | Care Team          | Description |
+--------+-------------+------------+--------------------+-------------+
| 10/18/ | Transcribed |            |   Dictation, Other | Transcribed |
| 2001   |             |            |                    |             |
+--------+-------------+------------+--------------------+-------------+
 
 
 
 Social History
 
 
+----------------+-------+-----------+--------+------+
| Tobacco Use    | Types | Packs/Day | Years  | Date |
|                |       |           | Used   |      |
+----------------+-------+-----------+--------+------+
| Never Assessed |       |           |        |      |
+----------------+-------+-----------+--------+------+
 
 
 
+------------------+---------------+
| Sex Assigned at  | Date Recorded |
| Birth            |               |
+------------------+---------------+
| Not on file      |               |
+------------------+---------------+
 
 
 
+----------------+-------------+-------------+
| Job Start Date | Occupation  | Industry    |
+----------------+-------------+-------------+
| Not on file    | Not on file | Not on file |
+----------------+-------------+-------------+
 
 
 
+----------------+--------------+------------+
| Travel History | Travel Start | Travel End |
+----------------+--------------+------------+
 
 
 
+-------------------------------------+
| No recent travel history available. |
+-------------------------------------+
 documented as of this encounter
 
 
 Progress Notes
 Interface, Transcription In - 10/02/2006  3:01 AM [facility]                                    [place] [address], [place] [address] [phone] or [phone]
 
 2001
 
 TON BELLO M.D.
 [address] [place]  [address]
 
 RE:   NILA VALDOVINOS
 MR #: [id number]
 DATE OF TRANSPLANT:     2000
 
 Dear Dr. Bello:
 
 Nila returned to the Pediatric Gastroenterology  Clinic at [facility] today with
 her parents.  It was attended by Dr. Taiwo Hyatt  and Shaylee Louise R.N. of the Pediatric Liver Transplant  Team  visiting from [place].  This
 is one of her summer annual visits.  She continues to do extremely well and
 appears to have really nothing to complain of today.
 
 Her  only medication is Prograf 3 mL  b.i.d.   Acyclovir  was  discontinued
 earlier this year.  SHE IS ALLERGIC TO THE SULFA DRUGS.
 
 On examination, she weighs 14.8 kg and  measures 91 cm.  Her blood pressure
 is 103/48 and her pulse is 117.  She is anicteric and quite well nourished.
 Her behavior is normal in the office.   There  is  no  cervical adenopathy.
 She has unlabored respirations.  Her abdomen is soft and flat with no mass,
 tenderness, or organomegaly.  She has well-healed surgical scars.
 
 A  recent  laboratory  test  on  October   15,   2001  showed  normal  CBC,
 comprehensive metabolic panel, GGT, and  magnesium.  Her tacrolimus 12-hour
 trough level was 7.6.  Prior to this it ran about 4.
 
 In summary, Nila is a 51-imced-rfh white  female  who  is 14 months status
 post liver transplantation for extrahepatic  biliary  atresia  at [place].
 She  continues to do extremely well with  an  uncomplicated  posttransplant
 course.  Her Prograf level was able to  be  lowered  over the last 6 months
 without difficulty.  It is unclear why  her  recent  level  of 7.6 occurred
 without a good explanation.  We would  merely repeat the Prograf level next
 month when she has her summer annual  EBV,  serology,  and  PCR level done.
 She  is  having  these  done every 6 months,  in  May  and  2001.
 Otherwise, since she is doing so well  Dr. Hyatt  believes she can have
 her labs drawn every other month instead of every month.
 We would like to have her Varicella titers  rechecked  soon.   In addition,
 she needs a flu shot once a year henceforth.
 
 We will plan to see her again in this clinic in 6 months.
 
 Sincerely,
 
 Eagle De Luna M.D.
 Pediatric Gastroenterology
 
 [facility] / 
 
 [id number] / [id number] / [id number] /
 D: 10/18/2001
 T: 10/19/2001
 C: 2001 ds
 
 cc:
 
 LETTY AMAYA M.D.
 [address] [facility] [address]
 [place]  [address]
 
 ADRIANA FLANNERY M.D.
 [address] [facility] [address]
 [place]  [address]
 
 TAIWO HYATT M.D.
 [address] [facility]
 [place]  [address]
 
 [id number] signed by Interface, Transcription In at 10/02/2006  3:01 AM [facility]
umented in this encounter
 
 Plan of Treatment
 Not on filedocumented as of this encounter
 
 Visit Diagnoses
 Not on filedocumented in this encounter

## 2019-11-01 NOTE — XMS
Encounter Summary
  Created on: 2019
 
 Bonifacio Nila JB
 External Reference #: [id number]
 : [birth year]
 Sex: Female
 
 Demographics
 
 
+-----------------------+------------------------+
| Address               | [address]            |
|                       | [place]  [address]   |
+-----------------------+------------------------+
| Home Phone            | [phone]        |
+-----------------------+------------------------+
| Preferred Language    | Unknown                |
+-----------------------+------------------------+
| Marital Status        | Single                 |
+-----------------------+------------------------+
| Restorationism Affiliation | Unknown                |
+-----------------------+------------------------+
| Race                  | White                  |
+-----------------------+------------------------+
| Ethnic Group          | Not  or  |
+-----------------------+------------------------+
 
 
 Author
 
 
+--------------+------------------------------+
| Author       | [facility] [employer] [facility] |
+--------------+------------------------------+
| Organization | [facility] |
+--------------+------------------------------+
| Address      | Unknown                      |
+--------------+------------------------------+
| Phone        | Unavailable                  |
+--------------+------------------------------+
 
 
 
 Support
 
 
+-----------------+--------------+---------+-----------------+
| Name            | Relationship | Address | Phone           |
+-----------------+--------------+---------+-----------------+
| Kit Valdovinos   | ECON         | Unknown | [phone] |
+-----------------+--------------+---------+-----------------+
| Magdalena Valdovinos | ECON         | Unknown | [phone] |
+-----------------+--------------+---------+-----------------+
 
 
 
 Care Team Providers
 
 
 
+-----------------------+------+-----------------+
| Care Team Member Name | Role | Phone           |
+-----------------------+------+-----------------+
| Lily Horton MD   | PCP  | [phone] |
+-----------------------+------+-----------------+
 
 
 
 Encounter Details
 
 
+--------+----------+----------------------+--------------------+-------------+
| Date   | Type     | Department           | Care Team          | Description |
+--------+----------+----------------------+--------------------+-------------+
| / | Abstract |   Pediatric          |   Ivis Burkett MD |             |
|    |          | Gastroenterology at  |                    |             |
|        |          | [facility]          |                    |             |
|        |          | [facility]  |                    |             |
|        |          |  [address] |                    |             |
|        |          |  [address]  Mailcode:  |                    |             |
|        |          | [address]   |                    |             |
|        |          | [place], [place]         |                    |             |
|        |          | [address]           |                    |             |
|        |          | [phone]         |                    |             |
+--------+----------+----------------------+--------------------+-------------+
 
 
 
 Social History
 
 
+----------------+-------+-----------+--------+------+
| Tobacco Use    | Types | Packs/Day | Years  | Date |
|                |       |           | Used   |      |
+----------------+-------+-----------+--------+------+
| Never Assessed |       |           |        |      |
+----------------+-------+-----------+--------+------+
 
 
 
+------------------+---------------+
| Sex Assigned at  | Date Recorded |
| Birth            |               |
+------------------+---------------+
| Not on file      |               |
+------------------+---------------+
 
 
 
+----------------+-------------+-------------+
| Job Start Date | Occupation  | Industry    |
+----------------+-------------+-------------+
| Not on file    | Not on file | Not on file |
+----------------+-------------+-------------+
 
 
 
+----------------+--------------+------------+
 
| Travel History | Travel Start | Travel End |
+----------------+--------------+------------+
 
 
 
+-------------------------------------+
| No recent travel history available. |
+-------------------------------------+
 documented as of this encounter
 
 Plan of Treatment
 Not on filedocumented as of this encounter
 
 Visit Diagnoses
 Not on filedocumented in this encounter

## 2019-11-01 NOTE — XMS
Encounter Summary
  Created on: 2019
 
 Bonifacio Nila JB
 External Reference #: [id number]
 : [birth year]
 Sex: Female
 
 Demographics
 
 
+-----------------------+------------------------+
| Address               | [address]            |
|                       | [place]  [address]   |
+-----------------------+------------------------+
| Home Phone            | [phone]        |
+-----------------------+------------------------+
| Preferred Language    | Unknown                |
+-----------------------+------------------------+
| Marital Status        | Single                 |
+-----------------------+------------------------+
| Shinto Affiliation | Unknown                |
+-----------------------+------------------------+
| Race                  | White                  |
+-----------------------+------------------------+
| Ethnic Group          | Not  or  |
+-----------------------+------------------------+
 
 
 Author
 
 
+--------------+------------------------------+
| Author       | [facility] |
+--------------+------------------------------+
| Organization | [facility] |
+--------------+------------------------------+
| Address      | Unknown                      |
+--------------+------------------------------+
| Phone        | Unavailable                  |
+--------------+------------------------------+
 
 
 
 Support
 
 
+-----------------+--------------+---------+-----------------+
| Name            | Relationship | Address | Phone           |
+-----------------+--------------+---------+-----------------+
| Kit Valdovinos   | ECON         | Unknown | [phone] |
+-----------------+--------------+---------+-----------------+
| Magdalena Valdovinos | ECON         | Unknown | [phone] |
+-----------------+--------------+---------+-----------------+
 
 
 
 Care Team Providers
 
 
 
+------------------------+------+-----------------+
| Care Team Member Name  | Role | Phone           |
+------------------------+------+-----------------+
| Lily Horton MD | PCP  | [phone] |
+------------------------+------+-----------------+
 
 
 
 Reason for Visit
 
 
+-------------------+----------+
| Reason            | Comments |
+-------------------+----------+
| Follow-up in      |          |
| outpatient clinic |          |
+-------------------+----------+
 Office Visit - E/M Services (Routine)
 
+--------+--------+---------------+--------------+--------------+---------------+
| Status | Reason | Specialty     | Diagnoses /  | Referred By  | Referred To   |
|        |        |               | Procedures   | Contact      | Contact       |
+--------+--------+---------------+--------------+--------------+---------------+
| Closed |        | Pediatric     |              |   Sol,    |   Ped Gastro  |
|        |        | Gastroenterol |              | Lily Lakhani, | [facility]  [address]  |
|        |        | [address]           |              |  [address]    | [address]   |
|        |        |               |              | SPECIALISTS  | [address]       |
|        |        |               |              | OF [place] | Mailcode:     |
|        |        |               |              |   [address]    | [address]         |
|        |        |               |              | [address]  | [address]   |
|        |        |               |              |  [place],  | [place], [place]  |
|        |        |               |              | [place] [address]     | [address]    |
|        |        |               |              | Phone:       | Phone:        |
|        |        |               |              | [phone] | [phone]  |
|        |        |               |              |   Fax:       |  Fax:         |
|        |        |               |              | [phone] | [phone]  |
+--------+--------+---------------+--------------+--------------+---------------+
 
 
 
 
 Encounter Details
 
 
+--------+---------+----------------------+----------------------+----------------------+
| Date   | Type    | Department           | Care Team            | Description          |
+--------+---------+----------------------+----------------------+----------------------+
| / | Office  |   Pediatric          |   Neema Khalil,   | Polysplenia (Primary |
| 2015   | Visit   | Gastroenterology at  | MD  [address] |  Dx); Transplanted   |
|        |         | [place]          |   [place], [place]       | liver (HCC);         |
|        |         | [facility]  | [address]           | Interrupted inferior |
|        |         |  [address] [facility] | [phone]         |  vena cava; Aortic   |
|        |         |  [address]  Mailcode:  | [phone] (Fax)   | insufficiency;       |
|        |         | [facility]  [address]   |                      | Aortic root          |
|        |         | [place], [place]         |                      | dilatation (HCC)     |
|        |         | [address]           |                      |                      |
|        |         | [phone]         |                      |                      |
+--------+---------+----------------------+----------------------+----------------------+
 
 
 
 
 Social History
 
 
+-------------------+-------+-----------+--------+------+
| Tobacco Use       | Types | Packs/Day | Years  | Date |
|                   |       |           | Used   |      |
+-------------------+-------+-----------+--------+------+
| Passive Smoke     |       |           |        |      |
| Exposure - Never  |       |           |        |      |
| Smoker            |       |           |        |      |
+-------------------+-------+-----------+--------+------+
 
 
 
+---------------------+---+---+---+
| Smokeless Tobacco:  |   |   |   |
| Never Used          |   |   |   |
+---------------------+---+---+---+
 
 
 
+-------------+-------------+---------+----------+
| Alcohol Use | Drinks/Week | oz/Week | Comments |
+-------------+-------------+---------+----------+
| Not Asked   |             |         |          |
+-------------+-------------+---------+----------+
 
 
 
+------------------+---------------+
| Sex Assigned at  | Date Recorded |
| Birth            |               |
+------------------+---------------+
| Not on file      |               |
+------------------+---------------+
 
 
 
+----------------+-------------+-------------+
| Job Start Date | Occupation  | Industry    |
+----------------+-------------+-------------+
| Not on file    | Not on file | Not on file |
+----------------+-------------+-------------+
 
 
 
+----------------+--------------+------------+
| Travel History | Travel Start | Travel End |
+----------------+--------------+------------+
 
 
 
+-------------------------------------+
| No recent travel history available. |
+-------------------------------------+
 documented as of this encounter
 
 
 Last Filed Vital Signs
 
 
+-------------------+----------------------+----------------------+----------+
| Vital Sign        | Reading              | Time Taken           | Comments |
+-------------------+----------------------+----------------------+----------+
| Blood Pressure    | 138/96               | 2015  9:13 AM  |          |
|                   |                      | PDT                  |          |
+-------------------+----------------------+----------------------+----------+
| Pulse             | 72                   | 2015  9:13 AM  |          |
|                   |                      | PDT                  |          |
+-------------------+----------------------+----------------------+----------+
| Temperature       | -                    | -                    |          |
+-------------------+----------------------+----------------------+----------+
| Respiratory Rate  | -                    | -                    |          |
+-------------------+----------------------+----------------------+----------+
| Oxygen Saturation | -                    | -                    |          |
+-------------------+----------------------+----------------------+----------+
| Inhaled Oxygen    | -                    | -                    |          |
| Concentration     |                      |                      |          |
+-------------------+----------------------+----------------------+----------+
| Weight            | 72.6 kg (160 lb 0.9  | 2015  9:13 AM  |          |
|                   | oz)                  | PDT                  |          |
+-------------------+----------------------+----------------------+----------+
| Height            | 164.8 cm (5' 4.88")  | 2015  9:13 AM  |          |
|                   |                      | PDT                  |          |
+-------------------+----------------------+----------------------+----------+
| Body Mass Index   | 26.73                | 2015  9:13 AM  |          |
|                   |                      | PDT                  |          |
+-------------------+----------------------+----------------------+----------+
 documented in this encounter
 
 Progress Notes
 Neema Khalil MD - 2015  2:07 AM PDTFormatting of this note might be different fro
m the original.
 Primary Care Provider: Lily Horton MD
 
 [facility] 
 DOS: 2015
 Visit type: Follow-up 
 
 Patient accompanied by: mother
  used: no
 
 CC: 
 - Post-liver transplant care 
 - High risk medication management
 
 Patient Active Problem List 
 Diagnosis 
   Transplanted Liver 
   VSD (ventricular septal defect), perimembranous 
   Polysplenia 
   Interrupted inferior vena cava 
   Aortic insufficiency 
   Aortic root dilatation 
 
  PAST Hx/kim: Biliary atresia
 -  s/p OLT at [facility]. She was last seen by Dr Burkett in . 
In , it was noticed that she has not been seen, attempts to reach to family was unsucces
 
sful. lost to follow up since that time. 
 - Cardiology: last cardiology note is from  from Dr Lily Horton, cardiologist at [facility]. Her cardiac dx includes 1) moderate perimembraneous VSD nearly occluded by aneurysmal
 tissue leaving a tiny VSD, 2) trace central aortic insufficiency, 3) Left atrial isomerism 
(polysplenia). It was recommended her annual follow up for progression of aortic insufficien
cy in which case she may need closure of VSD. No further notes available in our system as julio c minor is lost to follow up since .
 - 10/16/2014 s/p VSD repair, 8-mm Amplatzer Vascular Plug IV with no significant residual s
hunting noted.
 
 INTERVAL HISTORY:
 - not taking prograf since 2014
 - no complaints,no abdominal pain, emesis, diarrhea or constipation
 
 REVIEW OF SYSTEMS: 
 General:  No fever, fatigue, or weight loss.  
 Eyes:  No changes in vision, no eye irritation or discharge.  
 ENT:  No nosebleeds, nasal congestion, difficulty swallowing, no mouth sores. 
 Respiratory:  No shortness of breath, cough, wheezing.
 Cardiovascular:  No difficulty breathing, edema, cyanosis.  
 Genitourinary:  No urinary infections.  
 Neurologic:  No seizures. No headaches.  
 Musculoskeletal:  No joint pain, swelling. No back pain.  Full range of motion.
 Skin:  No itching, rash, jaundice.  
 Psychological:  No abnormal anxiety, depression. Attending school regularly.
 Heme: No anemia, abnormal bleeding, easy bruising
 Lymphatic: No enlarged lymph nodes.
 Metabolic/Endo: no glucose intolerance, hypothyroidism
 Allergy/immunology: no seasonal or food allergies, no asthma
 
 All other ROS are negative.
 
  
 Past Medical History 
 Diagnosis Date 
   Biliary atresia  
 
 Past Surgical History 
 Procedure Laterality Date 
   Liver transplant  2000 
   at [facility] 
   Vsd repair, amplatzer device  10/16/2014 
   8-mm Amplatzer Vascular Plug IV, no significant residual shunting 
 
 FHx: reviewed, unchanged
 
 SHx: reviewed, unchanged
 
 Allergies 
 Allergen Reactions 
   Sulfa (Sulfonamide Antibiotics) Hives and Swelling-Facial 
   Varicella Vaccines  
   Possible reaction with sulfa meds 
 
 PHYSICAL EXAMINATION: 
 
 Patient reports a pain level of  0 today. No action required.
 
 Ht 1.648 m (5' 4.88") (62 %*, Z = 0.32), Wt 72.6 kg (160 lb 0.9 oz) (91 %*, Z = 1.36), BP 1
38/96, Pulse 72, BMI 26.73 kg/(m^2).
 
 
 APPEARANCE: alert, active and in no apparent distress. 
 SKIN: no jaundice or rashes. 
 HEENT: normocephalic, PERRLA, mucous membranes moist. 
 NECK: supple, without thyromegaly. 
 CHEST: clear to auscultation bilaterally. 
 CV: no murmurs. 
 ABDOMEN: soft, NTND, no hepatosplenomegaly. 
 BACK: without tenderness. 
 MUSCULOSKELETAL: no muscle wasting, no deformity.
 EXTREMITIES: No edema, clubbing, deformity.
 NEURO: grossly intact
 
  
 ASSESSMENT: 14 yo female with
 
 759.0   Polysplenia
 V42.7   Transplanted liver
 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
 424.1   Aortic insufficiency
 447.71  Aortic root dilatation
 
 Nila is 15 yrs out from her transplant, she continues not to take her prograf. She has not
 shown any signs of rejection. Will continue to monitor her labs and will check an US w dopp
ler to evaluate for liver fibrosis.
 
 PLAN/RECOMMENDATIONS:  
 - check liver US w doppler
 - Labs: CBC w diff, CMP, GGT: every 3 months
 - follow up in liver transplant clinic: 2016, will check transient elastography at [facility]
t time
 
 Health Care Maintenance: 
 - immunizations, she can receive liver vaccines as she is not on immunosuppressive meds.
 - Flu vaccine (intramuscular): every . 
 - Dental care every 6 months
 
 At this visit: 
 Dr. Jorge Aguirre and DHARMESH Whatley from [facility] were present
 as observers during this visit.
 Psychosocial or economic issues that may affect patient's medical care or well being:none 
 Co-morbid chronic medical problems that may affect future procedural sedation risk: NA
 
 Labs/imaging:
 
 Component
     Latest Ref Rng 2015 
 GLUCOSE, PLASMA 
     70 - 100 mg/dL 79 
 ANION GAP
     7 - 21 16.8 
 CREATININE PLASMA 
     0.60 - 1.20 mg/dL 0.66 
 TOTAL PROTEIN
     6.1 - 8.5 g/dL 7.1 
 ALBUMIN, PLASMA
     3.5 - 5.0 g/dL 4.0 
 CALCIUM, PLASMA
     8.4 - 10.2 mg/dL 9.4 
 BILIRUBIN TOTAL
 
     0.0 - 1.2  0.2 
 ALK PHOS
     30 - 128 U/L 88 
 AST(SGOT)
     13 - 39 U/L 20 
 SODIUM, PLASMA 
     132 - 143 mmol/L 137 
 POTASSIUM
     3.6 - 5.1 mmol/L 3.8 
 CHLORIDE, PLASMA 
     95 - 112 mmol/L 102 
 TOTAL CO2
     19 - 31 mmol/L 22 
 ALT (SGPT)
     7 - 52 U/L 10 
 BUN/CREATININE RATIO   6.0 - 28.6 21.2 
 UREA NITROGEN, SERUM   6 - 23 mg/dL 14 
 WHITE CELL COUNT
     4.5 - 11.0 K/cu mm 8.3 
 RED CELL COUNT
     3.8 - 5.1 M/cu mm 4.55 
 HEMOGLOBIN
     12.0 - 16.0 g/dL 13.5 
 HEMATOCRIT
     35 - 45 % 40.2 
 MCV    81 - 99 fL 88.3 
 PLATELET COUNT
     140 - 440 K/cu mm 313 
 CHOLESTEROL  
     0 - 200 mg/dL 123 
 TRIGLYCERIDES
     30 - 150 mg/dL 106 
 LDL CHOLEST
     100 - 215 mg/dL 140 
 GAMMA GLUTAMYL TRANSFERASE
     5 - 60 u/l 7 
 MAGNESIUM,PLASMA
     1.7 - 2.5 mg/dL 1.8 
 
 Neema Khalil MD
 Pediatric Gastroenterology
 Consult line: [phone] 
 
 Electronically signed by Neema Khalil MD at 2015  2:23 AM PDTdocumented in this en
counter
 
 Plan of Treatment
 Not on filedocumented as of this encounter
 
 Visit Diagnoses
 
 
+-----------------------------------------------------------------------------+
| Diagnosis                                                                   |
+-----------------------------------------------------------------------------+
|   Polysplenia - Primary  Congenital anomalies of spleen                     |
+-----------------------------------------------------------------------------+
|   Transplanted liver (HCC)  Liver replaced by transplant                    |
+-----------------------------------------------------------------------------+
|   Interrupted inferior vena cava  Other congenital anomalies of great veins |
 
+-----------------------------------------------------------------------------+
|   Aortic insufficiency  Aortic valve disorders                              |
+-----------------------------------------------------------------------------+
|   Aortic root dilatation (HCC)  Thoracic aortic ectasia                     |
+-----------------------------------------------------------------------------+
 documented in this encounter

## 2019-11-01 NOTE — XMS
Encounter Summary
  Created on: 2019
 
 Bonifacio Nila JB
 External Reference #: [id number]
 : [birth year]
 Sex: Female
 
 Demographics
 
 
+-----------------------+------------------------+
| Address               | [address]            |
|                       | [place]  [address]   |
+-----------------------+------------------------+
| Home Phone            | [phone]        |
+-----------------------+------------------------+
| Preferred Language    | Unknown                |
+-----------------------+------------------------+
| Marital Status        | Single                 |
+-----------------------+------------------------+
| Sikhism Affiliation | Unknown                |
+-----------------------+------------------------+
| Race                  | White                  |
+-----------------------+------------------------+
| Ethnic Group          | Not  or  |
+-----------------------+------------------------+
 
 
 Author
 
 
+--------------+------------------------------+
| Author       | [facility] |
+--------------+------------------------------+
| Organization | [facility] |
+--------------+------------------------------+
| Address      | Unknown                      |
+--------------+------------------------------+
| Phone        | Unavailable                  |
+--------------+------------------------------+
 
 
 
 Support
 
 
+-----------------+--------------+---------+-----------------+
| Name            | Relationship | Address | Phone           |
+-----------------+--------------+---------+-----------------+
| Kit Valdovinos   | ECON         | Unknown | [phone] |
+-----------------+--------------+---------+-----------------+
| Magdalena Valdovinos | ECON         | Unknown | [phone] |
+-----------------+--------------+---------+-----------------+
 
 
 
 Care Team Providers
 
 
 
+-----------------------+------+-------------+
| Care Team Member Name | Role | Phone       |
+-----------------------+------+-------------+
 PCP  | Unavailable |
+-----------------------+------+-------------+
 
 
 
 Encounter Details
 
 
+--------+----------+----------------------+----------------------+-------------+
| Date   | Type     | Department           | Care Team            | Description |
+--------+----------+----------------------+----------------------+-------------+
| / | Abstract |   Pediatric          |   Neema Khalil,   |             |
|    |          | Gastroenterology at  | [address] |             |
|        |          | [place]          |   [place], [place]       |             |
|        |          | [facility]  | [address]           |             |
|        |          |  [address] | [phone]         |             |
|        |          |  [address]  Mailcode:  | [phone] (Fax)   |             |
|        |          | [facility]  [place]   |                      |             |
|        |          | [place], [place]         |                      |             |
|        |          | [address]           |                      |             |
|        |          | [phone]         |                      |             |
+--------+----------+----------------------+----------------------+-------------+
 
 
 
 Social History
 
 
+----------------+-------+-----------+--------+------+
| Tobacco Use    | Types | Packs/Day | Years  | Date |
|                |       |           | Used   |      |
+----------------+-------+-----------+--------+------+
| Never Assessed |       |           |        |      |
+----------------+-------+-----------+--------+------+
 
 
 
+------------------+---------------+
| Sex Assigned at  | Date Recorded |
| Birth            |               |
+------------------+---------------+
| Not on file      |               |
+------------------+---------------+
 
 
 
+----------------+-------------+-------------+
| Job Start Date | Occupation  | Industry    |
+----------------+-------------+-------------+
| Not on file    | Not on file | Not on file |
+----------------+-------------+-------------+
 
 
 
+----------------+--------------+------------+
 
| Travel History | Travel Start | Travel End |
+----------------+--------------+------------+
 
 
 
+-------------------------------------+
| No recent travel history available. |
+-------------------------------------+
 documented as of this encounter
 
 Plan of Treatment
 Not on filedocumented as of this encounter
 
 Visit Diagnoses
 Not on filedocumented in this encounter

## 2019-11-01 NOTE — XMS
Encounter Summary
  Created on: 2019
 
 Bonifacio Nila JB
 External Reference #: [id number]
 : [birth year]
 Sex: Female
 
 Demographics
 
 
+-----------------------+------------------------+
| Address               | [address]            |
|                       | [place]  [address]   |
+-----------------------+------------------------+
| Home Phone            | [phone]        |
+-----------------------+------------------------+
| Preferred Language    | Unknown                |
+-----------------------+------------------------+
| Marital Status        | Single                 |
+-----------------------+------------------------+
| Nondenominational Affiliation | Unknown                |
+-----------------------+------------------------+
| Race                  | White                  |
+-----------------------+------------------------+
| Ethnic Group          | Not  or  |
+-----------------------+------------------------+
 
 
 Author
 
 
+--------------+------------------------------+
| Author       | [facility] |
+--------------+------------------------------+
| Organization | [facility] |
+--------------+------------------------------+
| Address      | Unknown                      |
+--------------+------------------------------+
| Phone        | Unavailable                  |
+--------------+------------------------------+
 
 
 
 Support
 
 
+-----------------+--------------+---------+-----------------+
| Name            | Relationship | Address | Phone           |
+-----------------+--------------+---------+-----------------+
| Kit Valdovinos   | ECON         | Unknown | [phone] |
+-----------------+--------------+---------+-----------------+
| Magdalena Valdovinos | ECON         | Unknown | [phone] |
+-----------------+--------------+---------+-----------------+
 
 
 
 Care Team Providers
 
 
 
+-----------------------+------+-----------------+
| Care Team Member Name | Role | Phone           |
+-----------------------+------+-----------------+
| Lily Horton MD   | PCP  | [phone] |
+-----------------------+------+-----------------+
 
 
 
 Encounter Details
 
 
+--------+-------------+----------------------+---------------------+----------------------+
| Date   | Type        | Department           | Care Team           | Description          |
+--------+-------------+----------------------+---------------------+----------------------+
| / |  |   Pediatric          |   Monserrat Spann,  | Complications of     |
|    |             | Gastroenterology at  | RN  [address]     | Transplanted Liver;  |
|        |             | [facility]          | [address]     | Transplanted Liver   |
|        |             | [facility]  | [place] [address]  | (HCC)                |
|        |             |  [address] |                     |                      |
|        |             |  Aide [address]  Mailcode:  |                     |                      |
|        |             | [facility]  [facility]   |                     |                      |
|        |             | [place], [place]         |                     |                      |
|        |             | [address]           |                     |                      |
|        |             | [phone]         |                     |                      |
+--------+-------------+----------------------+---------------------+----------------------+
 
 
 
 Social History
 
 
+----------------+-------+-----------+--------+------+
| Tobacco Use    | Types | Packs/Day | Years  | Date |
|                |       |           | Used   |      |
+----------------+-------+-----------+--------+------+
| Never Assessed |       |           |        |      |
+----------------+-------+-----------+--------+------+
 
 
 
+------------------+---------------+
| Sex Assigned at  | Date Recorded |
| Birth            |               |
+------------------+---------------+
| Not on file      |               |
+------------------+---------------+
 
 
 
+----------------+-------------+-------------+
| Job Start Date | Occupation  | Industry    |
+----------------+-------------+-------------+
| Not on file    | Not on file | Not on file |
+----------------+-------------+-------------+
 
 
 
+----------------+--------------+------------+
 
| Travel History | Travel Start | Travel End |
+----------------+--------------+------------+
 
 
 
+-------------------------------------+
| No recent travel history available. |
+-------------------------------------+
 documented as of this encounter
 
 Plan of Treatment
 
 
+----------------------+------+--------+----------------------+---------------------+
| Name                 | Type | Priori | Associated Diagnoses | Order Schedule      |
|                      |      | ty     |                      |                     |
+----------------------+------+--------+----------------------+---------------------+
| LDH TOTAL, PLASMA    | Lab  | Routin |   Complications of   | Ordered: 2009 |
|                      |      | e      | Transplanted Liver   |                     |
|                      |      |        | Transplanted Liver   |                     |
|                      |      |        | (HCC)                |                     |
+----------------------+------+--------+----------------------+---------------------+
| CMV PCR              | Lab  | Routin |   Complications of   | Ordered: 2009 |
| QUANTITATION, PLASMA |      | e      | Transplanted Liver   |                     |
|                      |      |        | Transplanted Liver   |                     |
|                      |      |        | (HCC)                |                     |
+----------------------+------+--------+----------------------+---------------------+
 documented as of this encounter
 
 Procedures
 
 
+----------------------+--------+-------------+----------------------+----------------------
+
| Procedure Name       | Priori | Date/Time   | Associated Diagnosis | Comments             
|
|                      | ty     |             |                      |                      
|
+----------------------+--------+-------------+----------------------+----------------------
+
| CHOLESTEROL TOTAL,   | Routin | 2009  |   Complications of   |   Results for this   
|
| PLASMA               | e      |  3:26 PM    | Transplanted Liver   | procedure are in the 
|
|                      |        | PST         | Transplanted Liver   |  results section.    
|
|                      |        |             | (HCC)                |                      
|
+----------------------+--------+-------------+----------------------+----------------------
+
| COMPLETE METABOLIC   | Routin | 2009  |   Complications of   |   Results for this   
|
| SET                  | e      |  3:20 PM    | Transplanted Liver   | procedure are in the 
|
| (NA,K,CL,CO2,BUN,CRE |        | PST         | Transplanted Liver   |  results section.    
|
| AT,GLUC,CA,AST,ALT,B |        |             | (HCC)                |                      
|
| ASHWINI TOTAL,ALK        |        |             |                      |                      
|
 
| PHOS,ALB,PROT TOTAL) |        |             |                      |                      
|
+----------------------+--------+-------------+----------------------+----------------------
+
| TACROLIMUS, WHOLE    | Routin | 2009  |   Complications of   |   Results for this   
|
| BLOOD                | e      |  3:20 PM    | Transplanted Liver   | procedure are in the 
|
|                      |        | PST         | Transplanted Liver   |  results section.    
|
|                      |        |             | (HCC)                |                      
|
+----------------------+--------+-------------+----------------------+----------------------
+
| PHOSPHORUS, PLASMA   | Routin | 2009  |   Complications of   |   Results for this   
|
|                      | e      |  3:20 PM    | Transplanted Liver   | procedure are in the 
|
|                      |        | PST         | Transplanted Liver   |  results section.    
|
|                      |        |             | (HCC)                |                      
|
+----------------------+--------+-------------+----------------------+----------------------
+
| GGT, PLASMA          | Routin | 2009  |   Complications of   |   Results for this   
|
|                      | e      |  3:20 PM    | Transplanted Liver   | procedure are in the 
|
|                      |        | PST         | Transplanted Liver   |  results section.    
|
|                      |        |             | (HCC)                |                      
|
+----------------------+--------+-------------+----------------------+----------------------
+
| BILIRUBIN DIRECT     | Routin | 2009  |   Complications of   |   Results for this   
|
|                      | e      |  3:20 PM    | Transplanted Liver   | procedure are in the 
|
|                      |        | PST         | Transplanted Liver   |  results section.    
|
|                      |        |             | (HCC)                |                      
|
+----------------------+--------+-------------+----------------------+----------------------
+
| URIC ACID, PLASMA    | Routin | 2009  |   Complications of   |   Results for this   
|
|                      | e      |  3:20 PM    | Transplanted Liver   | procedure are in the 
|
|                      |        | PST         | Transplanted Liver   |  results section.    
|
|                      |        |             | (HCC)                |                      
|
+----------------------+--------+-------------+----------------------+----------------------
+
| MAGNESIUM, PLASMA    | Routin | 2009  |   Complications of   |   Results for this   
|
|                      | e      |  3:20 PM    | Transplanted Liver   | procedure are in the 
|
|                      |        | PST         | Transplanted Liver   |  results section.    
|
 
|                      |        |             | (HCC)                |                      
|
+----------------------+--------+-------------+----------------------+----------------------
+
| FRANCIS-BARR VIRUS   | Routin | 2009  |   Complications of   |   Results for this   
|
| PCR, PLASMA          | e      |             | Transplanted Liver   | procedure are in the 
|
|                      |        |             | Transplanted Liver   |  results section.    
|
|                      |        |             | (HCC)                |                      
|
+----------------------+--------+-------------+----------------------+----------------------
+
| CBC, WITH            | Routin | 2009  |   Complications of   |   Results for this   
|
| DIFFERENTIAL         | e      |             | Transplanted Liver   | procedure are in the 
|
|                      |        |             | Transplanted Liver   |  results section.    
|
|                      |        |             | (HCC)                |                      
|
+----------------------+--------+-------------+----------------------+----------------------
+
 documented in this encounter
 
 Results
 CHOLESTEROL TOTAL, PLASMA (2009  3:26 PM PST)
 
+-------------+-------+---------------+------------+--------------+
| Component   | Value | Ref Range     | Performed  | Pathologist  |
|             |       |               | At         | Signature    |
+-------------+-------+---------------+------------+--------------+
| CHOLESTEROL | 149   | < - 200 mg/dL | NON [facility]   |              |
|   (LAB)     |       |               | LAB        |              |
+-------------+-------+---------------+------------+--------------+
 
 
 
+---------------+
| Specimen      |
+---------------+
| Serum - Blood |
+---------------+
 
 
 
+----------------+---------+--------------------+--------------+
| Performing     | Address | City/State/Zipcode | Phone Number |
| Organization   |         |                    |              |
+----------------+---------+--------------------+--------------+
|   NON [facility] |         |                    |              |
+----------------+---------+--------------------+--------------+
 TACROLIMUS, WHOLE BLOOD (2009  3:20 PM PST)
 
+-------------+---------+--------------+------------+--------------+
| Component   | Value   | Ref Range    | Performed  | Pathologist  |
|             |         |              | At         | Signature    |
+-------------+---------+--------------+------------+--------------+
| TACROLIMUS  | 0.5 (A) | 5 - 18 ng/mL | [facility]  |              |
 
| ()    |         |              | LAB -      |              |
|             |         |              | [place]  |              |
+-------------+---------+--------------+------------+--------------+
 
 
 
+---------------+
| Specimen      |
+---------------+
| Serum - Blood |
+---------------+
 
 
 
+--------------------+----------------------+--------------------+----------------+
| Performing         | Address              | City/State/Zipcode | Phone Number   |
| Organization       |                      |                    |                |
+--------------------+----------------------+--------------------+----------------+
|   [facility]  |   [address] | [place]      |   [phone] |
| [place]          |                      |                    |                |
+--------------------+----------------------+--------------------+----------------+
|   [facility] -  |                      | [place], [place]      |                |
| [place]          |                      |                    |                |
+--------------------+----------------------+--------------------+----------------+
 GGT, PLASMA (2009  3:20 PM PST)
 
+-----------+-------+-----------+------------+--------------+
| Component | Value | Ref Range | Performed  | Pathologist  |
|           |       |           | At         | Signature    |
+-----------+-------+-----------+------------+--------------+
| GAMMA     | 7 (A) | 5 - 6 U/L | NON [facility]   |              |
| GLUTAMYL  |       |           | LAB        |              |
| TRANS     |       |           |            |              |
+-----------+-------+-----------+------------+--------------+
 
 
 
+---------------+
| Specimen      |
+---------------+
| Serum - Blood |
+---------------+
 
 
 
+----------------+---------+--------------------+--------------+
| Performing     | Address | City/State/Zipcode | Phone Number |
| Organization   |         |                    |              |
+----------------+---------+--------------------+--------------+
|   NON [facility] |         |                    |              |
+----------------+---------+--------------------+--------------+
 BILIRUBIN DIRECT (2009  3:20 PM PST)
 
+------------+-------+-----------------+------------+--------------+
| Component  | Value | Ref Range       | Performed  | Pathologist  |
|            |       |                 | At         | Signature    |
+------------+-------+-----------------+------------+--------------+
| BILIRUBIN  | 0.1   | 0.0 - 0.4 mg/dL | [facility]  |              |
| DIRECT     |       |                 | LAB -      |              |
|            |       |                 | [place]  |              |
 
+------------+-------+-----------------+------------+--------------+
 
 
 
+---------------+
| Specimen      |
+---------------+
| Serum - Blood |
+---------------+
 
 
 
+--------------------+----------------------+--------------------+----------------+
| Performing         | Address              | City/State/Zipcode | Phone Number   |
| Organization       |                      |                    |                |
+--------------------+----------------------+--------------------+----------------+
|   [facility]  |   [address] | [place], [place]      |   [phone] |
| [place]          |                      |                    |                |
+--------------------+----------------------+--------------------+----------------+
|   [facility] -  |                      | [place], [place]      |                |
| [place]          |                      |                    |                |
+--------------------+----------------------+--------------------+----------------+
 URIC ACID, PLASMA (2009  3:20 PM PST)
 
+-------------+---------+-----------+------------+--------------+
| Component   | Value   | Ref Range | Performed  | Pathologist  |
|             |         |           | At         | Signature    |
+-------------+---------+-----------+------------+--------------+
| URIC ACID,  | pending | mg/dL     | [facility]  |              |
| PLASMA      |         |           | LAB -      |              |
| (LAB)       |         |           | [place]  |              |
+-------------+---------+-----------+------------+--------------+
 
 
 
+---------------+
| Specimen      |
+---------------+
| Serum - Blood |
+---------------+
 
 
 
+--------------------+----------------------+--------------------+----------------+
| Performing         | Address              | City/State/Zipcode | Phone Number   |
| Organization       |                      |                    |                |
+--------------------+----------------------+--------------------+----------------+
|   [facility]  |   [address] | [place], [place]      |   [phone] |
| [place]          |                      |                    |                |
+--------------------+----------------------+--------------------+----------------+
|   [facility] -  |                      | [place], [place]      |                |
| [place]          |                      |                    |                |
+--------------------+----------------------+--------------------+----------------+
 PHOSPHORUS, PLASMA (2009  3:20 PM PST)
 
+-------------+-------+-----------------+------------+--------------+
| Component   | Value | Ref Range       | Performed  | Pathologist  |
|             |       |                 | At         | Signature    |
+-------------+-------+-----------------+------------+--------------+
| PHOSPHORUS, | 4.8   | 2.6 - 6.2 mg/dL | [facility]  |              |
 
|  PLASMA     |       |                 | LAB -      |              |
| (LAB)       |       |                 | [place]  |              |
+-------------+-------+-----------------+------------+--------------+
 
 
 
+---------------+
| Specimen      |
+---------------+
| Serum - Blood |
+---------------+
 
 
 
+--------------------+----------------------+--------------------+----------------+
| Performing         | Address              | City/State/Zipcode | Phone Number   |
| Organization       |                      |                    |                |
+--------------------+----------------------+--------------------+----------------+
|   [facility]  |   [address] | [place], [place]      |   [phone] |
| [place]          |                      |                    |                |
+--------------------+----------------------+--------------------+----------------+
|   [facility] -  |                      | [place], [place]      |                |
| [place]          |                      |                    |                |
+--------------------+----------------------+--------------------+----------------+
 MAGNESIUM, PLASMA (2009  3:20 PM PST)
 
+-------------+-------+-----------------+------------+--------------+
| Component   | Value | Ref Range       | Performed  | Pathologist  |
|             |       |                 | At         | Signature    |
+-------------+-------+-----------------+------------+--------------+
| MAGNESIUM,P | 1.99  | 1.7 - 2.5 mg/dL | [facility]  |              |
| LASMA       |       |                 | LAB -      |              |
|             |       |                 | [place]  |              |
+-------------+-------+-----------------+------------+--------------+
 
 
 
+---------------+
| Specimen      |
+---------------+
| Serum - Blood |
+---------------+
 
 
 
+--------------------+----------------------+--------------------+----------------+
| Performing         | Address              | City/State/Zipcode | Phone Number   |
| Organization       |                      |                    |                |
+--------------------+----------------------+--------------------+----------------+
|   [facility]  |   [address] | [place], [place]      |   [phone] |
| [place]          |                      |                    |                |
+--------------------+----------------------+--------------------+----------------+
|   [facility] -  |                      | [place], [place]      |                |
| [place]          |                      |                    |                |
+--------------------+----------------------+--------------------+----------------+
 COMPLETE METABOLIC SET (NA,K,CL,CO2,BUN,CREAT,GLUC,CA,AST,ALT,BILI TOTAL,ALK PHOS,ALB,PROT 
TOTAL) (2009  3:20 PM PST)
 
+-------------+-------+-----------------+------------+--------------+
| Component   | Value | Ref Range       | Performed  | Pathologist  |
 
|             |       |                 | At         | Signature    |
+-------------+-------+-----------------+------------+--------------+
| GLUCOSE,    | 88    | 60 - 100 mg/dL  | [facility]  |              |
| PLASMA      |       |                 | LAB -      |              |
| (LAB)       |       |                 | [place]  |              |
+-------------+-------+-----------------+------------+--------------+
| BUN, PLASMA | 13    | 6 - 23 mg/dL    | [facility]  |              |
|  (LAB)      |       |                 | LAB -      |              |
|             |       |                 | [place]  |              |
+-------------+-------+-----------------+------------+--------------+
| CREATININE  | 0.56  | 0.5 - 1.5 mg/dL | [facility]  |              |
| PLASMA      |       |                 | LAB -      |              |
| (LAB)       |       |                 | [place]  |              |
+-------------+-------+-----------------+------------+--------------+
| TOTAL       | 7.3   | 6.1 - 8.5 g/dL  | [facility]  |              |
| PROTEIN,    |       |                 | LAB -      |              |
| PLASMA      |       |                 | [place]  |              |
| (LAB)       |       |                 |            |              |
+-------------+-------+-----------------+------------+--------------+
| ALBUMIN,    | 4.3   | 2.9 - 5.5 g/dL  | [facility]  |              |
| PLASMA      |       |                 | LAB -      |              |
| (LAB)       |       |                 | [place]  |              |
+-------------+-------+-----------------+------------+--------------+
| CALCIUM,    | 10.0  | 8.5 - 10.5      | [facility]  |              |
| PLASMA      |       | mg/dL           | LAB -      |              |
| (LAB)       |       |                 | [place]  |              |
+-------------+-------+-----------------+------------+--------------+
| BILIRUBIN   | 0.4   | 0.0 - 1.2       | [facility]  |              |
| TOTAL       |       | Transcutaneous  | LAB -      |              |
|             |       | Bilirubinometer | [place]  |              |
+-------------+-------+-----------------+------------+--------------+
| ALK PHOS    | 273   | 135 - 384 U/L   | [facility]  |              |
|             |       |                 | LAB -      |              |
|             |       |                 | [place]  |              |
+-------------+-------+-----------------+------------+--------------+
| AST(SGOT)   | 26    | 0 - 40 U/L      | [facility]  |              |
|             |       |                 | LAB -      |              |
|             |       |                 | [place]  |              |
+-------------+-------+-----------------+------------+--------------+
| SODIUM,     | 140   | 132 - 143       | [facility]  |              |
| PLASMA      |       | mmol/L          | LAB -      |              |
| (LAB)       |       |                 | [place]  |              |
+-------------+-------+-----------------+------------+--------------+
| POTASSIUM,  | 3.9   | 3.6 - 5.1       | [facility]  |              |
| PLASMA      |       | mmol/L          | LAB -      |              |
| (LAB)       |       |                 | [place]  |              |
+-------------+-------+-----------------+------------+--------------+
| CHLORIDE,   | 107   | 95 - 112 mmol/L | [facility]  |              |
| PLASMA      |       |                 | LAB -      |              |
| (LAB)       |       |                 | [place]  |              |
+-------------+-------+-----------------+------------+--------------+
| TOTAL CO2,  | 22.4  | 19 - 31 mmol/L  | [facility]  |              |
| PLASMA      |       |                 | LAB -      |              |
| (LAB)       |       |                 | [place]  |              |
+-------------+-------+-----------------+------------+--------------+
| ALT (SGPT)  | 20    | 0 - 46 U/L      | [facility]  |              |
|             |       |                 | LAB -      |              |
|             |       |                 | [place]  |              |
+-------------+-------+-----------------+------------+--------------+
| ANION GAP   | 14.5  | 7.0 - 21        | [facility]  |              |
 
|             |       |                 | LAB -      |              |
|             |       |                 | [place]  |              |
+-------------+-------+-----------------+------------+--------------+
| BUN/CREATIN | 23.2  | 6.0 - 28.6      | [facility]  |              |
| INE RATIO   |       |                 | LAB -      |              |
|             |       |                 | [place]  |              |
+-------------+-------+-----------------+------------+--------------+
| GLOBULIN    | 3.0   | 1.8 - 3.5       | [facility]  |              |
|             |       |                 | LAB -      |              |
|             |       |                 | [place]  |              |
+-------------+-------+-----------------+------------+--------------+
| A/G RATIO   | 1.4   | 1.1 - 2.4       | [facility]  |              |
|             |       |                 | LAB -      |              |
|             |       |                 | [place]  |              |
+-------------+-------+-----------------+------------+--------------+
| BILIRUBIN   | 0.1   | 0.0 - 0.4 mg/dL | [facility]  |              |
| DIRECT      |       |                 | LAB -      |              |
|             |       |                 | [place]  |              |
+-------------+-------+-----------------+------------+--------------+
| GAMMA       | 7     | 5 - 60 U/L      | [facility]  |              |
| GLUTAMYL    |       |                 | LAB -      |              |
| TRANS       |       |                 | [place]  |              |
+-------------+-------+-----------------+------------+--------------+
 
 
 
+---------------+
| Specimen      |
+---------------+
| Serum - Blood |
+---------------+
 
 
 
+--------------------+----------------------+--------------------+----------------+
| Performing         | Address              | City/State/Zipcode | Phone Number   |
| Organization       |                      |                    |                |
+--------------------+----------------------+--------------------+----------------+
|   [facility]  |   [address] | [place], [place]      |   [phone] |
| [place]          |                      |                    |                |
+--------------------+----------------------+--------------------+----------------+
|   [facility]  |                      | [place], [place]      |                |
| [place]          |                      |                    |                |
+--------------------+----------------------+--------------------+----------------+
 FRANCIS-BARR VIRUS PCR (2009)
 
+-------------+-------+-----------+------------+--------------+
| Component   | Value | Ref Range | Performed  | Pathologist  |
|             |       |           | At         | Signature    |
+-------------+-------+-----------+------------+--------------+
| EBV QUANT   | <2.6  | <2.6      | [facility]  |              |
| INTERPRETAT |       |           | LAB -      |              |
| ION         |       |           | [place]  |              |
+-------------+-------+-----------+------------+--------------+
 
 
 
+----------+
| Specimen |
+----------+
 
| Serum    |
+----------+
 
 
 
+--------------------+----------------------+--------------------+----------------+
| Performing         | Address              | City/State/Zipcode | Phone Number   |
| Organization       |                      |                    |                |
+--------------------+----------------------+--------------------+----------------+
|   [facility]  |   [address] | [place], [place]      |   [phone] |
| [place]          |                      |                    |                |
+--------------------+----------------------+--------------------+----------------+
|   [facility]  |                      | [place], [place]      |                |
| [place]          |                      |                    |                |
+--------------------+----------------------+--------------------+----------------+
 CBC, WITH DIFFERENTIAL (2009)
 
+-------------+-------+-----------------+------------+--------------+
| Component   | Value | Ref Range       | Performed  | Pathologist  |
|             |       |                 | At         | Signature    |
+-------------+-------+-----------------+------------+--------------+
| WHITE CELL  | 8.6   | 4.5 - 13.5 K/cu | [facility]  |              |
| COUNT       |       |  mm             | LAB -      |              |
|             |       |                 | [place]  |              |
+-------------+-------+-----------------+------------+--------------+
| RED CELL    | 4.92  | 4.1 - 5.3 M/cu  | [facility]  |              |
| COUNT       |       | mm              | LAB -      |              |
|             |       |                 | [place]  |              |
+-------------+-------+-----------------+------------+--------------+
| HEMOGLOBIN  | 14.5  | 10.6 - 15.2     | [facility]  |              |
|             |       |                 | LAB -      |              |
|             |       |                 | [place]  |              |
+-------------+-------+-----------------+------------+--------------+
| HEMATOCRIT  | 41.5  | 32 - 42 %       | [facility]  |              |
|             |       |                 | LAB -      |              |
|             |       |                 | [place]  |              |
+-------------+-------+-----------------+------------+--------------+
| MCV         | 84.3  | 71 - 89 fL      | [facility]  |              |
|             |       |                 | LAB -      |              |
|             |       |                 | [place]  |              |
+-------------+-------+-----------------+------------+--------------+
| MCH         | 29    | 24 - 30 pg      | [facility]  |              |
|             |       |                 | LAB -      |              |
|             |       |                 | [place]  |              |
+-------------+-------+-----------------+------------+--------------+
| MCHC        | 35    | 30 - 36 g/dL    | [facility]  |              |
|             |       |                 | LAB -      |              |
|             |       |                 | [place]  |              |
+-------------+-------+-----------------+------------+--------------+
| PLATELET    | 368   | 140 - 440 K/cu  | [facility]  |              |
| COUNT       |       | mm              | LAB -      |              |
|             |       |                 | [place]  |              |
+-------------+-------+-----------------+------------+--------------+
| NEUTROPHIL  | 59.3  | 31 - 60 %       | [facility]  |              |
| %           |       |                 | LAB -      |              |
|             |       |                 | [place]  |              |
+-------------+-------+-----------------+------------+--------------+
| LYMPHOCYTE  | 30.1  | 28 - 48 %       | [facility]  |              |
| %           |       |                 | LAB -      |              |
|             |       |                 | [place]  |              |
 
+-------------+-------+-----------------+------------+--------------+
| MONOCYTE %  | 7.2   | 0 - 12 %        | [facility]  |              |
|             |       |                 | LAB -      |              |
|             |       |                 | [place]  |              |
+-------------+-------+-----------------+------------+--------------+
| EOS %       | 2.0   | 0 - 6 %         | [facility]  |              |
|             |       |                 | LAB -      |              |
|             |       |                 | [place]  |              |
+-------------+-------+-----------------+------------+--------------+
| BASO %      | 0.7   | 0 - 2 %         | [facility]  |              |
|             |       |                 | LAB -      |              |
|             |       |                 | [place]  |              |
+-------------+-------+-----------------+------------+--------------+
| RDW         | 13.4  | 10.5 - 15.8 %   | [facility]  |              |
|             |       |                 | LAB -      |              |
|             |       |                 | [place]  |              |
+-------------+-------+-----------------+------------+--------------+
| MPV         |       | fL              | [facility]  |              |
|             |       |                 | LAB -      |              |
|             |       |                 | [place]  |              |
+-------------+-------+-----------------+------------+--------------+
| NEUTROPHIL  |       | K/cu mm         | [facility]  |              |
| #           |       |                 | LAB -      |              |
|             |       |                 | [place]  |              |
+-------------+-------+-----------------+------------+--------------+
| LYMPHOCYTE  |       | K/cu mm         | [facility]  |              |
| #           |       |                 | LAB -      |              |
|             |       |                 | [place]  |              |
+-------------+-------+-----------------+------------+--------------+
| MONOCYTE #  |       | K/cu mm         | [facility]  |              |
|             |       |                 | LAB -      |              |
|             |       |                 | [place]  |              |
+-------------+-------+-----------------+------------+--------------+
| EOS #       |       | K/cu mm         | [facility]  |              |
|             |       |                 | LAB -      |              |
|             |       |                 | [place]  |              |
+-------------+-------+-----------------+------------+--------------+
| BASO #      |       |                 | [facility]  |              |
|             |       |                 | LAB -      |              |
|             |       |                 | [place]  |              |
+-------------+-------+-----------------+------------+--------------+
 
 
 
+---------------+
| Specimen      |
+---------------+
| Serum - Blood |
+---------------+
 
 
 
+--------------------+----------------------+--------------------+----------------+
| Performing         | Address              | City/State/Zipcode | Phone Number   |
| Organization       |                      |                    |                |
+--------------------+----------------------+--------------------+----------------+
|   [facility]  |   [address] | [place], [place]      |   [phone] |
| [place]          |                      |                    |                |
+--------------------+----------------------+--------------------+----------------+
|   [facility]  |                      | [place], [place]      |                |
 
| [place]          |                      |                    |                |
+--------------------+----------------------+--------------------+----------------+
 documented in this encounter
 
 Visit Diagnoses
 
 
+----------------------------------------------------------+
| Diagnosis                                                |
+----------------------------------------------------------+
|   Complications of transplanted liver                    |
+----------------------------------------------------------+
|   Transplanted liver (HCC)  Liver replaced by transplant |
+----------------------------------------------------------+
 documented in this encounter

## 2019-11-01 NOTE — XMS
Encounter Summary
  Created on: 2019
 
 Bonifacio Nila JB
 External Reference #: [id number]
 : [birth year]
 Sex: Female
 
 Demographics
 
 
+-----------------------+------------------------+
| Address               | [address]            |
|                       | [place]  [address]   |
+-----------------------+------------------------+
| Home Phone            | [phone]        |
+-----------------------+------------------------+
| Preferred Language    | Unknown                |
+-----------------------+------------------------+
| Marital Status        | Single                 |
+-----------------------+------------------------+
| Hindu Affiliation | Unknown                |
+-----------------------+------------------------+
| Race                  | White                  |
+-----------------------+------------------------+
| Ethnic Group          | Not  or  |
+-----------------------+------------------------+
 
 
 Author
 
 
+--------------+------------------------------+
| Author       | [facility] |
+--------------+------------------------------+
| Organization | [facility] |
+--------------+------------------------------+
| Address      | Unknown                      |
+--------------+------------------------------+
| Phone        | Unavailable                  |
+--------------+------------------------------+
 
 
 
 Support
 
 
+-----------------+--------------+---------+-----------------+
| Name            | Relationship | Address | Phone           |
+-----------------+--------------+---------+-----------------+
| Kit Valdovinos   | ECON         | Unknown | [phone] |
+-----------------+--------------+---------+-----------------+
| Magdalena Valdovinos | ECON         | Unknown | [phone] |
+-----------------+--------------+---------+-----------------+
 
 
 
 Care Team Providers
 
 
 
+------------------------+------+-----------------+
| Care Team Member Name  | Role | Phone           |
+------------------------+------+-----------------+
| Lily Horton MD | PCP  | [phone] |
+------------------------+------+-----------------+
 
 
 
 Reason for Visit
 
 
+-------------------+----------+
| Reason            | Comments |
+-------------------+----------+
| Care Coordination | [facility] |
+-------------------+----------+
 
 
 
 Encounter Details
 
 
+--------+-------------+----------------------+----------------------+--------------------+
| Date   | Type        | Department           | Care Team            | Description        |
+--------+-------------+----------------------+----------------------+--------------------+
| / | Documentati |   Pediatric          |   Neema Khalil,   | Care Coordination  |
| 2017   | on          | Gastroenterology at  | MD  [address] | ([facility])         |
|        |             | [place]          |   [place], [place]       |                    |
|        |             | [facility]  | [address]           |                    |
|        |             |  [address] | [phone]         |                    |
|        |             |  [address]  Mailcode:  | [phone] (Fax)   |                    |
|        |             | [facility]   |                      |                    |
|        |             | [place], [place]         |                      |                    |
|        |             | [address]           |                      |                    |
|        |             | [phone]         |                      |                    |
+--------+-------------+----------------------+----------------------+--------------------+
 
 
 
 Social History
 
 
+-------------------+-------+-----------+--------+------+
| Tobacco Use       | Types | Packs/Day | Years  | Date |
|                   |       |           | Used   |      |
+-------------------+-------+-----------+--------+------+
| Passive Smoke     |       |           |        |      |
| Exposure - Never  |       |           |        |      |
| Smoker            |       |           |        |      |
+-------------------+-------+-----------+--------+------+
 
 
 
+---------------------+---+---+---+
| Smokeless Tobacco:  |   |   |   |
| Never Used          |   |   |   |
+---------------------+---+---+---+
 
 
 
 
+-------------+-------------+---------+----------+
| Alcohol Use | Drinks/Week | oz/Week | Comments |
+-------------+-------------+---------+----------+
| Not Asked   |             |         |          |
+-------------+-------------+---------+----------+
 
 
 
+------------------+---------------+
| Sex Assigned at  | Date Recorded |
| Birth            |               |
+------------------+---------------+
| Not on file      |               |
+------------------+---------------+
 
 
 
+----------------+-------------+-------------+
| Job Start Date | Occupation  | Industry    |
+----------------+-------------+-------------+
| Not on file    | Not on file | Not on file |
+----------------+-------------+-------------+
 
 
 
+----------------+--------------+------------+
| Travel History | Travel Start | Travel End |
+----------------+--------------+------------+
 
 
 
+-------------------------------------+
| No recent travel history available. |
+-------------------------------------+
 documented as of this encounter
 
 Plan of Treatment
 Not on filedocumented as of this encounter
 
 Visit Diagnoses
 Not on filedocumented in this encounter

## 2019-11-01 NOTE — XMS
Encounter Summary
  Created on: 2019
 
 Bonifacio Nila JB
 External Reference #: [id number]
 : [birth year]
 Sex: Female
 
 Demographics
 
 
+-----------------------+------------------------+
| Address               | [address]            |
|                       | [place]  [address]   |
+-----------------------+------------------------+
| Home Phone            | [phone]        |
+-----------------------+------------------------+
| Preferred Language    | Unknown                |
+-----------------------+------------------------+
| Marital Status        | Single                 |
+-----------------------+------------------------+
| Episcopalian Affiliation | Unknown                |
+-----------------------+------------------------+
| Race                  | White                  |
+-----------------------+------------------------+
| Ethnic Group          | Not  or  |
+-----------------------+------------------------+
 
 
 Author
 
 
+--------------+------------------------------+
| Author       | [facility] |
+--------------+------------------------------+
| Organization | [facility] |
+--------------+------------------------------+
| Address      | Unknown                      |
+--------------+------------------------------+
| Phone        | Unavailable                  |
+--------------+------------------------------+
 
 
 
 Support
 
 
+-----------------+--------------+---------+-----------------+
| Name            | Relationship | Address | Phone           |
+-----------------+--------------+---------+-----------------+
| Kit Valdovinos   | ECON         | Unknown | [phone] |
+-----------------+--------------+---------+-----------------+
| Magdalena Valdovinos | ECON         | Unknown | [phone] |
+-----------------+--------------+---------+-----------------+
 
 
 
 Care Team Providers
 
 
 
+------------------------+------+-----------------+
| Care Team Member Name  | Role | Phone           |
+------------------------+------+-----------------+
| Lily Horton MD | PCP  | [phone] |
+------------------------+------+-----------------+
 
 
 
 Reason for Visit
 
 
+------------------+----------+
| Reason           | Comments |
+------------------+----------+
| Medical Records  |          |
| Review           |          |
+------------------+----------+
 
 
 
 Encounter Details
 
 
+--------+-------------+----------------------+---------------------+------------------+
| Date   | Type        | Department           | Care Team           | Description      |
+--------+-------------+----------------------+---------------------+------------------+
| / | Documentati |   [facility] |   Lab, Gi Procedure | Medical Records  |
|    | on          |  [place]  [address] |                     | Review           |
|        |             |  [address]  Mailcode: |                     |                  |
|        |             |  [address]  [facility]    |                     |                  |
|        |             | [facility],  |                     |                  |
|        |             | [address]           |                     |                  |
|        |             | [place], [place]         |                     |                  |
|        |             | [address]           |                     |                  |
|        |             | [phone]         |                     |                  |
+--------+-------------+----------------------+---------------------+------------------+
 
 
 
 Social History
 
 
+-------------------+-------+-----------+--------+------+
| Tobacco Use       | Types | Packs/Day | Years  | Date |
|                   |       |           | Used   |      |
+-------------------+-------+-----------+--------+------+
| Passive Smoke     |       |           |        |      |
| Exposure - Never  |       |           |        |      |
| Smoker            |       |           |        |      |
+-------------------+-------+-----------+--------+------+
 
 
 
+---------------------+---+---+---+
| Smokeless Tobacco:  |   |   |   |
| Never Used          |   |   |   |
+---------------------+---+---+---+
 
 
 
 
+-------------+-------------+---------+----------+
| Alcohol Use | Drinks/Week | oz/Week | Comments |
+-------------+-------------+---------+----------+
| Not Asked   |             |         |          |
+-------------+-------------+---------+----------+
 
 
 
+------------------+---------------+
| Sex Assigned at  | Date Recorded |
| Birth            |               |
+------------------+---------------+
| Not on file      |               |
+------------------+---------------+
 
 
 
+----------------+-------------+-------------+
| Job Start Date | Occupation  | Industry    |
+----------------+-------------+-------------+
| Not on file    | Not on file | Not on file |
+----------------+-------------+-------------+
 
 
 
+----------------+--------------+------------+
| Travel History | Travel Start | Travel End |
+----------------+--------------+------------+
 
 
 
+-------------------------------------+
| No recent travel history available. |
+-------------------------------------+
 documented as of this encounter
 
 Plan of Treatment
 Not on filedocumented as of this encounter
 
 Visit Diagnoses
 Not on filedocumented in this encounter

## 2019-11-01 NOTE — XMS
Clinical Summary
  Created on: 2019
 
 BonifacioNila
 External Reference #: [id number]
 : [birth year]
 Sex: Female
 
 Demographics
 
 
+-----------------------+------------------------+
| Address               | [address]            |
|                       | [place]  [address]   |
+-----------------------+------------------------+
| Home Phone            | [phone]        |
+-----------------------+------------------------+
| Preferred Language    | Unknown                |
+-----------------------+------------------------+
| Marital Status        | Single                 |
+-----------------------+------------------------+
| Restorationism Affiliation | Unknown                |
+-----------------------+------------------------+
| Race                  | White                  |
+-----------------------+------------------------+
| Ethnic Group          | Not  or  |
+-----------------------+------------------------+
 
 
 Author
 
 
+--------------+---------------------+
| Author       | [facility] |
+--------------+---------------------+
| Organization | [facility] |
+--------------+---------------------+
| Address      | Unknown             |
+--------------+---------------------+
| Phone        | Unavailable         |
+--------------+---------------------+
 
 
 
 Support
 
 
+-----------------+--------------+---------+-----------------+
| Name            | Relationship | Address | Phone           |
+-----------------+--------------+---------+-----------------+
| Kit Mathew   | ECON         | Unknown | [phone] |
+-----------------+--------------+---------+-----------------+
| Magdalena Mathew | ECON         | Unknown | [phone] |
+-----------------+--------------+---------+-----------------+
 
 
 
 Care Team Providers
 
 
 
+------------------------+------+-----------------+
| Care Team Member Name  | Role | Phone           |
+------------------------+------+-----------------+
| Lily Horton MD | PCP  | [phone] |
+------------------------+------+-----------------+
 
 
 
 Source Comments
 FLAKO is fully live on both EpicCare Ambulatory and EpicCare InPatient.[facility]
 
 Allergies
 
 
+---------------------+-----------------+----------+----------+----------------------+
| Active Allergy      | Reactions       | Severity | Noted    | Comments             |
|                     |                 |          | Date     |                      |
+---------------------+-----------------+----------+----------+----------------------+
| Sulfa (Sulfonamide  | Hives,          | High     | 20 |                      |
| Antibiotics)        | Swelling-Facial |          | 09       |                      |
+---------------------+-----------------+----------+----------+----------------------+
| Varicella Vaccines  |                 | Low      | 20 |   Possible reaction  |
|                     |                 |          | 09       | with sulfa meds      |
+---------------------+-----------------+----------+----------+----------------------+
 
 
 
 Medications
 
 
+--------------------+---------------------+-----------+---------+------+------+-------+
| Medication         | Sig                 | Dispensed | Refills | Star | End  | Statu |
|                    |                     |           |         | t    | Date | s     |
|                    |                     |           |         | Date |      |       |
+--------------------+---------------------+-----------+---------+------+------+-------+
|   amLODIPine 5 mg  | Take 5 mg by mouth  |           | 0       |      |      | Activ |
| oral tablet        | once daily.         |           |         |      |      | e     |
+--------------------+---------------------+-----------+---------+------+------+-------+
 
 
 
 Active Problems
 
 
+-------------------------------------------------+------------+
| Problem                                         | Noted Date |
+-------------------------------------------------+------------+
| Polysplenia                                     | 2014 |
+-------------------------------------------------+------------+
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
+-------------------------------------------------+------------+
| Aortic insufficiency                            | 2014 |
+-------------------------------------------------+------------+
| Aortic root dilatation                          | 2014 |
+-------------------------------------------------+------------+
| VSD (ventricular septal defect), perimembranous | 2008 |
+-------------------------------------------------+------------+
 
 
 
 
+-----------------------------------------------------------------+
|   Overview:   10/16/2014  S/p 8-mm Amplatzer Vascular Plug IV,  |
| with no significant residual shuntingLeft atrial isomerism LAE  |
| (left atrial enlargement) LVE (left ventricular enlargement)    |
|LVE (left ventricular enlargement)                               |
+-----------------------------------------------------------------+
 
 
 
+--------------------+------------+
| Transplanted liver | 10/12/2006 |
+--------------------+------------+
 
 
 
+---------------------------------------------------------+
|   Overview:   For biliary atresia, 2000 at [facility] |
+---------------------------------------------------------+
 
 
 
 Resolved Problems
 
 
+----------------------------+----------+-----------+
| Problem                    | Noted    | Resolved  |
|                            | Date     | Date      |
+----------------------------+----------+-----------+
| Immunosuppressed status    | 20 |  |
|                            | 14       | 5         |
+----------------------------+----------+-----------+
| Aortic valve insufficiency | 20 |  |
|                            | 08       | 4         |
+----------------------------+----------+-----------+
 
 
 
 Social History
 
 
+-------------------+-------+-----------+--------+------+
| Tobacco Use       | Types | Packs/Day | Years  | Date |
|                   |       |           | Used   |      |
+-------------------+-------+-----------+--------+------+
| Passive Smoke     |       |           |        |      |
| Exposure - Never  |       |           |        |      |
| Smoker            |       |           |        |      |
+-------------------+-------+-----------+--------+------+
 
 
 
+---------------------+---+---+---+
| Smokeless Tobacco:  |   |   |   |
| Never Used          |   |   |   |
+---------------------+---+---+---+
 
 
 
 
+-------------+-------------+---------+----------+
| Alcohol Use | Drinks/Week | oz/Week | Comments |
+-------------+-------------+---------+----------+
| Not Asked   |             |         |          |
+-------------+-------------+---------+----------+
 
 
 
+------------------+---------------+
| Sex Assigned at  | Date Recorded |
| Birth            |               |
+------------------+---------------+
| Not on file      |               |
+------------------+---------------+
 
 
 
+----------------+-------------+-------------+
| Job Start Date | Occupation  | Industry    |
+----------------+-------------+-------------+
| Not on file    | Not on file | Not on file |
+----------------+-------------+-------------+
 
 
 
+----------------+--------------+------------+
| Travel History | Travel Start | Travel End |
+----------------+--------------+------------+
 
 
 
+-------------------------------------+
| No recent travel history available. |
+-------------------------------------+
 
 
 
 Last Filed Vital Signs
 
 
+-------------------+----------------------+----------------------+----------------------+
| Vital Sign        | Reading              | Time Taken           | Comments             |
+-------------------+----------------------+----------------------+----------------------+
| Blood Pressure    | 130/93               | 2016  9:24 AM  | right arm adult cuff |
|                   |                      | PDT                  |                      |
+-------------------+----------------------+----------------------+----------------------+
| Pulse             | 72                   | 2016  9:24 AM  |                      |
|                   |                      | PDT                  |                      |
+-------------------+----------------------+----------------------+----------------------+
| Temperature       | 36.7   C (98.1   F)  | 2016  8:59 AM  |                      |
|                   |                      | PDT                  |                      |
+-------------------+----------------------+----------------------+----------------------+
| Respiratory Rate  | -                    | -                    |                      |
+-------------------+----------------------+----------------------+----------------------+
| Oxygen Saturation | -                    | -                    |                      |
+-------------------+----------------------+----------------------+----------------------+
| Inhaled Oxygen    | -                    | -                    |                      |
| Concentration     |                      |                      |                      |
+-------------------+----------------------+----------------------+----------------------+
 
| Weight            | 81.5 kg (179 lb 10.8 | 2016  8:59 AM  |                      |
|                   |  oz)                 | PDT                  |                      |
+-------------------+----------------------+----------------------+----------------------+
| Height            | 164.4 cm (5' 4.72")  | 2016  8:59 AM  |                      |
|                   |                      | PDT                  |                      |
+-------------------+----------------------+----------------------+----------------------+
| Body Mass Index   | 30.16                | 2016  8:59 AM  |                      |
|                   |                      | PDT                  |                      |
+-------------------+----------------------+----------------------+----------------------+
 
 
 
 Plan of Treatment
 
 
+----------------------+-----------+-----------+----------+
| Health Maintenance   | Due Date  | Last Done | Comments |
+----------------------+-----------+-----------+----------+
| Pneumococcal         | 01/15/200 |           |          |
| vaccination (1 of 3  | 5         |           |          |
| - PCV13)             |           |           |          |
+----------------------+-----------+-----------+----------+
| Influenza (Flu)      | 10/01/201 |           |          |
| vaccination (#1)     | 9         |           |          |
+----------------------+-----------+-----------+----------+
 
 
 
 Results
 Not on filefrom Last 3 Months
 
 Insurance
 
 
+--------------+--------+-------------+--------+-------+---------+--------+
| Payer        | Benefi | Subscriber  | Effect | Phone | Address | Type   |
|              | t Plan | ID          | david    |       |         |        |
|              |  /     |             | Dates  |       |         |        |
|              | Group  |             |        |       |         |        |
+--------------+--------+-------------+--------+-------+---------+--------+
| [employer] MEDICA[employer] | [employer]    | xxxxxxxx    |  |       |         | Medica |
|              | [place] |             | 015-Pr |       |         | id     |
|              | [place]   |             | esent  |       |         |        |
+--------------+--------+-------------+--------+-------+---------+--------+
 
 
 
+----------------+--------+-------------+--------+-------------+---------------------+
| Guarantor Name | Accoun | Relation to | Date   | Phone       | Billing Address     |
|                | t Type |  Patient    | of     |             |                     |
|                |        |             | Birth  |             |                     |
+----------------+--------+-------------+--------+-------------+---------------------+
| KIT MATHEW  | Person | Father      | / |             |   [address]       |
|                | al/Fam |             | 1979   | [phone] | [place] [id number] |
|                | lenny    |             |        | 7 (Home)    |                     |
+----------------+--------+-------------+--------+-------------+---------------------+
 
 
 
 Advance Directives
 
 
 
+-----------------+---------------+----------------+-------------+
| Type            | Date Recorded | Patient        | Explanation |
|                 |               | Representative |             |
+-----------------+---------------+----------------+-------------+
| Advance         |               |                |             |
| Directives and  |               |                |             |
| Living Will     |               |                |             |
+-----------------+---------------+----------------+-------------+
| Power of        |               |                |             |
|         |               |                |             |
+-----------------+---------------+----------------+-------------+

## 2019-11-01 NOTE — XMS
Encounter Summary
  Created on: 2019
 
 Bonifacio Nila JB
 External Reference #: [id number]
 : [birth year]
 Sex: Female
 
 Demographics
 
 
+-----------------------+------------------------+
| Address               | [address]            |
|                       | [place]  [address]   |
+-----------------------+------------------------+
| Home Phone            | [phone]        |
+-----------------------+------------------------+
| Preferred Language    | Unknown                |
+-----------------------+------------------------+
| Marital Status        | Single                 |
+-----------------------+------------------------+
| Buddhism Affiliation | Unknown                |
+-----------------------+------------------------+
| Race                  | White                  |
+-----------------------+------------------------+
| Ethnic Group          | Not  or  |
+-----------------------+------------------------+
 
 
 Author
 
 
+--------------+------------------------------+
| Author       | [facility] |
+--------------+------------------------------+
| Organization | [facility] |
+--------------+------------------------------+
| Address      | Unknown                      |
+--------------+------------------------------+
| Phone        | Unavailable                  |
+--------------+------------------------------+
 
 
 
 Support
 
 
+-----------------+--------------+---------+-----------------+
| Name            | Relationship | Address | Phone           |
+-----------------+--------------+---------+-----------------+
| Kit Valdovinos   | ECON         | Unknown | [phone] |
+-----------------+--------------+---------+-----------------+
| Magdalena Valdovinos | ECON         | Unknown | [phone] |
+-----------------+--------------+---------+-----------------+
 
 
 
 Care Team Providers
 
 
 
+-----------------------+------+-----------------+
| Care Team Member Name | Role | Phone           |
+-----------------------+------+-----------------+
| Lily Horton MD   | PCP  | [phone] |
+-----------------------+------+-----------------+
 
 
 
 Reason for Visit
 Consultation (Routine)
 
+--------+--------------+---------------+--------------+--------------+---------------+
| Status | Reason       | Specialty     | Diagnoses /  | Referred By  | Referred To   |
|        |              |               | Procedures   | Contact      | Contact       |
+--------+--------------+---------------+--------------+--------------+---------------+
| Closed |   Specialty  | Pediatric     |   Diagnoses  |   [facility]  |
|        | Services     | Gastroenterol |  Liver       | Lily CARY MD | [facility]  [address]   |
|        | Required     | ogy           | replaced by  |   PEDS       | [address]   |
|        |              |               | transplant   | SPECIALISTS  | [address]       |
|        |              |               | (HCC)        | [facility] [place] | Mailcode:     |
|        |              |               |              |   [address]   | [address]         |
|        |              |               |              | [address] | [place]   |
|        |              |               |              |  [address]         | [place], [place]  |
|        |              |               |              | [place],   | [address]    |
|        |              |               |              | [place] [address]     | Phone:        |
|        |              |               |              | Phone:       | [phone]  |
|        |              |               |              | [phone] |  Fax:         |
|        |              |               |              |   Fax:       | [phone]  |
|        |              |               |              | [phone] |               |
+--------+--------------+---------------+--------------+--------------+---------------+
 
 
 
 
 Encounter Details
 
 
+--------+-----------+----------------------+--------------------+-------------+
| Date   | Type      | Department           | Care Team          | Description |
+--------+-----------+----------------------+--------------------+-------------+
| 10/12/ | Office    |   Pediatric          |   Ivis Burkett MD |             |
|    | Visit-ECX | Gastroenterology at  |                    |             |
|        |           | [place]          |                    |             |
|        |           | [facility]  |                    |             |
|        |           |  [address] |                    |             |
|        |           |  [address]  Mailcode:  |                    |             |
|        |           | [address]  [place]   |                    |             |
|        |           | [place], [place]         |                    |             |
|        |           | [address]           |                    |             |
|        |           | [phone]         |                    |             |
+--------+-----------+----------------------+--------------------+-------------+
 
 
 
 Social History
 
 
+----------------+-------+-----------+--------+------+
 
| Tobacco Use    | Types | Packs/Day | Years  | Date |
|                |       |           | Used   |      |
+----------------+-------+-----------+--------+------+
| Never Assessed |       |           |        |      |
+----------------+-------+-----------+--------+------+
 
 
 
+------------------+---------------+
| Sex Assigned at  | Date Recorded |
| Birth            |               |
+------------------+---------------+
| Not on file      |               |
+------------------+---------------+
 
 
 
+----------------+-------------+-------------+
| Job Start Date | Occupation  | Industry    |
+----------------+-------------+-------------+
| Not on file    | Not on file | Not on file |
+----------------+-------------+-------------+
 
 
 
+----------------+--------------+------------+
| Travel History | Travel Start | Travel End |
+----------------+--------------+------------+
 
 
 
+-------------------------------------+
| No recent travel history available. |
+-------------------------------------+
 documented as of this encounter
 
 Last Filed Vital Signs
 
 
+-------------------+----------------------+----------------------+----------+
| Vital Sign        | Reading              | Time Taken           | Comments |
+-------------------+----------------------+----------------------+----------+
| Blood Pressure    | 120/83               | 10/12/2006  2:15 PM  |          |
|                   |                      | PDT                  |          |
+-------------------+----------------------+----------------------+----------+
| Pulse             | 80                   | 10/12/2006  2:15 PM  |          |
|                   |                      | PDT                  |          |
+-------------------+----------------------+----------------------+----------+
| Temperature       | -                    | -                    |          |
+-------------------+----------------------+----------------------+----------+
| Respiratory Rate  | -                    | -                    |          |
+-------------------+----------------------+----------------------+----------+
| Oxygen Saturation | -                    | -                    |          |
+-------------------+----------------------+----------------------+----------+
| Inhaled Oxygen    | -                    | -                    |          |
| Concentration     |                      |                      |          |
+-------------------+----------------------+----------------------+----------+
| Weight            | 30.8 kg (67 lb 14.4  | 10/12/2006  2:15 PM  |          |
|                   | oz)                  | PDT                  |          |
+-------------------+----------------------+----------------------+----------+
 
| Height            | 130 cm (4' 3.18")    | 10/12/2006  2:15 PM  |          |
|                   |                      | PDT                  |          |
+-------------------+----------------------+----------------------+----------+
| Body Mass Index   | 18.23                | 10/12/2006  2:15 PM  |          |
|                   |                      | PDT                  |          |
+-------------------+----------------------+----------------------+----------+
 documented in this encounter
 
 Plan of Treatment
 Not on filedocumented as of this encounter
 
 Visit Diagnoses
 Not on filedocumented in this encounter

## 2019-11-01 NOTE — XMS
Encounter Summary
  Created on: 2019
 
 Bonifacio Nila JB
 External Reference #: [id number]
 : [birth year]
 Sex: Female
 
 Demographics
 
 
+-----------------------+------------------------+
| Address               | [address]            |
|                       | [place]  [address]   |
+-----------------------+------------------------+
| Home Phone            | [phone]        |
+-----------------------+------------------------+
| Preferred Language    | Unknown                |
+-----------------------+------------------------+
| Marital Status        | Single                 |
+-----------------------+------------------------+
| Yazdanism Affiliation | Unknown                |
+-----------------------+------------------------+
| Race                  | White                  |
+-----------------------+------------------------+
| Ethnic Group          | Not  or  |
+-----------------------+------------------------+
 
 
 Author
 
 
+--------------+------------------------------+
| Author       | [facility] |
+--------------+------------------------------+
| Organization | [facility] |
+--------------+------------------------------+
| Address      | Unknown                      |
+--------------+------------------------------+
| Phone        | Unavailable                  |
+--------------+------------------------------+
 
 
 
 Support
 
 
+-----------------+--------------+---------+-----------------+
| Name            | Relationship | Address | Phone           |
+-----------------+--------------+---------+-----------------+
| Kit Valdovinos   | ECON         | Unknown | [phone] |
+-----------------+--------------+---------+-----------------+
| Magdalena Valdovinos | ECON         | Unknown | [phone] |
+-----------------+--------------+---------+-----------------+
 
 
 
 Care Team Providers
 
 
 
+-----------------------+------+-----------------+
| Care Team Member Name | Role | Phone           |
+-----------------------+------+-----------------+
| Lily Horton MD   | PCP  | [phone] |
+-----------------------+------+-----------------+
 
 
 
 Reason for Visit
 
 
+----------------+----------+
| Reason         | Comments |
+----------------+----------+
| Refill Request |          |
+----------------+----------+
 
 
 
 Encounter Details
 
 
+--------+--------+----------------------+---------------------+----------------+
| Date   | Type   | Department           | Care Team           | Description    |
+--------+--------+----------------------+---------------------+----------------+
| / | Refill |   Pediatric          |   Monserrat Spann,  | Refill Request |
|    |        | Gastroenterology at  | RN  [address]     |                |
|        |        | [place]          | [address]     |                |
|        |        | [facility]  | [place] [address]  |                |
|        |        |  [address] |                     |                |
|        |        |  [address]  Mailcode:  |                     |                |
|        |        | [facility]  [place]   |                     |                |
|        |        | [place], [place]         |                     |                |
|        |        | [address]           |                     |                |
|        |        | [phone]         |                     |                |
+--------+--------+----------------------+---------------------+----------------+
 
 
 
 Social History
 
 
+----------------+-------+-----------+--------+------+
| Tobacco Use    | Types | Packs/Day | Years  | Date |
|                |       |           | Used   |      |
+----------------+-------+-----------+--------+------+
| Never Assessed |       |           |        |      |
+----------------+-------+-----------+--------+------+
 
 
 
+------------------+---------------+
| Sex Assigned at  | Date Recorded |
| Birth            |               |
+------------------+---------------+
| Not on file      |               |
+------------------+---------------+
 
 
 
 
+----------------+-------------+-------------+
| Job Start Date | Occupation  | Industry    |
+----------------+-------------+-------------+
| Not on file    | Not on file | Not on file |
+----------------+-------------+-------------+
 
 
 
+----------------+--------------+------------+
| Travel History | Travel Start | Travel End |
+----------------+--------------+------------+
 
 
 
+-------------------------------------+
| No recent travel history available. |
+-------------------------------------+
 documented as of this encounter
 
 Plan of Treatment
 Not on filedocumented as of this encounter
 
 Visit Diagnoses
 Not on filedocumented in this encounter

## 2019-11-01 NOTE — XMS
Encounter Summary
  Created on: 2019
 
 Bonifacio Nila JB
 External Reference #: [id number]
 : [birth year]
 Sex: Female
 
 Demographics
 
 
+-----------------------+------------------------+
| Address               | [address]            |
|                       | [place]  [address]   |
+-----------------------+------------------------+
| Home Phone            | [phone]        |
+-----------------------+------------------------+
| Preferred Language    | Unknown                |
+-----------------------+------------------------+
| Marital Status        | Single                 |
+-----------------------+------------------------+
| Restorationism Affiliation | Unknown                |
+-----------------------+------------------------+
| Race                  | White                  |
+-----------------------+------------------------+
| Ethnic Group          | Not  or  |
+-----------------------+------------------------+
 
 
 Author
 
 
+--------------+------------------------------+
| Author       | [facility] |
+--------------+------------------------------+
| Organization | [facility] |
+--------------+------------------------------+
| Address      | Unknown                      |
+--------------+------------------------------+
| Phone        | Unavailable                  |
+--------------+------------------------------+
 
 
 
 Support
 
 
+-----------------+--------------+---------+-----------------+
| Name            | Relationship | Address | Phone           |
+-----------------+--------------+---------+-----------------+
| Kit Valdovinos   | ECON         | Unknown | [phone] |
+-----------------+--------------+---------+-----------------+
| Magdalena Valdovinos | ECON         | Unknown | [phone] |
+-----------------+--------------+---------+-----------------+
 
 
 
 Care Team Providers
 
 
 
+-----------------------+------+-------------+
| Care Team Member Name | Role | Phone       |
+-----------------------+------+-------------+
| No Pcp Per Patient    | PCP  | Unavailable |
+-----------------------+------+-------------+
 
 
 
 Reason for Visit
 
 
+-------------------+----------+
| Reason            | Comments |
+-------------------+----------+
| Follow-up in      |          |
| outpatient clinic |          |
+-------------------+----------+
 Office Visit - E/M Services (Routine)
 
+--------+--------------+---------------+--------------+--------------+---------------+
| Status | Reason       | Specialty     | Diagnoses /  | Referred By  | Referred To   |
|        |              |               | Procedures   | Contact      | Contact       |
+--------+--------------+---------------+--------------+--------------+---------------+
| Closed |   Specialty  | Pediatric     |              |   Non-[facility]   |   Remi,     |
|        | Services     | Gastroenterol |              | Epic Dept    | MD Neema   |
|        | Required     | ogy           |              |              | [address] |
|        |              |               |              |              |  Ed           |
|        |              |               |              |              | [place], [place]  |
|        |              |               |              |              | [address]    |
|        |              |               |              |              | Phone:        |
|        |              |               |              |              | [phone]  |
|        |              |               |              |              |  Fax:         |
|        |              |               |              |              | [phone]  |
+--------+--------------+---------------+--------------+--------------+---------------+
 
 
 
 
 Encounter Details
 
 
+--------+---------+----------------------+----------------------+----------------------+
| Date   | Type    | Department           | Care Team            | Description          |
+--------+---------+----------------------+----------------------+----------------------+
| / | Office  |   Pediatric          |   Neema Khalil,   | Immunosuppressed     |
|    | Visit   | Gastroenterology at  | [address] | status (HCC)         |
|        |         | [facility]          |   [place], [place]       | (Primary Dx);        |
|        |         | [facility]  | [address]           | Noncompliance with   |
|        |         |  [address] | [phone]         | treatment; BP (high  |
|        |         |  [address]  Mailcode:  | [phone] (Fax)   | blood pressure);     |
|        |         | [facility]  [facility]   |                      | Transplanted liver   |
|        |         | [place], [place]         |                      | ([facility] VSD           |
|        |         | [address]           |                      | (ventricular septal  |
|        |         | [phone]         |                      | defect); Aortic      |
|        |         |                      |                      | valve insufficiency  |
+--------+---------+----------------------+----------------------+----------------------+
 
 
 
 
 Social History
 
 
+-------------------+-------+-----------+--------+------+
| Tobacco Use       | Types | Packs/Day | Years  | Date |
|                   |       |           | Used   |      |
+-------------------+-------+-----------+--------+------+
| Passive Smoke     |       |           |        |      |
| Exposure - Never  |       |           |        |      |
| Smoker            |       |           |        |      |
+-------------------+-------+-----------+--------+------+
 
 
 
+---------------------+---+---+---+
| Smokeless Tobacco:  |   |   |   |
| Never Used          |   |   |   |
+---------------------+---+---+---+
 
 
 
+-------------+-------------+---------+----------+
| Alcohol Use | Drinks/Week | oz/Week | Comments |
+-------------+-------------+---------+----------+
| Not Asked   |             |         |          |
+-------------+-------------+---------+----------+
 
 
 
+------------------+---------------+
| Sex Assigned at  | Date Recorded |
| Birth            |               |
+------------------+---------------+
| Not on file      |               |
+------------------+---------------+
 
 
 
+----------------+-------------+-------------+
| Job Start Date | Occupation  | Industry    |
+----------------+-------------+-------------+
| Not on file    | Not on file | Not on file |
+----------------+-------------+-------------+
 
 
 
+----------------+--------------+------------+
| Travel History | Travel Start | Travel End |
+----------------+--------------+------------+
 
 
 
+-------------------------------------+
| No recent travel history available. |
+-------------------------------------+
 documented as of this encounter
 
 Last Filed Vital Signs
 
 
 
+-------------------+----------------------+----------------------+----------+
| Vital Sign        | Reading              | Time Taken           | Comments |
+-------------------+----------------------+----------------------+----------+
| Blood Pressure    | 141/104              | 2014  9:33 AM  |          |
|                   |                      | PDT                  |          |
+-------------------+----------------------+----------------------+----------+
| Pulse             | 71                   | 2014  9:33 AM  |          |
|                   |                      | PDT                  |          |
+-------------------+----------------------+----------------------+----------+
| Temperature       | -                    | -                    |          |
+-------------------+----------------------+----------------------+----------+
| Respiratory Rate  | -                    | -                    |          |
+-------------------+----------------------+----------------------+----------+
| Oxygen Saturation | -                    | -                    |          |
+-------------------+----------------------+----------------------+----------+
| Inhaled Oxygen    | -                    | -                    |          |
| Concentration     |                      |                      |          |
+-------------------+----------------------+----------------------+----------+
| Weight            | 72.2 kg (159 lb 2.8  | 2014  9:33 AM  |          |
|                   | oz)                  | PDT                  |          |
+-------------------+----------------------+----------------------+----------+
| Height            | 164 cm (5' 4.57")    | 2014  9:33 AM  |          |
|                   |                      | PDT                  |          |
+-------------------+----------------------+----------------------+----------+
| Body Mass Index   | 26.84                | 2014  9:33 AM  |          |
|                   |                      | PDT                  |          |
+-------------------+----------------------+----------------------+----------+
 documented in this encounter
 
 Progress Notes
 Neema Khalil MD - 2014  9:04 AM PDTFormatting of this note might be different fro
m the original.
 Referring provider: No Pcp Per PATIENT
 Primary Care Provider: No Pcp Per PATIENT
 
 [facility]
 
 Visit type: New Patient Visit 
 DOS: 2014
 
 CHIEF  COMPLAINT: Post liver transplant care first time since 
 
 HISTORY OF PRESENT ILLNESS: Nila Valdovinos is an 14 y.o. female, who is referred by Lily Horton, for post liver transplant care. She had liver transplant for biliary atresia i
2000 at [facility]. She was last seen by Dr Burkett in . In , 
it was noticed that she has not been seen, attempts to reach to family was unsuccessful. She
 was lost to follow up since that time. In 2014, our SW contacted family and they agre
ed to go to local lab for post-transplant labs and to come for clinic visit at [facility].
 She is accompanied by her father today. Today, she denies any pain, nausea, emesis, diarrhe
a or constipation. No fever.
 She reports she takes tacrolimus one pill once daily. Father reports she has not been takin
g regularly
 
 In [facility], Nila's last cardiology note is from  from Dr Lily Horton, cardiologist at [facility]. 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
mal tissue leaving a tiny VSD, 2) trace central aortic insufficiency, 3) Left atrial isomeri
sm (polysplenia). It was recommended her annual follow up for progression of aortic insuffic
iency in which case she may need closure of VSD. No further notes available in our system as
 she is lost to follow up since . Today, father says she has an appointment with cardiol
 
igor at [facility] soon. 
 
 Current Outpatient Prescriptions 
 Medication Sig 
   PROGRAF 1 mg Oral Capsule Take 1 Cap by mouth two times daily. 
 
 Allergies 
 Allergen Reactions 
   Sulfa (Sulfonamide Antibiotics) Hives and Swelling-Facial 
   Varicella Vaccines  
   Possible reaction with sulfa meds 
 
 Past Medical History 
 Diagnosis Date 
   Biliary atresia  
 
  
 Past Surgical History 
 Procedure Laterality Date 
   Liver transplant  2000 
   at [facility] 
 
 Family History 
 No liver problems 
 
 SHx: lives with family
  
 
 REVIEW OF SYSTEMS: 
 General:  No fever, fatigue, or weight loss.  
 Eyes:  No changes in vision, no eye irritation or discharge.  
 ENT:  No nosebleeds, nasal congestion, difficulty swallowing, no mouth sores. 
 Respiratory:  No shortness of breath, cough, wheezing.
 Cardiovascular: No breathing difficulty, cyanosis
 Genitourinary:  No urinary infections.  
 Neurologic:  No seizures. No headaches.  
 Musculoskeletal:  No joint pain, swelling. No back pain.  Full range of motion.
 Skin:  No itching, rash, jaundice.  
 Heme: No anemia, abnormal bleeding, easy bruising
 Lymphatic: No enlarged lymph nodes.
 Metabolic/Endo: no glucose intolerance, hypothyroidism
 Allergy/immunology: no seasonal or food allergies, no asthma
 
 All other ROS are negative.
 
  
 PHYSICAL EXAMINATION:
 
 Patient reports a pain level of  0 today. No action required.
 
 Ht 164 cm (5' 4.57") (60%, Z = 0.25), Wt 72.2 kg (159 lb 2.8 oz) (92%, Z = 1.41), /10
4, Pulse 71, BMI 26.84 kg/(m^2).
 
 APPEARANCE: alert, active and in no apparent distress. 
 SKIN:  no jaundice or rashes.  
 HEENT: normocephalic, PERRLA, mucous membranes moist. 
 NECK: supple, without thyromegaly. 
 CHEST: clear to auscultation bilaterally. 
 CV: systolic murmur at left sternal border
 ABDOMEN: soft, NTND, no hepatosplenomegaly. 
 
 BACK: without tenderness. 
 MUSCULOSKELETAL: no muscle wasting, no deformity.
 EXTREMITIES: no edema, clubbing, deformity.
 NEURO: grossly intact and appropriate to age, DTRs 2+ and symmetric
 NODES: no significant cervical, axillary or inguinal adenopathy
  
 Labs/imaging: as below
 
  ASSESSMENT and Plan: 14 yo female with
 
 279.9   Immunosuppressed status
 V15.81  Noncompliance with treatment
 401.9   BP (high blood pressure): repeat BP at the end of visit is 131/92
 V42.7   Transplanted liver
 745.4   VSD (ventricular septal defect)
 424.1   Aortic valve insufficiency
 
 Pt is lost to follow up for 5 years. Off meds, reportedly started taking tacrolimus once a 
day recently. She might be immunotolerant as she did not reject past 5 years. However this d
oesn't guarantee future rejection.
 Educated importance of compliance with meds and clinic visits. Since she is 14 years out fr
 liver transplant, her lab schedule is every 3 months, clinic visit schedule is once a beck ellis. Father says they will be able to take her to local laboratory every 3 months for blood dr
karlee and they will be able to come to [facility] when [facility] team is here next time.
 I strongly recommended to follow her BPs at PCPs office. If they continue to be persistentl
y elevated she will need to be seen by nephrology team. Hypertension is a well-known complic
ation of calcineurin inhibitors, although she has not been taking tacrolimus past couple of 
years.  
 
 - labs: CBC, CMP, GGT, magnesium, trough tacrolimus level: every 3 months
 - follow up in liver transplant clinic: annually, next in 2014.
 - cardiology fu locally and at [facility]
  
 
 At this visit: 
 Patient accompanied by: father 
  used:no 
 Psychosocial or economic issues that may affect patient's medical care or well being: nonco
mpliance 
 Co-morbid chronic medical problems that may affect procedural sedation risk: VSD
 
 Labs/imaging:
 
 Component
     Latest Ref Rng 3/26/2014 2014 
 GLUCOSE
     70 - 100 mg/dL 82 87 
 BUN  6 - 23 mg/dL 18 13 
 CREATININE
     0.40 - 1.05 mg/dL 0.81 0.75 
 TOTAL PROTEIN
     6.1 - 8.5 g/dL 7.3 7.6 
 ALBUMIN
     3.5 - 5.0 g/dL 4.2 4.4 
 CALCIUM
     8.4 - 10.2 mg/dL 9.6 9.9 
 BILIRUBIN TOTAL
     0 - 1.2  0.3 0.8 
 ALK PHOS
     135 - 384 U/L 78 (A) 92 (A) 
 
 AST(SGOT)
     13 - 39 U/L 15 13 
 SODIUM
     132 - 143 mmol/L 136 140 
 POTASSIUM
     3.6 - 5.1 mmol/L 3.6 4.0 
 CHLORIDE
     95 - 112 mmol/L 102 100 
 TOTAL CO2
     19 - 31 mmol/L 25 28 
 ALT (SGPT)
     7 - 52 U/L 12 9 
 ANION GAP
     7 - 21 12.6 16 
 BUN/CREATININE RATIO  6.0 - 28.6 22.2 17.3 
 GLOBULIN
     1.8 - 3.5 3.1 3.2 
 A/G RATIO
     1.1 - 2.4 1.4 1.4 
 WHITE CELL COUNT
  4.4 - 11.8 K/cu mm 7.8 6.6 
 RED CELL COUNT
     3.8 - 5.3 M/cu mm 4.55 4.83 
 HEMOGLOBIN
     11.1 - 15.7 g/dL 14 14.7 
 HEMATOCRIT
     32 - 47 % 40.7 42.8 
 MCV 73 - 91 fL 89.4 88.6 
 PLATELET COUNT
  140 - 440 K/cu mm 296 328 
 GGT  5 - 60 U/L 8  
 MAGNESIUM
     1.7 - 2.5 mg/dL 1.8  
 
 Neema Khalil MD
 Pediatric Gastroenterology
 Consult line: [phone] 
 
 Electronically signed by Neema Khalil MD at 2014 10:27 PM PDTdocumented in this en
counter
 
 Plan of Treatment
 Not on filedocumented as of this encounter
 
 Visit Diagnoses
 
 
+-------------------------------------------------------------------------------------+
| Diagnosis                                                                           |
+-------------------------------------------------------------------------------------+
|   Immunosuppressed status (HCC) - Primary  Unspecified disorder of immune mechanism |
+-------------------------------------------------------------------------------------+
|   Noncompliance with treatment  Personal history of noncompliance with medical      |
| treatment, presenting hazards to health                                             |
+-------------------------------------------------------------------------------------+
|   BP (high blood pressure)  Unspecified essential hypertension                      |
+-------------------------------------------------------------------------------------+
|   Transplanted liver (HCC)  Liver replaced by transplant                            |
+-------------------------------------------------------------------------------------+
|   VSD (ventricular septal defect)  Ventricular septal defect                        |
 
+-------------------------------------------------------------------------------------+
|   Aortic valve insufficiency  Aortic valve disorders                                |
+-------------------------------------------------------------------------------------+
 documented in this encounter

## 2019-11-01 NOTE — XMS
Encounter Summary
  Created on: 2019
 
 Bonifacio Nila JB
 External Reference #: [id number]
 : [birth year]
 Sex: Female
 
 Demographics
 
 
+-----------------------+------------------------+
| Address               | [address]            |
|                       | [place]  [address]   |
+-----------------------+------------------------+
| Home Phone            | [phone]        |
+-----------------------+------------------------+
| Preferred Language    | Unknown                |
+-----------------------+------------------------+
| Marital Status        | Single                 |
+-----------------------+------------------------+
| Rastafari Affiliation | Unknown                |
+-----------------------+------------------------+
| Race                  | White                  |
+-----------------------+------------------------+
| Ethnic Group          | Not  or  |
+-----------------------+------------------------+
 
 
 Author
 
 
+--------------+------------------------------+
| Author       | [facility] |
+--------------+------------------------------+
| Organization | [facility] |
+--------------+------------------------------+
| Address      | Unknown                      |
+--------------+------------------------------+
| Phone        | Unavailable                  |
+--------------+------------------------------+
 
 
 
 Support
 
 
+-----------------+--------------+---------+-----------------+
| Name            | Relationship | Address | Phone           |
+-----------------+--------------+---------+-----------------+
| Kit Valdovinos   | ECON         | Unknown | [phone] |
+-----------------+--------------+---------+-----------------+
| Magdalena Valdovinos | ECON         | Unknown | [phone] |
+-----------------+--------------+---------+-----------------+
 
 
 
 Care Team Providers
 
 
 
+------------------------+------+-----------------+
| Care Team Member Name  | Role | Phone           |
+------------------------+------+-----------------+
| Lily Horton MD | PCP  | [phone] |
+------------------------+------+-----------------+
 
 
 
 Reason for Visit
 
 
+-------------------+----------+
| Reason            | Comments |
+-------------------+----------+
| Follow-up in      |          |
| outpatient clinic |          |
+-------------------+----------+
 Office Visit - E/M Services (Routine)
 
+--------+--------------+---------------+--------------+--------------+---------------+
| Status | Reason       | Specialty     | Diagnoses /  | Referred By  | Referred To   |
|        |              |               | Procedures   | Contact      | Contact       |
+--------+--------------+---------------+--------------+--------------+---------------+
| Closed |   Specialty  | Pediatric     |              |   Non-[facility]   |   Remi,     |
|        | Services     | Gastroenterol |              | Epic Dept    | MD Neema   |
|        | Required     | ogy           |              |              | [address] |
|        |              |               |              |              |  Rd           |
|        |              |               |              |              | [place], [place]  |
|        |              |               |              |              | [address]    |
|        |              |               |              |              | Phone:        |
|        |              |               |              |              | [phone]  |
|        |              |               |              |              |  Fax:         |
|        |              |               |              |              | [phone]  |
+--------+--------------+---------------+--------------+--------------+---------------+
 
 
 
 
 Encounter Details
 
 
+--------+---------+----------------------+----------------------+----------------------+
| Date   | Type    | Department           | Care Team            | Description          |
+--------+---------+----------------------+----------------------+----------------------+
| / | Office  |   Pediatric          |   Neema Khalil,   | Transplanted liver   |
| 2014   | Visit   | Gastroenterology at  | MD  [address] | (HCC) (Primary Dx);  |
|        |         | [place]          |   [place]       | Immunosuppressed     |
|        |         | [facility]  | [address]           | status (HCC); High   |
|        |         |  [address] | [phone]         | risk medications     |
|        |         |  Naresh Ward  Mailcode:  | [phone] (Fax)   | (not anticoagulants) |
|        |         | [facility]   |                      |  long-term use; VSD  |
|        |         | [place], [place]         |                      | (ventricular septal  |
|        |         | [address]           |                      | defect),             |
|        |         | [phone]         |                      | perimembranous;      |
|        |         |                      |                      | Polysplenia; Aortic  |
|        |         |                      |                      | insufficiency;       |
|        |         |                      |                      | Aortic root          |
|        |         |                      |                      | dilatation (HCC);    |
 
|        |         |                      |                      | Interrupted inferior |
|        |         |                      |                      |  vena cava           |
+--------+---------+----------------------+----------------------+----------------------+
 
 
 
 Social History
 
 
+-------------------+-------+-----------+--------+------+
| Tobacco Use       | Types | Packs/Day | Years  | Date |
|                   |       |           | Used   |      |
+-------------------+-------+-----------+--------+------+
| Passive Smoke     |       |           |        |      |
| Exposure - Never  |       |           |        |      |
| Smoker            |       |           |        |      |
+-------------------+-------+-----------+--------+------+
 
 
 
+---------------------+---+---+---+
| Smokeless Tobacco:  |   |   |   |
| Never Used          |   |   |   |
+---------------------+---+---+---+
 
 
 
+-------------+-------------+---------+----------+
| Alcohol Use | Drinks/Week | oz/Week | Comments |
+-------------+-------------+---------+----------+
| Not Asked   |             |         |          |
+-------------+-------------+---------+----------+
 
 
 
+------------------+---------------+
| Sex Assigned at  | Date Recorded |
| Birth            |               |
+------------------+---------------+
| Not on file      |               |
+------------------+---------------+
 
 
 
+----------------+-------------+-------------+
| Job Start Date | Occupation  | Industry    |
+----------------+-------------+-------------+
| Not on file    | Not on file | Not on file |
+----------------+-------------+-------------+
 
 
 
+----------------+--------------+------------+
| Travel History | Travel Start | Travel End |
+----------------+--------------+------------+
 
 
 
+-------------------------------------+
| No recent travel history available. |
 
+-------------------------------------+
 documented as of this encounter
 
 Last Filed Vital Signs
 
 
+-------------------+---------------------+----------------------+----------+
| Vital Sign        | Reading             | Time Taken           | Comments |
+-------------------+---------------------+----------------------+----------+
| Blood Pressure    | 137/95              | 2014  3:52 PM  |          |
|                   |                     | PST                  |          |
+-------------------+---------------------+----------------------+----------+
| Pulse             | 74                  | 2014  3:52 PM  |          |
|                   |                     | PST                  |          |
+-------------------+---------------------+----------------------+----------+
| Temperature       | -                   | -                    |          |
+-------------------+---------------------+----------------------+----------+
| Respiratory Rate  | -                   | -                    |          |
+-------------------+---------------------+----------------------+----------+
| Oxygen Saturation | -                   | -                    |          |
+-------------------+---------------------+----------------------+----------+
| Inhaled Oxygen    | -                   | -                    |          |
| Concentration     |                     |                      |          |
+-------------------+---------------------+----------------------+----------+
| Weight            | 72 kg (158 lb 11.7  | 2014  3:52 PM  |          |
|                   | oz)                 | PST                  |          |
+-------------------+---------------------+----------------------+----------+
| Height            | 163.8 cm (5' 4.49") | 2014  3:52 PM  |          |
|                   |                     | PST                  |          |
+-------------------+---------------------+----------------------+----------+
| Body Mass Index   | 26.84               | 2014  3:52 PM  |          |
|                   |                     | PST                  |          |
+-------------------+---------------------+----------------------+----------+
 documented in this encounter
 
 Patient Instructions
 Patient Instructions Neema Khalil MD - 2014 10:38 AM PSTPlan: 
 
 - Target prograf level: Not established
 
 - Blood draw for labs: every  3  months
 
 - Change in medicines: None
 
 - Next appointment: 2015
 
 It was nice to see you today ! Thank you for choosing Pediatric [facility]atology [facility] at [facility]
 
 Neema Khalil MD
 
 Results of tests:  
 Test results will be sent to you by [employer]. 
 
 Calls:
 Medical emergencies:  call 911
 Non-urgent issues:  Send [employer] message
 Monday - Friday work hours: call  [phone] # [phone]
 After hours, weekends, holidays: call [phone]
 To schedule an appointment: call [phone] # [phone]
 
 For refills: call to your pharmacy a week before running out medicine
 
 Electronically signed by Neema Khalil MD at 2014  4:20 PM PST
 documented in this encounter
 
 Progress Notes
 Neema Khalil MD - 2014 10:38 AM PSTFormatting of this note might be different fro
m the original.
 Primary Care Provider: Lily Horton MD
 
 [facility] Trans[facility] 
 DOS: 2014
 Visit type: Follow-up 
 
 Patient accompanied by: mother
  used: no
 
 Chief Complaint/Reason for visit: 
 - Post-liver transplant care 
 - High risk medication management
 
 Patient Active Problem List 
 Diagnosis 
   Transplanted Liver 
   VSD (Ventricular Septal Defect) 
   Aortic Valve Insufficiency 
 
 PAST Hx/kim: Biliary atresia
 -  s/p OLT at [facility]. She was last seen by Dr Burkett in . 
In , it was noticed that she has not been seen, attempts to reach to family was unsucces
sful. lost to follow up since that time. 
 - Cardiology:  last cardiology note is from  from Dr Lily Horton, cardiologist at [facility]. Her cardiac dx includes 1) moderate perimembraneous VSD nearly occluded by aneurysma
l tissue leaving a tiny VSD, 2) trace central aortic insufficiency, 3) Left atrial isomerism
 (polysplenia). It was recommended her annual follow up for progression of aortic insufficie
ncy in which case she may need closure of VSD. No further notes available in our system as s
he is lost to follow up since .
 - 10/16/2014 s/p VSD repair, 8-mm Amplatzer Vascular Plug IV with no significant residual s
hunting noted.
 
 INTERVAL HISTORY:
 - s/p VSD closure in mid-October. Will receive abx ppx 5 more months and aspirin x 5 mo
 - did not receive flu shot, mother says they don't do flu shots
 - doing well after VSD closure
 - no fever, abdominal pain, nausea, emesis, diarrhea
 
 REVIEW OF SYSTEMS: 
 General:  No fever, fatigue, or weight loss.  
 Eyes:  No changes in vision, no eye irritation or discharge.  
 ENT:  No nosebleeds, nasal congestion, difficulty swallowing, no mouth sores. 
 Respiratory:  No shortness of breath, cough, wheezing.
 Cardiovascular:  No difficulty breathing, edema, cyanosis.  
 Genitourinary:  No urinary infections.  
 Neurologic:  No seizures. No headaches.  
 Musculoskeletal:  No joint pain, swelling. No back pain.  Full range of motion.
 Skin:  No itching, rash, jaundice.  
 Heme: No anemia, abnormal bleeding, easy bruising
 Lymphatic: No enlarged lymph nodes.
 Metabolic/Endo: no glucose intolerance, hypothyroidism
 Allergy/immunology: no seasonal or food allergies, no asthma
 
 
 All other ROS are negative.
 
  
 Current Outpatient Prescriptions 
 Medication Sig 
   PROGRAF 1 mg Oral Capsule Take 1 Cap by mouth two times daily. 
 
 Aspirin daily
 
 Allergies 
 Allergen Reactions 
   Sulfa (Sulfonamide Antibiotics) Hives and Swelling-Facial 
   Varicella Vaccines  
   Possible reaction with sulfa meds 
 
 PHYSICAL EXAMINATION: 
 
 Patient reports a pain level of 0  today. No action required.
 
 Ht 163.8 cm (5' 4.49") (58%, Z = 0.21), Wt 72 kg (158 lb 11.7 oz) (92%, Z = 1.38), /9
5, Pulse 74, BMI 26.84 kg/(m^2).
 
 APPEARANCE: alert, active and in no apparent distress. 
 SKIN: no jaundice or rashes. 
 HEENT: normocephalic, PERRLA, mucous membranes moist. 
 NECK: supple, without thyromegaly. 
 CHEST: clear to auscultation bilaterally. 
 CV: no murmurs. 
 ABDOMEN: soft, NTND, no hepatosplenomegaly. 
 BACK: without tenderness. 
 MUSCULOSKELETAL: no muscle wasting, no deformity.
 EXTREMITIES: No edema, clubbing, deformity.
 NEURO: grossly intact
 
  
 Labs/imaging: as below
 
 ASSESSMENT:  14 yo female with
 
 V42.7   Transplanted liver: normal LFTs, GGT
 279.9   Immunosuppressed status: no signs of infection
 V58.69  High risk medications (not anticoagulants) long-term use
 745.4   VSD (ventricular septal defect), perimembranous: s/p closure
 759.0   Polysplenia
 424.1   Aortic insufficiency
 447.71  Aortic root dilatation
 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
 
 PLAN/RECOMMENDATIONS:
 1) Goal prograf level: not established
 
 2) Meds to continue without change: prograf 1 mg bid
 
 4) Labs:
  - CBC w diff, CMP, GGT, magnesium, trough tacrolimus level, quantitative EBC and CMV PCR: 
every 3 months
  
 5) follow up in liver transplant clinic: in 2015, then annually
 
 
 Health Care Maintenance: 
 - No live vaccines for immunosuppressed patients  
 - Flu vaccine (intramuscular): every . 
  2 shots the first year after transplant (regardless of age)
  If late in the season, can be given next season
 - Dental care every 6 months
 
 At this visit: 
 Dr. Nazario and DHARMESH Francois from [facility] were presen
t as observers during this visit.
 Psychosocial or economic issues that may affect patient's medical care or well being:none 
 Co-morbid chronic medical problems that may affect procedural sedation risk: N/A 
 
 Labs/imaging:
 
 Component
     Latest Ref Rng 2014 
 GLUCOSE
     60-99 mg/dL 81 
 BUN  6-20 mg/dL 13 
 CREATININE     0.46-0.81 mg/dL 0.76 
 SODIUM 136-145 mmol/L 139 
 POTASSIUM
     3.4-5.0 mmol/L 3.9 
 CHLORIDE     mmol/L 105 
 TOTAL CO2
     21-32 mmol/L 30 
 CALCIUM    8.6-10.2 mg/dL 9.4 
 BILIRUBIN TOTAL
     0.3-1.2 mg/dL 0.2 (L) 
 TOTAL PROTEIN
     6.2-8.5 g/dL 7.6 
 ALBUMIN
     3.5-4.7 g/dL 3.7 
 ALK PHOS
      U/L 101 
 AST(SGOT)
     18-36 U/L 15 (L) 
 ALT (SGPT)
     12-60 U/L 18 
 ANION GAP(ALB CORRECTED)
     4-11 mmol/L 4 
 ANION GAP     4 
 BILIRUBIN DIRECT
     0.0-0.3 mg/dL <0.1 
 LD TOTAL, PLASMA
     175-285 U/L 154 (L) 
 MAGNESIUM    1.8-2.5 mg/dL 1.6 (L) 
 PHOSPHORUS
     2.4-4.7 mg/dL 3.3 
 URIC ACID    2.5-6.2 mg/dL 3.7 
 CHOLESTEROL 
     <200 mg/dL 136 
 TACROLIMUS ()  5.0-15.0 ng/mL <2.0 (L) 
 
 Neema Khalil MD
 Pediatric Gastroenterology
 Consult line: [phone] 
 
 Electronically signed by Neema Khalil MD at 2014  9:34 PM PSTdocumented in this en
 
counter
 
 Plan of Treatment
 Not on filedocumented as of this encounter
 
 Results
 TACROLIMUS, WHOLE BLOOD (2014  5:27 PM PST)
 
+-------------+----------+-------------+-------------+--------------+
| Component   | Value    | Ref Range   | Performed   | Pathologist  |
|             |          |             | At          | Signature    |
+-------------+----------+-------------+-------------+--------------+
| TACROLIMUS  | <2.0 (L) | 5.0 - 15.0  | [facility]        |              |
| ()    |          | ng/mL       | LABORATORY  |              |
|             |          |             | SERVICES,   |              |
|             |          |             | SPECIAL IMM |              |
|             |          |             |  + COAG     |              |
+-------------+----------+-------------+-------------+--------------+
 
 
 
+---------------+
| Specimen      |
+---------------+
| Blood - Blood |
+---------------+
 
 
 
+------------------------------------------------------------------------+----------------+
| Narrative                                                              | Performed At   |
+------------------------------------------------------------------------+----------------+
|         Therapeutic range is based on a whole blood specimen drawn 12  |   [facility]         |
| hours post-dose or prior to next dose (the trough). Some other factors | LABORATORY     |
|  influencing therapeutic range, dose administered, and result          | SERVICES,      |
| interpretation include time since transplantation, the organ           | SPECIAL IMM +  |
| transplanted, co-administration of other immunosuppressants and        | COAG           |
| interaction with other drugs that may increase or decrease Tacrolimus  |                |
| concentrations.                                                        |                |
+------------------------------------------------------------------------+----------------+
 
 
 
+--------------------+------------------------+--------------------+--------------+
| Performing         | Address                | City/State/Zipcode | Phone Number |
| Organization       |                        |                    |              |
+--------------------+------------------------+--------------------+--------------+
|   [facility] LAB[facility]AT[facility]  |   [address]  | [place] [address] |              |
| SERVICES, SPECIAL  | [address]                |                    |              |
| IMM + COAG         |                        |                    |              |
+--------------------+------------------------+--------------------+--------------+
 CHOLESTEROL TOTAL, PLASMA (2014  5:27 PM PST)
 
+-------------+-------+------------+-------------+--------------+
| Component   | Value | Ref Range  | Performed   | Pathologist  |
|             |       |            | At          | Signature    |
+-------------+-------+------------+-------------+--------------+
| CHOLESTEROL | 136   | <200 mg/dL | [facility]        |              |
|   (LAB)     |       |            | LABORATORY  |              |
|             |       |            | SERVICES,   |              |
 
|             |       |            | CORE        |              |
+-------------+-------+------------+-------------+--------------+
 
 
 
+---------------+
| Specimen      |
+---------------+
| Blood - Blood |
+---------------+
 
 
 
+--------------------+------------------------+--------------------+--------------+
| Performing         | Address                | City/State/Zipcode | Phone Number |
| Organization       |                        |                    |              |
+--------------------+------------------------+--------------------+--------------+
|   [facility] LAB[facility]  |   [address]  | [place] [address] |              |
| SERVICES, CORE     | [address]                |                    |              |
+--------------------+------------------------+--------------------+--------------+
 LDH TOTAL, PLASMA (2014  5:27 PM PST)
 
+------------+---------+---------------+-------------+--------------+
| Component  | Value   | Ref Range     | Performed   | Pathologist  |
|            |         |               | At          | Signature    |
+------------+---------+---------------+-------------+--------------+
| LD TOTAL,  | 154 (L) | 175 - 285 U/L | [facility]        |              |
| PLASMA     |         |               | LABORATORY  |              |
|            |         |               | SERVICES,   |              |
|            |         |               | CORE        |              |
+------------+---------+---------------+-------------+--------------+
| LD CMNT    | No Hemo |               | [facility]        |              |
|            |         |               | LABORATORY  |              |
|            |         |               | SERVICES,   |              |
|            |         |               | CORE        |              |
+------------+---------+---------------+-------------+--------------+
 
 
 
+---------------+
| Specimen      |
+---------------+
| Blood - Blood |
+---------------+
 
 
 
+-----------------------------------------------------------------------+----------------+
| Narrative                                                             | Performed At   |
+-----------------------------------------------------------------------+----------------+
|      New reference range effective 2013 for LD Total performed  |   [facility]         |
| in Core Lab only.                                                     | LABORATORY     |
|                                                                       | SERVICES, CORE |
+-----------------------------------------------------------------------+----------------+
 
 
 
+--------------------+------------------------+--------------------+--------------+
| Performing         | Address                | City/State/Zipcode | Phone Number |
| Organization       |                        |                    |              |
 
+--------------------+------------------------+--------------------+--------------+
|   [facility]  |   [address]  | [place] [address] |              |
| SERVICES, CORE     | [address]                |                    |              |
+--------------------+------------------------+--------------------+--------------+
 BILIRUBIN DIRECT (2014  5:27 PM PST)
 
+-------------+---------+-----------------+-------------+--------------+
| Component   | Value   | Ref Range       | Performed   | Pathologist  |
|             |         |                 | At          | Signature    |
+-------------+---------+-----------------+-------------+--------------+
| BILIRUBIN   | <0.1    | 0.0 - 0.3 mg/dL | [facility]        |              |
| DIRECT      |         |                 | LABORATORY  |              |
|             |         |                 | SERVICES,   |              |
|             |         |                 | CORE        |              |
+-------------+---------+-----------------+-------------+--------------+
| BILI D CMNT | No Hemo |                 | [facility]        |              |
|             |         |                 | LABORATORY  |              |
|             |         |                 | SERVICES,   |              |
|             |         |                 | CORE        |              |
+-------------+---------+-----------------+-------------+--------------+
 
 
 
+---------------+
| Specimen      |
+---------------+
| Blood - Blood |
+---------------+
 
 
 
+--------------------+------------------------+--------------------+--------------+
| Performing         | Address                | City/State/Zipcode | Phone Number |
| Organization       |                        |                    |              |
+--------------------+------------------------+--------------------+--------------+
|   [facility] LAB[facility]  |   [address]  | [place] [address] |              |
| SERVICES, CORE     | [address]                |                    |              |
+--------------------+------------------------+--------------------+--------------+
 URIC ACID, PLASMA (2014  5:27 PM PST)
 
+-------------+-------+-----------------+-------------+--------------+
| Component   | Value | Ref Range       | Performed   | Pathologist  |
|             |       |                 | At          | Signature    |
+-------------+-------+-----------------+-------------+--------------+
| URIC ACID,  | 3.7   | 2.5 - 6.2 mg/dL | [facility]        |              |
| PLASMA      |       |                 | LABORATORY  |              |
| (LAB)       |       |                 | SERVICES,   |              |
|             |       |                 | CORE        |              |
+-------------+-------+-----------------+-------------+--------------+
 
 
 
+---------------+
| Specimen      |
+---------------+
| Blood - Blood |
+---------------+
 
 
 
 
+--------------------+------------------------+--------------------+--------------+
| Performing         | Address                | City/State/Zipcode | Phone Number |
| Organization       |                        |                    |              |
+--------------------+------------------------+--------------------+--------------+
|   [facility]  |   [address]  | [place] [address] |              |
| SERVICES, CORE     | [address]                |                    |              |
+--------------------+------------------------+--------------------+--------------+
 PHOSPHORUS, PLASMA (2014  5:27 PM PST)
 
+-------------+-------+-----------------+-------------+--------------+
| Component   | Value | Ref Range       | Performed   | Pathologist  |
|             |       |                 | At          | Signature    |
+-------------+-------+-----------------+-------------+--------------+
| PHOSPHORUS, | 3.3   | 2.4 - 4.7 mg/dL | [facility]        |              |
|  PLASMA     |       |                 | LABORATORY  |              |
| (LAB)       |       |                 | SERVICES,   |              |
|             |       |                 | CORE        |              |
+-------------+-------+-----------------+-------------+--------------+
 
 
 
+---------------+
| Specimen      |
+---------------+
| Blood - Blood |
+---------------+
 
 
 
+--------------------+------------------------+--------------------+--------------+
| Performing         | Address                | City/State/Zipcode | Phone Number |
| Organization       |                        |                    |              |
+--------------------+------------------------+--------------------+--------------+
|   [facility]  |   [address]  | [place] [address] |              |
| SERVICES, CORE     | [address]                |                    |              |
+--------------------+------------------------+--------------------+--------------+
 MAGNESIUM, PLASMA (2014  5:27 PM PST)
 
+-------------+---------+-----------------+-------------+--------------+
| Component   | Value   | Ref Range       | Performed   | Pathologist  |
|             |         |                 | At          | Signature    |
+-------------+---------+-----------------+-------------+--------------+
| MAGNESIUM,P | 1.6 (L) | 1.8 - 2.5 mg/dL | [facility]        |              |
| LASMA       |         |                 | LABORATORY  |              |
|             |         |                 | SERVICES,   |              |
|             |         |                 | CORE        |              |
+-------------+---------+-----------------+-------------+--------------+
 
 
 
+---------------+
| Specimen      |
+---------------+
| Blood - Blood |
+---------------+
 
 
 
+--------------------+------------------------+--------------------+--------------+
| Performing         | Address                | City/State/Zipcode | Phone Number |
 
| Organization       |                        |                    |              |
+--------------------+------------------------+--------------------+--------------+
|   [facility]  |   [address]  | [place] [address] |              |
| SERVICES, CORE     | [address]                |                    |              |
+--------------------+------------------------+--------------------+--------------+
 COMPLETE METABOLIC SET (NA,K,CL,CO2,BUN,CREAT,GLUC,CA,AST,ALT,BILI TOTAL,ALK PHOS,ALB,PROT 
TOTAL) (2014  5:27 PM PST)
 
+-------------+---------+-----------------+-------------+--------------+
| Component   | Value   | Ref Range       | Performed   | Pathologist  |
|             |         |                 | At          | Signature    |
+-------------+---------+-----------------+-------------+--------------+
| GLUCOSE,    | 81      | 60 - 99 mg/dL   | [facility]        |              |
| PLASMA      |         |                 | LABORATORY  |              |
| (LAB)       |         |                 | SERVICES,   |              |
|             |         |                 | CORE        |              |
+-------------+---------+-----------------+-------------+--------------+
| BUN, PLASMA | 13      | 6 - 20 mg/dL    | [facility]        |              |
|  (LAB)      |         |                 | LABORATORY  |              |
|             |         |                 | SERVICES,   |              |
|             |         |                 | CORE        |              |
+-------------+---------+-----------------+-------------+--------------+
| CREATININE  | 0.76    | 0.46 - 0.81     | [facility]        |              |
| PLASMA      |         | mg/dL           | LABORATORY  |              |
| (LAB)       |         |                 | SERVICES,   |              |
|             |         |                 | CORE        |              |
+-------------+---------+-----------------+-------------+--------------+
| SODIUM,     | 139     | 136 - 145       | [facility]        |              |
| PLASMA      |         | mmol/L          | LABORATORY  |              |
| (LAB)       |         |                 | SERVICES,   |              |
|             |         |                 | CORE        |              |
+-------------+---------+-----------------+-------------+--------------+
| POTASSIUM,  | 3.9     | 3.4 - 5.0       | [facility]        |              |
| PLASMA      |         | mmol/L          | LABORATORY  |              |
| (LAB)       |         |                 | SERVICES,   |              |
|             |         |                 | CORE        |              |
+-------------+---------+-----------------+-------------+--------------+
| CHLORIDE,   | 105     | 97 - 108 mmol/L | [facility]        |              |
| PLASMA      |         |                 | LABORATORY  |              |
| (LAB)       |         |                 | SERVICES,   |              |
|             |         |                 | CORE        |              |
+-------------+---------+-----------------+-------------+--------------+
| TOTAL CO2,  | 30      | 21 - 32 mmol/L  | [facility]        |              |
| PLASMA      |         |                 | LABORATORY  |              |
| (LAB)       |         |                 | SERVICES,   |              |
|             |         |                 | CORE        |              |
+-------------+---------+-----------------+-------------+--------------+
| CALCIUM,    | 9.4     | 8.6 - 10.2      | [facility]        |              |
| PLASMA      |         | mg/dL           | LABORATORY  |              |
| (LAB)       |         |                 | SERVICES,   |              |
|             |         |                 | CORE        |              |
+-------------+---------+-----------------+-------------+--------------+
| BILIRUBIN   | 0.2 (L) | 0.3 - 1.2 mg/dL | [facility]        |              |
| TOTAL       |         |                 | LABORATORY  |              |
|             |         |                 | SERVICES,   |              |
|             |         |                 | CORE        |              |
+-------------+---------+-----------------+-------------+--------------+
| TOTAL       | 7.6     | 6.2 - 8.5 g/dL  | [facility]        |              |
| PROTEIN,    |         |                 | LABORATORY  |              |
| PLASMA      |         |                 | SERVICES,   |              |
 
| (LAB)       |         |                 | CORE        |              |
+-------------+---------+-----------------+-------------+--------------+
| ALBUMIN,    | 3.7     | 3.5 - 4.7 g/dL  | [facility]        |              |
| PLASMA      |         |                 | LABORATORY  |              |
| (LAB)       |         |                 | SERVICES,   |              |
|             |         |                 | CORE        |              |
+-------------+---------+-----------------+-------------+--------------+
| ALK PHOS    | 101     | 42 - 110 U/L    | [facility]        |              |
|             |         |                 | LABORATORY  |              |
|             |         |                 | SERVICES,   |              |
|             |         |                 | CORE        |              |
+-------------+---------+-----------------+-------------+--------------+
| AST(SGOT)   | 15 (L)  | 18 - 36 U/L     | [facility]        |              |
|             |         |                 | LABORATORY  |              |
|             |         |                 | SERVICES,   |              |
|             |         |                 | CORE        |              |
+-------------+---------+-----------------+-------------+--------------+
| ALT (SGPT)  | 18      | 12 - 60 U/L     | [facility]        |              |
|             |         |                 | LABORATORY  |              |
|             |         |                 | SERVICES,   |              |
|             |         |                 | CORE        |              |
+-------------+---------+-----------------+-------------+--------------+
| ANION       | 4       | 4 - 11 mmol/L   | [facility]        |              |
| GAP(ALB     |         |                 | LABORATORY  |              |
| CORRECTED)  |         |                 | SERVICES,   |              |
|             |         |                 | CORE        |              |
+-------------+---------+-----------------+-------------+--------------+
| POTASSIUM   | No Hemo |                 | [facility]        |              |
| CMNT        |         |                 | LABORATORY  |              |
|             |         |                 | SERVICES,   |              |
|             |         |                 | CORE        |              |
+-------------+---------+-----------------+-------------+--------------+
| BILI T CMNT | No Hemo |                 | [facility]        |              |
|             |         |                 | LABORATORY  |              |
|             |         |                 | SERVICES,   |              |
|             |         |                 | CORE        |              |
+-------------+---------+-----------------+-------------+--------------+
| AST CMNT    | No Hemo |                 | [facility]        |              |
|             |         |                 | LABORATORY  |              |
|             |         |                 | SERVICES,   |              |
|             |         |                 | CORE        |              |
+-------------+---------+-----------------+-------------+--------------+
| ANION GAP   | 4       | mmol/L          | [facility]        |              |
|             |         |                 | LABORATORY  |              |
|             |         |                 | SERVICES,   |              |
|             |         |                 | CORE        |              |
+-------------+---------+-----------------+-------------+--------------+
 
 
 
+---------------+
| Specimen      |
+---------------+
| Blood - Blood |
+---------------+
 
 
 
+--------------------+------------------------+--------------------+--------------+
| Performing         | Address                | City/State/Zipcode | Phone Number |
 
| Organization       |                        |                    |              |
+--------------------+------------------------+--------------------+--------------+
|   [facility]  |   [address]  | [place] [address] |              |
| SERVICES, CORE     | [address]                |                    |              |
+--------------------+------------------------+--------------------+--------------+
 documented in this encounter
 
 Visit Diagnoses
 
 
+--------------------------------------------------------------------------------------+
| Diagnosis                                                                            |
+--------------------------------------------------------------------------------------+
|   Transplanted liver (HCC) - Primary  Liver replaced by transplant                   |
+--------------------------------------------------------------------------------------+
|   Immunosuppressed status (HCC)  Unspecified disorder of immune mechanism            |
+--------------------------------------------------------------------------------------+
|   High risk medications (not anticoagulants) long-term use  Encounter for long-term  |
| (current) use of other medications                                                   |
+--------------------------------------------------------------------------------------+
|   VSD (ventricular septal defect), perimembranous  Ventricular septal defect         |
+--------------------------------------------------------------------------------------+
|   Polysplenia  Congenital anomalies of spleen                                        |
+--------------------------------------------------------------------------------------+
|   Aortic insufficiency  Aortic valve disorders                                       |
+--------------------------------------------------------------------------------------+
|   Aortic root dilatation (HCC)  Thoracic aortic ectasia                              |
+--------------------------------------------------------------------------------------+
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
+--------------------------------------------------------------------------------------+
 documented in this encounter

## 2019-11-01 NOTE — XMS
Encounter Summary
  Created on: 2019
 
 Bonifacio Nila JB
 External Reference #: [id number]
 : [birth year]
 Sex: Female
 
 Demographics
 
 
+-----------------------+------------------------+
| Address               | [address]            |
|                       | [place]  [address]   |
+-----------------------+------------------------+
| Home Phone            | [phone]        |
+-----------------------+------------------------+
| Preferred Language    | Unknown                |
+-----------------------+------------------------+
| Marital Status        | Single                 |
+-----------------------+------------------------+
| Muslim Affiliation | Unknown                |
+-----------------------+------------------------+
| Race                  | White                  |
+-----------------------+------------------------+
| Ethnic Group          | Not  or  |
+-----------------------+------------------------+
 
 
 Author
 
 
+--------------+------------------------------+
| Author       | [facility] |
+--------------+------------------------------+
| Organization | [facility] |
+--------------+------------------------------+
| Address      | Unknown                      |
+--------------+------------------------------+
| Phone        | Unavailable                  |
+--------------+------------------------------+
 
 
 
 Support
 
 
+-----------------+--------------+---------+-----------------+
| Name            | Relationship | Address | Phone           |
+-----------------+--------------+---------+-----------------+
| Kit Valdovinos   | ECON         | Unknown | [phone] |
+-----------------+--------------+---------+-----------------+
| Magdalena Valdovinos | ECON         | Unknown | [phone] |
+-----------------+--------------+---------+-----------------+
 
 
 
 Care Team Providers
 
 
 
+-----------------------+------+-------------+
| Care Team Member Name | Role | Phone       |
+-----------------------+------+-------------+
 PCP  | Unavailable |
+-----------------------+------+-------------+
 
 
 
 Encounter Details
 
 
+--------+----------+----------------------+----------------------+-------------+
| Date   | Type     | Department           | Care Team            | Description |
+--------+----------+----------------------+----------------------+-------------+
| / | Abstract |   Pediatric          |   Neema Khalil,   |             |
|    |          | Gastroenterology at  | [address] |             |
|        |          | [place]          |   [place], [place]       |             |
|        |          | [facility]  | [address]           |             |
|        |          |  [address] | [phone]         |             |
|        |          |  [address]  Mailcode:  | [phone] (Fax)   |             |
|        |          | [facility]  [address]   |                      |             |
|        |          | [place], [place]         |                      |             |
|        |          | [address]           |                      |             |
|        |          | [phone]         |                      |             |
+--------+----------+----------------------+----------------------+-------------+
 
 
 
 Social History
 
 
+----------------+-------+-----------+--------+------+
| Tobacco Use    | Types | Packs/Day | Years  | Date |
|                |       |           | Used   |      |
+----------------+-------+-----------+--------+------+
| Never Assessed |       |           |        |      |
+----------------+-------+-----------+--------+------+
 
 
 
+------------------+---------------+
| Sex Assigned at  | Date Recorded |
| Birth            |               |
+------------------+---------------+
| Not on file      |               |
+------------------+---------------+
 
 
 
+----------------+-------------+-------------+
| Job Start Date | Occupation  | Industry    |
+----------------+-------------+-------------+
| Not on file    | Not on file | Not on file |
+----------------+-------------+-------------+
 
 
 
+----------------+--------------+------------+
 
| Travel History | Travel Start | Travel End |
+----------------+--------------+------------+
 
 
 
+-------------------------------------+
| No recent travel history available. |
+-------------------------------------+
 documented as of this encounter
 
 Plan of Treatment
 Not on filedocumented as of this encounter
 
 Visit Diagnoses
 Not on filedocumented in this encounter

## 2019-11-01 NOTE — XMS
Encounter Summary
  Created on: 2019
 
 Bonifacio Nila JB
 External Reference #: [id number]
 : [birth year]
 Sex: Female
 
 Demographics
 
 
+-----------------------+------------------------+
| Address               | [address]            |
|                       | [place]  [address]   |
+-----------------------+------------------------+
| Home Phone            | [phone]        |
+-----------------------+------------------------+
| Preferred Language    | Unknown                |
+-----------------------+------------------------+
| Marital Status        | Single                 |
+-----------------------+------------------------+
| Worship Affiliation | Unknown                |
+-----------------------+------------------------+
| Race                  | White                  |
+-----------------------+------------------------+
| Ethnic Group          | Not  or  |
+-----------------------+------------------------+
 
 
 Author
 
 
+--------------+-------------+
| Organization | Unknown     |
+--------------+-------------+
| Address      | Unknown     |
+--------------+-------------+
| Phone        | Unavailable |
+--------------+-------------+
 
 
 
 Support
 
 
+-----------------+--------------+---------+-----------------+
| Name            | Relationship | Address | Phone           |
+-----------------+--------------+---------+-----------------+
| Kit Valdovinos   | ECON         | Unknown | [phone] |
+-----------------+--------------+---------+-----------------+
| Magdalena Valdovinos | ECON         | Unknown | [phone] |
+-----------------+--------------+---------+-----------------+
 
 
 
 Care Team Providers
 
 
 
+-----------------------+------+-----------------+
| Care Team Member Name | Role | Phone           |
+-----------------------+------+-----------------+
| Lily Horton MD   | PCP  | [phone] |
+-----------------------+------+-----------------+
 
 
 
 Encounter Details
 
 
+--------+-------------+------------+--------------------+-------------+
| Date   | Type        | Department | Care Team          | Description |
+--------+-------------+------------+--------------------+-------------+
| 10/28/ | Transcribed |            |   Dictation, Other | Transcribed |
|    |             |            |                    |             |
+--------+-------------+------------+--------------------+-------------+
 
 
 
 Social History
 
 
+----------------+-------+-----------+--------+------+
| Tobacco Use    | Types | Packs/Day | Years  | Date |
|                |       |           | Used   |      |
+----------------+-------+-----------+--------+------+
| Never Assessed |       |           |        |      |
+----------------+-------+-----------+--------+------+
 
 
 
+------------------+---------------+
| Sex Assigned at  | Date Recorded |
| Birth            |               |
+------------------+---------------+
| Not on file      |               |
+------------------+---------------+
 
 
 
+----------------+-------------+-------------+
| Job Start Date | Occupation  | Industry    |
+----------------+-------------+-------------+
| Not on file    | Not on file | Not on file |
+----------------+-------------+-------------+
 
 
 
+----------------+--------------+------------+
| Travel History | Travel Start | Travel End |
+----------------+--------------+------------+
 
 
 
+-------------------------------------+
| No recent travel history available. |
+-------------------------------------+
 documented as of this encounter
 
 
 Progress Notes
 Interface, Transcription In - 12/10/2006  3:10 AM [address] [address] [phone] or [phone]
 
 2000
 
 Lily Horton M.D.
 [address] [place] [address] 14
 [place]   [address]
 
 RE:   NILA VALDOVINOS
 MR #: [id number]
 
 Dear Sol:
 
 Nila was evaluated at [facility] in the Pediatric Liver Transplantation Clinic
 for the first time today by members of the [facility]
 [facility] which included Jorge Aguirre M.D. and Becca Marsh R.N.,
 Pediatric Liver Transplant Coordinator.  I presented Nila to the team in
 place of Dr. Bailey.  Briefly as you know, Nila is an 7-year-old female
 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
 heart disease.  This included left atrial isomerism, interrupted IVC, left
 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
 There were some other mild abnormalities found on angiography on 1999, (this information has been forwarded from the Cardiologist at
 [facility] to the cardiologists at [facility]).  She was eventually
 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  It required revision two days later, and she seemed to respond
 well to this with a fall in her bilirubin.  By , her bilirubin was 1.8
 and she was doing reasonably well.  She is doing reasonably well with
 colored stools.  Unfortunately in August, she developed jaundice with no
 fever.  Her white count was 20,000 and her delta bilirubin was elevated
 She was admitted to [facility] for a treatment of possible cholangitis.
 Unfortunately, her bilirubin did not come down and it has become clear that
 her Kasai procedure has failed.  Additional problems that have developed
 over the last few months include poor weight gain and growth requiring
 nocturnal supplementation, variceal bleeding, and apparent cirrhosis with
 resultant portal hypertension.  Approximately two weeks ago, she developed
 hematemesis and melena and required two transfusions with packed red blood
 cells.  Endoscopy showed incipient esophageal varices.
 
 She is on multiple medications including Actigall, Zantac, vitamin A, D, E,
 K, and iron.
 
 Her parents live in [place], [place] approximately four hours from
 [place].  They and the rest of the family are quite committed Nila's
 medical care.
 On examination today, she weighs 5.76 kg and measures 65 cm.  She is mildly
 icteric with an NG tube in place.  Her overall appearance is petite.  Her
 respirations are unlabored.  Her abdomen is soft and full with a large,
 hard nodular liver that extends 4-5 cm below the right costal margin.
 Spleen is also enlarged in the left upper quadrant descending approximately
 6 cm below the left costal margin.  Her umbilicus protrudes slightly and
 there certainly is a suggestion of ascites.  Her extremities are rather
 thin.  She is an alert and responsive child.
 
 
 Recent laboratory tests show a white blood count of 8.8, hematocrit of 27%,
 platelet count of 168k, sodium of 126, chloride 97, potassium 4.6, CO2 15,
 glucose 72, calcium 8.9, BUN 11, creatinine 0.2, , , alkaline
 phosphatase 1.017, gamma-GT 1250, total bilirubin 6.1 (mostly direct), and
 an albumin of 3.4.  Recent vitamin levels show adequate A and K but
 deficiency in D and E.  Her doses of D and E were recently to treat these
 deficiencies.
 
 In summary, Nila is an 7-year-old white female with rather progressed
 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
 biliary atresia and a failed Kasai procedure.  She is at an increased risk
 of even more progressive liver disease over the next 3 to 12 months
 resulting in death without liver transplantation.  She clearly needs to be
 listed and the transplant team intends to do this soon.  During the later
 half of the visit today at [facility], the family spent conversing with
 transplant coordinator regarding many of the practical aspects of getting
 one listed for liver transplantation.  In addition to category transplants,
 the living-related donor program was discussed.  Assuming a suitable donor
 can be identified, Nila is a good candidate for this program given the
 distance that the family lives from the Transplant Center and the degree of
 liver disease present.
 
 Dr. Bailey will continue to follow Nila closely and act as the liaison
 between the family and the Transplant Center.  Please let me know if you
 have any questions.
 
 Eagle De Luna M.D.
 
 [facility] / 
 [id number] / [id number] / [id number] /
 D: 2000
 T: 2000
 
 cc:
 
 Kevon Bailey M.D.
 [address] [place] [address]   [id number]
 
 Jorge Aguirre M.D.
 [address] [place] [address]   [id number]
 
 Allen Covington M.D.
 [address] [place] [address]   [id number]
 
 [id number] signed by Interface, Transcription In at 12/10/2006  3:10 AM PSTdocume
nted in this encounter
 
 Plan of Treatment
 Not on filedocumented as of this encounter
 
 Visit Diagnoses
 Not on filedocumented in this encounter

## 2019-11-01 NOTE — XMS
Encounter Summary
  Created on: 2019
 
 Bonifacio Nila JB
 External Reference #: [id number]
 : [birth year]
 Sex: Female
 
 Demographics
 
 
+-----------------------+------------------------+
| Address               | [address]            |
|                       | [place]  [address]   |
+-----------------------+------------------------+
| Home Phone            | [phone]        |
+-----------------------+------------------------+
| Preferred Language    | Unknown                |
+-----------------------+------------------------+
| Marital Status        | Single                 |
+-----------------------+------------------------+
| Adventism Affiliation | Unknown                |
+-----------------------+------------------------+
| Race                  | White                  |
+-----------------------+------------------------+
| Ethnic Group          | Not  or  |
+-----------------------+------------------------+
 
 
 Author
 
 
+--------------+------------------------------+
| Author       | [facility] |
+--------------+------------------------------+
| Organization | [facility] |
+--------------+------------------------------+
| Address      | Unknown                      |
+--------------+------------------------------+
| Phone        | Unavailable                  |
+--------------+------------------------------+
 
 
 
 Support
 
 
+-----------------+--------------+---------+-----------------+
| Name            | Relationship | Address | Phone           |
+-----------------+--------------+---------+-----------------+
| Kit Valdovinos   | ECON         | Unknown | [phone] |
+-----------------+--------------+---------+-----------------+
| Magdalena Valdovinos | ECON         | Unknown | [phone] |
+-----------------+--------------+---------+-----------------+
 
 
 
 Care Team Providers
 
 
 
+-----------------------+------+-------------+
| Care Team Member Name | Role | Phone       |
+-----------------------+------+-------------+
| No Pcp Per Patient    | PCP  | Unavailable |
+-----------------------+------+-------------+
 
 
 
 Reason for Visit
 
 
+-------------------+----------+
| Reason            | Comments |
+-------------------+----------+
| Follow-up in      |          |
| outpatient clinic |          |
+-------------------+----------+
 Office Visit - E/M Services (Routine)
 
+--------+--------------+---------------+--------------+--------------+---------------+
| Status | Reason       | Specialty     | Diagnoses /  | Referred By  | Referred To   |
|        |              |               | Procedures   | Contact      | Contact       |
+--------+--------------+---------------+--------------+--------------+---------------+
| Closed |   Specialty  | Pediatric     |              |   Non-[facility]   |   Remi,     |
|        | Services     | Gastroenterol |              | Epic Dept    | MD Neema   |
|        | Required     | ogy           |              |              | [address] |
|        |              |               |              |              |  Ed           |
|        |              |               |              |              | [place], [place]  |
|        |              |               |              |              | [address]    |
|        |              |               |              |              | Phone:        |
|        |              |               |              |              | [phone]  |
|        |              |               |              |              |  Fax:         |
|        |              |               |              |              | [phone]  |
+--------+--------------+---------------+--------------+--------------+---------------+
 
 
 
 
 Encounter Details
 
 
+--------+---------+----------------------+----------------------+----------------------+
| Date   | Type    | Department           | Care Team            | Description          |
+--------+---------+----------------------+----------------------+----------------------+
| / | Office  |   Pediatric          |   Neema Khalil,   | Immunosuppressed     |
|    | Visit   | Gastroenterology at  | MD [address] | status (HCC)         |
|        |         | [facility]          |   [place], [place]       | (Primary Dx);        |
|        |         | [facility]  | [address]           | Noncompliance with   |
|        |         |  [address] | [phone]         | treatment; BP (high  |
|        |         |  [address]  Mailcode:  | [phone] (Fax)   | blood pressure);     |
|        |         | [facility]  [facility]   |                      | Transplanted liver   |
|        |         | [place], [place]         |                      | ([facility]); VSD           |
|        |         | [address]           |                      | (ventricular septal  |
|        |         | [phone]         |                      | defect); Aortic      |
|        |         |                      |                      | valve insufficiency  |
+--------+---------+----------------------+----------------------+----------------------+
 
 
 
 
 Social History
 
 
+-------------------+-------+-----------+--------+------+
| Tobacco Use       | Types | Packs/Day | Years  | Date |
|                   |       |           | Used   |      |
+-------------------+-------+-----------+--------+------+
| Passive Smoke     |       |           |        |      |
| Exposure - Never  |       |           |        |      |
| Smoker            |       |           |        |      |
+-------------------+-------+-----------+--------+------+
 
 
 
+---------------------+---+---+---+
| Smokeless Tobacco:  |   |   |   |
| Never Used          |   |   |   |
+---------------------+---+---+---+
 
 
 
+-------------+-------------+---------+----------+
| Alcohol Use | Drinks/Week | oz/Week | Comments |
+-------------+-------------+---------+----------+
| Not Asked   |             |         |          |
+-------------+-------------+---------+----------+
 
 
 
+------------------+---------------+
| Sex Assigned at  | Date Recorded |
| Birth            |               |
+------------------+---------------+
| Not on file      |               |
+------------------+---------------+
 
 
 
+----------------+-------------+-------------+
| Job Start Date | Occupation  | Industry    |
+----------------+-------------+-------------+
| Not on file    | Not on file | Not on file |
+----------------+-------------+-------------+
 
 
 
+----------------+--------------+------------+
| Travel History | Travel Start | Travel End |
+----------------+--------------+------------+
 
 
 
+-------------------------------------+
| No recent travel history available. |
+-------------------------------------+
 documented as of this encounter
 
 Last Filed Vital Signs
 
 
 
+-------------------+----------------------+----------------------+----------+
| Vital Sign        | Reading              | Time Taken           | Comments |
+-------------------+----------------------+----------------------+----------+
| Blood Pressure    | 141/104              | 2014  9:33 AM  |          |
|                   |                      | PDT                  |          |
+-------------------+----------------------+----------------------+----------+
| Pulse             | 71                   | 2014  9:33 AM  |          |
|                   |                      | PDT                  |          |
+-------------------+----------------------+----------------------+----------+
| Temperature       | -                    | -                    |          |
+-------------------+----------------------+----------------------+----------+
| Respiratory Rate  | -                    | -                    |          |
+-------------------+----------------------+----------------------+----------+
| Oxygen Saturation | -                    | -                    |          |
+-------------------+----------------------+----------------------+----------+
| Inhaled Oxygen    | -                    | -                    |          |
| Concentration     |                      |                      |          |
+-------------------+----------------------+----------------------+----------+
| Weight            | 72.2 kg (159 lb 2.8  | 2014  9:33 AM  |          |
|                   | oz)                  | PDT                  |          |
+-------------------+----------------------+----------------------+----------+
| Height            | 164 cm (5' 4.57")    | 2014  9:33 AM  |          |
|                   |                      | PDT                  |          |
+-------------------+----------------------+----------------------+----------+
| Body Mass Index   | 26.84                | 2014  9:33 AM  |          |
|                   |                      | PDT                  |          |
+-------------------+----------------------+----------------------+----------+
 documented in this encounter
 
 Progress Notes
 Neema Khalil MD - 2014  9:04 AM PDTFormatting of this note might be different fro
m the original.
 Referring provider: No Pcp Per PATIENT
 Primary Care Provider: No Pcp Per PATIENT
 
 Pediatric Hepatology [facility]
 
 Visit type: New Patient Visit 
 DOS: 2014
 
 CHIEF  COMPLAINT: Post liver transplant care first time since 
 
 HISTORY OF PRESENT ILLNESS: Nila Valdovinos is an 14 y.o. female, who is referred by Lily Horton, for post liver transplant care. She had liver transplant for biliary atresia i
2000 at [facility]. She was last seen by Dr Burkett in . In , 
it was noticed that she has not been seen, attempts to reach to family was unsuccessful. She
 was lost to follow up since that time. In 2014, our SW contacted family and they agre
ed to go to local lab for post-transplant labs and to come for clinic visit at [facility].
 She is accompanied by her father today. Today, she denies any pain, nausea, emesis, diarrhe
a or constipation. No fever.
 She reports she takes tacrolimus one pill once daily. Father reports she has not been takin
g regularly
 
 In [employer] Nila's last cardiology note is from  from Dr Lily Horton, cardiologist at [facility]. 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
mal tissue leaving a tiny VSD, 2) trace central aortic insufficiency, 3) Left atrial isomeri
sm (polysplenia). It was recommended her annual follow up for progression of aortic insuffic
iency in which case she may need closure of VSD. No further notes available in our system as
 she is lost to follow up since . Today, father says she has an appointment with cardiol
 
igor at [facility] soon. 
 
 Current Outpatient Prescriptions 
 Medication Sig 
   PROGRAF 1 mg Oral Capsule Take 1 Cap by mouth two times daily. 
 
 Allergies 
 Allergen Reactions 
   Sulfa (Sulfonamide Antibiotics) Hives and Swelling-Facial 
   Varicella Vaccines  
   Possible reaction with sulfa meds 
 
 Past Medical History 
 Diagnosis Date 
   Biliary atresia  
 
  
 Past Surgical History 
 Procedure Laterality Date 
   Liver transplant  2000 
   at [facility] 
 
 Family History 
 No liver problems 
 
 SHx: lives with family
  
 
 REVIEW OF SYSTEMS: 
 General:  No fever, fatigue, or weight loss.  
 Eyes:  No changes in vision, no eye irritation or discharge.  
 ENT:  No nosebleeds, nasal congestion, difficulty swallowing, no mouth sores. 
 Respiratory:  No shortness of breath, cough, wheezing.
 Cardiovascular: No breathing difficulty, cyanosis
 Genitourinary:  No urinary infections.  
 Neurologic:  No seizures. No headaches.  
 Musculoskeletal:  No joint pain, swelling. No back pain.  Full range of motion.
 Skin:  No itching, rash, jaundice.  
 Heme: No anemia, abnormal bleeding, easy bruising
 Lymphatic: No enlarged lymph nodes.
 Metabolic/Endo: no glucose intolerance, hypothyroidism
 Allergy/immunology: no seasonal or food allergies, no asthma
 
 All other ROS are negative.
 
  
 PHYSICAL EXAMINATION:
 
 Patient reports a pain level of  0 today. No action required.
 
 Ht 164 cm (5' 4.57") (60%, Z = 0.25), Wt 72.2 kg (159 lb 2.8 oz) (92%, Z = 1.41), /10
4, Pulse 71, BMI 26.84 kg/(m^2).
 
 APPEARANCE: alert, active and in no apparent distress. 
 SKIN:  no jaundice or rashes.  
 HEENT: normocephalic, PERRLA, mucous membranes moist. 
 NECK: supple, without thyromegaly. 
 CHEST: clear to auscultation bilaterally. 
 CV: systolic murmur at left sternal border
 ABDOMEN: soft, NTND, no hepatosplenomegaly. 
 
 BACK: without tenderness. 
 MUSCULOSKELETAL: no muscle wasting, no deformity.
 EXTREMITIES: no edema, clubbing, deformity.
 NEURO: grossly intact and appropriate to age, DTRs 2+ and symmetric
 NODES: no significant cervical, axillary or inguinal adenopathy
  
 Labs/imaging: as below
 
  ASSESSMENT and Plan: 14 yo female with
 
 279.9   Immunosuppressed status
 V15.81  Noncompliance with treatment
 401.9   BP (high blood pressure): repeat BP at the end of visit is 131/92
 V42.7   Transplanted liver
 745.4   VSD (ventricular septal defect)
 424.1   Aortic valve insufficiency
 
 Pt is lost to follow up for 5 years. Off meds, reportedly started taking tacrolimus once a 
day recently. She might be immunotolerant as she did not reject past 5 years. However this d
oesn't guarantee future rejection.
 Educated importance of compliance with meds and clinic visits. Since she is 14 years out fr
 liver transplant, her lab schedule is every 3 months, clinic visit schedule is once a beck ellis. Father says they will be able to take her to local laboratory every 3 months for blood dr
karlee and they will be able to come to [facility] when [facility] team is here next time.
 I strongly recommended to follow her BPs at PCPs office. If they continue to be persistentl
y elevated she will need to be seen by nephrology team. Hypertension is a well-known complic
ation of calcineurin inhibitors, although she has not been taking tacrolimus past couple of 
years.  
 
 - labs: CBC, CMP, GGT, magnesium, trough tacrolimus level: every 3 months
 - follow up in liver transplant clinic: annually, next in 2014.
 - cardiology fu locally and at [facility]
  
 
 At this visit: 
 Patient accompanied by: father 
  used:no 
 Psychosocial or economic issues that may affect patient's medical care or well being: nonco
mpliance 
 Co-morbid chronic medical problems that may affect procedural sedation risk: VSD
 
 Labs/imaging:
 
 Component
     Latest Ref Rng 3/26/2014 2014 
 GLUCOSE
     70 - 100 mg/dL 82 87 
 BUN  6 - 23 mg/dL 18 13 
 CREATININE
     0.40 - 1.05 mg/dL 0.81 0.75 
 TOTAL PROTEIN
     6.1 - 8.5 g/dL 7.3 7.6 
 ALBUMIN
     3.5 - 5.0 g/dL 4.2 4.4 
 CALCIUM
     8.4 - 10.2 mg/dL 9.6 9.9 
 BILIRUBIN TOTAL
     0 - 1.2  0.3 0.8 
 ALK PHOS
     135 - 384 U/L 78 (A) 92 (A) 
 
 AST(SGOT)
     13 - 39 U/L 15 13 
 SODIUM
     132 - 143 mmol/L 136 140 
 POTASSIUM
     3.6 - 5.1 mmol/L 3.6 4.0 
 CHLORIDE
     95 - 112 mmol/L 102 100 
 TOTAL CO2
     19 - 31 mmol/L 25 28 
 ALT (SGPT)
     7 - 52 U/L 12 9 
 ANION GAP
     7 - 21 12.6 16 
 BUN/CREATININE RATIO  6.0 - 28.6 22.2 17.3 
 GLOBULIN
     1.8 - 3.5 3.1 3.2 
 A/G RATIO
     1.1 - 2.4 1.4 1.4 
 WHITE CELL COUNT
  4.4 - 11.8 K/cu mm 7.8 6.6 
 RED CELL COUNT
     3.8 - 5.3 M/cu mm 4.55 4.83 
 HEMOGLOBIN
     11.1 - 15.7 g/dL 14 14.7 
 HEMATOCRIT
     32 - 47 % 40.7 42.8 
 MCV 73 - 91 fL 89.4 88.6 
 PLATELET COUNT
  140 - 440 K/cu mm 296 328 
 GGT  5 - 60 U/L 8  
 MAGNESIUM
     1.7 - 2.5 mg/dL 1.8  
 
 Neema Khalil MD
 Pediatric Gastroenterology
 Consult line: [phone] 
 
 Electronically signed by Neema Khalil MD at 2014 10:27 PM PDTdocumented in this en
counter
 
 Plan of Treatment
 Not on filedocumented as of this encounter
 
 Visit Diagnoses
 
 
+-------------------------------------------------------------------------------------+
| Diagnosis                                                                           |
+-------------------------------------------------------------------------------------+
|   Immunosuppressed status (HCC) - Primary  Unspecified disorder of immune mechanism |
+-------------------------------------------------------------------------------------+
|   Noncompliance with treatment  Personal history of noncompliance with medical      |
| treatment, presenting hazards to health                                             |
+-------------------------------------------------------------------------------------+
|   BP (high blood pressure)  Unspecified essential hypertension                      |
+-------------------------------------------------------------------------------------+
|   Transplanted liver (HCC)  Liver replaced by transplant                            |
+-------------------------------------------------------------------------------------+
|   VSD (ventricular septal defect)  Ventricular septal defect                        |
 
+-------------------------------------------------------------------------------------+
|   Aortic valve insufficiency  Aortic valve disorders                                |
+-------------------------------------------------------------------------------------+
 documented in this encounter

## 2019-11-01 NOTE — XMS
Encounter Summary
  Created on: 2019
 
 Bonifacio Nila JB
 External Reference #: [id number]
 : [birth year]
 Sex: Female
 
 Demographics
 
 
+-----------------------+------------------------+
| Address               | [address]            |
|                       | [place]  [address]   |
+-----------------------+------------------------+
| Home Phone            | [phone]        |
+-----------------------+------------------------+
| Preferred Language    | Unknown                |
+-----------------------+------------------------+
| Marital Status        | Single                 |
+-----------------------+------------------------+
| Restoration Affiliation | Unknown                |
+-----------------------+------------------------+
| Race                  | White                  |
+-----------------------+------------------------+
| Ethnic Group          | Not  or  |
+-----------------------+------------------------+
 
 
 Author
 
 
+--------------+------------------------------+
| Author       | [facility] |
+--------------+------------------------------+
| Organization | [facility] |
+--------------+------------------------------+
| Address      | Unknown                      |
+--------------+------------------------------+
| Phone        | Unavailable                  |
+--------------+------------------------------+
 
 
 
 Support
 
 
+-----------------+--------------+---------+-----------------+
| Name            | Relationship | Address | Phone           |
+-----------------+--------------+---------+-----------------+
| Kit Valdovinos   | ECON         | Unknown | [phone] |
+-----------------+--------------+---------+-----------------+
| Magdalena Valdovinos | ECON         | Unknown | [phone] |
+-----------------+--------------+---------+-----------------+
 
 
 
 Care Team Providers
 
 
 
+-----------------------+------+-------------+
| Care Team Member Name | Role | Phone       |
+-----------------------+------+-------------+
 PCP  | Unavailable |
+-----------------------+------+-------------+
 
 
 
 Reason for Visit
 
 
+--------------+----------+
| Reason       | Comments |
+--------------+----------+
| Social work  |          |
| consultation |          |
+--------------+----------+
 
 
 
 Encounter Details
 
 
+--------+-------------+----------------------+---------------------+--------------+
| Date   | Type        | Department           | Care Team           | Description  |
+--------+-------------+----------------------+---------------------+--------------+
| 07/17/ | Documentati |   SOCIAL WORK        |   Coretta Armijo,  | Social work  |
|    | on          | AMBULATORY  [address]  | MSW  [address]    | consultation |
|        |             | [address]  | [address]     |              |
|        |             |  Mailcode: [address]      | [place], [place]        |              |
|        |             | [place], [place]         | [address]          |              |
|        |             | [address]           | [phone]        |              |
|        |             | [phone]         | [phone] (Fax)  |              |
+--------+-------------+----------------------+---------------------+--------------+
 
 
 
 Social History
 
 
+----------------+-------+-----------+--------+------+
| Tobacco Use    | Types | Packs/Day | Years  | Date |
|                |       |           | Used   |      |
+----------------+-------+-----------+--------+------+
| Never Assessed |       |           |        |      |
+----------------+-------+-----------+--------+------+
 
 
 
+------------------+---------------+
| Sex Assigned at  | Date Recorded |
| Birth            |               |
+------------------+---------------+
| Not on file      |               |
+------------------+---------------+
 
 
 
 
+----------------+-------------+-------------+
| Job Start Date | Occupation  | Industry    |
+----------------+-------------+-------------+
| Not on file    | Not on file | Not on file |
+----------------+-------------+-------------+
 
 
 
+----------------+--------------+------------+
| Travel History | Travel Start | Travel End |
+----------------+--------------+------------+
 
 
 
+-------------------------------------+
| No recent travel history available. |
+-------------------------------------+
 documented as of this encounter
 
 Plan of Treatment
 Not on filedocumented as of this encounter
 
 Visit Diagnoses
 Not on filedocumented in this encounter

## 2019-11-01 NOTE — XMS
Encounter Summary
  Created on: 2019
 
 Bonifacio Nila JB
 External Reference #: [id number]
 : [birth year]
 Sex: Female
 
 Demographics
 
 
+-----------------------+------------------------+
| Address               | [address]            |
|                       | [place]  [address]   |
+-----------------------+------------------------+
| Home Phone            | [phone]        |
+-----------------------+------------------------+
| Preferred Language    | Unknown                |
+-----------------------+------------------------+
| Marital Status        | Single                 |
+-----------------------+------------------------+
| Restoration Affiliation | Unknown                |
+-----------------------+------------------------+
| Race                  | White                  |
+-----------------------+------------------------+
| Ethnic Group          | Not  or  |
+-----------------------+------------------------+
 
 
 Author
 
 
+--------------+------------------------------+
| Author       | [facility] |
+--------------+------------------------------+
| Organization | [facility] |
+--------------+------------------------------+
| Address      | Unknown                      |
+--------------+------------------------------+
| Phone        | Unavailable                  |
+--------------+------------------------------+
 
 
 
 Support
 
 
+-----------------+--------------+---------+-----------------+
| Name            | Relationship | Address | Phone           |
+-----------------+--------------+---------+-----------------+
| Kit Valdovinos   | ECON         | Unknown | [phone] |
+-----------------+--------------+---------+-----------------+
| Magdalena Valdovinos | ECON         | Unknown | [phone] |
+-----------------+--------------+---------+-----------------+
 
 
 
 Care Team Providers
 
 
 
+-----------------------+------+-----------------+
| Care Team Member Name | Role | Phone           |
+-----------------------+------+-----------------+
| Lily Horton MD   | PCP  | [phone] |
+-----------------------+------+-----------------+
 
 
 
 Reason for Visit
 
 
+----------------+----------+
| Reason         | Comments |
+----------------+----------+
| Refill Request |          |
+----------------+----------+
 
 
 
 Encounter Details
 
 
+--------+--------+----------------------+---------------------+----------------+
| Date   | Type   | Department           | Care Team           | Description    |
+--------+--------+----------------------+---------------------+----------------+
| / | Refill |   Pediatric          |   Monserrat Spann,  | Refill Request |
|    |        | Gastroenterology at  | RN  [address]     |                |
|        |        | [place]          | [address]     |                |
|        |        | [facility]  | [place] [address]  |                |
|        |        |  [address] |                     |                |
|        |        |  [address]  Mailcode:  |                     |                |
|        |        | [facility]  [place]   |                     |                |
|        |        | [place], [place]         |                     |                |
|        |        | [address]           |                     |                |
|        |        | [phone]         |                     |                |
+--------+--------+----------------------+---------------------+----------------+
 
 
 
 Social History
 
 
+----------------+-------+-----------+--------+------+
| Tobacco Use    | Types | Packs/Day | Years  | Date |
|                |       |           | Used   |      |
+----------------+-------+-----------+--------+------+
| Never Assessed |       |           |        |      |
+----------------+-------+-----------+--------+------+
 
 
 
+------------------+---------------+
| Sex Assigned at  | Date Recorded |
| Birth            |               |
+------------------+---------------+
| Not on file      |               |
+------------------+---------------+
 
 
 
 
+----------------+-------------+-------------+
| Job Start Date | Occupation  | Industry    |
+----------------+-------------+-------------+
| Not on file    | Not on file | Not on file |
+----------------+-------------+-------------+
 
 
 
+----------------+--------------+------------+
| Travel History | Travel Start | Travel End |
+----------------+--------------+------------+
 
 
 
+-------------------------------------+
| No recent travel history available. |
+-------------------------------------+
 documented as of this encounter
 
 Plan of Treatment
 Not on filedocumented as of this encounter
 
 Visit Diagnoses
 Not on filedocumented in this encounter

## 2019-11-01 NOTE — XMS
Encounter Summary
  Created on: 2019
 
 Bonifacio Nila JB
 External Reference #: [id number]
 : [birth year]
 Sex: Female
 
 Demographics
 
 
+-----------------------+------------------------+
| Address               | [address]            |
|                       | [place]  [address]   |
+-----------------------+------------------------+
| Home Phone            | [phone]        |
+-----------------------+------------------------+
| Preferred Language    | Unknown                |
+-----------------------+------------------------+
| Marital Status        | Single                 |
+-----------------------+------------------------+
| Restorationism Affiliation | Unknown                |
+-----------------------+------------------------+
| Race                  | White                  |
+-----------------------+------------------------+
| Ethnic Group          | Not  or  |
+-----------------------+------------------------+
 
 
 Author
 
 
+--------------+------------------------------+
| Author       | [facility] |
+--------------+------------------------------+
| Organization | [facility] |
+--------------+------------------------------+
| Address      | Unknown                      |
+--------------+------------------------------+
| Phone        | Unavailable                  |
+--------------+------------------------------+
 
 
 
 Support
 
 
+-----------------+--------------+---------+-----------------+
| Name            | Relationship | Address | Phone           |
+-----------------+--------------+---------+-----------------+
| Kit Valdovinos   | ECON         | Unknown | [phone] |
+-----------------+--------------+---------+-----------------+
| Magdalena Valdovinos | ECON         | Unknown | [phone] |
+-----------------+--------------+---------+-----------------+
 
 
 
 Care Team Providers
 
 
 
+------------------------+------+-----------------+
| Care Team Member Name  | Role | Phone           |
+------------------------+------+-----------------+
| Lily Horton MD | PCP  | [phone] |
+------------------------+------+-----------------+
 
 
 
 Reason for Visit
 
 
+-------------+----------+
| Reason      | Comments |
+-------------+----------+
| Lab Results |          |
+-------------+----------+
 
 
 
 Encounter Details
 
 
+--------+----------+----------------------+----------------------+-------------+
| Date   | Type     | Department           | Care Team            | Description |
+--------+----------+----------------------+----------------------+-------------+
| 10/23/ | Abstract |   Pediatric          |   Neema Khalil,   | Lab Results |
| 2017   |          | Gastroenterology at  | MD  [address] |             |
|        |          | [place]          |   [place], [place]       |             |
|        |          | [facility]  | [address]           |             |
|        |          |  [address] | [phone]         |             |
|        |          |  [address]  Mailcode:  | [phone] (Fax)   |             |
|        |          | [facility]   |                      |             |
|        |          | [place], [place]         |                      |             |
|        |          | [address]           |                      |             |
|        |          | [phone]         |                      |             |
+--------+----------+----------------------+----------------------+-------------+
 
 
 
 Social History
 
 
+-------------------+-------+-----------+--------+------+
| Tobacco Use       | Types | Packs/Day | Years  | Date |
|                   |       |           | Used   |      |
+-------------------+-------+-----------+--------+------+
| Passive Smoke     |       |           |        |      |
| Exposure - Never  |       |           |        |      |
| Smoker            |       |           |        |      |
+-------------------+-------+-----------+--------+------+
 
 
 
+---------------------+---+---+---+
| Smokeless Tobacco:  |   |   |   |
| Never Used          |   |   |   |
+---------------------+---+---+---+
 
 
 
 
+-------------+-------------+---------+----------+
| Alcohol Use | Drinks/Week | oz/Week | Comments |
+-------------+-------------+---------+----------+
| Not Asked   |             |         |          |
+-------------+-------------+---------+----------+
 
 
 
+------------------+---------------+
| Sex Assigned at  | Date Recorded |
| Birth            |               |
+------------------+---------------+
| Not on file      |               |
+------------------+---------------+
 
 
 
+----------------+-------------+-------------+
| Job Start Date | Occupation  | Industry    |
+----------------+-------------+-------------+
| Not on file    | Not on file | Not on file |
+----------------+-------------+-------------+
 
 
 
+----------------+--------------+------------+
| Travel History | Travel Start | Travel End |
+----------------+--------------+------------+
 
 
 
+-------------------------------------+
| No recent travel history available. |
+-------------------------------------+
 documented as of this encounter
 
 Plan of Treatment
 Not on filedocumented as of this encounter
 
 Procedures
 
 
+----------------------+--------+------------+----------------------+----------------------+
| Procedure Name       | Priori | Date/Time  | Associated Diagnosis | Comments             |
|                      | ty     |            |                      |                      |
+----------------------+--------+------------+----------------------+----------------------+
| CBC, WITH            | Routin | 10/19/2017 |                      |   Results for this   |
| DIFFERENTIAL         | e      |            |                      | procedure are in the |
|                      |        |            |                      |  results section.    |
+----------------------+--------+------------+----------------------+----------------------+
| COMPLETE METABOLIC   | Routin | 10/19/2017 |                      |   Results for this   |
| SET                  | e      |            |                      | procedure are in the |
| (NA,K,CL,CO2,BUN,CRE |        |            |                      |  results section.    |
| AT,GLUC,CA,AST,ALT,B |        |            |                      |                      |
| ASHWINI TOTAL,ALK        |        |            |                      |                      |
| PHOS,ALB,PROT TOTAL) |        |            |                      |                      |
+----------------------+--------+------------+----------------------+----------------------+
| TACROLIMUS, WHOLE    | Routin | 10/19/2017 |                      |   Results for this   |
 
| BLOOD                | e      |            |                      | procedure are in the |
|                      |        |            |                      |  results section.    |
+----------------------+--------+------------+----------------------+----------------------+
| GGT, PLASMA          | Routin | 10/19/2017 |                      |   Results for this   |
|                      | e      |            |                      | procedure are in the |
|                      |        |            |                      |  results section.    |
+----------------------+--------+------------+----------------------+----------------------+
| MAGNESIUM, PLASMA    | Routin | 10/19/2017 |                      |   Results for this   |
|                      | e      |            |                      | procedure are in the |
|                      |        |            |                      |  results section.    |
+----------------------+--------+------------+----------------------+----------------------+
 documented in this encounter
 
 Results
 MAGNESIUM, PLASMA (10/19/2017)
 
+-----------+-------+-----------------+------------+--------------+
| Component | Value | Ref Range       | Performed  | Pathologist  |
|           |       |                 | At         | Signature    |
+-----------+-------+-----------------+------------+--------------+
| MAGNESIUM | 1.9   | 1.7 - 2.5 mg/dL | [facility]  |              |
|           |       |                 | LAB -      |              |
|           |       |                 | [place]  |              |
+-----------+-------+-----------------+------------+--------------+
 
 
 
+----------------+
| Specimen       |
+----------------+
| Blood - Blood  |
| (substance)    |
+----------------+
 
 
 
+--------------------+----------------------+--------------------+----------------+
| Performing         | Address              | City/State/Zipcode | Phone Number   |
| Organization       |                      |                    |                |
+--------------------+----------------------+--------------------+----------------+
|   [facility]  |   [address] | [place]      |   [phone] |
| [place]          |                      |                    |                |
+--------------------+----------------------+--------------------+----------------+
 GGT, PLASMA (10/19/2017)
 
+-----------+-------+------------+------------+--------------+
| Component | Value | Ref Range  | Performed  | Pathologist  |
|           |       |            | At         | Signature    |
+-----------+-------+------------+------------+--------------+
| GAMMA     | 14    | 5 - 60 U/L | [facility]  |              |
| GLUTAMYL  |       |            | LAB -      |              |
| TRANS     |       |            | [place]  |              |
+-----------+-------+------------+------------+--------------+
 
 
 
+----------------+
| Specimen       |
+----------------+
| Blood - Blood  |
 
| (substance)    |
+----------------+
 
 
 
+--------------------+----------------------+--------------------+----------------+
| Performing         | Address              | City/State/Zipcode | Phone Number   |
| Organization       |                      |                    |                |
+--------------------+----------------------+--------------------+----------------+
|   [facility]  |   [address] | [place]      |   [phone] |
| [place]          |                      |                    |                |
+--------------------+----------------------+--------------------+----------------+
 TACROLIMUS, WHOLE BLOOD (10/19/2017)
 
+-------------+-------+-----------+------------+--------------+
| Component   | Value | Ref Range | Performed  | Pathologist  |
|             |       |           | At         | Signature    |
+-------------+-------+-----------+------------+--------------+
| TACROLIMUS  | <1    | ng/mL     | [facility]  |              |
| ()    |       |           | LAB -      |              |
|             |       |           | [place]  |              |
+-------------+-------+-----------+------------+--------------+
 
 
 
+----------------+
| Specimen       |
+----------------+
| Blood - Blood  |
| (substance)    |
+----------------+
 
 
 
+--------------------+----------------------+--------------------+----------------+
| Performing         | Address              | City/State/Zipcode | Phone Number   |
| Organization       |                      |                    |                |
+--------------------+----------------------+--------------------+----------------+
|   [facility]  |   [address] | [place], [place]      |   [phone] |
| [place]          |                      |                    |                |
+--------------------+----------------------+--------------------+----------------+
 COMPLETE METABOLIC SET (NA,K,CL,CO2,BUN,CREAT,GLUC,CA,AST,ALT,BILI TOTAL,ALK PHOS,ALB,PROT 
TOTAL) (10/19/2017)
 
+-------------+-------+-----------------+------------+--------------+
| Component   | Value | Ref Range       | Performed  | Pathologist  |
|             |       |                 | At         | Signature    |
+-------------+-------+-----------------+------------+--------------+
| GLUCOSE,    | 83    | 70 - 100 mg/dL  | [facility]  |              |
| PLASMA      |       |                 | LAB -      |              |
| (LAB)       |       |                 | [place]  |              |
+-------------+-------+-----------------+------------+--------------+
| BUN, PLASMA | 13    | 6 - 23 mg/dL    | [facility]  |              |
|  (LAB)      |       |                 | LAB -      |              |
|             |       |                 | [place]  |              |
+-------------+-------+-----------------+------------+--------------+
| CREATININE  | 0.74  | 0.6 - 1.26      | [facility]  |              |
| PLASMA      |       | mg/dL           | LAB -      |              |
| (LAB)       |       |                 | [place]  |              |
+-------------+-------+-----------------+------------+--------------+
 
| TOTAL       | 7.7   | 6 - 8 g/dL      | [facility]  |              |
| PROTEIN,    |       |                 | LAB -      |              |
| PLASMA      |       |                 | [place]  |              |
| (LAB)       |       |                 |            |              |
+-------------+-------+-----------------+------------+--------------+
| ALBUMIN,    | 4.3   | 3.5 - 5 g/dL    | [facility]  |              |
| PLASMA      |       |                 | LAB -      |              |
| (LAB)       |       |                 | [place]  |              |
+-------------+-------+-----------------+------------+--------------+
| CALCIUM,    | 10.1  | 8.4 - 10.2      | [facility]  |              |
| PLASMA      |       | mg/dL           | LAB -      |              |
| (LAB)       |       |                 | [place]  |              |
+-------------+-------+-----------------+------------+--------------+
| BILIRUBIN   | 0.4   | 0 - 1.2 mg/dL   | [facility]  |              |
| TOTAL       |       |                 | LAB -      |              |
|             |       |                 | [place]  |              |
+-------------+-------+-----------------+------------+--------------+
| ALK PHOS    | 106   | 42 - 145 U/L    | [facility]  |              |
|             |       |                 | LAB -      |              |
|             |       |                 | [place]  |              |
+-------------+-------+-----------------+------------+--------------+
| AST(SGOT)   | 17    | 13 - 39 U/L     | [facility]  |              |
|             |       |                 | LAB -      |              |
|             |       |                 | [place]  |              |
+-------------+-------+-----------------+------------+--------------+
| SODIUM,     | 139   | 132 - 143       | [facility]  |              |
| PLASMA      |       | mmol/L          | LAB -      |              |
| (LAB)       |       |                 | [place]  |              |
+-------------+-------+-----------------+------------+--------------+
| POTASSIUM,  | 3.8   | 3.6 - 5.1       | [facility]  |              |
| PLASMA      |       | mmol/L          | LAB -      |              |
| (LAB)       |       |                 | [place]  |              |
+-------------+-------+-----------------+------------+--------------+
| CHLORIDE,   | 104   | 95 - 112 mmol/L | [facility]  |              |
| PLASMA      |       |                 | LAB -      |              |
| (LAB)       |       |                 | [place]  |              |
+-------------+-------+-----------------+------------+--------------+
| TOTAL CO2,  | 23    | 19 - 31 mmol/L  | [facility]  |              |
| PLASMA      |       |                 | LAB -      |              |
| (LAB)       |       |                 | [place]  |              |
+-------------+-------+-----------------+------------+--------------+
| ALT (SGPT)  | 23    | 7 - 52 U/L      | [facility]  |              |
|             |       |                 | LAB -      |              |
|             |       |                 | [place]  |              |
+-------------+-------+-----------------+------------+--------------+
 
 
 
+----------------+
| Specimen       |
+----------------+
| Blood - Blood  |
| (substance)    |
+----------------+
 
 
 
+--------------------+----------------------+--------------------+----------------+
| Performing         | Address              | City/State/Zipcode | Phone Number   |
| Organization       |                      |                    |                |
 
+--------------------+----------------------+--------------------+----------------+
|   [facility]  |   [address] | [place], [place]      |   [phone] |
| [place]          |                      |                    |                |
+--------------------+----------------------+--------------------+----------------+
 CBC, WITH DIFFERENTIAL (10/19/2017)
 
+-------------+-------+-----------------+------------+--------------+
| Component   | Value | Ref Range       | Performed  | Pathologist  |
|             |       |                 | At         | Signature    |
+-------------+-------+-----------------+------------+--------------+
| WHITE CELL  | 8.2   | 4.5 - 11 K/cu   | [facility]  |              |
| COUNT       |       | mm              | LAB -      |              |
|             |       |                 | [place]  |              |
+-------------+-------+-----------------+------------+--------------+
| RED CELL    | 4.91  | 3.8 - 5.1 M/cu  | [facility]  |              |
| COUNT       |       | mm              | LAB -      |              |
|             |       |                 | [place]  |              |
+-------------+-------+-----------------+------------+--------------+
| HEMOGLOBIN  | 14.8  | 12 - 16 g/dL    | [facility]  |              |
|             |       |                 | LAB -      |              |
|             |       |                 | [place]  |              |
+-------------+-------+-----------------+------------+--------------+
| HEMATOCRIT  | 43.5  | 35 - 45 %       | [facility]  |              |
|             |       |                 | LAB -      |              |
|             |       |                 | [place]  |              |
+-------------+-------+-----------------+------------+--------------+
| MCV         | 88.7  | 81 - 99 fL      | [facility]  |              |
|             |       |                 | LAB -      |              |
|             |       |                 | [place]  |              |
+-------------+-------+-----------------+------------+--------------+
| MCH         | 30    | 27 - 33 pg      | [facility]  |              |
|             |       |                 | LAB -      |              |
|             |       |                 | [place]  |              |
+-------------+-------+-----------------+------------+--------------+
| MCHC        | 34    | 30 - 36 g/dL    | [facility]  |              |
|             |       |                 | LAB -      |              |
|             |       |                 | [place]  |              |
+-------------+-------+-----------------+------------+--------------+
| PLATELET    | 330   | 140 - 440 K/cu  | [facility]  |              |
| COUNT       |       | mm              | LAB -      |              |
|             |       |                 | [place]  |              |
+-------------+-------+-----------------+------------+--------------+
| NEUTROPHIL  | 61.3  | 39 - 80 %       | [facility]  |              |
| %           |       |                 | LAB -      |              |
|             |       |                 | [place]  |              |
+-------------+-------+-----------------+------------+--------------+
| LYMPHOCYTE  | 26.5  | 24 - 44 %       | [facility]  |              |
| %           |       |                 | LAB -      |              |
|             |       |                 | [place]  |              |
+-------------+-------+-----------------+------------+--------------+
| MONOCYTE %  | 9.8   | 0 - 12 %        | [facility]  |              |
|             |       |                 | LAB -      |              |
|             |       |                 | [place]  |              |
+-------------+-------+-----------------+------------+--------------+
| EOS %       | 1.4   | 0 - 6 %         | [facility]  |              |
|             |       |                 | LAB -      |              |
|             |       |                 | [place]  |              |
+-------------+-------+-----------------+------------+--------------+
| BASO %      | 1     | 0 - 2 %         | [facility]  |              |
|             |       |                 | LAB -      |              |
 
|             |       |                 | [place]  |              |
+-------------+-------+-----------------+------------+--------------+
| RDW         | 13.6  | 10.5 - 15 %     | [facility]  |              |
|             |       |                 | LAB -      |              |
|             |       |                 | [place]  |              |
+-------------+-------+-----------------+------------+--------------+
 
 
 
+----------------+
| Specimen       |
+----------------+
| Blood - Blood  |
| (substance)    |
+----------------+
 
 
 
+--------------------+----------------------+--------------------+----------------+
| Performing         | Address              | City/State/Zipcode | Phone Number   |
| Organization       |                      |                    |                |
+--------------------+----------------------+--------------------+----------------+
|   [facility]  |   [address] | [place]      |   [phone] |
| [place]          |                      |                    |                |
+--------------------+----------------------+--------------------+----------------+
 documented in this encounter
 
 Visit Diagnoses
 Not on filedocumented in this encounter

## 2019-11-01 NOTE — XMS
Encounter Summary
  Created on: 2019
 
 Bonifacio Nila JB
 External Reference #: [id number]
 : [birth year]
 Sex: Female
 
 Demographics
 
 
+-----------------------+------------------------+
| Address               | [address]            |
|                       | [place]  [address]   |
+-----------------------+------------------------+
| Home Phone            | [phone]        |
+-----------------------+------------------------+
| Preferred Language    | Unknown                |
+-----------------------+------------------------+
| Marital Status        | Single                 |
+-----------------------+------------------------+
| Mandaeism Affiliation | Unknown                |
+-----------------------+------------------------+
| Race                  | White                  |
+-----------------------+------------------------+
| Ethnic Group          | Not  or  |
+-----------------------+------------------------+
 
 
 Author
 
 
+--------------+------------------------------+
| Author       | [facility] |
+--------------+------------------------------+
| Organization | [facility] |
+--------------+------------------------------+
| Address      | Unknown                      |
+--------------+------------------------------+
| Phone        | Unavailable                  |
+--------------+------------------------------+
 
 
 
 Support
 
 
+-----------------+--------------+---------+-----------------+
| Name            | Relationship | Address | Phone           |
+-----------------+--------------+---------+-----------------+
| Kit Valdovinos   | ECON         | Unknown | [phone] |
+-----------------+--------------+---------+-----------------+
| Magdalena Valdovinos | ECON         | Unknown | [phone] |
+-----------------+--------------+---------+-----------------+
 
 
 
 Care Team Providers
 
 
 
+------------------------+------+-----------------+
| Care Team Member Name  | Role | Phone           |
+------------------------+------+-----------------+
| Lily Horton MD | PCP  | [phone] |
+------------------------+------+-----------------+
 
 
 
 Reason for Visit
 
 
+------------------+----------+
| Reason           | Comments |
+------------------+----------+
| Medical Records  |          |
| Review           |          |
+------------------+----------+
 
 
 
 Encounter Details
 
 
+--------+-------------+----------------------+---------------------+------------------+
| Date   | Type        | Department           | Care Team           | Description      |
+--------+-------------+----------------------+---------------------+------------------+
| / | Documentati |   [facility] |   Lab, Gi Procedure | Medical Records  |
|    | on          |  [place]  [address] |                     | Review           |
|        |             |  [address]  Mailcode: |                     |                  |
|        |             |  [address]  [facility]    |                     |                  |
|        |             | [facility],  |                     |                  |
|        |             | [address]           |                     |                  |
|        |             | [place], [place]         |                     |                  |
|        |             | [address]           |                     |                  |
|        |             | [phone]         |                     |                  |
+--------+-------------+----------------------+---------------------+------------------+
 
 
 
 Social History
 
 
+-------------------+-------+-----------+--------+------+
| Tobacco Use       | Types | Packs/Day | Years  | Date |
|                   |       |           | Used   |      |
+-------------------+-------+-----------+--------+------+
| Passive Smoke     |       |           |        |      |
| Exposure - Never  |       |           |        |      |
| Smoker            |       |           |        |      |
+-------------------+-------+-----------+--------+------+
 
 
 
+---------------------+---+---+---+
| Smokeless Tobacco:  |   |   |   |
| Never Used          |   |   |   |
+---------------------+---+---+---+
 
 
 
 
+-------------+-------------+---------+----------+
| Alcohol Use | Drinks/Week | oz/Week | Comments |
+-------------+-------------+---------+----------+
| Not Asked   |             |         |          |
+-------------+-------------+---------+----------+
 
 
 
+------------------+---------------+
| Sex Assigned at  | Date Recorded |
| Birth            |               |
+------------------+---------------+
| Not on file      |               |
+------------------+---------------+
 
 
 
+----------------+-------------+-------------+
| Job Start Date | Occupation  | Industry    |
+----------------+-------------+-------------+
| Not on file    | Not on file | Not on file |
+----------------+-------------+-------------+
 
 
 
+----------------+--------------+------------+
| Travel History | Travel Start | Travel End |
+----------------+--------------+------------+
 
 
 
+-------------------------------------+
| No recent travel history available. |
+-------------------------------------+
 documented as of this encounter
 
 Plan of Treatment
 Not on filedocumented as of this encounter
 
 Visit Diagnoses
 Not on filedocumented in this encounter

## 2019-11-01 NOTE — XMS
Encounter Summary
  Created on: 2019
 
 Bonifacio Nila JB
 External Reference #: [id number]
 : [birth year]
 Sex: Female
 
 Demographics
 
 
+-----------------------+------------------------+
| Address               | [address]            |
|                       | [place]  [address]   |
+-----------------------+------------------------+
| Home Phone            | [phone]        |
+-----------------------+------------------------+
| Preferred Language    | Unknown                |
+-----------------------+------------------------+
| Marital Status        | Single                 |
+-----------------------+------------------------+
| Episcopal Affiliation | Unknown                |
+-----------------------+------------------------+
| Race                  | White                  |
+-----------------------+------------------------+
| Ethnic Group          | Not  or  |
+-----------------------+------------------------+
 
 
 Author
 
 
+--------------+------------------------------+
| Author       | [facility] |
+--------------+------------------------------+
| Organization | [facility] |
+--------------+------------------------------+
| Address      | Unknown                      |
+--------------+------------------------------+
| Phone        | Unavailable                  |
+--------------+------------------------------+
 
 
 
 Support
 
 
+-----------------+--------------+---------+-----------------+
| Name            | Relationship | Address | Phone           |
+-----------------+--------------+---------+-----------------+
| Kit Valdovinos   | ECON         | Unknown | [phone] |
+-----------------+--------------+---------+-----------------+
| Magdalena Valdovinos | ECON         | Unknown | [phone] |
+-----------------+--------------+---------+-----------------+
 
 
 
 Care Team Providers
 
 
 
+-----------------------+------+-----------------+
| Care Team Member Name | Role | Phone           |
+-----------------------+------+-----------------+
| Lily Horton MD   | PCP  | [phone] |
+-----------------------+------+-----------------+
 
 
 
 Reason for Visit
 
 
+-------------------+----------+
| Reason            | Comments |
+-------------------+----------+
| Medication Refill |          |
+-------------------+----------+
 
 
 
 Encounter Details
 
 
+--------+-----------+----------------------+--------------------+-------------------+
| Date   | Type      | Department           | Care Team          | Description       |
+--------+-----------+----------------------+--------------------+-------------------+
| / | Telephone |   Pediatric          |   Ivis Burkett MD | Medication Refill |
|    |           | Gastroenterology at  |                    |                   |
|        |           | [place]          |                    |                   |
|        |           | [facility]  |                    |                   |
|        |           |  [address] |                    |                   |
|        |           |  [address]  Mailcode:  |                    |                   |
|        |           | [address]R  [place]   |                    |                   |
|        |           | [place], [place]         |                    |                   |
|        |           | [address]           |                    |                   |
|        |           | [phone]         |                    |                   |
+--------+-----------+----------------------+--------------------+-------------------+
 
 
 
 Social History
 
 
+----------------+-------+-----------+--------+------+
| Tobacco Use    | Types | Packs/Day | Years  | Date |
|                |       |           | Used   |      |
+----------------+-------+-----------+--------+------+
| Never Assessed |       |           |        |      |
+----------------+-------+-----------+--------+------+
 
 
 
+------------------+---------------+
| Sex Assigned at  | Date Recorded |
| Birth            |               |
+------------------+---------------+
| Not on file      |               |
+------------------+---------------+
 
 
 
 
+----------------+-------------+-------------+
| Job Start Date | Occupation  | Industry    |
+----------------+-------------+-------------+
| Not on file    | Not on file | Not on file |
+----------------+-------------+-------------+
 
 
 
+----------------+--------------+------------+
| Travel History | Travel Start | Travel End |
+----------------+--------------+------------+
 
 
 
+-------------------------------------+
| No recent travel history available. |
+-------------------------------------+
 documented as of this encounter
 
 Plan of Treatment
 Not on filedocumented as of this encounter
 
 Visit Diagnoses
 Not on filedocumented in this encounter

## 2019-11-01 NOTE — XMS
Encounter Summary
  Created on: 2019
 
 Bonifacio Nila JB
 External Reference #: [id number]
 : [birth year]
 Sex: Female
 
 Demographics
 
 
+-----------------------+------------------------+
| Address               | [address]            |
|                       | [place]  [address]   |
+-----------------------+------------------------+
| Home Phone            | [phone]        |
+-----------------------+------------------------+
| Preferred Language    | Unknown                |
+-----------------------+------------------------+
| Marital Status        | Single                 |
+-----------------------+------------------------+
| Quaker Affiliation | Unknown                |
+-----------------------+------------------------+
| Race                  | White                  |
+-----------------------+------------------------+
| Ethnic Group          | Not  or  |
+-----------------------+------------------------+
 
 
 Author
 
 
+--------------+------------------------------+
| Author       | [facility] |
+--------------+------------------------------+
| Organization | [facility] |
+--------------+------------------------------+
| Address      | Unknown                      |
+--------------+------------------------------+
| Phone        | Unavailable                  |
+--------------+------------------------------+
 
 
 
 Support
 
 
+-----------------+--------------+---------+-----------------+
| Name            | Relationship | Address | Phone           |
+-----------------+--------------+---------+-----------------+
| Kit Valdovinos   | ECON         | Unknown | [phone] |
+-----------------+--------------+---------+-----------------+
| Magdalena Valdovinos | ECON         | Unknown | [phone] |
+-----------------+--------------+---------+-----------------+
 
 
 
 Care Team Providers
 
 
 
+-----------------------+------+-------------+
| Care Team Member Name | Role | Phone       |
+-----------------------+------+-------------+
 PCP  | Unavailable |
+-----------------------+------+-------------+
 
 
 
 Reason for Visit
 
 
+--------------+----------+
| Reason       | Comments |
+--------------+----------+
| Social work  |          |
| consultation |          |
+--------------+----------+
 
 
 
 Encounter Details
 
 
+--------+-------------+----------------------+---------------------+--------------+
| Date   | Type        | Department           | Care Team           | Description  |
+--------+-------------+----------------------+---------------------+--------------+
| 03/13/ | Documentati |   SOCIAL WORK        |   Coretta Armijo,  | Social work  |
|    | on          | AMBULATORY  [address]  | [facility]  [address]    | consultation |
|        |             | [address]  | [address]     |              |
|        |             |  Mailcode: [address]      | [place], [place]        |              |
|        |             | [place]         | [address]          |              |
|        |             | [address]           | [phone]        |              |
|        |             | [phone]         | [phone] (Fax)  |              |
+--------+-------------+----------------------+---------------------+--------------+
 
 
 
 Social History
 
 
+----------------+-------+-----------+--------+------+
| Tobacco Use    | Types | Packs/Day | Years  | Date |
|                |       |           | Used   |      |
+----------------+-------+-----------+--------+------+
| Never Assessed |       |           |        |      |
+----------------+-------+-----------+--------+------+
 
 
 
+------------------+---------------+
| Sex Assigned at  | Date Recorded |
| Birth            |               |
+------------------+---------------+
| Not on file      |               |
+------------------+---------------+
 
 
 
 
+----------------+-------------+-------------+
| Job Start Date | Occupation  | Industry    |
+----------------+-------------+-------------+
| Not on file    | Not on file | Not on file |
+----------------+-------------+-------------+
 
 
 
+----------------+--------------+------------+
| Travel History | Travel Start | Travel End |
+----------------+--------------+------------+
 
 
 
+-------------------------------------+
| No recent travel history available. |
+-------------------------------------+
 documented as of this encounter
 
 Plan of Treatment
 Not on filedocumented as of this encounter
 
 Visit Diagnoses
 Not on filedocumented in this encounter

## 2019-11-01 NOTE — XMS
Encounter Summary
  Created on: 2019
 
 Bonifacio Nila JB
 External Reference #: [id number]
 : [birth year]
 Sex: Female
 
 Demographics
 
 
+-----------------------+------------------------+
| Address               | [address]            |
|                       | [place]  [address]   |
+-----------------------+------------------------+
| Home Phone            | [phone]        |
+-----------------------+------------------------+
| Preferred Language    | Unknown                |
+-----------------------+------------------------+
| Marital Status        | Single                 |
+-----------------------+------------------------+
| Jehovah's witness Affiliation | Unknown                |
+-----------------------+------------------------+
| Race                  | White                  |
+-----------------------+------------------------+
| Ethnic Group          | Not  or  |
+-----------------------+------------------------+
 
 
 Author
 
 
+--------------+------------------------------+
| Author       | [facility] |
+--------------+------------------------------+
| Organization | [facility] |
+--------------+------------------------------+
| Address      | Unknown                      |
+--------------+------------------------------+
| Phone        | Unavailable                  |
+--------------+------------------------------+
 
 
 
 Support
 
 
+-----------------+--------------+---------+-----------------+
| Name            | Relationship | Address | Phone           |
+-----------------+--------------+---------+-----------------+
| Kit Valdovinos   | ECON         | Unknown | [phone] |
+-----------------+--------------+---------+-----------------+
| Magdalena Valdovinos | ECON         | Unknown | [phone] |
+-----------------+--------------+---------+-----------------+
 
 
 
 Care Team Providers
 
 
 
+------------------------+------+-----------------+
| Care Team Member Name  | Role | Phone           |
+------------------------+------+-----------------+
| Lily Horton MD | PCP  | [phone] |
+------------------------+------+-----------------+
 
 
 
 Reason for Visit
 
 
+-------------+----------+
| Reason      | Comments |
+-------------+----------+
| Lab Results |          |
+-------------+----------+
 
 
 
 Encounter Details
 
 
+--------+----------+----------------------+----------------------+-------------+
| Date   | Type     | Department           | Care Team            | Description |
+--------+----------+----------------------+----------------------+-------------+
| 10/23/ | Abstract |   Pediatric          |   Neema Khalil,   | Lab Results |
| 2017   |          | Gastroenterology at  | MD  [address] |             |
|        |          | [place]          |   [place], [place]       |             |
|        |          | [facility]  | [address]           |             |
|        |          |  [address] | [phone]         |             |
|        |          |  [address]  Mailcode:  | [phone] (Fax)   |             |
|        |          | [facility]   |                      |             |
|        |          | [place], [place]         |                      |             |
|        |          | [address]           |                      |             |
|        |          | [phone]         |                      |             |
+--------+----------+----------------------+----------------------+-------------+
 
 
 
 Social History
 
 
+-------------------+-------+-----------+--------+------+
| Tobacco Use       | Types | Packs/Day | Years  | Date |
|                   |       |           | Used   |      |
+-------------------+-------+-----------+--------+------+
| Passive Smoke     |       |           |        |      |
| Exposure - Never  |       |           |        |      |
| Smoker            |       |           |        |      |
+-------------------+-------+-----------+--------+------+
 
 
 
+---------------------+---+---+---+
| Smokeless Tobacco:  |   |   |   |
| Never Used          |   |   |   |
+---------------------+---+---+---+
 
 
 
 
+-------------+-------------+---------+----------+
| Alcohol Use | Drinks/Week | oz/Week | Comments |
+-------------+-------------+---------+----------+
| Not Asked   |             |         |          |
+-------------+-------------+---------+----------+
 
 
 
+------------------+---------------+
| Sex Assigned at  | Date Recorded |
| Birth            |               |
+------------------+---------------+
| Not on file      |               |
+------------------+---------------+
 
 
 
+----------------+-------------+-------------+
| Job Start Date | Occupation  | Industry    |
+----------------+-------------+-------------+
| Not on file    | Not on file | Not on file |
+----------------+-------------+-------------+
 
 
 
+----------------+--------------+------------+
| Travel History | Travel Start | Travel End |
+----------------+--------------+------------+
 
 
 
+-------------------------------------+
| No recent travel history available. |
+-------------------------------------+
 documented as of this encounter
 
 Plan of Treatment
 Not on filedocumented as of this encounter
 
 Procedures
 
 
+----------------------+--------+------------+----------------------+----------------------+
| Procedure Name       | Priori | Date/Time  | Associated Diagnosis | Comments             |
|                      | ty     |            |                      |                      |
+----------------------+--------+------------+----------------------+----------------------+
| CBC, WITH            | Routin | 10/19/2017 |                      |   Results for this   |
| DIFFERENTIAL         | e      |            |                      | procedure are in the |
|                      |        |            |                      |  results section.    |
+----------------------+--------+------------+----------------------+----------------------+
| COMPLETE METABOLIC   | Routin | 10/19/2017 |                      |   Results for this   |
| SET                  | e      |            |                      | procedure are in the |
| (NA,K,CL,CO2,BUN,CRE |        |            |                      |  results section.    |
| AT,GLUC,CA,AST,ALT,B |        |            |                      |                      |
| ASHWINI TOTAL,ALK        |        |            |                      |                      |
| PHOS,ALB,PROT TOTAL) |        |            |                      |                      |
+----------------------+--------+------------+----------------------+----------------------+
| TACROLIMUS, WHOLE    | Routin | 10/19/2017 |                      |   Results for this   |
 
| BLOOD                | e      |            |                      | procedure are in the |
|                      |        |            |                      |  results section.    |
+----------------------+--------+------------+----------------------+----------------------+
| GGT, PLASMA          | Routin | 10/19/2017 |                      |   Results for this   |
|                      | e      |            |                      | procedure are in the |
|                      |        |            |                      |  results section.    |
+----------------------+--------+------------+----------------------+----------------------+
| MAGNESIUM, PLASMA    | Routin | 10/19/2017 |                      |   Results for this   |
|                      | e      |            |                      | procedure are in the |
|                      |        |            |                      |  results section.    |
+----------------------+--------+------------+----------------------+----------------------+
 documented in this encounter
 
 Results
 MAGNESIUM, PLASMA (10/19/2017)
 
+-----------+-------+-----------------+------------+--------------+
| Component | Value | Ref Range       | Performed  | Pathologist  |
|           |       |                 | At         | Signature    |
+-----------+-------+-----------------+------------+--------------+
| MAGNESIUM | 1.9   | 1.7 - 2.5 mg/dL | [facility]  |              |
|           |       |                 | LAB -      |              |
|           |       |                 | [place]  |              |
+-----------+-------+-----------------+------------+--------------+
 
 
 
+----------------+
| Specimen       |
+----------------+
| Blood - Blood  |
| (substance)    |
+----------------+
 
 
 
+--------------------+----------------------+--------------------+----------------+
| Performing         | Address              | City/State/Zipcode | Phone Number   |
| Organization       |                      |                    |                |
+--------------------+----------------------+--------------------+----------------+
|   [facility]  |   [address] | [place]      |   [phone] |
| [place]          |                      |                    |                |
+--------------------+----------------------+--------------------+----------------+
 GGT, PLASMA (10/19/2017)
 
+-----------+-------+------------+------------+--------------+
| Component | Value | Ref Range  | Performed  | Pathologist  |
|           |       |            | At         | Signature    |
+-----------+-------+------------+------------+--------------+
| GAMMA     | 14    | 5 - 60 U/L | [facility]  |              |
| GLUTAMYL  |       |            | LAB -      |              |
| TRANS     |       |            | [place]  |              |
+-----------+-------+------------+------------+--------------+
 
 
 
+----------------+
| Specimen       |
+----------------+
| Blood - Blood  |
 
| (substance)    |
+----------------+
 
 
 
+--------------------+----------------------+--------------------+----------------+
| Performing         | Address              | City/State/Zipcode | Phone Number   |
| Organization       |                      |                    |                |
+--------------------+----------------------+--------------------+----------------+
|   [facility]  |   [address] | [place]      |   [phone] |
| [place]          |                      |                    |                |
+--------------------+----------------------+--------------------+----------------+
 TACROLIMUS, WHOLE BLOOD (10/19/2017)
 
+-------------+-------+-----------+------------+--------------+
| Component   | Value | Ref Range | Performed  | Pathologist  |
|             |       |           | At         | Signature    |
+-------------+-------+-----------+------------+--------------+
| TACROLIMUS  | <1    | ng/mL     | [facility]  |              |
| ()    |       |           | LAB -      |              |
|             |       |           | [place]  |              |
+-------------+-------+-----------+------------+--------------+
 
 
 
+----------------+
| Specimen       |
+----------------+
| Blood - Blood  |
| (substance)    |
+----------------+
 
 
 
+--------------------+----------------------+--------------------+----------------+
| Performing         | Address              | City/State/Zipcode | Phone Number   |
| Organization       |                      |                    |                |
+--------------------+----------------------+--------------------+----------------+
|   [facility]  |   [address] | [place], [place]      |   [phone] |
| [place]          |                      |                    |                |
+--------------------+----------------------+--------------------+----------------+
 COMPLETE METABOLIC SET (NA,K,CL,CO2,BUN,CREAT,GLUC,CA,AST,ALT,BILI TOTAL,ALK PHOS,ALB,PROT 
TOTAL) (10/19/2017)
 
+-------------+-------+-----------------+------------+--------------+
| Component   | Value | Ref Range       | Performed  | Pathologist  |
|             |       |                 | At         | Signature    |
+-------------+-------+-----------------+------------+--------------+
| GLUCOSE,    | 83    | 70 - 100 mg/dL  | [facility]  |              |
| PLASMA      |       |                 | LAB -      |              |
| (LAB)       |       |                 | [place]  |              |
+-------------+-------+-----------------+------------+--------------+
| BUN, PLASMA | 13    | 6 - 23 mg/dL    | [facility]  |              |
|  (LAB)      |       |                 | LAB -      |              |
|             |       |                 | [place]  |              |
+-------------+-------+-----------------+------------+--------------+
| CREATININE  | 0.74  | 0.6 - 1.26      | [facility]  |              |
| PLASMA      |       | mg/dL           | LAB -      |              |
| (LAB)       |       |                 | [place]  |              |
+-------------+-------+-----------------+------------+--------------+
 
| TOTAL       | 7.7   | 6 - 8 g/dL      | [facility]  |              |
| PROTEIN,    |       |                 | LAB -      |              |
| PLASMA      |       |                 | [place]  |              |
| (LAB)       |       |                 |            |              |
+-------------+-------+-----------------+------------+--------------+
| ALBUMIN,    | 4.3   | 3.5 - 5 g/dL    | [facility]  |              |
| PLASMA      |       |                 | LAB -      |              |
| (LAB)       |       |                 | [place]  |              |
+-------------+-------+-----------------+------------+--------------+
| CALCIUM,    | 10.1  | 8.4 - 10.2      | [facility]  |              |
| PLASMA      |       | mg/dL           | LAB -      |              |
| (LAB)       |       |                 | [place]  |              |
+-------------+-------+-----------------+------------+--------------+
| BILIRUBIN   | 0.4   | 0 - 1.2 mg/dL   | [facility]  |              |
| TOTAL       |       |                 | LAB -      |              |
|             |       |                 | [place]  |              |
+-------------+-------+-----------------+------------+--------------+
| ALK PHOS    | 106   | 42 - 145 U/L    | [facility]  |              |
|             |       |                 | LAB -      |              |
|             |       |                 | [place]  |              |
+-------------+-------+-----------------+------------+--------------+
| AST(SGOT)   | 17    | 13 - 39 U/L     | [facility]  |              |
|             |       |                 | LAB -      |              |
|             |       |                 | [place]  |              |
+-------------+-------+-----------------+------------+--------------+
| SODIUM,     | 139   | 132 - 143       | [facility]  |              |
| PLASMA      |       | mmol/L          | LAB -      |              |
| (LAB)       |       |                 | [place]  |              |
+-------------+-------+-----------------+------------+--------------+
| POTASSIUM,  | 3.8   | 3.6 - 5.1       | [facility]  |              |
| PLASMA      |       | mmol/L          | LAB -      |              |
| (LAB)       |       |                 | [place]  |              |
+-------------+-------+-----------------+------------+--------------+
| CHLORIDE,   | 104   | 95 - 112 mmol/L | [facility]  |              |
| PLASMA      |       |                 | LAB -      |              |
| (LAB)       |       |                 | [place]  |              |
+-------------+-------+-----------------+------------+--------------+
| TOTAL CO2,  | 23    | 19 - 31 mmol/L  | [facility]  |              |
| PLASMA      |       |                 | LAB -      |              |
| (LAB)       |       |                 | [place]  |              |
+-------------+-------+-----------------+------------+--------------+
| ALT (SGPT)  | 23    | 7 - 52 U/L      | [facility]  |              |
|             |       |                 | LAB -      |              |
|             |       |                 | [place]  |              |
+-------------+-------+-----------------+------------+--------------+
 
 
 
+----------------+
| Specimen       |
+----------------+
| Blood - Blood  |
| (substance)    |
+----------------+
 
 
 
+--------------------+----------------------+--------------------+----------------+
| Performing         | Address              | City/State/Zipcode | Phone Number   |
| Organization       |                      |                    |                |
 
+--------------------+----------------------+--------------------+----------------+
|   [facility]  |   [address] | [place], [place]      |   [phone] |
| [place]          |                      |                    |                |
+--------------------+----------------------+--------------------+----------------+
 CBC, WITH DIFFERENTIAL (10/19/2017)
 
+-------------+-------+-----------------+------------+--------------+
| Component   | Value | Ref Range       | Performed  | Pathologist  |
|             |       |                 | At         | Signature    |
+-------------+-------+-----------------+------------+--------------+
| WHITE CELL  | 8.2   | 4.5 - 11 K/cu   | [facility]  |              |
| COUNT       |       | mm              | LAB -      |              |
|             |       |                 | [place]  |              |
+-------------+-------+-----------------+------------+--------------+
| RED CELL    | 4.91  | 3.8 - 5.1 M/cu  | [facility]  |              |
| COUNT       |       | mm              | LAB -      |              |
|             |       |                 | [place]  |              |
+-------------+-------+-----------------+------------+--------------+
| HEMOGLOBIN  | 14.8  | 12 - 16 g/dL    | [facility]  |              |
|             |       |                 | LAB -      |              |
|             |       |                 | [place]  |              |
+-------------+-------+-----------------+------------+--------------+
| HEMATOCRIT  | 43.5  | 35 - 45 %       | [facility]  |              |
|             |       |                 | LAB -      |              |
|             |       |                 | [place]  |              |
+-------------+-------+-----------------+------------+--------------+
| MCV         | 88.7  | 81 - 99 fL      | [facility]  |              |
|             |       |                 | LAB -      |              |
|             |       |                 | [place]  |              |
+-------------+-------+-----------------+------------+--------------+
| MCH         | 30    | 27 - 33 pg      | [facility]  |              |
|             |       |                 | LAB -      |              |
|             |       |                 | [place]  |              |
+-------------+-------+-----------------+------------+--------------+
| MCHC        | 34    | 30 - 36 g/dL    | [facility]  |              |
|             |       |                 | LAB -      |              |
|             |       |                 | [place]  |              |
+-------------+-------+-----------------+------------+--------------+
| PLATELET    | 330   | 140 - 440 K/cu  | [facility]  |              |
| COUNT       |       | mm              | LAB -      |              |
|             |       |                 | [place]  |              |
+-------------+-------+-----------------+------------+--------------+
| NEUTROPHIL  | 61.3  | 39 - 80 %       | [facility]  |              |
| %           |       |                 | LAB -      |              |
|             |       |                 | [place]  |              |
+-------------+-------+-----------------+------------+--------------+
| LYMPHOCYTE  | 26.5  | 24 - 44 %       | [facility]  |              |
| %           |       |                 | LAB -      |              |
|             |       |                 | [place]  |              |
+-------------+-------+-----------------+------------+--------------+
| MONOCYTE %  | 9.8   | 0 - 12 %        | [facility]  |              |
|             |       |                 | LAB -      |              |
|             |       |                 | [place]  |              |
+-------------+-------+-----------------+------------+--------------+
| EOS %       | 1.4   | 0 - 6 %         | [facility]  |              |
|             |       |                 | LAB -      |              |
|             |       |                 | [place]  |              |
+-------------+-------+-----------------+------------+--------------+
| BASO %      | 1     | 0 - 2 %         | [facility]  |              |
|             |       |                 | LAB -      |              |
 
|             |       |                 | [place]  |              |
+-------------+-------+-----------------+------------+--------------+
| RDW         | 13.6  | 10.5 - 15 %     | [facility]  |              |
|             |       |                 | LAB -      |              |
|             |       |                 | [place]  |              |
+-------------+-------+-----------------+------------+--------------+
 
 
 
+----------------+
| Specimen       |
+----------------+
| Blood - Blood  |
| (substance)    |
+----------------+
 
 
 
+--------------------+----------------------+--------------------+----------------+
| Performing         | Address              | City/State/Zipcode | Phone Number   |
| Organization       |                      |                    |                |
+--------------------+----------------------+--------------------+----------------+
|   [facility]  |   [address] | [place]      |   [phone] |
| [place]          |                      |                    |                |
+--------------------+----------------------+--------------------+----------------+
 documented in this encounter
 
 Visit Diagnoses
 Not on filedocumented in this encounter

## 2019-11-01 NOTE — XMS
Encounter Summary
  Created on: 2019
 
 Bonifacio Nila JB
 External Reference #: [id number]
 : [birth year]
 Sex: Female
 
 Demographics
 
 
+-----------------------+------------------------+
| Address               | [address]            |
|                       | [place]  [address]   |
+-----------------------+------------------------+
| Home Phone            | [phone]        |
+-----------------------+------------------------+
| Preferred Language    | Unknown                |
+-----------------------+------------------------+
| Marital Status        | Single                 |
+-----------------------+------------------------+
| Mormon Affiliation | Unknown                |
+-----------------------+------------------------+
| Race                  | White                  |
+-----------------------+------------------------+
| Ethnic Group          | Not  or  |
+-----------------------+------------------------+
 
 
 Author
 
 
+--------------+------------------------------+
| Author       | [facility] |
+--------------+------------------------------+
| Organization | [facility] |
+--------------+------------------------------+
| Address      | Unknown                      |
+--------------+------------------------------+
| Phone        | Unavailable                  |
+--------------+------------------------------+
 
 
 
 Support
 
 
+-----------------+--------------+---------+-----------------+
| Name            | Relationship | Address | Phone           |
+-----------------+--------------+---------+-----------------+
| Kit Valdovinos   | ECON         | Unknown | [phone] |
+-----------------+--------------+---------+-----------------+
| Magdalena Valdovinos | ECON         | Unknown | [phone] |
+-----------------+--------------+---------+-----------------+
 
 
 
 Care Team Providers
 
 
 
+-----------------------+------+-------------+
| Care Team Member Name | Role | Phone       |
+-----------------------+------+-------------+
 PCP  | Unavailable |
+-----------------------+------+-------------+
 
 
 
 Reason for Visit
 Office Visit - E/M Services (Routine)
 
+--------+--------------+---------------+--------------+--------------+---------------+
| Status | Reason       | Specialty     | Diagnoses /  | Referred By  | Referred To   |
|        |              |               | Procedures   | Contact      | Contact       |
+--------+--------------+---------------+--------------+--------------+---------------+
| Closed |   Specialty  | Pediatric     |              |   Non-[facility]   |   Ped Gastro  |
|        | Services     | Gastroenterol |              | Epic Dept    | [facility]  [address]  |
|        | Required     | ogy           |              |              | [address]   |
|        |              |               |              |              | [address]       |
|        |              |               |              |              | Mailcode:     |
|        |              |               |              |              | [facility]         |
|        |              |               |              |              | [facility]   |
|        |              |               |              |              | [place], [place]  |
|        |              |               |              |              | [address]    |
|        |              |               |              |              | Phone:        |
|        |              |               |              |              | [phone]  |
|        |              |               |              |              |  Fax:         |
|        |              |               |              |              | [phone]  |
+--------+--------------+---------------+--------------+--------------+---------------+
 
 
 
 
 Encounter Details
 
 
+--------+---------+----------------------+----------------------+----------------------+
| Date   | Type    | Department           | Care Team            | Description          |
+--------+---------+----------------------+----------------------+----------------------+
| 07/10/ | Office  |   Specialty Clinics  |   Neema Khalil,   | Immunosuppressed     |
|    | Visit   | at [facility]  [address]  | MD [address] | status (HCC)         |
|        |         | [address]      |   [facility] [place]       | (Primary Dx); S/P    |
|        |         | Mailcode: [facility]       | [address]           | liver transplant     |
|        |         | [place]          | [phone]         | (HCC); High risk     |
|        |         | [facility] [place]         | [phone] (Fax)   | medications (not     |
|        |         | [phone]           |                      | anticoagulants)      |
|        |         | [phone]         |                      | long-term use; VSD   |
|        |         |                      |                      | (ventricular septal  |
|        |         |                      |                      | defect); Aortic      |
|        |         |                      |                      | valve insufficiency  |
+--------+---------+----------------------+----------------------+----------------------+
 
 
 
 Social History
 
 
+----------------+-------+-----------+--------+------+
 
| Tobacco Use    | Types | Packs/Day | Years  | Date |
|                |       |           | Used   |      |
+----------------+-------+-----------+--------+------+
| Never Assessed |       |           |        |      |
+----------------+-------+-----------+--------+------+
 
 
 
+------------------+---------------+
| Sex Assigned at  | Date Recorded |
| Birth            |               |
+------------------+---------------+
| Not on file      |               |
+------------------+---------------+
 
 
 
+----------------+-------------+-------------+
| Job Start Date | Occupation  | Industry    |
+----------------+-------------+-------------+
| Not on file    | Not on file | Not on file |
+----------------+-------------+-------------+
 
 
 
+----------------+--------------+------------+
| Travel History | Travel Start | Travel End |
+----------------+--------------+------------+
 
 
 
+-------------------------------------+
| No recent travel history available. |
+-------------------------------------+
 documented as of this encounter
 
 Patient Instructions
 Patient Instructions Neema Khalil MD - 2014  4:33 PM PDT
 
 Electronically signed by Neema Khalil MD at 07/10/2014  6:31 PM PDT
 documented in this encounter
 
 Progress Notes
 Neema Khalil MD - 2014  4:33 PM PDT
 Patient is no show.
 
 Electronically signed by Neema Khalil MD at 07/10/2014  6:32 PM PDTdocumented in this en
counter
 
 Plan of Treatment
 Not on filedocumented as of this encounter
 
 Visit Diagnoses
 
 
+--------------------------------------------------------------------------------------+
| Diagnosis                                                                            |
+--------------------------------------------------------------------------------------+
|   Immunosuppressed status (HCC) - Primary  Unspecified disorder of immune mechanism  |
+--------------------------------------------------------------------------------------+
 
|   S/P liver transplant (HCC)  Liver replaced by transplant                           |
+--------------------------------------------------------------------------------------+
|   High risk medications (not anticoagulants) long-term use  Encounter for long-term  |
| (current) use of other medications                                                   |
+--------------------------------------------------------------------------------------+
|   VSD (ventricular septal defect)  Ventricular septal defect                         |
+--------------------------------------------------------------------------------------+
|   Aortic valve insufficiency  Aortic valve disorders                                 |
+--------------------------------------------------------------------------------------+
 documented in this encounter

## 2019-11-01 NOTE — XMS
Encounter Summary
  Created on: 2019
 
 Bonifacio Nila JB
 External Reference #: [id number]
 : [birth year]
 Sex: Female
 
 Demographics
 
 
+-----------------------+------------------------+
| Address               | [address]            |
|                       | [place]  [address]   |
+-----------------------+------------------------+
| Home Phone            | [phone]        |
+-----------------------+------------------------+
| Preferred Language    | Unknown                |
+-----------------------+------------------------+
| Marital Status        | Single                 |
+-----------------------+------------------------+
| Faith Affiliation | Unknown                |
+-----------------------+------------------------+
| Race                  | White                  |
+-----------------------+------------------------+
| Ethnic Group          | Not  or  |
+-----------------------+------------------------+
 
 
 Author
 
 
+--------------+------------------------------+
| Author       | [facility] [employer] [facility] |
+--------------+------------------------------+
| Organization | [facility] |
+--------------+------------------------------+
| Address      | Unknown                      |
+--------------+------------------------------+
| Phone        | Unavailable                  |
+--------------+------------------------------+
 
 
 
 Support
 
 
+-----------------+--------------+---------+-----------------+
| Name            | Relationship | Address | Phone           |
+-----------------+--------------+---------+-----------------+
| Kit Valdovinos   | ECON         | Unknown | [phone] |
+-----------------+--------------+---------+-----------------+
| Magdalena Valdovinos | ECON         | Unknown | [phone] |
+-----------------+--------------+---------+-----------------+
 
 
 
 Care Team Providers
 
 
 
+------------------------+------+-----------------+
| Care Team Member Name  | Role | Phone           |
+------------------------+------+-----------------+
| Lily Horton MD | PCP  | [phone] |
+------------------------+------+-----------------+
 
 
 
 Reason for Visit
 
 
+-------------+----------+
| Reason      | Comments |
+-------------+----------+
| Lab Results |          |
+-------------+----------+
 
 
 
 Encounter Details
 
 
+--------+-----------+----------------------+----------------------+-------------+
| Date   | Type      | Department           | Care Team            | Description |
+--------+-----------+----------------------+----------------------+-------------+
| 10/23/ | Telephone |   Pediatric          |   Neema Khalil,   | Lab Results |
| 2017   |           | Gastroenterology at  | MD  [address] |             |
|        |           | [place]          |   [place], [place]       |             |
|        |           | [facility] [[email or web address]]  | [address]           |             |
|        |           |  [address] | [phone]         |             |
|        |           |  [address]  Mailcode:  | [phone] (Fax)   |             |
|        |           | [facility]   |                      |             |
|        |           | [place], [place]         |                      |             |
|        |           | [address]           |                      |             |
|        |           | [phone]         |                      |             |
+--------+-----------+----------------------+----------------------+-------------+
 
 
 
 Social History
 
 
+-------------------+-------+-----------+--------+------+
| Tobacco Use       | Types | Packs/Day | Years  | Date |
|                   |       |           | Used   |      |
+-------------------+-------+-----------+--------+------+
| Passive Smoke     |       |           |        |      |
| Exposure - Never  |       |           |        |      |
| Smoker            |       |           |        |      |
+-------------------+-------+-----------+--------+------+
 
 
 
+---------------------+---+---+---+
| Smokeless Tobacco:  |   |   |   |
| Never Used          |   |   |   |
+---------------------+---+---+---+
 
 
 
 
+-------------+-------------+---------+----------+
| Alcohol Use | Drinks/Week | oz/Week | Comments |
+-------------+-------------+---------+----------+
| Not Asked   |             |         |          |
+-------------+-------------+---------+----------+
 
 
 
+------------------+---------------+
| Sex Assigned at  | Date Recorded |
| Birth            |               |
+------------------+---------------+
| Not on file      |               |
+------------------+---------------+
 
 
 
+----------------+-------------+-------------+
| Job Start Date | Occupation  | Industry    |
+----------------+-------------+-------------+
| Not on file    | Not on file | Not on file |
+----------------+-------------+-------------+
 
 
 
+----------------+--------------+------------+
| Travel History | Travel Start | Travel End |
+----------------+--------------+------------+
 
 
 
+-------------------------------------+
| No recent travel history available. |
+-------------------------------------+
 documented as of this encounter
 
 Plan of Treatment
 Not on filedocumented as of this encounter
 
 Visit Diagnoses
 Not on filedocumented in this encounter

## 2019-11-01 NOTE — XMS
Encounter Summary
  Created on: 2019
 
 Bonifacio Nila JB
 External Reference #: [id number]
 : [birth year]
 Sex: Female
 
 Demographics
 
 
+-----------------------+------------------------+
| Address               | [address]            |
|                       | [place]  [address]   |
+-----------------------+------------------------+
| Home Phone            | [phone]        |
+-----------------------+------------------------+
| Preferred Language    | Unknown                |
+-----------------------+------------------------+
| Marital Status        | Single                 |
+-----------------------+------------------------+
| Rastafari Affiliation | Unknown                |
+-----------------------+------------------------+
| Race                  | White                  |
+-----------------------+------------------------+
| Ethnic Group          | Not  or  |
+-----------------------+------------------------+
 
 
 Author
 
 
+--------------+------------------------------+
| Author       | [facility] [employer] [facility] |
+--------------+------------------------------+
| Organization | [facility] |
+--------------+------------------------------+
| Address      | Unknown                      |
+--------------+------------------------------+
| Phone        | Unavailable                  |
+--------------+------------------------------+
 
 
 
 Support
 
 
+-----------------+--------------+---------+-----------------+
| Name            | Relationship | Address | Phone           |
+-----------------+--------------+---------+-----------------+
| Kit Valdovinos   | ECON         | Unknown | [phone] |
+-----------------+--------------+---------+-----------------+
| Magdalena Valdovinos | ECON         | Unknown | [phone] |
+-----------------+--------------+---------+-----------------+
 
 
 
 Care Team Providers
 
 
 
+-----------------------+------+-----------------+
| Care Team Member Name | Role | Phone           |
+-----------------------+------+-----------------+
| Lily Horton MD   | PCP  | [phone] |
+-----------------------+------+-----------------+
 
 
 
 Reason for Visit
 
 
+----------------------+----------+
| Reason               | Comments |
+----------------------+----------+
| Lab findings,        |          |
| teaching, guidance,  |          |
| and counseling       |          |
+----------------------+----------+
 
 
 
 Encounter Details
 
 
+--------+-----------+----------------------+--------------------+----------------------+
| Date   | Type      | Department           | Care Team          | Description          |
+--------+-----------+----------------------+--------------------+----------------------+
| / | Telephone |   Pediatric          |   Ivis Burkett MD | Lab findings,        |
|    |           | Gastroenterology at  |                    | teaching, guidance,  |
|        |           | [place]          |                    | and counseling       |
|        |           | [facility]  |                    |                      |
|        |           |  [address] |                    |                      |
|        |           |  [address]  Mailcode:  |                    |                      |
|        |           |   [place]   |                    |                      |
|        |           | [place], [place]         |                    |                      |
|        |           | [address]           |                    |                      |
|        |           | [phone]         |                    |                      |
+--------+-----------+----------------------+--------------------+----------------------+
 
 
 
 Social History
 
 
+----------------+-------+-----------+--------+------+
| Tobacco Use    | Types | Packs/Day | Years  | Date |
|                |       |           | Used   |      |
+----------------+-------+-----------+--------+------+
| Never Assessed |       |           |        |      |
+----------------+-------+-----------+--------+------+
 
 
 
+------------------+---------------+
| Sex Assigned at  | Date Recorded |
| Birth            |               |
+------------------+---------------+
| Not on file      |               |
 
+------------------+---------------+
 
 
 
+----------------+-------------+-------------+
| Job Start Date | Occupation  | Industry    |
+----------------+-------------+-------------+
| Not on file    | Not on file | Not on file |
+----------------+-------------+-------------+
 
 
 
+----------------+--------------+------------+
| Travel History | Travel Start | Travel End |
+----------------+--------------+------------+
 
 
 
+-------------------------------------+
| No recent travel history available. |
+-------------------------------------+
 documented as of this encounter
 
 Plan of Treatment
 Not on filedocumented as of this encounter
 
 Visit Diagnoses
 Not on filedocumented in this encounter

## 2019-11-01 NOTE — XMS
Encounter Summary
  Created on: 2019
 
 Bonifacio Nila JB
 External Reference #: [id number]
 : [birth year]
 Sex: Female
 
 Demographics
 
 
+-----------------------+------------------------+
| Address               | [address]            |
|                       | [place]  [address]   |
+-----------------------+------------------------+
| Home Phone            | [phone]        |
+-----------------------+------------------------+
| Preferred Language    | Unknown                |
+-----------------------+------------------------+
| Marital Status        | Single                 |
+-----------------------+------------------------+
| Advent Affiliation | Unknown                |
+-----------------------+------------------------+
| Race                  | White                  |
+-----------------------+------------------------+
| Ethnic Group          | Not  or  |
+-----------------------+------------------------+
 
 
 Author
 
 
+--------------+------------------------------+
| Author       | [facility] |
+--------------+------------------------------+
| Organization | [facility] |
+--------------+------------------------------+
| Address      | Unknown                      |
+--------------+------------------------------+
| Phone        | Unavailable                  |
+--------------+------------------------------+
 
 
 
 Support
 
 
+-----------------+--------------+---------+-----------------+
| Name            | Relationship | Address | Phone           |
+-----------------+--------------+---------+-----------------+
| Kit Valdovinos   | ECON         | Unknown | [phone] |
+-----------------+--------------+---------+-----------------+
| Magdalena Valdovinos | ECON         | Unknown | [phone] |
+-----------------+--------------+---------+-----------------+
 
 
 
 Care Team Providers
 
 
 
+------------------------+------+-----------------+
| Care Team Member Name  | Role | Phone           |
+------------------------+------+-----------------+
| Lily Horton MD | PCP  | [phone] |
+------------------------+------+-----------------+
 
 
 
 Encounter Details
 
 
+--------+-------------+----------------------+----------------------+-------------+
| Date   | Type        | Department           | Care Team            | Description |
+--------+-------------+----------------------+----------------------+-------------+
| 06/15/ | Documentati |   Pediatric          |   Neema Khalil,   |             |
|    | on          | Gastroenterology at  | MD [address] |             |
|        |             | [place]          |   [place], [place]       |             |
|        |             | [facility]  | [address]           |             |
|        |             |  [address] | [phone]         |             |
|        |             |  Aide [address]  Mailcode:  | [phone] (Fax)   |             |
|        |             | [facility]   |                      |             |
|        |             | [place], [place]         |                      |             |
|        |             | [address]           |                      |             |
|        |             | [phone]         |                      |             |
+--------+-------------+----------------------+----------------------+-------------+
 
 
 
 Social History
 
 
+-------------------+-------+-----------+--------+------+
| Tobacco Use       | Types | Packs/Day | Years  | Date |
|                   |       |           | Used   |      |
+-------------------+-------+-----------+--------+------+
| Passive Smoke     |       |           |        |      |
| Exposure - Never  |       |           |        |      |
| Smoker            |       |           |        |      |
+-------------------+-------+-----------+--------+------+
 
 
 
+---------------------+---+---+---+
| Smokeless Tobacco:  |   |   |   |
| Never Used          |   |   |   |
+---------------------+---+---+---+
 
 
 
+-------------+-------------+---------+----------+
| Alcohol Use | Drinks/Week | oz/Week | Comments |
+-------------+-------------+---------+----------+
| Not Asked   |             |         |          |
+-------------+-------------+---------+----------+
 
 
 
+------------------+---------------+
 
| Sex Assigned at  | Date Recorded |
| Birth            |               |
+------------------+---------------+
| Not on file      |               |
+------------------+---------------+
 
 
 
+----------------+-------------+-------------+
| Job Start Date | Occupation  | Industry    |
+----------------+-------------+-------------+
| Not on file    | Not on file | Not on file |
+----------------+-------------+-------------+
 
 
 
+----------------+--------------+------------+
| Travel History | Travel Start | Travel End |
+----------------+--------------+------------+
 
 
 
+-------------------------------------+
| No recent travel history available. |
+-------------------------------------+
 documented as of this encounter
 
 Plan of Treatment
 Not on filedocumented as of this encounter
 
 Procedures
 
 
+----------------------+--------+-------------+----------------------+----------------------
+
| Procedure Name       | Priori | Date/Time   | Associated Diagnosis | Comments             
|
|                      | ty     |             |                      |                      
|
+----------------------+--------+-------------+----------------------+----------------------
+
| LIPID SET (TRIG, T   | Routin | 2015  |                      |   Results for this   
|
| CHOL, HDL, CALC LDL) | e      |  2:24 PM    |                      | procedure are in the 
|
|                      |        | PDT         |                      |  results section.    
|
+----------------------+--------+-------------+----------------------+----------------------
+
| CBC, WITH            | Routin | 2015  |                      |   Results for this   
|
| DIFFERENTIAL         | e      |  2:19 PM    |                      | procedure are in the 
|
|                      |        | PDT         |                      |  results section.    
|
+----------------------+--------+-------------+----------------------+----------------------
+
| COMPLETE METABOLIC   | Routin | 2015  |                      |   Results for this   
|
| SET                  | e      |  2:19 PM    |                      | procedure are in the 
 
|
| (NA,K,CL,CO2,BUN,CRE |        | PDT         |                      |  results section.    
|
| AT,GLUC,CA,AST,ALT,B |        |             |                      |                      
|
| ASHWINI TOTAL,ALK        |        |             |                      |                      
|
| PHOS,ALB,PROT TOTAL) |        |             |                      |                      
|
+----------------------+--------+-------------+----------------------+----------------------
+
| GGT, PLASMA          | Routin | 2015  |                      |   Results for this   
|
|                      | e      |  2:19 PM    |                      | procedure are in the 
|
|                      |        | PDT         |                      |  results section.    
|
+----------------------+--------+-------------+----------------------+----------------------
+
| MAGNESIUM, PLASMA    | Routin | 2015  |                      |   Results for this   
|
|                      | e      |  2:19 PM    |                      | procedure are in the 
|
|                      |        | PDT         |                      |  results section.    
|
+----------------------+--------+-------------+----------------------+----------------------
+
 documented in this encounter
 
 Results
 LIPID SET (TRIG, T CHOL, HDL, CALC LDL) (2015  2:24 PM PDT)
 
+-------------+-------+-----------------+------------+--------------+
| Component   | Value | Ref Range       | Performed  | Pathologist  |
|             |       |                 | At         | Signature    |
+-------------+-------+-----------------+------------+--------------+
| CHOLESTEROL | 123   | 0 - 200 mg/dL   | [facility]  |              |
|   (LAB)     |       |                 | LAB -      |              |
|             |       |                 | [place]  |              |
+-------------+-------+-----------------+------------+--------------+
| TRIGLYCERID | 106   | 30 - 150 mg/dL  | [facility]  |              |
| ES          |       |                 | LAB -      |              |
|             |       |                 | [place]  |              |
+-------------+-------+-----------------+------------+--------------+
| LDL         | 140   | 100 - 215 mg/dL | [facility]  |              |
| CHOLESTEROL |       |                 | LAB -      |              |
| ,           |       |                 | [place]  |              |
| CALCULATED  |       |                 |            |              |
+-------------+-------+-----------------+------------+--------------+
 
 
 
+---------------+
| Specimen      |
+---------------+
| Blood - Blood |
+---------------+
 
 
 
 
+--------------------+----------------------+--------------------+----------------+
| Performing         | Address              | City/State/Zipcode | Phone Number   |
| Organization       |                      |                    |                |
+--------------------+----------------------+--------------------+----------------+
|   [facility]  |   [address] | [place]      |   [phone] |
| [place]          |                      |                    |                |
+--------------------+----------------------+--------------------+----------------+
 MAGNESIUM, PLASMA (2015  2:19 PM PDT)
 
+-------------+-------+-----------------+------------+--------------+
| Component   | Value | Ref Range       | Performed  | Pathologist  |
|             |       |                 | At         | Signature    |
+-------------+-------+-----------------+------------+--------------+
| MAGNESIUM,P | 1.8   | 1.7 - 2.5 mg/dL | [facility]  |              |
| LASMA       |       |                 | LAB -      |              |
|             |       |                 | [place]  |              |
+-------------+-------+-----------------+------------+--------------+
 
 
 
+---------------+
| Specimen      |
+---------------+
| Blood - Blood |
+---------------+
 
 
 
+--------------------+----------------------+--------------------+----------------+
| Performing         | Address              | City/State/Zipcode | Phone Number   |
| Organization       |                      |                    |                |
+--------------------+----------------------+--------------------+----------------+
|   [facility]  |   [address] | [place], [place]      |   [phone] |
| [place]          |                      |                    |                |
+--------------------+----------------------+--------------------+----------------+
 GGT, PLASMA (2015  2:19 PM PDT)
 
+-------------+-------+------------+------------+--------------+
| Component   | Value | Ref Range  | Performed  | Pathologist  |
|             |       |            | At         | Signature    |
+-------------+-------+------------+------------+--------------+
| GAMMA       | 7     | 5 - 60 u/l | [facility]  |              |
| GLUTAMYL    |       |            | LAB -      |              |
| TRANSFERASE |       |            | [place]  |              |
+-------------+-------+------------+------------+--------------+
 
 
 
+---------------+
| Specimen      |
+---------------+
| Blood - Blood |
+---------------+
 
 
 
+--------------------+----------------------+--------------------+----------------+
| Performing         | Address              | City/State/Zipcode | Phone Number   |
| Organization       |                      |                    |                |
+--------------------+----------------------+--------------------+----------------+
 
|   [facility]  |   [address] | [place], [place]      |   [phone] |
| [place]          |                      |                    |                |
+--------------------+----------------------+--------------------+----------------+
 COMPLETE METABOLIC SET (NA,K,CL,CO2,BUN,CREAT,GLUC,CA,AST,ALT,BILI TOTAL,ALK PHOS,ALB,PROT 
TOTAL) (2015  2:19 PM PDT)
 
+-------------+-------+-----------------+------------+--------------+
| Component   | Value | Ref Range       | Performed  | Pathologist  |
|             |       |                 | At         | Signature    |
+-------------+-------+-----------------+------------+--------------+
| GLUCOSE,    | 79    | 70 - 100 mg/dL  | [facility]  |              |
| PLASMA      |       |                 | LAB -      |              |
| (LAB)       |       |                 | [place]  |              |
+-------------+-------+-----------------+------------+--------------+
| ANION GAP   | 16.8  | 7 - 21          | [facility]  |              |
|             |       |                 | LAB -      |              |
|             |       |                 | [place]  |              |
+-------------+-------+-----------------+------------+--------------+
| CREATININE  | 0.66  | 0.60 - 1.20     | [facility]  |              |
| PLASMA      |       | mg/dL           | LAB -      |              |
| (LAB)       |       |                 | [place]  |              |
+-------------+-------+-----------------+------------+--------------+
| TOTAL       | 7.1   | 6.1 - 8.5 g/dL  | [facility]  |              |
| PROTEIN,    |       |                 | LAB -      |              |
| PLASMA      |       |                 | [place]  |              |
| (LAB)       |       |                 |            |              |
+-------------+-------+-----------------+------------+--------------+
| ALBUMIN,    | 4.0   | 3.5 - 5.0 g/dL  | [facility]  |              |
| PLASMA      |       |                 | LAB -      |              |
| (LAB)       |       |                 | [place]  |              |
+-------------+-------+-----------------+------------+--------------+
| CALCIUM,    | 9.4   | 8.4 - 10.2      | [facility]  |              |
| PLASMA      |       | mg/dL           | LAB -      |              |
| (LAB)       |       |                 | [place]  |              |
+-------------+-------+-----------------+------------+--------------+
| BILIRUBIN   | 0.2   | 0.0 - 1.2       | [facility]  |              |
| TOTAL       |       | Transcutaneous  | LAB -      |              |
|             |       | Bilirubinometer | [place]  |              |
+-------------+-------+-----------------+------------+--------------+
| ALK PHOS    | 88    | 30 - 128 U/L    | [facility]  |              |
|             |       |                 | LAB -      |              |
|             |       |                 | [place]  |              |
+-------------+-------+-----------------+------------+--------------+
| AST(SGOT)   | 20    | 13 - 39 U/L     | [facility]  |              |
|             |       |                 | LAB -      |              |
|             |       |                 | [place]  |              |
+-------------+-------+-----------------+------------+--------------+
| SODIUM,     | 137   | 132 - 143       | [facility]  |              |
| PLASMA      |       | mmol/L          | LAB -      |              |
| (LAB)       |       |                 | [place]  |              |
+-------------+-------+-----------------+------------+--------------+
| POTASSIUM,  | 3.8   | 3.6 - 5.1       | [facility]  |              |
| PLASMA      |       | mmol/L          | LAB -      |              |
| (LAB)       |       |                 | [place]  |              |
+-------------+-------+-----------------+------------+--------------+
| CHLORIDE,   | 102   | 95 - 112 mmol/L | [facility]  |              |
| PLASMA      |       |                 | LAB -      |              |
| (LAB)       |       |                 | [place]  |              |
+-------------+-------+-----------------+------------+--------------+
| TOTAL CO2,  | 22    | 19 - 31 mmol/L  | [facility]  |              |
 
| PLASMA      |       |                 | LAB -      |              |
| (LAB)       |       |                 | [place]  |              |
+-------------+-------+-----------------+------------+--------------+
| ALT (SGPT)  | 10    | 7 - 52 U/L      | [facility]  |              |
|             |       |                 | LAB -      |              |
|             |       |                 | [place]  |              |
+-------------+-------+-----------------+------------+--------------+
| BUN/CREATIN | 21.2  | 6.0 - 28.6      | [facility]  |              |
| INE RATIO   |       |                 | LAB -      |              |
|             |       |                 | [place]  |              |
+-------------+-------+-----------------+------------+--------------+
| UREA        | 14    | 6 - 23 mg/dL    | [facility]  |              |
| NITROGEN,   |       |                 | LAB -      |              |
| SERUM       |       |                 | [place]  |              |
+-------------+-------+-----------------+------------+--------------+
| GLOBULIN    | 3.1   | 1.8 - 3.5       | [facility]  |              |
|             |       |                 | LAB -      |              |
|             |       |                 | [place]  |              |
+-------------+-------+-----------------+------------+--------------+
| A/G RATIO   | 1.3   | 1.1 - 2.4       | [facility]  |              |
|             |       |                 | LAB -      |              |
|             |       |                 | [place]  |              |
+-------------+-------+-----------------+------------+--------------+
 
 
 
+---------------+
| Specimen      |
+---------------+
| Blood - Blood |
+---------------+
 
 
 
+--------------------+----------------------+--------------------+----------------+
| Performing         | Address              | City/State/Zipcode | Phone Number   |
| Organization       |                      |                    |                |
+--------------------+----------------------+--------------------+----------------+
|   [facility]  |   [address] | [place]      |   [phone] |
| [place]          |                      |                    |                |
+--------------------+----------------------+--------------------+----------------+
 CBC, WITH DIFFERENTIAL (2015  2:19 PM PDT)
 
+-------------+----------+-----------------+------------+--------------+
| Component   | Value    | Ref Range       | Performed  | Pathologist  |
|             |          |                 | At         | Signature    |
+-------------+----------+-----------------+------------+--------------+
| WHITE CELL  | 8.3      | 4.5 - 11.0 K/cu | [facility]  |              |
| COUNT       |          |  mm             | LAB -      |              |
|             |          |                 | [place]  |              |
+-------------+----------+-----------------+------------+--------------+
| RED CELL    | 4.55     | 3.8 - 5.1 M/cu  | [facility]  |              |
| COUNT       |          | mm              | LAB -      |              |
|             |          |                 | [place]  |              |
+-------------+----------+-----------------+------------+--------------+
| HEMOGLOBIN  | 13.5     | 12.0 - 16.0     | [facility]  |              |
|             |          | g/dL            | LAB -      |              |
|             |          |                 | [place]  |              |
+-------------+----------+-----------------+------------+--------------+
| HEMATOCRIT  | 40.2     | 35 - 45 %       | [facility]  |              |
 
|             |          |                 | LAB -      |              |
|             |          |                 | [place]  |              |
+-------------+----------+-----------------+------------+--------------+
| MCV         | 88.3     | 81 - 99 fL      | [facility]  |              |
|             |          |                 | LAB -      |              |
|             |          |                 | [place]  |              |
+-------------+----------+-----------------+------------+--------------+
| MCH         | 30       | 27 - 33 pg      | [facility]  |              |
|             |          |                 | LAB -      |              |
|             |          |                 | [place]  |              |
+-------------+----------+-----------------+------------+--------------+
| MCHC        | 34       | 30 - 36 g/dL    | [facility]  |              |
|             |          |                 | LAB -      |              |
|             |          |                 | [place]  |              |
+-------------+----------+-----------------+------------+--------------+
| PLATELET    | 313      | 140 - 440 K/cu  | [facility]  |              |
| COUNT       |          | mm              | LAB -      |              |
|             |          |                 | [place]  |              |
+-------------+----------+-----------------+------------+--------------+
| NEUTROPHIL  | 63.8     | 39 - 80 %       | [facility]  |              |
| %           |          |                 | LAB -      |              |
|             |          |                 | [place]  |              |
+-------------+----------+-----------------+------------+--------------+
| LYMPHOCYTE  | 21.5 (A) | 24 - 44 %       | [facility]  |              |
| %           |          |                 | LAB -      |              |
|             |          |                 | [place]  |              |
+-------------+----------+-----------------+------------+--------------+
| MONOCYTE %  | 12.6 (A) | 0 - 12 %        | [facility]  |              |
|             |          |                 | LAB -      |              |
|             |          |                 | [place]  |              |
+-------------+----------+-----------------+------------+--------------+
| EOS %       | 1.6      | 0 - 6 %         | [facility]  |              |
|             |          |                 | LAB -      |              |
|             |          |                 | [place]  |              |
+-------------+----------+-----------------+------------+--------------+
| BASO %      | 0.5      | 0 - 2 %         | [facility]  |              |
|             |          |                 | LAB -      |              |
|             |          |                 | [place]  |              |
+-------------+----------+-----------------+------------+--------------+
| RDW         | 13.5     | 10.5 - 15.0 %   | [facility]  |              |
|             |          |                 | LAB -      |              |
|             |          |                 | [place]  |              |
+-------------+----------+-----------------+------------+--------------+
 
 
 
+---------------+
| Specimen      |
+---------------+
| Blood - Blood |
+---------------+
 
 
 
+--------------------+----------------------+--------------------+----------------+
| Performing         | Address              | City/State/Zipcode | Phone Number   |
| Organization       |                      |                    |                |
+--------------------+----------------------+--------------------+----------------+
|   [facility]  |   [address] | [place]      |   [phone] |
| [place]          |                      |                    |                |
 
+--------------------+----------------------+--------------------+----------------+
 documented in this encounter
 
 Visit Diagnoses
 Not on filedocumented in this encounter

## 2019-11-01 NOTE — XMS
Encounter Summary
  Created on: 2019
 
 Bonifacio Nila BJ
 External Reference #: [id number]
 : [birth year]
 Sex: Female
 
 Demographics
 
 
+-----------------------+------------------------+
| Address               | [address]            |
|                       | [place]  [address]   |
+-----------------------+------------------------+
| Home Phone            | [phone]        |
+-----------------------+------------------------+
| Preferred Language    | Unknown                |
+-----------------------+------------------------+
| Marital Status        | Single                 |
+-----------------------+------------------------+
| Anabaptist Affiliation | Unknown                |
+-----------------------+------------------------+
| Race                  | White                  |
+-----------------------+------------------------+
| Ethnic Group          | Not  or  |
+-----------------------+------------------------+
 
 
 Author
 
 
+--------------+-------------+
| Organization | Unknown     |
+--------------+-------------+
| Address      | Unknown     |
+--------------+-------------+
| Phone        | Unavailable |
+--------------+-------------+
 
 
 
 Support
 
 
+-----------------+--------------+---------+-----------------+
| Name            | Relationship | Address | Phone           |
+-----------------+--------------+---------+-----------------+
| Kit Valdovinos   | ECON         | Unknown | [phone] |
+-----------------+--------------+---------+-----------------+
| Magdalena Valdovinos | ECON         | Unknown | [phone] |
+-----------------+--------------+---------+-----------------+
 
 
 
 Care Team Providers
 
 
 
+-----------------------+------+-----------------+
| Care Team Member Name | Role | Phone           |
+-----------------------+------+-----------------+
| Lily Horton MD   | PCP  | [phone] |
+-----------------------+------+-----------------+
 
 
 
 Encounter Details
 
 
+--------+-------------+------------+--------------------+-------------+
| Date   | Type        | Department | Care Team          | Description |
+--------+-------------+------------+--------------------+-------------+
| / | Transcribed |            |   Dictation, Other | Transcribed |
|    |             |            |                    |             |
+--------+-------------+------------+--------------------+-------------+
 
 
 
 Social History
 
 
+----------------+-------+-----------+--------+------+
| Tobacco Use    | Types | Packs/Day | Years  | Date |
|                |       |           | Used   |      |
+----------------+-------+-----------+--------+------+
| Never Assessed |       |           |        |      |
+----------------+-------+-----------+--------+------+
 
 
 
+------------------+---------------+
| Sex Assigned at  | Date Recorded |
| Birth            |               |
+------------------+---------------+
| Not on file      |               |
+------------------+---------------+
 
 
 
+----------------+-------------+-------------+
| Job Start Date | Occupation  | Industry    |
+----------------+-------------+-------------+
| Not on file    | Not on file | Not on file |
+----------------+-------------+-------------+
 
 
 
+----------------+--------------+------------+
| Travel History | Travel Start | Travel End |
+----------------+--------------+------------+
 
 
 
+-------------------------------------+
| No recent travel history available. |
+-------------------------------------+
 documented as of this encounter
 
 
 Progress Notes
 Interface, Transcription In - 10/29/2006  5:04 AM [facility]                                    [place] [address] [place], [place] [address] [phone] or [phone]
 
 2001
 
 Lily Horton M.D.
 [address]
 [place]  [address]
 
 RE:   NILA VALDOVINOS
 MR #: [id number]
 
 Dear Dr. Horton:
 
 I had the pleasure of seeing Nila in the Pediatric Liver Transplant Clinic
 at [facility] today attended by Dr. Marcos Aguirre of the [facility]
 Team for Dr. Bailey.  As you know,  she  is  a  1-year-old female who is 8
 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   She has had a relatively  uneventful  posttransplant
 course and is doing reasonably well.   The  only  clinical concern today is
 intermittent yet chronic mild diarrhea  and  intestinal  gas which produces
 abdominal discomfort and pain.  Since  the  intake  of fruit juice and milk
 products have been reduced, her diarrhea  is  better  but the gas persists.
 There is no fever, vomiting, weight loss,  or  blood  in the stool, but the
 mother is yet concerned.  No studies have been done.
 
 She is otherwise doing well.  Her present  medications include Prograf 5 mL
 b.i.d. (0.5 mg/mL), acyclovir (200 mg/5mL),  2.75  mL  (110 mg) b.i.d., and
 monthly pentamidine.  She missed the pentamidine last month, so the plan is
 to get it soon.  SHE IS ALLERGIC TO SULFA.
 
 On exam, she weighs 12.92 kg and measures  87.2  cm.   Both  parameters are
 close to the 50th percentile for age and represent normal increases.  Blood
 pressure is 107/52, pulse is 120, and  head  circumference is 50.4 cm.  She
 is slightly pale yet active, alert, and  playful.   She  has no adenopathy.
 Her abdomen is slightly distended, and  her  liver  is  several centimeters
 below the right costal margin, although  it  is soft and not enlarged.  Her
 spleen is palpable (or spleens since  she  has polysplenia), and she has an
 adequate amount of fat and muscle in her extremities.
 
 The  last  laboratory  tests were on  2000.   There  were  no
 significant abnormalities except a slightly  elevated  phosphorus  of  8.0.
 Her tacrolimus level was 7.6.  Faye-Barr  virus serology showed positive
 viral capsule antigen IgG but negative IgM, and negative EBV DNA PCR.
 
 In summary, Nila is a [id number][age][id number]  white  female  who  is 8 months status
 post liver transplantation for biliary atresia who is doing quite well.  We
 need to make some changes in her posttransplant management.
 
 The first goal is to get her Prograf level down.  The first step will be to
 decrease her Prograf dose to 4 mL b.i.d.  or 4 mg a day divided b.i.d.  The
 mother will do this today.  In one week,  she  will  need  laboratory tests
 that will include a chemistry panel,  CBC,  and  Prograf  level.  Then, she
 will get labs one month later.  She needs  to  have  labs done monthly.  If
 she has normal laboratory tests for 2  months  after a reduction in Prograf
 
 dose, then another change can be made.   Her  dose should be decreased by 1
 mg a day which is 1 mL in the morning  and  1  mL at night.  The goal is to
 get her down to 1 to 2 g a day, although  3  g  a  day would be acceptable.
 The Prograf blood level goal is 3 to 5 ng/mL.
 
 Posttransplant patients usually receive  monthly  labs  for  the  first  12
 months following transplant.  During  the  second  year,  if  everything is
 going  well, they can have their laboratory  tests  drawn  every  6  weeks.
 During the third year, if all goes well,  they can have the blood test done
 every other month.  During the third and fourth years and thereafter, every
 third month labs is acceptable, again, given the provision of an uneventful
 course.
 
 When her Prograf dose is down to 3  mg  a  day  (or  3  mL b.i.d.), her EBV
 serology and DNA PCR should be checked  again.  If it is negative, then the
 acyclovir can be discontinued.
 
 Finally, again, when her Prograf dose  is  down  to  3 mg a day, she should
 receive hepatitis A and B vaccines if  they  have  not  already been given.
 Given that when the Prograf dose is  so  elevated,  often  it is associated
 with ineffectiveness, and obviously, immunosuppression  patients should not
 receive live virus vaccines such as the measles vaccine.
 
 Nila should be seen again in 6 months  when  the  transplant  team  visits
 [place] again.
 
 Please let me know if you have any questions.
 
 Sincerely,
 
 Eagle De Luna M.D.
 Pediatric Gastroenterology
 
 [facility] / 
 [id number] / [id number] / [id number] / [id number]
 D: 2001
 T: 2001
 
 cc:
 
 Cristo Bailey M.D.
 [address]
 [place]  [address]
 
 Valentin Covington M.D.
 [address]
 [place]  [address]
 
 Marcos Aguirre M.D.
 [address]
 [place]  [address]
 
 [id number] signed by Interface, Transcription In at 10/29/2006  5:04 AM PSTdocume
nted in this encounter
 
 Plan of Treatment
 Not on filedocumented as of this encounter
 
 Visit Diagnoses
 Not on filedocumented in this encounter

## 2019-11-01 NOTE — XMS
Encounter Summary
  Created on: 2019
 
 Bonifacio Nila JB
 External Reference #: [id number]
 : [birth year]
 Sex: Female
 
 Demographics
 
 
+-----------------------+------------------------+
| Address               | [address]            |
|                       | [place]  [address]   |
+-----------------------+------------------------+
| Home Phone            | [phone]        |
+-----------------------+------------------------+
| Preferred Language    | Unknown                |
+-----------------------+------------------------+
| Marital Status        | Single                 |
+-----------------------+------------------------+
| Orthodox Affiliation | Unknown                |
+-----------------------+------------------------+
| Race                  | White                  |
+-----------------------+------------------------+
| Ethnic Group          | Not  or  |
+-----------------------+------------------------+
 
 
 Author
 
 
+--------------+-------------+
| Organization | Unknown     |
+--------------+-------------+
| Address      | Unknown     |
+--------------+-------------+
| Phone        | Unavailable |
+--------------+-------------+
 
 
 
 Support
 
 
+-----------------+--------------+---------+-----------------+
| Name            | Relationship | Address | Phone           |
+-----------------+--------------+---------+-----------------+
| Kit Valdovinos   | ECON         | Unknown | [phone] |
+-----------------+--------------+---------+-----------------+
| Magdalena Valdovinos | ECON         | Unknown | [phone] |
+-----------------+--------------+---------+-----------------+
 
 
 
 Care Team Providers
 
 
 
+-----------------------+------+-----------------+
| Care Team Member Name | Role | Phone           |
+-----------------------+------+-----------------+
| Lily Horton MD   | PCP  | [phone] |
+-----------------------+------+-----------------+
 
 
 
 Encounter Details
 
 
+--------+-------------+------------+--------------------+-------------+
| Date   | Type        | Department | Care Team          | Description |
+--------+-------------+------------+--------------------+-------------+
| 10/26/ | Transcribed |            |   Dictation, Other | Transcribed |
| 2000   |             |            |                    |             |
+--------+-------------+------------+--------------------+-------------+
 
 
 
 Social History
 
 
+----------------+-------+-----------+--------+------+
| Tobacco Use    | Types | Packs/Day | Years  | Date |
|                |       |           | Used   |      |
+----------------+-------+-----------+--------+------+
| Never Assessed |       |           |        |      |
+----------------+-------+-----------+--------+------+
 
 
 
+------------------+---------------+
| Sex Assigned at  | Date Recorded |
| Birth            |               |
+------------------+---------------+
| Not on file      |               |
+------------------+---------------+
 
 
 
+----------------+-------------+-------------+
| Job Start Date | Occupation  | Industry    |
+----------------+-------------+-------------+
| Not on file    | Not on file | Not on file |
+----------------+-------------+-------------+
 
 
 
+----------------+--------------+------------+
| Travel History | Travel Start | Travel End |
+----------------+--------------+------------+
 
 
 
+-------------------------------------+
| No recent travel history available. |
+-------------------------------------+
 documented as of this encounter
 
 
 Progress Notes
 Interface, Transcription In - 2006  3:05 AM [facility]                                    [place] [address], [place] [address] [phone] or [phone]
 
 2000
 
 Lily Horton M.D.
 [address]
 [place]  [address]
 
 RE:   NILA VALDOVINOS
 MR #: [id number]
 :  2000
 DATE OF TRANSPLANT:  2000
 
 Dear Dr. Horton:
 
 I had the pleasure of seeing Nila at the Pediatric Liver Transplant Clinic
 held  at  [facility],  attended  by  Dr. Mcrae   and   Dr. Becca Marsh,
 representatives of the Liver Transplant  team at [place], in lieu with Dr. Bailey.  As you know, she underwent  liver  transplantation  on  2000, and did well posttransplant.   Since  she  has  been at home, she has
 continued to do fairly well.  The only problem is some diarrhea.  On a good
 day she will have 2 bowel movements a  day,  and on a bad day she will have
 4-5.  She has no fever or blood in the stool, however.
 
 She is on Prograf 5 ml b.i.d. and acyclovir 10 ml q.i.d.
 
 Septra was stopped a few weeks ago because of A LIKELY ALLERGIC REACTION TO
 SEPTRA.  It involved hives.
 
 On exam, she weighs 11.2 kg and measures  78  cm.  Blood pressure is 85/50.
 Pulse was 126.  She is anicteric, active, and playful.  Her abdomen is soft
 and rounded with no mass, tenderness, or organomegaly.  She had some recent
 blood tests, which show transaminases in the 40-50 range.  The remainder of
 the blood test results are very similar  to  the  ones  done a week before.
 Her most recent tacrolimus level is appropriate.   The  most  recent one is
 pending.
 
 In summary, Nila is a 1-1/2-year-old  white  female  who  is  2-1/2 months
 status post liver transplantation for  extrahepatic  biliary  atresia.  She
 has  had a relatively uncomplicated  posttransplant  course  and  is  doing
 fairly well.  We need to begin a pneumocystis  prophylaxis  soon.  She will
 start receiving pentamidine, 1 unit  dose  per  month,  (1 vial) inhalation
 therapy.  We will see if can arrange this to be done in the [facility],
 rather than having the family drive all  the  way to the [place] for this.
 This should be done for 1 year posttransplant.
 
 For the next 1-1/2 months, the family will have Nila's blood drawn every 2
 weeks.   Assuming  everything is under  control,  then  it  could  be  done
 monthly.  The family will be talking  to  Dr. Bailey soon to firm up plans
 to have Nila followed mostly by you in  the [facility], and to come to
 [place] either when the situation is  unstable  and,  at  the  very least,
 every 3-6 months for chronic monitoring.
 
 Finally, Becca will work out the details  of which laboratory tests should
 
 be done and when with the family.   Please  let  me  know  if  you have any
 questions.
 
 Sincerely,
 
 Eagle De Luna M.D.
 Pediatric Gastroenterology
 
 [facility] / 
 [id number] / [id number] / [id number] / [id number]
 D: 10/26/2000
 T: 10/28/2000
 
 cc:
 
 Eagle Mcrae M.D.
 [address] [place] [address]  [address]
 
 [id number] signed by Interface, Transcription In at 2006  3:05 AM PSTdocume
nted in this encounter
 
 Plan of Treatment
 Not on filedocumented as of this encounter
 
 Visit Diagnoses
 Not on filedocumented in this encounter

## 2019-11-01 NOTE — XMS
Encounter Summary
  Created on: 2019
 
 Bonifacio Nila JB
 External Reference #: [id number]
 : [birth year]
 Sex: Female
 
 Demographics
 
 
+-----------------------+------------------------+
| Address               | [address]            |
|                       | [place]  [address]   |
+-----------------------+------------------------+
| Home Phone            | [phone]        |
+-----------------------+------------------------+
| Preferred Language    | Unknown                |
+-----------------------+------------------------+
| Marital Status        | Single                 |
+-----------------------+------------------------+
| Anabaptist Affiliation | Unknown                |
+-----------------------+------------------------+
| Race                  | White                  |
+-----------------------+------------------------+
| Ethnic Group          | Not  or  |
+-----------------------+------------------------+
 
 
 Author
 
 
+--------------+------------------------------+
| Author       | [facility] |
+--------------+------------------------------+
| Organization | [facility] |
+--------------+------------------------------+
| Address      | Unknown                      |
+--------------+------------------------------+
| Phone        | Unavailable                  |
+--------------+------------------------------+
 
 
 
 Support
 
 
+-----------------+--------------+---------+-----------------+
| Name            | Relationship | Address | Phone           |
+-----------------+--------------+---------+-----------------+
| Kit Valdovinos   | ECON         | Unknown | [phone] |
+-----------------+--------------+---------+-----------------+
| Magdalena Valdovinos | ECON         | Unknown | [phone] |
+-----------------+--------------+---------+-----------------+
 
 
 
 Care Team Providers
 
 
 
+-----------------------+------+-----------------+
| Care Team Member Name | Role | Phone           |
+-----------------------+------+-----------------+
| Lily Horton MD   | PCP  | [phone] |
+-----------------------+------+-----------------+
 
 
 
 Encounter Details
 
 
+--------+----------+----------------------+--------------------+-------------+
| Date   | Type     | Department           | Care Team          | Description |
+--------+----------+----------------------+--------------------+-------------+
| 10/12/ | Abstract |   Pediatric          |   Ivis Burkett MD |             |
|    |          | Gastroenterology at  |                    |             |
|        |          | [facility]          |                    |             |
|        |          | [facility]  |                    |             |
|        |          |  [address] |                    |             |
|        |          |  [address]  Mailcode:  |                    |             |
|        |          | [place]   |                    |             |
|        |          | [place], [place]         |                    |             |
|        |          | [address]           |                    |             |
|        |          | [phone]         |                    |             |
+--------+----------+----------------------+--------------------+-------------+
 
 
 
 Social History
 
 
+----------------+-------+-----------+--------+------+
| Tobacco Use    | Types | Packs/Day | Years  | Date |
|                |       |           | Used   |      |
+----------------+-------+-----------+--------+------+
| Never Assessed |       |           |        |      |
+----------------+-------+-----------+--------+------+
 
 
 
+------------------+---------------+
| Sex Assigned at  | Date Recorded |
| Birth            |               |
+------------------+---------------+
| Not on file      |               |
+------------------+---------------+
 
 
 
+----------------+-------------+-------------+
| Job Start Date | Occupation  | Industry    |
+----------------+-------------+-------------+
| Not on file    | Not on file | Not on file |
+----------------+-------------+-------------+
 
 
 
+----------------+--------------+------------+
 
| Travel History | Travel Start | Travel End |
+----------------+--------------+------------+
 
 
 
+-------------------------------------+
| No recent travel history available. |
+-------------------------------------+
 documented as of this encounter
 
 Plan of Treatment
 Not on filedocumented as of this encounter
 
 Procedures
 
 
+----------------------+--------+-------------+----------------------+----------------------
+
| Procedure Name       | Priori | Date/Time   | Associated Diagnosis | Comments             
|
|                      | ty     |             |                      |                      
|
+----------------------+--------+-------------+----------------------+----------------------
+
| CULTURE, URINE BACTI | Routin | 2009  |                      |   Results for this   
|
|                      | e      |  8:23 PM    |                      | procedure are in the 
|
|                      |        | PDT         |                      |  results section.    
|
+----------------------+--------+-------------+----------------------+----------------------
+
| CBC, WITH            | Routin | 2009  |                      |   Results for this   
|
| DIFFERENTIAL         | e      |  7:59 PM    |                      | procedure are in the 
|
|                      |        | PDT         |                      |  results section.    
|
+----------------------+--------+-------------+----------------------+----------------------
+
| CULTURE, BLOOD BACTI | Routin | 2009  |                      |   Results for this   
|
|  & YEAST             | e      |  7:57 PM    |                      | procedure are in the 
|
|                      |        | PDT         |                      |  results section.    
|
+----------------------+--------+-------------+----------------------+----------------------
+
| COMPLETE METABOLIC   | Routin | 2009  |                      |   Results for this   
|
| SET                  | e      |  7:52 PM    |                      | procedure are in the 
|
| (NA,K,CL,CO2,BUN,CRE |        | PDT         |                      |  results section.    
|
| AT,GLUC,CA,AST,ALT,B |        |             |                      |                      
|
| ASHWINI TOTAL,ALK        |        |             |                      |                      
|
| PHOS,ALB,PROT TOTAL) |        |             |                      |                      
|
 
+----------------------+--------+-------------+----------------------+----------------------
+
| UA, DIPSTICK ONLY    | Routin | 2009  |                      |   Results for this   
|
|                      | e      |             |                      | procedure are in the 
|
|                      |        |             |                      |  results section.    
|
+----------------------+--------+-------------+----------------------+----------------------
+
 documented in this encounter
 
 Results
 CULTURE, URINE BACTI (2009  8:23 PM PDT)
 
+-----------+------------------------+-----------+------------+--------------+
| Component | Value                  | Ref Range | Performed  | Pathologist  |
|           |                        |           | At         | Signature    |
+-----------+------------------------+-----------+------------+--------------+
| CULTURE   | negComment: Day 1,     |           | [facility]  |              |
| RESULT    | 09 am No growth   |           | LAB -      |              |
|           | after overnight        |           | [place]  |              |
|           | incubation             |           |            |              |
+-----------+------------------------+-----------+------------+--------------+
| CULTURE   | nuxedComment: Days 2,  |           | [facility]  |              |
| RESULT    | - AM, 20,000     |           | LAB -      |              |
|           | CFU/ML mixed autumn     |           | [place]  |              |
|           | bacteria and/or yeast  |           |            |              |
|           | isolated probably      |           |            |              |
|           | represent urethral,    |           |            |              |
|           | perineal, or other     |           |            |              |
|           | contaminating autumn    |           |            |              |
+-----------+------------------------+-----------+------------+--------------+
 
 
 
+----------+
| Specimen |
+----------+
| Urine    |
+----------+
 
 
 
+------------------------------------------------------------------------+-----------------+
| Narrative                                                              | Performed At    |
+------------------------------------------------------------------------+-----------------+
|   Day 1 no aerobic or anaerobic growth as of 09   07:22 AM  Day 2 |   [facility] |
|  no aerobic or anaerobic growth as of 09   07:16 AM  Day 3 no     |  - [place]    |
| aerobic or anaerobic growth as of 09   06:51 AM        Day 6 - No |                 |
|  growth after 6 days incubation                                        |                 |
+------------------------------------------------------------------------+-----------------+
 
 
 
+--------------------+----------------------+--------------------+----------------+
| Performing         | Address              | City/State/Zipcode | Phone Number   |
| Organization       |                      |                    |                |
+--------------------+----------------------+--------------------+----------------+
|   [facility]  |   [address] | [place], [place]      |   [phone] |
 
| [place]          |                      |                    |                |
+--------------------+----------------------+--------------------+----------------+
|   [facility]  |                      | [place], [place]      |                |
| [place]          |                      |                    |                |
+--------------------+----------------------+--------------------+----------------+
 CBC, WITH DIFFERENTIAL (2009  7:59 PM PDT)
 
+-------------+----------+-----------------+------------+--------------+
| Component   | Value    | Ref Range       | Performed  | Pathologist  |
|             |          |                 | At         | Signature    |
+-------------+----------+-----------------+------------+--------------+
| WHITE CELL  | 8.6      | 4.5 - 13.5 K/cu | [facility]  |              |
| COUNT       |          |  mm             | LAB -      |              |
|             |          |                 | [place]  |              |
+-------------+----------+-----------------+------------+--------------+
| RED CELL    | 5.48 (A) | 4.1 - 5.3 M/cu  | [facility]  |              |
| COUNT       |          | mm              | LAB -      |              |
|             |          |                 | [place]  |              |
+-------------+----------+-----------------+------------+--------------+
| HEMOGLOBIN  | 16.4 (A) | 10.6 - 15.2     | [facility]  |              |
|             |          | g/dL            | LAB -      |              |
|             |          |                 | [place]  |              |
+-------------+----------+-----------------+------------+--------------+
| HEMATOCRIT  | 46.5 (A) | 32 - 42 %       | [facility]  |              |
|             |          |                 | LAB -      |              |
|             |          |                 | [place]  |              |
+-------------+----------+-----------------+------------+--------------+
| MCV         | 84.9     | 71 - 89 fL      | [facility]  |              |
|             |          |                 | LAB -      |              |
|             |          |                 | [place]  |              |
+-------------+----------+-----------------+------------+--------------+
| MCH         | 30       | 24 - 30 pg      | [facility]  |              |
|             |          |                 | LAB -      |              |
|             |          |                 | [place]  |              |
+-------------+----------+-----------------+------------+--------------+
| MCHC        | 35       | 30 - 36 g/dL    | [facility]  |              |
|             |          |                 | LAB -      |              |
|             |          |                 | [place]  |              |
+-------------+----------+-----------------+------------+--------------+
| PLATELET    | 359      | 140 - 440 K/cu  | [facility]  |              |
| COUNT       |          | mm              | LAB -      |              |
|             |          |                 | [place]  |              |
+-------------+----------+-----------------+------------+--------------+
| NEUTROPHIL  | 71.7 (A) | 31 - 60 %       | [facility]  |              |
| %           |          |                 | LAB -      |              |
|             |          |                 | [place]  |              |
+-------------+----------+-----------------+------------+--------------+
| LYMPHOCYTE  | 17.7 (A) | 28 - 48 %       | [facility]  |              |
| %           |          |                 | LAB -      |              |
|             |          |                 | [place]  |              |
+-------------+----------+-----------------+------------+--------------+
| MONOCYTE %  | 8.3      | 0 - 12 %        | [facility]  |              |
|             |          |                 | LAB -      |              |
|             |          |                 | [place]  |              |
+-------------+----------+-----------------+------------+--------------+
| EOS %       | 0.5      | 0 - 6 %         | [facility]  |              |
|             |          |                 | LAB -      |              |
|             |          |                 | [place]  |              |
+-------------+----------+-----------------+------------+--------------+
| BASO %      | 1.8      | 0 - 2 %         | [facility]  |              |
 
|             |          |                 | LAB -      |              |
|             |          |                 | [place]  |              |
+-------------+----------+-----------------+------------+--------------+
| RDW         | 12.0     | 10.5 - 15.0 %   | [facility]  |              |
|             |          |                 | LAB -      |              |
|             |          |                 | [place]  |              |
+-------------+----------+-----------------+------------+--------------+
| MPV         |          | fL              | [facility]  |              |
|             |          |                 | LAB -      |              |
|             |          |                 | [place]  |              |
+-------------+----------+-----------------+------------+--------------+
| NEUTROPHIL  |          | K/cu mm         | [facility]  |              |
| #           |          |                 | LAB -      |              |
|             |          |                 | [place]  |              |
+-------------+----------+-----------------+------------+--------------+
| LYMPHOCYTE  |          | K/cu mm         | [facility]  |              |
| #           |          |                 | LAB -      |              |
|             |          |                 | [place]  |              |
+-------------+----------+-----------------+------------+--------------+
| MONOCYTE #  |          | K/cu mm         | [facility]  |              |
|             |          |                 | LAB -      |              |
|             |          |                 | [place]  |              |
+-------------+----------+-----------------+------------+--------------+
| EOS #       |          | K/cu mm         | [facility]  |              |
|             |          |                 | LAB -      |              |
|             |          |                 | [place]  |              |
+-------------+----------+-----------------+------------+--------------+
| BASO #      |          |                 | [facility]  |              |
|             |          |                 | LAB -      |              |
|             |          |                 | [place]  |              |
+-------------+----------+-----------------+------------+--------------+
 
 
 
+---------------+
| Specimen      |
+---------------+
| Blood - Blood |
+---------------+
 
 
 
+--------------------+----------------------+--------------------+----------------+
| Performing         | Address              | City/State/Zipcode | Phone Number   |
| Organization       |                      |                    |                |
+--------------------+----------------------+--------------------+----------------+
|   [facility]  |   [address] | [place], [place]      |   [phone] |
| [place]          |                      |                    |                |
+--------------------+----------------------+--------------------+----------------+
|   [facility]  |                      | [place], [place]      |                |
| [place]          |                      |                    |                |
+--------------------+----------------------+--------------------+----------------+
 CULTURE, BLOOD BACTI & YEAST (2009  7:57 PM PDT)
 
+-----------+-------+-----------+------------+--------------+
| Component | Value | Ref Range | Performed  | Pathologist  |
|           |       |           | At         | Signature    |
+-----------+-------+-----------+------------+--------------+
| CULTURE   | Neg   |           | [facility]  |              |
| RESULT    |       |           | LAB -      |              |
 
|           |       |           | [place]  |              |
+-----------+-------+-----------+------------+--------------+
 
 
 
+----------+
| Specimen |
+----------+
| Blood    |
+----------+
 
 
 
+------------------------------------------------------------------------+-----------------+
| Narrative                                                              | Performed At    |
+------------------------------------------------------------------------+-----------------+
|   Day 1 no aerobic or anaerobic growth as of 09   07:22 AM  Day 2 |   [facility] |
|  no aerobic or anaerobic growth as of 09   07:16 AM  Day 3 no     |  - [place]    |
| aerobic or anaerobic growth as of 09   06:51 AM        Day 6 - No |                 |
|  growth after 6 days incubation                                        |                 |
+------------------------------------------------------------------------+-----------------+
 
 
 
+--------------------+----------------------+--------------------+----------------+
| Performing         | Address              | City/State/Zipcode | Phone Number   |
| Organization       |                      |                    |                |
+--------------------+----------------------+--------------------+----------------+
|   [facility]  |   [address] | [place], [place]      |   [phone] |
| [place]          |                      |                    |                |
+--------------------+----------------------+--------------------+----------------+
|   [facility]  |                      | [place], [place]      |                |
| [place]          |                      |                    |                |
+--------------------+----------------------+--------------------+----------------+
 COMPLETE METABOLIC SET (NA,K,CL,CO2,BUN,CREAT,GLUC,CA,AST,ALT,BILI TOTAL,ALK PHOS,ALB,PROT 
TOTAL) (2009  7:52 PM PDT)
 
+-------------+-------+-----------------+------------+--------------+
| Component   | Value | Ref Range       | Performed  | Pathologist  |
|             |       |                 | At         | Signature    |
+-------------+-------+-----------------+------------+--------------+
| GLUCOSE,    | 99    | 60 - 100 mg/dL  | [facility]  |              |
| PLASMA      |       |                 | LAB -      |              |
| (LAB)       |       |                 | [place]  |              |
+-------------+-------+-----------------+------------+--------------+
| BUN, PLASMA | 14.8  | 7 - 21 mg/dL    | [facility]  |              |
|  (LAB)      |       |                 | LAB -      |              |
|             |       |                 | [place]  |              |
+-------------+-------+-----------------+------------+--------------+
| CREATININE  | 0.70  | 0.5 - 1.5 mg/dL | [facility]  |              |
| PLASMA      |       |                 | LAB -      |              |
| (LAB)       |       |                 | [place]  |              |
+-------------+-------+-----------------+------------+--------------+
| TOTAL       |       | g/dL            | [facility]  |              |
| PROTEIN,    |       |                 | LAB -      |              |
| PLASMA      |       |                 | [place]  |              |
| (LAB)       |       |                 |            |              |
+-------------+-------+-----------------+------------+--------------+
| ALBUMIN,    |       | g/dL            | [facility]  |              |
| PLASMA      |       |                 | LAB -      |              |
 
| (LAB)       |       |                 | [place]  |              |
+-------------+-------+-----------------+------------+--------------+
| CALCIUM,    |       | mg/dL           | [facility]  |              |
| PLASMA      |       |                 | LAB -      |              |
| (LAB)       |       |                 | [place]  |              |
+-------------+-------+-----------------+------------+--------------+
| BILIRUBIN   |       | Transcutaneous  | [facility]  |              |
| TOTAL       |       | Bilirubinometer | LAB -      |              |
|             |       |                 | [place]  |              |
+-------------+-------+-----------------+------------+--------------+
| ALK PHOS    |       | U/L             | [facility]  |              |
|             |       |                 | LAB -      |              |
|             |       |                 | [place]  |              |
+-------------+-------+-----------------+------------+--------------+
| AST(SGOT)   |       | U/L             | [facility]  |              |
|             |       |                 | LAB -      |              |
|             |       |                 | [place]  |              |
+-------------+-------+-----------------+------------+--------------+
| SODIUM,     | 138   | 132 - 143       | [facility]  |              |
| PLASMA      |       | mmol/L          | LAB -      |              |
| (LAB)       |       |                 | [place]  |              |
+-------------+-------+-----------------+------------+--------------+
| POTASSIUM,  | 3.8   | 3.6 - 5.1       | [facility]  |              |
| PLASMA      |       | mmol/L          | LAB -      |              |
| (LAB)       |       |                 | [place]  |              |
+-------------+-------+-----------------+------------+--------------+
| CHLORIDE,   | 103   | 95 - 112 mmol/L | [facility]  |              |
| PLASMA      |       |                 | LAB -      |              |
| (LAB)       |       |                 | [place]  |              |
+-------------+-------+-----------------+------------+--------------+
| TOTAL CO2,  | 24.0  | 19 - 31 mmol/L  | [facility]  |              |
| PLASMA      |       |                 | LAB -      |              |
| (LAB)       |       |                 | [place]  |              |
+-------------+-------+-----------------+------------+--------------+
| ALT (SGPT)  |       | U/L             | [facility]  |              |
|             |       |                 | LAB -      |              |
|             |       |                 | [place]  |              |
+-------------+-------+-----------------+------------+--------------+
 
 
 
+---------------+
| Specimen      |
+---------------+
| Blood - Blood |
+---------------+
 
 
 
+--------------------+----------------------+--------------------+----------------+
| Performing         | Address              | City/State/Zipcode | Phone Number   |
| Organization       |                      |                    |                |
+--------------------+----------------------+--------------------+----------------+
|   [facility]  |   [address] | [place], [place]      |   [phone] |
| [place]          |                      |                    |                |
+--------------------+----------------------+--------------------+----------------+
|   [facility]  |                      | [place], [place]      |                |
| [place]          |                      |                    |                |
+--------------------+----------------------+--------------------+----------------+
 UA, DIPSTICK ONLY (2009)
 
 
+-------------+-------------+----------------+------------+--------------+
| Component   | Value       | Ref Range      | Performed  | Pathologist  |
|             |             |                | At         | Signature    |
+-------------+-------------+----------------+------------+--------------+
| COLOR(UR)   | yellow      |                | [facility]  |              |
|             |             |                | LAB -      |              |
|             |             |                | [place]  |              |
+-------------+-------------+----------------+------------+--------------+
| APPEARANCE  | hazy        |                | [facility]  |              |
|             |             |                | LAB -      |              |
|             |             |                | [place]  |              |
+-------------+-------------+----------------+------------+--------------+
| LEUKOCYTE   | Neg         | Negative       | [facility]  |              |
| ESTERASE    |             |                | LAB -      |              |
|             |             |                | [place]  |              |
+-------------+-------------+----------------+------------+--------------+
| NITRITES    | Neg         | Negative       | [facility]  |              |
|             |             |                | LAB -      |              |
|             |             |                | [place]  |              |
+-------------+-------------+----------------+------------+--------------+
| UROBILINOGE | 1           | 0.2 SARTHAK    | [facility]  |              |
| N           |             | UNITS          | LAB -      |              |
|             |             |                | [place]  |              |
+-------------+-------------+----------------+------------+--------------+
| PROTEIN(LAB | 30          | Negative to    | [facility]  |              |
| )           |             | Trace mg/dL    | LAB -      |              |
|             |             |                | [place]  |              |
+-------------+-------------+----------------+------------+--------------+
| PH(UR)      | 7           | 5 - 9          | [facility]  |              |
|             |             |                | LAB -      |              |
|             |             |                | [place]  |              |
+-------------+-------------+----------------+------------+--------------+
| BLOOD       | 10          | Negative       | [facility]  |              |
|             |             |                | LAB -      |              |
|             |             |                | [place]  |              |
+-------------+-------------+----------------+------------+--------------+
| SPECIFIC    | 1.033 (A)   | 1.005 - 1.03   | [facility]  |              |
| GRAVITY     |             |                | LAB -      |              |
|             |             |                | [place]  |              |
+-------------+-------------+----------------+------------+--------------+
| KETONES     | 150         | Negative mg/dL | [facility]  |              |
|             |             |                | LAB -      |              |
|             |             |                | [place]  |              |
+-------------+-------------+----------------+------------+--------------+
| BILIRUBIN   | neg         | Negative       | [facility]  |              |
|             |             |                | LAB -      |              |
|             |             |                | [place]  |              |
+-------------+-------------+----------------+------------+--------------+
| GLUCOSE(UR) | normal      | Negative to    | [facility]  |              |
|             |             | Trace mg/dL    | LAB -      |              |
|             |             |                | [place]  |              |
+-------------+-------------+----------------+------------+--------------+
| WHITE CELL  | 5 (A)       | 0 - 4 K/cu mm  | [facility]  |              |
| COUNT       |             |                | LAB -      |              |
|             |             |                | [place]  |              |
+-------------+-------------+----------------+------------+--------------+
| RED CELLS   | 15 (A)      | 0 - 4 /hpf     | [facility]  |              |
|             |             |                | LAB -      |              |
|             |             |                | [place]  |              |
 
+-------------+-------------+----------------+------------+--------------+
| EPITHELIAL  | squamous 1+ | Not applicable | [facility]  |              |
| CELLS       |             |                | LAB -      |              |
|             |             |                | [place]  |              |
+-------------+-------------+----------------+------------+--------------+
| CRYSTALS    | neg         |                | [facility]  |              |
|             |             |                | LAB -      |              |
|             |             |                | [place]  |              |
+-------------+-------------+----------------+------------+--------------+
| BACTERIA    | 2+          | /hpf           | [facility]  |              |
|             |             |                | LAB -      |              |
|             |             |                | [place]  |              |
+-------------+-------------+----------------+------------+--------------+
 
 
 
+---------------+
| Specimen      |
+---------------+
| Urine - Urine |
+---------------+
 
 
 
+--------------------+----------------------+--------------------+----------------+
| Performing         | Address              | City/State/Zipcode | Phone Number   |
| Organization       |                      |                    |                |
+--------------------+----------------------+--------------------+----------------+
|   [facility]  |   [address] | [place]      |   [phone] |
| [place]          |                      |                    |                |
+--------------------+----------------------+--------------------+----------------+
|   [facility]  |                      | [place], [place]      |                |
| [place]          |                      |                    |                |
+--------------------+----------------------+--------------------+----------------+
 documented in this encounter
 
 Visit Diagnoses
 Not on filedocumented in this encounter

## 2019-11-01 NOTE — XMS
Clinical Summary
  Created on: 2019
 
 Bonifacio Nila JB
 External Reference #: [id number]
 : [birth year]
 Sex: Female
 
 Demographics
 
 
+-----------------------+---------------------------+
| Address               | [address]          |
|                       | [place]  [address] |
+-----------------------+---------------------------+
| Home Phone            | [phone]           |
+-----------------------+---------------------------+
| Preferred Language    | Unknown                   |
+-----------------------+---------------------------+
| Marital Status        | Single                    |
+-----------------------+---------------------------+
| Buddhism Affiliation | Unknown                   |
+-----------------------+---------------------------+
| Race                  | Unknown                   |
+-----------------------+---------------------------+
| Ethnic Group          | Unknown                   |
+-----------------------+---------------------------+
 
 
 Author
 
 
+--------------+------------------------------------------+
| Author       | [employer] [facility] (Historical as of  |
|              | 19)                                 |
+--------------+------------------------------------------+
| Organization | [facility] (Historical as of  |
|              | 19)                                 |
+--------------+------------------------------------------+
| Address      | Unknown                                  |
+--------------+------------------------------------------+
| Phone        | Unavailable                              |
+--------------+------------------------------------------+
 
 
 
 Support
 
 
+-----------------+--------------+---------+-----------------+
| Name            | Relationship | Address | Phone           |
+-----------------+--------------+---------+-----------------+
| Magdalena Valdovinos | ECON         | Unknown | [phone] |
+-----------------+--------------+---------+-----------------+
 
 
 
 Care Team Providers
 
 
 
+-----------------------+------+-----------------+
| Care Team Member Name | Role | Phone           |
+-----------------------+------+-----------------+
| Augustine Buck DO      | PP   | [phone] |
+-----------------------+------+-----------------+
 
 
 
 Allergies
 
 
+-------------------+----------------+----------+----------+----------+
| Active Allergy    | Reactions      | Severity | Noted    | Comments |
|                   |                |          | Date     |          |
+-------------------+----------------+----------+----------+----------+
| Sulfa Antibiotics | Swelling, Rash | Medium   | 20 |          |
|                   |                |          | 19       |          |
+-------------------+----------------+----------+----------+----------+
 
 
 
 Current Medications
 
 
+----------------------+---------------------+-------+---------+------+------+-------+
| Prescription         | Sig.                | Disp. | Refills | Star | End  | Statu |
|                      |                     |       |         | t    | Date | s     |
|                      |                     |       |         | Date |      |       |
+----------------------+---------------------+-------+---------+------+------+-------+
|                      | Inject  into the    |       |         |      |      | Activ |
| medroxyPROGESTERone  | muscle every 3      |       |         |      |      | e     |
| Acetate              | (three) months.     |       |         |      |      |       |
| (DEPO-PROVERA IM)    |                     |       |         |      |      |       |
+----------------------+---------------------+-------+---------+------+------+-------+
|   amLODIPine         | Take 5 mg by mouth  |       |         |      |      | Activ |
| (NORVASC) 5 MG       | daily.              |       |         |      |      | e     |
| tablet               |                     |       |         |      |      |       |
+----------------------+---------------------+-------+---------+------+------+-------+
|                      | Take 1 tablet by    |       | 1       | 04/2 |      | Activ |
| lisinopril-hydrochlo | mouth daily.        |       |         | 3/20 |      | e     |
| rothiazide           |                     |       |         | 19   |      |       |
| (ZESTORETIC) 20-12.5 |                     |       |         |      |      |       |
|  MG per tablet       |                     |       |         |      |      |       |
+----------------------+---------------------+-------+---------+------+------+-------+
 
 
 
 Active Problems
 
 
+-----------------+------------+
| Problem         | Noted Date |
+-----------------+------------+
| S/P VSD closure | 2016 |
+-----------------+------------+
 
 
 
 
+------------------------------------------------------------+
|   Overview:   Overview:                                    |
| 8 mm Amplatzer Vascular plug-4 device placement (10/16/14) |
+------------------------------------------------------------+
 
 
 
+-------------------------------------------------+------------+
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
+-------------------------------------------------+------------+
| VSD (ventricular septal defect), perimembranous | 2008 |
+-------------------------------------------------+------------+
 
 
 
+------------------------------------------------------------------+
|   Overview:   Overview: 10/16/2014  S/p 8-mm Amplatzer Vascular  |
| Plug IV, with no significant residual shuntingLeft atrial        |
| isomerism LAE (left atrial enlargement) LVE (left ventricular    |
| enlargement)                                                     |
|LVE (left ventricular enlargement)                                |
+------------------------------------------------------------------+
 
 
 
+--------------------------+------------+
| Transplanted liver (HCC) | 10/12/2006 |
+--------------------------+------------+
 
 
 
+-------------------------------------------+
|   Overview:   Overview:                   |
| For biliary atresia, 2000 at [facility] |
+-------------------------------------------+
 
 
 
 Family History
 
 
+-----------------+----------+------+--------------------+
| Medical History | Relation | Name | Comments           |
+-----------------+----------+------+--------------------+
| Heart disease   | Father   |      | had heart surgery  |
+-----------------+----------+------+--------------------+
| Hypertension    | Father   |      |                    |
+-----------------+----------+------+--------------------+
 
 
 
+----------+------+--------+----------+
| Relation | Name | Status | Comments |
+----------+------+--------+----------+
| Father   |      | Alive  |          |
+----------+------+--------+----------+
| Mother   |      | Alive  |          |
+----------+------+--------+----------+
 
 
 
 
 Social History
 
 
+--------------+-------+-----------+--------+------+
| Tobacco Use  | Types | Packs/Day | Years  | Date |
|              |       |           | Used   |      |
+--------------+-------+-----------+--------+------+
| Never Smoker |       |           |        |      |
+--------------+-------+-----------+--------+------+
 
 
 
+---------------------+---+---+---+
| Smokeless Tobacco:  |   |   |   |
| Current User        |   |   |   |
+---------------------+---+---+---+
 
 
 
+-------------+-----------+---------+----------+
| Alcohol Use | Drinks/We | oz/Week | Comments |
|             | ek        |         |          |
+-------------+-----------+---------+----------+
| No          |           |         |          |
+-------------+-----------+---------+----------+
 
 
 
+------------------+---------------+
| Sex Assigned at  | Date Recorded |
| Birth            |               |
+------------------+---------------+
| Not on file      |               |
+------------------+---------------+
 
 
 
 Last Filed Vital Signs
 
 
+-------------------+----------------------+-------------------------+
| Vital Sign        | Reading              | Time Taken              |
+-------------------+----------------------+-------------------------+
| Blood Pressure    | 124/70               | 2019  2:18 PM PDT |
+-------------------+----------------------+-------------------------+
| Pulse             | 100                  | 2019  2:18 PM PDT |
+-------------------+----------------------+-------------------------+
| Temperature       | -                    | -                       |
+-------------------+----------------------+-------------------------+
| Respiratory Rate  | -                    | -                       |
+-------------------+----------------------+-------------------------+
| Oxygen Saturation | 98%                  | 2019  2:18 PM PDT |
+-------------------+----------------------+-------------------------+
| Inhaled Oxygen    | -                    | -                       |
| Concentration     |                      |                         |
+-------------------+----------------------+-------------------------+
| Weight            | 96.4 kg (212 lb 9.6  | 2019  2:18 PM PDT |
|                   | oz)                  |                         |
+-------------------+----------------------+-------------------------+
 
| Height            | 165.1 cm (5' 5")     | 2019  2:18 PM PDT |
+-------------------+----------------------+-------------------------+
| Body Mass Index   | 35.38                | 2019  2:18 PM PDT |
+-------------------+----------------------+-------------------------+
 
 
 
 Plan of Treatment
 
 
+--------+---------+-----------+----------------------+-------------+
| Date   | Type    | Specialty | Care Team            | Description |
+--------+---------+-----------+----------------------+-------------+
| / | Office  |           |   Renetta Goldberg, |             |
|    | Visit   |           |  MD [address]   |             |
|        |         |           | [address],  |             |
|        |         |           | [place] [address]             |             |
|        |         |           | [phone]         |             |
|        |         |           | [phone] (Fax)   |             |
+--------+---------+-----------+----------------------+-------------+
 
 
 
+----------------------+-----------+-----------+----------+
| Health Maintenance   | Due Date  | Last Done | Comments |
+----------------------+-----------+-----------+----------+
| Well Child Check     | [birth year] |           |          |
|                      | 2         |           |          |
+----------------------+-----------+-----------+----------+
| Vaccine: HPV (1 -    | 01/15/201 |           |          |
| Female 3-dose        | 4         |           |          |
| series)              |           |           |          |
+----------------------+-----------+-----------+----------+
| Vaccine:             | 01/15/201 |           |          |
| Dtap/Tdap/Td (1 -    | 8         |           |          |
| Tdap)                |           |           |          |
+----------------------+-----------+-----------+----------+
| Vaccine:             | 01/15/201 |           |          |
| Pneumococcal 19-64   | 8         |           |          |
| Highest Risk (1 of 3 |           |           |          |
|  - PCV13)            |           |           |          |
+----------------------+-----------+-----------+----------+
| Vaccine: Influenza   |  |           |          |
| (#1)                 | 9         |           |          |
+----------------------+-----------+-----------+----------+
 
 
 
 Results
 Not on filefrom Last 3 Months
 
 Insurance
 
 
+--------------------+--------+-------------+------+-------+---------+
| Payer              | Benefi | Subscriber  | Type | Phone | Address |
|                    | t Plan | ID          |      |       |         |
|                    |  /     |             |      |       |         |
|                    | Group  |             |      |       |         |
+--------------------+--------+-------------+------+-------+---------+
 
| [employer] [facility]     | [facility]  | [id number] |      |       |         |
| [facility]           | HEALTH |             |      |       |         |
|                    |  -     |             |      |       |         |
|                    | PACIFI |             |      |       |         |
|                    | CSOURC |             |      |       |         |
|                    | E      |             |      |       |         |
+--------------------+--------+-------------+------+-------+---------+
| [employer]  | PROVID | [id number] | PPO  |       |         |
| PLAN               | ENCE   |             |      |       |         |
|                    | HEALTH |             |      |       |         |
|                    |  PLAN  |             |      |       |         |
+--------------------+--------+-------------+------+-------+---------+
 
 
 
+-----------------+--------+-------------+--------+-------------+----------------------+
| Guarantor Name  | [employer] | Relation to | Date   | Phone       | Billing Address      |
|                 | t Type |  Patient    | of     |             |                      |
|                 |        |             | Birth  |             |                      |
+-----------------+--------+-------------+--------+-------------+----------------------+
| NILA VALDOVINOS | Person | Self        | [birth year] |   Home:     |   [address]   |
|                 | [address] |             |    | [phone] | [place]        |
|                 | [place]    |             |        | [address]        | [address]           |
+-----------------+--------+-------------+--------+-------------+----------------------+

## 2019-11-01 NOTE — XMS
Encounter Summary
  Created on: 2019
 
 Bonifacio Nila JB
 External Reference #: [id number]
 : [birth year]
 Sex: Female
 
 Demographics
 
 
+-----------------------+------------------------+
| Address               | [address]            |
|                       | [place]  [address]   |
+-----------------------+------------------------+
| Home Phone            | [phone]        |
+-----------------------+------------------------+
| Preferred Language    | Unknown                |
+-----------------------+------------------------+
| Marital Status        | Single                 |
+-----------------------+------------------------+
| Confucianism Affiliation | Unknown                |
+-----------------------+------------------------+
| Race                  | White                  |
+-----------------------+------------------------+
| Ethnic Group          | Not  or  |
+-----------------------+------------------------+
 
 
 Author
 
 
+--------------+------------------------------+
| Author       | [facility] |
+--------------+------------------------------+
| Organization | [facility] |
+--------------+------------------------------+
| Address      | Unknown                      |
+--------------+------------------------------+
| Phone        | Unavailable                  |
+--------------+------------------------------+
 
 
 
 Support
 
 
+-----------------+--------------+---------+-----------------+
| Name            | Relationship | Address | Phone           |
+-----------------+--------------+---------+-----------------+
| Kit Valdovinos   | ECON         | Unknown | [phone] |
+-----------------+--------------+---------+-----------------+
| Magdalena Valdovinos | ECON         | Unknown | [phone] |
+-----------------+--------------+---------+-----------------+
 
 
 
 Care Team Providers
 
 
 
+------------------------+------+-----------------+
| Care Team Member Name  | Role | Phone           |
+------------------------+------+-----------------+
| Lily Horton MD | PCP  | [phone] |
+------------------------+------+-----------------+
 
 
 
 Encounter Details
 
 
+--------+-------------+----------------------+---------------------+-------------+
| Date   | Type        | Department           | Care Team           | Description |
+--------+-------------+----------------------+---------------------+-------------+
| / | Documentati |   SOCIAL WORK        |   Coretta Armijo,  |             |
|    | on          | AMBULATORY  [address]  | MS[address]  [address]    |             |
|        |             | [address]  | [address]     |             |
|        |             |  Mailcode: [address]      | [place], [place]        |             |
|        |             | [place], [place]         | [address]          |             |
|        |             | [address]           | [phone]        |             |
|        |             | [phone]         | [phone] (Fax)  |             |
+--------+-------------+----------------------+---------------------+-------------+
 
 
 
 Social History
 
 
+-------------------+-------+-----------+--------+------+
| Tobacco Use       | Types | Packs/Day | Years  | Date |
|                   |       |           | Used   |      |
+-------------------+-------+-----------+--------+------+
| Passive Smoke     |       |           |        |      |
| Exposure - Never  |       |           |        |      |
| Smoker            |       |           |        |      |
+-------------------+-------+-----------+--------+------+
 
 
 
+---------------------+---+---+---+
| Smokeless Tobacco:  |   |   |   |
| Never Used          |   |   |   |
+---------------------+---+---+---+
 
 
 
+-------------+-------------+---------+----------+
| Alcohol Use | Drinks/Week | oz/Week | Comments |
+-------------+-------------+---------+----------+
| Not Asked   |             |         |          |
+-------------+-------------+---------+----------+
 
 
 
+------------------+---------------+
| Sex Assigned at  | Date Recorded |
| Birth            |               |
+------------------+---------------+
 
| Not on file      |               |
+------------------+---------------+
 
 
 
+----------------+-------------+-------------+
| Job Start Date | Occupation  | Industry    |
+----------------+-------------+-------------+
| Not on file    | Not on file | Not on file |
+----------------+-------------+-------------+
 
 
 
+----------------+--------------+------------+
| Travel History | Travel Start | Travel End |
+----------------+--------------+------------+
 
 
 
+-------------------------------------+
| No recent travel history available. |
+-------------------------------------+
 documented as of this encounter
 
 Plan of Treatment
 Not on filedocumented as of this encounter
 
 Visit Diagnoses
 Not on filedocumented in this encounter

## 2019-11-01 NOTE — XMS
Encounter Summary
  Created on: 2019
 
 Bonifacio Nila JB
 External Reference #: [id number]
 : [birth year]
 Sex: Female
 
 Demographics
 
 
+-----------------------+------------------------+
| Address               | [address]            |
|                       | [place]  [address]   |
+-----------------------+------------------------+
| Home Phone            | [phone]        |
+-----------------------+------------------------+
| Preferred Language    | Unknown                |
+-----------------------+------------------------+
| Marital Status        | Single                 |
+-----------------------+------------------------+
| Spiritism Affiliation | Unknown                |
+-----------------------+------------------------+
| Race                  | White                  |
+-----------------------+------------------------+
| Ethnic Group          | Not  or  |
+-----------------------+------------------------+
 
 
 Author
 
 
+--------------+------------------------------+
| Author       | [facility] |
+--------------+------------------------------+
| Organization | [facility] |
+--------------+------------------------------+
| Address      | Unknown                      |
+--------------+------------------------------+
| Phone        | Unavailable                  |
+--------------+------------------------------+
 
 
 
 Support
 
 
+-----------------+--------------+---------+-----------------+
| Name            | Relationship | Address | Phone           |
+-----------------+--------------+---------+-----------------+
| Kit Valdovinos   | ECON         | Unknown | [phone] |
+-----------------+--------------+---------+-----------------+
| Magdalena Valdovinos | ECON         | Unknown | [phone] |
+-----------------+--------------+---------+-----------------+
 
 
 
 Care Team Providers
 
 
 
+------------------------+------+-----------------+
| Care Team Member Name  | Role | Phone           |
+------------------------+------+-----------------+
| Lily Horton MD | PCP  | [phone] |
+------------------------+------+-----------------+
 
 
 
 Reason for Visit
 
 
+-------------------+-----------------------------------+
| Reason            | Comments                          |
+-------------------+-----------------------------------+
| Care Coordination | transplant labs overdue, liver US |
+-------------------+-----------------------------------+
 
 
 
 Encounter Details
 
 
+--------+-----------+----------------------+----------------------+--------------------+
| Date   | Type      | Department           | Care Team            | Description        |
+--------+-----------+----------------------+----------------------+--------------------+
| 10/16/ | Telephone |   Pediatric          |   Neema Khalil,   | Care Coordination  |
| 2015   |           | Gastroenterology at  | MD  [address] | (transplant labs   |
|        |           | [place]          |   [place], [place]       | overdue, liver US) |
|        |           | [facility]  | [address]           |                    |
|        |           |  [address] | [phone]         |                    |
|        |           |  [address]  Mailcode:  | [phone] (Fax)   |                    |
|        |           | [facility] [address]   |                      |                    |
|        |           | [place], [place]         |                      |                    |
|        |           | [address]           |                      |                    |
|        |           | [phone]         |                      |                    |
+--------+-----------+----------------------+----------------------+--------------------+
 
 
 
 Social History
 
 
+-------------------+-------+-----------+--------+------+
| Tobacco Use       | Types | Packs/Day | Years  | Date |
|                   |       |           | Used   |      |
+-------------------+-------+-----------+--------+------+
| Passive Smoke     |       |           |        |      |
| Exposure - Never  |       |           |        |      |
| Smoker            |       |           |        |      |
+-------------------+-------+-----------+--------+------+
 
 
 
+---------------------+---+---+---+
| Smokeless Tobacco:  |   |   |   |
| Never Used          |   |   |   |
+---------------------+---+---+---+
 
 
 
 
+-------------+-------------+---------+----------+
| Alcohol Use | Drinks/Week | oz/Week | Comments |
+-------------+-------------+---------+----------+
| Not Asked   |             |         |          |
+-------------+-------------+---------+----------+
 
 
 
+------------------+---------------+
| Sex Assigned at  | Date Recorded |
| Birth            |               |
+------------------+---------------+
| Not on file      |               |
+------------------+---------------+
 
 
 
+----------------+-------------+-------------+
| Job Start Date | Occupation  | Industry    |
+----------------+-------------+-------------+
| Not on file    | Not on file | Not on file |
+----------------+-------------+-------------+
 
 
 
+----------------+--------------+------------+
| Travel History | Travel Start | Travel End |
+----------------+--------------+------------+
 
 
 
+-------------------------------------+
| No recent travel history available. |
+-------------------------------------+
 documented as of this encounter
 
 Plan of Treatment
 Not on filedocumented as of this encounter
 
 Visit Diagnoses
 Not on filedocumented in this encounter

## 2019-11-01 NOTE — XMS
Encounter Summary
  Created on: 2019
 
 Bonifacio Nila JB
 External Reference #: [id number]
 : [birth year]
 Sex: Female
 
 Demographics
 
 
+-----------------------+------------------------+
| Address               | [address]            |
|                       | [place]  [address]   |
+-----------------------+------------------------+
| Home Phone            | [phone]        |
+-----------------------+------------------------+
| Preferred Language    | Unknown                |
+-----------------------+------------------------+
| Marital Status        | Single                 |
+-----------------------+------------------------+
| Restorationism Affiliation | Unknown                |
+-----------------------+------------------------+
| Race                  | White                  |
+-----------------------+------------------------+
| Ethnic Group          | Not  or  |
+-----------------------+------------------------+
 
 
 Author
 
 
+--------------+------------------------------+
| Author       | [facility] |
+--------------+------------------------------+
| Organization | [facility] |
+--------------+------------------------------+
| Address      | Unknown                      |
+--------------+------------------------------+
| Phone        | Unavailable                  |
+--------------+------------------------------+
 
 
 
 Support
 
 
+-----------------+--------------+---------+-----------------+
| Name            | Relationship | Address | Phone           |
+-----------------+--------------+---------+-----------------+
| Kit Valdovinos   | ECON         | Unknown | [phone] |
+-----------------+--------------+---------+-----------------+
| Magdalena Valdovinos | ECON         | Unknown | [phone] |
+-----------------+--------------+---------+-----------------+
 
 
 
 Care Team Providers
 
 
 
+------------------------+------+-----------------+
| Care Team Member Name  | Role | Phone           |
+------------------------+------+-----------------+
| Lily Horton MD | PCP  | [phone] |
+------------------------+------+-----------------+
 
 
 
 Reason for Referral
 PROC - Dept/Practice Procedure (Routine)
 
+--------+--------+---------------+--------------+--------------+---------------+
| Status | Reason | Specialty     | Diagnoses /  | Referred By  | Referred To   |
|        |        |               | Procedures   | Contact      | Contact       |
+--------+--------+---------------+--------------+--------------+---------------+
| Closed |        | Gastroenterol |   Diagnoses  |   Eroglu,    |   Gas Endo    |
|        |        | ogy           |  Biliary     | MD Neema  | [address] |
|        |        |               | atresia      |  [address]      |  [address]     |
|        |        |               | Transplanted | [address]    | Mailcode:     |
|        |        |               |  liver (HCC) | [place], [place] | [address]  |
|        |        |               |   Procedures |  [address]  | for Health    |
|        |        |               |   [facility]  |  Phone:      | and Healing,  |
|        |        |               |              | [phone] | [address]    |
|        |        |               |              |   Fax:       | [place], [place]  |
|        |        |               |              | [phone] | [address]    |
|        |        |               |              |              | Phone:        |
|        |        |               |              |              | [phone]  |
|        |        |               |              |              |  Fax:         |
|        |        |               |              |              | [phone]  |
+--------+--------+---------------+--------------+--------------+---------------+
 
 
 Consultation (Routine)
 
+------------+--------+------------+--------------+--------------+---------------+
| Status     | Reason | Specialty  | Diagnoses /  | Referred By  | Referred To   |
|            |        |            | Procedures   | Contact      | Contact       |
+------------+--------+------------+--------------+--------------+---------------+
| Authorized |        | Liver      |   Diagnoses  |   Eroglu,    |   Ltx Liver   |
|            |        | Transplant |  Biliary     | MD Neema  | [address]  |
|            |        |            | atresia      |  [address]      | [address]        |
|            |        |            | Other        | [address]    | [address]  |
|            |        |            | secondary    | [place], [place] | Rd  Mailcode: |
|            |        |            | hypertension |  [address]  |  [address]         |
|            |        |            |              |  Phone:      | Physician's   |
|            |        |            | Transplanted | [phone] | [address]  |
|            |        |            |  liver (HCC) |   Fax:       |  [place], [place] |
|            |        |            |   Procedures | [phone] |  [address]   |
|            |        |            |   CONSULT TO |              | Phone:        |
|            |        |            |  HEPATOLOGY  |              | [phone]  |
|            |        |            |              |              |  Fax:         |
|            |        |            |              |              | [phone]  |
+------------+--------+------------+--------------+--------------+---------------+
 
 
 
 
 Reason for Visit
 
 Office Visit - E/M Services (Routine)
 
+--------+--------+---------------+--------------+--------------+---------------+
| Status | Reason | Specialty     | Diagnoses /  | Referred By  | Referred To   |
|        |        |               | Procedures   | Contact      | Contact       |
+--------+--------+---------------+--------------+--------------+---------------+
| Closed |        | Pediatric     |   Diagnoses  |   Sol,    |   Ped Gastro  |
|        |        | Gastroenterol |  Congenital  | Lily Lakhani, | [facility]  [address]   |
|        |        | ogy           | malformation |  [address]    | [address]   |
|        |        |               | s of spleen  | SPECIALISTS  | [address]       |
|        |        |               |  Liver       | [facility] [place] | Mailcode:     |
|        |        |               | transplant   |   [address]    | CDRCP         |
|        |        |               | status       | [address]  | [address]   |
|        |        |               | Procedures   |  [place],  | [place]  |
|        |        |               | includes     | [place] [address]     | [address]    |
|        |        |               | diagnostics  | Phone:       | Phone:        |
|        |        |               |              | [phone] | [phone]  |
|        |        |               |              |   Fax:       |  Fax:         |
|        |        |               |              | [phone] | [phone]  |
+--------+--------+---------------+--------------+--------------+---------------+
 
 
 
 
 Encounter Details
 
 
+--------+---------+----------------------+----------------------+----------------------+
| Date   | Type    | Department           | Care Team            | Description          |
+--------+---------+----------------------+----------------------+----------------------+
| / | Office  |   Pediatric          |   Neema Khalil,   | Biliary atresia      |
| 2016   | Visit   | Gastroenterology at  | MD  [address] | (Primary Dx);        |
|        |         | [place]          |   [place], [place]       | Polysplenia; Other   |
|        |         | [facility]  | [address]           | secondary            |
|        |         |  [address] | [phone]         | hypertension;        |
|        |         |  [address]  Mailcode:  | [phone] (Fax)   | Transplanted liver   |
|        |         | [facility]  [address]   |                      | ([facility]); Nonrheumatic  |
|        |         | [place], [place]         |                      | aortic valve         |
|        |         | [address]           |                      | insufficiency; VSD   |
|        |         | [phone]         |                      | (ventricular septal  |
|        |         |                      |                      | defect),             |
|        |         |                      |                      | perimembranous;      |
|        |         |                      |                      | Interrupted inferior |
|        |         |                      |                      |  vena cava; Aortic   |
|        |         |                      |                      | root dilatation      |
|        |         |                      |                      | ([facility])                |
+--------+---------+----------------------+----------------------+----------------------+
 
 
 
 Social History
 
 
+-------------------+-------+-----------+--------+------+
| Tobacco Use       | Types | Packs/Day | Years  | Date |
|                   |       |           | Used   |      |
+-------------------+-------+-----------+--------+------+
| Passive Smoke     |       |           |        |      |
| Exposure - Never  |       |           |        |      |
| Smoker            |       |           |        |      |
 
+-------------------+-------+-----------+--------+------+
 
 
 
+---------------------+---+---+---+
| Smokeless Tobacco:  |   |   |   |
| Never Used          |   |   |   |
+---------------------+---+---+---+
 
 
 
+-------------+-------------+---------+----------+
| Alcohol Use | Drinks/Week | oz/Week | Comments |
+-------------+-------------+---------+----------+
| Not Asked   |             |         |          |
+-------------+-------------+---------+----------+
 
 
 
+------------------+---------------+
| Sex Assigned at  | Date Recorded |
| Birth            |               |
+------------------+---------------+
| Not on file      |               |
+------------------+---------------+
 
 
 
+----------------+-------------+-------------+
| Job Start Date | Occupation  | Industry    |
+----------------+-------------+-------------+
| Not on file    | Not on file | Not on file |
+----------------+-------------+-------------+
 
 
 
+----------------+--------------+------------+
| Travel History | Travel Start | Travel End |
+----------------+--------------+------------+
 
 
 
+-------------------------------------+
| No recent travel history available. |
+-------------------------------------+
 documented as of this encounter
 
 Last Filed Vital Signs
 
 
+-------------------+----------------------+----------------------+----------------------+
| Vital Sign        | Reading              | Time Taken           | Comments             |
+-------------------+----------------------+----------------------+----------------------+
| Blood Pressure    | 130/93               | 2016  9:24 AM  | right arm adult cuff |
|                   |                      | PDT                  |                      |
+-------------------+----------------------+----------------------+----------------------+
| Pulse             | 72                   | 2016  9:24 AM  |                      |
|                   |                      | PDT                  |                      |
+-------------------+----------------------+----------------------+----------------------+
| Temperature       | 36.7   C (98.1   F)  | 2016  8:59 AM  |                      |
 
|                   |                      | PDT                  |                      |
+-------------------+----------------------+----------------------+----------------------+
| Respiratory Rate  | -                    | -                    |                      |
+-------------------+----------------------+----------------------+----------------------+
| Oxygen Saturation | -                    | -                    |                      |
+-------------------+----------------------+----------------------+----------------------+
| Inhaled Oxygen    | -                    | -                    |                      |
| Concentration     |                      |                      |                      |
+-------------------+----------------------+----------------------+----------------------+
| Weight            | 81.5 kg (179 lb 10.8 | 2016  8:59 AM  |                      |
|                   |  oz)                 | PDT                  |                      |
+-------------------+----------------------+----------------------+----------------------+
| Height            | 164.4 cm (5' 4.72")  | 2016  8:59 AM  |                      |
|                   |                      | PDT                  |                      |
+-------------------+----------------------+----------------------+----------------------+
| Body Mass Index   | 30.16                | 2016  8:59 AM  |                      |
|                   |                      | PDT                  |                      |
+-------------------+----------------------+----------------------+----------------------+
 documented in this encounter
 
 Patient Instructions
 Patient Instructions Neema Khalil MD - 2016  9:03 AM PDTDear family, 
 
 It was nice to see you in the clinic today !  Our recommendations are as below:
 
 Plan: 
 
 - Blood draw for labs: every 3 months
 
 - Next appointment: With Adult GI next summer (can schedule with Fibroscan)
 
 Results of tests:  
 Test results will be sent to you by MyChart. 
 
 Calls:
 Medical emergencies:  call 911
 Non-urgent issues:  Send [employer] message
 Monday - Friday work hours: call  [phone] # [phone]
 After hours, weekends, holidays: call [phone]
 To schedule an appointment: call [phone] # [phone]
 For refills: call to your pharmacy a week before running out medicine
 
 Electronically signed by Marianne Barba RN at 2016  9:17 AM PDT
 documented in this encounter
 
 Progress Notes
 Neema Khalil MD - 2016  3:40 PM PDTFormatting of this note might be different fro
m the original.
 
 Primary Care Provider: Lily Horton MD
 
 [facility] 
 DOS: 2016
 Visit type: Follow-up 
 
 Patient accompanied by: father
  used: no
 
 CC: 
 - Post-liver transplant care 
 
 - High risk medication management
 
 Patient Active Problem List 
 Diagnosis 
   Transplanted Liver 
   VSD (ventricular septal defect), perimembranous 
   Polysplenia 
   Interrupted inferior vena cava 
   Aortic insufficiency 
   Aortic root dilatation 
 
  PAST Hx/kim: Biliary atresia
 -  s/p OLT at [facility]. She was last seen by Dr Burkett in . 
In , it was noticed that she has not been seen, attempts to reach to family was unsucces
sful. lost to follow up since that time. 
 - Cardiology: last cardiology note is from  from Dr Lily Horton, cardiologist at [facility]. Her cardiac dx includes 1) moderate perimembraneous VSD nearly occluded by aneurysmal
 tissue leaving a tiny VSD, 2) trace central aortic insufficiency, 3) Left atrial isomerism 
(polysplenia). It was recommended her annual follow up for progression of aortic insufficien
cy in which case she may need closure of VSD. No further notes available in our system as julio c minor is lost to follow up since .
 - 10/16/2014 s/p VSD repair, 8-mm Amplatzer Vascular Plug IV with no significant residual s
hunting noted.
 - not on prograf for a long time
 
 INTERVAL HISTORY:
 - no complaints,no abdominal pain, emesis, diarrhea or constipation
 - started BCP, blood pressures were high since then, she will be seen  By cardiologist chelita acosta, they will monitor BPs
 
 REVIEW OF SYSTEMS: 
 General:  No fever, fatigue, or weight loss.  
 Eyes:  No changes in vision, no eye irritation or discharge.  
 ENT:  No nosebleeds, nasal congestion, difficulty swallowing, no mouth sores. 
 Respiratory:  No shortness of breath, cough, wheezing.
 Cardiovascular:  No difficulty breathing, edema, cyanosis.  
 Genitourinary:  No urinary infections.  
 Neurologic:  No seizures. No headaches.  
 Musculoskeletal:  No joint pain, swelling. No back pain.  Full range of motion.
 Skin:  No itching, rash, jaundice.  
 Psychological:  No abnormal anxiety, depression. Attending school regularly. Will be senior
 this year
 Heme: No anemia, abnormal bleeding, easy bruising
 Lymphatic: No enlarged lymph nodes.
 Metabolic/Endo: no glucose intolerance, hypothyroidism
 Allergy/immunology: no seasonal or food allergies, no asthma
 
 All other ROS are negative.
 
  
 Past Medical History 
 Diagnosis Date 
   Biliary atresia  
 
 Past Surgical History 
 Procedure Laterality Date 
   Liver transplant  2000 
   at [facility] 
   Vsd repair, amplatzer device  10/16/2014 
   8-mm Amplatzer Vascular Plug IV, no significant residual shunting 
 
 
 FHx: reviewed, unchanged
 
 SHx: reviewed, will be senior at  this year
 
 Allergies 
 Allergen Reactions 
   Sulfa (Sulfonamide Antibiotics) Hives and Swelling-Facial 
   Varicella Vaccines  
   Possible reaction with sulfa meds 
 
 PHYSICAL EXAMINATION: 
 
 Patient reports a pain level of  0 today. No action required.
 
 Ht 1.644 m (5' 4.72") (58 %*, Z = 0.21), Wt 81.5 kg (179 lb 10.8 oz) (96 %*, Z = 1.70), Jae
ght for age(%)  96% (Z=1.70) , /98, Pulse 84, Temperature 36.7 C (98.1 F), Tempera
ture source Oral, BMI 30.15 kg/(m^2). 
 
 Repeat BP at the end of clinic: 130/93 mmHg
 
 APPEARANCE: alert, active and in no apparent distress. 
 SKIN: no jaundice or rashes. 
 HEENT: normocephalic, PERRLA, mucous membranes moist. 
 NECK: supple, without thyromegaly. 
 CHEST: clear to auscultation bilaterally. 
 CV: no murmurs. 
 ABDOMEN: soft, NTND, no hepatosplenomegaly. 
 BACK: without tenderness. 
 MUSCULOSKELETAL: no muscle wasting, no deformity.
 EXTREMITIES: No edema, clubbing, deformity.
 NEURO: grossly intact
 
  
 ASSESSMENT: 14 yo female with h/o biliary atresia, s/p OLT in , off immunosuppression b
y choice for a long time, congenital heart disease, s/p surgery, now w hypertension. CNI cou
ld be contributor to HTN, despite that she has been off for a long time. Patient says she wi
ll be referred to nephrologist by PCP.
 Recommend fibroscan to evaluate for fibrosis which could have developed despite normal LFts
.
 
 Q44.2   Biliary atresia
 Q89.09  Polysplenia
 I15.8   Other secondary hypertension
 Z94.4   Transplanted liver (HCC)
 I35.1   Nonrheumatic aortic valve insufficiency
 Q21.0   VSD (ventricular septal defect), perimembranous
 Q26.9   Interrupted inferior vena cava
 I77.810  Aortic root dilatation (HCC)
 
 PLAN/RECOMMENDATIONS:  
 - Labs: CBC w diff, CMP, GGT: every 3 months
 - will transfer care to adult hepatology, requested appt to be scheduled for summer 2017 , 
this works for family well.
 - Fibroscan next year when she is seen by adult hepatology
 - she will be seen by cardiology today for follow up
 - PCP referred her to nephrologist for HTN
 
 Health Care Maintenance: 
 - she can receive all vaccines as she is not on immunsupression, especially we recommend he
 
p A, hep B vaccines if she is not already immune.
 - Flu vaccine (intramuscular): every . 
 - Dental care every 6 months
 
 At this visit: 
 Dr. Jorge Aguirre and DHARMESH Webber from [facility] were presen
t as observers during this visit.
 Psychosocial or economic issues that may affect patient's medical care or well being:none 
 Co-morbid chronic medical problems that may affect future procedural sedation risk: NA
 
 Labs/imaging:
 Component
     Latest Ref Rng 2016 
 GLUCOSE, PLASMA (LAB)
     70 - 100 mg/dL 73 
 BUN, PLASMA (LAB)
     6 - 23 mg/dL 8 
 CREATININE PLASMA (LAB)
     0.6 - 1.2 mg/dL 0.61 
 TOTAL PROTEIN, PLASMA 
     6 - 8 g/dL 7.9 
 ALBUMIN, PLASMA (LAB)
     3.5 - 5 g/dL 4 
 CALCIUM, PLASMA (LAB)
     8.4 - 10.2 mg/dL 9.7 
 BILIRUBIN TOTAL
     0 - 1.2 mg/dL 0.3 
 ALK PHOS     30 - 128 U/L 108 
 AST(SGOT)     13 - 39 U/L 18 
 SODIUM, PLASMA (LAB)
     132 - 143 mmol/L 137 
 POTASSIUM, PLASMA  (LAB)
     3.6 - 5.1 mmol/L 4 
 CHLORIDE, PLASMA (LAB)
     95 - 112 mmol/L 100 
 TOTAL CO2, PLASMA (LAB)
     19 - 31 mmol/L 24 
 ALT (SGPT)     7 - 52 U/L 18 
 WHITE CELL COUNT
     4.5 - 11 K/cu mm 8.8 
 RED CELL COUNT
     3.8 - 5.1 M/cu mm 4.97 
 HEMOGLOBIN
     12 - 16 g/dL 14.5 
 HEMATOCRIT
     35 - 45 % 43.2 
 MCV     81 - 99 fL 86.9 
 PLATELET COUNT
     140 - 440 K/cu mm 415 
 
 Neema Khalil MD
 Pediatric Gastroenterology
 Consult line: [phone] 
 
 Electronically signed by Neema Khalil MD at 2016 12:31 PM PDTEroNeema dias MD -
 2016  3:39 PM PDT
 
 Electronically signed by Neema Khalil MD at 2016 12:31 PM PDTdocumented in this en
counter
 
 
 Plan of Treatment
 
 
+-----------+------------+--------+----------------------+---------------------+
| Name      | Type       | Priori | Associated Diagnoses | Order Schedule      |
|           |            | ty     |                      |                     |
+-----------+------------+--------+----------------------+---------------------+
| [facility] | Procedures | Routin |   Biliary atresia    | Ordered: 2016 |
|           |            | e      | Transplanted liver   |                     |
|           |            |        | (HCC)                |                     |
+-----------+------------+--------+----------------------+---------------------+
 documented as of this encounter
 
 Visit Diagnoses
 
 
+------------------------------------------------------------------------------+
| Diagnosis                                                                    |
+------------------------------------------------------------------------------+
|   Biliary atresia - Primary  Congenital biliary atresia                      |
+------------------------------------------------------------------------------+
|   Polysplenia  Congenital anomalies of spleen                                |
+------------------------------------------------------------------------------+
|   Other secondary hypertension                                               |
+------------------------------------------------------------------------------+
|   Transplanted liver (HCC)  Liver replaced by transplant                     |
+------------------------------------------------------------------------------+
|   Nonrheumatic aortic valve insufficiency  Aortic valve disorders            |
+------------------------------------------------------------------------------+
|   VSD (ventricular septal defect), perimembranous  Ventricular septal defect |
+------------------------------------------------------------------------------+
|   Interrupted inferior vena cava  Other congenital anomalies of great veins  |
+------------------------------------------------------------------------------+
|   Aortic root dilatation (HCC)  Thoracic aortic ectasia                      |
+------------------------------------------------------------------------------+
 documented in this encounter

## 2019-11-01 NOTE — XMS
Encounter Summary
  Created on: 2019
 
 Bonifacio Nila JB
 External Reference #: [id number]
 : [birth year]
 Sex: Female
 
 Demographics
 
 
+-----------------------+------------------------+
| Address               | [address]            |
|                       | [place]  [address]   |
+-----------------------+------------------------+
| Home Phone            | [phone]        |
+-----------------------+------------------------+
| Preferred Language    | Unknown                |
+-----------------------+------------------------+
| Marital Status        | Single                 |
+-----------------------+------------------------+
| Advent Affiliation | Unknown                |
+-----------------------+------------------------+
| Race                  | White                  |
+-----------------------+------------------------+
| Ethnic Group          | Not  or  |
+-----------------------+------------------------+
 
 
 Author
 
 
+--------------+------------------------------+
| Author       | [facility] |
+--------------+------------------------------+
| Organization | [facility] |
+--------------+------------------------------+
| Address      | Unknown                      |
+--------------+------------------------------+
| Phone        | Unavailable                  |
+--------------+------------------------------+
 
 
 
 Support
 
 
+-----------------+--------------+---------+-----------------+
| Name            | Relationship | Address | Phone           |
+-----------------+--------------+---------+-----------------+
| Kit Valdovinos   | ECON         | Unknown | [phone] |
+-----------------+--------------+---------+-----------------+
| Magdalena Valdovinos | ECON         | Unknown | [phone] |
+-----------------+--------------+---------+-----------------+
 
 
 
 Care Team Providers
 
 
 
+-----------------------+------+-------------+
| Care Team Member Name | Role | Phone       |
+-----------------------+------+-------------+
 PCP  | Unavailable |
+-----------------------+------+-------------+
 
 
 
 Reason for Visit
 
 
+-----------+-----------------+
| Reason    | Comments        |
+-----------+-----------------+
| Lab Order | faxed lab order |
+-----------+-----------------+
 
 
 
 Encounter Details
 
 
+--------+-----------+----------------------+----------------------+----------------------+
| Date   | Type      | Department           | Care Team            | Description          |
+--------+-----------+----------------------+----------------------+----------------------+
| / | Telephone |   Pediatric          |   Neema Khalil,   | Lab Order (faxed lab |
|    |           | Gastroenterology at  | [address] |  order)              |
|        |           | [place]          |   [place], [place]       |                      |
|        |           | [facility]  | [address]           |                      |
|        |           |  [address] | [phone]         |                      |
|        |           |  [address]  Mailcode:  | [phone] (Fax)   |                      |
|        |           | [facility]  [place]   |                      |                      |
|        |           | [place], [place]         |                      |                      |
|        |           | [address]           |                      |                      |
|        |           | [phone]         |                      |                      |
+--------+-----------+----------------------+----------------------+----------------------+
 
 
 
 Social History
 
 
+----------------+-------+-----------+--------+------+
| Tobacco Use    | Types | Packs/Day | Years  | Date |
|                |       |           | Used   |      |
+----------------+-------+-----------+--------+------+
| Never Assessed |       |           |        |      |
+----------------+-------+-----------+--------+------+
 
 
 
+------------------+---------------+
| Sex Assigned at  | Date Recorded |
| Birth            |               |
+------------------+---------------+
| Not on file      |               |
+------------------+---------------+
 
 
 
 
+----------------+-------------+-------------+
| Job Start Date | Occupation  | Industry    |
+----------------+-------------+-------------+
| Not on file    | Not on file | Not on file |
+----------------+-------------+-------------+
 
 
 
+----------------+--------------+------------+
| Travel History | Travel Start | Travel End |
+----------------+--------------+------------+
 
 
 
+-------------------------------------+
| No recent travel history available. |
+-------------------------------------+
 documented as of this encounter
 
 Plan of Treatment
 Not on filedocumented as of this encounter
 
 Procedures
 
 
+----------------------+--------+-------------+----------------------+----------------------
+
| Procedure Name       | Priori | Date/Time   | Associated Diagnosis | Comments             
|
|                      | ty     |             |                      |                      
|
+----------------------+--------+-------------+----------------------+----------------------
+
| CBC, WITH            | Routin | 2014  |   Transplanted liver |   Results for this   
|
| DIFFERENTIAL         | e      |  4:50 PM    |  (HCC)  Encounter    | procedure are in the 
|
|                      |        | PDT         | for therapeutic drug |  results section.    
|
|                      |        |             |  monitoring          |                      
|
+----------------------+--------+-------------+----------------------+----------------------
+
| COMPLETE METABOLIC   | Routin | 2014  |   Transplanted liver |   Results for this   
|
| SET                  | e      |  4:50 PM    |  (HCC)  Encounter    | procedure are in the 
|
| (NA,K,CL,CO2,BUN,CRE |        | PDT         | for therapeutic drug |  results section.    
|
| AT,GLUC,CA,AST,ALT,B |        |             |  monitoring          |                      
|
| ASHWINI TOTAL,ALK        |        |             |                      |                      
|
| PHOS,ALB,PROT TOTAL) |        |             |                      |                      
|
+----------------------+--------+-------------+----------------------+----------------------
+
| GGT, PLASMA          | Routin | 2014  |   Transplanted liver |   Results for this   
 
|
|                      | e      |  4:50 PM    |  (HCC)  Encounter    | procedure are in the 
|
|                      |        | PDT         | for therapeutic drug |  results section.    
|
|                      |        |             |  monitoring          |                      
|
+----------------------+--------+-------------+----------------------+----------------------
+
| MAGNESIUM, PLASMA    | Routin | 2014  |   Transplanted liver |   Results for this   
|
|                      | e      |  4:50 PM    |  (HCC)  Encounter    | procedure are in the 
|
|                      |        | PDT         | for therapeutic drug |  results section.    
|
|                      |        |             |  monitoring          |                      
|
+----------------------+--------+-------------+----------------------+----------------------
+
 documented in this encounter
 
 Results
 MAGNESIUM, PLASMA (2014  4:50 PM PDT)
 
+-----------+-------+-----------------+------------+--------------+
| Component | Value | Ref Range       | Performed  | Pathologist  |
|           |       |                 | At         | Signature    |
+-----------+-------+-----------------+------------+--------------+
| MAGNESIUM | 1.8   | 1.7 - 2.5 mg/dL | [facility]  |              |
|           |       |                 | LAB -      |              |
|           |       |                 | [place]  |              |
+-----------+-------+-----------------+------------+--------------+
 
 
 
+---------------+
| Specimen      |
+---------------+
| Blood - Blood |
+---------------+
 
 
 
+--------------------+----------------------+--------------------+----------------+
| Performing         | Address              | City/State/Zipcode | Phone Number   |
| Organization       |                      |                    |                |
+--------------------+----------------------+--------------------+----------------+
|   [facility]  |   [address] | [place]      |   [phone] |
| [place]          |                      |                    |                |
+--------------------+----------------------+--------------------+----------------+
 GGT, PLASMA (2014  4:50 PM PDT)
 
+-----------+-------+------------+------------+--------------+
| Component | Value | Ref Range  | Performed  | Pathologist  |
|           |       |            | At         | Signature    |
+-----------+-------+------------+------------+--------------+
| GAMMA     | 8     | 5 - 60 U/L | [facility]  |              |
| GLUTAMYL  |       |            | LAB -      |              |
| TRANS     |       |            | [place]  |              |
+-----------+-------+------------+------------+--------------+
 
 
 
 
+---------------+
| Specimen      |
+---------------+
| Blood - Blood |
+---------------+
 
 
 
+--------------------+----------------------+--------------------+----------------+
| Performing         | Address              | City/State/Zipcode | Phone Number   |
| Organization       |                      |                    |                |
+--------------------+----------------------+--------------------+----------------+
|   [facility]  |   [address] | [place], [place]      |   [phone] |
| [place]          |                      |                    |                |
+--------------------+----------------------+--------------------+----------------+
 COMPLETE METABOLIC SET (NA,K,CL,CO2,BUN,CREAT,GLUC,CA,AST,ALT,BILI TOTAL,ALK PHOS,ALB,PROT 
TOTAL) (2014  4:50 PM PDT)
 
+-------------+--------+-----------------+------------+--------------+
| Component   | Value  | Ref Range       | Performed  | Pathologist  |
|             |        |                 | At         | Signature    |
+-------------+--------+-----------------+------------+--------------+
| GLUCOSE,    | 82     | 70 - 100 mg/dL  | [facility]  |              |
| PLASMA      |        |                 | LAB -      |              |
| (LAB)       |        |                 | [place]  |              |
+-------------+--------+-----------------+------------+--------------+
| BUN, PLASMA | 18     | 6 - 23 mg/dL    | [facility]  |              |
|  (LAB)      |        |                 | LAB -      |              |
|             |        |                 | [place]  |              |
+-------------+--------+-----------------+------------+--------------+
| CREATININE  | 0.81   | 0.40 - 1.05     | [facility]  |              |
| PLASMA      |        | mg/dL           | LAB -      |              |
| (LAB)       |        |                 | [place]  |              |
+-------------+--------+-----------------+------------+--------------+
| TOTAL       | 7.3    | 6.1 - 8.5 g/dL  | [facility]  |              |
| PROTEIN,    |        |                 | LAB -      |              |
| PLASMA      |        |                 | [place]  |              |
| (LAB)       |        |                 |            |              |
+-------------+--------+-----------------+------------+--------------+
| ALBUMIN,    | 4.2    | 3.5 - 5.0 g/dL  | [facility]  |              |
| PLASMA      |        |                 | LAB -      |              |
| (LAB)       |        |                 | [place]  |              |
+-------------+--------+-----------------+------------+--------------+
| CALCIUM,    | 9.6    | 8.4 - 10.2      | [facility]  |              |
| PLASMA      |        | mg/dL           | LAB -      |              |
| (LAB)       |        |                 | [place]  |              |
+-------------+--------+-----------------+------------+--------------+
| BILIRUBIN   | 0.3    | 0 - 1.2         | [facility]  |              |
| TOTAL       |        | Transcutaneous  | LAB -      |              |
|             |        | Bilirubinometer | [place]  |              |
+-------------+--------+-----------------+------------+--------------+
| ALK PHOS    | 78 (A) | 135 - 384 U/L   | [facility]  |              |
|             |        |                 | LAB -      |              |
|             |        |                 | [place]  |              |
+-------------+--------+-----------------+------------+--------------+
| AST(SGOT)   | 15     | 13 - 39 U/L     | [facility]  |              |
|             |        |                 | LAB -      |              |
 
|             |        |                 | [place]  |              |
+-------------+--------+-----------------+------------+--------------+
| SODIUM,     | 136    | 132 - 143       | [facility]  |              |
| PLASMA      |        | mmol/L          | LAB -      |              |
| (LAB)       |        |                 | [place]  |              |
+-------------+--------+-----------------+------------+--------------+
| POTASSIUM,  | 3.6    | 3.6 - 5.1       | [facility]  |              |
| PLASMA      |        | mmol/L          | LAB -      |              |
| (LAB)       |        |                 | [place]  |              |
+-------------+--------+-----------------+------------+--------------+
| CHLORIDE,   | 102    | 95 - 112 mmol/L | [facility]  |              |
| PLASMA      |        |                 | LAB -      |              |
| (LAB)       |        |                 | [place]  |              |
+-------------+--------+-----------------+------------+--------------+
| TOTAL CO2,  | 25     | 19 - 31 mmol/L  | [facility]  |              |
| PLASMA      |        |                 | LAB -      |              |
| (LAB)       |        |                 | [place]  |              |
+-------------+--------+-----------------+------------+--------------+
| ALT (SGPT)  | 12     | 7 - 52 U/L      | [facility]  |              |
|             |        |                 | LAB -      |              |
|             |        |                 | [place]  |              |
+-------------+--------+-----------------+------------+--------------+
| ANION GAP   | 12.6   | 7 - 21          | [facility]  |              |
|             |        |                 | LAB -      |              |
|             |        |                 | [place]  |              |
+-------------+--------+-----------------+------------+--------------+
| BUN/CREATIN | 22.2   | 6.0 - 28.6      | [facility]  |              |
| INE RATIO   |        |                 | LAB -      |              |
|             |        |                 | [place]  |              |
+-------------+--------+-----------------+------------+--------------+
| GLOBULIN    | 3.1    | 1.8 - 3.5       | [facility]  |              |
|             |        |                 | LAB -      |              |
|             |        |                 | [place]  |              |
+-------------+--------+-----------------+------------+--------------+
| A/G RATIO   | 1.4    | 1.1 - 2.4       | [facility]  |              |
|             |        |                 | LAB -      |              |
|             |        |                 | [place]  |              |
+-------------+--------+-----------------+------------+--------------+
 
 
 
+---------------+
| Specimen      |
+---------------+
| Blood - Blood |
+---------------+
 
 
 
+--------------------+----------------------+--------------------+----------------+
| Performing         | Address              | City/State/Zipcode | Phone Number   |
| Organization       |                      |                    |                |
+--------------------+----------------------+--------------------+----------------+
|   [facility]  |   [address] | [place], [place]      |   [phone] |
| [place]          |                      |                    |                |
+--------------------+----------------------+--------------------+----------------+
 CBC, WITH DIFFERENTIAL (2014  4:50 PM PDT)
 
+-------------+----------+-----------------+------------+--------------+
| Component   | Value    | Ref Range       | Performed  | Pathologist  |
 
|             |          |                 | At         | Signature    |
+-------------+----------+-----------------+------------+--------------+
| WHITE CELL  | 7.8      | 4.4 - 11.8 K/cu | [facility]  |              |
| COUNT       |          |  mm             | LAB -      |              |
|             |          |                 | [place]  |              |
+-------------+----------+-----------------+------------+--------------+
| RED CELL    | 4.55     | 3.8 - 5.3 M/cu  | [facility]  |              |
| COUNT       |          | mm              | LAB -      |              |
|             |          |                 | [place]  |              |
+-------------+----------+-----------------+------------+--------------+
| HEMOGLOBIN  | 14       | 11.1 - 15.7     | [facility]  |              |
|             |          | g/dL            | LAB -      |              |
|             |          |                 | [place]  |              |
+-------------+----------+-----------------+------------+--------------+
| HEMATOCRIT  | 40.7     | 32 - 47 %       | [facility]  |              |
|             |          |                 | LAB -      |              |
|             |          |                 | [place]  |              |
+-------------+----------+-----------------+------------+--------------+
| MCV         | 89.4     | 73 - 91 fL      | [facility]  |              |
|             |          |                 | LAB -      |              |
|             |          |                 | [place]  |              |
+-------------+----------+-----------------+------------+--------------+
| MCH         | 31       | 25 - 31 pg      | [facility]  |              |
|             |          |                 | LAB -      |              |
|             |          |                 | [place]  |              |
+-------------+----------+-----------------+------------+--------------+
| MCHC        | 34       | 30 - 36 g/dL    | [facility]  |              |
|             |          |                 | LAB -      |              |
|             |          |                 | [place]  |              |
+-------------+----------+-----------------+------------+--------------+
| PLATELET    | 296      | 140 - 440 K/cu  | [facility]  |              |
| COUNT       |          | mm              | LAB -      |              |
|             |          |                 | [place]  |              |
+-------------+----------+-----------------+------------+--------------+
| NEUTROPHIL  | 63.5     | 35 - 65 %       | [facility]  |              |
| %           |          |                 | LAB -      |              |
|             |          |                 | [place]  |              |
+-------------+----------+-----------------+------------+--------------+
| LYMPHOCYTE  | 25.8 (A) | 28 - 48 %       | [facility]  |              |
| %           |          |                 | LAB -      |              |
|             |          |                 | [place]  |              |
+-------------+----------+-----------------+------------+--------------+
| MONOCYTE %  | 7.9      | 0 - 12 %        | [facility]  |              |
|             |          |                 | LAB -      |              |
|             |          |                 | [place]  |              |
+-------------+----------+-----------------+------------+--------------+
| EOS %       | 2.2      | 0 - 6 %         | [facility]  |              |
|             |          |                 | LAB -      |              |
|             |          |                 | [place]  |              |
+-------------+----------+-----------------+------------+--------------+
| BASO %      | 0.6      | 0 - 2 %         | [facility]  |              |
|             |          |                 | LAB -      |              |
|             |          |                 | [place]  |              |
+-------------+----------+-----------------+------------+--------------+
| RDW         | 13.4     | 10.5 - 15 %     | [facility]  |              |
|             |          |                 | LAB -      |              |
|             |          |                 | [place]  |              |
+-------------+----------+-----------------+------------+--------------+
 
 
 
 
+---------------+
| Specimen      |
+---------------+
| Blood - Blood |
+---------------+
 
 
 
+--------------------+----------------------+--------------------+----------------+
| Performing         | Address              | City/State/Zipcode | Phone Number   |
| Organization       |                      |                    |                |
+--------------------+----------------------+--------------------+----------------+
|   [facility]  |   [address] | [place]      |   [phone] |
| [place]          |                      |                    |                |
+--------------------+----------------------+--------------------+----------------+
 documented in this encounter
 
 Visit Diagnoses
 
 
+--------------------------------------------------------------------+
| Diagnosis                                                          |
+--------------------------------------------------------------------+
|   Transplanted liver (HCC) - Primary  Liver replaced by transplant |
+--------------------------------------------------------------------+
|   Encounter for therapeutic drug monitoring                        |
+--------------------------------------------------------------------+
 documented in this encounter

## 2019-11-01 NOTE — XMS
Encounter Summary
  Created on: 2019
 
 Bonifacio Nila JB
 External Reference #: [id number]
 : [birth year]
 Sex: Female
 
 Demographics
 
 
+-----------------------+------------------------+
| Address               | [address]            |
|                       | [place]  [address]   |
+-----------------------+------------------------+
| Home Phone            | [phone]        |
+-----------------------+------------------------+
| Preferred Language    | Unknown                |
+-----------------------+------------------------+
| Marital Status        | Single                 |
+-----------------------+------------------------+
| Jehovah's witness Affiliation | Unknown                |
+-----------------------+------------------------+
| Race                  | White                  |
+-----------------------+------------------------+
| Ethnic Group          | Not  or  |
+-----------------------+------------------------+
 
 
 Author
 
 
+--------------+------------------------------+
| Author       | [facility] |
+--------------+------------------------------+
| Organization | [facility] |
+--------------+------------------------------+
| Address      | Unknown                      |
+--------------+------------------------------+
| Phone        | Unavailable                  |
+--------------+------------------------------+
 
 
 
 Support
 
 
+-----------------+--------------+---------+-----------------+
| Name            | Relationship | Address | Phone           |
+-----------------+--------------+---------+-----------------+
| Kit Valdovinos   | ECON         | Unknown | [phone] |
+-----------------+--------------+---------+-----------------+
| Magdalena Valdovinos | ECON         | Unknown | [phone] |
+-----------------+--------------+---------+-----------------+
 
 
 
 Care Team Providers
 
 
 
+------------------------+------+-----------------+
| Care Team Member Name  | Role | Phone           |
+------------------------+------+-----------------+
| Lily Horton MD | PCP  | [phone] |
+------------------------+------+-----------------+
 
 
 
 Reason for Visit
 
 
+-------------+----------+
| Reason      | Comments |
+-------------+----------+
| Lab Results |          |
+-------------+----------+
 
 
 
 Encounter Details
 
 
+--------+-----------+----------------------+----------------------+-------------+
| Date   | Type      | Department           | Care Team            | Description |
+--------+-----------+----------------------+----------------------+-------------+
| / | Telephone |   Pediatric          |   Neema Khalil,   | Lab Results |
| 2016   |           | Gastroenterology at  | MD  [address] |             |
|        |           | [facility]          |   [place], [place]       |             |
|        |           | [facility]  | [address]           |             |
|        |           |  [address] | [phone]         |             |
|        |           |  [address]  Mailcode:  | [phone] (Fax)   |             |
|        |           | [facility]   |                      |             |
|        |           | [place], [place]         |                      |             |
|        |           | [address]           |                      |             |
|        |           | [phone]         |                      |             |
+--------+-----------+----------------------+----------------------+-------------+
 
 
 
 Social History
 
 
+-------------------+-------+-----------+--------+------+
| Tobacco Use       | Types | Packs/Day | Years  | Date |
|                   |       |           | Used   |      |
+-------------------+-------+-----------+--------+------+
| Passive Smoke     |       |           |        |      |
| Exposure - Never  |       |           |        |      |
| Smoker            |       |           |        |      |
+-------------------+-------+-----------+--------+------+
 
 
 
+---------------------+---+---+---+
| Smokeless Tobacco:  |   |   |   |
| Never Used          |   |   |   |
+---------------------+---+---+---+
 
 
 
 
+-------------+-------------+---------+----------+
| Alcohol Use | Drinks/Week | oz/Week | Comments |
+-------------+-------------+---------+----------+
| Not Asked   |             |         |          |
+-------------+-------------+---------+----------+
 
 
 
+------------------+---------------+
| Sex Assigned at  | Date Recorded |
| Birth            |               |
+------------------+---------------+
| Not on file      |               |
+------------------+---------------+
 
 
 
+----------------+-------------+-------------+
| Job Start Date | Occupation  | Industry    |
+----------------+-------------+-------------+
| Not on file    | Not on file | Not on file |
+----------------+-------------+-------------+
 
 
 
+----------------+--------------+------------+
| Travel History | Travel Start | Travel End |
+----------------+--------------+------------+
 
 
 
+-------------------------------------+
| No recent travel history available. |
+-------------------------------------+
 documented as of this encounter
 
 Plan of Treatment
 Not on filedocumented as of this encounter
 
 Visit Diagnoses
 Not on filedocumented in this encounter

## 2019-11-01 NOTE — XMS
Encounter Summary
  Created on: 2019
 
 Bonifacio Nila JB
 External Reference #: [id number]
 : [birth year]
 Sex: Female
 
 Demographics
 
 
+-----------------------+------------------------+
| Address               | [address]            |
|                       | [place]  [address]   |
+-----------------------+------------------------+
| Home Phone            | [phone]        |
+-----------------------+------------------------+
| Preferred Language    | Unknown                |
+-----------------------+------------------------+
| Marital Status        | Single                 |
+-----------------------+------------------------+
| Oriental orthodox Affiliation | Unknown                |
+-----------------------+------------------------+
| Race                  | White                  |
+-----------------------+------------------------+
| Ethnic Group          | Not  or  |
+-----------------------+------------------------+
 
 
 Author
 
 
+--------------+------------------------------+
| Author       | [facility] |
+--------------+------------------------------+
| Organization | [facility] |
+--------------+------------------------------+
| Address      | Unknown                      |
+--------------+------------------------------+
| Phone        | Unavailable                  |
+--------------+------------------------------+
 
 
 
 Support
 
 
+-----------------+--------------+---------+-----------------+
| Name            | Relationship | Address | Phone           |
+-----------------+--------------+---------+-----------------+
| Kit Valdovinos   | ECON         | Unknown | [phone] |
+-----------------+--------------+---------+-----------------+
| Magdalena Valdovinos | ECON         | Unknown | [phone] |
+-----------------+--------------+---------+-----------------+
 
 
 
 Care Team Providers
 
 
 
+------------------------+------+-----------------+
| Care Team Member Name  | Role | Phone           |
+------------------------+------+-----------------+
| Lily Horton MD | PCP  | [phone] |
+------------------------+------+-----------------+
 
 
 
 Encounter Details
 
 
+--------+-------------+----------------------+----------------------+-------------+
| Date   | Type        | Department           | Care Team            | Description |
+--------+-------------+----------------------+----------------------+-------------+
| / | Documentati |   Pediatric          |   Neema Khalil,   |             |
|    | on          | Gastroenterology at  | MD [address] |             |
|        |             | [facility]          |   [place], [place]       |             |
|        |             | [facility]  | [address]           |             |
|        |             |  [address] | [phone]         |             |
|        |             |  [address]  Mailcode:  | [phone] (Fax)   |             |
|        |             | [address]   |                      |             |
|        |             | [place], [place]         |                      |             |
|        |             | [address]           |                      |             |
|        |             | [phone]         |                      |             |
+--------+-------------+----------------------+----------------------+-------------+
 
 
 
 Social History
 
 
+-------------------+-------+-----------+--------+------+
| Tobacco Use       | Types | Packs/Day | Years  | Date |
|                   |       |           | Used   |      |
+-------------------+-------+-----------+--------+------+
| Passive Smoke     |       |           |        |      |
| Exposure - Never  |       |           |        |      |
| Smoker            |       |           |        |      |
+-------------------+-------+-----------+--------+------+
 
 
 
+---------------------+---+---+---+
| Smokeless Tobacco:  |   |   |   |
| Never Used          |   |   |   |
+---------------------+---+---+---+
 
 
 
+-------------+-------------+---------+----------+
| Alcohol Use | Drinks/Week | oz/Week | Comments |
+-------------+-------------+---------+----------+
| Not Asked   |             |         |          |
+-------------+-------------+---------+----------+
 
 
 
+------------------+---------------+
 
| Sex Assigned at  | Date Recorded |
| Birth            |               |
+------------------+---------------+
| Not on file      |               |
+------------------+---------------+
 
 
 
+----------------+-------------+-------------+
| Job Start Date | Occupation  | Industry    |
+----------------+-------------+-------------+
| Not on file    | Not on file | Not on file |
+----------------+-------------+-------------+
 
 
 
+----------------+--------------+------------+
| Travel History | Travel Start | Travel End |
+----------------+--------------+------------+
 
 
 
+-------------------------------------+
| No recent travel history available. |
+-------------------------------------+
 documented as of this encounter
 
 Plan of Treatment
 Not on filedocumented as of this encounter
 
 Visit Diagnoses
 Not on filedocumented in this encounter

## 2019-11-01 NOTE — XMS
Encounter Summary
  Created on: 2019
 
 Bonifacio Nila JB
 External Reference #: [id number]
 : [birth year]
 Sex: Female
 
 Demographics
 
 
+-----------------------+------------------------+
| Address               | [address]            |
|                       | [place]  [address]   |
+-----------------------+------------------------+
| Home Phone            | [phone]        |
+-----------------------+------------------------+
| Preferred Language    | Unknown                |
+-----------------------+------------------------+
| Marital Status        | Single                 |
+-----------------------+------------------------+
| Gnosticist Affiliation | Unknown                |
+-----------------------+------------------------+
| Race                  | White                  |
+-----------------------+------------------------+
| Ethnic Group          | Not  or  |
+-----------------------+------------------------+
 
 
 Author
 
 
+--------------+------------------------------+
| Author       | [facility] |
+--------------+------------------------------+
| Organization | [facility] |
+--------------+------------------------------+
| Address      | Unknown                      |
+--------------+------------------------------+
| Phone        | Unavailable                  |
+--------------+------------------------------+
 
 
 
 Support
 
 
+-----------------+--------------+---------+-----------------+
| Name            | Relationship | Address | Phone           |
+-----------------+--------------+---------+-----------------+
| Kit Valdovinos   | ECON         | Unknown | [phone] |
+-----------------+--------------+---------+-----------------+
| Magdalena Valdovinos | ECON         | Unknown | [phone] |
+-----------------+--------------+---------+-----------------+
 
 
 
 Care Team Providers
 
 
 
+-----------------------+------+-------------+
| Care Team Member Name | Role | Phone       |
+-----------------------+------+-------------+
 PCP  | Unavailable |
+-----------------------+------+-------------+
 
 
 
 Encounter Details
 
 
+--------+-------------+----------------------+----------------------+-------------+
| Date   | Type        | Department           | Care Team            | Description |
+--------+-------------+----------------------+----------------------+-------------+
| / | Documentati |   Pediatric          |   Neema Khalil,   |             |
|    | on          | Gastroenterology at  | MD [address] |             |
|        |             | [facility]          |   [facility] [place]       |             |
|        |             | [facility]  | [address]           |             |
|        |             |  [address] | [phone]         |             |
|        |             |  [address]  Mailcode:  | [phone] (Fax)   |             |
|        |             | [facility]  [place]   |                      |             |
|        |             | [place], [place]         |                      |             |
|        |             | [address]           |                      |             |
|        |             | [phone]         |                      |             |
+--------+-------------+----------------------+----------------------+-------------+
 
 
 
 Social History
 
 
+----------------+-------+-----------+--------+------+
| Tobacco Use    | Types | Packs/Day | Years  | Date |
|                |       |           | Used   |      |
+----------------+-------+-----------+--------+------+
| Never Assessed |       |           |        |      |
+----------------+-------+-----------+--------+------+
 
 
 
+------------------+---------------+
| Sex Assigned at  | Date Recorded |
| Birth            |               |
+------------------+---------------+
| Not on file      |               |
+------------------+---------------+
 
 
 
+----------------+-------------+-------------+
| Job Start Date | Occupation  | Industry    |
+----------------+-------------+-------------+
| Not on file    | Not on file | Not on file |
+----------------+-------------+-------------+
 
 
 
+----------------+--------------+------------+
 
| Travel History | Travel Start | Travel End |
+----------------+--------------+------------+
 
 
 
+-------------------------------------+
| No recent travel history available. |
+-------------------------------------+
 documented as of this encounter
 
 Plan of Treatment
 Not on filedocumented as of this encounter
 
 Visit Diagnoses
 Not on filedocumented in this encounter

## 2019-11-01 NOTE — XMS
Encounter Summary
  Created on: 2019
 
 Bonifacio Nila JB
 External Reference #: [id number]
 : [birth year]
 Sex: Female
 
 Demographics
 
 
+-----------------------+------------------------+
| Address               | [address]            |
|                       | [place]  [address]   |
+-----------------------+------------------------+
| Home Phone            | [phone]        |
+-----------------------+------------------------+
| Preferred Language    | Unknown                |
+-----------------------+------------------------+
| Marital Status        | Single                 |
+-----------------------+------------------------+
| Yarsanism Affiliation | Unknown                |
+-----------------------+------------------------+
| Race                  | White                  |
+-----------------------+------------------------+
| Ethnic Group          | Not  or  |
+-----------------------+------------------------+
 
 
 Author
 
 
+--------------+------------------------------+
| Author       | [facility] |
+--------------+------------------------------+
| Organization | [facility] |
+--------------+------------------------------+
| Address      | Unknown                      |
+--------------+------------------------------+
| Phone        | Unavailable                  |
+--------------+------------------------------+
 
 
 
 Support
 
 
+-----------------+--------------+---------+-----------------+
| Name            | Relationship | Address | Phone           |
+-----------------+--------------+---------+-----------------+
| Kit Valdovinos   | ECON         | Unknown | [phone] |
+-----------------+--------------+---------+-----------------+
| Magdalena Valdovinos | ECON         | Unknown | [phone] |
+-----------------+--------------+---------+-----------------+
 
 
 
 Care Team Providers
 
 
 
+-----------------------+------+-----------------+
| Care Team Member Name | Role | Phone           |
+-----------------------+------+-----------------+
| Lily Horton MD   | PCP  | [phone] |
+-----------------------+------+-----------------+
 
 
 
 Reason for Visit
 
 
+-------------------+----------+
| Reason            | Comments |
+-------------------+----------+
| Medication Refill |          |
+-------------------+----------+
 
 
 
 Encounter Details
 
 
+--------+-----------+----------------------+--------------------+-------------------+
| Date   | Type      | Department           | Care Team          | Description       |
+--------+-----------+----------------------+--------------------+-------------------+
| / | Telephone |   Pediatric          |   Ivis Burkett MD | Medication Refill |
|    |           | Gastroenterology at  |                    |                   |
|        |           | [facility]          |                    |                   |
|        |           | [facility]  |                    |                   |
|        |           |  [address] |                    |                   |
|        |           |  [address]  Mailcode:  |                    |                   |
|        |           | [address]  [place]   |                    |                   |
|        |           | [place], [place]         |                    |                   |
|        |           | [address]           |                    |                   |
|        |           | [phone]         |                    |                   |
+--------+-----------+----------------------+--------------------+-------------------+
 
 
 
 Social History
 
 
+----------------+-------+-----------+--------+------+
| Tobacco Use    | Types | Packs/Day | Years  | Date |
|                |       |           | Used   |      |
+----------------+-------+-----------+--------+------+
| Never Assessed |       |           |        |      |
+----------------+-------+-----------+--------+------+
 
 
 
+------------------+---------------+
| Sex Assigned at  | Date Recorded |
| Birth            |               |
+------------------+---------------+
| Not on file      |               |
+------------------+---------------+
 
 
 
 
+----------------+-------------+-------------+
| Job Start Date | Occupation  | Industry    |
+----------------+-------------+-------------+
| Not on file    | Not on file | Not on file |
+----------------+-------------+-------------+
 
 
 
+----------------+--------------+------------+
| Travel History | Travel Start | Travel End |
+----------------+--------------+------------+
 
 
 
+-------------------------------------+
| No recent travel history available. |
+-------------------------------------+
 documented as of this encounter
 
 Plan of Treatment
 Not on filedocumented as of this encounter
 
 Visit Diagnoses
 Not on filedocumented in this encounter

## 2019-11-01 NOTE — XMS
Encounter Summary
  Created on: 2019
 
 Bonifacio Nila JB
 External Reference #: [id number]
 : [birth year]
 Sex: Female
 
 Demographics
 
 
+-----------------------+------------------------+
| Address               | [address]            |
|                       | [place]  [address]   |
+-----------------------+------------------------+
| Home Phone            | [phone]        |
+-----------------------+------------------------+
| Preferred Language    | Unknown                |
+-----------------------+------------------------+
| Marital Status        | Single                 |
+-----------------------+------------------------+
| Advent Affiliation | Unknown                |
+-----------------------+------------------------+
| Race                  | White                  |
+-----------------------+------------------------+
| Ethnic Group          | Not  or  |
+-----------------------+------------------------+
 
 
 Author
 
 
+--------------+-------------+
| Organization | Unknown     |
+--------------+-------------+
| Address      | Unknown     |
+--------------+-------------+
| Phone        | Unavailable |
+--------------+-------------+
 
 
 
 Support
 
 
+-----------------+--------------+---------+-----------------+
| Name            | Relationship | Address | Phone           |
+-----------------+--------------+---------+-----------------+
| Kit Valdovinos   | ECON         | Unknown | [phone] |
+-----------------+--------------+---------+-----------------+
| Magdalena Valdovinos | ECON         | Unknown | [phone] |
+-----------------+--------------+---------+-----------------+
 
 
 
 Care Team Providers
 
 
 
+-----------------------+------+-----------------+
| Care Team Member Name | Role | Phone           |
+-----------------------+------+-----------------+
| Lily Horton MD   | PCP  | [phone] |
+-----------------------+------+-----------------+
 
 
 
 Encounter Details
 
 
+--------+-------------+------------+--------------------+-------------+
| Date   | Type        | Department | Care Team          | Description |
+--------+-------------+------------+--------------------+-------------+
| 10/26/ | Transcribed |            |   Dictation, Other | Transcribed |
| 2000   |             |            |                    |             |
+--------+-------------+------------+--------------------+-------------+
 
 
 
 Social History
 
 
+----------------+-------+-----------+--------+------+
| Tobacco Use    | Types | Packs/Day | Years  | Date |
|                |       |           | Used   |      |
+----------------+-------+-----------+--------+------+
| Never Assessed |       |           |        |      |
+----------------+-------+-----------+--------+------+
 
 
 
+------------------+---------------+
| Sex Assigned at  | Date Recorded |
| Birth            |               |
+------------------+---------------+
| Not on file      |               |
+------------------+---------------+
 
 
 
+----------------+-------------+-------------+
| Job Start Date | Occupation  | Industry    |
+----------------+-------------+-------------+
| Not on file    | Not on file | Not on file |
+----------------+-------------+-------------+
 
 
 
+----------------+--------------+------------+
| Travel History | Travel Start | Travel End |
+----------------+--------------+------------+
 
 
 
+-------------------------------------+
| No recent travel history available. |
+-------------------------------------+
 documented as of this encounter
 
 
 Progress Notes
 Interface, Transcription In - 2006  3:05 AM [facility]                                    [place] [address], [place] [address] [phone] or [phone]
 
 2000
 
 Lily Horton M.D.
 [address]
 [place]  [address]
 
 RE:   NILA VALDOVINOS
 MR #: [id number]
 :  2000
 DATE OF TRANSPLANT:  2000
 
 Dear Dr. Horton:
 
 I had the pleasure of seeing Nila at the Pediatric Liver Transplant Clinic
 held  at  [facility],  attended  by  Dr. Mcrae   and   Dr. Becca Marsh,
 representatives of the Liver Transplant  team at [place], in lieu with Dr. Bailey.  As you know, she underwent  liver  transplantation  on  2000, and did well posttransplant.   Since  she  has  been at home, she has
 continued to do fairly well.  The only problem is some diarrhea.  On a good
 day she will have 2 bowel movements a  day,  and on a bad day she will have
 4-5.  She has no fever or blood in the stool, however.
 
 She is on Prograf 5 ml b.i.d. and acyclovir 10 ml q.i.d.
 
 Septra was stopped a few weeks ago because of A LIKELY ALLERGIC REACTION TO
 SEPTRA.  It involved hives.
 
 On exam, she weighs 11.2 kg and measures  78  cm.  Blood pressure is 85/50.
 Pulse was 126.  She is anicteric, active, and playful.  Her abdomen is soft
 and rounded with no mass, tenderness, or organomegaly.  She had some recent
 blood tests, which show transaminases in the 40-50 range.  The remainder of
 the blood test results are very similar  to  the  ones  done a week before.
 Her most recent tacrolimus level is appropriate.   The  most  recent one is
 pending.
 
 In summary, Nila is a 1-1/2-year-old  white  female  who  is  2-1/2 months
 status post liver transplantation for  extrahepatic  biliary  atresia.  She
 has  had a relatively uncomplicated  posttransplant  course  and  is  doing
 fairly well.  We need to begin a pneumocystis  prophylaxis  soon.  She will
 start receiving pentamidine, 1 unit  dose  per  month,  (1 vial) inhalation
 therapy.  We will see if can arrange this to be done in the [facility],
 rather than having the family drive all  the  way to the [place] for this.
 This should be done for 1 year posttransplant.
 
 For the next 1-1/2 months, the family will have Nila's blood drawn every 2
 weeks.   Assuming  everything is under  control,  then  it  could  be  done
 monthly.  The family will be talking  to  Dr. Bailey soon to firm up plans
 to have Nila followed mostly by you in  the [facility], and to come to
 [place] either when the situation is  unstable  and,  at  the  very least,
 every 3-6 months for chronic monitoring.
 
 Finally, Becca will work out the details  of which laboratory tests should
 
 be done and when with the family.   Please  let  me  know  if  you have any
 questions.
 
 Sincerely,
 
 Eagle De Luna M.D.
 Pediatric Gastroenterology
 
 [facility] / 
 [id number] / [id number] / [id number] / [id number]
 D: 10/26/2000
 T: 10/28/2000
 
 cc:
 
 Eagle Mcrae M.D.
 [address] [place] [address]  [id number]
 
 [id number] signed by Interface, Transcription In at 2006  3:05 AM PSTdocume
nted in this encounter
 
 Plan of Treatment
 Not on filedocumented as of this encounter
 
 Visit Diagnoses
 Not on filedocumented in this encounter

## 2019-11-02 NOTE — NUR
PATIENT CALLED FOR ASSISTANCE. PATIENT AMBULATED TO BATHROOM TO VOID WITH
SUPERVISION. PATIENT IS STEADY ON HER FEET. LINENS CHANGED AND READJUSTED AS
NEEDED. PATIENT SITTING UP IN BEDSIDE RECLINER, CALL LIGHT IN REACH, FAMILY IN
ROOM. NO OTHER NEEDS AT THIS TIME.

## 2019-11-02 NOTE — NUR
UP TO VOID, DENIES LIGHTHEADEDNESS/DIZZINESS, HR REMAINS STEADY WHILE UP.
STATES "THIS IS THE BEST STACEY FELT SINCE BEFORE I GOT SICK". BACK TO BED, SNACK
GIVEN PER REQUEST. DENIES PAIN/NAUSEA.

## 2019-11-02 NOTE — NUR
PATIENT SITTING UP IN CHAIR AFTER EATING BREAKFAST THIS AM. PT EAGER TO GO
HOME. PT STATES SHE IS FEELING MUCH BETTER, BUT TIRED. DR. PARKER'S OFFICE TOB
E CALLED ONCE OPEN TO MAKE A FOLLOW UP APPOINTMENT FOR PATIENT. ASSESSMENT
COMPLETE. LAST BP 97/59 (68).

## 2020-06-19 ENCOUNTER — HOSPITAL ENCOUNTER (EMERGENCY)
Dept: HOSPITAL 46 - ED | Age: 20
LOS: 1 days | Discharge: HOME | End: 2020-06-20
Payer: COMMERCIAL

## 2020-06-19 VITALS — WEIGHT: 222 LBS | HEIGHT: 66 IN | BODY MASS INDEX: 35.68 KG/M2

## 2020-06-19 DIAGNOSIS — I10: ICD-10-CM

## 2020-06-19 DIAGNOSIS — F41.0: Primary | ICD-10-CM

## 2020-06-19 DIAGNOSIS — Z79.899: ICD-10-CM

## 2020-06-19 DIAGNOSIS — Z88.2: ICD-10-CM

## 2020-06-20 NOTE — EKG
[facility]
                                    [address]
                                  [place]  [address]
_________________________________________________________________________________________
                                                                 Signed   
 
 
Normal sinus rhythm
Minimal voltage criteria for LVH, may be normal variant
Nonspecific ST abnormality
Abnormal ECG
No previous ECGs available
Confirmed by CAROLE FRANCES MD ([id number]) on 6/20/2020 7:52:58 AM
 
 
 
 
 
 
 
 
 
 
 
 
 
 
 
 
 
 
 
 
 
 
 
 
 
 
 
 
 
 
 
 
 
 
 
 
 
 
 
    Electronically Signed By: CAROLE FRANCES MD  06/20/20 0753
_________________________________________________________________________________________
PATIENT NAME:     ROSA MATHEW LETI                 
MEDICAL RECORD #: [id number]                     Electrocardiogram             
          ACCT #: [id number]  
DATE OF BIRTH:   [birth year]                                       
PHYSICIAN:   CAROLE FRANCES MD                   REPORT #: [id number]
REPORT IS CONFIDENTIAL AND NOT TO BE RELEASED WITHOUT AUTHORIZATION

## 2020-10-12 NOTE — XMS
Encounter Summary
  Created on: 2019
 
 Bonifacio Nila JB
 External Reference #: [id number]
 : [birth year]
 Sex: Female
 
 Demographics
 
 
+-----------------------+------------------------+
| Address               | [address]            |
|                       | [place]  [address]   |
+-----------------------+------------------------+
| Home Phone            | [phone]        |
+-----------------------+------------------------+
| Preferred Language    | Unknown                |
+-----------------------+------------------------+
| Marital Status        | Single                 |
+-----------------------+------------------------+
| Restorationist Affiliation | Unknown                |
+-----------------------+------------------------+
| Race                  | White                  |
+-----------------------+------------------------+
| Ethnic Group          | Not  or  |
+-----------------------+------------------------+
 
 
 Author
 
 
+--------------+------------------------------+
| Author       | [facility] |
+--------------+------------------------------+
| Organization | [facility] |
+--------------+------------------------------+
| Address      | Unknown                      |
+--------------+------------------------------+
| Phone        | Unavailable                  |
+--------------+------------------------------+
 
 
 
 Support
 
 
+-----------------+--------------+---------+-----------------+
| Name            | Relationship | Address | Phone           |
+-----------------+--------------+---------+-----------------+
| Kit Valdovinos   | ECON         | Unknown | [phone] |
+-----------------+--------------+---------+-----------------+
| Magdalena Valdovinos | ECON         | Unknown | [phone] |
+-----------------+--------------+---------+-----------------+
 
 
 
 Care Team Providers
 
 
 
+------------------------+------+-----------------+
| Care Team Member Name  | Role | Phone           |
+------------------------+------+-----------------+
| Lily Horton MD | PCP  | [phone] |
+------------------------+------+-----------------+
 
 
 
 Reason for Visit
 
 
+----------+------------------------------+
| Reason   | Comments                     |
+----------+------------------------------+
| Lab Draw | past due for transplant labs |
+----------+------------------------------+
 
 
 
 Encounter Details
 
 
+--------+-----------+----------------------+----------------------+----------------------+
| Date   | Type      | Department           | Care Team            | Description          |
+--------+-----------+----------------------+----------------------+----------------------+
| / | Telephone |   Pediatric          |   Neema Khalil,   | Lab Draw (past due   |
| 2015   |           | Gastroenterology at  | MD  [address] | for transplant labs) |
|        |           | [place]          |   [place], [place]       |                      |
|        |           | [facility]  | [address]           |                      |
|        |           |  [address] | [phone]         |                      |
|        |           |  [address]  Mailcode:  | [phone] (Fax)   |                      |
|        |           | [facility]   |                      |                      |
|        |           | [place], [place]         |                      |                      |
|        |           | [address]           |                      |                      |
|        |           | [phone]         |                      |                      |
+--------+-----------+----------------------+----------------------+----------------------+
 
 
 
 Social History
 
 
+-------------------+-------+-----------+--------+------+
| Tobacco Use       | Types | Packs/Day | Years  | Date |
|                   |       |           | Used   |      |
+-------------------+-------+-----------+--------+------+
| Passive Smoke     |       |           |        |      |
| Exposure - Never  |       |           |        |      |
| Smoker            |       |           |        |      |
+-------------------+-------+-----------+--------+------+
 
 
 
+---------------------+---+---+---+
| Smokeless Tobacco:  |   |   |   |
| Never Used          |   |   |   |
+---------------------+---+---+---+
 
 
 
 
+-------------+-------------+---------+----------+
| Alcohol Use | Drinks/Week | oz/Week | Comments |
+-------------+-------------+---------+----------+
| Not Asked   |             |         |          |
+-------------+-------------+---------+----------+
 
 
 
+------------------+---------------+
| Sex Assigned at  | Date Recorded |
| Birth            |               |
+------------------+---------------+
| Not on file      |               |
+------------------+---------------+
 
 
 
+----------------+-------------+-------------+
| Job Start Date | Occupation  | Industry    |
+----------------+-------------+-------------+
| Not on file    | Not on file | Not on file |
+----------------+-------------+-------------+
 
 
 
+----------------+--------------+------------+
| Travel History | Travel Start | Travel End |
+----------------+--------------+------------+
 
 
 
+-------------------------------------+
| No recent travel history available. |
+-------------------------------------+
 documented as of this encounter
 
 Plan of Treatment
 Not on filedocumented as of this encounter
 
 Visit Diagnoses
 Not on filedocumented in this encounter Please call to inform

## 2020-10-13 ENCOUNTER — HOSPITAL ENCOUNTER (EMERGENCY)
Dept: HOSPITAL 46 - ED | Age: 20
Discharge: HOME | End: 2020-10-13
Payer: COMMERCIAL

## 2020-10-13 VITALS — HEIGHT: 66 IN | WEIGHT: 206.99 LBS | BODY MASS INDEX: 33.27 KG/M2

## 2020-10-13 DIAGNOSIS — Z88.2: ICD-10-CM

## 2020-10-13 DIAGNOSIS — F41.9: ICD-10-CM

## 2020-10-13 DIAGNOSIS — R07.81: Primary | ICD-10-CM

## 2020-10-13 DIAGNOSIS — I10: ICD-10-CM

## 2020-10-13 DIAGNOSIS — Z79.899: ICD-10-CM

## 2020-10-13 NOTE — XMS
Clinical Summary
  Created on: 10/13/2020
 
 Nila Mathew
 External Reference #: [id number]
 : [birth year]
 Sex: Female
 
 Demographics
 
 
+-----------------------+------------------------+
| Address               | [address]            |
|                       | [place]  [address]   |
+-----------------------+------------------------+
| Home Phone            | [phone]        |
+-----------------------+------------------------+
| Preferred Language    | Unknown                |
+-----------------------+------------------------+
| Marital Status        | Single                 |
+-----------------------+------------------------+
| [employer]an Affiliation | Unknown                |
+-----------------------+------------------------+
| Race                  | White                  |
+-----------------------+------------------------+
| Ethnic Group          | Not  or  |
+-----------------------+------------------------+
 
 
 Author
 
 
+--------------+---------------------+
| Author       | [facility] |
+--------------+---------------------+
| Organization | [facility] |
+--------------+---------------------+
| Address      | Unknown             |
+--------------+---------------------+
| Phone        | Unavailable         |
+--------------+---------------------+
 
 
 
 Support
 
 
+-----------------+--------------+---------+-----------------+
| Name            | Relationship | Address | Phone           |
+-----------------+--------------+---------+-----------------+
| Kit Mathew   | ECON         | Unknown | [phone] |
+-----------------+--------------+---------+-----------------+
| Magdalena Mathew | ECON         | Unknown | [phone] |
+-----------------+--------------+---------+-----------------+
 
 
 
 Care Team Providers
 
 
 
+------------------------+------+-----------------+
| Care Team Member Name  | Role | Phone           |
+------------------------+------+-----------------+
| Lily Horton MD | PCP  | [phone] |
+------------------------+------+-----------------+
 
 
 
 Source Comments
 [facility] is fully live on both Epic[facility] Ambulatory and Epic[facility] InPatient.[facility]
 
 Allergies
 
 
+---------------------+-----------------+----------+----------+----------------------+
| Active Allergy      | Reactions       | Severity | Noted    | Comments             |
|                     |                 |          | Date     |                      |
+---------------------+-----------------+----------+----------+----------------------+
| Sulfa (Sulfonamide  | Hives,          | High     | 20 |                      |
| Antibiotics)        | Swelling-Facial |          | 09       |                      |
+---------------------+-----------------+----------+----------+----------------------+
| Varicella Vaccines  |                 | Low      | 20 |   Possible reaction  |
|                     |                 |          | 09       | with sulfa meds      |
+---------------------+-----------------+----------+----------+----------------------+
 
 
 
 Medications
 
 
+--------------------+---------------------+-----------+---------+------+------+-------+
| Medication         | Sig                 | Dispensed | Refills | Star | End  | Statu |
|                    |                     |           |         | t    | Date | s     |
|                    |                     |           |         | Date |      |       |
+--------------------+---------------------+-----------+---------+------+------+-------+
|   amLODIPine 5 mg  | Take 5 mg by mouth  |           | 0       |      |      | Activ |
| oral tablet        | once daily.         |           |         |      |      | e     |
+--------------------+---------------------+-----------+---------+------+------+-------+
 
 
 
 Active Problems
 
 
+-------------------------------------------------+------------+
| Problem                                         | Noted Date |
+-------------------------------------------------+------------+
| Polysplenia                                     | 2014 |
+-------------------------------------------------+------------+
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
+-------------------------------------------------+------------+
| Aortic insufficiency                            | 2014 |
+-------------------------------------------------+------------+
| Aortic root dilatation                          | 2014 |
+-------------------------------------------------+------------+
| VSD (ventricular septal defect), perimembranous | 2008 |
+-------------------------------------------------+------------+
 
 
 
 
+-----------------------------------------------------------------+
|   Overview:   10/16/2014  S/p 8-mm Amplatzer Vascular Plug IV,  |
| with no significant residual shuntingLeft atrial isomerism LAE  |
| (left atrial enlargement) LVE (left ventricular enlargement)    |
|LVE (left ventricular enlargement)                               |
+-----------------------------------------------------------------+
 
 
 
+--------------------+------------+
| Transplanted liver | 10/12/2006 |
+--------------------+------------+
 
 
 
+---------------------------------------------------------+
|   Overview:   For biliary atresia, 2000 at [facility] |
+---------------------------------------------------------+
 
 
 
 Resolved Problems
 
 
+----------------------------+----------+-----------+
| Problem                    | Noted    | Resolved  |
|                            | Date     | Date      |
+----------------------------+----------+-----------+
| Immunosuppressed status    | 20 |  |
|                            | 14       | 5         |
+----------------------------+----------+-----------+
| Aortic valve insufficiency | 20 |  |
|                            | 08       | 4         |
+----------------------------+----------+-----------+
 
 
 
 Social History
 
 
+-------------------+-------+-----------+--------+------+
| Tobacco Use       | Types | Packs/Day | Years  | Date |
|                   |       |           | Used   |      |
+-------------------+-------+-----------+--------+------+
| Passive Smoke     |       |           |        |      |
| Exposure - Never  |       |           |        |      |
| Smoker            |       |           |        |      |
+-------------------+-------+-----------+--------+------+
 
 
 
+---------------------+---+---+---+
| Smokeless Tobacco:  |   |   |   |
| Never Used          |   |   |   |
+---------------------+---+---+---+
 
 
 
 
+-------------+-------------+---------+----------+
| Alcohol Use | Drinks/Week | oz/Week | Comments |
+-------------+-------------+---------+----------+
| Not Asked   |             |         |          |
+-------------+-------------+---------+----------+
 
 
 
+------------------+---------------+
| Sex Assigned at  | Date Recorded |
| Birth            |               |
+------------------+---------------+
| Not on file      |               |
+------------------+---------------+
 
 
 
 Last Filed Vital Signs
 
 
+-------------------+----------------------+----------------------+----------------------+
| Vital Sign        | Reading              | Time Taken           | Comments             |
+-------------------+----------------------+----------------------+----------------------+
| Blood Pressure    | 130/93               | 2016  9:24 AM  | right arm adult cuff |
|                   |                      | PDT                  |                      |
+-------------------+----------------------+----------------------+----------------------+
| Pulse             | 72                   | 2016  9:24 AM  |                      |
|                   |                      | PDT                  |                      |
+-------------------+----------------------+----------------------+----------------------+
| Temperature       | 36.7   C (98.1   F)  | 2016  8:59 AM  |                      |
|                   |                      | PDT                  |                      |
+-------------------+----------------------+----------------------+----------------------+
| Respiratory Rate  | -                    | -                    |                      |
+-------------------+----------------------+----------------------+----------------------+
| Oxygen Saturation | -                    | -                    |                      |
+-------------------+----------------------+----------------------+----------------------+
| Inhaled Oxygen    | -                    | -                    |                      |
| Concentration     |                      |                      |                      |
+-------------------+----------------------+----------------------+----------------------+
| Weight            | 81.5 kg (179 lb 10.8 | 2016  8:59 AM  |                      |
|                   |  oz)                 | PDT                  |                      |
+-------------------+----------------------+----------------------+----------------------+
| Height            | 164.4 cm (5' 4.72")  | 2016  8:59 AM  |                      |
|                   |                      | PDT                  |                      |
+-------------------+----------------------+----------------------+----------------------+
| Body Mass Index   | 30.16                | 2016  8:59 AM  |                      |
|                   |                      | PDT                  |                      |
+-------------------+----------------------+----------------------+----------------------+
 
 
 
 Plan of Treatment
 
 
+----------------------+-----------+-------+----------+
| Health Maintenance   | Due Date  | Last  | Comments |
|                      |           | Done  |          |
+----------------------+-----------+-------+----------+
| Pneumococcal         | 01/15/200 |       |          |
 
| vaccination (1 of 3  | 5         |       |          |
| - PCV13)             |           |       |          |
+----------------------+-----------+-------+----------+
| Influenza (Flu)      |  |       |          |
| vaccination (#1)     | 0         |       |          |
+----------------------+-----------+-------+----------+
 
 
 
 Results
 Not on filefrom Last 3 Months
 
 Insurance
 
 
+--------------+--------+-------------+--------+-------+---------+--------+
| Payer        | Benefi | Subscriber  | Effect | Phone | Address | Type   |
|              | t Plan | ID          | david    |       |         |        |
|              |  /     |             | Dates  |       |         |        |
|              | Group  |             |        |       |         |        |
+--------------+--------+-------------+--------+-------+---------+--------+
| [employer] | [employer]    | [id number]    |  |       |         | Medica |
|              | [employer] |             | 015-Pr |       |         | id     |
|              | [place]   |             | esent  |       |         |        |
+--------------+--------+-------------+--------+-------+---------+--------+
 
 
 
+----------------+--------+-------------+--------+-------------+---------------------+
| Guarantor Name | Accoun | Relation to | Date   | Phone       | Billing Address     |
|                | t Type |  Patient    | of     |             |                     |
|                |        |             | Birth  |             |                     |
+----------------+--------+-------------+--------+-------------+---------------------+
| KIT MATHEW  | Person | Father      | / |             |   [address]       |
|                | al/Fam |             | 1979   | [phone] | [place] [id number] |
|                | lenny    |             |        | [address] (Home)    |                     |
+----------------+--------+-------------+--------+-------------+---------------------+
 
 
 
 Advance Directives
 
 
+-----------------+---------------+----------------+-------------+
| Type            | Date Recorded | Patient        | Explanation |
|                 |               | Representative |             |
+-----------------+---------------+----------------+-------------+
| Advance         |               |                |             |
| Directives and  |               |                |             |
| Living Will     |               |                |             |
+-----------------+---------------+----------------+-------------+
| Power of        |               |                |             |
|         |               |                |             |
+-----------------+---------------+----------------+-------------+

## 2020-10-13 NOTE — XMS
Encounter Summary
  Created on: 10/13/2020
 
 Bonifacio Nila JB
 External Reference #: [id number]
 : [birth year]
 Sex: Female
 
 Demographics
 
 
+-----------------------+------------------------+
| Address               | [address]            |
|                       | [place]  [address]   |
+-----------------------+------------------------+
| Home Phone            | [phone]        |
+-----------------------+------------------------+
| Preferred Language    | Unknown                |
+-----------------------+------------------------+
| Marital Status        | Single                 |
+-----------------------+------------------------+
| Jehovah's witness Affiliation | Unknown                |
+-----------------------+------------------------+
| Race                  | White                  |
+-----------------------+------------------------+
| Ethnic Group          | Not  or  |
+-----------------------+------------------------+
 
 
 Author
 
 
+--------------+------------------------------+
| Author       | [facility] |
+--------------+------------------------------+
| Organization | [facility] |
+--------------+------------------------------+
| Address      | Unknown                      |
+--------------+------------------------------+
| Phone        | Unavailable                  |
+--------------+------------------------------+
 
 
 
 Support
 
 
+-----------------+--------------+---------+-----------------+
| Name            | Relationship | Address | Phone           |
+-----------------+--------------+---------+-----------------+
| Kit Valdovinos   | ECON         | Unknown | [phone] |
+-----------------+--------------+---------+-----------------+
| Magdalena Valdovinos | ECON         | Unknown | [phone] |
+-----------------+--------------+---------+-----------------+
 
 
 
 Care Team Providers
 
 
 
+-----------------------+------+-----------------+
| Care Team Member Name | Role | Phone           |
+-----------------------+------+-----------------+
| Lily Horton MD   | PCP  | [phone] |
+-----------------------+------+-----------------+
 
 
 
 Encounter Details
 
 
+--------+----------+----------------------+--------------------+-------------+
| Date   | Type     | Department           | Care Team          | Description |
+--------+----------+----------------------+--------------------+-------------+
| 10/27/ | Abstract |   Pediatric          |   Ivis Burkett MD |             |
|    |          | Gastroenterology at  |                    |             |
|        |          | [facility]          |                    |             |
|        |          | [facility]  |                    |             |
|        |          |  [address]     |                    |             |
|        |          | [facility]          |                    |             |
|        |          | [facility], |                    |             |
|        |          |  [address]th floor           |                    |             |
|        |          | [place], [place]         |                    |             |
|        |          | [id number]           |                    |             |
|        |          | [phone]         |                    |             |
+--------+----------+----------------------+--------------------+-------------+
 
 
 
 Social History
 
 
+----------------+-------+-----------+--------+------+
| Tobacco Use    | Types | Packs/Day | Years  | Date |
|                |       |           | Used   |      |
+----------------+-------+-----------+--------+------+
| Never Assessed |       |           |        |      |
+----------------+-------+-----------+--------+------+
 
 
 
+------------------+---------------+
| Sex Assigned at  | Date Recorded |
| Birth            |               |
+------------------+---------------+
| Not on file      |               |
+------------------+---------------+
 documented as of this encounter
 
 Plan of Treatment
 Not on filedocumented as of this encounter
 
 Procedures
 
 
+----------------------+--------+-------------+----------------------+----------------------
+
| Procedure Name       | Priori | Date/Time   | Associated Diagnosis | Comments             
 
|
|                      | ty     |             |                      |                      
|
+----------------------+--------+-------------+----------------------+----------------------
+
| OTHER                | Routin | 10/20/2009  |                      |   Results for this   
|
|                      | e      |  8:15 AM    |                      | procedure are in the 
|
|                      |        | PDT         |                      |  results section.    
|
+----------------------+--------+-------------+----------------------+----------------------
+
| CBC, WITH            | Routin | 10/20/2009  |                      |   Results for this   
|
| DIFFERENTIAL         | e      |  8:15 AM    |                      | procedure are in the 
|
|                      |        | PDT         |                      |  results section.    
|
+----------------------+--------+-------------+----------------------+----------------------
+
| MICROALBUMIN/CREATIN | Routin | 10/20/2009  |                      |   Results for this   
|
| INE RATIO (RANDOM    | e      |  8:15 AM    |                      | procedure are in the 
|
| URINE)               |        | PDT         |                      |  results section.    
|
+----------------------+--------+-------------+----------------------+----------------------
+
| COMPLETE METABOLIC   | Routin | 10/20/2009  |                      |   Results for this   
|
| SET                  | e      |  8:15 AM    |                      | procedure are in the 
|
| (NA,K,CL,CO2,BUN,CRE |        | PDT         |                      |  results section.    
|
| AT,GLUC,CA,AST,ALT,B |        |             |                      |                      
|
| ASHWINI TOTAL,ALK        |        |             |                      |                      
|
| PHOS,ALB,PROT TOTAL) |        |             |                      |                      
|
+----------------------+--------+-------------+----------------------+----------------------
+
| PHOSPHORUS, PLASMA   | Routin | 10/20/2009  |                      |   Results for this   
|
|                      | e      |  8:15 AM    |                      | procedure are in the 
|
|                      |        | PDT         |                      |  results section.    
|
+----------------------+--------+-------------+----------------------+----------------------
+
| GGT, PLASMA          | Routin | 10/20/2009  |                      |   Results for this   
|
|                      | e      |  8:15 AM    |                      | procedure are in the 
|
|                      |        | PDT         |                      |  results section.    
|
+----------------------+--------+-------------+----------------------+----------------------
+
| BILIRUBIN DIRECT     | Routin | 10/20/2009  |                      |   Results for this   
 
|
|                      | e      |  8:15 AM    |                      | procedure are in the 
|
|                      |        | PDT         |                      |  results section.    
|
+----------------------+--------+-------------+----------------------+----------------------
+
| MAGNESIUM, PLASMA    | Routin | 10/20/2009  |                      |   Results for this   
|
|                      | e      |  8:15 AM    |                      | procedure are in the 
|
|                      |        | PDT         |                      |  results section.    
|
+----------------------+--------+-------------+----------------------+----------------------
+
| TRIGLYCERIDES,       | Routin | 10/20/2009  |                      |   Results for this   
|
| PLASMA               | e      |  8:15 AM    |                      | procedure are in the 
|
|                      |        | PDT         |                      |  results section.    
|
+----------------------+--------+-------------+----------------------+----------------------
+
| CHOLESTEROL TOTAL,   | Routin | 10/20/2009  |                      |   Results for this   
|
| PLASMA               | e      |  8:15 AM    |                      | procedure are in the 
|
|                      |        | PDT         |                      |  results section.    
|
+----------------------+--------+-------------+----------------------+----------------------
+
| CREATININE, URINE    | Routin | 10/20/2009  |                      |   Results for this   
|
|                      | e      |  8:15 AM    |                      | procedure are in the 
|
|                      |        | PDT         |                      |  results section.    
|
+----------------------+--------+-------------+----------------------+----------------------
+
 documented in this encounter
 
 Results
 OTHER (10/20/2009  8:15 AM PDT)
 
+-------------+----------------+--------------+------------+--------------+
| Component   | Value          | Ref Range    | Performed  | Pathologist  |
|             |                |              | At         | Signature    |
+-------------+----------------+--------------+------------+--------------+
| ALKALINE    | 8.3Comment:    | 0 - 30 mg/dL | [facility]  |              |
| PHOS, OTHER | microalb/creat |              | LAB -      |              |
|  CALC       |                |              | [place]  |              |
+-------------+----------------+--------------+------------+--------------+
 
 
 
+----------+
| Specimen |
+----------+
| Urine    |
+----------+
 
 
 
 
+--------------------+----------------------+--------------------+----------------+
| Performing         | Address              | City/State/Zipcode | Phone Number   |
| Organization       |                      |                    |                |
+--------------------+----------------------+--------------------+----------------+
|   [facility]  |   [address] |   [place]    |   [phone] |
| [place]          |                      |                    |                |
+--------------------+----------------------+--------------------+----------------+
|   [facility]  |                      |   [place], [place]    |                |
| [place]          |                      |                    |                |
+--------------------+----------------------+--------------------+----------------+
 COMPLETE METABOLIC SET (NA,K,CL,CO2,BUN,CREAT,GLUC,CA,AST,ALT,BILI TOTAL,ALK PHOS,ALB,PROT 
TOTAL) (10/20/2009  8:15 AM PDT)
 
+-------------+-------+-----------------+------------+--------------+
| Component   | Value | Ref Range       | Performed  | Pathologist  |
|             |       |                 | At         | Signature    |
+-------------+-------+-----------------+------------+--------------+
| GLUCOSE,    | 100   | 60 - 100 mg/dL  | [facility]  |              |
| PLASMA      |       |                 | LAB -      |              |
| (LAB)       |       |                 | [place]  |              |
+-------------+-------+-----------------+------------+--------------+
| BUN, PLASMA | 9     | 6 - 23 mg/dL    | [facility]  |              |
|  (LAB)      |       |                 | LAB -      |              |
|             |       |                 | [place]  |              |
+-------------+-------+-----------------+------------+--------------+
| CREATININE  | 0.51  | 0.5 - 1.5 mg/dL | [facility]  |              |
| PLASMA      |       |                 | LAB -      |              |
| (LAB)       |       |                 | [place]  |              |
+-------------+-------+-----------------+------------+--------------+
| TOTAL       | 6.7   | 6.1 - 8.5 g/dL  | [facility]  |              |
| PROTEIN,    |       |                 | LAB -      |              |
| PLASMA      |       |                 | [place]  |              |
| (LAB)       |       |                 |            |              |
+-------------+-------+-----------------+------------+--------------+
| ALBUMIN,    | 3.7   | 2.9 - 5.5 g/dL  | [facility]  |              |
| PLASMA      |       |                 | LAB -      |              |
| (LAB)       |       |                 | [place]  |              |
+-------------+-------+-----------------+------------+--------------+
| CALCIUM,    | 9.6   | 8.5 - 10.5      | [facility]  |              |
| PLASMA      |       | mg/dL           | LAB -      |              |
| (LAB)       |       |                 | [place]  |              |
+-------------+-------+-----------------+------------+--------------+
| BILIRUBIN   | 0.5   | 0.0 - 1.2       | [facility]  |              |
| TOTAL       |       | Transcutaneous  | LAB -      |              |
|             |       | Bilirubinometer | [place]  |              |
+-------------+-------+-----------------+------------+--------------+
| ALK PHOS    | 220   | 135 - 384 U/L   | [facility]  |              |
|             |       |                 | LAB -      |              |
|             |       |                 | [place]  |              |
+-------------+-------+-----------------+------------+--------------+
| AST(SGOT)   | 20    | 0 - 40 U/L      | [facility]  |              |
|             |       |                 | LAB -      |              |
|             |       |                 | [place]  |              |
+-------------+-------+-----------------+------------+--------------+
| SODIUM,     | 139   | 132 - 143       | [facility]  |              |
| PLASMA      |       | mmol/L          | LAB -      |              |
| (LAB)       |       |                 | [place]  |              |
 
+-------------+-------+-----------------+------------+--------------+
| POTASSIUM,  | 4.0   | 3.6 - 5.1       | [facility]  |              |
| PLASMA      |       | mmol/L          | LAB -      |              |
| (LAB)       |       |                 | [place]  |              |
+-------------+-------+-----------------+------------+--------------+
| CHLORIDE,   | 106   | 95 - 112 mmol/L | [facility]  |              |
| PLASMA      |       |                 | LAB -      |              |
| (LAB)       |       |                 | [place]  |              |
+-------------+-------+-----------------+------------+--------------+
| TOTAL CO2,  | 23.2  | 19 - 31 mmol/L  | [facility]  |              |
| PLASMA      |       |                 | LAB -      |              |
| (LAB)       |       |                 | [place]  |              |
+-------------+-------+-----------------+------------+--------------+
| ALT (SGPT)  | 17    | 0 - 46 U/L      | [facility]  |              |
|             |       |                 | LAB -      |              |
|             |       |                 | [place]  |              |
+-------------+-------+-----------------+------------+--------------+
| ANION GAP   | 13.8  | 7 - 21          | [facility]  |              |
|             |       |                 | LAB -      |              |
|             |       |                 | [place]  |              |
+-------------+-------+-----------------+------------+--------------+
| BUN/CREATIN | 17.6  | 6.0 - 28.6      | [facility]  |              |
| INE RATIO   |       |                 | LAB -      |              |
|             |       |                 | [place]  |              |
+-------------+-------+-----------------+------------+--------------+
| A/G RATIO   | 1.2   | 1.1 - 2.4       | [facility]  |              |
|             |       |                 | LAB -      |              |
|             |       |                 | [place]  |              |
+-------------+-------+-----------------+------------+--------------+
 
 
 
+---------------+
| Specimen      |
+---------------+
| Blood - Blood |
+---------------+
 
 
 
+--------------------+----------------------+--------------------+----------------+
| Performing         | Address              | City/State/Zipcode | Phone Number   |
| Organization       |                      |                    |                |
+--------------------+----------------------+--------------------+----------------+
|   [facility]  |   [address] |   [place]    |   [phone] |
| [place]          |                      |                    |                |
+--------------------+----------------------+--------------------+----------------+
|   [facility]  |                      |   [place], [place]    |                |
| [place]          |                      |                    |                |
+--------------------+----------------------+--------------------+----------------+
 CBC, WITH DIFFERENTIAL (10/20/2009  8:15 AM PDT)
 
+-------------+----------+-----------------+------------+--------------+
| Component   | Value    | Ref Range       | Performed  | Pathologist  |
|             |          |                 | At         | Signature    |
+-------------+----------+-----------------+------------+--------------+
| WHITE CELL  | 5.7      | 4.5 - 13.5 K/cu | [facility]  |              |
| COUNT       |          |  mm             | LAB -      |              |
|             |          |                 | [place]  |              |
+-------------+----------+-----------------+------------+--------------+
 
| RED CELL    | 4.89     | 4.1 - 5.3 M/cu  | [facility]  |              |
| COUNT       |          | mm              | LAB -      |              |
|             |          |                 | [place]  |              |
+-------------+----------+-----------------+------------+--------------+
| HEMOGLOBIN  | 14.1     | 10.6 - 15.2     | [facility]  |              |
|             |          | g/dL            | LAB -      |              |
|             |          |                 | [place]  |              |
+-------------+----------+-----------------+------------+--------------+
| HEMATOCRIT  | 43.0 (A) | 32 - 42 %       | [facility]  |              |
|             |          |                 | LAB -      |              |
|             |          |                 | [place]  |              |
+-------------+----------+-----------------+------------+--------------+
| MCV         | 87.8     | 71 - 89 fL      | [facility]  |              |
|             |          |                 | LAB -      |              |
|             |          |                 | [place]  |              |
+-------------+----------+-----------------+------------+--------------+
| MCH         | 29       | 24 - 30 pg      | [facility]  |              |
|             |          |                 | LAB -      |              |
|             |          |                 | [place]  |              |
+-------------+----------+-----------------+------------+--------------+
| MCHC        | 33       | 30 - 36 g/dL    | [facility]  |              |
|             |          |                 | LAB -      |              |
|             |          |                 | [place]  |              |
+-------------+----------+-----------------+------------+--------------+
| PLATELET    | 320      | 140 - 440 K/cu  | [facility]  |              |
| COUNT       |          | mm              | LAB -      |              |
|             |          |                 | [place]  |              |
+-------------+----------+-----------------+------------+--------------+
| NEUTROPHIL  | 49.7     | 31 - 60 %       | [facility]  |              |
| %           |          |                 | LAB -      |              |
|             |          |                 | [place]  |              |
+-------------+----------+-----------------+------------+--------------+
| LYMPHOCYTE  | 41.0     | 28 - 48 %       | [facility]  |              |
| %           |          |                 | LAB -      |              |
|             |          |                 | [place]  |              |
+-------------+----------+-----------------+------------+--------------+
| MONOCYTE %  | 4.8      | 0 - 12 %        | [facility]  |              |
|             |          |                 | LAB -      |              |
|             |          |                 | [place]  |              |
+-------------+----------+-----------------+------------+--------------+
| EOS %       | 3.8      | 0 - 6 %         | [facility]  |              |
|             |          |                 | LAB -      |              |
|             |          |                 | [place]  |              |
+-------------+----------+-----------------+------------+--------------+
| BASO %      | 0.7      | 0 - 2 %         | [facility]  |              |
|             |          |                 | LAB -      |              |
|             |          |                 | [place]  |              |
+-------------+----------+-----------------+------------+--------------+
| RDW         | 13.4     | 10.5 - 15.0 %   | [facility]  |              |
|             |          |                 | LAB -      |              |
|             |          |                 | [place]  |              |
+-------------+----------+-----------------+------------+--------------+
| MPV         |          | fL              | [facility]  |              |
|             |          |                 | LAB -      |              |
|             |          |                 | [place]  |              |
+-------------+----------+-----------------+------------+--------------+
| NEUTROPHIL  |          | K/cu mm         | [facility]  |              |
| #           |          |                 | LAB -      |              |
|             |          |                 | [place]  |              |
+-------------+----------+-----------------+------------+--------------+
 
| LYMPHOCYTE  |          | K/cu mm         | [facility]  |              |
| #           |          |                 | LAB -      |              |
|             |          |                 | [place]  |              |
+-------------+----------+-----------------+------------+--------------+
| MONOCYTE #  |          | K/cu mm         | [facility]  |              |
|             |          |                 | LAB -      |              |
|             |          |                 | [place]  |              |
+-------------+----------+-----------------+------------+--------------+
| EOS #       |          | K/cu mm         | [facility]  |              |
|             |          |                 | LAB -      |              |
|             |          |                 | [place]  |              |
+-------------+----------+-----------------+------------+--------------+
| BASO #      |          |                 | [facility]  |              |
|             |          |                 | LAB -      |              |
|             |          |                 | [place]  |              |
+-------------+----------+-----------------+------------+--------------+
 
 
 
+---------------+
| Specimen      |
+---------------+
| Blood - Blood |
+---------------+
 
 
 
+--------------------+----------------------+--------------------+----------------+
| Performing         | Address              | City/State/Zipcode | Phone Number   |
| Organization       |                      |                    |                |
+--------------------+----------------------+--------------------+----------------+
|   [facility]  |   [address] |   [place], [place]    |   [phone] |
| [place]          |                      |                    |                |
+--------------------+----------------------+--------------------+----------------+
|   [facility]  |                      |   [place], [place]    |                |
| [place]          |                      |                    |                |
+--------------------+----------------------+--------------------+----------------+
 GGT, PLASMA (10/20/2009  8:15 AM PDT)
 
+-----------+-------+------------+------------+--------------+
| Component | Value | Ref Range  | Performed  | Pathologist  |
|           |       |            | At         | Signature    |
+-----------+-------+------------+------------+--------------+
| GAMMA     | 10    | 5 - 60 U/L | [facility]  |              |
| GLUTAMYL  |       |            | LAB -      |              |
| TRANS     |       |            | [place]  |              |
+-----------+-------+------------+------------+--------------+
 
 
 
+---------------+
| Specimen      |
+---------------+
| Blood - Blood |
+---------------+
 
 
 
+--------------------+----------------------+--------------------+----------------+
| Performing         | Address              | City/State/Zipcode | Phone Number   |
 
| Organization       |                      |                    |                |
+--------------------+----------------------+--------------------+----------------+
|   [facility]  |   [address] |   [place], [place]    |   [phone] |
| [place]          |                      |                    |                |
+--------------------+----------------------+--------------------+----------------+
|   [facility]  |                      |   [place], [place]    |                |
| [place]          |                      |                    |                |
+--------------------+----------------------+--------------------+----------------+
 MAGNESIUM, PLASMA (10/20/2009  8:15 AM PDT)
 
+-------------+----------+-----------------+------------+--------------+
| Component   | Value    | Ref Range       | Performed  | Pathologist  |
|             |          |                 | At         | Signature    |
+-------------+----------+-----------------+------------+--------------+
| MAGNESIUM,P | 1.69 (A) | 1.7 - 2.5 mg/dL | [facility]  |              |
| LASMA       |          |                 | LAB -      |              |
|             |          |                 | [place]  |              |
+-------------+----------+-----------------+------------+--------------+
 
 
 
+---------------+
| Specimen      |
+---------------+
| Blood - Blood |
+---------------+
 
 
 
+--------------------+----------------------+--------------------+----------------+
| Performing         | Address              | City/State/Zipcode | Phone Number   |
| Organization       |                      |                    |                |
+--------------------+----------------------+--------------------+----------------+
|   [facility]  |   [address] |   [place], [place]    |   [phone] |
| [place]          |                      |                    |                |
+--------------------+----------------------+--------------------+----------------+
|   [facility]  |                      |   [place], [place]    |                |
| [place]          |                      |                    |                |
+--------------------+----------------------+--------------------+----------------+
 BILIRUBIN DIRECT (10/20/2009  8:15 AM PDT)
 
+------------+-------+-----------------+------------+--------------+
| Component  | Value | Ref Range       | Performed  | Pathologist  |
|            |       |                 | At         | Signature    |
+------------+-------+-----------------+------------+--------------+
| BILIRUBIN  | 0.1   | 0.0 - 0.4 mg/dL | [facility]  |              |
| DIRECT     |       |                 | LAB -      |              |
|            |       |                 | [place]  |              |
+------------+-------+-----------------+------------+--------------+
 
 
 
+---------------+
| Specimen      |
+---------------+
| Blood - Blood |
+---------------+
 
 
 
 
+--------------------+----------------------+--------------------+----------------+
| Performing         | Address              | City/State/Zipcode | Phone Number   |
| Organization       |                      |                    |                |
+--------------------+----------------------+--------------------+----------------+
|   [facility]  |   [address] |   [place]    |   [phone] |
| [place]          |                      |                    |                |
+--------------------+----------------------+--------------------+----------------+
|   [facility]  |                      |   [place], [place]    |                |
| [place]          |                      |                    |                |
+--------------------+----------------------+--------------------+----------------+
 TRIGLYCERIDES, PLASMA (10/20/2009  8:15 AM PDT)
 
+-------------+-------+----------------+------------+--------------+
| Component   | Value | Ref Range      | Performed  | Pathologist  |
|             |       |                | At         | Signature    |
+-------------+-------+----------------+------------+--------------+
| TRIGLYCERID | 94    | 30 - 150 mg/dL | [facility]  |              |
| ES          |       |                | LAB -      |              |
|             |       |                | [place]  |              |
+-------------+-------+----------------+------------+--------------+
 
 
 
+---------------+
| Specimen      |
+---------------+
| Blood - Blood |
+---------------+
 
 
 
+--------------------+----------------------+--------------------+----------------+
| Performing         | Address              | City/State/Zipcode | Phone Number   |
| Organization       |                      |                    |                |
+--------------------+----------------------+--------------------+----------------+
|   [facility]  |   [address] |   [place], [place]    |   [phone] |
| [place]          |                      |                    |                |
+--------------------+----------------------+--------------------+----------------+
|   [facility]  |                      |   [place], [place]    |                |
| [place]          |                      |                    |                |
+--------------------+----------------------+--------------------+----------------+
 PHOSPHORUS, PLASMA (10/20/2009  8:15 AM PDT)
 
+-------------+-------+-----------------+------------+--------------+
| Component   | Value | Ref Range       | Performed  | Pathologist  |
|             |       |                 | At         | Signature    |
+-------------+-------+-----------------+------------+--------------+
| PHOSPHORUS, | 4.7   | 2.6 - 6.2 mg/dL | [facility]  |              |
|  PLASMA     |       |                 | LAB -      |              |
| (LAB)       |       |                 | [place]  |              |
+-------------+-------+-----------------+------------+--------------+
 
 
 
+---------------+
| Specimen      |
+---------------+
| Blood - Blood |
+---------------+
 
 
 
 
+--------------------+----------------------+--------------------+----------------+
| Performing         | Address              | City/State/Zipcode | Phone Number   |
| Organization       |                      |                    |                |
+--------------------+----------------------+--------------------+----------------+
|   [facility]  |   [address] |   [place]    |   [phone] |
| [place]          |                      |                    |                |
+--------------------+----------------------+--------------------+----------------+
|   [facility]  |                      |   [place], [place]    |                |
| [place]          |                      |                    |                |
+--------------------+----------------------+--------------------+----------------+
 CHOLESTEROL TOTAL, PLASMA (10/20/2009  8:15 AM PDT)
 
+-------------+-------+---------------+------------+--------------+
| Component   | Value | Ref Range     | Performed  | Pathologist  |
|             |       |               | At         | Signature    |
+-------------+-------+---------------+------------+--------------+
| CHOLESTEROL | 142   | < - 200 mg/dL | [facility]  |              |
|   (LAB)     |       |               | LAB -      |              |
|             |       |               | [place]  |              |
+-------------+-------+---------------+------------+--------------+
 
 
 
+---------------+
| Specimen      |
+---------------+
| Blood - Blood |
+---------------+
 
 
 
+--------------------+----------------------+--------------------+----------------+
| Performing         | Address              | City/State/Zipcode | Phone Number   |
| Organization       |                      |                    |                |
+--------------------+----------------------+--------------------+----------------+
|   [facility]  |   [address] |   [place], [place]    |   [phone] |
| [place]          |                      |                    |                |
+--------------------+----------------------+--------------------+----------------+
|   [facility]  |                      |   [place], [place]    |                |
| [place]          |                      |                    |                |
+--------------------+----------------------+--------------------+----------------+
 CREATININE, URINE (10/20/2009  8:15 AM PDT)
 
+-------------+-------+-------------+------------+--------------+
| Component   | Value | Ref Range   | Performed  | Pathologist  |
|             |       |             | At         | Signature    |
+-------------+-------+-------------+------------+--------------+
| CREATININE, | 24    | g/col. time | [facility]  |              |
|  URINE      |       |             | LAB -      |              |
|             |       |             | [place]  |              |
+-------------+-------+-------------+------------+--------------+
 
 
 
+---------------+
| Specimen      |
+---------------+
| Urine - Urine |
 
+---------------+
 
 
 
+--------------------+----------------------+--------------------+----------------+
| Performing         | Address              | City/State/Zipcode | Phone Number   |
| Organization       |                      |                    |                |
+--------------------+----------------------+--------------------+----------------+
|   [facility]  |   [address] |   [place], [place]    |   [phone] |
| [place]          |                      |                    |                |
+--------------------+----------------------+--------------------+----------------+
|   [facility]  |                      |   [place], [place]    |                |
| [place]          |                      |                    |                |
+--------------------+----------------------+--------------------+----------------+
 MICROALBUMIN/CREATININE RATIO (RANDOM URINE) (10/20/2009  8:15 AM PDT)
 
+-------------+-------+----------------+------------+--------------+
| Component   | Value | Ref Range      | Performed  | Pathologist  |
|             |       |                | At         | Signature    |
+-------------+-------+----------------+------------+--------------+
| MICROALBUMI | 0.2   | 0.0 - 2.0 mg/L | [facility]  |              |
| N,          |       |                | LAB -      |              |
| URINE-RANDO |       |                | [place]  |              |
| M           |       |                |            |              |
+-------------+-------+----------------+------------+--------------+
 
 
 
+---------------+
| Specimen      |
+---------------+
| Urine - Urine |
+---------------+
 
 
 
+--------------------+----------------------+--------------------+----------------+
| Performing         | Address              | City/State/Zipcode | Phone Number   |
| Organization       |                      |                    |                |
+--------------------+----------------------+--------------------+----------------+
|   [facility]  |   [address] |   [place], [place]    |   [phone] |
| [place]          |                      |                    |                |
+--------------------+----------------------+--------------------+----------------+
|   [facility]  |                      |   [place], [place]    |                |
| [place]          |                      |                    |                |
+--------------------+----------------------+--------------------+----------------+
 documented in this encounter
 
 Visit Diagnoses
 Not on filedocumented in this encounter

## 2020-10-13 NOTE — XMS
Encounter Summary
  Created on: 10/13/2020
 
 Bonifacio Nila JB
 External Reference #: [id number]
 : [birth year]
 Sex: Female
 
 Demographics
 
 
+-----------------------+------------------------+
| Address               | [address]            |
|                       | [place]  [address]   |
+-----------------------+------------------------+
| Home Phone            | [phone]        |
+-----------------------+------------------------+
| Preferred Language    | Unknown                |
+-----------------------+------------------------+
| Marital Status        | Single                 |
+-----------------------+------------------------+
| [employer] Affiliation | Unknown                |
+-----------------------+------------------------+
| Race                  | White                  |
+-----------------------+------------------------+
| Ethnic Group          | Not  or  |
+-----------------------+------------------------+
 
 
 Author
 
 
+--------------+-------------+
| Organization | Unknown     |
+--------------+-------------+
| Address      | Unknown     |
+--------------+-------------+
| Phone        | Unavailable |
+--------------+-------------+
 
 
 
 Support
 
 
+-----------------+--------------+---------+-----------------+
| Name            | Relationship | Address | Phone           |
+-----------------+--------------+---------+-----------------+
| Kit Valdovinos   | ECON         | Unknown | [phone] |
+-----------------+--------------+---------+-----------------+
| Magdalena Valdovinos | ECON         | Unknown | [phone] |
+-----------------+--------------+---------+-----------------+
 
 
 
 Care Team Providers
 
 
 
+-----------------------+------+-------------+
| Care Team Member Name | Role | Phone       |
+-----------------------+------+-------------+
 PCP  | Unavailable |
+-----------------------+------+-------------+
 
 
 
 Encounter Details
 
 
+--------+-------------+------------+--------------------+-------------+
| Date   | Type        | Department | Care Team          | Description |
+--------+-------------+------------+--------------------+-------------+
| 10/18/ | Transcribed |            |   Dictation, Other | Transcribed |
| 2001   |             |            |                    |             |
+--------+-------------+------------+--------------------+-------------+
 
 
 
 Social History
 
 
+----------------+-------+-----------+--------+------+
| Tobacco Use    | Types | Packs/Day | Years  | Date |
|                |       |           | Used   |      |
+----------------+-------+-----------+--------+------+
| Never Assessed |       |           |        |      |
+----------------+-------+-----------+--------+------+
 
 
 
+------------------+---------------+
| Sex Assigned at  | Date Recorded |
| Birth            |               |
+------------------+---------------+
| Not on file      |               |
+------------------+---------------+
 documented as of this encounter
 
 Progress Notes
 Interface, Transcription In - 10/02/2006  3:01 AM [address] [address] [phone] or [phone]
 
 2001
 
 TON BELLO M.D.
 [address] [place]  [address]
 
 RE:   NILA VALDOVINOS
 MR #: [id number]
 DATE OF TRANSPLANT:     2000
 
 Dear Dr. Bello:
 
 
 Nila returned to the Pediatric Gastroenterology  Clinic at [facility] today with
 her parents.  It was attended by Dr. Taiwo Hyatt  and Shaylee Louise R.N. of the Pediatric Liver Transplant  Team  visiting from [place].  This
 is one of her summer annual visits.  She continues to do extremely well and
 appears to have really nothing to complain of today.
 
 Her  only medication is Prograf 3 mL  b.i.d.   Acyclovir  was  discontinued
 earlier this year.  SHE IS ALLERGIC TO THE SULFA DRUGS.
 
 On examination, she weighs 14.8 kg and  measures 91 cm.  Her blood pressure
 is 103/48 and her pulse is 117.  She is anicteric and quite well nourished.
 Her behavior is normal in the office.   There  is  no  cervical adenopathy.
 She has unlabored respirations.  Her abdomen is soft and flat with no mass,
 tenderness, or organomegaly.  She has well-healed surgical scars.
 
 A  recent  laboratory  test  on  October   15,   2001  showed  normal  CBC,
 comprehensive metabolic panel, GGT, and  magnesium.  Her tacrolimus 12-hour
 trough level was 7.6.  Prior to this it ran about 4.
 
 In summary, Nila is a 44-xrvbw-yeg white  female  who  is 14 months status
 post liver transplantation for extrahepatic  biliary  atresia  at [place].
 She  continues to do extremely well with  an  uncomplicated  posttransplant
 course.  Her Prograf level was able to  be  lowered  over the last 6 months
 without difficulty.  It is unclear why  her  recent  level  of 7.6 occurred
 without a good explanation.  We would  merely repeat the Prograf level next
 month when she has her summer annual  EBV,  serology,  and  PCR level done.
 She  is  having  these  done every 6 months,  in  May  and  2001.
 Otherwise, since she is doing so well  Dr. Hyatt  believes she can have
 her labs drawn every other month instead of every month.
 We would like to have her Varicella titers  rechecked  soon.   In addition,
 she needs a flu shot once a year henceforth.
 
 We will plan to see her again in this clinic in 6 months.
 
 Sincerely,
 
 Eagle De Luna M.D.
 Pediatric Gastroenterology
 
 [facility] / 
 [id number] / [id number] / [id number] /
 D: 10/18/2001
 T: 10/19/2001
 C: 2001 ds
 
 cc:
 
 LETTY AMAYA M.D.
 [address]
 [place]  [address]
 
 ADRIANA FLANNERY M.D.
 [address]
 [place]  [address]
 
 TAIWO HYATT M.D.
 [address]
 [place]  [address]
 
 [id number] signed by Interface, Transcription In at 10/02/2006  3:01 AM PDTdoc
 
umented in this encounter
 
 Plan of Treatment
 Not on filedocumented as of this encounter
 
 Visit Diagnoses
 Not on filedocumented in this encounter

## 2020-10-13 NOTE — XMS
Encounter Summary
  Created on: 10/13/2020
 
 Bonifacio Nila JB
 External Reference #: [id number]
 : [birth year]
 Sex: Female
 
 Demographics
 
 
+-----------------------+------------------------+
| Address               | [address]            |
|                       | [place]  [address]   |
+-----------------------+------------------------+
| Home Phone            | [phone]        |
+-----------------------+------------------------+
| Preferred Language    | Unknown                |
+-----------------------+------------------------+
| Marital Status        | Single                 |
+-----------------------+------------------------+
| Faith Affiliation | Unknown                |
+-----------------------+------------------------+
| Race                  | White                  |
+-----------------------+------------------------+
| Ethnic Group          | Not  or  |
+-----------------------+------------------------+
 
 
 Author
 
 
+--------------+------------------------------+
| Author       | [facility] |
+--------------+------------------------------+
| Organization | [facility] |
+--------------+------------------------------+
| Address      | Unknown                      |
+--------------+------------------------------+
| Phone        | Unavailable                  |
+--------------+------------------------------+
 
 
 
 Support
 
 
+-----------------+--------------+---------+-----------------+
| Name            | Relationship | Address | Phone           |
+-----------------+--------------+---------+-----------------+
| Kit Valdovinos   | ECON         | Unknown | [phone] |
+-----------------+--------------+---------+-----------------+
| Magdalena Valdovinos | ECON         | Unknown | [phone] |
+-----------------+--------------+---------+-----------------+
 
 
 
 Care Team Providers
 
 
 
+------------------------+------+-----------------+
| Care Team Member Name  | Role | Phone           |
+------------------------+------+-----------------+
| Lily Horton MD | PCP  | [phone] |
+------------------------+------+-----------------+
 
 
 
 Reason for Visit
 
 
+-------------------+--------+-----------------------------------+
| Reason            | Onset  | Comments                          |
|                   | Date   |                                   |
+-------------------+--------+-----------------------------------+
| Care Coordination | 10/16/ | transplant labs overdue, liver US |
|                   |    |                                   |
+-------------------+--------+-----------------------------------+
 
 
 
 Encounter Details
 
 
+--------+-----------+----------------------+----------------------+--------------------+
| Date   | Type      | Department           | Care Team            | Description        |
+--------+-----------+----------------------+----------------------+--------------------+
| 10/16/ | Telephone |   Pediatric          |   Neema Khalil,   | Care Coordination  |
| 2015   |           | Gastroenterology at  | MD  [address] | (transplant labs   |
|        |           | [place]          |   [place], [place]       | overdue, liver US) |
|        |           | [facility]  | [address]           |                    |
|        |           |  [address]    | [phone]         |                    |
|        |           | [place]          | [phone] (Fax)   |                    |
|        |           | [facility], |                      |                    |
|        |           |  [facility] floor           |                      |                    |
|        |           | [place], [place]         |                      |                    |
|        |           | [address]           |                      |                    |
|        |           | [phone]         |                      |                    |
+--------+-----------+----------------------+----------------------+--------------------+
 
 
 
 Social History
 
 
+-------------------+-------+-----------+--------+------+
| Tobacco Use       | Types | Packs/Day | Years  | Date |
|                   |       |           | Used   |      |
+-------------------+-------+-----------+--------+------+
| Passive Smoke     |       |           |        |      |
| Exposure - Never  |       |           |        |      |
| Smoker            |       |           |        |      |
+-------------------+-------+-----------+--------+------+
 
 
 
+---------------------+---+---+---+
| Smokeless Tobacco:  |   |   |   |
 
| Never Used          |   |   |   |
+---------------------+---+---+---+
 
 
 
+-------------+-------------+---------+----------+
| Alcohol Use | Drinks/Week | oz/Week | Comments |
+-------------+-------------+---------+----------+
| Not Asked   |             |         |          |
+-------------+-------------+---------+----------+
 
 
 
+------------------+---------------+
| Sex Assigned at  | Date Recorded |
| Birth            |               |
+------------------+---------------+
| Not on file      |               |
+------------------+---------------+
 documented as of this encounter
 
 Miscellaneous Notes
 Telephone Encounter - Marainne Barba RN - 10/28/2015 11:15 AM PDTPt had labs drawn 10/2
3/15.  Results letter mailed to pt.  Also included to call back to let us know if pt has had
 liver ultrasound.Electronically signed by Marianne Barba RN at 10/28/2015 11:15 AM PDTTe
lephone Encounter - Marianne Barba RN - 10/16/2015  8:54 AM PDTLeft a detailed message o
n mom's voicemail to find out if labs have been drawn (due mid-September) and if liver ultra
sound has been done.  Requested a call back.Electronically signed by Marianne Barba RN at
 10/16/2015  8:55 AM PDTdocumented in this encounter
 
 Plan of Treatment
 Not on filedocumented as of this encounter
 
 Visit Diagnoses
 Not on filedocumented in this encounter

## 2020-10-13 NOTE — XMS
Encounter Summary
  Created on: 10/13/2020
 
 Bonifacio Nila JB
 External Reference #: [id number]
 : [birth year]
 Sex: Female
 
 Demographics
 
 
+-----------------------+------------------------+
| Address               | [address]            |
|                       | [place]  [address]   |
+-----------------------+------------------------+
| Home Phone            | [phone]        |
+-----------------------+------------------------+
| Preferred Language    | Unknown                |
+-----------------------+------------------------+
| Marital Status        | Single                 |
+-----------------------+------------------------+
| Latter-day Affiliation | Unknown                |
+-----------------------+------------------------+
| Race                  | White                  |
+-----------------------+------------------------+
| Ethnic Group          | Not  or  |
+-----------------------+------------------------+
 
 
 Author
 
 
+--------------+------------------------------+
| Author       | [facility] |
+--------------+------------------------------+
| Organization | [facility] |
+--------------+------------------------------+
| Address      | Unknown                      |
+--------------+------------------------------+
| Phone        | Unavailable                  |
+--------------+------------------------------+
 
 
 
 Support
 
 
+-----------------+--------------+---------+-----------------+
| Name            | Relationship | Address | Phone           |
+-----------------+--------------+---------+-----------------+
| Kit Valdovinos   | ECON         | Unknown | [phone] |
+-----------------+--------------+---------+-----------------+
| Magdalena Valdovinos | ECON         | Unknown | [phone] |
+-----------------+--------------+---------+-----------------+
 
 
 
 Care Team Providers
 
 
 
+------------------------+------+-----------------+
| Care Team Member Name  | Role | Phone           |
+------------------------+------+-----------------+
| Lily Horton MD | PCP  | [phone] |
+------------------------+------+-----------------+
 
 
 
 Encounter Details
 
 
+--------+----------+----------------------+----------------------+-------------+
| Date   | Type     | Department           | Care Team            | Description |
+--------+----------+----------------------+----------------------+-------------+
| 10/27/ | Abstract |   Pediatric          |   Neema Khalil,   |             |
|    |          | Gastroenterology at  | MD  [address] |             |
|        |          | [place]          |   [place], [place]       |             |
|        |          | [facility]  | [address]           |             |
|        |          |  [address]     | [phone]         |             |
|        |          | [place]          | [phone] (Fax)   |             |
|        |          | [facility], |                      |             |
|        |          |  [address] [place] [address]           |                      |             |
|        |          | [place], [place]         |                      |             |
|        |          | [address]           |                      |             |
|        |          | [phone]         |                      |             |
+--------+----------+----------------------+----------------------+-------------+
 
 
 
 Social History
 
 
+-------------------+-------+-----------+--------+------+
| Tobacco Use       | Types | Packs/Day | Years  | Date |
|                   |       |           | Used   |      |
+-------------------+-------+-----------+--------+------+
| Passive Smoke     |       |           |        |      |
| Exposure - Never  |       |           |        |      |
| Smoker            |       |           |        |      |
+-------------------+-------+-----------+--------+------+
 
 
 
+---------------------+---+---+---+
| Smokeless Tobacco:  |   |   |   |
| Never Used          |   |   |   |
+---------------------+---+---+---+
 
 
 
+-------------+-------------+---------+----------+
| Alcohol Use | Drinks/Week | oz/Week | Comments |
+-------------+-------------+---------+----------+
| Not Asked   |             |         |          |
+-------------+-------------+---------+----------+
 
 
 
 
+------------------+---------------+
| Sex Assigned at  | Date Recorded |
| Birth            |               |
+------------------+---------------+
| Not on file      |               |
+------------------+---------------+
 documented as of this encounter
 
 Plan of Treatment
 Not on filedocumented as of this encounter
 
 Procedures
 
 
+----------------------+--------+-------------+----------------------+----------------------
+
| Procedure Name       | Priori | Date/Time   | Associated Diagnosis | Comments             
|
|                      | ty     |             |                      |                      
|
+----------------------+--------+-------------+----------------------+----------------------
+
| CMV PCR              | Routin | 10/23/2015  |                      |   Results for this   
|
| QUANTITATION, PLASMA | e      |  9:25 AM    |                      | procedure are in the 
|
|                      |        | PDT         |                      |  results section.    
|
+----------------------+--------+-------------+----------------------+----------------------
+
| FRANCIS CAGLE         | Routin | 10/23/2015  |                      |   Results for this   
|
| PCR,QUANT (PLASMA OR | e      |  9:25 AM    |                      | procedure are in the 
|
|  CSF)                |        | PDT         |                      |  results section.    
|
+----------------------+--------+-------------+----------------------+----------------------
+
 documented in this encounter
 
 Results
 CMV PCR QUANTITATION, PLASMA (10/23/2015  9:25 AM PDT)
 
+-------------+--------------+-----------+------------+--------------+
| Component   | Value        | Ref Range | Performed  | Pathologist  |
|             |              |           | At         | Signature    |
+-------------+--------------+-----------+------------+--------------+
| CMV DNA     | <390         |           | [facility]  |              |
| QUANT       |              |           | LAB -      |              |
| COPY/ML     |              |           | [place]  |              |
+-------------+--------------+-----------+------------+--------------+
| CMV, QUANT, | <2.6         |           | [facility]  |              |
|  LOG CPY/ML |              |           | LAB -      |              |
|             |              |           | [place]  |              |
+-------------+--------------+-----------+------------+--------------+
| CMV DNA     | <227         |           | [facility]  |              |
| QUANT IU/ML |              |           | LAB -      |              |
|             |              |           | [place]  |              |
+-------------+--------------+-----------+------------+--------------+
| CMV DNA     | <2.4         |           | [facility]  |              |
 
| QUANT LOG   |              |           | LAB -      |              |
| IU/ML       |              |           | [place]  |              |
+-------------+--------------+-----------+------------+--------------+
| CMV DNA     | Not detected |           | [facility]  |              |
| QUANT       |              |           | LAB -      |              |
| INTER[facility]      |              |           | [place]  |              |
+-------------+--------------+-----------+------------+--------------+
 
 
 
+---------------+
| Specimen      |
+---------------+
| Blood - Blood |
+---------------+
 
 
 
+--------------------+----------------------+--------------------+----------------+
| Performing         | Address              | City/State/Zipcode | Phone Number   |
| Organization       |                      |                    |                |
+--------------------+----------------------+--------------------+----------------+
|   [facility]  |   [address] |   [place], [place]    |   [phone] |
| [place]          |                      |                    |                |
+--------------------+----------------------+--------------------+----------------+
 FRANCIS BARR PCR,QUANT (PLASMA OR CSF) (10/23/2015  9:25 AM PDT)
 
+-------------+--------------+-----------+------------+--------------+
| Component   | Value        | Ref Range | Performed  | Pathologist  |
|             |              |           | At         | Signature    |
+-------------+--------------+-----------+------------+--------------+
| EBV QUANT   | <390         |           | [facility]  |              |
| COPY/ML     |              |           | LAB -      |              |
|             |              |           | [place]  |              |
+-------------+--------------+-----------+------------+--------------+
| EBV QUANT   | Log <2.6     |           | [facility]  |              |
| SPECIMEN    |              |           | LAB -      |              |
|             |              |           | [place]  |              |
+-------------+--------------+-----------+------------+--------------+
| EBV         | Not detected |           | [facility]  |              |
| INTERPRETAT |              |           | LAB -      |              |
| ION         |              |           | [place]  |              |
+-------------+--------------+-----------+------------+--------------+
 
 
 
+----------+
| Specimen |
+----------+
| Blood    |
+----------+
 
 
 
+--------------------+----------------------+--------------------+----------------+
| Performing         | Address              | City/State/Zipcode | Phone Number   |
| Organization       |                      |                    |                |
+--------------------+----------------------+--------------------+----------------+
|   [facility]  |   [address] |   [place]    |   [phone] |
| [place]          |                      |                    |                |
 
+--------------------+----------------------+--------------------+----------------+
 documented in this encounter
 
 Visit Diagnoses
 Not on filedocumented in this encounter

## 2020-10-13 NOTE — XMS
Encounter Summary
  Created on: 10/13/2020
 
 Bonifacio Nila JB
 External Reference #: [id number]
 : [birth year]
 Sex: Female
 
 Demographics
 
 
+-----------------------+------------------------+
| Address               | [address]            |
|                       | [place]  [address]   |
+-----------------------+------------------------+
| Home Phone            | [phone]        |
+-----------------------+------------------------+
| Preferred Language    | Unknown                |
+-----------------------+------------------------+
| Marital Status        | Single                 |
+-----------------------+------------------------+
| Jew Affiliation | Unknown                |
+-----------------------+------------------------+
| Race                  | White                  |
+-----------------------+------------------------+
| Ethnic Group          | Not  or  |
+-----------------------+------------------------+
 
 
 Author
 
 
+--------------+------------------------------+
| Author       | [facility] |
+--------------+------------------------------+
| Organization | [facility] |
+--------------+------------------------------+
| Address      | Unknown                      |
+--------------+------------------------------+
| Phone        | Unavailable                  |
+--------------+------------------------------+
 
 
 
 Support
 
 
+-----------------+--------------+---------+-----------------+
| Name            | Relationship | Address | Phone           |
+-----------------+--------------+---------+-----------------+
| Kit Valdovinos   | ECON         | Unknown | [phone] |
+-----------------+--------------+---------+-----------------+
| Magdalena Valdovinos | ECON         | Unknown | [phone] |
+-----------------+--------------+---------+-----------------+
 
 
 
 Care Team Providers
 
 
 
+------------------------+------+-----------------+
| Care Team Member Name  | Role | Phone           |
+------------------------+------+-----------------+
| Lily Horton MD | PCP  | [phone] |
+------------------------+------+-----------------+
 
 
 
 Encounter Details
 
 
+--------+-------------+----------------------+--------------------+-------------+
| Date   | Type        | Department           | Care Team          | Description |
+--------+-------------+----------------------+--------------------+-------------+
| 10/19/ | Document-Sc |   Health Information |   Other, Faculty   |             |
| 2017   | anned       |  Services  [id number]    | [phone]       |             |
|        |             | [address]  |                    |             |
|        |             |  Mailcode: [id number]     |                    |             |
|        |             | [facility]  |                    |             |
|        |             | [place], [place]  |                    |             |
|        |             | [address]           |                    |             |
|        |             | [phone]         |                    |             |
+--------+-------------+----------------------+--------------------+-------------+
 
 
 
 Social History
 
 
+-------------------+-------+-----------+--------+------+
| Tobacco Use       | Types | Packs/Day | Years  | Date |
|                   |       |           | Used   |      |
+-------------------+-------+-----------+--------+------+
| Passive Smoke     |       |           |        |      |
| Exposure - Never  |       |           |        |      |
| Smoker            |       |           |        |      |
+-------------------+-------+-----------+--------+------+
 
 
 
+---------------------+---+---+---+
| Smokeless Tobacco:  |   |   |   |
| Never Used          |   |   |   |
+---------------------+---+---+---+
 
 
 
+-------------+-------------+---------+----------+
| Alcohol Use | Drinks/Week | oz/Week | Comments |
+-------------+-------------+---------+----------+
| Not Asked   |             |         |          |
+-------------+-------------+---------+----------+
 
 
 
+------------------+---------------+
| Sex Assigned at  | Date Recorded |
| Birth            |               |
 
+------------------+---------------+
| Not on file      |               |
+------------------+---------------+
 documented as of this encounter
 
 Plan of Treatment
 Not on filedocumented as of this encounter
 
 Procedures
 
 
+----------------+--------+-------------+----------------------+----------------------+
| Procedure Name | Priori | Date/Time   | Associated Diagnosis | Comments             |
|                | ty     |             |                      |                      |
+----------------+--------+-------------+----------------------+----------------------+
| LAB REPORTS    |        | 10/19/2017  |                      |   Results for this   |
|                |        | 12:00 AM    |                      | procedure are in the |
|                |        | PDT         |                      |  results section.    |
+----------------+--------+-------------+----------------------+----------------------+
 documented in this encounter
 
 Results
 LAB REPORTS (10/19/2017 12:00 AM PDT)
 
+----------------------------------------------------------+--------------+
| Narrative                                                | Performed At |
+----------------------------------------------------------+--------------+
|   This result has an attachment that is not available.   |              |
+----------------------------------------------------------+--------------+
 documented in this encounter
 
 Visit Diagnoses
 Not on filedocumented in this encounter

## 2020-10-13 NOTE — XMS
Encounter Summary
  Created on: 10/13/2020
 
 Bonifacio Nila JB
 External Reference #: [id number]
 : [birth year]
 Sex: Female
 
 Demographics
 
 
+-----------------------+------------------------+
| Address               | [address]            |
|                       | [place]  [address]   |
+-----------------------+------------------------+
| Home Phone            | [phone]        |
+-----------------------+------------------------+
| Preferred Language    | Unknown                |
+-----------------------+------------------------+
| Marital Status        | Single                 |
+-----------------------+------------------------+
| Sabianism Affiliation | Unknown                |
+-----------------------+------------------------+
| Race                  | White                  |
+-----------------------+------------------------+
| Ethnic Group          | Not  or  |
+-----------------------+------------------------+
 
 
 Author
 
 
+--------------+------------------------------+
| Author       | [facility] |
+--------------+------------------------------+
| Organization | [facility] |
+--------------+------------------------------+
| Address      | Unknown                      |
+--------------+------------------------------+
| Phone        | Unavailable                  |
+--------------+------------------------------+
 
 
 
 Support
 
 
+-----------------+--------------+---------+-----------------+
| Name            | Relationship | Address | Phone           |
+-----------------+--------------+---------+-----------------+
| Kit Valdovinos   | ECON         | Unknown | [phone] |
+-----------------+--------------+---------+-----------------+
| Magdalena Valdovinos | ECON         | Unknown | [phone] |
+-----------------+--------------+---------+-----------------+
 
 
 
 Care Team Providers
 
 
 
+-----------------------+------+-------------+
| Care Team Member Name | Role | Phone       |
+-----------------------+------+-------------+
 PCP  | Unavailable |
+-----------------------+------+-------------+
 
 
 
 Encounter Details
 
 
+--------+-------------+----------------------+----------------------+-------------+
| Date   | Type        | Department           | Care Team            | Description |
+--------+-------------+----------------------+----------------------+-------------+
| / | Documentati |   Pediatric          |   Neema Khalil,   |             |
|    | on          | Gastroenterology at  | MD  [address] |             |
|        |             | [place]          |   [facility] [place]       |             |
|        |             | [facility]  | [address]           |             |
|        |             |  [address]    | [phone]         |             |
|        |             | [place]          | [phone] (Fax)   |             |
|        |             | [facility], |                      |             |
|        |             |  7th floor           |                      |             |
|        |             | [place], [place]         |                      |             |
|        |             | [address]           |                      |             |
|        |             | [phone]         |                      |             |
+--------+-------------+----------------------+----------------------+-------------+
 
 
 
 Social History
 
 
+----------------+-------+-----------+--------+------+
| Tobacco Use    | Types | Packs/Day | Years  | Date |
|                |       |           | Used   |      |
+----------------+-------+-----------+--------+------+
| Never Assessed |       |           |        |      |
+----------------+-------+-----------+--------+------+
 
 
 
+------------------+---------------+
| Sex Assigned at  | Date Recorded |
| Birth            |               |
+------------------+---------------+
| Not on file      |               |
+------------------+---------------+
 documented as of this encounter
 
 Plan of Treatment
 Not on filedocumented as of this encounter
 
 Visit Diagnoses
 Not on filedocumented in this encounter

## 2020-10-13 NOTE — XMS
Encounter Summary
  Created on: 10/13/2020
 
 Bonifacio Nila JB
 External Reference #: [id number]
 : [birth year]
 Sex: Female
 
 Demographics
 
 
+-----------------------+------------------------+
| Address               | [address]            |
|                       | [place]  [address]   |
+-----------------------+------------------------+
| Home Phone            | [phone]        |
+-----------------------+------------------------+
| Preferred Language    | Unknown                |
+-----------------------+------------------------+
| Marital Status        | Single                 |
+-----------------------+------------------------+
| Zoroastrianism Affiliation | Unknown                |
+-----------------------+------------------------+
| Race                  | White                  |
+-----------------------+------------------------+
| Ethnic Group          | Not  or  |
+-----------------------+------------------------+
 
 
 Author
 
 
+--------------+------------------------------+
| Author       | [facility] |
+--------------+------------------------------+
| Organization | [facility] |
+--------------+------------------------------+
| Address      | Unknown                      |
+--------------+------------------------------+
| Phone        | Unavailable                  |
+--------------+------------------------------+
 
 
 
 Support
 
 
+-----------------+--------------+---------+-----------------+
| Name            | Relationship | Address | Phone           |
+-----------------+--------------+---------+-----------------+
| Kit Valdovinos   | ECON         | Unknown | [phone] |
+-----------------+--------------+---------+-----------------+
| Magdalena Valdovinos | ECON         | Unknown | [phone] |
+-----------------+--------------+---------+-----------------+
 
 
 
 Care Team Providers
 
 
 
+------------------------+------+-----------------+
| Care Team Member Name  | Role | Phone           |
+------------------------+------+-----------------+
| Lily Horton MD | PCP  | [phone] |
+------------------------+------+-----------------+
 
 
 
 Reason for Visit
 
 
+-------------------+----------+
| Reason            | Comments |
+-------------------+----------+
| Follow-up in      |          |
| outpatient clinic |          |
+-------------------+----------+
 Office Visit - E/M Services (Routine)
 
+--------+--------+---------------+--------------+--------------+---------------+
| Status | Reason | Specialty     | Diagnoses /  | Referred By  | Referred To   |
|        |        |               | Procedures   | Contact      | Contact       |
+--------+--------+---------------+--------------+--------------+---------------+
| Closed |        | Pediatric     |              |   Sol,    |   Ped Gastro  |
|        |        | Gastroenterol |              | Lily Lakhani, | Dc  [address]   |
|        |        | [address]           |              |  MD  PEDS    | [place]      |
|        |        |               |              | [facility]  | [place]   |
|        |        |               |              | [facility] [place] | [facility]    |
|        |        |               |              |   [address]    | [address] |
|        |        |               |              | [address]  |  [facility]        |
|        |        |               |              |  [place],  | [place], [place]  |
|        |        |               |              | [place] [address]     | [address]    |
|        |        |               |              | Phone:       | Phone:        |
|        |        |               |              | [phone] | [phone]  |
|        |        |               |              |   Fax:       |  Fax:         |
|        |        |               |              | [phone] | [phone]  |
+--------+--------+---------------+--------------+--------------+---------------+
 
 
 
 
 Encounter Details
 
 
+--------+---------+----------------------+----------------------+----------------------+
| Date   | Type    | Department           | Care Team            | Description          |
+--------+---------+----------------------+----------------------+----------------------+
| / | Office  |   Pediatric          |   Neema Khalil,   | Polysplenia (Primary |
| 2015   | Visit   | Gastroenterology at  | MD  [address] |  Dx); Transplanted   |
|        |         | [place]          |   [place], [place]       | liver (HCC);         |
|        |         | [facility]  | [address]           | Interrupted inferior |
|        |         |  [address]    | [phone]         |  vena cava; Aortic   |
|        |         | [facility]          | [phone] (Fax)   | insufficiency;       |
|        |         | [facility], |                      | Aortic root          |
|        |         |  7th floor           |                      | dilatation (HCC)     |
|        |         | [place], [place]         |                      |                      |
|        |         | [address]           |                      |                      |
|        |         | [phone]         |                      |                      |
 
+--------+---------+----------------------+----------------------+----------------------+
 
 
 
 Social History
 
 
+-------------------+-------+-----------+--------+------+
| Tobacco Use       | Types | Packs/Day | Years  | Date |
|                   |       |           | Used   |      |
+-------------------+-------+-----------+--------+------+
| Passive Smoke     |       |           |        |      |
| Exposure - Never  |       |           |        |      |
| Smoker            |       |           |        |      |
+-------------------+-------+-----------+--------+------+
 
 
 
+---------------------+---+---+---+
| Smokeless Tobacco:  |   |   |   |
| Never Used          |   |   |   |
+---------------------+---+---+---+
 
 
 
+-------------+-------------+---------+----------+
| Alcohol Use | Drinks/Week | oz/Week | Comments |
+-------------+-------------+---------+----------+
| Not Asked   |             |         |          |
+-------------+-------------+---------+----------+
 
 
 
+------------------+---------------+
| Sex Assigned at  | Date Recorded |
| Birth            |               |
+------------------+---------------+
| Not on file      |               |
+------------------+---------------+
 documented as of this encounter
 
 Last Filed Vital Signs
 
 
+-------------------+----------------------+----------------------+----------+
| Vital Sign        | Reading              | Time Taken           | Comments |
+-------------------+----------------------+----------------------+----------+
| Blood Pressure    | 138/96               | 2015  9:13 AM  |          |
|                   |                      | PDT                  |          |
+-------------------+----------------------+----------------------+----------+
| Pulse             | 72                   | 2015  9:13 AM  |          |
|                   |                      | PDT                  |          |
+-------------------+----------------------+----------------------+----------+
| Temperature       | -                    | -                    |          |
+-------------------+----------------------+----------------------+----------+
| Respiratory Rate  | -                    | -                    |          |
+-------------------+----------------------+----------------------+----------+
| Oxygen Saturation | -                    | -                    |          |
+-------------------+----------------------+----------------------+----------+
| Inhaled Oxygen    | -                    | -                    |          |
 
| Concentration     |                      |                      |          |
+-------------------+----------------------+----------------------+----------+
| Weight            | 72.6 kg (160 lb 0.9  | 2015  9:13 AM  |          |
|                   | oz)                  | PDT                  |          |
+-------------------+----------------------+----------------------+----------+
| Height            | 164.8 cm (5' 4.88")  | 2015  9:13 AM  |          |
|                   |                      | PDT                  |          |
+-------------------+----------------------+----------------------+----------+
| Body Mass Index   | 26.73                | 2015  9:13 AM  |          |
|                   |                      | PDT                  |          |
+-------------------+----------------------+----------------------+----------+
 documented in this encounter
 
 Progress Notes
 Neema Khalil MD - 2015  2:07 AM PDTFormatting of this note might be different fro
m the original.
 Primary Care Provider: Lily Horton MD
 
 [facility] 
 DOS: 2015
 Visit type: Follow-up 
 
 Patient accompanied by: mother
  used: no
 
 CC: 
 - Post-liver transplant care 
 - High risk medication management
 
 Patient Active Problem List 
 Diagnosis 
   Transplanted Liver 
   VSD (ventricular septal defect), perimembranous 
   Polysplenia 
   Interrupted inferior vena cava 
   Aortic insufficiency 
   Aortic root dilatation 
 
  PAST Hx/kim: Biliary atresia
 -  s/p OLT at [facility]. She was last seen by Dr Burkett in . 
In , it was noticed that she has not been seen, attempts to reach to family was unsucces
sful. lost to follow up since that time. 
 - Cardiology: last cardiology note is from  from Dr Lily Horotn, cardiologist at [facility]. Her cardiac dx includes 1) moderate perimembraneous VSD nearly occluded by aneurysmal
 tissue leaving a tiny VSD, 2) trace central aortic insufficiency, 3) Left atrial isomerism 
(polysplenia). It was recommended her annual follow up for progression of aortic insufficien
cy in which case she may need closure of VSD. No further notes available in our system as julio c minor is lost to follow up since .
 - 10/16/2014 s/p VSD repair, 8-mm Amplatzer Vascular Plug IV with no significant residual s
hunting noted.
 
 INTERVAL HISTORY:
 - not taking prograf since 2014
 - no complaints,no abdominal pain, emesis, diarrhea or constipation
 
 REVIEW OF SYSTEMS: 
 General:  No fever, fatigue, or weight loss.  
 Eyes:  No changes in vision, no eye irritation or discharge.  
 ENT:  No nosebleeds, nasal congestion, difficulty swallowing, no mouth sores. 
 Respiratory:  No shortness of breath, cough, wheezing.
 
 Cardiovascular:  No difficulty breathing, edema, cyanosis.  
 Genitourinary:  No urinary infections.  
 Neurologic:  No seizures. No headaches.  
 Musculoskeletal:  No joint pain, swelling. No back pain.  Full range of motion.
 Skin:  No itching, rash, jaundice.  
 Psychological:  No abnormal anxiety, depression. Attending school regularly.
 Heme: No anemia, abnormal bleeding, easy bruising
 Lymphatic: No enlarged lymph nodes.
 Metabolic/Endo: no glucose intolerance, hypothyroidism
 Allergy/immunology: no seasonal or food allergies, no asthma
 
 All other ROS are negative.
 
  
 Past Medical History 
 Diagnosis Date 
   Biliary atresia  
 
 Past Surgical History 
 Procedure Laterality Date 
   Liver transplant  2000 
   at [facility] 
   Vsd repair, amplatzer device  10/16/2014 
   8-mm Amplatzer Vascular Plug IV, no significant residual shunting 
 
 FHx: reviewed, unchanged
 
 SHx: reviewed, unchanged
 
 Allergies 
 Allergen Reactions 
   Sulfa (Sulfonamide Antibiotics) Hives and Swelling-Facial 
   Varicella Vaccines  
   Possible reaction with sulfa meds 
 
 PHYSICAL EXAMINATION: 
 
 Patient reports a pain level of  0 today. No action required.
 
 Ht 1.648 m (5' 4.88") (62 %*, Z = 0.32), Wt 72.6 kg (160 lb 0.9 oz) (91 %*, Z = 1.36), BP 1
38/96, Pulse 72, BMI 26.73 kg/(m^2).
 
 APPEARANCE: alert, active and in no apparent distress. 
 SKIN: no jaundice or rashes. 
 HEENT: normocephalic, PERRLA, mucous membranes moist. 
 NECK: supple, without thyromegaly. 
 CHEST: clear to auscultation bilaterally. 
 CV: no murmurs. 
 ABDOMEN: soft, NTND, no hepatosplenomegaly. 
 BACK: without tenderness. 
 MUSCULOSKELETAL: no muscle wasting, no deformity.
 EXTREMITIES: No edema, clubbing, deformity.
 NEURO: grossly intact
 
  
 ASSESSMENT: 14 yo female with
 
 759.0   Polysplenia
 V42.7   Transplanted liver
 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
 
 424.1   Aortic insufficiency
 447.71  Aortic root dilatation
 
 Nila is 15 yrs out from her transplant, she continues not to take her prograf. She has not
 shown any signs of rejection. Will continue to monitor her labs and will check an US w dopp
ler to evaluate for liver fibrosis.
 
 PLAN/RECOMMENDATIONS:  
 - check liver US w doppler
 - Labs: CBC w diff, CMP, GGT: every 3 months
 - follow up in liver transplant clinic: 2016, will check transient elastography at [facility]
t time
 
 Health Care Maintenance: 
 - immunizations, she can receive liver vaccines as she is not on immunosuppressive meds.
 - Flu vaccine (intramuscular): every . 
 - Dental care every 6 months
 
 At this visit: 
 Dr. Jorge Aguirre and DHARMESH Whatley from [facility] were present
 as observers during this visit.
 Psychosocial or economic issues that may affect patient's medical care or well being:none 
 Co-morbid chronic medical problems that may affect future procedural sedation risk: NA
 
 Labs/imaging:
 
 Component
     Latest Ref Rng 2015 
 GLUCOSE, PLASMA 
     70 - 100 mg/dL 79 
 ANION GAP
     7 - 21 16.8 
 CREATININE PLASMA 
     0.60 - 1.20 mg/dL 0.66 
 TOTAL PROTEIN
     6.1 - 8.5 g/dL 7.1 
 ALBUMIN, PLASMA
     3.5 - 5.0 g/dL 4.0 
 CALCIUM, PLASMA
     8.4 - 10.2 mg/dL 9.4 
 BILIRUBIN TOTAL
     0.0 - 1.2  0.2 
 ALK PHOS
     30 - 128 U/L 88 
 AST(SGOT)
     13 - 39 U/L 20 
 SODIUM, PLASMA 
     132 - 143 mmol/L 137 
 POTASSIUM
     3.6 - 5.1 mmol/L 3.8 
 CHLORIDE, PLASMA 
     95 - 112 mmol/L 102 
 TOTAL CO2
     19 - 31 mmol/L 22 
 ALT (SGPT)
     7 - 52 U/L 10 
 BUN/CREATININE RATIO   6.0 - 28.6 21.2 
 UREA NITROGEN, SERUM   6 - 23 mg/dL 14 
 WHITE CELL COUNT
     4.5 - 11.0 K/cu mm 8.3 
 
 RED CELL COUNT
     3.8 - 5.1 M/cu mm 4.55 
 HEMOGLOBIN
     12.0 - 16.0 g/dL 13.5 
 HEMATOCRIT
     35 - 45 % 40.2 
 MCV    81 - 99 fL 88.3 
 PLATELET COUNT
     140 - 440 K/cu mm 313 
 CHOLESTEROL  
     0 - 200 mg/dL 123 
 TRIGLYCERIDES
     30 - 150 mg/dL 106 
 LDL CHOLEST
     100 - 215 mg/dL 140 
 GAMMA GLUTAMYL TRANSFERASE
     5 - 60 u/l 7 
 MAGNESIUM,PLASMA
     1.7 - 2.5 mg/dL 1.8 
 
 Neema Khalil MD
 Pediatric Gastroenterology
 Consult line: [phone] 
 
 Electronically signed by Neema Khalil MD at 2015  2:23 AM PDTdocumented in this en
counter
 
 Plan of Treatment
 Not on filedocumented as of this encounter
 
 Visit Diagnoses
 
 
+-----------------------------------------------------------------------------+
| Diagnosis                                                                   |
+-----------------------------------------------------------------------------+
|   Polysplenia - Primary  Congenital anomalies of spleen                     |
+-----------------------------------------------------------------------------+
|   Transplanted liver (HCC)  Liver replaced by transplant                    |
+-----------------------------------------------------------------------------+
|   Interrupted inferior vena cava  Other congenital anomalies of great veins |
+-----------------------------------------------------------------------------+
|   Aortic insufficiency  Aortic valve disorders                              |
+-----------------------------------------------------------------------------+
|   Aortic root dilatation (HCC)  Thoracic aortic ectasia                     |
+-----------------------------------------------------------------------------+
 documented in this encounter

## 2020-10-13 NOTE — XMS
Encounter Summary
  Created on: 10/13/2020
 
 Bonifacio Nila JB
 External Reference #: [id number]
 : [birth year]
 Sex: Female
 
 Demographics
 
 
+-----------------------+------------------------+
| Address               | [address]            |
|                       | [place]  [address]   |
+-----------------------+------------------------+
| Home Phone            | [phone]        |
+-----------------------+------------------------+
| Preferred Language    | Unknown                |
+-----------------------+------------------------+
| Marital Status        | Single                 |
+-----------------------+------------------------+
| Scientologist Affiliation | Unknown                |
+-----------------------+------------------------+
| Race                  | White                  |
+-----------------------+------------------------+
| Ethnic Group          | Not  or  |
+-----------------------+------------------------+
 
 
 Author
 
 
+--------------+------------------------------+
| Author       | [facility] |
+--------------+------------------------------+
| Organization | [facility] |
+--------------+------------------------------+
| Address      | Unknown                      |
+--------------+------------------------------+
| Phone        | Unavailable                  |
+--------------+------------------------------+
 
 
 
 Support
 
 
+-----------------+--------------+---------+-----------------+
| Name            | Relationship | Address | Phone           |
+-----------------+--------------+---------+-----------------+
| Kit Valdovinos   | ECON         | Unknown | [phone] |
+-----------------+--------------+---------+-----------------+
| Magdalena Valdovinos | ECON         | Unknown | [phone] |
+-----------------+--------------+---------+-----------------+
 
 
 
 Care Team Providers
 
 
 
+-----------------------+------+-----------------+
| Care Team Member Name | Role | Phone           |
+-----------------------+------+-----------------+
| Lily Horton MD   | PCP  | [phone] |
+-----------------------+------+-----------------+
 
 
 
 Reason for Visit
 
 
+-----------------+----------+
| Reason          | Comments |
+-----------------+----------+
| Follow-up visit |          |
+-----------------+----------+
 Consultation (Routine)
 
+--------+--------------+---------------+--------------+--------------+---------------+
| Status | Reason       | Specialty     | Diagnoses /  | Referred By  | Referred To   |
|        |              |               | Procedures   | Contact      | Contact       |
+--------+--------------+---------------+--------------+--------------+---------------+
| Closed |   Specialty  | Pediatric     |   Diagnoses  |   [facility],    |   Ped Gastro  |
|        | Services     | Gastroenterol |  Liver       | Lily CARY MD | [address]   |
|        | Required     | ogy           | replaced by  |   PEDS       | [address]     |
|        |              |               | transplant   | SPECIALISTS  | [place]   |
|        |              |               | (HCC)        | [facility] [place] | [facility]    |
|        |              |               |              |   [address]   | [facility] |
|        |              |               |              | [address] |  floor        |
|        |              |               |              |  [address]         | [place], [place]  |
|        |              |               |              | [place],   | [address]    |
|        |              |               |              | [place] [address]     | Phone:        |
|        |              |               |              | Phone:       | [phone]  |
|        |              |               |              | [phone] |  Fax:         |
|        |              |               |              |   Fax:       | [phone]  |
|        |              |               |              | [phone] |               |
+--------+--------------+---------------+--------------+--------------+---------------+
 
 
 
 
 Encounter Details
 
 
+--------+---------+----------------------+--------------------+---------------------+
| Date   | Type    | Department           | Care Team          | Description         |
+--------+---------+----------------------+--------------------+---------------------+
| / | Office  |   Specialty Clinics  |   Ivis Burkett MD | Transplanted Liver  |
|    | Visit   | at [facility]  [address]       |                    | (HCC) (Primary Dx)  |
|        |         | [place]             |                    |                     |
|        |         | [place]          |                    |                     |
|        |         | [facility], |                    |                     |
|        |         |  [address] [place] [address]           |                    |                     |
|        |         | [place], [place]         |                    |                     |
|        |         | [address]           |                    |                     |
|        |         | [phone]         |                    |                     |
+--------+---------+----------------------+--------------------+---------------------+
 
 
 
 
 Social History
 
 
+----------------+-------+-----------+--------+------+
| Tobacco Use    | Types | Packs/Day | Years  | Date |
|                |       |           | Used   |      |
+----------------+-------+-----------+--------+------+
| Never Assessed |       |           |        |      |
+----------------+-------+-----------+--------+------+
 
 
 
+------------------+---------------+
| Sex Assigned at  | Date Recorded |
| Birth            |               |
+------------------+---------------+
| Not on file      |               |
+------------------+---------------+
 documented as of this encounter
 
 Last Filed Vital Signs
 
 
+-------------------+----------------------+----------------------+----------+
| Vital Sign        | Reading              | Time Taken           | Comments |
+-------------------+----------------------+----------------------+----------+
| Blood Pressure    | 123/82               | 2009  9:47 AM  |          |
|                   |                      | PST                  |          |
+-------------------+----------------------+----------------------+----------+
| Pulse             | 84                   | 2009  9:47 AM  |          |
|                   |                      | PST                  |          |
+-------------------+----------------------+----------------------+----------+
| Temperature       | -                    | -                    |          |
+-------------------+----------------------+----------------------+----------+
| Respiratory Rate  | -                    | -                    |          |
+-------------------+----------------------+----------------------+----------+
| Oxygen Saturation | -                    | -                    |          |
+-------------------+----------------------+----------------------+----------+
| Inhaled Oxygen    | -                    | -                    |          |
| Concentration     |                      |                      |          |
+-------------------+----------------------+----------------------+----------+
| Weight            | 45 kg (99 lb 3.3 oz) | 2009  9:47 AM  |          |
|                   |                      | PST                  |          |
+-------------------+----------------------+----------------------+----------+
| Height            | 148.3 cm (4' 10.39") | 2009  9:47 AM  |          |
|                   |                      | PST                  |          |
+-------------------+----------------------+----------------------+----------+
| Body Mass Index   | 20.46                | 2009  9:47 AM  |          |
|                   |                      | PST                  |          |
+-------------------+----------------------+----------------------+----------+
 documented in this encounter
 
 Patient Instructions
 Patient Instructions Ivis Burkett MD - 2009 11:01 AM PST1. Continue the Prograf 1 mg
 twice daily
 2. Have her labs done every 3 months
 3. She should continue to have an antibiotic before dental work
 Electronically signed by Ivis Burkett MD at 2009 11:01 AM PST
 
 documented in this encounter
 
 Progress Notes
 Ivis Burkett MD - 2009 11:04 AM PSTFormatting of this note might be different from t
he original.
 
 Nila Valdovinos is an 9 y.o. female, who is referred by Lily Horton, who returns to [facility] for transplant followup. Dr Aguirre from [place] was an observ
er in clinic today.
 
 Patient Active Problem List: 
   VSD (Ventricular Septal Defect)[745.4Y]
     Comment: With polysplenia 
   Aortic Valve Insufficiency[424.1F]
   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
     Comment: For biliary atresia, 2000 at [place]
 
 Current outpatient prescriptions prior to encounter 
 Medication Sig Dispense Refill 
   PROGRAF 1 mg Oral Capsule Take 1 Cap by mouth two times daily.   60  3 
 
 Allergies 
 Allergen Reactions 
   Sulfa (Sulfonamide Antibiotics) Hives and Swelling-Facial 
   Varicella  
   Possible reaction with sulfa meds 
 
 HPI Nila was seen in clinic with her mother. They report that she has not had any medical 
problems this year. She has not had any labs done between May and this month: they thought s
he only needed labs every 6 months. 
 She is doing well in 4th grade and plans to do track again this year. :
 
 Review of Systems:  
 General:  No  fevers, fatigue, weight loss   
 Ears, Nose and Throat: negative
 Respiratory:  No cough,
 Cardiovascular:  Will see her cardiologist again this spring
 Gastrointestinal: no abdominal pain
 
 Physical Examination:
 
 /82, Pulse 84, Ht 148.3 cm (4' 10.39") (92 %ile), Wt 45 kg (99 lbs 3.3 oz) (91 %ile),
 Weight for age(%)  91.25%, BMI for age(%)  87.31%, Length for age(%)  92.16%. 
 87.31% of growth percentile based on BMI-for-age.
 
 Appearance: alert, active and in no apparent distress.
 Skin:  Multiple warts R hand,  no rashes, petechiae 
 HEENT: normocephalic,PERRLA , no icterus,,nose without discharge, mouth no aphthous lesions
, mucous membranes moist, pharynx unremarkable
 Neck: supple, without thyromegaly
 Chest: clear to auscultation bilaterally.
 CV: regular sinus rhythm, 4/6 systolic murmur heard best lower left sternal border 
 Abdomen: ,  well healed incisions, soft, no distention, no tenderness to palpation, no rebo
und , no guarding, no palpable masses, normal bowel sounds, no hepatosplenomegaly
 Musculoskeletal: grossly intact  without clubbing or edema
 Nodes: no signficant cervical, supraclavicular adenopathy
 
 Lab Results 
 Basename Value Date/Time 
   WBC 8.3 08  3:25 PM 
 
   HB 14.2 08  3:25 PM 
   HCT 40.8 08  3:25 PM 
    08  3:25 PM 
   MCV 84.6 08  3:25 PM 
   RDW 12.6 08  3:25 PM 
 
 Lab Results 
 Basename Value Date/Time 
   TBILI 0.8 08  3:25 PM 
    08  3:25 PM 
   TP 7.1 08  3:25 PM 
   DIRBILI 0.1 08  3:25 PM 
   ALB 4.2 08  3:25 PM 
   AST 28 08  3:25 PM 
   ALT 24 08  3:25 PM 
 
 Lab Results 
 Basename Value Date/Time 
    3.4 10/9/07  8:00 AM 
 
 Last prograf level from this week is pending
 
  last EBV PCR       ?
 
  
 Assessment: Patient Active Problem List: 
   VSD (Ventricular Septal Defect)[745.4Y]
     Comment: With polysplenia 
   Aortic Valve Insufficiency[424.1F]
   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
     Comment: For biliary atresia, 2000 at [place]
 
 Plan:  
 1. CBC, primary transplant panel, drug levels-      every  3  months
 2. EBV PCR and serology                                      every   12  months
 3. CMV PCR and serology                                      every  12   months
 
 4. Other labs:   
 5. Imaging:       none
 6. Flu shot from PCP yearly
 7. change in medications:
          Prograf  No change- 1mg twice daily
          
        
 8. Continue to give antibiotic prior to dental procedures because of her polysplenia, even 
if not needed for her cardiac lesion
 
 8. Return to clinic    12  months Electronically signed by Ivis Burkett MD at 2009 11
:14 AM PSTdocumented in this encounter
 
 Miscellaneous Notes
 Scan - Other, Faculty - 2009  9:56 PM PDT  Electronically signed by Genet Quinteros at 2009  9:56 PM PDTScan - Other, Faculty - 2009 12:00 AM PSTAssociated Or
ronnie(s): LAB REPORTS  Electronically signed by Faculty Other at 2009 12:00 AM PSTdocume
nted in this encounter
 
 Plan of Treatment
 Not on filedocumented as of this encounter
 
 Procedures
 
 
 
+----------------+--------+-------------+----------------------+----------------------+
| Procedure Name | Priori | Date/Time   | Associated Diagnosis | Comments             |
|                | ty     |             |                      |                      |
+----------------+--------+-------------+----------------------+----------------------+
| LAB REPORTS    |        | 2009  |                      |   Results for this   |
|                |        | 12:00 AM    |                      | procedure are in the |
|                |        | PST         |                      |  results section.    |
+----------------+--------+-------------+----------------------+----------------------+
 documented in this encounter
 
 Results
 LAB REPORTS (2009 12:00 AM PST)
 
+----------------------------------------------------------+--------------+
| Narrative                                                | Performed At |
+----------------------------------------------------------+--------------+
|   This result has an attachment that is not available.   |              |
+----------------------------------------------------------+--------------+
 
 
 
+------------------------------------------------+
| Procedure Note                                 |
+------------------------------------------------+
|   Dash, Faculty - 2009 12:00 AM PST     |
+------------------------------------------------+
 documented in this encounter
 
 Visit Diagnoses
 
 
+--------------------------------------------------------------------+
| Diagnosis                                                          |
+--------------------------------------------------------------------+
|   Transplanted liver (HCC) - Primary  Liver replaced by transplant |
+--------------------------------------------------------------------+
 documented in this encounter

## 2020-10-13 NOTE — XMS
Encounter Summary
  Created on: 10/13/2020
 
 Bonifacio Nila JB
 External Reference #: [id number]
 : [birth year]
 Sex: Female
 
 Demographics
 
 
+-----------------------+------------------------+
| Address               | [address]            |
|                       | [place]  [address]   |
+-----------------------+------------------------+
| Home Phone            | [phone]        |
+-----------------------+------------------------+
| Preferred Language    | Unknown                |
+-----------------------+------------------------+
| Marital Status        | Single                 |
+-----------------------+------------------------+
| Gnosticist Affiliation | Unknown                |
+-----------------------+------------------------+
| Race                  | White                  |
+-----------------------+------------------------+
| Ethnic Group          | Not  or  |
+-----------------------+------------------------+
 
 
 Author
 
 
+--------------+------------------------------+
| Author       | [facility] [facility] |
+--------------+------------------------------+
| Organization | [facility] |
+--------------+------------------------------+
| Address      | Unknown                      |
+--------------+------------------------------+
| Phone        | Unavailable                  |
+--------------+------------------------------+
 
 
 
 Support
 
 
+-----------------+--------------+---------+-----------------+
| Name            | Relationship | Address | Phone           |
+-----------------+--------------+---------+-----------------+
| Kit Valdovinos   | ECON         | Unknown | [phone] |
+-----------------+--------------+---------+-----------------+
| Magdalena Valdovinos | ECON         | Unknown | [phone] |
+-----------------+--------------+---------+-----------------+
 
 
 
 Care Team Providers
 
 
 
+------------------------+------+-----------------+
| Care Team Member Name  | Role | Phone           |
+------------------------+------+-----------------+
| Lily Horton MD | PCP  | [phone] |
+------------------------+------+-----------------+
 
 
 
 Reason for Visit
 
 
+------------------+----------+
| Reason           | Comments |
+------------------+----------+
| Medical Records  |          |
| Review           |          |
+------------------+----------+
 
 
 
 Encounter Details
 
 
+--------+-------------+----------------------+---------------------+------------------+
| Date   | Type        | Department           | Care Team           | Description      |
+--------+-------------+----------------------+---------------------+------------------+
| / | Documentati |   [facility] at [facility] |   Lab, Gi Procedure | Medical Records  |
|    | on          |  [place]  [address]  |                     | Review           |
|        |             | [facility] |                     |                  |
|        |             |  [facility], |                     |                  |
|        |             |  [address]          |                     |                  |
|        |             | [place], [place]         |                     |                  |
|        |             | [address]           |                     |                  |
|        |             | [phone]         |                     |                  |
+--------+-------------+----------------------+---------------------+------------------+
 
 
 
 Social History
 
 
+-------------------+-------+-----------+--------+------+
| Tobacco Use       | Types | Packs/Day | Years  | Date |
|                   |       |           | Used   |      |
+-------------------+-------+-----------+--------+------+
| Passive Smoke     |       |           |        |      |
| Exposure - Never  |       |           |        |      |
| Smoker            |       |           |        |      |
+-------------------+-------+-----------+--------+------+
 
 
 
+---------------------+---+---+---+
| Smokeless Tobacco:  |   |   |   |
| Never Used          |   |   |   |
+---------------------+---+---+---+
 
 
 
 
+-------------+-------------+---------+----------+
| Alcohol Use | Drinks/Week | oz/Week | Comments |
+-------------+-------------+---------+----------+
| Not Asked   |             |         |          |
+-------------+-------------+---------+----------+
 
 
 
+------------------+---------------+
| Sex Assigned at  | Date Recorded |
| Birth            |               |
+------------------+---------------+
| Not on file      |               |
+------------------+---------------+
 documented as of this encounter
 
 Miscellaneous Notes
 Telephone Encounter - Lajigar Alberto - 2016 10:36 AM PDTFormatting of this note might
 be different from the original.
 Nila Valdovinos
 [id number]
 
 REFERRAL FOR REVIEW:
 
 Reviewing Provider: Sharlene Pichardo PA-C
 [x]Internal Referral   [] External Referral
 
 [] CORI Report Available
 
 Referring Diagnosis/Comments: Q44.2 (ICD-10-CM) - 751.61 (ICD-9-CM) - Biliary atresia, Z94.
4 (ICD-10-CM) - V42.7 (ICD-9-CM) - Transplanted liver (HCC)
 
 [] emergent   []urgent   [x]routine
 
 []Consultation
 []Colonoscopy
 []Flexible Sigmoidoscopy
 []EGD (Upper Endoscopy)
 [] Sm Bowel Enteroscopy
 [] Upper Double Balloon Enteroscopy
 [] Lower Double Balloon Enteroscopy
 []Capsule Endoscopy: []Small Bowel   []ESO
 []Upper EUS
 []Lower EUS
 []ERCP
 []24HR ph Monitor   [] ON PPI (will be done OFF PPI unless checked)           []48HR ph Mon
itor   [] ON PPI (will be done OFF PPI unless checked)
 []Esophageal Manometry 
 []Anorectal Manometry
 [x]Other:[facility] 
      
 
 Electronic Data:
 Last 1 Encounter BMI Readings: 
 Date BMI 
 2016 30.15 kg/m2 
 
 Patient Active Problem List 
 Diagnosis 
 
   Transplanted liver (HCC) 
   VSD (ventricular septal defect), perimembranous 
   Polysplenia 
   Interrupted inferior vena cava 
   Aortic insufficiency 
   Aortic root dilatation (HCC) 
  
 Current Outpatient Prescriptions 
 Medication Sig 
   amLODIPine 5 mg oral tablet Take 5 mg by mouth once daily. 
 
 No current facility-administered medications for this visit. 
 
 Allergies 
 Allergen Reactions 
   Sulfa (Sulfonamide Antibiotics) Hives and Swelling-Facial 
   Varicella Vaccines  
   Possible reaction with sulfa meds 
  
 Past Medical History 
 Diagnosis Date 
   Biliary atresia  
 
 Past Surgical History 
 Procedure Laterality Date 
   Liver transplant  2000 
   at [place] 
   Vsd repair, amplatzer device  10/16/2014 
   8-mm Amplatzer Vascular Plug IV, no significant residual shunting 
 
 Lab Results 
 Component Value Date 
  WBC 8.8 2016 
  HB 14.5 2016 
  HCT 43.2 2016 
   2016 
  MCV 86.9 2016 
  RDW 14.4 2016 
   2016 
  K 4 2016 
   2016 
  BICARB 24 2016 
  BUN 8 2016 
  CR 0.61 2016 
  GLU 73 2016 
  CA 9.7 2016 
  AST 18 2016 
  ALT 18 2016 
   2016 
  TBILI 0.3 2016 
  TP 7.9 2016 
  ALB 4 2016 
  DIRBILI 0.1 10/23/2015 
 
 Electronically signed by Alberto Toussaint at 2016 10:39 AM PDTdocumented in this encount
er
 
 Plan of Treatment
 Not on filedocumented as of this encounter
 
 
 Visit Diagnoses
 Not on filedocumented in this encounter

## 2020-10-13 NOTE — XMS
Encounter Summary
  Created on: 10/13/2020
 
 Bonifacio Nila JB
 External Reference #: [id number]
 : [birth year]
 Sex: Female
 
 Demographics
 
 
+-----------------------+------------------------+
| Address               | [address]            |
|                       | [place]  [address]   |
+-----------------------+------------------------+
| Home Phone            | [phone]        |
+-----------------------+------------------------+
| Preferred Language    | Unknown                |
+-----------------------+------------------------+
| Marital Status        | Single                 |
+-----------------------+------------------------+
| Taoism Affiliation | Unknown                |
+-----------------------+------------------------+
| Race                  | White                  |
+-----------------------+------------------------+
| Ethnic Group          | Not  or  |
+-----------------------+------------------------+
 
 
 Author
 
 
+--------------+------------------------------+
| Author       | [facility] |
+--------------+------------------------------+
| Organization | [facility] |
+--------------+------------------------------+
| Address      | Unknown                      |
+--------------+------------------------------+
| Phone        | Unavailable                  |
+--------------+------------------------------+
 
 
 
 Support
 
 
+-----------------+--------------+---------+-----------------+
| Name            | Relationship | Address | Phone           |
+-----------------+--------------+---------+-----------------+
| Kit Valdovinos   | ECON         | Unknown | [phone] |
+-----------------+--------------+---------+-----------------+
| Magdalena Valdovinos | ECON         | Unknown | [phone] |
+-----------------+--------------+---------+-----------------+
 
 
 
 Care Team Providers
 
 
 
+------------------------+------+-----------------+
| Care Team Member Name  | Role | Phone           |
+------------------------+------+-----------------+
| Lily Horton MD | PCP  | [phone] |
+------------------------+------+-----------------+
 
 
 
 Reason for Visit
 
 
+----------+--------+---------------+
| Reason   | Onset  | Comments      |
|          | Date   |               |
+----------+--------+---------------+
| Lab Draw | 10/13/ | Reminder call |
|          | 2016   |               |
+----------+--------+---------------+
 
 
 
 Encounter Details
 
 
+--------+-----------+----------------------+----------------------+---------------------+
| Date   | Type      | Department           | Care Team            | Description         |
+--------+-----------+----------------------+----------------------+---------------------+
| 10/13/ | Telephone |   Pediatric          |   Neema Khalil,   | Lab Draw (Reminder  |
| 2016   |           | Gastroenterology at  | MD  [address] | call)               |
|        |           | [place]          |   [place], [place]       |                     |
|        |           | [facility]  | [address]           |                     |
|        |           |  [address]     | [phone]         |                     |
|        |           | [place]          | [phone] (Fax)   |                     |
|        |           | [facility], |                      |                     |
|        |           |  [facility] floor           |                      |                     |
|        |           | [facility] [place]         |                      |                     |
|        |           | [address]           |                      |                     |
|        |           | [phone]         |                      |                     |
+--------+-----------+----------------------+----------------------+---------------------+
 
 
 
 Social History
 
 
+-------------------+-------+-----------+--------+------+
| Tobacco Use       | Types | Packs/Day | Years  | Date |
|                   |       |           | Used   |      |
+-------------------+-------+-----------+--------+------+
| Passive Smoke     |       |           |        |      |
| Exposure - Never  |       |           |        |      |
| Smoker            |       |           |        |      |
+-------------------+-------+-----------+--------+------+
 
 
 
+---------------------+---+---+---+
| Smokeless Tobacco:  |   |   |   |
 
| Never Used          |   |   |   |
+---------------------+---+---+---+
 
 
 
+-------------+-------------+---------+----------+
| Alcohol Use | Drinks/Week | oz/Week | Comments |
+-------------+-------------+---------+----------+
| Not Asked   |             |         |          |
+-------------+-------------+---------+----------+
 
 
 
+------------------+---------------+
| Sex Assigned at  | Date Recorded |
| Birth            |               |
+------------------+---------------+
| Not on file      |               |
+------------------+---------------+
 documented as of this encounter
 
 Miscellaneous Notes
 Telephone Encounter - Tri Mcnamara RN - 10/13/2016  1:16 PM PDTCalled and LM on VM as
 a reminder to get liver labs drawn. Encouraged to call nurseline when complete.Electronical
ly signed by Tri Mcnamara RN at 10/13/2016  1:18 PM PDTdocumented in this encounter
 
 Plan of Treatment
 Not on filedocumented as of this encounter
 
 Visit Diagnoses
 Not on filedocumented in this encounter

## 2020-10-13 NOTE — XMS
Encounter Summary
  Created on: 10/13/2020
 
 Bonifacio Nila JB
 External Reference #: [id number]
 : [birth year]
 Sex: Female
 
 Demographics
 
 
+-----------------------+------------------------+
| Address               | [address]            |
|                       | [place]  [address]   |
+-----------------------+------------------------+
| Home Phone            | [phone]        |
+-----------------------+------------------------+
| Preferred Language    | Unknown                |
+-----------------------+------------------------+
| Marital Status        | Single                 |
+-----------------------+------------------------+
| Hinduism Affiliation | Unknown                |
+-----------------------+------------------------+
| Race                  | White                  |
+-----------------------+------------------------+
| Ethnic Group          | Not  or  |
+-----------------------+------------------------+
 
 
 Author
 
 
+--------------+-------------+
| Organization | Unknown     |
+--------------+-------------+
| Address      | Unknown     |
+--------------+-------------+
| Phone        | Unavailable |
+--------------+-------------+
 
 
 
 Support
 
 
+-----------------+--------------+---------+-----------------+
| Name            | Relationship | Address | Phone           |
+-----------------+--------------+---------+-----------------+
| Kit Valdovinos   | ECON         | Unknown | [phone] |
+-----------------+--------------+---------+-----------------+
| Magdalena Valdovinos | ECON         | Unknown | [phone] |
+-----------------+--------------+---------+-----------------+
 
 
 
 Care Team Providers
 
 
 
+-----------------------+------+-------------+
| Care Team Member Name | Role | Phone       |
+-----------------------+------+-------------+
 PCP  | Unavailable |
+-----------------------+------+-------------+
 
 
 
 Encounter Details
 
 
+--------+-------------+------------+--------------------+-------------+
| Date   | Type        | Department | Care Team          | Description |
+--------+-------------+------------+--------------------+-------------+
| / | Transcribed |            |   Dictation, Other | Transcribed |
| 2002   |             |            |                    |             |
+--------+-------------+------------+--------------------+-------------+
 
 
 
 Social History
 
 
+----------------+-------+-----------+--------+------+
| Tobacco Use    | Types | Packs/Day | Years  | Date |
|                |       |           | Used   |      |
+----------------+-------+-----------+--------+------+
| Never Assessed |       |           |        |      |
+----------------+-------+-----------+--------+------+
 
 
 
+------------------+---------------+
| Sex Assigned at  | Date Recorded |
| Birth            |               |
+------------------+---------------+
| Not on file      |               |
+------------------+---------------+
 documented as of this encounter
 
 Progress Notes
 Interface, Transcription In - 2006  3:05 AM [facility]                                    [place]
[address] [address] [phone] or [phone]
 
 2002
 
 Lily Horton M.D.
 [address]
 [place]  [address]
 
 RE:   NILA VALDOVINOS
 MR #: [id number]
 
 Dear Dr. Horton:
 
 I had the pleasure of seeing Nila back  in  the Pediatric Liver Transplant
 
 Clinic  at [facility] staffed by Dr.Bill Mcrae  and  Dr. Jennifer Webb  from
 [place].  This is a routine followup.   Nila  continues  to do quite well
 with no symptoms.  She is gaining weight well and developing normally.  She
 is presently on Prograf 2 mL (0.5 mg/mL) b.i.d.
 
 Past medical history is significant for an allergy to sulfa medication.  In
 addition, she has a very small VSD that  is  followed  by  Dr. Baljinder Diaz,
 pediatric cardiologist.  The VSD certainly  does  not seem to be giving her
 any trouble and Dr. Diaz does not want to see her for quite sometime.
 
 On examination, she weighs 15.2 kg and  measures  95 cm.  Blood pressure is
 105/58 and heart rate is 119.  She is  anicteric.   She  has large tonsils,
 but  they  are  normal  in  appearance   and   symmetrical.   There  is  no
 lymphadenopathy of the neck or axilla.   She has normal shotty nodes in the
 inguinal areas.  She has unlabored respirations.   Her  abdomen  is benign.
 There is no mass, tenderness, or organomegaly.   She has no clubbing of the
 digits.
 
 Recent  laboratory tests from 2002,  show  normal  electrolytes,
 serum protein, transaminases, bilirubin, and GGT.  Specifically, the GGT is
 11, AST is 27, ALT is 14, bilirubin 0.5, and albumin 4.2.  Her magnesium is
 1.8.  The CBC is normal.  Her tacrolimus level is pending.
 
 Laboratory tests from 2001,  show  a  positive  IgG EBV capsid
 antibody and negative IgM EBV capsid antibody.   The qualitative EBV PCR is
 positive.  A year ago, the serology was  negative  for  EBV.  Thus, she has
 acquired EBV within the last year.  Her  CMV  serology  is negative and the
 CMV PCR is negative.
 
 IMPRESSION:  In summary, Nila is a 2-year-old  white  female  who is 1-1/2
 year  status post liver transplant who  is  doing  quite  well.   Her  last
 tacrolimus level was mildly elevated  at  11.5  on  2001.  Her
 dose was decreased from 3 mL b.i.d. to 2 mL b.i.d. at that point.  Her goal
 Prograf level now is about 3.  Depending  on  her pending Prograf level, we
 may drop her dose to 1.5 mL b.i.d. which  is  closer  to 0.1 mg/kg per day.
 If we do drop the dose, she will need another Prograf level a week later to
 document an appropriate level.
 
 Because she has converted to EBV positivity,  she  needs  to go back on the
 acyclovir.  The dose will be 10mg/kg per dose given q.i.d.  The solution is
 40 mg/cc, so she will receive 150 mg  q.i.d.  of  acyclovir until she has 2
 negative EBV PCRs.  She is to repeat  her  EBV  serology  in  May 2002, and
 again in 2002.  We will try to  order the quantitative EBV PCRs next
 time.
 
 She will return back to this clinic in  the  fall for routine followup.  If
 everything is relatively stable at that point, perhaps she can go to yearly
 followup with the Transplant team.
 
 Sincerely,
 
 Eagle De Luna M.D.
 Pediatric Gastroenterology
 
 [facility] / 
 [id number] / [id number] / [id number] /
 D: 2002
 T: 2002
 
 cc:
 
 
 Cristo Bailey M.D.
 [address]
 [place]  [address]
 
 Simeon Covington M.D.
 [address]
 [place]  [address]
 
 Patel Mcrae M.D.
 [address]
 [place]  [address]
 
 [id number] signed by Interface, Transcription In at 2006  3:05 AM PDTdoc
umented in this encounter
 
 Plan of Treatment
 Not on filedocumented as of this encounter
 
 Visit Diagnoses
 Not on filedocumented in this encounter

## 2020-10-13 NOTE — XMS
Encounter Summary
  Created on: 10/13/2020
 
 Bonifacio Nila JB
 External Reference #: [id number]
 : [birth year]
 Sex: Female
 
 Demographics
 
 
+-----------------------+------------------------+
| Address               | [address]            |
|                       | [place]  [address]   |
+-----------------------+------------------------+
| Home Phone            | [phone]        |
+-----------------------+------------------------+
| Preferred Language    | Unknown                |
+-----------------------+------------------------+
| Marital Status        | Single                 |
+-----------------------+------------------------+
| Confucianism Affiliation | Unknown                |
+-----------------------+------------------------+
| Race                  | White                  |
+-----------------------+------------------------+
| Ethnic Group          | Not  or  |
+-----------------------+------------------------+
 
 
 Author
 
 
+--------------+------------------------------+
| Author       | [facility] |
+--------------+------------------------------+
| Organization | [facility] |
+--------------+------------------------------+
| Address      | Unknown                      |
+--------------+------------------------------+
| Phone        | Unavailable                  |
+--------------+------------------------------+
 
 
 
 Support
 
 
+-----------------+--------------+---------+-----------------+
| Name            | Relationship | Address | Phone           |
+-----------------+--------------+---------+-----------------+
| Kit Valdovinos   | ECON         | Unknown | [phone] |
+-----------------+--------------+---------+-----------------+
| Magdalena Valdovinos | ECON         | Unknown | [phone] |
+-----------------+--------------+---------+-----------------+
 
 
 
 Care Team Providers
 
 
 
+-----------------------+------+-------------+
| Care Team Member Name | Role | Phone       |
+-----------------------+------+-------------+
 PCP  | Unavailable |
+-----------------------+------+-------------+
 
 
 
 Reason for Visit
 
 
+--------------+----------+
| Reason       | Comments |
+--------------+----------+
| Social work  |          |
| consultation |          |
+--------------+----------+
 
 
 
 Encounter Details
 
 
+--------+-------------+----------------------+----------------------+--------------+
| Date   | Type        | Department           | Care Team            | Description  |
+--------+-------------+----------------------+----------------------+--------------+
| 03/13/ | Documentati |   SOCIAL WORK        |   [facility],       | Social work  |
|    | on          | AMBULATORY  [address]   | SISSY [address]      | consultation |
|        |             | [address]  | [address]  |              |
|        |             |  Mailcode: [address]      | [address]  [place],   |              |
|        |             | [place], [place]         | [place] [address]        |              |
|        |             | [address]           | [phone]         |              |
|        |             | [phone]         | [phone] (Fax)   |              |
+--------+-------------+----------------------+----------------------+--------------+
 
 
 
 Social History
 
 
+----------------+-------+-----------+--------+------+
| Tobacco Use    | Types | Packs/Day | Years  | Date |
|                |       |           | Used   |      |
+----------------+-------+-----------+--------+------+
| Never Assessed |       |           |        |      |
+----------------+-------+-----------+--------+------+
 
 
 
+------------------+---------------+
| Sex Assigned at  | Date Recorded |
| Birth            |               |
+------------------+---------------+
| Not on file      |               |
+------------------+---------------+
 documented as of this encounter
 
 Miscellaneous Notes
 
 Telephone Encounter - Coretta Armijo MSW - 2014  3:45 PM PDTSocial Work (SW) note:
 
 Referral from Liver clinic MD, Neema Khalil.  Patient is post transplant has been lost to
 follow-up, not seen since .  Last labs were .  Patient was scheduled today and no-s
howed.
 
 TC to patient's mom, Magdalena Valdovinos.  Ms. Valdovinos stated that her local lab never received
 orders.  Ms. Valdovinos stated that she didn't realize the appointment was still happening bec
ause she hadn't been able to get labs yet.  Ms. Valdovinos stated that the reason they have not
 been to clinic is that they were having insurance issues, which are now resolved.  Ms. Delmis hussein seemed open to rescheduling and denied having any other barriers.
 
 SW will discuss with MD and determine how to proceed.
 
 SISSY Post, Pager# [phone]
 Electronically signed by SISSY Post at 2014  3:48 PM PDTdocumented in this e
ncounter
 
 Plan of Treatment
 Not on filedocumented as of this encounter
 
 Visit Diagnoses
 Not on filedocumented in this encounter

## 2020-10-13 NOTE — XMS
Encounter Summary
  Created on: 10/13/2020
 
 Bonifacio Nila BJ
 External Reference #: [id number]
 : [birth year]
 Sex: Female
 
 Demographics
 
 
+-----------------------+------------------------+
| Address               | [address]            |
|                       | [place]  [address]   |
+-----------------------+------------------------+
| Home Phone            | [phone]        |
+-----------------------+------------------------+
| Preferred Language    | Unknown                |
+-----------------------+------------------------+
| Marital Status        | Single                 |
+-----------------------+------------------------+
| Evangelical Affiliation | Unknown                |
+-----------------------+------------------------+
| Race                  | White                  |
+-----------------------+------------------------+
| Ethnic Group          | Not  or  |
+-----------------------+------------------------+
 
 
 Author
 
 
+--------------+------------------------------+
| Author       | [facility] |
+--------------+------------------------------+
| Organization | [facility] |
+--------------+------------------------------+
| Address      | Unknown                      |
+--------------+------------------------------+
| Phone        | Unavailable                  |
+--------------+------------------------------+
 
 
 
 Support
 
 
+-----------------+--------------+---------+-----------------+
| Name            | Relationship | Address | Phone           |
+-----------------+--------------+---------+-----------------+
| Kit Valdovinos   | ECON         | Unknown | [phone] |
+-----------------+--------------+---------+-----------------+
| Magdalena Valdovinos | ECON         | Unknown | [phone] |
+-----------------+--------------+---------+-----------------+
 
 
 
 Care Team Providers
 
 
 
+------------------------+------+-----------------+
| Care Team Member Name  | Role | Phone           |
+------------------------+------+-----------------+
| Lily Horton MD | PCP  | [phone] |
+------------------------+------+-----------------+
 
 
 
 Reason for Visit
 
 
+-------------------+--------+----------+
| Reason            | Onset  | Comments |
|                   | Date   |          |
+-------------------+--------+----------+
| Care Coordination | / |          |
|                   |    |          |
+-------------------+--------+----------+
 
 
 
 Encounter Details
 
 
+--------+-----------+----------------------+----------------------+-------------------+
| Date   | Type      | Department           | Care Team            | Description       |
+--------+-----------+----------------------+----------------------+-------------------+
| / | Telephone |   Pediatric          |   Neema Khalil,   | Care Coordination |
| 2016   |           | Gastroenterology at  | MD  [address] |                   |
|        |           | [place]          |   [place], [place]       |                   |
|        |           | [facility]  | [address]           |                   |
|        |           |  [address]     | [phone]         |                   |
|        |           | [place]          | [phone] (Fax)   |                   |
|        |           | [facility], |                      |                   |
|        |           |  [facility] floor           |                      |                   |
|        |           | [place], [place]         |                      |                   |
|        |           | [address]           |                      |                   |
|        |           | [phone]         |                      |                   |
+--------+-----------+----------------------+----------------------+-------------------+
 
 
 
 Social History
 
 
+-------------------+-------+-----------+--------+------+
| Tobacco Use       | Types | Packs/Day | Years  | Date |
|                   |       |           | Used   |      |
+-------------------+-------+-----------+--------+------+
| Passive Smoke     |       |           |        |      |
| Exposure - Never  |       |           |        |      |
| Smoker            |       |           |        |      |
+-------------------+-------+-----------+--------+------+
 
 
 
+---------------------+---+---+---+
| Smokeless Tobacco:  |   |   |   |
 
| Never Used          |   |   |   |
+---------------------+---+---+---+
 
 
 
+-------------+-------------+---------+----------+
| Alcohol Use | Drinks/Week | oz/Week | Comments |
+-------------+-------------+---------+----------+
| Not Asked   |             |         |          |
+-------------+-------------+---------+----------+
 
 
 
+------------------+---------------+
| Sex Assigned at  | Date Recorded |
| Birth            |               |
+------------------+---------------+
| Not on file      |               |
+------------------+---------------+
 documented as of this encounter
 
 Miscellaneous Notes
 Telephone Encounter - Irais Rosa RN - 2016 12:15 PM PSTCalled mom to remind her 
Nila is due for labs.  Before I could give mom the reason for my call she stated she is at 
work in an ambulance on the way to a call and requested to call back later.Electronically si
gned by Irais Rosa RN at 2016 12:16 PM PSTdocumented in this encounter
 
 Plan of Treatment
 Not on filedocumented as of this encounter
 
 Visit Diagnoses
 Not on filedocumented in this encounter

## 2020-10-13 NOTE — XMS
Encounter Summary
  Created on: 10/13/2020
 
 Bonifacio Nila JB
 External Reference #: [id number]
 : [birth year]
 Sex: Female
 
 Demographics
 
 
+-----------------------+------------------------+
| Address               | [address]            |
|                       | [place]  [address]   |
+-----------------------+------------------------+
| Home Phone            | [phone]        |
+-----------------------+------------------------+
| Preferred Language    | Unknown                |
+-----------------------+------------------------+
| Marital Status        | Single                 |
+-----------------------+------------------------+
| Christianity Affiliation | Unknown                |
+-----------------------+------------------------+
| Race                  | White                  |
+-----------------------+------------------------+
| Ethnic Group          | Not  or  |
+-----------------------+------------------------+
 
 
 Author
 
 
+--------------+------------------------------+
| Author       | [facility] |
+--------------+------------------------------+
| Organization | [facility] |
+--------------+------------------------------+
| Address      | Unknown                      |
+--------------+------------------------------+
| Phone        | Unavailable                  |
+--------------+------------------------------+
 
 
 
 Support
 
 
+-----------------+--------------+---------+-----------------+
| Name            | Relationship | Address | Phone           |
+-----------------+--------------+---------+-----------------+
| Kit Valdovinos   | ECON         | Unknown | [phone] |
+-----------------+--------------+---------+-----------------+
| Magdalena Valdovinos | ECON         | Unknown | [phone] |
+-----------------+--------------+---------+-----------------+
 
 
 
 Care Team Providers
 
 
 
+-----------------------+------+-----------------+
| Care Team Member Name | Role | Phone           |
+-----------------------+------+-----------------+
| Lily Horton MD   | PCP  | [phone] |
+-----------------------+------+-----------------+
 
 
 
 Encounter Details
 
 
+--------+----------+----------------------+--------------------+-------------+
| Date   | Type     | Department           | Care Team          | Description |
+--------+----------+----------------------+--------------------+-------------+
| / | Abstract |   Pediatric          |   Ivis Burkett MD |             |
|    |          | Gastroenterology at  |                    |             |
|        |          | [place]          |                    |             |
|        |          | [facility]  |                    |             |
|        |          |  [address]     |                    |             |
|        |          | [place]          |                    |             |
|        |          | [facility], |                    |             |
|        |          |  [address] floor           |                    |             |
|        |          | [place], [place]         |                    |             |
|        |          | [id number]           |                    |             |
|        |          | [phone]         |                    |             |
+--------+----------+----------------------+--------------------+-------------+
 
 
 
 Social History
 
 
+----------------+-------+-----------+--------+------+
| Tobacco Use    | Types | Packs/Day | Years  | Date |
|                |       |           | Used   |      |
+----------------+-------+-----------+--------+------+
| Never Assessed |       |           |        |      |
+----------------+-------+-----------+--------+------+
 
 
 
+------------------+---------------+
| Sex Assigned at  | Date Recorded |
| Birth            |               |
+------------------+---------------+
| Not on file      |               |
+------------------+---------------+
 documented as of this encounter
 
 Plan of Treatment
 Not on filedocumented as of this encounter
 
 Procedures
 
 
+----------------------+--------+-------------+----------------------+----------------------
+
| Procedure Name       | Priori | Date/Time   | Associated Diagnosis | Comments             
 
|
|                      | ty     |             |                      |                      
|
+----------------------+--------+-------------+----------------------+----------------------
+
| CBC, WITH            | Routin | 2010  |                      |   Results for this   
|
| DIFFERENTIAL         | e      |  8:30 AM    |                      | procedure are in the 
|
|                      |        | PST         |                      |  results section.    
|
+----------------------+--------+-------------+----------------------+----------------------
+
| COMPLETE METABOLIC   | Routin | 2010  |                      |   Results for this   
|
| SET                  | e      |  8:30 AM    |                      | procedure are in the 
|
| (NA,K,CL,CO2,BUN,CRE |        | PST         |                      |  results section.    
|
| AT,GLUC,CA,AST,ALT,B |        |             |                      |                      
|
| ASHWINI TOTAL,ALK        |        |             |                      |                      
|
| PHOS,ALB,PROT TOTAL) |        |             |                      |                      
|
+----------------------+--------+-------------+----------------------+----------------------
+
| PHOSPHORUS, PLASMA   | Routin | 2010  |                      |   Results for this   
|
|                      | e      |  8:30 AM    |                      | procedure are in the 
|
|                      |        | PST         |                      |  results section.    
|
+----------------------+--------+-------------+----------------------+----------------------
+
| GGT, PLASMA          | Routin | 2010  |                      |   Results for this   
|
|                      | e      |  8:30 AM    |                      | procedure are in the 
|
|                      |        | PST         |                      |  results section.    
|
+----------------------+--------+-------------+----------------------+----------------------
+
| BILIRUBIN DIRECT     | Routin | 2010  |                      |   Results for this   
|
|                      | e      |  8:30 AM    |                      | procedure are in the 
|
|                      |        | PST         |                      |  results section.    
|
+----------------------+--------+-------------+----------------------+----------------------
+
| MAGNESIUM, PLASMA    | Routin | 2010  |                      |   Results for this   
|
|                      | e      |  8:30 AM    |                      | procedure are in the 
|
|                      |        | PST         |                      |  results section.    
|
+----------------------+--------+-------------+----------------------+----------------------
+
| TRIGLYCERIDES,       | Routin | 2010  |                      |   Results for this   
 
|
| PLASMA               | e      |  8:30 AM    |                      | procedure are in the 
|
|                      |        | PST         |                      |  results section.    
|
+----------------------+--------+-------------+----------------------+----------------------
+
| CHOLESTEROL TOTAL,   | Routin | 2010  |                      |   Results for this   
|
| PLASMA               | e      |  8:30 AM    |                      | procedure are in the 
|
|                      |        | PST         |                      |  results section.    
|
+----------------------+--------+-------------+----------------------+----------------------
+
 documented in this encounter
 
 Results
 PHOSPHORUS, PLASMA (2010  8:30 AM PST)
 
+-------------+-------+-----------------+------------+--------------+
| Component   | Value | Ref Range       | Performed  | Pathologist  |
|             |       |                 | At         | Signature    |
+-------------+-------+-----------------+------------+--------------+
| PHOSPHORUS, | 3.6   | 2.5 - 5.0 mg/dL | [facility]  |              |
|  PLASMA     |       |                 | LAB -      |              |
| (LAB)       |       |                 | [place]  |              |
+-------------+-------+-----------------+------------+--------------+
 
 
 
+---------------+
| Specimen      |
+---------------+
| Blood - Blood |
+---------------+
 
 
 
+--------------------+----------------------+--------------------+----------------+
| Performing         | Address              | City/State/Zipcode | Phone Number   |
| Organization       |                      |                    |                |
+--------------------+----------------------+--------------------+----------------+
|   [facility]  |   [address] |   [place], [place]    |   [phone] |
| [place]          |                      |                    |                |
+--------------------+----------------------+--------------------+----------------+
|   [facility]  |                      |   [place], [place]    |                |
| [place]          |                      |                    |                |
+--------------------+----------------------+--------------------+----------------+
 COMPLETE METABOLIC SET (NA,K,CL,CO2,BUN,CREAT,GLUC,CA,AST,ALT,BILI TOTAL,ALK PHOS,ALB,PROT 
TOTAL) (2010  8:30 AM PST)
 
+-------------+---------+-----------------+------------+--------------+
| Component   | Value   | Ref Range       | Performed  | Pathologist  |
|             |         |                 | At         | Signature    |
+-------------+---------+-----------------+------------+--------------+
| GLUCOSE,    | 93      | 60 - 100 mg/dL  | [facility]  |              |
| PLASMA      |         |                 | LAB -      |              |
| (LAB)       |         |                 | [place]  |              |
+-------------+---------+-----------------+------------+--------------+
 
| BUN, PLASMA | 11      | 6 - 23 mg/dL    | [facility]  |              |
|  (LAB)      |         |                 | LAB -      |              |
|             |         |                 | [place]  |              |
+-------------+---------+-----------------+------------+--------------+
| CREATININE  | 0.52    | 0.5 - 1.5 mg/dL | [facility]  |              |
| PLASMA      |         |                 | LAB -      |              |
| (LAB)       |         |                 | [place]  |              |
+-------------+---------+-----------------+------------+--------------+
| TOTAL       | 6.7     | 6.1 - 8.5 g/dL  | [facility]  |              |
| PROTEIN,    |         |                 | LAB -      |              |
| PLASMA      |         |                 | [place]  |              |
| (LAB)       |         |                 |            |              |
+-------------+---------+-----------------+------------+--------------+
| ALBUMIN,    | 3.9 (A) | 9.5 - 4.8 g/dL  | [facility]  |              |
| PLASMA      |         |                 | LAB -      |              |
| (LAB)       |         |                 | [place]  |              |
+-------------+---------+-----------------+------------+--------------+
| CALCIUM,    | 9.3     | 8.4 - 10.2      | [facility]  |              |
| PLASMA      |         | mg/dL           | LAB -      |              |
| (LAB)       |         |                 | [place]  |              |
+-------------+---------+-----------------+------------+--------------+
| BILIRUBIN   | 0.3     | 0.0 - 1.2       | [facility]  |              |
| TOTAL       |         | Transcutaneous  | LAB -      |              |
|             |         | Bilirubinometer | [place]  |              |
+-------------+---------+-----------------+------------+--------------+
| ALK PHOS    | 130 (A) | 135 - 384 U/L   | [facility]  |              |
|             |         |                 | LAB -      |              |
|             |         |                 | [place]  |              |
+-------------+---------+-----------------+------------+--------------+
| AST(SGOT)   | 17      | 0 - 40 U/L      | [facility]  |              |
|             |         |                 | LAB -      |              |
|             |         |                 | [place]  |              |
+-------------+---------+-----------------+------------+--------------+
| SODIUM,     | 137     | 132 - 143       | [facility]  |              |
| PLASMA      |         | mmol/L          | LAB -      |              |
| (LAB)       |         |                 | [place]  |              |
+-------------+---------+-----------------+------------+--------------+
| POTASSIUM,  | 3.9     | 3.6 - 5.1       | [facility]  |              |
| PLASMA      |         | mmol/L          | LAB -      |              |
| (LAB)       |         |                 | [place]  |              |
+-------------+---------+-----------------+------------+--------------+
| CHLORIDE,   | 105     | 95 - 112 mmol/L | [facility]  |              |
| PLASMA      |         |                 | LAB -      |              |
| (LAB)       |         |                 | [place]  |              |
+-------------+---------+-----------------+------------+--------------+
| TOTAL CO2,  | 25      | 19 - 31 mmol/L  | [facility]  |              |
| PLASMA      |         |                 | LAB -      |              |
| (LAB)       |         |                 | [place]  |              |
+-------------+---------+-----------------+------------+--------------+
| ALT (SGPT)  | 17      | 0 - 46 U/L      | [facility]  |              |
|             |         |                 | LAB -      |              |
|             |         |                 | [place]  |              |
+-------------+---------+-----------------+------------+--------------+
 
 
 
+---------------+
| Specimen      |
+---------------+
| Blood - Blood |
 
+---------------+
 
 
 
+--------------------+----------------------+--------------------+----------------+
| Performing         | Address              | City/State/Zipcode | Phone Number   |
| Organization       |                      |                    |                |
+--------------------+----------------------+--------------------+----------------+
|   [facility]  |   [address] |   [place], [place]    |   [phone] |
| [place]          |                      |                    |                |
+--------------------+----------------------+--------------------+----------------+
|   [facility]  |                      |   [place], [place]    |                |
| [place]          |                      |                    |                |
+--------------------+----------------------+--------------------+----------------+
 BILIRUBIN DIRECT (2010  8:30 AM PST)
 
+------------+-------+---------------+------------+--------------+
| Component  | Value | Ref Range     | Performed  | Pathologist  |
|            |       |               | At         | Signature    |
+------------+-------+---------------+------------+--------------+
| BILIRUBIN  | 0.1   | 0 - 0.1 mg/dL | [facility]  |              |
| DIRECT     |       |               | LAB -      |              |
|            |       |               | [place]  |              |
+------------+-------+---------------+------------+--------------+
 
 
 
+---------------+
| Specimen      |
+---------------+
| Blood - Blood |
+---------------+
 
 
 
+--------------------+----------------------+--------------------+----------------+
| Performing         | Address              | City/State/Zipcode | Phone Number   |
| Organization       |                      |                    |                |
+--------------------+----------------------+--------------------+----------------+
|   [facility]  |   [address] |   [place]    |   [phone] |
| [place]          |                      |                    |                |
+--------------------+----------------------+--------------------+----------------+
|   [facility]  |                      |   [place], [place]    |                |
| [place]          |                      |                    |                |
+--------------------+----------------------+--------------------+----------------+
 GGT, PLASMA (2010  8:30 AM PST)
 
+-----------+-------+------------+------------+--------------+
| Component | Value | Ref Range  | Performed  | Pathologist  |
|           |       |            | At         | Signature    |
+-----------+-------+------------+------------+--------------+
| GAMMA     | 7     | 5 - 60 U/L | [facility]  |              |
| GLUTAMYL  |       |            | LAB -      |              |
| TRANS     |       |            | [place]  |              |
+-----------+-------+------------+------------+--------------+
 
 
 
+---------------+
| Specimen      |
 
+---------------+
| Blood - Blood |
+---------------+
 
 
 
+--------------------+----------------------+--------------------+----------------+
| Performing         | Address              | City/State/Zipcode | Phone Number   |
| Organization       |                      |                    |                |
+--------------------+----------------------+--------------------+----------------+
|   [facility]  |   [address] |   [place], [place]    |   [phone] |
| [place]          |                      |                    |                |
+--------------------+----------------------+--------------------+----------------+
|   [facility]  |                      |   [place], [place]    |                |
| [place]          |                      |                    |                |
+--------------------+----------------------+--------------------+----------------+
 MAGNESIUM, PLASMA (2010  8:30 AM PST)
 
+-------------+-------+-----------------+------------+--------------+
| Component   | Value | Ref Range       | Performed  | Pathologist  |
|             |       |                 | At         | Signature    |
+-------------+-------+-----------------+------------+--------------+
| MAGNESIUM,P | 1.7   | 1.7 - 2.5 mg/dL | [facility]  |              |
| LASMA       |       |                 | LAB -      |              |
|             |       |                 | [place]  |              |
+-------------+-------+-----------------+------------+--------------+
 
 
 
+---------------+
| Specimen      |
+---------------+
| Blood - Blood |
+---------------+
 
 
 
+--------------------+----------------------+--------------------+----------------+
| Performing         | Address              | City/State/Zipcode | Phone Number   |
| Organization       |                      |                    |                |
+--------------------+----------------------+--------------------+----------------+
|   [facility]  |   [address] |   [place]    |   [phone] |
| [place]          |                      |                    |                |
+--------------------+----------------------+--------------------+----------------+
|   [facility]  |                      |   [place], [place]    |                |
| [place]          |                      |                    |                |
+--------------------+----------------------+--------------------+----------------+
 CBC, WITH DIFFERENTIAL (2010  8:30 AM PST)
 
+-------------+-------+-----------------+------------+--------------+
| Component   | Value | Ref Range       | Performed  | Pathologist  |
|             |       |                 | At         | Signature    |
+-------------+-------+-----------------+------------+--------------+
| WHITE CELL  | 7.8   | 4.4 - 11.8 K/cu | [facility]  |              |
| COUNT       |       |  mm             | LAB -      |              |
|             |       |                 | [place]  |              |
+-------------+-------+-----------------+------------+--------------+
| RED CELL    | 4.88  | 3.8 - 5.3 M/cu  | [facility]  |              |
| COUNT       |       | mm              | LAB -      |              |
|             |       |                 | [place]  |              |
 
+-------------+-------+-----------------+------------+--------------+
| HEMOGLOBIN  | 14.2  | 11.1 - 15.7     | [facility]  |              |
|             |       | g/dL            | LAB -      |              |
|             |       |                 | [place]  |              |
+-------------+-------+-----------------+------------+--------------+
| HEMATOCRIT  | 42.4  | 32 - 47 %       | [facility]  |              |
|             |       |                 | LAB -      |              |
|             |       |                 | [place]  |              |
+-------------+-------+-----------------+------------+--------------+
| MCV         | 86.9  | 73 - 91 fL      | [facility]  |              |
|             |       |                 | LAB -      |              |
|             |       |                 | [place]  |              |
+-------------+-------+-----------------+------------+--------------+
| MCH         | 29    | 25 - 31 pg      | [facility]  |              |
|             |       |                 | LAB -      |              |
|             |       |                 | [place]  |              |
+-------------+-------+-----------------+------------+--------------+
| MCHC        | 33    | 30 - 36 g/dL    | [facility]  |              |
|             |       |                 | LAB -      |              |
|             |       |                 | [place]  |              |
+-------------+-------+-----------------+------------+--------------+
| PLATELET    | 266   | 140 - 440 K/cu  | [facility]  |              |
| COUNT       |       | mm              | LAB -      |              |
|             |       |                 | [place]  |              |
+-------------+-------+-----------------+------------+--------------+
| NEUTROPHIL  | 60.7  | 35 - 65 %       | [facility]  |              |
| %           |       |                 | LAB -      |              |
|             |       |                 | [place]  |              |
+-------------+-------+-----------------+------------+--------------+
| LYMPHOCYTE  | 31.4  | 26 - 48 %       | [facility]  |              |
| %           |       |                 | LAB -      |              |
|             |       |                 | [place]  |              |
+-------------+-------+-----------------+------------+--------------+
| MONOCYTE %  | 3.7   | 0 - 12 %        | [facility]  |              |
|             |       |                 | LAB -      |              |
|             |       |                 | [place]  |              |
+-------------+-------+-----------------+------------+--------------+
| EOS %       | 2.9   | 0 - 6 %         | [facility]  |              |
|             |       |                 | LAB -      |              |
|             |       |                 | [place]  |              |
+-------------+-------+-----------------+------------+--------------+
| BASO %      | 1.3   | 0 - 2 %         | [facility]  |              |
|             |       |                 | LAB -      |              |
|             |       |                 | [place]  |              |
+-------------+-------+-----------------+------------+--------------+
| RDW         | 12.6  | 10.5 - 15.0 %   | [facility]  |              |
|             |       |                 | LAB -      |              |
|             |       |                 | [place]  |              |
+-------------+-------+-----------------+------------+--------------+
| MPV         |       | fL              | [facility]  |              |
|             |       |                 | LAB -      |              |
|             |       |                 | [place]  |              |
+-------------+-------+-----------------+------------+--------------+
| NEUTROPHIL  |       | K/cu mm         | [facility]  |              |
| #           |       |                 | LAB -      |              |
|             |       |                 | [place]  |              |
+-------------+-------+-----------------+------------+--------------+
| LYMPHOCYTE  |       | K/cu mm         | [facility]  |              |
| #           |       |                 | LAB -      |              |
|             |       |                 | [place]  |              |
 
+-------------+-------+-----------------+------------+--------------+
| MONOCYTE #  |       | K/cu mm         | [facility]  |              |
|             |       |                 | LAB -      |              |
|             |       |                 | [place]  |              |
+-------------+-------+-----------------+------------+--------------+
| EOS #       |       | K/cu mm         | [facility]  |              |
|             |       |                 | LAB -      |              |
|             |       |                 | [place]  |              |
+-------------+-------+-----------------+------------+--------------+
| BASO #      |       |                 | [facility]  |              |
|             |       |                 | LAB -      |              |
|             |       |                 | [place]  |              |
+-------------+-------+-----------------+------------+--------------+
 
 
 
+---------------+
| Specimen      |
+---------------+
| Blood - Blood |
+---------------+
 
 
 
+--------------------+----------------------+--------------------+----------------+
| Performing         | Address              | City/State/Zipcode | Phone Number   |
| Organization       |                      |                    |                |
+--------------------+----------------------+--------------------+----------------+
|   [facility]  |   [address] |   [place], [place]    |   [phone] |
| [place]          |                      |                    |                |
+--------------------+----------------------+--------------------+----------------+
|   [facility]  |                      |   [place], [place]    |                |
| [place]          |                      |                    |                |
+--------------------+----------------------+--------------------+----------------+
 CHOLESTEROL TOTAL, PLASMA (2010  8:30 AM PST)
 
+-------------+-------+---------------+------------+--------------+
| Component   | Value | Ref Range     | Performed  | Pathologist  |
|             |       |               | At         | Signature    |
+-------------+-------+---------------+------------+--------------+
| CHOLESTEROL | 101   | < - 200 mg/dL | [facility]  |              |
|   (LAB)     |       |               | LAB -      |              |
|             |       |               | [place]  |              |
+-------------+-------+---------------+------------+--------------+
 
 
 
+---------------+
| Specimen      |
+---------------+
| Blood - Blood |
+---------------+
 
 
 
+--------------------+----------------------+--------------------+----------------+
| Performing         | Address              | City/State/Zipcode | Phone Number   |
| Organization       |                      |                    |                |
+--------------------+----------------------+--------------------+----------------+
|   [facility]  |   [address] |   [place]    |   [phone] |
 
| [place]          |                      |                    |                |
+--------------------+----------------------+--------------------+----------------+
|   [facility]  |                      |   [place], [place]    |                |
| [place]          |                      |                    |                |
+--------------------+----------------------+--------------------+----------------+
 TRIGLYCERIDES, PLASMA (2010  8:30 AM PST)
 
+-------------+-------+----------------+------------+--------------+
| Component   | Value | Ref Range      | Performed  | Pathologist  |
|             |       |                | At         | Signature    |
+-------------+-------+----------------+------------+--------------+
| TRIGLYCERID | 83    | 30 - 150 mg/dL | [facility]  |              |
| ES          |       |                | LAB -      |              |
|             |       |                | [place]  |              |
+-------------+-------+----------------+------------+--------------+
 
 
 
+---------------+
| Specimen      |
+---------------+
| Blood - Blood |
+---------------+
 
 
 
+--------------------+----------------------+--------------------+----------------+
| Performing         | Address              | City/State/Zipcode | Phone Number   |
| Organization       |                      |                    |                |
+--------------------+----------------------+--------------------+----------------+
|   [facility]  |   [address] |   [place], [place]    |   [phone] |
| [place]          |                      |                    |                |
+--------------------+----------------------+--------------------+----------------+
|   [facility]  |                      |   [place], [place]    |                |
| [place]          |                      |                    |                |
+--------------------+----------------------+--------------------+----------------+
 documented in this encounter
 
 Visit Diagnoses
 Not on filedocumented in this encounter

## 2020-10-13 NOTE — XMS
Encounter Summary
  Created on: 10/13/2020
 
 Bonifacio Nila JB
 External Reference #: [id number]
 : [birth year]
 Sex: Female
 
 Demographics
 
 
+-----------------------+---------------------------+
| Address               | [address]          |
|                       | [place]  [address] |
+-----------------------+---------------------------+
| Home Phone            | [phone]           |
+-----------------------+---------------------------+
| Preferred Language    | Unknown                   |
+-----------------------+---------------------------+
| Marital Status        | Single                    |
+-----------------------+---------------------------+
| Pentecostalism Affiliation | Unknown                   |
+-----------------------+---------------------------+
| Race                  | White                     |
+-----------------------+---------------------------+
| Ethnic Group          | Not  or     |
+-----------------------+---------------------------+
 
 
 Author
 
 
+--------------+--------------------------------------------+
| Author       | [facility]  |
|              | and [place]                                |
+--------------+--------------------------------------------+
| Organization | [facility]  |
|              | and [place]                                |
+--------------+--------------------------------------------+
| Address      | Unknown                                    |
+--------------+--------------------------------------------+
| Phone        | Unavailable                                |
+--------------+--------------------------------------------+
 
 
 
 Support
 
 
+-----------------+--------------+-------------------+-----------------+
| Name            | Relationship | Address           | Phone           |
+-----------------+--------------+-------------------+-----------------+
| Magdalena Bonifacio | ECON         | [address]     | [phone] |
|                 |              | [place]   |                 |
|                 |              | [address]        |                 |
+-----------------+--------------+-------------------+-----------------+
 
 
 
 
 Care Team Providers
 
 
+-----------------------+------+-----------------+
| Care Team Member Name | Role | Phone           |
+-----------------------+------+-----------------+
| Augustine Buck DO      | PCP  | [phone] |
+-----------------------+------+-----------------+
 
 
 
 Reason for Visit
 
 
+-------------+----------+
| Reason      | Comments |
+-------------+----------+
| Annual Exam |          |
+-------------+----------+
 
 
 
 Encounter Details
 
 
+--------+---------+----------------------+----------------------+----------------------+
| Date   | Type    | Department           | Care Team            | Description          |
+--------+---------+----------------------+----------------------+----------------------+
| / | Office  |   [facility]      |   Renetta Mackenzie, | S/P VSD closure      |
|    | Visit   | [facility] |  MD Mustapha CINTRON   | (Primary Dx); VSD    |
|        |         |   [address]    | [place]  | (ventricular septal  |
|        |         | [address]          | [address]  [phone]  | defect),             |
|        |         | [place]        |  [phone] (Fax)  | perimembranous;      |
|        |         | [address]           |                      | Transplanted liver   |
|        |         | [phone]         |                      | (HCC)                |
+--------+---------+----------------------+----------------------+----------------------+
 
 
 
 Social History
 
 
+--------------+-------+-----------+--------+------+
| Tobacco Use  | Types | Packs/Day | Years  | Date |
|              |       |           | Used   |      |
+--------------+-------+-----------+--------+------+
| Never Smoker |       |           |        |      |
+--------------+-------+-----------+--------+------+
 
 
 
+---------------------+---+---+---+
| Smokeless Tobacco:  |   |   |   |
| Current User        |   |   |   |
+---------------------+---+---+---+
 
 
 
+---------------+-------------+---------+----------+
 
| Alcohol Use   | Drinks/Week | oz/Week | Comments |
+---------------+-------------+---------+----------+
| Not Currently |             |         |          |
+---------------+-------------+---------+----------+
 
 
 
+------------------+---------------+
| Sex Assigned at  | Date Recorded |
| Birth            |               |
+------------------+---------------+
| Not on file      |               |
+------------------+---------------+
 documented as of this encounter
 
 Last Filed Vital Signs
 
 
+-------------------+------------------+----------------------+----------+
| Vital Sign        | Reading          | Time Taken           | Comments |
+-------------------+------------------+----------------------+----------+
| Blood Pressure    | 108/72           | 2020  9:48 AM  |          |
|                   |                  | PDT                  |          |
+-------------------+------------------+----------------------+----------+
| Pulse             | 95               | 2020  9:48 AM  |          |
|                   |                  | PDT                  |          |
+-------------------+------------------+----------------------+----------+
| Temperature       | -                | -                    |          |
+-------------------+------------------+----------------------+----------+
| Respiratory Rate  | -                | -                    |          |
+-------------------+------------------+----------------------+----------+
| Oxygen Saturation | 100%             | 2020  9:48 AM  |          |
|                   |                  | PDT                  |          |
+-------------------+------------------+----------------------+----------+
| Inhaled Oxygen    | -                | -                    |          |
| Concentration     |                  |                      |          |
+-------------------+------------------+----------------------+----------+
| Weight            | 99.8 kg (220 lb) | 2020  9:48 AM  |          |
|                   |                  | PDT                  |          |
+-------------------+------------------+----------------------+----------+
| Height            | 162.6 cm (5' 4") | 2020  9:48 AM  |          |
|                   |                  | PDT                  |          |
+-------------------+------------------+----------------------+----------+
| Body Mass Index   | 37.76            | 2020  9:48 AM  |          |
|                   |                  | PDT                  |          |
+-------------------+------------------+----------------------+----------+
 documented in this encounter
 
 Progress Notes
 Renetta Mackenzie MD - 2020  9:45 AM PDTFormatting of this note might be different f
rom the original.
 Date of visit: 2020
 Primary Care Physician: Augustine Buck DO
 CHIEF COMPLAINT:  
 Chief Complaint 
 Patient presents with 
   Annual Exam 
 
 HISTORY OF PRESENT ILLNESS
 Nila is 20 y.o. here for pleasant good historian here for evaluation for congenital heart 
 
disease S/P closure of VSD with amplatzer in . 
 Was evaluated in [place] in 2019 due to gastroenteritis. 
 Hydrochlorothiazide was stopped. Blood pressure is controlled with lisinopril 5 mg and amlo
dipine 5 mg. 
 Denies any chest pain or shortness of breath.
 
 Has complicated past medical history childhood.
 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
icular septal repair with Amplatzer device in .
 No lower extremity edema.  
 
 Past medical history, SH, FH, and medications were reviewed in the chart.
 
 Medications:
 Outpatient Encounter Medications as of 2020 
 Medication Sig Dispense Refill 
   amLODIPine (NORVASC) 5 mg tablet Take 5 mg by mouth daily.   
   lisinopril (PRINIVIL, ZESTRIL) 5 mg tablet Take 5 mg by mouth Daily.   
   [DISCONTINUED] lisinopril-hydrochlorothiazide (PRINZIDE,ZESTORETIC) 20-12.5 MG per tabl
et Take 1 tablet by mouth daily.  1 
   medroxyPROGESTERone Acetate (DEPO-PROVERA IM) Inject  into the muscle every 3 (three) m
[facility].   
 
 No facility-administered encounter medications on file as of 2020.  
 
 Allergies
 Allergies 
 Allergen Reactions 
   Sulfa Antibiotics Swelling and Rash 
 
 REVIEW OF SYSTEMS:
 Constitutional: negative for fatigue. No fever, chills, and rigors.  No report of weight ch
apryl. 
 HEENT: Negative for nosebleeds, ear discharge, nasal congestion or soar throat.  
 Eyes: Negative for visual disturbance, redness, or secretion. 
 Respiratory: Negative for cough, sputum production, hemoptysis, wheezing. 
 Cardiovascular: as HPI.
 Gastrointestinal: Negative for nausea, vomiting, diarrhea, abdominal pain and blood in stoo
l. 
 Genitourinary: Negative for dysuria or hematuria. 
 Musculoskeletal: Negative for myalgias, back pain or arthralgia. 
 Skin: Negative for rash. 
 Neurological: Negative for dizziness. No numbness. No recent falls. No slurred speech.
 Hematological: No significant bruising. 
 Psychiatric/Behavioral: No depression or anxiety.  
 
 PHYSICAL EXAM
 Vital Signs:
 /72  | Pulse 95  | Ht 1.626 m (5' 4")  | Wt 99.8 kg (220 lb)  | SpO2 100%  | BMI 37.7
6 kg/m 
   
 GENERAL APPEARANCE: Alert, oriented, cooperative, no distress, appears stated age.
 HEENT: Extraocular movements were intact.  No jaundice. Pupiles round and reactive. 
 NECK: No JVD, lymphadenopathy. Carotid upstrokes normal. No carotid bruit heard.
 CARDIAC:  Regular rhythm and rate. There is normal S1 and S2. No galop. No murmur.
 CHEST: Normal bilateral symmetrical chest excursion.ackles or wheezing. No evidence of dull
ness.
 ABDOMEN: Soft.No tenderness or guarding. No palpable organs. Active bowel sounds.
 EXTREMITIES: No lower extremities edema, cyanosis or clubbing.
 NEURO: Alert and oriented times three with no focal deficit.  Cranial nerves are grossly no
 
rmal. 
 SKIN: Warm and dry. No rash.
 Psych: Normal affect and mood.  
 
 DATA
 
 No results found for: NA, K, CO2, BUN, CREA, CALCIUM, MG
 No results found for: WBC, HGB, HCT, MCV, LABPLAT
 No results found for: ALT, CHOL, TRIG, HDL, LDLEX, GLUF, TSH
 EC2019
 Ordered and reviewed by myself showed normal sinus rhythm, normal axis, normal EKG.
 Last Echo: Dec 2019 
 Reviewed showed normal left ventricular size and function. Normal right ventricular size an
d function. 
 S/P VSD repair with minimal residual shunt. 
 2014:
 S/P VSD closure, with 8 mm Amplatzer, VSD. 
 Normal left ventricular function. Mild LA and LV enlargement. 
 No LVOT obstruction. Normal coronaries by doppler. 
 Well positioned VSD device with small residual leak, V max 4.1 m. 
 Last Stress test:
 Last Cath: 10/14/2014
 Amplatzer 8 mm ventricular septal defect closure.
 Last US carotid:
 
 ASSESSMENT: 
 Patient is 20 y.o. with 
 1. Congenital heart disease, membranous ventricular septal defect repair in  with Ampla
tzer device.
 2. Biliary atresia status post liver transplant.
 3. Hypertension.
 4. Obesity.
 5. Mild pleuritic regurgitation.
 
 Plan:
 Patient denies any symptoms.  
 Blood pressure is controlled with lisinopril 5 mg 5 mg daily.  Hydrochlorothiazide stopped.
 Left ventricular function is normal.  Stop lisinopril and continue to monitor blood pressur
e.
 Echocardiogram was reviewed showed stable repair.
 Minimal residual shunt noted by color Doppler.  Normal right side function.
 At this time continue with current medical management. 
 Can call with any change in symptoms.  Follow-up once a year.
 
 Thank you Dr. Torres for allowing me to participate in the care of this patient.
 *This report has been prepared using a voice recognition system. The report was reviewed fo
r accuracy, however, sound-alike word errors, addition and/or deletions may occur. If there 
is any question about this report please contact me.
 
 Renetta Mackenzie MD, MPH
 Electronically signed by Renetta Mackenzie MD at 2020 11:34 AM PDTdocumented in this 
encounter
 
 Plan of Treatment
 Not on filedocumented as of this encounter
 
 Procedures
 
 
+----------------+--------+-------------+----------------------+----------------------+
 
| Procedure Name | Priori | Date/Time   | Associated Diagnosis | Comments             |
|                | ty     |             |                      |                      |
+----------------+--------+-------------+----------------------+----------------------+
| ECG 12 LEAD    | Routin | 2020  |   S/P VSD closure    |   Results for this   |
|                | e      |  9:55 AM    |                      | procedure are in the |
|                |        | PDT         |                      |  results section.    |
+----------------+--------+-------------+----------------------+----------------------+
 documented in this encounter
 
 Results
 ECG 12 lead (2020  9:55 AM PDT)
 
+-------------+-------------------------+-----------+------------+--------------+
| Component   | Value                   | Ref Range | Performed  | Pathologist  |
|             |                         |           | At         | Signature    |
+-------------+-------------------------+-----------+------------+--------------+
| VENTRICULAR | 80                      | BPM       | [facility]  |              |
|  RATE EKG   |                         |           |            |              |
+-------------+-------------------------+-----------+------------+--------------+
| ATRIAL RATE | 80                      | BPM       | [facility]  |              |
+-------------+-------------------------+-----------+------------+--------------+
| P-R         | 148                     | ms        | [facility]  |              |
| INTERVAL    |                         |           |            |              |
+-------------+-------------------------+-----------+------------+--------------+
| QRS         | 80                      | ms        | [facility]  |              |
| DURATION    |                         |           |            |              |
+-------------+-------------------------+-----------+------------+--------------+
| Q-T         | 358                     | ms        | [facility]  |              |
| INTERVAL    |                         |           |            |              |
+-------------+-------------------------+-----------+------------+--------------+
| Q-T         | 412                     | ms        | [facility]  |              |
| INTERVAL    |                         |           |            |              |
| (CORRECTED) |                         |           |            |              |
+-------------+-------------------------+-----------+------------+--------------+
| P WAVE AXIS | -7                      | degrees   | [facility]  |              |
+-------------+-------------------------+-----------+------------+--------------+
| QRS AXIS    | 16                      | degrees   | [facility]  |              |
+-------------+-------------------------+-----------+------------+--------------+
| T AXIS      | 37                      | degrees   | [facility]  |              |
+-------------+-------------------------+-----------+------------+--------------+
| INTERPRETAT | Normal sinus            |           | [facility]  |              |
| ION TEXT    | rhythmNormal ECGWhen    |           |            |              |
|             | compared with ECG of    |           |            |              |
|             | 08-MAY-2019 15:00,No    |           |            |              |
|             | significant change was  |           |            |              |
|             | foundConfirmed by       |           |            |              |
|             | RENETTA MACKENZIE MD    |           |            |              |
|             | ([id number]) on 2020     |           |            |              |
|             | 12:12:45 PM             |           |            |              |
+-------------+-------------------------+-----------+------------+--------------+
 
 
 
+----------+
| Specimen |
+----------+
|          |
+----------+
 
 
 
 
+----------------------------------------------------------+--------------+
| Narrative                                                | Performed At |
+----------------------------------------------------------+--------------+
|   This result has an attachment that is not available.   |              |
+----------------------------------------------------------+--------------+
 
 
 
+--------------+---------+--------------------+--------------+
| Performing   | Address | City/State/Zipcode | Phone Number |
| Organization |         |                    |              |
+--------------+---------+--------------------+--------------+
|   [facility]  |         |                    |              |
+--------------+---------+--------------------+--------------+
 documented in this encounter
 
 Visit Diagnoses
 
 
+------------------------------------------------------------------------------+
| Diagnosis                                                                    |
+------------------------------------------------------------------------------+
|   S/P VSD closure - Primary  Other postprocedural status                     |
+------------------------------------------------------------------------------+
|   VSD (ventricular septal defect), perimembranous  Ventricular septal defect |
+------------------------------------------------------------------------------+
|   Transplanted liver (HCC)  Liver replaced by transplant                     |
+------------------------------------------------------------------------------+
 documented in this encounter

## 2020-10-13 NOTE — XMS
Encounter Summary
  Created on: 10/13/2020
 
 Bonifacio Nila JB
 External Reference #: [id number]
 : [birth year]
 Sex: Female
 
 Demographics
 
 
+-----------------------+------------------------+
| Address               | [address]            |
|                       | [place]  [address]   |
+-----------------------+------------------------+
| Home Phone            | [phone]        |
+-----------------------+------------------------+
| Preferred Language    | Unknown                |
+-----------------------+------------------------+
| Marital Status        | Single                 |
+-----------------------+------------------------+
| Denominational Affiliation | Unknown                |
+-----------------------+------------------------+
| Race                  | White                  |
+-----------------------+------------------------+
| Ethnic Group          | Not  or  |
+-----------------------+------------------------+
 
 
 Author
 
 
+--------------+------------------------------+
| Author       | [facility] |
+--------------+------------------------------+
| Organization | [facility] |
+--------------+------------------------------+
| Address      | Unknown                      |
+--------------+------------------------------+
| Phone        | Unavailable                  |
+--------------+------------------------------+
 
 
 
 Support
 
 
+-----------------+--------------+---------+-----------------+
| Name            | Relationship | Address | Phone           |
+-----------------+--------------+---------+-----------------+
| Kit Valdovinos   | ECON         | Unknown | [phone] |
+-----------------+--------------+---------+-----------------+
| Magdalena Valdovinos | ECON         | Unknown | [phone] |
+-----------------+--------------+---------+-----------------+
 
 
 
 Care Team Providers
 
 
 
+-----------------------+------+-----------------+
| Care Team Member Name | Role | Phone           |
+-----------------------+------+-----------------+
| Lily Horton MD   | PCP  | [phone] |
+-----------------------+------+-----------------+
 
 
 
 Reason for Visit
 
 
+----------------+--------+----------+
| Reason         | Onset  | Comments |
|                | Date   |          |
+----------------+--------+----------+
| Refill Request | / |          |
|                | 2008   |          |
+----------------+--------+----------+
 
 
 
 Encounter Details
 
 
+--------+--------+----------------------+---------------------+----------------+
| Date   | Type   | Department           | Care Team           | Description    |
+--------+--------+----------------------+---------------------+----------------+
| / | Refill |   Pediatric          |   Monserrat Spann,  | Refill Request |
|    |        | Gastroenterology at  | RN  [address] [facility]     |                |
|        |        | [place]          | [facility]     |                |
|        |        | [facility]  | [place] [address]  |                |
|        |        |  [address]    |                     |                |
|        |        | [place]          |                     |                |
|        |        | [facility], |                     |                |
|        |        |  [address] [place] [address]           |                     |                |
|        |        | [place], [place]         |                     |                |
|        |        | [address]           |                     |                |
|        |        | [phone]         |                     |                |
+--------+--------+----------------------+---------------------+----------------+
 
 
 
 Social History
 
 
+----------------+-------+-----------+--------+------+
| Tobacco Use    | Types | Packs/Day | Years  | Date |
|                |       |           | Used   |      |
+----------------+-------+-----------+--------+------+
| Never Assessed |       |           |        |      |
+----------------+-------+-----------+--------+------+
 
 
 
+------------------+---------------+
| Sex Assigned at  | Date Recorded |
| Birth            |               |
+------------------+---------------+
 
| Not on file      |               |
+------------------+---------------+
 documented as of this encounter
 
 Plan of Treatment
 Not on filedocumented as of this encounter
 
 Visit Diagnoses
 Not on filedocumented in this encounter

## 2020-10-13 NOTE — XMS
Encounter Summary
  Created on: 10/13/2020
 
 Bonifacio Nila JB
 External Reference #: [id number]
 : [birth year]
 Sex: Female
 
 Demographics
 
 
+-----------------------+------------------------+
| Address               | [address]            |
|                       | [place]  [address]   |
+-----------------------+------------------------+
| Home Phone            | [phone]        |
+-----------------------+------------------------+
| Preferred Language    | Unknown                |
+-----------------------+------------------------+
| Marital Status        | Single                 |
+-----------------------+------------------------+
| Nondenominational Affiliation | Unknown                |
+-----------------------+------------------------+
| Race                  | White                  |
+-----------------------+------------------------+
| Ethnic Group          | Not  or  |
+-----------------------+------------------------+
 
 
 Author
 
 
+--------------+------------------------------+
| Author       | [facility] |
+--------------+------------------------------+
| Organization | [facility] |
+--------------+------------------------------+
| Address      | Unknown                      |
+--------------+------------------------------+
| Phone        | Unavailable                  |
+--------------+------------------------------+
 
 
 
 Support
 
 
+-----------------+--------------+---------+-----------------+
| Name            | Relationship | Address | Phone           |
+-----------------+--------------+---------+-----------------+
| Kit Valdovinos   | ECON         | Unknown | [phone] |
+-----------------+--------------+---------+-----------------+
| Magdalena Valdovinos | ECON         | Unknown | [phone] |
+-----------------+--------------+---------+-----------------+
 
 
 
 Care Team Providers
 
 
 
+-----------------------+------+-----------------+
| Care Team Member Name | Role | Phone           |
+-----------------------+------+-----------------+
| Lily Horton MD   | PCP  | [phone] |
+-----------------------+------+-----------------+
 
 
 
 Encounter Details
 
 
+--------+-------------+----------------------+--------------------+-------------+
| Date   | Type        | Department           | Care Team          | Description |
+--------+-------------+----------------------+--------------------+-------------+
| / | Documentati |   Pediatric          |   Ivis Burkett MD |             |
|    | on          | Gastroenterology at  |                    |             |
|        |             | [place]          |                    |             |
|        |             | [facility]  |                    |             |
|        |             |  [address] [facility]     |                    |             |
|        |             | [place]          |                    |             |
|        |             | [facility], |                    |             |
|        |             |  7th floor           |                    |             |
|        |             | [place], [place]         |                    |             |
|        |             | [address]           |                    |             |
|        |             | [phone]         |                    |             |
+--------+-------------+----------------------+--------------------+-------------+
 
 
 
 Social History
 
 
+----------------+-------+-----------+--------+------+
| Tobacco Use    | Types | Packs/Day | Years  | Date |
|                |       |           | Used   |      |
+----------------+-------+-----------+--------+------+
| Never Assessed |       |           |        |      |
+----------------+-------+-----------+--------+------+
 
 
 
+------------------+---------------+
| Sex Assigned at  | Date Recorded |
| Birth            |               |
+------------------+---------------+
| Not on file      |               |
+------------------+---------------+
 documented as of this encounter
 
 Miscellaneous Notes
 Telephone Encounter - Ivis Burkett MD - 2010 12:16 PM Marvin has not been seen for
 a year and the family called and cancelled her follow up visit in Transplant clinic. We hav
e not been able to find any labs done in the last year. The family did not return a message 
left by the pediatric GI nurse in September. 
 
 I called her PCPs office ( Dr Horton) and spoke to her nurse. They have had no contact with
 her since . I requested that they try to bring the patient in to their clinic for an ex
am and to have labs done.Electronically signed by Ivis Burkett MD at 2010 12:16 PM PDT
 
documented in this encounter
 
 Plan of Treatment
 Not on filedocumented as of this encounter
 
 Visit Diagnoses
 Not on filedocumented in this encounter

## 2020-10-13 NOTE — XMS
Encounter Summary
  Created on: 10/13/2020
 
 Bonifacio Nila JB
 External Reference #: [id number]
 : [birth year]
 Sex: Female
 
 Demographics
 
 
+-----------------------+------------------------+
| Address               | [address]            |
|                       | [place]  [address]   |
+-----------------------+------------------------+
| Home Phone            | [phone]        |
+-----------------------+------------------------+
| Preferred Language    | Unknown                |
+-----------------------+------------------------+
| Marital Status        | Single                 |
+-----------------------+------------------------+
| Islam Affiliation | Unknown                |
+-----------------------+------------------------+
| Race                  | White                  |
+-----------------------+------------------------+
| Ethnic Group          | Not  or  |
+-----------------------+------------------------+
 
 
 Author
 
 
+--------------+------------------------------+
| Author       | [facility] |
+--------------+------------------------------+
| Organization | [facility] |
+--------------+------------------------------+
| Address      | Unknown                      |
+--------------+------------------------------+
| Phone        | Unavailable                  |
+--------------+------------------------------+
 
 
 
 Support
 
 
+-----------------+--------------+---------+-----------------+
| Name            | Relationship | Address | Phone           |
+-----------------+--------------+---------+-----------------+
| Kit Valdovinos   | ECON         | Unknown | [phone] |
+-----------------+--------------+---------+-----------------+
| Magdalena Valdovinos | ECON         | Unknown | [phone] |
+-----------------+--------------+---------+-----------------+
 
 
 
 Care Team Providers
 
 
 
+------------------------+------+-----------------+
| Care Team Member Name  | Role | Phone           |
+------------------------+------+-----------------+
| Lily Horton MD | PCP  | [phone] |
+------------------------+------+-----------------+
 
 
 
 Reason for Visit
 
 
+-------------------+--------+----------+
| Reason            | Onset  | Comments |
|                   | Date   |          |
+-------------------+--------+----------+
| Care Coordination | / |          |
|                   |    |          |
+-------------------+--------+----------+
 
 
 
 Encounter Details
 
 
+--------+-----------+----------------------+----------------------+-------------------+
| Date   | Type      | Department           | Care Team            | Description       |
+--------+-----------+----------------------+----------------------+-------------------+
| / | Telephone |   Pediatric          |   Neema Khalil,   | Care Coordination |
| 2017   |           | Gastroenterology at  | MD  [address] |                   |
|        |           | [place]          |   [place], [place]       |                   |
|        |           | [place]  | [address]           |                   |
|        |           |  [address] [facility]     | [phone]         |                   |
|        |           | [place]          | [phone] (Fax)   |                   |
|        |           | [place], |                      |                   |
|        |           |  [facility] floor           |                      |                   |
|        |           | [place], [place]         |                      |                   |
|        |           | [address]           |                      |                   |
|        |           | [phone]         |                      |                   |
+--------+-----------+----------------------+----------------------+-------------------+
 
 
 
 Social History
 
 
+-------------------+-------+-----------+--------+------+
| Tobacco Use       | Types | Packs/Day | Years  | Date |
|                   |       |           | Used   |      |
+-------------------+-------+-----------+--------+------+
| Passive Smoke     |       |           |        |      |
| Exposure - Never  |       |           |        |      |
| Smoker            |       |           |        |      |
+-------------------+-------+-----------+--------+------+
 
 
 
+---------------------+---+---+---+
| Smokeless Tobacco:  |   |   |   |
 
| Never Used          |   |   |   |
+---------------------+---+---+---+
 
 
 
+-------------+-------------+---------+----------+
| Alcohol Use | Drinks/Week | oz/Week | Comments |
+-------------+-------------+---------+----------+
| Not Asked   |             |         |          |
+-------------+-------------+---------+----------+
 
 
 
+------------------+---------------+
| Sex Assigned at  | Date Recorded |
| Birth            |               |
+------------------+---------------+
| Not on file      |               |
+------------------+---------------+
 documented as of this encounter
 
 Miscellaneous Notes
 Telephone Encounter - Tri Mcnamara RN - 2017  2:43 PM PDTReceived the following
 communication from NATI Valencia MS, SYLVESTER of the [facility] team:
 From: Meghan Vallejo <[email or web address]>
 To: GI Nurses <[email or web address]>, Neema Khalil <[email or web address]>
 CC: "Ana Rosa Quinonez" <[email or web address]>, "Anastasia Ana Rosa" <[email or web address]>
 Date: 2017 9:17:11 PM GMT
 Subject:  SECURE: transition to adult
 
  
 Hi [facility] GI Team,
  
 Our team acknowledges the plan for Nila  s care is to transition her to [facility] Adult Liver 
Transplant.
 We spoke with Nila today regarding labs and she has informed us that she has not yet sched
uled an appointment with Adult Liver Transplant because she is unsure of her availability.
  
 Please let us know how we may facilitate her ongoing care and transition to adult.
  
 Thank you,
 PETER ClarkN, RN, PHNElectronically signed by Tri Mcnamara, RN at 2017  2:
45 PM PDTdocumented in this encounter
 
 Plan of Treatment
 Not on filedocumented as of this encounter
 
 Visit Diagnoses
 Not on filedocumented in this encounter

## 2020-10-13 NOTE — XMS
Encounter Summary
  Created on: 10/13/2020
 
 Bonifacio Nila JB
 External Reference #: [id number]
 : [birth year]
 Sex: Female
 
 Demographics
 
 
+-----------------------+------------------------+
| Address               | [address]            |
|                       | [place]  [address]   |
+-----------------------+------------------------+
| Home Phone            | [phone]        |
+-----------------------+------------------------+
| Preferred Language    | Unknown                |
+-----------------------+------------------------+
| Marital Status        | Single                 |
+-----------------------+------------------------+
| Sabianism Affiliation | Unknown                |
+-----------------------+------------------------+
| Race                  | White                  |
+-----------------------+------------------------+
| Ethnic Group          | Not  or  |
+-----------------------+------------------------+
 
 
 Author
 
 
+--------------+------------------------------+
| Author       | [facility] |
+--------------+------------------------------+
| Organization | [facility] |
+--------------+------------------------------+
| Address      | Unknown                      |
+--------------+------------------------------+
| Phone        | Unavailable                  |
+--------------+------------------------------+
 
 
 
 Support
 
 
+-----------------+--------------+---------+-----------------+
| Name            | Relationship | Address | Phone           |
+-----------------+--------------+---------+-----------------+
| Kit Valdovinos   | ECON         | Unknown | [phone] |
+-----------------+--------------+---------+-----------------+
| Magdalena Valdovinos | ECON         | Unknown | [phone] |
+-----------------+--------------+---------+-----------------+
 
 
 
 Care Team Providers
 
 
 
+------------------------+------+-----------------+
| Care Team Member Name  | Role | Phone           |
+------------------------+------+-----------------+
| Lily Horton MD | PCP  | [phone] |
+------------------------+------+-----------------+
 
 
 
 Reason for Visit
 
 
+-------------------+--------+----------+
| Reason            | Onset  | Comments |
|                   | Date   |          |
+-------------------+--------+----------+
| Care Coordination | / |          |
|                   |    |          |
+-------------------+--------+----------+
 
 
 
 Encounter Details
 
 
+--------+-----------+----------------------+----------------------+-------------------+
| Date   | Type      | Department           | Care Team            | Description       |
+--------+-----------+----------------------+----------------------+-------------------+
| / | Telephone |   Pediatric          |   Neema Khalil,   | Care Coordination |
| 2015   |           | Gastroenterology at  | MD  [address] |                   |
|        |           | [place]          |   [place], [place]       |                   |
|        |           | [facility]  | [address]           |                   |
|        |           |  [address] [facility]     | [phone]         |                   |
|        |           | [place]          | [phone] (Fax)   |                   |
|        |           | [facility], |                      |                   |
|        |           |  [facility] floor           |                      |                   |
|        |           | [place], [place]         |                      |                   |
|        |           | [address]           |                      |                   |
|        |           | [phone]         |                      |                   |
+--------+-----------+----------------------+----------------------+-------------------+
 
 
 
 Social History
 
 
+-------------------+-------+-----------+--------+------+
| Tobacco Use       | Types | Packs/Day | Years  | Date |
|                   |       |           | Used   |      |
+-------------------+-------+-----------+--------+------+
| Passive Smoke     |       |           |        |      |
| Exposure - Never  |       |           |        |      |
| Smoker            |       |           |        |      |
+-------------------+-------+-----------+--------+------+
 
 
 
+---------------------+---+---+---+
| Smokeless Tobacco:  |   |   |   |
 
| Never Used          |   |   |   |
+---------------------+---+---+---+
 
 
 
+-------------+-------------+---------+----------+
| Alcohol Use | Drinks/Week | oz/Week | Comments |
+-------------+-------------+---------+----------+
| Not Asked   |             |         |          |
+-------------+-------------+---------+----------+
 
 
 
+------------------+---------------+
| Sex Assigned at  | Date Recorded |
| Birth            |               |
+------------------+---------------+
| Not on file      |               |
+------------------+---------------+
 documented as of this encounter
 
 Miscellaneous Notes
 Telephone Encounter - Irais Rosa RN - 2015  4:31 PM PSTLeft voicemail message on
 both mom and dad's phones, identified by number, reminding them Nila is due for labs.  Inv
ited them to call back if they have questions.Electronically signed by Irais Rosa RN at 
2015  4:32 PM PSTdocumented in this encounter
 
 Plan of Treatment
 Not on filedocumented as of this encounter
 
 Visit Diagnoses
 Not on filedocumented in this encounter

## 2020-10-13 NOTE — XMS
Encounter Summary
  Created on: 10/13/2020
 
 Bonifacio Nila JB
 External Reference #: [id number]
 : [birth year]
 Sex: Female
 
 Demographics
 
 
+-----------------------+------------------------+
| Address               | [address]            |
|                       | [place]  [address]   |
+-----------------------+------------------------+
| Home Phone            | [phone]        |
+-----------------------+------------------------+
| Preferred Language    | Unknown                |
+-----------------------+------------------------+
| Marital Status        | Single                 |
+-----------------------+------------------------+
| [employer] Affiliation | Unknown                |
+-----------------------+------------------------+
| Race                  | White                  |
+-----------------------+------------------------+
| Ethnic Group          | Not  or  |
+-----------------------+------------------------+
 
 
 Author
 
 
+--------------+------------------------------+
| Author       | [facility] |
+--------------+------------------------------+
| Organization | [facility] |
+--------------+------------------------------+
| Address      | Unknown                      |
+--------------+------------------------------+
| Phone        | Unavailable                  |
+--------------+------------------------------+
 
 
 
 Support
 
 
+-----------------+--------------+---------+-----------------+
| Name            | Relationship | Address | Phone           |
+-----------------+--------------+---------+-----------------+
| Kit Valdovinos   | ECON         | Unknown | [phone] |
+-----------------+--------------+---------+-----------------+
| Magdalena Valdovinos | ECON         | Unknown | [phone] |
+-----------------+--------------+---------+-----------------+
 
 
 
 Care Team Providers
 
 
 
+------------------------+------+-----------------+
| Care Team Member Name  | Role | Phone           |
+------------------------+------+-----------------+
| Lily Horton MD | PCP  | [phone] |
+------------------------+------+-----------------+
 
 
 
 Encounter Details
 
 
+--------+-------------+----------------------+----------------------+-------------+
| Date   | Type        | Department           | Care Team            | Description |
+--------+-------------+----------------------+----------------------+-------------+
| 06/15/ | Documentati |   Pediatric          |   Neema Khalil,   |             |
|    | on          | Gastroenterology at  | [address] |             |
|        |             | [place]          |   [place], [place]       |             |
|        |             | [facility]  | [address]           |             |
|        |             |  [address]    | [phone]         |             |
|        |             | [place]          | [phone] (Fax)   |             |
|        |             | [facility], |                      |             |
|        |             |  [address] [facility]           |                      |             |
|        |             | [place], [place]         |                      |             |
|        |             | [address]           |                      |             |
|        |             | [phone]         |                      |             |
+--------+-------------+----------------------+----------------------+-------------+
 
 
 
 Social History
 
 
+-------------------+-------+-----------+--------+------+
| Tobacco Use       | Types | Packs/Day | Years  | Date |
|                   |       |           | Used   |      |
+-------------------+-------+-----------+--------+------+
| Passive Smoke     |       |           |        |      |
| Exposure - Never  |       |           |        |      |
| Smoker            |       |           |        |      |
+-------------------+-------+-----------+--------+------+
 
 
 
+---------------------+---+---+---+
| Smokeless Tobacco:  |   |   |   |
| Never Used          |   |   |   |
+---------------------+---+---+---+
 
 
 
+-------------+-------------+---------+----------+
| Alcohol Use | Drinks/Week | oz/Week | Comments |
+-------------+-------------+---------+----------+
| Not Asked   |             |         |          |
+-------------+-------------+---------+----------+
 
 
 
 
+------------------+---------------+
| Sex Assigned at  | Date Recorded |
| Birth            |               |
+------------------+---------------+
| Not on file      |               |
+------------------+---------------+
 documented as of this encounter
 
 Miscellaneous Notes
 Telephone Encounter - Ariana Hoffman - 2016  7:14 AM PDTThis encounter has been a
dministratively closed with the authorization of the [facility].
 
 Electronically signed by Ariana Hoffman at 2016  7:14 AM PDTdocumented in this enc
ounter
 
 Plan of Treatment
 Not on filedocumented as of this encounter
 
 Procedures
 
 
+----------------------+--------+-------------+----------------------+----------------------
+
| Procedure Name       | Priori | Date/Time   | Associated Diagnosis | Comments             
|
|                      | ty     |             |                      |                      
|
+----------------------+--------+-------------+----------------------+----------------------
+
| LIPID SET (TRIG, T   | Routin | 2015  |                      |   Results for this   
|
| CHOL, HDL, CALC LDL) | e      |  2:24 PM    |                      | procedure are in the 
|
|                      |        | PDT         |                      |  results section.    
|
+----------------------+--------+-------------+----------------------+----------------------
+
| CBC, WITH            | Routin | 2015  |                      |   Results for this   
|
| DIFFERENTIAL         | e      |  2:19 PM    |                      | procedure are in the 
|
|                      |        | PDT         |                      |  results section.    
|
+----------------------+--------+-------------+----------------------+----------------------
+
| COMPLETE METABOLIC   | Routin | 2015  |                      |   Results for this   
|
| SET                  | e      |  2:19 PM    |                      | procedure are in the 
|
| (NA,K,CL,CO2,BUN,CRE |        | PDT         |                      |  results section.    
|
| AT,GLUC,CA,AST,ALT,B |        |             |                      |                      
|
| ASHWINI TOTAL,ALK        |        |             |                      |                      
|
| PHOS,ALB,PROT TOTAL) |        |             |                      |                      
|
+----------------------+--------+-------------+----------------------+----------------------
+
| GGT, PLASMA          | Routin | 2015  |                      |   Results for this   
 
|
|                      | e      |  2:19 PM    |                      | procedure are in the 
|
|                      |        | PDT         |                      |  results section.    
|
+----------------------+--------+-------------+----------------------+----------------------
+
| MAGNESIUM, PLASMA    | Routin | 2015  |                      |   Results for this   
|
|                      | e      |  2:19 PM    |                      | procedure are in the 
|
|                      |        | PDT         |                      |  results section.    
|
+----------------------+--------+-------------+----------------------+----------------------
+
 documented in this encounter
 
 Results
 LIPID SET (TRIG, T CHOL, HDL, CALC LDL) (2015  2:24 PM PDT)
 
+-------------+-------+-----------------+------------+--------------+
| Component   | Value | Ref Range       | Performed  | Pathologist  |
|             |       |                 | At         | Signature    |
+-------------+-------+-----------------+------------+--------------+
| CHOLESTEROL | 123   | 0 - 200 mg/dL   | [facility]  |              |
|   (LAB)     |       |                 | LAB -      |              |
|             |       |                 | [place]  |              |
+-------------+-------+-----------------+------------+--------------+
| TRIGLYCERID | 106   | 30 - 150 mg/dL  | [facility]  |              |
| ES          |       |                 | LAB -      |              |
|             |       |                 | [place]  |              |
+-------------+-------+-----------------+------------+--------------+
| LDL         | 140   | 100 - 215 mg/dL | [facility]  |              |
| CHOLESTEROL |       |                 | LAB -      |              |
| ,           |       |                 | [place]  |              |
| CALCULATED  |       |                 |            |              |
+-------------+-------+-----------------+------------+--------------+
 
 
 
+---------------+
| Specimen      |
+---------------+
| Blood - Blood |
+---------------+
 
 
 
+--------------------+----------------------+--------------------+----------------+
| Performing         | Address              | City/State/Zipcode | Phone Number   |
| Organization       |                      |                    |                |
+--------------------+----------------------+--------------------+----------------+
|   [facility]  |   [address] |   [place]    |   [phone] |
| [place]          |                      |                    |                |
+--------------------+----------------------+--------------------+----------------+
 MAGNESIUM, PLASMA (2015  2:19 PM PDT)
 
+-------------+-------+-----------------+------------+--------------+
| Component   | Value | Ref Range       | Performed  | Pathologist  |
|             |       |                 | At         | Signature    |
 
+-------------+-------+-----------------+------------+--------------+
| MAGNESIUM,P | 1.8   | 1.7 - 2.5 mg/dL | [facility]  |              |
| LASMA       |       |                 | LAB -      |              |
|             |       |                 | [place]  |              |
+-------------+-------+-----------------+------------+--------------+
 
 
 
+---------------+
| Specimen      |
+---------------+
| Blood - Blood |
+---------------+
 
 
 
+--------------------+----------------------+--------------------+----------------+
| Performing         | Address              | City/State/Zipcode | Phone Number   |
| Organization       |                      |                    |                |
+--------------------+----------------------+--------------------+----------------+
|   [facility]  |   [address] |   [place], [place]    |   [phone] |
| [place]          |                      |                    |                |
+--------------------+----------------------+--------------------+----------------+
 GGT, PLASMA (2015  2:19 PM PDT)
 
+-------------+-------+------------+------------+--------------+
| Component   | Value | Ref Range  | Performed  | Pathologist  |
|             |       |            | At         | Signature    |
+-------------+-------+------------+------------+--------------+
| GAMMA       | 7     | 5 - 60 u/l | [facility]  |              |
| GLUTAMYL    |       |            | LAB -      |              |
| TRANSFERASE |       |            | [place]  |              |
+-------------+-------+------------+------------+--------------+
 
 
 
+---------------+
| Specimen      |
+---------------+
| Blood - Blood |
+---------------+
 
 
 
+--------------------+----------------------+--------------------+----------------+
| Performing         | Address              | City/State/Zipcode | Phone Number   |
| Organization       |                      |                    |                |
+--------------------+----------------------+--------------------+----------------+
|   [facility]  |   [address] |   [place], [place]    |   [phone] |
| [place]          |                      |                    |                |
+--------------------+----------------------+--------------------+----------------+
 COMPLETE METABOLIC SET (NA,K,CL,CO2,BUN,CREAT,GLUC,CA,AST,ALT,BILI TOTAL,ALK PHOS,ALB,PROT 
TOTAL) (2015  2:19 PM PDT)
 
+-------------+-------+-----------------+------------+--------------+
| Component   | Value | Ref Range       | Performed  | Pathologist  |
|             |       |                 | At         | Signature    |
+-------------+-------+-----------------+------------+--------------+
| GLUCOSE,    | 79    | 70 - 100 mg/dL  | [facility]  |              |
| PLASMA      |       |                 | LAB -      |              |
 
| (LAB)       |       |                 | [place]  |              |
+-------------+-------+-----------------+------------+--------------+
| ANION GAP   | 16.8  | 7 - 21          | [facility]  |              |
|             |       |                 | LAB -      |              |
|             |       |                 | [place]  |              |
+-------------+-------+-----------------+------------+--------------+
| CREATININE  | 0.66  | 0.60 - 1.20     | [facility]  |              |
| PLASMA      |       | mg/dL           | LAB -      |              |
| (LAB)       |       |                 | [place]  |              |
+-------------+-------+-----------------+------------+--------------+
| TOTAL       | 7.1   | 6.1 - 8.5 g/dL  | [facility]  |              |
| PROTEIN,    |       |                 | LAB -      |              |
| PLASMA      |       |                 | [place]  |              |
| (LAB)       |       |                 |            |              |
+-------------+-------+-----------------+------------+--------------+
| ALBUMIN,    | 4.0   | 3.5 - 5.0 g/dL  | [facility]  |              |
| PLASMA      |       |                 | LAB -      |              |
| (LAB)       |       |                 | [place]  |              |
+-------------+-------+-----------------+------------+--------------+
| CALCIUM,    | 9.4   | 8.4 - 10.2      | [facility]  |              |
| PLASMA      |       | mg/dL           | LAB -      |              |
| (LAB)       |       |                 | [place]  |              |
+-------------+-------+-----------------+------------+--------------+
| BILIRUBIN   | 0.2   | 0.0 - 1.2       | [facility]  |              |
| TOTAL       |       | Transcutaneous  | LAB -      |              |
|             |       | Bilirubinometer | [place]  |              |
+-------------+-------+-----------------+------------+--------------+
| ALK PHOS    | 88    | 30 - 128 U/L    | [facility]  |              |
|             |       |                 | LAB -      |              |
|             |       |                 | [place]  |              |
+-------------+-------+-----------------+------------+--------------+
| AST(SGOT)   | 20    | 13 - 39 U/L     | [facility]  |              |
|             |       |                 | LAB -      |              |
|             |       |                 | [place]  |              |
+-------------+-------+-----------------+------------+--------------+
| SODIUM,     | 137   | 132 - 143       | [facility]  |              |
| PLASMA      |       | mmol/L          | LAB -      |              |
| (LAB)       |       |                 | [place]  |              |
+-------------+-------+-----------------+------------+--------------+
| POTASSIUM,  | 3.8   | 3.6 - 5.1       | [facility]  |              |
| PLASMA      |       | mmol/L          | LAB -      |              |
| (LAB)       |       |                 | [place]  |              |
+-------------+-------+-----------------+------------+--------------+
| CHLORIDE,   | 102   | 95 - 112 mmol/L | [facility]  |              |
| PLASMA      |       |                 | LAB -      |              |
| (LAB)       |       |                 | [place]  |              |
+-------------+-------+-----------------+------------+--------------+
| TOTAL CO2,  | 22    | 19 - 31 mmol/L  | [facility]  |              |
| PLASMA      |       |                 | LAB -      |              |
| (LAB)       |       |                 | [place]  |              |
+-------------+-------+-----------------+------------+--------------+
| ALT (SGPT)  | 10    | 7 - 52 U/L      | [facility]  |              |
|             |       |                 | LAB -      |              |
|             |       |                 | [place]  |              |
+-------------+-------+-----------------+------------+--------------+
| BUN/CREATIN | 21.2  | 6.0 - 28.6      | [facility]  |              |
| INE RATIO   |       |                 | LAB -      |              |
|             |       |                 | [place]  |              |
+-------------+-------+-----------------+------------+--------------+
| UREA        | 14    | 6 - 23 mg/dL    | [facility]  |              |
 
| NITROGEN,   |       |                 | LAB -      |              |
| SERUM       |       |                 | [place]  |              |
+-------------+-------+-----------------+------------+--------------+
| GLOBULIN    | 3.1   | 1.8 - 3.5       | [facility]  |              |
|             |       |                 | LAB -      |              |
|             |       |                 | [place]  |              |
+-------------+-------+-----------------+------------+--------------+
| A/G RATIO   | 1.3   | 1.1 - 2.4       | [facility]  |              |
|             |       |                 | LAB -      |              |
|             |       |                 | [place]  |              |
+-------------+-------+-----------------+------------+--------------+
 
 
 
+---------------+
| Specimen      |
+---------------+
| Blood - Blood |
+---------------+
 
 
 
+--------------------+----------------------+--------------------+----------------+
| Performing         | Address              | City/State/Zipcode | Phone Number   |
| Organization       |                      |                    |                |
+--------------------+----------------------+--------------------+----------------+
|   [facility]  |   [address] |   [place], [place]    |   [phone] |
| [place]          |                      |                    |                |
+--------------------+----------------------+--------------------+----------------+
 CBC, WITH DIFFERENTIAL (2015  2:19 PM PDT)
 
+-------------+----------+-----------------+------------+--------------+
| Component   | Value    | Ref Range       | Performed  | Pathologist  |
|             |          |                 | At         | Signature    |
+-------------+----------+-----------------+------------+--------------+
| WHITE CELL  | 8.3      | 4.5 - 11.0 K/cu | [facility]  |              |
| COUNT       |          |  mm             | LAB -      |              |
|             |          |                 | [place]  |              |
+-------------+----------+-----------------+------------+--------------+
| RED CELL    | 4.55     | 3.8 - 5.1 M/cu  | [facility]  |              |
| COUNT       |          | mm              | LAB -      |              |
|             |          |                 | [place]  |              |
+-------------+----------+-----------------+------------+--------------+
| HEMOGLOBIN  | 13.5     | 12.0 - 16.0     | [facility]  |              |
|             |          | g/dL            | LAB -      |              |
|             |          |                 | [place]  |              |
+-------------+----------+-----------------+------------+--------------+
| HEMATOCRIT  | 40.2     | 35 - 45 %       | [facility]  |              |
|             |          |                 | LAB -      |              |
|             |          |                 | [place]  |              |
+-------------+----------+-----------------+------------+--------------+
| MCV         | 88.3     | 81 - 99 fL      | [facility]  |              |
|             |          |                 | LAB -      |              |
|             |          |                 | [place]  |              |
+-------------+----------+-----------------+------------+--------------+
| MCH         | 30       | 27 - 33 pg      | [facility]  |              |
|             |          |                 | LAB -      |              |
|             |          |                 | [place]  |              |
+-------------+----------+-----------------+------------+--------------+
| MCHC        | 34       | 30 - 36 g/dL    | [facility]  |              |
 
|             |          |                 | LAB -      |              |
|             |          |                 | [place]  |              |
+-------------+----------+-----------------+------------+--------------+
| PLATELET    | 313      | 140 - 440 K/cu  | [facility]  |              |
| COUNT       |          | mm              | LAB -      |              |
|             |          |                 | [place]  |              |
+-------------+----------+-----------------+------------+--------------+
| NEUTROPHIL  | 63.8     | 39 - 80 %       | [facility]  |              |
| %           |          |                 | LAB -      |              |
|             |          |                 | [place]  |              |
+-------------+----------+-----------------+------------+--------------+
| LYMPHOCYTE  | 21.5 (A) | 24 - 44 %       | [facility]  |              |
| %           |          |                 | LAB -      |              |
|             |          |                 | [place]  |              |
+-------------+----------+-----------------+------------+--------------+
| MONOCYTE %  | 12.6 (A) | 0 - 12 %        | [facility]  |              |
|             |          |                 | LAB -      |              |
|             |          |                 | [place]  |              |
+-------------+----------+-----------------+------------+--------------+
| EOS %       | 1.6      | 0 - 6 %         | [facility]  |              |
|             |          |                 | LAB -      |              |
|             |          |                 | [place]  |              |
+-------------+----------+-----------------+------------+--------------+
| BASO %      | 0.5      | 0 - 2 %         | [facility]  |              |
|             |          |                 | LAB -      |              |
|             |          |                 | [place]  |              |
+-------------+----------+-----------------+------------+--------------+
| RDW         | 13.5     | 10.5 - 15.0 %   | [facility]  |              |
|             |          |                 | LAB -      |              |
|             |          |                 | [place]  |              |
+-------------+----------+-----------------+------------+--------------+
 
 
 
+---------------+
| Specimen      |
+---------------+
| Blood - Blood |
+---------------+
 
 
 
+--------------------+----------------------+--------------------+----------------+
| Performing         | Address              | City/State/Zipcode | Phone Number   |
| Organization       |                      |                    |                |
+--------------------+----------------------+--------------------+----------------+
|   [facility]  |   [address] |   [place], [place]    |   [phone] |
| [place]          |                      |                    |                |
+--------------------+----------------------+--------------------+----------------+
 documented in this encounter
 
 Visit Diagnoses
 Not on filedocumented in this encounter

## 2020-10-13 NOTE — XMS
Encounter Summary
  Created on: 10/13/2020
 
 Bonifacio Nila JB
 External Reference #: [id number]
 : [birth year]
 Sex: Female
 
 Demographics
 
 
+-----------------------+------------------------+
| Address               | [address]            |
|                       | [place]  [address]   |
+-----------------------+------------------------+
| Home Phone            | [phone]        |
+-----------------------+------------------------+
| Preferred Language    | Unknown                |
+-----------------------+------------------------+
| Marital Status        | Single                 |
+-----------------------+------------------------+
| [employer] Affiliation | Unknown                |
+-----------------------+------------------------+
| Race                  | White                  |
+-----------------------+------------------------+
| Ethnic Group          | Not  or  |
+-----------------------+------------------------+
 
 
 Author
 
 
+--------------+------------------------------+
| Author       | [facility] [employer] [facility] |
+--------------+------------------------------+
| Organization | [facility] |
+--------------+------------------------------+
| Address      | Unknown                      |
+--------------+------------------------------+
| Phone        | Unavailable                  |
+--------------+------------------------------+
 
 
 
 Support
 
 
+-----------------+--------------+---------+-----------------+
| Name            | Relationship | Address | Phone           |
+-----------------+--------------+---------+-----------------+
| Kit Valdovinos   | ECON         | Unknown | [phone] |
+-----------------+--------------+---------+-----------------+
| Magdalena Valdovinos | ECON         | Unknown | [phone] |
+-----------------+--------------+---------+-----------------+
 
 
 
 Care Team Providers
 
 
 
+-----------------------+------+-----------------+
| Care Team Member Name | Role | Phone           |
+-----------------------+------+-----------------+
| Lily Horton MD   | PCP  | [phone] |
+-----------------------+------+-----------------+
 
 
 
 Reason for Visit
 
 
+--------+--------+-------------------------+
| Reason | Onset  | Comments                |
|        | Date   |                         |
+--------+--------+-------------------------+
| Other  | / | No lab work in one year |
|        |    |                         |
+--------+--------+-------------------------+
 
 
 
 Encounter Details
 
 
+--------+-----------+----------------------+---------------------+---------------------+
| Date   | Type      | Department           | Care Team           | Description         |
+--------+-----------+----------------------+---------------------+---------------------+
| / | Telephone |   Pediatric          |   Monserrat Spann,  | Other (No lab work  |
|    |           | Gastroenterology at  | RN  [address] [facility]     | in one year)        |
|        |           | [place]          | [address]     |                     |
|        |           | [facility]  | [place] [address]  |                     |
|        |           |  [address]    |                     |                     |
|        |           | [place]          |                     |                     |
|        |           | [facility], |                     |                     |
|        |           |  [address] [place] [address]           |                     |                     |
|        |           | [place], [place]         |                     |                     |
|        |           | [address]           |                     |                     |
|        |           | [phone]         |                     |                     |
+--------+-----------+----------------------+---------------------+---------------------+
 
 
 
 Social History
 
 
+----------------+-------+-----------+--------+------+
| Tobacco Use    | Types | Packs/Day | Years  | Date |
|                |       |           | Used   |      |
+----------------+-------+-----------+--------+------+
| Never Assessed |       |           |        |      |
+----------------+-------+-----------+--------+------+
 
 
 
+------------------+---------------+
| Sex Assigned at  | Date Recorded |
| Birth            |               |
+------------------+---------------+
 
| Not on file      |               |
+------------------+---------------+
 documented as of this encounter
 
 Miscellaneous Notes
 Telephone Encounter - Monserrat Spann RN - 2010  2:40 PM PDTAfter calling the local 
lab and finding out that no lab work has been drawn since Oct. of 2009.  left message on mom
's cell phone requesting she call me to let me know where the lab work is being drawn and wh
o is following Nila's liver transplant.Electronically signed by Monserrat Spann RN at   2:40 PM PDTdocumented in this encounter
 
 Plan of Treatment
 Not on filedocumented as of this encounter
 
 Visit Diagnoses
 Not on filedocumented in this encounter

## 2020-10-13 NOTE — XMS
Encounter Summary
  Created on: 10/13/2020
 
 Bonifacio Nila JB
 External Reference #: [id number]
 : [birth year]
 Sex: Female
 
 Demographics
 
 
+-----------------------+------------------------+
| Address               | [address]            |
|                       | [place]  [address]   |
+-----------------------+------------------------+
| Home Phone            | [phone]        |
+-----------------------+------------------------+
| Preferred Language    | Unknown                |
+-----------------------+------------------------+
| Marital Status        | Single                 |
+-----------------------+------------------------+
| Moravian Affiliation | Unknown                |
+-----------------------+------------------------+
| Race                  | White                  |
+-----------------------+------------------------+
| Ethnic Group          | Not  or  |
+-----------------------+------------------------+
 
 
 Author
 
 
+--------------+------------------------------+
| Author       | [facility] |
+--------------+------------------------------+
| Organization | [facility] |
+--------------+------------------------------+
| Address      | Unknown                      |
+--------------+------------------------------+
| Phone        | Unavailable                  |
+--------------+------------------------------+
 
 
 
 Support
 
 
+-----------------+--------------+---------+-----------------+
| Name            | Relationship | Address | Phone           |
+-----------------+--------------+---------+-----------------+
| Kit Valdovinos   | ECON         | Unknown | [phone] |
+-----------------+--------------+---------+-----------------+
| Magdalena Valdovinos | ECON         | Unknown | [phone] |
+-----------------+--------------+---------+-----------------+
 
 
 
 Care Team Providers
 
 
 
+------------------------+------+-----------------+
| Care Team Member Name  | Role | Phone           |
+------------------------+------+-----------------+
| Lily Horton MD | PCP  | [phone] |
+------------------------+------+-----------------+
 
 
 
 Encounter Details
 
 
+--------+----------+----------------------+----------------------+-------------+
| Date   | Type     | Department           | Care Team            | Description |
+--------+----------+----------------------+----------------------+-------------+
| / | Abstract |   Pediatric          |   Neema Khalli,   |             |
|    |          | Gastroenterology at  | MD  [address] |             |
|        |          | [place]          |   [place], [place]       |             |
|        |          | [facility]  | [address]           |             |
|        |          |  [address]     | [phone]         |             |
|        |          | [place]          | [phone] (Fax)   |             |
|        |          | [facility], |                      |             |
|        |          |  [address] [place] [address]           |                      |             |
|        |          | [place], [place]         |                      |             |
|        |          | [address]           |                      |             |
|        |          | [phone]         |                      |             |
+--------+----------+----------------------+----------------------+-------------+
 
 
 
 Social History
 
 
+-------------------+-------+-----------+--------+------+
| Tobacco Use       | Types | Packs/Day | Years  | Date |
|                   |       |           | Used   |      |
+-------------------+-------+-----------+--------+------+
| Passive Smoke     |       |           |        |      |
| Exposure - Never  |       |           |        |      |
| Smoker            |       |           |        |      |
+-------------------+-------+-----------+--------+------+
 
 
 
+---------------------+---+---+---+
| Smokeless Tobacco:  |   |   |   |
| Never Used          |   |   |   |
+---------------------+---+---+---+
 
 
 
+-------------+-------------+---------+----------+
| Alcohol Use | Drinks/Week | oz/Week | Comments |
+-------------+-------------+---------+----------+
| Not Asked   |             |         |          |
+-------------+-------------+---------+----------+
 
 
 
 
+------------------+---------------+
| Sex Assigned at  | Date Recorded |
| Birth            |               |
+------------------+---------------+
| Not on file      |               |
+------------------+---------------+
 documented as of this encounter
 
 Plan of Treatment
 Not on filedocumented as of this encounter
 
 Procedures
 
 
+----------------------+--------+-------------+----------------------+----------------------
+
| Procedure Name       | Priori | Date/Time   | Associated Diagnosis | Comments             
|
|                      | ty     |             |                      |                      
|
+----------------------+--------+-------------+----------------------+----------------------
+
| CHOLESTEROL, TOTAL   | Routin | 2015  |                      |   Results for this   
|
| (EXTERNAL RESULTS    | e      |  3:11 PM    |                      | procedure are in the 
|
| ONLY)                |        | PST         |                      |  results section.    
|
+----------------------+--------+-------------+----------------------+----------------------
+
| CBC, WITH            | Routin | 2015  |                      |   Results for this   
|
| DIFFERENTIAL         | e      |  3:11 PM    |                      | procedure are in the 
|
|                      |        | PST         |                      |  results section.    
|
+----------------------+--------+-------------+----------------------+----------------------
+
| COMPLETE METABOLIC   | Routin | 2015  |                      |   Results for this   
|
| SET                  | e      |  3:11 PM    |                      | procedure are in the 
|
| (NA,K,CL,CO2,BUN,CRE |        | PST         |                      |  results section.    
|
| AT,GLUC,CA,AST,ALT,B |        |             |                      |                      
|
| ASHWINI TOTAL,ALK        |        |             |                      |                      
|
| PHOS,ALB,PROT TOTAL) |        |             |                      |                      
|
+----------------------+--------+-------------+----------------------+----------------------
+
| PHOSPHORUS, PLASMA   | Routin | 2015  |                      |   Results for this   
|
|                      | e      |  3:11 PM    |                      | procedure are in the 
|
|                      |        | PST         |                      |  results section.    
|
+----------------------+--------+-------------+----------------------+----------------------
+
 
| GGT, PLASMA          | Routin | 2015  |                      |   Results for this   
|
|                      | e      |  3:11 PM    |                      | procedure are in the 
|
|                      |        | PST         |                      |  results section.    
|
+----------------------+--------+-------------+----------------------+----------------------
+
| URIC ACID, PLASMA    | Routin | 2015  |                      |   Results for this   
|
|                      | e      |  3:11 PM    |                      | procedure are in the 
|
|                      |        | PST         |                      |  results section.    
|
+----------------------+--------+-------------+----------------------+----------------------
+
| MAGNESIUM, PLASMA    | Routin | 2015  |                      |   Results for this   
|
|                      | e      |  3:11 PM    |                      | procedure are in the 
|
|                      |        | PST         |                      |  results section.    
|
+----------------------+--------+-------------+----------------------+----------------------
+
| TRIGLYCERIDES,       | Routin | 2015  |                      |   Results for this   
|
| PLASMA               | e      |  3:11 PM    |                      | procedure are in the 
|
|                      |        | PST         |                      |  results section.    
|
+----------------------+--------+-------------+----------------------+----------------------
+
| LDH TOTAL, PLASMA    | Routin | 2015  |                      |   Results for this   
|
|                      | e      |  3:11 PM    |                      | procedure are in the 
|
|                      |        | PST         |                      |  results section.    
|
+----------------------+--------+-------------+----------------------+----------------------
+
 documented in this encounter
 
 Results
 CBC, WITH DIFFERENTIAL (2015  3:11 PM PST)
 
+-------------+----------+-----------------+------------+--------------+
| Component   | Value    | Ref Range       | Performed  | Pathologist  |
|             |          |                 | At         | Signature    |
+-------------+----------+-----------------+------------+--------------+
| WHITE CELL  | 10.7     | 4.5 - 11.0 K/cu | [facility]  |              |
| COUNT       |          |  mm             | LAB -      |              |
|             |          |                 | [place]  |              |
+-------------+----------+-----------------+------------+--------------+
| RED CELL    | 4.62     | 3.8 - 5.1 M/cu  | [facility]  |              |
| COUNT       |          | mm              | LAB -      |              |
|             |          |                 | [place]  |              |
+-------------+----------+-----------------+------------+--------------+
| HEMOGLOBIN  | 14.3     | 12 - 16 g/dL    | [facility]  |              |
|             |          |                 | LAB -      |              |
|             |          |                 | [place]  |              |
 
+-------------+----------+-----------------+------------+--------------+
| HEMATOCRIT  | 41.4     | 35 - 45 %       | [facility]  |              |
|             |          |                 | LAB -      |              |
|             |          |                 | [place]  |              |
+-------------+----------+-----------------+------------+--------------+
| MCV         | 89.5     | 81 - 99 fL      | [facility]  |              |
|             |          |                 | LAB -      |              |
|             |          |                 | [place]  |              |
+-------------+----------+-----------------+------------+--------------+
| MCH         | 31       | 27 - 33 pg      | [facility]  |              |
|             |          |                 | LAB -      |              |
|             |          |                 | [place]  |              |
+-------------+----------+-----------------+------------+--------------+
| MCHC        | 35       | 30 - 36 g/dL    | [facility]  |              |
|             |          |                 | LAB -      |              |
|             |          |                 | [place]  |              |
+-------------+----------+-----------------+------------+--------------+
| PLATELET    | 370      | 140 - 440 K/cu  | [facility]  |              |
| COUNT       |          | mm              | LAB -      |              |
|             |          |                 | [place]  |              |
+-------------+----------+-----------------+------------+--------------+
| NEUTROPHIL  | 70.4     | 39 - 80 %       | [facility]  |              |
| %           |          |                 | LAB -      |              |
|             |          |                 | [place]  |              |
+-------------+----------+-----------------+------------+--------------+
| LYMPHOCYTE  | 20.5 (A) | 24 - 44 %       | [facility]  |              |
| %           |          |                 | LAB -      |              |
|             |          |                 | [place]  |              |
+-------------+----------+-----------------+------------+--------------+
| MONOCYTE %  | 6.7      | 0 - 12 %        | [facility]  |              |
|             |          |                 | LAB -      |              |
|             |          |                 | [place]  |              |
+-------------+----------+-----------------+------------+--------------+
| EOS %       | 2.1      | 0 - 6 %         | [facility]  |              |
|             |          |                 | LAB -      |              |
|             |          |                 | [place]  |              |
+-------------+----------+-----------------+------------+--------------+
| BASO %      | 0.6      | 0 - 2 %         | [facility]  |              |
|             |          |                 | LAB -      |              |
|             |          |                 | [place]  |              |
+-------------+----------+-----------------+------------+--------------+
| RDW         | 13       | 10.5 - 15 %     | [facility]  |              |
|             |          |                 | LAB -      |              |
|             |          |                 | [place]  |              |
+-------------+----------+-----------------+------------+--------------+
 
 
 
+---------------+
| Specimen      |
+---------------+
| Blood - Blood |
+---------------+
 
 
 
+--------------------+----------------------+--------------------+----------------+
| Performing         | Address              | City/State/Zipcode | Phone Number   |
| Organization       |                      |                    |                |
+--------------------+----------------------+--------------------+----------------+
 
|   [facility]  |   [address] |   [place]    |   [phone] |
| [place]          |                      |                    |                |
+--------------------+----------------------+--------------------+----------------+
 MAGNESIUM, PLASMA (2015  3:11 PM PST)
 
+-----------+-------+-----------------+------------+--------------+
| Component | Value | Ref Range       | Performed  | Pathologist  |
|           |       |                 | At         | Signature    |
+-----------+-------+-----------------+------------+--------------+
| MAGNESIUM | 1.8   | 1.7 - 2.5 mg/dL | [facility]  |              |
|           |       |                 | LAB -      |              |
|           |       |                 | [place]  |              |
+-----------+-------+-----------------+------------+--------------+
 
 
 
+---------------+
| Specimen      |
+---------------+
| Blood - Blood |
+---------------+
 
 
 
+--------------------+----------------------+--------------------+----------------+
| Performing         | Address              | City/State/Zipcode | Phone Number   |
| Organization       |                      |                    |                |
+--------------------+----------------------+--------------------+----------------+
|   [facility]  |   [address] |   [place]    |   [phone] |
| [place]          |                      |                    |                |
+--------------------+----------------------+--------------------+----------------+
 GGT, PLASMA (2015  3:11 PM PST)
 
+-----------+-------+------------+------------+--------------+
| Component | Value | Ref Range  | Performed  | Pathologist  |
|           |       |            | At         | Signature    |
+-----------+-------+------------+------------+--------------+
| GAMMA     | 7     | 5 - 60 U/L | [facility]  |              |
| GLUTAMYL  |       |            | LAB -      |              |
| TRANS     |       |            | [place]  |              |
+-----------+-------+------------+------------+--------------+
 
 
 
+---------------+
| Specimen      |
+---------------+
| Blood - Blood |
+---------------+
 
 
 
+--------------------+----------------------+--------------------+----------------+
| Performing         | Address              | City/State/Zipcode | Phone Number   |
| Organization       |                      |                    |                |
+--------------------+----------------------+--------------------+----------------+
|   [facility]  |   [address] |   [place]    |   [phone] |
| [place]          |                      |                    |                |
+--------------------+----------------------+--------------------+----------------+
 COMPLETE METABOLIC SET (NA,K,CL,CO2,BUN,CREAT,GLUC,CA,AST,ALT,BILI TOTAL,ALK PHOS,ALB,PROT 
 
TOTAL) (2015  3:11 PM PST)
 
+-------------+--------+-----------------+------------+--------------+
| Component   | Value  | Ref Range       | Performed  | Pathologist  |
|             |        |                 | At         | Signature    |
+-------------+--------+-----------------+------------+--------------+
| GLUCOSE,    | 77     | 70 - 100 mg/dL  | [facility]  |              |
| PLASMA      |        |                 | LAB -      |              |
| (LAB)       |        |                 | [place]  |              |
+-------------+--------+-----------------+------------+--------------+
| BUN, PLASMA | 14     | 6 - 23 mg/dL    | [facility]  |              |
|  (LAB)      |        |                 | LAB -      |              |
|             |        |                 | [place]  |              |
+-------------+--------+-----------------+------------+--------------+
| CREATININE  | 0.66   | 0.60 - 1.20     | [facility]  |              |
| PLASMA      |        | mg/dL           | LAB -      |              |
| (LAB)       |        |                 | [place]  |              |
+-------------+--------+-----------------+------------+--------------+
| TOTAL       | 7.4    | 6.1 - 8.5 g/dL  | [facility]  |              |
| PROTEIN,    |        |                 | LAB -      |              |
| PLASMA      |        |                 | [place]  |              |
| (LAB)       |        |                 |            |              |
+-------------+--------+-----------------+------------+--------------+
| ALBUMIN,    | 4.2    | 3.5 - 5.0 g/dL  | [facility]  |              |
| PLASMA      |        |                 | LAB -      |              |
| (LAB)       |        |                 | [place]  |              |
+-------------+--------+-----------------+------------+--------------+
| CALCIUM,    | 9.9    | 8.4 - 10.2      | [facility]  |              |
| PLASMA      |        | mg/dL           | LAB -      |              |
| (LAB)       |        |                 | [place]  |              |
+-------------+--------+-----------------+------------+--------------+
| BILIRUBIN   | 0.3    | 0 - 1.2         | [facility]  |              |
| TOTAL       |        | Transcutaneous  | LAB -      |              |
|             |        | Bilirubinometer | [place]  |              |
+-------------+--------+-----------------+------------+--------------+
| ALK PHOS    | 82     | 30 - 128 U/L    | [facility]  |              |
|             |        |                 | LAB -      |              |
|             |        |                 | [place]  |              |
+-------------+--------+-----------------+------------+--------------+
| AST(SGOT)   | 12 (A) | 13 - 39 U/L     | [facility]  |              |
|             |        |                 | LAB -      |              |
|             |        |                 | [place]  |              |
+-------------+--------+-----------------+------------+--------------+
| SODIUM,     | 139    | 132 - 143       | [facility]  |              |
| PLASMA      |        | mmol/L          | LAB -      |              |
| (LAB)       |        |                 | [place]  |              |
+-------------+--------+-----------------+------------+--------------+
| POTASSIUM,  | 4.2    | 3.6 - 5.1       | [facility]  |              |
| PLASMA      |        | mmol/L          | LAB -      |              |
| (LAB)       |        |                 | [place]  |              |
+-------------+--------+-----------------+------------+--------------+
| CHLORIDE,   | 102    | 95 - 112 mmol/L | [facility]  |              |
| PLASMA      |        |                 | LAB -      |              |
| (LAB)       |        |                 | [place]  |              |
+-------------+--------+-----------------+------------+--------------+
| TOTAL CO2,  | 28     | 19 - 31 mmol/L  | [facility]  |              |
| PLASMA      |        |                 | LAB -      |              |
| (LAB)       |        |                 | [place]  |              |
+-------------+--------+-----------------+------------+--------------+
| ALT (SGPT)  | 10     | 7 - 52 U/L      | [facility]  |              |
 
|             |        |                 | LAB -      |              |
|             |        |                 | [place]  |              |
+-------------+--------+-----------------+------------+--------------+
| ANION GAP   | 13.2   | 7 - 21          | [facility]  |              |
|             |        |                 | LAB -      |              |
|             |        |                 | [place]  |              |
+-------------+--------+-----------------+------------+--------------+
| BUN/CREATIN | 21.2   | 6.0 - 28.6      | [facility]  |              |
| INE RATIO   |        |                 | LAB -      |              |
|             |        |                 | [place]  |              |
+-------------+--------+-----------------+------------+--------------+
| GLOBULIN    | 3.2    | 1.8 - 3.5       | [facility]  |              |
|             |        |                 | LAB -      |              |
|             |        |                 | [place]  |              |
+-------------+--------+-----------------+------------+--------------+
| A/G RATIO   | 1.3    | 1.1 - 2.4       | [facility]  |              |
|             |        |                 | LAB -      |              |
|             |        |                 | [place]  |              |
+-------------+--------+-----------------+------------+--------------+
| BILIRUBIN   | 0.1    | 0.0 - 0.1 mg/dL | [facility]  |              |
| DIRECT      |        |                 | LAB -      |              |
|             |        |                 | [place]  |              |
+-------------+--------+-----------------+------------+--------------+
 
 
 
+---------------+
| Specimen      |
+---------------+
| Blood - Blood |
+---------------+
 
 
 
+--------------------+----------------------+--------------------+----------------+
| Performing         | Address              | City/State/Zipcode | Phone Number   |
| Organization       |                      |                    |                |
+--------------------+----------------------+--------------------+----------------+
|   [facility]  |   [address] |   [place]    |   [phone] |
| [place]          |                      |                    |                |
+--------------------+----------------------+--------------------+----------------+
 TRIGLYCERIDES, PLASMA (2015  3:11 PM PST)
 
+-------------+-------+----------------+------------+--------------+
| Component   | Value | Ref Range      | Performed  | Pathologist  |
|             |       |                | At         | Signature    |
+-------------+-------+----------------+------------+--------------+
| TRIGLYCERID | 86    | 30 - 150 mg/dL | [facility]  |              |
| ES          |       |                | LAB -      |              |
|             |       |                | [place]  |              |
+-------------+-------+----------------+------------+--------------+
 
 
 
+---------------+
| Specimen      |
+---------------+
| Blood - Blood |
+---------------+
 
 
 
 
+--------------------+----------------------+--------------------+----------------+
| Performing         | Address              | City/State/Zipcode | Phone Number   |
| Organization       |                      |                    |                |
+--------------------+----------------------+--------------------+----------------+
|   [facility]  |   [address] |   [place]    |   [phone] |
| [place]          |                      |                    |                |
+--------------------+----------------------+--------------------+----------------+
 LDH TOTAL, PLASMA (2015  3:11 PM PST)
 
+-------------+-------+------------+------------+--------------+
| Component   | Value | Ref Range  | Performed  | Pathologist  |
|             |       |            | At         | Signature    |
+-------------+-------+------------+------------+--------------+
| LDH (TOTAL) | 129   | 100 - 215  | [facility]  |              |
|             |       | Units/L    | LAB -      |              |
|             |       |            | [place]  |              |
+-------------+-------+------------+------------+--------------+
 
 
 
+---------------+
| Specimen      |
+---------------+
| Blood - Blood |
+---------------+
 
 
 
+--------------------+----------------------+--------------------+----------------+
| Performing         | Address              | City/State/Zipcode | Phone Number   |
| Organization       |                      |                    |                |
+--------------------+----------------------+--------------------+----------------+
|   [facility]  |   [address] |   [place], [place]    |   [phone] |
| [place]          |                      |                    |                |
+--------------------+----------------------+--------------------+----------------+
 CHOLESTEROL, TOTAL (EXTERNAL RESULTS ONLY) (2015  3:11 PM PST)
 
+-------------+-------+-----------+------------+--------------+
| Component   | Value | Ref Range | Performed  | Pathologist  |
|             |       |           | At         | Signature    |
+-------------+-------+-----------+------------+--------------+
| CHOLESTEROL | 129   | 200 mg/dL | [facility]  |              |
|   (LAB)     |       |           | LAB -      |              |
|             |       |           | [place]  |              |
+-------------+-------+-----------+------------+--------------+
 
 
 
+----------+
| Specimen |
+----------+
| Blood    |
+----------+
 
 
 
+--------------------+----------------------+--------------------+----------------+
| Performing         | Address              | City/State/Zipcode | Phone Number   |
 
| Organization       |                      |                    |                |
+--------------------+----------------------+--------------------+----------------+
|   [facility]  |   [address] |   [place], [place]    |   [phone] |
| [place]          |                      |                    |                |
+--------------------+----------------------+--------------------+----------------+
 URIC ACID, PLASMA (2015  3:11 PM PST)
 
+-------------+-------+-----------------+------------+--------------+
| Component   | Value | Ref Range       | Performed  | Pathologist  |
|             |       |                 | At         | Signature    |
+-------------+-------+-----------------+------------+--------------+
| URIC ACID,  | 3.6   | 2.3 - 6.6 mg/dL | [facility]  |              |
| PLASMA      |       |                 | LAB -      |              |
| (LAB)       |       |                 | [place]  |              |
+-------------+-------+-----------------+------------+--------------+
 
 
 
+---------------+
| Specimen      |
+---------------+
| Blood - Blood |
+---------------+
 
 
 
+--------------------+----------------------+--------------------+----------------+
| Performing         | Address              | City/State/Zipcode | Phone Number   |
| Organization       |                      |                    |                |
+--------------------+----------------------+--------------------+----------------+
|   [facility]  |   [address] |   [place]    |   [phone] |
| [place]          |                      |                    |                |
+--------------------+----------------------+--------------------+----------------+
 PHOSPHORUS, PLASMA (2015  3:11 PM PST)
 
+-------------+-------+-----------------+------------+--------------+
| Component   | Value | Ref Range       | Performed  | Pathologist  |
|             |       |                 | At         | Signature    |
+-------------+-------+-----------------+------------+--------------+
| PHOSPHORUS, | 3.9   | 2.5 - 5.0 mg/dL | [facility]  |              |
|  PLASMA     |       |                 | LAB -      |              |
| (LAB)       |       |                 | [place]  |              |
+-------------+-------+-----------------+------------+--------------+
 
 
 
+---------------+
| Specimen      |
+---------------+
| Blood - Blood |
+---------------+
 
 
 
+--------------------+----------------------+--------------------+----------------+
| Performing         | Address              | City/State/Zipcode | Phone Number   |
| Organization       |                      |                    |                |
+--------------------+----------------------+--------------------+----------------+
|   [facility]  |   [address] |   [place]    |   [phone] |
| [place]          |                      |                    |                |
 
+--------------------+----------------------+--------------------+----------------+
 documented in this encounter
 
 Visit Diagnoses
 Not on filedocumented in this encounter

## 2020-10-13 NOTE — XMS
Encounter Summary
  Created on: 10/13/2020
 
 Bonifacio Nila JB
 External Reference #: [id number]
 : [birth year]
 Sex: Female
 
 Demographics
 
 
+-----------------------+------------------------+
| Address               | [address]            |
|                       | [place]  [address]   |
+-----------------------+------------------------+
| Home Phone            | [phone]        |
+-----------------------+------------------------+
| Preferred Language    | Unknown                |
+-----------------------+------------------------+
| Marital Status        | Single                 |
+-----------------------+------------------------+
| Denominational Affiliation | Unknown                |
+-----------------------+------------------------+
| Race                  | White                  |
+-----------------------+------------------------+
| Ethnic Group          | Not  or  |
+-----------------------+------------------------+
 
 
 Author
 
 
+--------------+------------------------------+
| Author       | [facility] |
+--------------+------------------------------+
| Organization | [facility] |
+--------------+------------------------------+
| Address      | Unknown                      |
+--------------+------------------------------+
| Phone        | Unavailable                  |
+--------------+------------------------------+
 
 
 
 Support
 
 
+-----------------+--------------+---------+-----------------+
| Name            | Relationship | Address | Phone           |
+-----------------+--------------+---------+-----------------+
| Kit Valdovinos   | ECON         | Unknown | [phone] |
+-----------------+--------------+---------+-----------------+
| Magdalena Valdovinos | ECON         | Unknown | [phone] |
+-----------------+--------------+---------+-----------------+
 
 
 
 Care Team Providers
 
 
 
+-----------------------+------+-------------+
| Care Team Member Name | Role | Phone       |
+-----------------------+------+-------------+
 PCP  | Unavailable |
+-----------------------+------+-------------+
 
 
 
 Encounter Details
 
 
+--------+----------+----------------------+----------------------+-------------+
| Date   | Type     | Department           | Care Team            | Description |
+--------+----------+----------------------+----------------------+-------------+
| / | Abstract |   Pediatric          |   Neema Khalil,   |             |
|    |          | Gastroenterology at  | MD  [address] |             |
|        |          | [place]          |   [place], [place]       |             |
|        |          | [facility]  | [address]           |             |
|        |          |  [address]    | [phone]         |             |
|        |          | [place]          | [phone] (Fax)   |             |
|        |          | [facility] |                      |             |
|        |          |  7th floor           |                      |             |
|        |          | [place], [place]         |                      |             |
|        |          | [address]           |                      |             |
|        |          | [phone]         |                      |             |
+--------+----------+----------------------+----------------------+-------------+
 
 
 
 Social History
 
 
+----------------+-------+-----------+--------+------+
| Tobacco Use    | Types | Packs/Day | Years  | Date |
|                |       |           | Used   |      |
+----------------+-------+-----------+--------+------+
| Never Assessed |       |           |        |      |
+----------------+-------+-----------+--------+------+
 
 
 
+------------------+---------------+
| Sex Assigned at  | Date Recorded |
| Birth            |               |
+------------------+---------------+
| Not on file      |               |
+------------------+---------------+
 documented as of this encounter
 
 Plan of Treatment
 Not on filedocumented as of this encounter
 
 Visit Diagnoses
 Not on filedocumented in this encounter

## 2020-10-13 NOTE — XMS
Encounter Summary
  Created on: 10/13/2020
 
 Bonifacio Nila JB
 External Reference #: [id number]
 : [birth year]
 Sex: Female
 
 Demographics
 
 
+-----------------------+------------------------+
| Address               | [address]            |
|                       | [place]  [address]   |
+-----------------------+------------------------+
| Home Phone            | [phone]        |
+-----------------------+------------------------+
| Preferred Language    | Unknown                |
+-----------------------+------------------------+
| Marital Status        | Single                 |
+-----------------------+------------------------+
| Taoist Affiliation | Unknown                |
+-----------------------+------------------------+
| Race                  | White                  |
+-----------------------+------------------------+
| Ethnic Group          | Not  or  |
+-----------------------+------------------------+
 
 
 Author
 
 
+--------------+------------------------------+
| Author       | [facility] |
+--------------+------------------------------+
| Organization | [facility] |
+--------------+------------------------------+
| Address      | Unknown                      |
+--------------+------------------------------+
| Phone        | Unavailable                  |
+--------------+------------------------------+
 
 
 
 Support
 
 
+-----------------+--------------+---------+-----------------+
| Name            | Relationship | Address | Phone           |
+-----------------+--------------+---------+-----------------+
| Kti Valdovinos   | ECON         | Unknown | [phone] |
+-----------------+--------------+---------+-----------------+
| Magdalena Valdovinos | ECON         | Unknown | [phone] |
+-----------------+--------------+---------+-----------------+
 
 
 
 Care Team Providers
 
 
 
+------------------------+------+-----------------+
| Care Team Member Name  | Role | Phone           |
+------------------------+------+-----------------+
| Lily Horton MD | PCP  | [phone] |
+------------------------+------+-----------------+
 
 
 
 Reason for Visit
 
 
+-------------------+--------+----------+
| Reason            | Onset  | Comments |
|                   | Date   |          |
+-------------------+--------+----------+
| Care Coordination | / |          |
|                   | 2018   |          |
+-------------------+--------+----------+
 
 
 
 Encounter Details
 
 
+--------+-----------+----------------------+----------------------+-------------------+
| Date   | Type      | Department           | Care Team            | Description       |
+--------+-----------+----------------------+----------------------+-------------------+
| / | Telephone |   Pediatric          |   Neema Khalil,   | Care Coordination |
| 2018   |           | Gastroenterology at  | MD  [address] |                   |
|        |           | [place]          |   [place], [place]       |                   |
|        |           | [facility]  | [address]           |                   |
|        |           |  [address]     | [phone]         |                   |
|        |           | [place]          | [phone] (Fax)   |                   |
|        |           | [facility], |                      |                   |
|        |           |  [facility] floor           |                      |                   |
|        |           | [place], [place]         |                      |                   |
|        |           | [address]           |                      |                   |
|        |           | [phone]         |                      |                   |
+--------+-----------+----------------------+----------------------+-------------------+
 
 
 
 Social History
 
 
+-------------------+-------+-----------+--------+------+
| Tobacco Use       | Types | Packs/Day | Years  | Date |
|                   |       |           | Used   |      |
+-------------------+-------+-----------+--------+------+
| Passive Smoke     |       |           |        |      |
| Exposure - Never  |       |           |        |      |
| Smoker            |       |           |        |      |
+-------------------+-------+-----------+--------+------+
 
 
 
+---------------------+---+---+---+
| Smokeless Tobacco:  |   |   |   |
 
| Never Used          |   |   |   |
+---------------------+---+---+---+
 
 
 
+-------------+-------------+---------+----------+
| Alcohol Use | Drinks/Week | oz/Week | Comments |
+-------------+-------------+---------+----------+
| Not Asked   |             |         |          |
+-------------+-------------+---------+----------+
 
 
 
+------------------+---------------+
| Sex Assigned at  | Date Recorded |
| Birth            |               |
+------------------+---------------+
| Not on file      |               |
+------------------+---------------+
 documented as of this encounter
 
 Miscellaneous Notes
 Telephone Encounter - Tri Mcnamara, RN - 2018  9:14 AM PSTAbbie and LM on VM on
 both numbers to return call to [facility] at [phone], option #3, to go over transitioni
 to adult care.
 See previous 2017 encounters.
 
 Called PCP, Dr. Horton's office at [phone]. Last visit was . Obtained adalberto watson's mobile number.
 Called and LM on VM on patient's own mobile number to return call to [facility] at [phone]
[phone], option #3, to go over transitioning to adult care.
 
 Letter composed and mailed to family.
 Electronically signed by Tri Mcnamara RN at 2018  9:32 AM PSTdocumented in this 
encounter
 
 Plan of Treatment
 Not on filedocumented as of this encounter
 
 Visit Diagnoses
 Not on filedocumented in this encounter

## 2020-10-13 NOTE — XMS
Encounter Summary
  Created on: 10/13/2020
 
 Bonifacio Nila JB
 External Reference #: [id number]
 : [birth year]
 Sex: Female
 
 Demographics
 
 
+-----------------------+------------------------+
| Address               | [address]            |
|                       | [place]  [address]   |
+-----------------------+------------------------+
| Home Phone            | [phone]        |
+-----------------------+------------------------+
| Preferred Language    | Unknown                |
+-----------------------+------------------------+
| Marital Status        | Single                 |
+-----------------------+------------------------+
| Lutheran Affiliation | Unknown                |
+-----------------------+------------------------+
| Race                  | White                  |
+-----------------------+------------------------+
| Ethnic Group          | Not  or  |
+-----------------------+------------------------+
 
 
 Author
 
 
+--------------+------------------------------+
| Author       | [facility] |
+--------------+------------------------------+
| Organization | [facility] |
+--------------+------------------------------+
| Address      | Unknown                      |
+--------------+------------------------------+
| Phone        | Unavailable                  |
+--------------+------------------------------+
 
 
 
 Support
 
 
+-----------------+--------------+---------+-----------------+
| Name            | Relationship | Address | Phone           |
+-----------------+--------------+---------+-----------------+
| Kit Valdovinos   | ECON         | Unknown | [phone] |
+-----------------+--------------+---------+-----------------+
| Magdalena Valdovinos | ECON         | Unknown | [phone] |
+-----------------+--------------+---------+-----------------+
 
 
 
 Care Team Providers
 
 
 
+-----------------------+------+-----------------+
| Care Team Member Name | Role | Phone           |
+-----------------------+------+-----------------+
| Lily Horton MD   | PCP  | [phone] |
+-----------------------+------+-----------------+
 
 
 
 Reason for Visit
 
 
+----------------------+--------+----------+
| Reason               | Onset  | Comments |
|                      | Date   |          |
+----------------------+--------+----------+
| Lab findings,        | / |          |
| teaching, guidance,  |    |          |
| and counseling       |        |          |
+----------------------+--------+----------+
 
 
 
 Encounter Details
 
 
+--------+-----------+----------------------+--------------------+----------------------+
| Date   | Type      | Department           | Care Team          | Description          |
+--------+-----------+----------------------+--------------------+----------------------+
| / | Telephone |   Pediatric          |   Ivis Burkett MD | Lab findings,        |
|    |           | Gastroenterology at  |                    | teaching, guidance,  |
|        |           | [place]          |                    | and counseling       |
|        |           | [facility]  |                    |                      |
|        |           |  [address] [facility]     |                    |                      |
|        |           | [place]          |                    |                      |
|        |           | [facility], |                    |                      |
|        |           |  [address] [place] [address]           |                    |                      |
|        |           | [place], [place]         |                    |                      |
|        |           | [address]           |                    |                      |
|        |           | [phone]         |                    |                      |
+--------+-----------+----------------------+--------------------+----------------------+
 
 
 
 Social History
 
 
+----------------+-------+-----------+--------+------+
| Tobacco Use    | Types | Packs/Day | Years  | Date |
|                |       |           | Used   |      |
+----------------+-------+-----------+--------+------+
| Never Assessed |       |           |        |      |
+----------------+-------+-----------+--------+------+
 
 
 
+------------------+---------------+
| Sex Assigned at  | Date Recorded |
| Birth            |               |
 
+------------------+---------------+
| Not on file      |               |
+------------------+---------------+
 documented as of this encounter
 
 Miscellaneous Notes
 Telephone Encounter - Ivis Burkett - 2008 11:22 AM PDTShmily should be getting transplan
t labs every 3 months: CBC, diff, CMP, and trough Prograf level. In addition, she is due for
 an EBV PCR, which should be done every 6 months.
 
 We could cancel her  appointment, as she is also scheduled for transplant followup clini
c with [facility] on .
 Electronically signed by Ivis Burkett at 2008 11:22 AM PDTTelephone Encounter - Desiree Priest RN - 2008 11:06 AM PDTMother called and was wondering if Nila should have 
any labs drawn before her May 5th appt with Dr. Burkett ?Electronically signed by Desiree templeton RN at 2008 11:06 AM PDTdocumented in this encounter
 
 Plan of Treatment
 Not on filedocumented as of this encounter
 
 Visit Diagnoses
 Not on filedocumented in this encounter

## 2020-10-13 NOTE — XMS
Encounter Summary
  Created on: 10/13/2020
 
 Bonifacio Nila JB
 External Reference #: [id number]
 : [birth year]
 Sex: Female
 
 Demographics
 
 
+-----------------------+------------------------+
| Address               | [address]            |
|                       | [place]  [address]   |
+-----------------------+------------------------+
| Home Phone            | [phone]        |
+-----------------------+------------------------+
| Preferred Language    | Unknown                |
+-----------------------+------------------------+
| Marital Status        | Single                 |
+-----------------------+------------------------+
| Quaker Affiliation | Unknown                |
+-----------------------+------------------------+
| Race                  | White                  |
+-----------------------+------------------------+
| Ethnic Group          | Not  or  |
+-----------------------+------------------------+
 
 
 Author
 
 
+--------------+------------------------------+
| Author       | [facility] |
+--------------+------------------------------+
| Organization | [facility] |
+--------------+------------------------------+
| Address      | Unknown                      |
+--------------+------------------------------+
| Phone        | Unavailable                  |
+--------------+------------------------------+
 
 
 
 Support
 
 
+-----------------+--------------+---------+-----------------+
| Name            | Relationship | Address | Phone           |
+-----------------+--------------+---------+-----------------+
| Kit Valdovinos   | ECON         | Unknown | [phone] |
+-----------------+--------------+---------+-----------------+
| Magdalena Valdovinos | ECON         | Unknown | [phone] |
+-----------------+--------------+---------+-----------------+
 
 
 
 Care Team Providers
 
 
 
+-----------------------+------+-------------+
| Care Team Member Name | Role | Phone       |
+-----------------------+------+-------------+
 PCP  | Unavailable |
+-----------------------+------+-------------+
 
 
 
 Reason for Visit
 
 
+-----------+--------+-----------------+
| Reason    | Onset  | Comments        |
|           | Date   |                 |
+-----------+--------+-----------------+
| Lab Order | / | faxed lab order |
|           |    |                 |
+-----------+--------+-----------------+
 
 
 
 Encounter Details
 
 
+--------+-----------+----------------------+----------------------+----------------------+
| Date   | Type      | Department           | Care Team            | Description          |
+--------+-----------+----------------------+----------------------+----------------------+
| / | Telephone |   Pediatric          |   Neema Khalil,   | Lab Order (faxed lab |
|    |           | Gastroenterology at  | MD  [address] |  order)              |
|        |           | [facility]          |   [place], [place]       |                      |
|        |           | [facility]  | [id number]           |                      |
|        |           |  [address]     | [phone]         |                      |
|        |           | [place]          | [phone] (Fax)   |                      |
|        |           | [facility], |                      |                      |
|        |           |  [address]th floor           |                      |                      |
|        |           | [place], [place]         |                      |                      |
|        |           | [address]           |                      |                      |
|        |           | [phone]         |                      |                      |
+--------+-----------+----------------------+----------------------+----------------------+
 
 
 
 Social History
 
 
+----------------+-------+-----------+--------+------+
| Tobacco Use    | Types | Packs/Day | Years  | Date |
|                |       |           | Used   |      |
+----------------+-------+-----------+--------+------+
| Never Assessed |       |           |        |      |
+----------------+-------+-----------+--------+------+
 
 
 
+------------------+---------------+
| Sex Assigned at  | Date Recorded |
| Birth            |               |
+------------------+---------------+
 
| Not on file      |               |
+------------------+---------------+
 documented as of this encounter
 
 Miscellaneous Notes
 Telephone Encounter - Marianne Barba RN - 2014 11:01 AM PDTLeft a detailed messag
e on mom's cell voicemail that lab orders are being faxed to [facility] in [place].  Re
quested mom call back if she would like orders sent elsewhere.
 Also requested mom call back to schedule f/u appt with Dr. Khalil- scheduling number provid
ed.  Note sent to scheduling.Electronically signed by Marianne Barba RN at 2014 11:
06 AM PDTdocumented in this encounter
 
 Plan of Treatment
 Not on filedocumented as of this encounter
 
 Procedures
 
 
+----------------------+--------+-------------+----------------------+----------------------
+
| Procedure Name       | Priori | Date/Time   | Associated Diagnosis | Comments             
|
|                      | ty     |             |                      |                      
|
+----------------------+--------+-------------+----------------------+----------------------
+
| CBC, WITH            | Routin | 2014  |   Transplanted liver |   Results for this   
|
| DIFFERENTIAL         | e      |  4:50 PM    |  (HCC)  Encounter    | procedure are in the 
|
|                      |        | PDT         | for therapeutic drug |  results section.    
|
|                      |        |             |  monitoring          |                      
|
+----------------------+--------+-------------+----------------------+----------------------
+
| COMPLETE METABOLIC   | Routin | 2014  |   Transplanted liver |   Results for this   
|
| SET                  | e      |  4:50 PM    |  (HCC)  Encounter    | procedure are in the 
|
| (NA,K,CL,CO2,BUN,CRE |        | PDT         | for therapeutic drug |  results section.    
|
| AT,GLUC,CA,AST,ALT,B |        |             |  monitoring          |                      
|
| ASHWINI TOTAL,ALK        |        |             |                      |                      
|
| PHOS,ALB,PROT TOTAL) |        |             |                      |                      
|
+----------------------+--------+-------------+----------------------+----------------------
+
| GGT, PLASMA          | Routin | 2014  |   Transplanted liver |   Results for this   
|
|                      | e      |  4:50 PM    |  (HCC)  Encounter    | procedure are in the 
|
|                      |        | PDT         | for therapeutic drug |  results section.    
|
|                      |        |             |  monitoring          |                      
|
+----------------------+--------+-------------+----------------------+----------------------
+
 
| MAGNESIUM, PLASMA    | Routin | 2014  |   Transplanted liver |   Results for this   
|
|                      | e      |  4:50 PM    |  (HCC)  Encounter    | procedure are in the 
|
|                      |        | PDT         | for therapeutic drug |  results section.    
|
|                      |        |             |  monitoring          |                      
|
+----------------------+--------+-------------+----------------------+----------------------
+
 documented in this encounter
 
 Results
 MAGNESIUM, PLASMA (2014  4:50 PM PDT)
 
+-----------+-------+-----------------+------------+--------------+
| Component | Value | Ref Range       | Performed  | Pathologist  |
|           |       |                 | At         | Signature    |
+-----------+-------+-----------------+------------+--------------+
| MAGNESIUM | 1.8   | 1.7 - 2.5 mg/dL | [facility]  |              |
|           |       |                 | LAB -      |              |
|           |       |                 | [place]  |              |
+-----------+-------+-----------------+------------+--------------+
 
 
 
+---------------+
| Specimen      |
+---------------+
| Blood - Blood |
+---------------+
 
 
 
+--------------------+----------------------+--------------------+----------------+
| Performing         | Address              | City/State/Zipcode | Phone Number   |
| Organization       |                      |                    |                |
+--------------------+----------------------+--------------------+----------------+
|   [facility]  |   [address] |   [place]    |   [phone] |
| [place]          |                      |                    |                |
+--------------------+----------------------+--------------------+----------------+
 GGT, PLASMA (2014  4:50 PM PDT)
 
+-----------+-------+------------+------------+--------------+
| Component | Value | Ref Range  | Performed  | Pathologist  |
|           |       |            | At         | Signature    |
+-----------+-------+------------+------------+--------------+
| GAMMA     | 8     | 5 - 60 U/L | [facility]  |              |
| GLUTAMYL  |       |            | LAB -      |              |
| TRANS     |       |            | [place]  |              |
+-----------+-------+------------+------------+--------------+
 
 
 
+---------------+
| Specimen      |
+---------------+
| Blood - Blood |
+---------------+
 
 
 
 
+--------------------+----------------------+--------------------+----------------+
| Performing         | Address              | City/State/Zipcode | Phone Number   |
| Organization       |                      |                    |                |
+--------------------+----------------------+--------------------+----------------+
|   [facility]  |   [address] |   [place]    |   [phone] |
| [place]          |                      |                    |                |
+--------------------+----------------------+--------------------+----------------+
 COMPLETE METABOLIC SET (NA,K,CL,CO2,BUN,CREAT,GLUC,CA,AST,ALT,BILI TOTAL,ALK PHOS,ALB,PROT 
TOTAL) (2014  4:50 PM PDT)
 
+-------------+--------+-----------------+------------+--------------+
| Component   | Value  | Ref Range       | Performed  | Pathologist  |
|             |        |                 | At         | Signature    |
+-------------+--------+-----------------+------------+--------------+
| GLUCOSE,    | 82     | 70 - 100 mg/dL  | [facility]  |              |
| PLASMA      |        |                 | LAB -      |              |
| (LAB)       |        |                 | [place]  |              |
+-------------+--------+-----------------+------------+--------------+
| BUN, PLASMA | 18     | 6 - 23 mg/dL    | [facility]  |              |
|  (LAB)      |        |                 | LAB -      |              |
|             |        |                 | [place]  |              |
+-------------+--------+-----------------+------------+--------------+
| CREATININE  | 0.81   | 0.40 - 1.05     | [facility]  |              |
| PLASMA      |        | mg/dL           | LAB -      |              |
| (LAB)       |        |                 | [place]  |              |
+-------------+--------+-----------------+------------+--------------+
| TOTAL       | 7.3    | 6.1 - 8.5 g/dL  | [facility]  |              |
| PROTEIN,    |        |                 | LAB -      |              |
| PLASMA      |        |                 | [place]  |              |
| (LAB)       |        |                 |            |              |
+-------------+--------+-----------------+------------+--------------+
| ALBUMIN,    | 4.2    | 3.5 - 5.0 g/dL  | [facility]  |              |
| PLASMA      |        |                 | LAB -      |              |
| (LAB)       |        |                 | [place]  |              |
+-------------+--------+-----------------+------------+--------------+
| CALCIUM,    | 9.6    | 8.4 - 10.2      | [facility]  |              |
| PLASMA      |        | mg/dL           | LAB -      |              |
| (LAB)       |        |                 | [place]  |              |
+-------------+--------+-----------------+------------+--------------+
| BILIRUBIN   | 0.3    | 0 - 1.2         | [facility]  |              |
| TOTAL       |        | Transcutaneous  | LAB -      |              |
|             |        | Bilirubinometer | [place]  |              |
+-------------+--------+-----------------+------------+--------------+
| ALK PHOS    | 78 (A) | 135 - 384 U/L   | [facility]  |              |
|             |        |                 | LAB -      |              |
|             |        |                 | [place]  |              |
+-------------+--------+-----------------+------------+--------------+
| AST(SGOT)   | 15     | 13 - 39 U/L     | [facility]  |              |
|             |        |                 | LAB -      |              |
|             |        |                 | [place]  |              |
+-------------+--------+-----------------+------------+--------------+
| SODIUM,     | 136    | 132 - 143       | [facility]  |              |
| PLASMA      |        | mmol/L          | LAB -      |              |
| (LAB)       |        |                 | [place]  |              |
+-------------+--------+-----------------+------------+--------------+
| POTASSIUM,  | 3.6    | 3.6 - 5.1       | [facility]  |              |
| PLASMA      |        | mmol/L          | LAB -      |              |
| (LAB)       |        |                 | [place]  |              |
 
+-------------+--------+-----------------+------------+--------------+
| CHLORIDE,   | 102    | 95 - 112 mmol/L | [facility]  |              |
| PLASMA      |        |                 | LAB -      |              |
| (LAB)       |        |                 | [place]  |              |
+-------------+--------+-----------------+------------+--------------+
| TOTAL CO2,  | 25     | 19 - 31 mmol/L  | [facility]  |              |
| PLASMA      |        |                 | LAB -      |              |
| (LAB)       |        |                 | [place]  |              |
+-------------+--------+-----------------+------------+--------------+
| ALT (SGPT)  | 12     | 7 - 52 U/L      | [facility]  |              |
|             |        |                 | LAB -      |              |
|             |        |                 | [place]  |              |
+-------------+--------+-----------------+------------+--------------+
| ANION GAP   | 12.6   | 7 - 21          | [facility]  |              |
|             |        |                 | LAB -      |              |
|             |        |                 | [place]  |              |
+-------------+--------+-----------------+------------+--------------+
| BUN/CREATIN | 22.2   | 6.0 - 28.6      | [facility]  |              |
| INE RATIO   |        |                 | LAB -      |              |
|             |        |                 | [place]  |              |
+-------------+--------+-----------------+------------+--------------+
| GLOBULIN    | 3.1    | 1.8 - 3.5       | [facility]  |              |
|             |        |                 | LAB -      |              |
|             |        |                 | [place]  |              |
+-------------+--------+-----------------+------------+--------------+
| A/G RATIO   | 1.4    | 1.1 - 2.4       | [facility]  |              |
|             |        |                 | LAB -      |              |
|             |        |                 | [place]  |              |
+-------------+--------+-----------------+------------+--------------+
 
 
 
+---------------+
| Specimen      |
+---------------+
| Blood - Blood |
+---------------+
 
 
 
+--------------------+----------------------+--------------------+----------------+
| Performing         | Address              | City/State/Zipcode | Phone Number   |
| Organization       |                      |                    |                |
+--------------------+----------------------+--------------------+----------------+
|   [facility]  |   [address] |   [place], [place]    |   [phone] |
| [place]          |                      |                    |                |
+--------------------+----------------------+--------------------+----------------+
 CBC, WITH DIFFERENTIAL (2014  4:50 PM PDT)
 
+-------------+----------+-----------------+------------+--------------+
| Component   | Value    | Ref Range       | Performed  | Pathologist  |
|             |          |                 | At         | Signature    |
+-------------+----------+-----------------+------------+--------------+
| WHITE CELL  | 7.8      | 4.4 - 11.8 K/cu | [facility]  |              |
| COUNT       |          |  mm             | LAB -      |              |
|             |          |                 | [place]  |              |
+-------------+----------+-----------------+------------+--------------+
| RED CELL    | 4.55     | 3.8 - 5.3 M/cu  | [facility]  |              |
| COUNT       |          | mm              | LAB -      |              |
|             |          |                 | [place]  |              |
 
+-------------+----------+-----------------+------------+--------------+
| HEMOGLOBIN  | 14       | 11.1 - 15.7     | [facility]  |              |
|             |          | g/dL            | LAB -      |              |
|             |          |                 | [place]  |              |
+-------------+----------+-----------------+------------+--------------+
| HEMATOCRIT  | 40.7     | 32 - 47 %       | [facility]  |              |
|             |          |                 | LAB -      |              |
|             |          |                 | [place]  |              |
+-------------+----------+-----------------+------------+--------------+
| MCV         | 89.4     | 73 - 91 fL      | [facility]  |              |
|             |          |                 | LAB -      |              |
|             |          |                 | [place]  |              |
+-------------+----------+-----------------+------------+--------------+
| MCH         | 31       | 25 - 31 pg      | [facility]  |              |
|             |          |                 | LAB -      |              |
|             |          |                 | [place]  |              |
+-------------+----------+-----------------+------------+--------------+
| MCHC        | 34       | 30 - 36 g/dL    | [facility]  |              |
|             |          |                 | LAB -      |              |
|             |          |                 | [place]  |              |
+-------------+----------+-----------------+------------+--------------+
| PLATELET    | 296      | 140 - 440 K/cu  | [facility]  |              |
| COUNT       |          | mm              | LAB -      |              |
|             |          |                 | [place]  |              |
+-------------+----------+-----------------+------------+--------------+
| NEUTROPHIL  | 63.5     | 35 - 65 %       | [facility]  |              |
| %           |          |                 | LAB -      |              |
|             |          |                 | [place]  |              |
+-------------+----------+-----------------+------------+--------------+
| LYMPHOCYTE  | 25.8 (A) | 28 - 48 %       | [facility]  |              |
| %           |          |                 | LAB -      |              |
|             |          |                 | [place]  |              |
+-------------+----------+-----------------+------------+--------------+
| MONOCYTE %  | 7.9      | 0 - 12 %        | [facility]  |              |
|             |          |                 | LAB -      |              |
|             |          |                 | [place]  |              |
+-------------+----------+-----------------+------------+--------------+
| EOS %       | 2.2      | 0 - 6 %         | [facility]  |              |
|             |          |                 | LAB -      |              |
|             |          |                 | [place]  |              |
+-------------+----------+-----------------+------------+--------------+
| BASO %      | 0.6      | 0 - 2 %         | [facility]  |              |
|             |          |                 | LAB -      |              |
|             |          |                 | [place]  |              |
+-------------+----------+-----------------+------------+--------------+
| RDW         | 13.4     | 10.5 - 15 %     | [facility]  |              |
|             |          |                 | LAB -      |              |
|             |          |                 | [place]  |              |
+-------------+----------+-----------------+------------+--------------+
 
 
 
+---------------+
| Specimen      |
+---------------+
| Blood - Blood |
+---------------+
 
 
 
 
+--------------------+----------------------+--------------------+----------------+
| Performing         | Address              | City/State/Zipcode | Phone Number   |
| Organization       |                      |                    |                |
+--------------------+----------------------+--------------------+----------------+
|   [facility]  |   [address] |   [place]    |   [phone] |
| [place]          |                      |                    |                |
+--------------------+----------------------+--------------------+----------------+
 documented in this encounter
 
 Visit Diagnoses
 
 
+--------------------------------------------------------------------+
| Diagnosis                                                          |
+--------------------------------------------------------------------+
|   Transplanted liver (HCC) - Primary  Liver replaced by transplant |
+--------------------------------------------------------------------+
|   Encounter for therapeutic drug monitoring                        |
+--------------------------------------------------------------------+
 documented in this encounter

## 2020-10-13 NOTE — XMS
Encounter Summary
  Created on: 10/13/2020
 
 Bonifacio Nila JB
 External Reference #: [id number]
 : [birth year]
 Sex: Female
 
 Demographics
 
 
+-----------------------+------------------------+
| Address               | [address]            |
|                       | [place]  [address]   |
+-----------------------+------------------------+
| Home Phone            | [phone]        |
+-----------------------+------------------------+
| Preferred Language    | Unknown                |
+-----------------------+------------------------+
| Marital Status        | Single                 |
+-----------------------+------------------------+
| Voodoo Affiliation | Unknown                |
+-----------------------+------------------------+
| Race                  | White                  |
+-----------------------+------------------------+
| Ethnic Group          | Not  or  |
+-----------------------+------------------------+
 
 
 Author
 
 
+--------------+------------------------------+
| Author       | [facility] |
+--------------+------------------------------+
| Organization | [facility] |
+--------------+------------------------------+
| Address      | Unknown                      |
+--------------+------------------------------+
| Phone        | Unavailable                  |
+--------------+------------------------------+
 
 
 
 Support
 
 
+-----------------+--------------+---------+-----------------+
| Name            | Relationship | Address | Phone           |
+-----------------+--------------+---------+-----------------+
| Kit Valdovinos   | ECON         | Unknown | [phone] |
+-----------------+--------------+---------+-----------------+
| Magdalena Valdovinos | ECON         | Unknown | [phone] |
+-----------------+--------------+---------+-----------------+
 
 
 
 Care Team Providers
 
 
 
+-----------------------+------+-----------------+
| Care Team Member Name | Role | Phone           |
+-----------------------+------+-----------------+
| Lily Horton MD   | PCP  | [phone] |
+-----------------------+------+-----------------+
 
 
 
 Encounter Details
 
 
+--------+----------+----------------------+--------------------+-------------+
| Date   | Type     | Department           | Care Team          | Description |
+--------+----------+----------------------+--------------------+-------------+
| 10/12/ | Abstract |   Pediatric          |   Iivs Burkett MD |             |
|    |          | Gastroenterology at  |                    |             |
|        |          | [place]          |                    |             |
|        |          | [facility]  |                    |             |
|        |          |  [address]     |                    |             |
|        |          | [place]          |                    |             |
|        |          | [facility], |                    |             |
|        |          |  [address] floor           |                    |             |
|        |          | [place], [place]         |                    |             |
|        |          | [id number]           |                    |             |
|        |          | [phone]         |                    |             |
+--------+----------+----------------------+--------------------+-------------+
 
 
 
 Social History
 
 
+----------------+-------+-----------+--------+------+
| Tobacco Use    | Types | Packs/Day | Years  | Date |
|                |       |           | Used   |      |
+----------------+-------+-----------+--------+------+
| Never Assessed |       |           |        |      |
+----------------+-------+-----------+--------+------+
 
 
 
+------------------+---------------+
| Sex Assigned at  | Date Recorded |
| Birth            |               |
+------------------+---------------+
| Not on file      |               |
+------------------+---------------+
 documented as of this encounter
 
 Plan of Treatment
 Not on filedocumented as of this encounter
 
 Procedures
 
 
+----------------------+--------+-------------+----------------------+----------------------
+
| Procedure Name       | Priori | Date/Time   | Associated Diagnosis | Comments             
 
|
|                      | ty     |             |                      |                      
|
+----------------------+--------+-------------+----------------------+----------------------
+
| CULTURE, URINE BACTI | Routin | 2009  |                      |   Results for this   
|
|                      | e      |  8:23 PM    |                      | procedure are in the 
|
|                      |        | PDT         |                      |  results section.    
|
+----------------------+--------+-------------+----------------------+----------------------
+
| CBC, WITH            | Routin | 2009  |                      |   Results for this   
|
| DIFFERENTIAL         | e      |  7:59 PM    |                      | procedure are in the 
|
|                      |        | PDT         |                      |  results section.    
|
+----------------------+--------+-------------+----------------------+----------------------
+
| CULTURE, BLOOD BACTI | Routin | 2009  |                      |   Results for this   
|
|  & YEAST             | e      |  7:57 PM    |                      | procedure are in the 
|
|                      |        | PDT         |                      |  results section.    
|
+----------------------+--------+-------------+----------------------+----------------------
+
| COMPLETE METABOLIC   | Routin | 2009  |                      |   Results for this   
|
| SET                  | e      |  7:52 PM    |                      | procedure are in the 
|
| (NA,K,CL,CO2,BUN,CRE |        | PDT         |                      |  results section.    
|
| AT,GLUC,CA,AST,ALT,B |        |             |                      |                      
|
| ASHWINI TOTAL,ALK        |        |             |                      |                      
|
| PHOS,ALB,PROT TOTAL) |        |             |                      |                      
|
+----------------------+--------+-------------+----------------------+----------------------
+
| UA, DIPSTICK ONLY    | Routin | 2009  |                      |   Results for this   
|
|                      | e      |             |                      | procedure are in the 
|
|                      |        |             |                      |  results section.    
|
+----------------------+--------+-------------+----------------------+----------------------
+
 documented in this encounter
 
 Results
 CULTURE, URINE BACTI (2009  8:23 PM PDT)
 
+-----------+------------------------+-----------+------------+--------------+
| Component | Value                  | Ref Range | Performed  | Pathologist  |
|           |                        |           | At         | Signature    |
+-----------+------------------------+-----------+------------+--------------+
 
| CULTURE   | negComment: Day 1,     |           | [facility]  |              |
| RESULT    | 09 am No growth   |           | LAB -      |              |
|           | after overnight        |           | [place]  |              |
|           | incubation             |           |            |              |
+-----------+------------------------+-----------+------------+--------------+
| CULTURE   | nuxedComment: Days 2,  |           | [facility]  |              |
| RESULT    | 09 AM, 20,000     |           | LAB -      |              |
|           | CFU/ML mixed autumn     |           | [place]  |              |
|           | bacteria and/or yeast  |           |            |              |
|           | isolated probably      |           |            |              |
|           | represent urethral,    |           |            |              |
|           | perineal, or other     |           |            |              |
|           | contaminating autumn    |           |            |              |
+-----------+------------------------+-----------+------------+--------------+
 
 
 
+----------+
| Specimen |
+----------+
| Urine    |
+----------+
 
 
 
+------------------------------------------------------------------------+-----------------+
| Narrative                                                              | Performed At    |
+------------------------------------------------------------------------+-----------------+
|   Day 1 no aerobic or anaerobic growth as of 09   07:22 AM  Day 2 |   [facility] |
|  no aerobic or anaerobic growth as of 09   07:16 AM  Day 3 no     |  - [place]    |
| aerobic or anaerobic growth as of 09   06:51 AM        Day 6 - No |                 |
|  growth after 6 days incubation                                        |                 |
+------------------------------------------------------------------------+-----------------+
 
 
 
+--------------------+----------------------+--------------------+----------------+
| Performing         | Address              | City/State/Zipcode | Phone Number   |
| Organization       |                      |                    |                |
+--------------------+----------------------+--------------------+----------------+
|   [facility]  |   [address] |   [place], [place]    |   [phone] |
| [place]          |                      |                    |                |
+--------------------+----------------------+--------------------+----------------+
|   [facility]  |                      |   [place], [place]    |                |
| [place]          |                      |                    |                |
+--------------------+----------------------+--------------------+----------------+
 CBC, WITH DIFFERENTIAL (2009  7:59 PM PDT)
 
+-------------+----------+-----------------+------------+--------------+
| Component   | Value    | Ref Range       | Performed  | Pathologist  |
|             |          |                 | At         | Signature    |
+-------------+----------+-----------------+------------+--------------+
| WHITE CELL  | 8.6      | 4.5 - 13.5 K/cu | [facility]  |              |
| COUNT       |          |  mm             | LAB -      |              |
|             |          |                 | [place]  |              |
+-------------+----------+-----------------+------------+--------------+
| RED CELL    | 5.48 (A) | 4.1 - 5.3 M/cu  | [facility]  |              |
| COUNT       |          | mm              | LAB -      |              |
|             |          |                 | [place]  |              |
+-------------+----------+-----------------+------------+--------------+
 
| HEMOGLOBIN  | 16.4 (A) | 10.6 - 15.2     | [facility]  |              |
|             |          | g/dL            | LAB -      |              |
|             |          |                 | [place]  |              |
+-------------+----------+-----------------+------------+--------------+
| HEMATOCRIT  | 46.5 (A) | 32 - 42 %       | [facility]  |              |
|             |          |                 | LAB -      |              |
|             |          |                 | [place]  |              |
+-------------+----------+-----------------+------------+--------------+
| MCV         | 84.9     | 71 - 89 fL      | [facility]  |              |
|             |          |                 | LAB -      |              |
|             |          |                 | [place]  |              |
+-------------+----------+-----------------+------------+--------------+
| MCH         | 30       | 24 - 30 pg      | [facility]  |              |
|             |          |                 | LAB -      |              |
|             |          |                 | [place]  |              |
+-------------+----------+-----------------+------------+--------------+
| MCHC        | 35       | 30 - 36 g/dL    | [facility]  |              |
|             |          |                 | LAB -      |              |
|             |          |                 | [place]  |              |
+-------------+----------+-----------------+------------+--------------+
| PLATELET    | 359      | 140 - 440 K/cu  | [facility]  |              |
| COUNT       |          | mm              | LAB -      |              |
|             |          |                 | [place]  |              |
+-------------+----------+-----------------+------------+--------------+
| NEUTROPHIL  | 71.7 (A) | 31 - 60 %       | [facility]  |              |
| %           |          |                 | LAB -      |              |
|             |          |                 | [place]  |              |
+-------------+----------+-----------------+------------+--------------+
| LYMPHOCYTE  | 17.7 (A) | 28 - 48 %       | [facility]  |              |
| %           |          |                 | LAB -      |              |
|             |          |                 | [place]  |              |
+-------------+----------+-----------------+------------+--------------+
| MONOCYTE %  | 8.3      | 0 - 12 %        | [facility]  |              |
|             |          |                 | LAB -      |              |
|             |          |                 | [place]  |              |
+-------------+----------+-----------------+------------+--------------+
| EOS %       | 0.5      | 0 - 6 %         | [facility]  |              |
|             |          |                 | LAB -      |              |
|             |          |                 | [place]  |              |
+-------------+----------+-----------------+------------+--------------+
| BASO %      | 1.8      | 0 - 2 %         | [facility]  |              |
|             |          |                 | LAB -      |              |
|             |          |                 | [place]  |              |
+-------------+----------+-----------------+------------+--------------+
| RDW         | 12.0     | 10.5 - 15.0 %   | [facility]  |              |
|             |          |                 | LAB -      |              |
|             |          |                 | [place]  |              |
+-------------+----------+-----------------+------------+--------------+
| MPV         |          | fL              | [facility]  |              |
|             |          |                 | LAB -      |              |
|             |          |                 | [place]  |              |
+-------------+----------+-----------------+------------+--------------+
| NEUTROPHIL  |          | K/cu mm         | [facility]  |              |
| #           |          |                 | LAB -      |              |
|             |          |                 | [place]  |              |
+-------------+----------+-----------------+------------+--------------+
| LYMPHOCYTE  |          | K/cu mm         | [facility]  |              |
| #           |          |                 | LAB -      |              |
|             |          |                 | [place]  |              |
+-------------+----------+-----------------+------------+--------------+
 
| MONOCYTE #  |          | K/cu mm         | [facility]  |              |
|             |          |                 | LAB -      |              |
|             |          |                 | [place]  |              |
+-------------+----------+-----------------+------------+--------------+
| EOS #       |          | K/cu mm         | [facility]  |              |
|             |          |                 | LAB -      |              |
|             |          |                 | [place]  |              |
+-------------+----------+-----------------+------------+--------------+
| BASO #      |          |                 | [facility]  |              |
|             |          |                 | LAB -      |              |
|             |          |                 | [place]  |              |
+-------------+----------+-----------------+------------+--------------+
 
 
 
+---------------+
| Specimen      |
+---------------+
| Blood - Blood |
+---------------+
 
 
 
+--------------------+----------------------+--------------------+----------------+
| Performing         | Address              | City/State/Zipcode | Phone Number   |
| Organization       |                      |                    |                |
+--------------------+----------------------+--------------------+----------------+
|   [facility]  |   [address] |   [place], [place]    |   [phone] |
| [place]          |                      |                    |                |
+--------------------+----------------------+--------------------+----------------+
|   [facility]  |                      |   [place], [place]    |                |
| [place]          |                      |                    |                |
+--------------------+----------------------+--------------------+----------------+
 CULTURE, BLOOD BACTI & YEAST (2009  7:57 PM PDT)
 
+-----------+-------+-----------+------------+--------------+
| Component | Value | Ref Range | Performed  | Pathologist  |
|           |       |           | At         | Signature    |
+-----------+-------+-----------+------------+--------------+
| CULTURE   | Neg   |           | [facility]  |              |
| RESULT    |       |           | LAB -      |              |
|           |       |           | [place]  |              |
+-----------+-------+-----------+------------+--------------+
 
 
 
+----------+
| Specimen |
+----------+
| Blood    |
+----------+
 
 
 
+------------------------------------------------------------------------+-----------------+
| Narrative                                                              | Performed At    |
+------------------------------------------------------------------------+-----------------+
|   Day 1 no aerobic or anaerobic growth as of 09   07:22 AM  Day 2 |   [facility] |
|  no aerobic or anaerobic growth as of 09   07:16 AM  Day 3 no     |  - [place]    |
| aerobic or anaerobic growth as of 09   06:51 AM        Day 6 - No |                 |
 
|  growth after 6 days incubation                                        |                 |
+------------------------------------------------------------------------+-----------------+
 
 
 
+--------------------+----------------------+--------------------+----------------+
| Performing         | Address              | City/State/Zipcode | Phone Number   |
| Organization       |                      |                    |                |
+--------------------+----------------------+--------------------+----------------+
|   [facility]  |   [address] |   [place], [place]    |   [phone] |
| [place]          |                      |                    |                |
+--------------------+----------------------+--------------------+----------------+
|   [facility]  |                      |   [place], [place]    |                |
| [place]          |                      |                    |                |
+--------------------+----------------------+--------------------+----------------+
 COMPLETE METABOLIC SET (NA,K,CL,CO2,BUN,CREAT,GLUC,CA,AST,ALT,BILI TOTAL,ALK PHOS,ALB,PROT 
TOTAL) (2009  7:52 PM PDT)
 
+-------------+-------+-----------------+------------+--------------+
| Component   | Value | Ref Range       | Performed  | Pathologist  |
|             |       |                 | At         | Signature    |
+-------------+-------+-----------------+------------+--------------+
| GLUCOSE,    | 99    | 60 - 100 mg/dL  | [facility]  |              |
| PLASMA      |       |                 | LAB -      |              |
| (LAB)       |       |                 | [place]  |              |
+-------------+-------+-----------------+------------+--------------+
| BUN, PLASMA | 14.8  | 7 - 21 mg/dL    | [facility]  |              |
|  (LAB)      |       |                 | LAB -      |              |
|             |       |                 | [place]  |              |
+-------------+-------+-----------------+------------+--------------+
| CREATININE  | 0.70  | 0.5 - 1.5 mg/dL | [facility]  |              |
| PLASMA      |       |                 | LAB -      |              |
| (LAB)       |       |                 | [place]  |              |
+-------------+-------+-----------------+------------+--------------+
| TOTAL       |       | g/dL            | [facility]  |              |
| PROTEIN,    |       |                 | LAB -      |              |
| PLASMA      |       |                 | [place]  |              |
| (LAB)       |       |                 |            |              |
+-------------+-------+-----------------+------------+--------------+
| ALBUMIN,    |       | g/dL            | [facility]  |              |
| PLASMA      |       |                 | LAB -      |              |
| (LAB)       |       |                 | [place]  |              |
+-------------+-------+-----------------+------------+--------------+
| CALCIUM,    |       | mg/dL           | [facility]  |              |
| PLASMA      |       |                 | LAB -      |              |
| (LAB)       |       |                 | [place]  |              |
+-------------+-------+-----------------+------------+--------------+
| BILIRUBIN   |       | Transcutaneous  | [facility]  |              |
| TOTAL       |       | Bilirubinometer | LAB -      |              |
|             |       |                 | [place]  |              |
+-------------+-------+-----------------+------------+--------------+
| ALK PHOS    |       | U/L             | [facility]  |              |
|             |       |                 | LAB -      |              |
|             |       |                 | [place]  |              |
+-------------+-------+-----------------+------------+--------------+
| AST(SGOT)   |       | U/L             | [facility]  |              |
|             |       |                 | LAB -      |              |
|             |       |                 | [place]  |              |
+-------------+-------+-----------------+------------+--------------+
| SODIUM,     | 138   | 132 - 143       | [facility]  |              |
 
| PLASMA      |       | mmol/L          | LAB -      |              |
| (LAB)       |       |                 | [place]  |              |
+-------------+-------+-----------------+------------+--------------+
| POTASSIUM,  | 3.8   | 3.6 - 5.1       | [facility]  |              |
| PLASMA      |       | mmol/L          | LAB -      |              |
| (LAB)       |       |                 | [place]  |              |
+-------------+-------+-----------------+------------+--------------+
| CHLORIDE,   | 103   | 95 - 112 mmol/L | [facility]  |              |
| PLASMA      |       |                 | LAB -      |              |
| (LAB)       |       |                 | [place]  |              |
+-------------+-------+-----------------+------------+--------------+
| TOTAL CO2,  | 24.0  | 19 - 31 mmol/L  | [facility]  |              |
| PLASMA      |       |                 | LAB -      |              |
| (LAB)       |       |                 | [place]  |              |
+-------------+-------+-----------------+------------+--------------+
| ALT (SGPT)  |       | U/L             | [facility]  |              |
|             |       |                 | LAB -      |              |
|             |       |                 | [place]  |              |
+-------------+-------+-----------------+------------+--------------+
 
 
 
+---------------+
| Specimen      |
+---------------+
| Blood - Blood |
+---------------+
 
 
 
+--------------------+----------------------+--------------------+----------------+
| Performing         | Address              | City/State/Zipcode | Phone Number   |
| Organization       |                      |                    |                |
+--------------------+----------------------+--------------------+----------------+
|   [facility]  |   [address] |   [place], [place]    |   [phone] |
| [place]          |                      |                    |                |
+--------------------+----------------------+--------------------+----------------+
|   [facility]  |                      |   [place], [place]    |                |
| [place]          |                      |                    |                |
+--------------------+----------------------+--------------------+----------------+
 NAYAN MELGAR ONLY (2009)
 
+-------------+-------------+----------------+------------+--------------+
| Component   | Value       | Ref Range      | Performed  | Pathologist  |
|             |             |                | At         | Signature    |
+-------------+-------------+----------------+------------+--------------+
| COLOR(UR)   | yellow      |                | [facility]  |              |
|             |             |                | LAB -      |              |
|             |             |                | [place]  |              |
+-------------+-------------+----------------+------------+--------------+
| APPEARANCE  | hazy        |                | [facility]  |              |
|             |             |                | LAB -      |              |
|             |             |                | [place]  |              |
+-------------+-------------+----------------+------------+--------------+
| LEUKOCYTE   | Neg         | Negative       | [facility]  |              |
| ESTERASE    |             |                | LAB -      |              |
|             |             |                | [place]  |              |
+-------------+-------------+----------------+------------+--------------+
| NITRITES    | Neg         | Negative       | [facility]  |              |
|             |             |                | LAB -      |              |
 
|             |             |                | [place]  |              |
+-------------+-------------+----------------+------------+--------------+
| UROBILINOGE | 1           | 0.2 SARTHAK    | [facility]  |              |
| N           |             | UNITS          | LAB -      |              |
|             |             |                | [place]  |              |
+-------------+-------------+----------------+------------+--------------+
| PROTEIN(LAB | 30          | Negative to    | [facility]  |              |
| )           |             | Trace mg/dL    | LAB -      |              |
|             |             |                | [place]  |              |
+-------------+-------------+----------------+------------+--------------+
| PH(UR)      | 7           | 5 - 9          | [facility]  |              |
|             |             |                | LAB -      |              |
|             |             |                | [place]  |              |
+-------------+-------------+----------------+------------+--------------+
| BLOOD       | 10          | Negative       | [facility]  |              |
|             |             |                | LAB -      |              |
|             |             |                | [place]  |              |
+-------------+-------------+----------------+------------+--------------+
| SPECIFIC    | 1.033 (A)   | 1.005 - 1.03   | [facility]  |              |
| GRAVITY     |             |                | LAB -      |              |
|             |             |                | [place]  |              |
+-------------+-------------+----------------+------------+--------------+
| KETONES     | 150         | Negative mg/dL | [facility]  |              |
|             |             |                | LAB -      |              |
|             |             |                | [place]  |              |
+-------------+-------------+----------------+------------+--------------+
| BILIRUBIN   | neg         | Negative       | [facility]  |              |
|             |             |                | LAB -      |              |
|             |             |                | [place]  |              |
+-------------+-------------+----------------+------------+--------------+
| GLUCOSE(UR) | normal      | Negative to    | [facility]  |              |
|             |             | Trace mg/dL    | LAB -      |              |
|             |             |                | [place]  |              |
+-------------+-------------+----------------+------------+--------------+
| WHITE CELL  | 5 (A)       | 0 - 4 K/cu mm  | [facility]  |              |
| COUNT       |             |                | LAB -      |              |
|             |             |                | [place]  |              |
+-------------+-------------+----------------+------------+--------------+
| RED CELLS   | 15 (A)      | 0 - 4 /hpf     | [facility]  |              |
|             |             |                | LAB -      |              |
|             |             |                | [place]  |              |
+-------------+-------------+----------------+------------+--------------+
| EPITHELIAL  | squamous 1+ | Not applicable | [facility]  |              |
| CELLS       |             |                | LAB -      |              |
|             |             |                | [place]  |              |
+-------------+-------------+----------------+------------+--------------+
| CRYSTALS    | neg         |                | [facility]  |              |
|             |             |                | LAB -      |              |
|             |             |                | [place]  |              |
+-------------+-------------+----------------+------------+--------------+
| BACTERIA    | 2+          | /hpf           | [facility]  |              |
|             |             |                | LAB -      |              |
|             |             |                | [place]  |              |
+-------------+-------------+----------------+------------+--------------+
 
 
 
+---------------+
| Specimen      |
+---------------+
 
| Urine - Urine |
+---------------+
 
 
 
+--------------------+----------------------+--------------------+----------------+
| Performing         | Address              | City/State/Zipcode | Phone Number   |
| Organization       |                      |                    |                |
+--------------------+----------------------+--------------------+----------------+
|   [facility]  |   [address] |   [place]    |   [phone] |
| [place]          |                      |                    |                |
+--------------------+----------------------+--------------------+----------------+
|   [facility]  |                      |   [place], [place]    |                |
| [place]          |                      |                    |                |
+--------------------+----------------------+--------------------+----------------+
 documented in this encounter
 
 Visit Diagnoses
 Not on filedocumented in this encounter

## 2020-10-13 NOTE — XMS
Encounter Summary
  Created on: 10/13/2020
 
 Bonifacio Nila JB
 External Reference #: [id number]
 : [birth year]
 Sex: Female
 
 Demographics
 
 
+-----------------------+------------------------+
| Address               | [address]            |
|                       | [place]  [address]   |
+-----------------------+------------------------+
| Home Phone            | [phone]        |
+-----------------------+------------------------+
| Preferred Language    | Unknown                |
+-----------------------+------------------------+
| Marital Status        | Single                 |
+-----------------------+------------------------+
| [employer] Affiliation | Unknown                |
+-----------------------+------------------------+
| Race                  | White                  |
+-----------------------+------------------------+
| Ethnic Group          | Not  or  |
+-----------------------+------------------------+
 
 
 Author
 
 
+--------------+------------------------------+
| Author       | [facility], [facility] |
+--------------+------------------------------+
| Organization | [facility] |
+--------------+------------------------------+
| Address      | Unknown                      |
+--------------+------------------------------+
| Phone        | Unavailable                  |
+--------------+------------------------------+
 
 
 
 Support
 
 
+-----------------+--------------+---------+-----------------+
| Name            | Relationship | Address | Phone           |
+-----------------+--------------+---------+-----------------+
| Kit Valdovinos   | ECON         | Unknown | [phone] |
+-----------------+--------------+---------+-----------------+
| Magdalena Valdovinos | ECON         | Unknown | [phone] |
+-----------------+--------------+---------+-----------------+
 
 
 
 Care Team Providers
 
 
 
+-----------------------+------+-----------------+
| Care Team Member Name | Role | Phone           |
+-----------------------+------+-----------------+
| Lily Horton MD   | PCP  | [phone] |
+-----------------------+------+-----------------+
 
 
 
 Encounter Details
 
 
+--------+----------+----------------------+---------------------+-------------+
| Date   | Type     | Department           | Care Team           | Description |
+--------+----------+----------------------+---------------------+-------------+
| 10/10/ | Abstract |   Pediatric          |   [facility] Sp[facility],  |             |
|    |          | Gastroenterology at  | RN  [address]     |             |
|        |          | [place]          | [address]     |             |
|        |          | [facility]  | [place] [address]  |             |
|        |          |  [address]    |                     |             |
|        |          | [place]          |                     |             |
|        |          | [facility], |                     |             |
|        |          |  [address] [facility]           |                     |             |
|        |          | [place], [place]         |                     |             |
|        |          | [address]           |                     |             |
|        |          | [phone]         |                     |             |
+--------+----------+----------------------+---------------------+-------------+
 
 
 
 Social History
 
 
+----------------+-------+-----------+--------+------+
| Tobacco Use    | Types | Packs/Day | Years  | Date |
|                |       |           | Used   |      |
+----------------+-------+-----------+--------+------+
| Never Assessed |       |           |        |      |
+----------------+-------+-----------+--------+------+
 
 
 
+------------------+---------------+
| Sex Assigned at  | Date Recorded |
| Birth            |               |
+------------------+---------------+
| Not on file      |               |
+------------------+---------------+
 documented as of this encounter
 
 Plan of Treatment
 Not on filedocumented as of this encounter
 
 Procedures
 
 
+-------------------+--------+-------------+----------------------+----------------------+
| Procedure Name    | Priori | Date/Time   | Associated Diagnosis | Comments             |
|                   | ty     |             |                      |                      |
 
+-------------------+--------+-------------+----------------------+----------------------+
| PEDS              | Routin | 10/09/2007  |                      |   Results for this   |
| GASTROENTEROLOGY  | e      |  8:00 AM    |                      | procedure are in the |
| EXTERNAL RESULTS  |        | PDT         |                      |  results section.    |
| PANEL             |        |             |                      |                      |
+-------------------+--------+-------------+----------------------+----------------------+
 documented in this encounter
 
 Results
 PEDS GASTROENTEROLOGY EXTERNAL RESULTS PANEL (10/09/2007  8:00 AM PDT)
 
+-------------+----------+-----------------+-------------+--------------+
| Component   | Value    | Ref Range       | Performed   | Pathologist  |
|             |          |                 | At          | Signature    |
+-------------+----------+-----------------+-------------+--------------+
| SODIUM,     | 140      | 132 - 143       | OUTSIDE LAB |              |
| PLASMA      |          | mmol/L          |             |              |
| (LAB)       |          |                 |             |              |
+-------------+----------+-----------------+-------------+--------------+
| POTASSIUM,  | 4.0      | 3.6 - 5.1       | OUTSIDE LAB |              |
| PLASMA      |          | mmol/L          |             |              |
| (LAB)       |          |                 |             |              |
+-------------+----------+-----------------+-------------+--------------+
| CHLORIDE,   | 108      | 95 - 112 mmol/L | OUTSIDE LAB |              |
| PLASMA      |          |                 |             |              |
| (LAB)       |          |                 |             |              |
+-------------+----------+-----------------+-------------+--------------+
| TOTAL CO2,  | 21.1     | 19 - 31 mmol/L  | OUTSIDE LAB |              |
| PLASMA      |          |                 |             |              |
| (LAB)       |          |                 |             |              |
+-------------+----------+-----------------+-------------+--------------+
| BUN, PLASMA | 13       | 6 - 23 mg/dL    | OUTSIDE LAB |              |
|  (LAB)      |          |                 |             |              |
+-------------+----------+-----------------+-------------+--------------+
| CREATININE  | 0.48 (A) | 0.5 - 1.5 mg/dL | OUTSIDE LAB |              |
| PLASMA      |          |                 |             |              |
| (LAB)       |          |                 |             |              |
+-------------+----------+-----------------+-------------+--------------+
| GLUCOSE,    | 96       | 60 - 100 mg/dL  | OUTSIDE LAB |              |
| PLASMA      |          |                 |             |              |
| (LAB)       |          |                 |             |              |
+-------------+----------+-----------------+-------------+--------------+
| CALCIUM,    | 10.2     | 8.5 - 10.5      | OUTSIDE LAB |              |
| PLASMA      |          | mg/dL           |             |              |
| (LAB)       |          |                 |             |              |
+-------------+----------+-----------------+-------------+--------------+
| TOTAL       | 7.8      | 6.0 - 8.1 g/dL  | OUTSIDE LAB |              |
| PROTEIN,    |          |                 |             |              |
| PLASMA      |          |                 |             |              |
| (LAB)       |          |                 |             |              |
+-------------+----------+-----------------+-------------+--------------+
| ALBUMIN,    | 4.2      | 2.9 - 5.5 g/dL  | OUTSIDE LAB |              |
| PLASMA      |          |                 |             |              |
| (LAB)       |          |                 |             |              |
+-------------+----------+-----------------+-------------+--------------+
| ALK PHOS    | 263      | 135 - 384 U/L   | OUTSIDE LAB |              |
+-------------+----------+-----------------+-------------+--------------+
| ALT (SGPT)  | 24       | 0 - 46 U/L      | OUTSIDE LAB |              |
+-------------+----------+-----------------+-------------+--------------+
| AST(SGOT)   | 28       | 0 - 40 U/L      | OUTSIDE LAB |              |
 
+-------------+----------+-----------------+-------------+--------------+
| BILIRUBIN   | 0.8      | 0.0 - 1.2       | OUTSIDE LAB |              |
| TOTAL       |          | Transcutaneous  |             |              |
|             |          | Bilirubinometer |             |              |
+-------------+----------+-----------------+-------------+--------------+
| BILIRUBIN   |          | mg/dL           | OUTSIDE LAB |              |
| DIRECT      |          |                 |             |              |
+-------------+----------+-----------------+-------------+--------------+
| PHOSPHORUS, |          | mg/dL           | OUTSIDE LAB |              |
|  PLASMA     |          |                 |             |              |
| (LAB)       |          |                 |             |              |
+-------------+----------+-----------------+-------------+--------------+
| MAGNESIUM,P | 1.9      | 1.7 - 2.5 mg/dL | OUTSIDE LAB |              |
| LASMA       |          |                 |             |              |
+-------------+----------+-----------------+-------------+--------------+
| GAMMA       | 16       | 5 - 60 U/L      | OUTSIDE LAB |              |
| GLUTAMYL    |          |                 |             |              |
| TRANS       |          |                 |             |              |
+-------------+----------+-----------------+-------------+--------------+
| URIC ACID,  |          | mg/dL           | OUTSIDE LAB |              |
| PLASMA      |          |                 |             |              |
| (LAB)       |          |                 |             |              |
+-------------+----------+-----------------+-------------+--------------+
| AMYLASE,NADEEN |          | U/L             | OUTSIDE LAB |              |
| SMA         |          |                 |             |              |
+-------------+----------+-----------------+-------------+--------------+
| LIPASE      |          | U/L             | OUTSIDE LAB |              |
| (LAB)       |          |                 |             |              |
+-------------+----------+-----------------+-------------+--------------+
| AMMONIA     |          | umol/L          | OUTSIDE LAB |              |
| (LAB)       |          |                 |             |              |
+-------------+----------+-----------------+-------------+--------------+
| WHITE CELL  | 6.3      | 4.5 - 13.5 K/cu | OUTSIDE LAB |              |
| COUNT       |          |  mm             |             |              |
+-------------+----------+-----------------+-------------+--------------+
| HEMOGLOBIN  | 15.5 (A) | 10.6 - 15.2     | OUTSIDE LAB |              |
|             |          | g/dL            |             |              |
+-------------+----------+-----------------+-------------+--------------+
| HEMATOCRIT  | 44.6 (A) | 32 - 42 %       | OUTSIDE LAB |              |
+-------------+----------+-----------------+-------------+--------------+
| RDW         | 13.5     | 10.5 - 15.0 %   | OUTSIDE LAB |              |
+-------------+----------+-----------------+-------------+--------------+
| PLATELET    | 310      | 140 - 440 K/cu  | OUTSIDE LAB |              |
| COUNT       |          | mm              |             |              |
+-------------+----------+-----------------+-------------+--------------+
| MCV         | 83.3     | 71 - 89 fL      | OUTSIDE LAB |              |
+-------------+----------+-----------------+-------------+--------------+
| NEUTROPHIL  | 54.4     | %               | OUTSIDE LAB |              |
| %           |          |                 |             |              |
+-------------+----------+-----------------+-------------+--------------+
| LYMPHOCYTE  | 37.6     | %               | OUTSIDE LAB |              |
| %           |          |                 |             |              |
+-------------+----------+-----------------+-------------+--------------+
| MONOCYTE %  | 4.9      | %               | OUTSIDE LAB |              |
+-------------+----------+-----------------+-------------+--------------+
| EOS %       | 1.9      | %               | OUTSIDE LAB |              |
+-------------+----------+-----------------+-------------+--------------+
| BASO %      | 1.1      | %               | OUTSIDE LAB |              |
+-------------+----------+-----------------+-------------+--------------+
| PEDS        |          |                 | OUTSIDE LAB |              |
 
| GASTROENTER |          |                 |             |              |
| OLOGY PT    |          |                 |             |              |
+-------------+----------+-----------------+-------------+--------------+
| PROTHROMBIN |          |                 | OUTSIDE LAB |              |
|  TIME       |          |                 |             |              |
| (INR), POC  |          |                 |             |              |
+-------------+----------+-----------------+-------------+--------------+
| APTT        |          | seconds         | OUTSIDE LAB |              |
+-------------+----------+-----------------+-------------+--------------+
| SEDIMENTATI |          | mm/hr           | OUTSIDE LAB |              |
| ON RATE     |          |                 |             |              |
+-------------+----------+-----------------+-------------+--------------+
| C-REACTIVE  |          | mg/dl           | OUTSIDE LAB |              |
| PROTEIN     |          |                 |             |              |
+-------------+----------+-----------------+-------------+--------------+
| CHOLESTEROL |          | mg/dL           | OUTSIDE LAB |              |
|   (LAB)     |          |                 |             |              |
+-------------+----------+-----------------+-------------+--------------+
| TRIGLYCERID |          | mg/dL           | OUTSIDE LAB |              |
| ES          |          |                 |             |              |
+-------------+----------+-----------------+-------------+--------------+
| HDL         |          | mg/dL           | OUTSIDE LAB |              |
| CHOLESTEROL |          |                 |             |              |
+-------------+----------+-----------------+-------------+--------------+
| LDL         |          | mg/dL           | OUTSIDE LAB |              |
| CHOLESTEROL |          |                 |             |              |
| ,           |          |                 |             |              |
| CALCULATED  |          |                 |             |              |
+-------------+----------+-----------------+-------------+--------------+
| IRON SERUM  |          | ug/dL           | OUTSIDE LAB |              |
+-------------+----------+-----------------+-------------+--------------+
| IRON BIND   |          | ug/dL           | OUTSIDE LAB |              |
| CAP SERUM   |          |                 |             |              |
+-------------+----------+-----------------+-------------+--------------+
| %           |          | %               | OUTSIDE LAB |              |
| SATURATION  |          |                 |             |              |
| TRANSFERRIN |          |                 |             |              |
| , SERUM     |          |                 |             |              |
+-------------+----------+-----------------+-------------+--------------+
| FERRITIN    |          | ng/mL           | OUTSIDE LAB |              |
+-------------+----------+-----------------+-------------+--------------+
| TRANSFERRIN |          | mg/dL           | OUTSIDE LAB |              |
| , SERUM     |          |                 |             |              |
+-------------+----------+-----------------+-------------+--------------+
| CYCLOSPORIN |          | ng/ml           | OUTSIDE LAB |              |
| E, WHOLE    |          |                 |             |              |
| BLOOD       |          |                 |             |              |
+-------------+----------+-----------------+-------------+--------------+
| TACROLIMUS  | 3.4 (A)  | 5.0 - 18.0      | OUTSIDE LAB |              |
| ()    |          | ng/mL           |             |              |
+-------------+----------+-----------------+-------------+--------------+
| MYCOPHENOLI |          |                 | OUTSIDE LAB |              |
| C ACID      |          |                 |             |              |
| BLOOD LEVEL |          |                 |             |              |
+-------------+----------+-----------------+-------------+--------------+
| SIROLIMUS,  |          | ng/mL           | OUTSIDE LAB |              |
| WHOLE BLOOD |          |                 |             |              |
+-------------+----------+-----------------+-------------+--------------+
| FRANCIS-BAR |          |                 | OUTSIDE LAB |              |
| R PCR,QUAL  |          |                 |             |              |
 
+-------------+----------+-----------------+-------------+--------------+
| EBV QUANT   |          |                 | OUTSIDE LAB |              |
| INTERPRETAT |          |                 |             |              |
| ION         |          |                 |             |              |
+-------------+----------+-----------------+-------------+--------------+
| IGG SERUM   |          | mg/dL           | OUTSIDE LAB |              |
+-------------+----------+-----------------+-------------+--------------+
| IGM SERUM   |          | mg/dL           | OUTSIDE LAB |              |
+-------------+----------+-----------------+-------------+--------------+
| AB TO       |          | IV              | OUTSIDE LAB |              |
| NUCLEAR AG  |          |                 |             |              |
+-------------+----------+-----------------+-------------+--------------+
| AB TO EARLY |          | IV              | OUTSIDE LAB |              |
|  AG         |          |                 |             |              |
+-------------+----------+-----------------+-------------+--------------+
| CMV IGG AB  |          | AU/mL           | OUTSIDE LAB |              |
+-------------+----------+-----------------+-------------+--------------+
| CMV IGM AB  |          | IV              | OUTSIDE LAB |              |
+-------------+----------+-----------------+-------------+--------------+
| CMV         |          |                 | OUTSIDE LAB |              |
| QUANTITATIV |          |                 |             |              |
| E PCR       |          |                 |             |              |
+-------------+----------+-----------------+-------------+--------------+
| HEPATITIS A |          |                 | OUTSIDE LAB |              |
|  AB TOTAL   |          |                 |             |              |
+-------------+----------+-----------------+-------------+--------------+
| HEPATITIS B |          |                 | OUTSIDE LAB |              |
|  CORE AB,   |          |                 |             |              |
| SERUM       |          |                 |             |              |
+-------------+----------+-----------------+-------------+--------------+
| HEPATITIS B |          |                 | OUTSIDE LAB |              |
|  SURFACE    |          |                 |             |              |
| AG, SERUM   |          |                 |             |              |
+-------------+----------+-----------------+-------------+--------------+
| HEPATITIS B |          |                 | OUTSIDE LAB |              |
|  SURF AB,   |          |                 |             |              |
| QUANT       |          |                 |             |              |
+-------------+----------+-----------------+-------------+--------------+
| HEPATITIS C |          |                 | OUTSIDE LAB |              |
|  AB         |          |                 |             |              |
+-------------+----------+-----------------+-------------+--------------+
| VITAMIN D   |          | ng/mL           | OUTSIDE LAB |              |
| 25 HYDROXY  |          |                 |             |              |
+-------------+----------+-----------------+-------------+--------------+
| VITAMIN     |          | pg/ml           | OUTSIDE LAB |              |
| B12, SERUM  |          |                 |             |              |
+-------------+----------+-----------------+-------------+--------------+
| VITAMIN A   |          |                 | OUTSIDE LAB |              |
| (LAB)       |          |                 |             |              |
+-------------+----------+-----------------+-------------+--------------+
| VITAMIN E   |          | mg/L            | OUTSIDE LAB |              |
| (ALPHA      |          |                 |             |              |
| USHA), SERUM |          |                 |             |              |
+-------------+----------+-----------------+-------------+--------------+
| CARNITINE   |          |                 | OUTSIDE LAB |              |
| MUSCLE -    |          |                 |             |              |
| CAM         |          |                 |             |              |
+-------------+----------+-----------------+-------------+--------------+
| COPPER      |          | ug/dL           | OUTSIDE LAB |              |
| SERUM       |          |                 |             |              |
 
+-------------+----------+-----------------+-------------+--------------+
| PEDS        |          |                 | OUTSIDE LAB |              |
| GASTROENTER |          |                 |             |              |
| OLOGY       |          |                 |             |              |
| CHROMIUM    |          |                 |             |              |
+-------------+----------+-----------------+-------------+--------------+
| PEDS        |          |                 | OUTSIDE LAB |              |
| GASTROENTER |          |                 |             |              |
| OLOGY       |          |                 |             |              |
| MANGANESE   |          |                 |             |              |
+-------------+----------+-----------------+-------------+--------------+
| SELENIUM,   |          | ug/L            | OUTSIDE LAB |              |
| SERUM       |          |                 |             |              |
+-------------+----------+-----------------+-------------+--------------+
| ZINC SERUM  |          | ug/dL           | OUTSIDE LAB |              |
+-------------+----------+-----------------+-------------+--------------+
| IGA SERUM   |          | mg/dl           | OUTSIDE LAB |              |
+-------------+----------+-----------------+-------------+--------------+
| TTG IGA     |          | AU              | OUTSIDE LAB |              |
+-------------+----------+-----------------+-------------+--------------+
| GLIADIN     |          | EU              | OUTSIDE LAB |              |
| PEPTIDE AB, |          |                 |             |              |
|  IGA        |          |                 |             |              |
+-------------+----------+-----------------+-------------+--------------+
 
 
 
+----------+
| Specimen |
+----------+
|          |
+----------+
 
 
 
+----------------+---------+--------------------+--------------+
| Performing     | Address | City/State/Zipcode | Phone Number |
| Organization   |         |                    |              |
+----------------+---------+--------------------+--------------+
|   [facility] |         |                    |              |
+----------------+---------+--------------------+--------------+
|   OUTSIDE LAB  |         |                    |              |
+----------------+---------+--------------------+--------------+
 documented in this encounter
 
 Visit Diagnoses
 Not on filedocumented in this encounter

## 2020-10-13 NOTE — XMS
Encounter Summary
  Created on: 10/13/2020
 
 Bonifacio Nila JB
 External Reference #: [id number]
 : [birth year]
 Sex: Female
 
 Demographics
 
 
+-----------------------+------------------------+
| Address               | [address]            |
|                       | [place]  [address]   |
+-----------------------+------------------------+
| Home Phone            | [phone]        |
+-----------------------+------------------------+
| Preferred Language    | Unknown                |
+-----------------------+------------------------+
| Marital Status        | Single                 |
+-----------------------+------------------------+
| Sikh Affiliation | Unknown                |
+-----------------------+------------------------+
| Race                  | White                  |
+-----------------------+------------------------+
| Ethnic Group          | Not  or  |
+-----------------------+------------------------+
 
 
 Author
 
 
+--------------+------------------------------+
| Author       | [facility] |
+--------------+------------------------------+
| Organization | [facility] |
+--------------+------------------------------+
| Address      | Unknown                      |
+--------------+------------------------------+
| Phone        | Unavailable                  |
+--------------+------------------------------+
 
 
 
 Support
 
 
+-----------------+--------------+---------+-----------------+
| Name            | Relationship | Address | Phone           |
+-----------------+--------------+---------+-----------------+
| Kit Valdovinos   | ECON         | Unknown | [phone] |
+-----------------+--------------+---------+-----------------+
| Magdalena Valdovinos | ECON         | Unknown | [phone] |
+-----------------+--------------+---------+-----------------+
 
 
 
 Care Team Providers
 
 
 
+-----------------------+------+-------------+
| Care Team Member Name | Role | Phone       |
+-----------------------+------+-------------+
 PCP  | Unavailable |
+-----------------------+------+-------------+
 
 
 
 Encounter Details
 
 
+--------+-------------+----------------------+----------------------+---------------------+
| Date   | Type        | Department           | Care Team            | Description         |
+--------+-------------+----------------------+----------------------+---------------------+
| / |  |   Pediatric          |   Neema Khalil,   | Transplanted liver  |
|    |             | Gastroenterology at  | MD  [address] | (HCC) (Primary Dx)  |
|        |             | [place]          |   [place], [place]       |                     |
|        |             | [facility]  | [address]           |                     |
|        |             |  [address]     | [phone]         |                     |
|        |             | [place]          | [phone] (Fax)   |                     |
|        |             | [facility], |                      |                     |
|        |             |  [address] [place] [address]           |                      |                     |
|        |             | [place], [place]         |                      |                     |
|        |             | [address]           |                      |                     |
|        |             | [phone]         |                      |                     |
+--------+-------------+----------------------+----------------------+---------------------+
 
 
 
 Social History
 
 
+----------------+-------+-----------+--------+------+
| Tobacco Use    | Types | Packs/Day | Years  | Date |
|                |       |           | Used   |      |
+----------------+-------+-----------+--------+------+
| Never Assessed |       |           |        |      |
+----------------+-------+-----------+--------+------+
 
 
 
+------------------+---------------+
| Sex Assigned at  | Date Recorded |
| Birth            |               |
+------------------+---------------+
| Not on file      |               |
+------------------+---------------+
 documented as of this encounter
 
 Plan of Treatment
 Not on filedocumented as of this encounter
 
 Visit Diagnoses
 
 
+--------------------------------------------------------------------+
| Diagnosis                                                          |
+--------------------------------------------------------------------+
 
|   Transplanted liver (HCC) - Primary  Liver replaced by transplant |
+--------------------------------------------------------------------+
 documented in this encounter

## 2020-10-13 NOTE — XMS
Encounter Summary
  Created on: 10/13/2020
 
 Bonifacio Nila JB
 External Reference #: [id number]
 : [birth year]
 Sex: Female
 
 Demographics
 
 
+-----------------------+------------------------+
| Address               | [address]            |
|                       | [place]  [address]   |
+-----------------------+------------------------+
| Home Phone            | [phone]        |
+-----------------------+------------------------+
| Preferred Language    | Unknown                |
+-----------------------+------------------------+
| Marital Status        | Single                 |
+-----------------------+------------------------+
| Scientology Affiliation | Unknown                |
+-----------------------+------------------------+
| Race                  | White                  |
+-----------------------+------------------------+
| Ethnic Group          | Not  or  |
+-----------------------+------------------------+
 
 
 Author
 
 
+--------------+------------------------------+
| Author       | On license of [facility] |
+--------------+------------------------------+
| Organization | [facility] |
+--------------+------------------------------+
| Address      | Unknown                      |
+--------------+------------------------------+
| Phone        | Unavailable                  |
+--------------+------------------------------+
 
 
 
 Support
 
 
+-----------------+--------------+---------+-----------------+
| Name            | Relationship | Address | Phone           |
+-----------------+--------------+---------+-----------------+
| Kit Valdovinos   | ECON         | Unknown | [phone] |
+-----------------+--------------+---------+-----------------+
| Magdalena Valdovinos | ECON         | Unknown | [phone] |
+-----------------+--------------+---------+-----------------+
 
 
 
 Care Team Providers
 
 
 
+------------------------+------+-----------------+
| Care Team Member Name  | Role | Phone           |
+------------------------+------+-----------------+
| Lily Horton MD | PCP  | [phone] |
+------------------------+------+-----------------+
 
 
 
 Reason for Visit
 
 
+-------------+----------+
| Reason      | Comments |
+-------------+----------+
| Lab Results |          |
+-------------+----------+
 
 
 
 Encounter Details
 
 
+--------+----------+----------------------+----------------------+-------------+
| Date   | Type     | Department           | Care Team            | Description |
+--------+----------+----------------------+----------------------+-------------+
| / | Abstract |   Pediatric          |   Neema Khalil,   | Lab Results |
| 2016   |          | Gastroenterology at  | MD  [address] |             |
|        |          | [facility]          |   [place], [place]       |             |
|        |          | [facility]  | [address]           |             |
|        |          |  [address]    | [phone]         |             |
|        |          | [place]          | [phone] (Fax)   |             |
|        |          | [facility], |                      |             |
|        |          |  [facility] floor           |                      |             |
|        |          | [place], [place]         |                      |             |
|        |          | [address]           |                      |             |
|        |          | [phone]         |                      |             |
+--------+----------+----------------------+----------------------+-------------+
 
 
 
 Social History
 
 
+-------------------+-------+-----------+--------+------+
| Tobacco Use       | Types | Packs/Day | Years  | Date |
|                   |       |           | Used   |      |
+-------------------+-------+-----------+--------+------+
| Passive Smoke     |       |           |        |      |
| Exposure - Never  |       |           |        |      |
| Smoker            |       |           |        |      |
+-------------------+-------+-----------+--------+------+
 
 
 
+---------------------+---+---+---+
| Smokeless Tobacco:  |   |   |   |
| Never Used          |   |   |   |
+---------------------+---+---+---+
 
 
 
 
+-------------+-------------+---------+----------+
| Alcohol Use | Drinks/Week | oz/Week | Comments |
+-------------+-------------+---------+----------+
| Not Asked   |             |         |          |
+-------------+-------------+---------+----------+
 
 
 
+------------------+---------------+
| Sex Assigned at  | Date Recorded |
| Birth            |               |
+------------------+---------------+
| Not on file      |               |
+------------------+---------------+
 documented as of this encounter
 
 Plan of Treatment
 Not on filedocumented as of this encounter
 
 Procedures
 
 
+----------------------+--------+------------+----------------------+----------------------+
| Procedure Name       | Priori | Date/Time  | Associated Diagnosis | Comments             |
|                      | ty     |            |                      |                      |
+----------------------+--------+------------+----------------------+----------------------+
| CBC, WITH            | Routin | 2016 |                      |   Results for this   |
| DIFFERENTIAL         | e      |            |                      | procedure are in the |
|                      |        |            |                      |  results section.    |
+----------------------+--------+------------+----------------------+----------------------+
| COMPLETE METABOLIC   | Routin | 2016 |                      |   Results for this   |
| SET                  | e      |            |                      | procedure are in the |
| (NA,K,CL,CO2,BUN,CRE |        |            |                      |  results section.    |
| AT,GLUC,CA,AST,ALT,B |        |            |                      |                      |
| ASHWINI TOTAL,ALK        |        |            |                      |                      |
| PHOS,ALB,PROT TOTAL) |        |            |                      |                      |
+----------------------+--------+------------+----------------------+----------------------+
 documented in this encounter
 
 Results
 CBC, WITH DIFFERENTIAL (2016)
 
+-------------+--------+-----------------+------------+--------------+
| Component   | Value  | Ref Range       | Performed  | Pathologist  |
|             |        |                 | At         | Signature    |
+-------------+--------+-----------------+------------+--------------+
| WHITE CELL  | 8.8    | 4.5 - 11 K/cu   | [facility]  |              |
| COUNT       |        | mm              | LAB -      |              |
|             |        |                 | [place]  |              |
+-------------+--------+-----------------+------------+--------------+
| RED CELL    | 4.97   | 3.8 - 5.1 M/cu  | [facility]  |              |
| COUNT       |        | mm              | LAB -      |              |
|             |        |                 | [place]  |              |
+-------------+--------+-----------------+------------+--------------+
| HEMOGLOBIN  | 14.5   | 12 - 16 g/dL    | [facility]  |              |
|             |        |                 | LAB -      |              |
|             |        |                 | [place]  |              |
 
+-------------+--------+-----------------+------------+--------------+
| HEMATOCRIT  | 43.2   | 35 - 45 %       | [facility]  |              |
|             |        |                 | LAB -      |              |
|             |        |                 | [place]  |              |
+-------------+--------+-----------------+------------+--------------+
| MCV         | 86.9   | 81 - 99 fL      | [facility]  |              |
|             |        |                 | LAB -      |              |
|             |        |                 | [place]  |              |
+-------------+--------+-----------------+------------+--------------+
| MCH         | 29     | 27 - 33 pg      | [facility]  |              |
|             |        |                 | LAB -      |              |
|             |        |                 | [place]  |              |
+-------------+--------+-----------------+------------+--------------+
| MCHC        | 34     | 30 - 36 g/dL    | [facility]  |              |
|             |        |                 | LAB -      |              |
|             |        |                 | [place]  |              |
+-------------+--------+-----------------+------------+--------------+
| PLATELET    | 415    | 140 - 440 K/cu  | [facility]  |              |
| COUNT       |        | mm              | LAB -      |              |
|             |        |                 | [place]  |              |
+-------------+--------+-----------------+------------+--------------+
| NEUTROPHIL  | 65.8   | 39 - 80 %       | [facility]  |              |
| %           |        |                 | LAB -      |              |
|             |        |                 | [place]  |              |
+-------------+--------+-----------------+------------+--------------+
| LYMPHOCYTE  | 22 (A) | 24 - 44 %       | [facility]  |              |
| %           |        |                 | LAB -      |              |
|             |        |                 | [place]  |              |
+-------------+--------+-----------------+------------+--------------+
| MONOCYTE %  | 7.4    | 0 - 12 %        | [facility]  |              |
|             |        |                 | LAB -      |              |
|             |        |                 | [place]  |              |
+-------------+--------+-----------------+------------+--------------+
| EOS %       | 3.9    | 0 - 6 %         | [facility]  |              |
|             |        |                 | LAB -      |              |
|             |        |                 | [place]  |              |
+-------------+--------+-----------------+------------+--------------+
| BASO %      | 0.9    | 0 - 2 %         | [facility]  |              |
|             |        |                 | LAB -      |              |
|             |        |                 | [place]  |              |
+-------------+--------+-----------------+------------+--------------+
| RDW         | 14.4   | 10.5 - 15 %     | [facility]  |              |
|             |        |                 | LAB -      |              |
|             |        |                 | [place]  |              |
+-------------+--------+-----------------+------------+--------------+
 
 
 
+---------------+
| Specimen      |
+---------------+
| Blood - Blood |
+---------------+
 
 
 
+--------------------+----------------------+--------------------+----------------+
| Performing         | Address              | City/State/Zipcode | Phone Number   |
| Organization       |                      |                    |                |
+--------------------+----------------------+--------------------+----------------+
 
|   [facility]  |   [address] |   [place]    |   [phone] |
| [place]          |                      |                    |                |
+--------------------+----------------------+--------------------+----------------+
 COMPLETE METABOLIC SET (NA,K,CL,CO2,BUN,CREAT,GLUC,CA,AST,ALT,BILI TOTAL,ALK PHOS,ALB,PROT 
TOTAL) (2016)
 
+-------------+-------+-----------------+------------+--------------+
| Component   | Value | Ref Range       | Performed  | Pathologist  |
|             |       |                 | At         | Signature    |
+-------------+-------+-----------------+------------+--------------+
| GLUCOSE,    | 73    | 70 - 100 mg/dL  | [facility]  |              |
| PLASMA      |       |                 | LAB -      |              |
| (LAB)       |       |                 | [place]  |              |
+-------------+-------+-----------------+------------+--------------+
| BUN, PLASMA | 8     | 6 - 23 mg/dL    | [facility]  |              |
|  (LAB)      |       |                 | LAB -      |              |
|             |       |                 | [place]  |              |
+-------------+-------+-----------------+------------+--------------+
| CREATININE  | 0.61  | 0.6 - 1.2 mg/dL | [facility]  |              |
| PLASMA      |       |                 | LAB -      |              |
| (LAB)       |       |                 | [place]  |              |
+-------------+-------+-----------------+------------+--------------+
| TOTAL       | 7.9   | 6 - 8 g/dL      | [facility]  |              |
| PROTEIN,    |       |                 | LAB -      |              |
| PLASMA      |       |                 | [place]  |              |
| (LAB)       |       |                 |            |              |
+-------------+-------+-----------------+------------+--------------+
| ALBUMIN,    | 4     | 3.5 - 5 g/dL    | [facility]  |              |
| PLASMA      |       |                 | LAB -      |              |
| (LAB)       |       |                 | [place]  |              |
+-------------+-------+-----------------+------------+--------------+
| CALCIUM,    | 9.7   | 8.4 - 10.2      | [facility]  |              |
| PLASMA      |       | mg/dL           | LAB -      |              |
| (LAB)       |       |                 | [place]  |              |
+-------------+-------+-----------------+------------+--------------+
| BILIRUBIN   | 0.3   | 0 - 1.2 mg/dL   | [facility]  |              |
| TOTAL       |       |                 | LAB -      |              |
|             |       |                 | [place]  |              |
+-------------+-------+-----------------+------------+--------------+
| ALK PHOS    | 108   | 30 - 128 U/L    | [facility]  |              |
|             |       |                 | LAB -      |              |
|             |       |                 | [place]  |              |
+-------------+-------+-----------------+------------+--------------+
| AST(SGOT)   | 18    | 13 - 39 U/L     | [facility]  |              |
|             |       |                 | LAB -      |              |
|             |       |                 | [place]  |              |
+-------------+-------+-----------------+------------+--------------+
| SODIUM,     | 137   | 132 - 143       | [facility]  |              |
| PLASMA      |       | mmol/L          | LAB -      |              |
| (LAB)       |       |                 | [place]  |              |
+-------------+-------+-----------------+------------+--------------+
| POTASSIUM,  | 4     | 3.6 - 5.1       | [facility]  |              |
| PLASMA      |       | mmol/L          | LAB -      |              |
| (LAB)       |       |                 | [place]  |              |
+-------------+-------+-----------------+------------+--------------+
| CHLORIDE,   | 100   | 95 - 112 mmol/L | [facility]  |              |
| PLASMA      |       |                 | LAB -      |              |
| (LAB)       |       |                 | [place]  |              |
+-------------+-------+-----------------+------------+--------------+
| TOTAL CO2,  | 24    | 19 - 31 mmol/L  | [facility]  |              |
 
| PLASMA      |       |                 | LAB -      |              |
| (LAB)       |       |                 | [place]  |              |
+-------------+-------+-----------------+------------+--------------+
| ALT (SGPT)  | 18    | 7 - 52 U/L      | [facility]  |              |
|             |       |                 | LAB -      |              |
|             |       |                 | [place]  |              |
+-------------+-------+-----------------+------------+--------------+
 
 
 
+---------------+
| Specimen      |
+---------------+
| Blood - Blood |
+---------------+
 
 
 
+--------------------+----------------------+--------------------+----------------+
| Performing         | Address              | City/State/Zipcode | Phone Number   |
| Organization       |                      |                    |                |
+--------------------+----------------------+--------------------+----------------+
|   [facility]  |   [address] |   [place], [place]    |   [phone] |
| [place]          |                      |                    |                |
+--------------------+----------------------+--------------------+----------------+
 documented in this encounter
 
 Visit Diagnoses
 Not on filedocumented in this encounter

## 2020-10-13 NOTE — XMS
Encounter Summary
  Created on: 10/13/2020
 
 Bonifacio Nila JB
 External Reference #: [id number]
 : [birth year]
 Sex: Female
 
 Demographics
 
 
+-----------------------+------------------------+
| Address               | [address]            |
|                       | [place]  [address]   |
+-----------------------+------------------------+
| Home Phone            | [phone]        |
+-----------------------+------------------------+
| Preferred Language    | Unknown                |
+-----------------------+------------------------+
| Marital Status        | Single                 |
+-----------------------+------------------------+
| Church Affiliation | Unknown                |
+-----------------------+------------------------+
| Race                  | White                  |
+-----------------------+------------------------+
| Ethnic Group          | Not  or  |
+-----------------------+------------------------+
 
 
 Author
 
 
+--------------+------------------------------+
| Author       | [facility] |
+--------------+------------------------------+
| Organization | [facility] |
+--------------+------------------------------+
| Address      | Unknown                      |
+--------------+------------------------------+
| Phone        | Unavailable                  |
+--------------+------------------------------+
 
 
 
 Support
 
 
+-----------------+--------------+---------+-----------------+
| Name            | Relationship | Address | Phone           |
+-----------------+--------------+---------+-----------------+
| Kit Valdovinos   | ECON         | Unknown | [phone] |
+-----------------+--------------+---------+-----------------+
| Magdalena Valdovinos | ECON         | Unknown | [phone] |
+-----------------+--------------+---------+-----------------+
 
 
 
 Care Team Providers
 
 
 
+-----------------------+------+-------------+
| Care Team Member Name | Role | Phone       |
+-----------------------+------+-------------+
| No Pcp Per Patient    | PCP  | Unavailable |
+-----------------------+------+-------------+
 
 
 
 Encounter Details
 
 
+--------+----------+----------------------+----------------------+-------------+
| Date   | Type     | Department           | Care Team            | Description |
+--------+----------+----------------------+----------------------+-------------+
| 09/15/ | Abstract |   Pediatric          |   Neema Kahlil,   |             |
|    |          | Gastroenterology at  | [address] |             |
|        |          | [place]          |   [place], [place]       |             |
|        |          | [facility]  | [address]           |             |
|        |          |  [address]    | [phone]         |             |
|        |          | [place]          | [phone] (Fax)   |             |
|        |          | [facility], |                      |             |
|        |          |  7th floor           |                      |             |
|        |          | [place], [place]         |                      |             |
|        |          | [address]           |                      |             |
|        |          | [phone]         |                      |             |
+--------+----------+----------------------+----------------------+-------------+
 
 
 
 Social History
 
 
+-------------------+-------+-----------+--------+------+
| Tobacco Use       | Types | Packs/Day | Years  | Date |
|                   |       |           | Used   |      |
+-------------------+-------+-----------+--------+------+
| Passive Smoke     |       |           |        |      |
| Exposure - Never  |       |           |        |      |
| Smoker            |       |           |        |      |
+-------------------+-------+-----------+--------+------+
 
 
 
+---------------------+---+---+---+
| Smokeless Tobacco:  |   |   |   |
| Never Used          |   |   |   |
+---------------------+---+---+---+
 
 
 
+-------------+-------------+---------+----------+
| Alcohol Use | Drinks/Week | oz/Week | Comments |
+-------------+-------------+---------+----------+
| Not Asked   |             |         |          |
+-------------+-------------+---------+----------+
 
 
 
 
+------------------+---------------+
| Sex Assigned at  | Date Recorded |
| Birth            |               |
+------------------+---------------+
| Not on file      |               |
+------------------+---------------+
 documented as of this encounter
 
 Plan of Treatment
 Not on filedocumented as of this encounter
 
 Procedures
 
 
+----------------------+--------+-------------+----------------------+----------------------
+
| Procedure Name       | Priori | Date/Time   | Associated Diagnosis | Comments             
|
|                      | ty     |             |                      |                      
|
+----------------------+--------+-------------+----------------------+----------------------
+
| CBC, WITH            | Routin | 2014  |                      |   Results for this   
|
| DIFFERENTIAL         | e      | 11:57 AM    |                      | procedure are in the 
|
|                      |        | PDT         |                      |  results section.    
|
+----------------------+--------+-------------+----------------------+----------------------
+
| COMPLETE METABOLIC   | Routin | 2014  |                      |   Results for this   
|
| SET                  | e      | 11:57 AM    |                      | procedure are in the 
|
| (NA,K,CL,CO2,BUN,CRE |        | PDT         |                      |  results section.    
|
| AT,GLUC,CA,AST,ALT,B |        |             |                      |                      
|
| ASHWINI TOTAL,ALK        |        |             |                      |                      
|
| PHOS,ALB,PROT TOTAL) |        |             |                      |                      
|
+----------------------+--------+-------------+----------------------+----------------------
+
 documented in this encounter
 
 Results
 CBC, WITH DIFFERENTIAL (2014 11:57 AM PDT)
 
+-------------+----------+-----------------+------------+--------------+
| Component   | Value    | Ref Range       | Performed  | Pathologist  |
|             |          |                 | At         | Signature    |
+-------------+----------+-----------------+------------+--------------+
| WHITE CELL  | 6.6      | 4.4 - 11.8 K/cu | [facility]  |              |
| COUNT       |          |  mm             | LAB -      |              |
|             |          |                 | [place]  |              |
+-------------+----------+-----------------+------------+--------------+
| RED CELL    | 4.83     | 3.8 - 5.3 M/cu  | [facility]  |              |
| COUNT       |          | mm              | LAB -      |              |
|             |          |                 | [place]  |              |
 
+-------------+----------+-----------------+------------+--------------+
| HEMOGLOBIN  | 14.7     | 11.1 - 15.7     | [facility]  |              |
|             |          | g/dL            | LAB -      |              |
|             |          |                 | [place]  |              |
+-------------+----------+-----------------+------------+--------------+
| HEMATOCRIT  | 42.8     | 32 - 47 %       | [facility]  |              |
|             |          |                 | LAB -      |              |
|             |          |                 | [place]  |              |
+-------------+----------+-----------------+------------+--------------+
| MCV         | 88.6     | 73 - 91 fL      | [facility]  |              |
|             |          |                 | LAB -      |              |
|             |          |                 | [place]  |              |
+-------------+----------+-----------------+------------+--------------+
| MCH         | 30       | 25 - 31 pg      | [facility]  |              |
|             |          |                 | LAB -      |              |
|             |          |                 | [place]  |              |
+-------------+----------+-----------------+------------+--------------+
| MCHC        | 34       | 30 - 36 g/dL    | [facility]  |              |
|             |          |                 | LAB -      |              |
|             |          |                 | [place]  |              |
+-------------+----------+-----------------+------------+--------------+
| PLATELET    | 328      | 140 - 440 K/cu  | [facility]  |              |
| COUNT       |          | mm              | LAB -      |              |
|             |          |                 | [place]  |              |
+-------------+----------+-----------------+------------+--------------+
| NEUTROPHIL  | 63.8     | 35 - 65 %       | [facility]  |              |
| %           |          |                 | LAB -      |              |
|             |          |                 | [place]  |              |
+-------------+----------+-----------------+------------+--------------+
| LYMPHOCYTE  | 25.1 (A) | 28 - 48 %       | [facility]  |              |
| %           |          |                 | LAB -      |              |
|             |          |                 | [place]  |              |
+-------------+----------+-----------------+------------+--------------+
| MONOCYTE %  | 7.2      | 0 - 12 %        | [facility]  |              |
|             |          |                 | LAB -      |              |
|             |          |                 | [place]  |              |
+-------------+----------+-----------------+------------+--------------+
| EOS %       | 3.1      | 0 - 6 %         | [facility]  |              |
|             |          |                 | LAB -      |              |
|             |          |                 | [place]  |              |
+-------------+----------+-----------------+------------+--------------+
| BASO %      | 0.8      | 0 - 2 %         | [facility]  |              |
|             |          |                 | LAB -      |              |
|             |          |                 | [place]  |              |
+-------------+----------+-----------------+------------+--------------+
| RDW         | 13.8     | 10.5 - 15.0 %   | [facility]  |              |
|             |          |                 | LAB -      |              |
|             |          |                 | [place]  |              |
+-------------+----------+-----------------+------------+--------------+
 
 
 
+---------------+
| Specimen      |
+---------------+
| Blood - Blood |
+---------------+
 
 
 
 
+--------------------+----------------------+--------------------+----------------+
| Performing         | Address              | City/State/Zipcode | Phone Number   |
| Organization       |                      |                    |                |
+--------------------+----------------------+--------------------+----------------+
|   [facility]  |   [address] |   [place], [place]    |   [phone] |
| [place]          |                      |                    |                |
+--------------------+----------------------+--------------------+----------------+
 COMPLETE METABOLIC SET (NA,K,CL,CO2,BUN,CREAT,GLUC,CA,AST,ALT,BILI TOTAL,ALK PHOS,ALB,PROT 
TOTAL) (2014 11:57 AM PDT)
 
+-------------+--------+-----------------+------------+--------------+
| Component   | Value  | Ref Range       | Performed  | Pathologist  |
|             |        |                 | At         | Signature    |
+-------------+--------+-----------------+------------+--------------+
| GLUCOSE,    | 87     | 70 - 100 mg/dL  | [facility]  |              |
| PLASMA      |        |                 | LAB -      |              |
| (LAB)       |        |                 | [place]  |              |
+-------------+--------+-----------------+------------+--------------+
| BUN, PLASMA | 13     | 6 - 23 mg/dL    | [facility]  |              |
|  (LAB)      |        |                 | LAB -      |              |
|             |        |                 | [place]  |              |
+-------------+--------+-----------------+------------+--------------+
| CREATININE  | 0.75   | 0.40 - 1.05     | [facility]  |              |
| PLASMA      |        | mg/dL           | LAB -      |              |
| (LAB)       |        |                 | [place]  |              |
+-------------+--------+-----------------+------------+--------------+
| TOTAL       | 7.6    | 6.1 - 8.5 g/dL  | [facility]  |              |
| PROTEIN,    |        |                 | LAB -      |              |
| PLASMA      |        |                 | [place]  |              |
| (LAB)       |        |                 |            |              |
+-------------+--------+-----------------+------------+--------------+
| ALBUMIN,    | 4.4    | 3.5 - 5.0 g/dL  | [facility]  |              |
| PLASMA      |        |                 | LAB -      |              |
| (LAB)       |        |                 | [place]  |              |
+-------------+--------+-----------------+------------+--------------+
| CALCIUM,    | 9.9    | 8.4 - 10.2      | [facility]  |              |
| PLASMA      |        | mg/dL           | LAB -      |              |
| (LAB)       |        |                 | [place]  |              |
+-------------+--------+-----------------+------------+--------------+
| BILIRUBIN   | 0.8    | 0 - 1.2         | [facility]  |              |
| TOTAL       |        | Transcutaneous  | LAB -      |              |
|             |        | Bilirubinometer | [place]  |              |
+-------------+--------+-----------------+------------+--------------+
| ALK PHOS    | 92 (A) | 135 - 384 U/L   | [facility]  |              |
|             |        |                 | LAB -      |              |
|             |        |                 | [place]  |              |
+-------------+--------+-----------------+------------+--------------+
| AST(SGOT)   | 13     | 13 - 39 U/L     | [facility]  |              |
|             |        |                 | LAB -      |              |
|             |        |                 | [place]  |              |
+-------------+--------+-----------------+------------+--------------+
| SODIUM,     | 140    | 132 - 143       | [facility]  |              |
| PLASMA      |        | mmol/L          | LAB -      |              |
| (LAB)       |        |                 | [place]  |              |
+-------------+--------+-----------------+------------+--------------+
| POTASSIUM,  | 4.0    | 3.6 - 5.1       | [facility]  |              |
| PLASMA      |        | mmol/L          | LAB -      |              |
| (LAB)       |        |                 | [place]  |              |
+-------------+--------+-----------------+------------+--------------+
| CHLORIDE,   | 100    | 95 - 112 mmol/L | [facility]  |              |
 
| PLASMA      |        |                 | LAB -      |              |
| (LAB)       |        |                 | [place]  |              |
+-------------+--------+-----------------+------------+--------------+
| TOTAL CO2,  | 28     | 19 - 31 mmol/L  | [facility]  |              |
| PLASMA      |        |                 | LAB -      |              |
| (LAB)       |        |                 | [place]  |              |
+-------------+--------+-----------------+------------+--------------+
| ALT (SGPT)  | 9      | 7 - 52 U/L      | [facility]  |              |
|             |        |                 | LAB -      |              |
|             |        |                 | [place]  |              |
+-------------+--------+-----------------+------------+--------------+
| ANION GAP   | 16     | 7 - 21          | [facility]  |              |
|             |        |                 | LAB -      |              |
|             |        |                 | [place]  |              |
+-------------+--------+-----------------+------------+--------------+
| BUN/CREATIN | 17.3   | 6.0 - 28.6      | [facility]  |              |
| INE RATIO   |        |                 | LAB -      |              |
|             |        |                 | [place]  |              |
+-------------+--------+-----------------+------------+--------------+
| GLOBULIN    | 3.2    | 1.8 - 3.5       | [facility]  |              |
|             |        |                 | LAB -      |              |
|             |        |                 | [place]  |              |
+-------------+--------+-----------------+------------+--------------+
| A/G RATIO   | 1.4    | 1.1 - 2.4       | [facility]  |              |
|             |        |                 | LAB -      |              |
|             |        |                 | [place]  |              |
+-------------+--------+-----------------+------------+--------------+
 
 
 
+---------------+
| Specimen      |
+---------------+
| Blood - Blood |
+---------------+
 
 
 
+--------------------+----------------------+--------------------+----------------+
| Performing         | Address              | City/State/Zipcode | Phone Number   |
| Organization       |                      |                    |                |
+--------------------+----------------------+--------------------+----------------+
|   [facility]  |   [address] |   [place]    |   [phone] |
| [place]          |                      |                    |                |
+--------------------+----------------------+--------------------+----------------+
 documented in this encounter
 
 Visit Diagnoses
 Not on filedocumented in this encounter

## 2020-10-13 NOTE — XMS
Encounter Summary
  Created on: 10/13/2020
 
 Bonifacio Nila JB
 External Reference #: [id number]
 : [birth year]
 Sex: Female
 
 Demographics
 
 
+-----------------------+------------------------+
| Address               | [address]            |
|                       | [place]  [address]   |
+-----------------------+------------------------+
| Home Phone            | [phone]        |
+-----------------------+------------------------+
| Preferred Language    | Unknown                |
+-----------------------+------------------------+
| Marital Status        | Single                 |
+-----------------------+------------------------+
| Confucianism Affiliation | Unknown                |
+-----------------------+------------------------+
| Race                  | White                  |
+-----------------------+------------------------+
| Ethnic Group          | Not  or  |
+-----------------------+------------------------+
 
 
 Author
 
 
+--------------+------------------------------+
| Author       | [facility] |
+--------------+------------------------------+
| Organization | [facility] |
+--------------+------------------------------+
| Address      | Unknown                      |
+--------------+------------------------------+
| Phone        | Unavailable                  |
+--------------+------------------------------+
 
 
 
 Support
 
 
+-----------------+--------------+---------+-----------------+
| Name            | Relationship | Address | Phone           |
+-----------------+--------------+---------+-----------------+
| Kit Valdovinos   | ECON         | Unknown | [phone] |
+-----------------+--------------+---------+-----------------+
| Magdalena Valdovinos | ECON         | Unknown | [phone] |
+-----------------+--------------+---------+-----------------+
 
 
 
 Care Team Providers
 
 
 
+-----------------------+------+-------------+
| Care Team Member Name | Role | Phone       |
+-----------------------+------+-------------+
 PCP  | Unavailable |
+-----------------------+------+-------------+
 
 
 
 Reason for Visit
 Office Visit - E/M Services (Routine)
 
+--------+--------------+---------------+--------------+--------------+---------------+
| Status | Reason       | Specialty     | Diagnoses /  | Referred By  | Referred To   |
|        |              |               | Procedures   | Contact      | Contact       |
+--------+--------------+---------------+--------------+--------------+---------------+
| Closed |   Specialty  | Pediatric     |              |   Non-[facility]   |   Ped Gastro  |
|        | Services     | Gastroenterol |              | Epic Dept    | [facility]  [address]   |
|        | Required     | ogy           |              |              | [place]      |
|        |              |               |              |              | [facility]   |
|        |              |               |              |              [facility]    |
|        |              |               |              |              | [facility], [address] |
|        |              |               |              |              |  floor        |
|        |              |               |              |              | [place], [place]  |
|        |              |               |              |              | [address]    |
|        |              |               |              |              | Phone:        |
|        |              |               |              |              | [phone]  |
|        |              |               |              |              |  Fax:         |
|        |              |               |              |              | [phone]  |
+--------+--------------+---------------+--------------+--------------+---------------+
 
 
 
 
 Encounter Details
 
 
+--------+---------+----------------------+----------------------+----------------------+
| Date   | Type    | Department           | Care Team            | Description          |
+--------+---------+----------------------+----------------------+----------------------+
| 07/10/ | Office  |   Specialty Clinics  |   Neema Khalil,   | Immunosuppressed     |
|    | Visit   | at [facility]  [address]       | MD  [address] | status (HCC)         |
|        |         | [address]            |   [place]       | (Primary Dx); S/P    |
|        |         | [place]          | [address]           | liver transplant     |
|        |         | [facility], | [phone]         | (HCC); High risk     |
|        |         |  7th floor           | [phone] (Fax)   | medications (not     |
|        |         | [place], [place]         |                      | anticoagulants)      |
|        |         | [address]           |                      | long-term use; VSD   |
|        |         | [phone]         |                      | (ventricular septal  |
|        |         |                      |                      | defect); Aortic      |
|        |         |                      |                      | valve insufficiency  |
+--------+---------+----------------------+----------------------+----------------------+
 
 
 
 Social History
 
 
+----------------+-------+-----------+--------+------+
 
| Tobacco Use    | Types | Packs/Day | Years  | Date |
|                |       |           | Used   |      |
+----------------+-------+-----------+--------+------+
| Never Assessed |       |           |        |      |
+----------------+-------+-----------+--------+------+
 
 
 
+------------------+---------------+
| Sex Assigned at  | Date Recorded |
| Birth            |               |
+------------------+---------------+
| Not on file      |               |
+------------------+---------------+
 documented as of this encounter
 
 Patient Instructions
 Patient Instructions Neema Khalil MD - 2014  4:33 PM PDT
 
 Electronically signed by Neema Khalil MD at 07/10/2014  6:31 PM PDT
 documented in this encounter
 
 Progress Notes
 Neema Khalil MD - 2014  4:33 PM PDT
 Patient is no show.
 
 Electronically signed by Neema Khalil MD at 07/10/2014  6:32 PM PDTdocumented in this en
counter
 
 Plan of Treatment
 Not on filedocumented as of this encounter
 
 Visit Diagnoses
 
 
+--------------------------------------------------------------------------------------+
| Diagnosis                                                                            |
+--------------------------------------------------------------------------------------+
|   Immunosuppressed status (HCC) - Primary  Unspecified disorder of immune mechanism  |
+--------------------------------------------------------------------------------------+
|   S/P liver transplant (HCC)  Liver replaced by transplant                           |
+--------------------------------------------------------------------------------------+
|   High risk medications (not anticoagulants) long-term use  Encounter for long-term  |
| (current) use of other medications                                                   |
+--------------------------------------------------------------------------------------+
|   VSD (ventricular septal defect)  Ventricular septal defect                         |
+--------------------------------------------------------------------------------------+
|   Aortic valve insufficiency  Aortic valve disorders                                 |
+--------------------------------------------------------------------------------------+
 documented in this encounter

## 2020-10-13 NOTE — XMS
Encounter Summary
  Created on: 10/13/2020
 
 Bonifacio Nila JB
 External Reference #: [id number]
 : [birth year]
 Sex: Female
 
 Demographics
 
 
+-----------------------+------------------------+
| Address               | [address]            |
|                       | [place]  [address]   |
+-----------------------+------------------------+
| Home Phone            | [phone]        |
+-----------------------+------------------------+
| Preferred Language    | Unknown                |
+-----------------------+------------------------+
| Marital Status        | Single                 |
+-----------------------+------------------------+
| Faith Affiliation | Unknown                |
+-----------------------+------------------------+
| Race                  | White                  |
+-----------------------+------------------------+
| Ethnic Group          | Not  or  |
+-----------------------+------------------------+
 
 
 Author
 
 
+--------------+------------------------------+
| Author       | [facility] |
+--------------+------------------------------+
| Organization | [facility] |
+--------------+------------------------------+
| Address      | Unknown                      |
+--------------+------------------------------+
| Phone        | Unavailable                  |
+--------------+------------------------------+
 
 
 
 Support
 
 
+-----------------+--------------+---------+-----------------+
| Name            | Relationship | Address | Phone           |
+-----------------+--------------+---------+-----------------+
| iKt Valdovinos   | ECON         | Unknown | [phone] |
+-----------------+--------------+---------+-----------------+
| Magdalena Valdovinos | ECON         | Unknown | [phone] |
+-----------------+--------------+---------+-----------------+
 
 
 
 Care Team Providers
 
 
 
+------------------------+------+-----------------+
| Care Team Member Name  | Role | Phone           |
+------------------------+------+-----------------+
| Lily Horton MD | PCP  | [phone] |
+------------------------+------+-----------------+
 
 
 
 Reason for Visit
 
 
+-------------------+----------+
| Reason            | Comments |
+-------------------+----------+
| Follow-up in      |          |
| outpatient clinic |          |
+-------------------+----------+
 Office Visit - E/M Services (Routine)
 
+--------+--------------+---------------+--------------+--------------+---------------+
| Status | Reason       | Specialty     | Diagnoses /  | Referred By  | Referred To   |
|        |              |               | Procedures   | Contact      | Contact       |
+--------+--------------+---------------+--------------+--------------+---------------+
| Closed |   Specialty  | Pediatric     |              |   Non-[facility]   |   Remi,     |
|        | Services     | Gastroenterol |              | Epic Dept    | MD Neema   |
|        | Required     | ogy           |              |              | [address] |
|        |              |               |              |              |  Rd           |
|        |              |               |              |              | [place], [place]  |
|        |              |               |              |              | [address]    |
|        |              |               |              |              | Phone:        |
|        |              |               |              |              | [phone]  |
|        |              |               |              |              |  Fax:         |
|        |              |               |              |              | [phone]  |
+--------+--------------+---------------+--------------+--------------+---------------+
 
 
 
 
 Encounter Details
 
 
+--------+---------+----------------------+----------------------+----------------------+
| Date   | Type    | Department           | Care Team            | Description          |
+--------+---------+----------------------+----------------------+----------------------+
| / | Office  |   Pediatric          |   Neema Khalil,   | Transplanted liver   |
| 2014   | Visit   | Gastroenterology at  | MD  [address] | (HCC) (Primary Dx);  |
|        |         | [place]          |   [place], [place]       | Immunosuppressed     |
|        |         | [facility]  | [address]           | status (HCC); High   |
|        |         |  [address]    | [phone]         | risk medications     |
|        |         | [place]          | [phone] (Fax)   | (not anticoagulants) |
|        |         | [facility], |                      |  long-term use; VSD  |
|        |         |  7th floor           |                      | (ventricular septal  |
|        |         | [place], [place]         |                      | defect),             |
|        |         | [address]           |                      | perimembranous;      |
|        |         | [phone]         |                      | Polysplenia; Aortic  |
|        |         |                      |                      | insufficiency;       |
|        |         |                      |                      | Aortic root          |
|        |         |                      |                      | dilatation (HCC);    |
 
|        |         |                      |                      | Interrupted inferior |
|        |         |                      |                      |  vena cava           |
+--------+---------+----------------------+----------------------+----------------------+
 
 
 
 Social History
 
 
+-------------------+-------+-----------+--------+------+
| Tobacco Use       | Types | Packs/Day | Years  | Date |
|                   |       |           | Used   |      |
+-------------------+-------+-----------+--------+------+
| Passive Smoke     |       |           |        |      |
| Exposure - Never  |       |           |        |      |
| Smoker            |       |           |        |      |
+-------------------+-------+-----------+--------+------+
 
 
 
+---------------------+---+---+---+
| Smokeless Tobacco:  |   |   |   |
| Never Used          |   |   |   |
+---------------------+---+---+---+
 
 
 
+-------------+-------------+---------+----------+
| Alcohol Use | Drinks/Week | oz/Week | Comments |
+-------------+-------------+---------+----------+
| Not Asked   |             |         |          |
+-------------+-------------+---------+----------+
 
 
 
+------------------+---------------+
| Sex Assigned at  | Date Recorded |
| Birth            |               |
+------------------+---------------+
| Not on file      |               |
+------------------+---------------+
 documented as of this encounter
 
 Last Filed Vital Signs
 
 
+-------------------+---------------------+----------------------+----------+
| Vital Sign        | Reading             | Time Taken           | Comments |
+-------------------+---------------------+----------------------+----------+
| Blood Pressure    | 137/95              | 2014  3:52 PM  |          |
|                   |                     | PST                  |          |
+-------------------+---------------------+----------------------+----------+
| Pulse             | 74                  | 2014  3:52 PM  |          |
|                   |                     | PST                  |          |
+-------------------+---------------------+----------------------+----------+
| Temperature       | -                   | -                    |          |
+-------------------+---------------------+----------------------+----------+
| Respiratory Rate  | -                   | -                    |          |
+-------------------+---------------------+----------------------+----------+
| Oxygen Saturation | -                   | -                    |          |
 
+-------------------+---------------------+----------------------+----------+
| Inhaled Oxygen    | -                   | -                    |          |
| Concentration     |                     |                      |          |
+-------------------+---------------------+----------------------+----------+
| Weight            | 72 kg (158 lb 11.7  | 2014  3:52 PM  |          |
|                   | oz)                 | PST                  |          |
+-------------------+---------------------+----------------------+----------+
| Height            | 163.8 cm (5' 4.49") | 2014  3:52 PM  |          |
|                   |                     | PST                  |          |
+-------------------+---------------------+----------------------+----------+
| Body Mass Index   | 26.84               | 2014  3:52 PM  |          |
|                   |                     | PST                  |          |
+-------------------+---------------------+----------------------+----------+
 documented in this encounter
 
 Patient Instructions
 Patient Instructions Neema Khalil MD - 2014 10:38 AM PSTPlan: 
 
 - Target prograf level: Not established
 
 - Blood draw for labs: every  3  months
 
 - Change in medicines: None
 
 - Next appointment: 2015
 
 It was nice to see you today ! Thank you for choosing Pediatric [facility]at[facility] at [facility]
 
 Neema Khalil MD
 
 Results of tests:  
 Test results will be sent to you by CellPly. 
 
 Calls:
 Medical emergencies:  call 911
 Non-urgent issues:  Send CellPly message
 Monday - Friday work hours: call  [phone] # [phone]
 After hours, weekends, holidays: call [phone]
 To schedule an appointment: call [phone] # [phone]
 For refills: call to your pharmacy a week before running out medicine
 
 Electronically signed by Neema Khalil MD at 2014  4:20 PM PST
 documented in this encounter
 
 Progress Notes
 Neema Khalil MD - 2014 10:38 AM PSTFormatting of this note might be different fro
m the original.
 Primary Care Provider: Lily Horton MD
 
 [facility] Trans[facility] 
 DOS: 2014
 Visit type: Follow-up 
 
 Patient accompanied by: mother
  used: no
 
 Chief Complaint/Reason for visit: 
 - Post-liver transplant care 
 - High risk medication management
 
 
 Patient Active Problem List 
 Diagnosis 
   Transplanted Liver 
   VSD (Ventricular Septal Defect) 
   Aortic Valve Insufficiency 
 
 PAST Hx/kim: Biliary atresia
 -  s/p OLT at [facility]. She was last seen by Dr Burkett in . 
In , it was noticed that she has not been seen, attempts to reach to family was unsucces
sful. lost to follow up since that time. 
 - Cardiology:  last cardiology note is from  from Dr Lily Horton, cardiologist at [facility]. Her cardiac dx includes 1) moderate perimembraneous VSD nearly occluded by aneurysma
l tissue leaving a tiny VSD, 2) trace central aortic insufficiency, 3) Left atrial isomerism
 (polysplenia). It was recommended her annual follow up for progression of aortic insufficie
ncy in which case she may need closure of VSD. No further notes available in our system as s
he is lost to follow up since .
 - 10/16/2014 s/p VSD repair, 8-mm Amplatzer Vascular Plug IV with no significant residual s
hunting noted.
 
 INTERVAL HISTORY:
 - s/p VSD closure in mid-October. Will receive abx ppx 5 more months and aspirin x 5 mo
 - did not receive flu shot, mother says they don't do flu shots
 - doing well after VSD closure
 - no fever, abdominal pain, nausea, emesis, diarrhea
 
 REVIEW OF SYSTEMS: 
 General:  No fever, fatigue, or weight loss.  
 Eyes:  No changes in vision, no eye irritation or discharge.  
 ENT:  No nosebleeds, nasal congestion, difficulty swallowing, no mouth sores. 
 Respiratory:  No shortness of breath, cough, wheezing.
 Cardiovascular:  No difficulty breathing, edema, cyanosis.  
 Genitourinary:  No urinary infections.  
 Neurologic:  No seizures. No headaches.  
 Musculoskeletal:  No joint pain, swelling. No back pain.  Full range of motion.
 Skin:  No itching, rash, jaundice.  
 Heme: No anemia, abnormal bleeding, easy bruising
 Lymphatic: No enlarged lymph nodes.
 Metabolic/Endo: no glucose intolerance, hypothyroidism
 Allergy/immunology: no seasonal or food allergies, no asthma
 
 All other ROS are negative.
 
  
 Current Outpatient Prescriptions 
 Medication Sig 
   PROGRAF 1 mg Oral Capsule Take 1 Cap by mouth two times daily. 
 
 Aspirin daily
 
 Allergies 
 Allergen Reactions 
   Sulfa (Sulfonamide Antibiotics) Hives and Swelling-Facial 
   Varicella Vaccines  
   Possible reaction with sulfa meds 
 
 PHYSICAL EXAMINATION: 
 
 Patient reports a pain level of 0  today. No action required.
 
 
 Ht 163.8 cm (5' 4.49") (58%, Z = 0.21), Wt 72 kg (158 lb 11.7 oz) (92%, Z = 1.38), /9
5, Pulse 74, BMI 26.84 kg/(m^2).
 
 APPEARANCE: alert, active and in no apparent distress. 
 SKIN: no jaundice or rashes. 
 HEENT: normocephalic, PERRLA, mucous membranes moist. 
 NECK: supple, without thyromegaly. 
 CHEST: clear to auscultation bilaterally. 
 CV: no murmurs. 
 ABDOMEN: soft, NTND, no hepatosplenomegaly. 
 BACK: without tenderness. 
 MUSCULOSKELETAL: no muscle wasting, no deformity.
 EXTREMITIES: No edema, clubbing, deformity.
 NEURO: grossly intact
 
  
 Labs/imaging: as below
 
 ASSESSMENT:  14 yo female with
 
 V42.7   Transplanted liver: normal LFTs, GGT
 279.9   Immunosuppressed status: no signs of infection
 V58.69  High risk medications (not anticoagulants) long-term use
 745.4   VSD (ventricular septal defect), perimembranous: s/p closure
 759.0   Polysplenia
 424.1   Aortic insufficiency
 447.71  Aortic root dilatation
 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
 
 PLAN/RECOMMENDATIONS:
 1) Goal prograf level: not established
 
 2) Meds to continue without change: prograf 1 mg bid
 
 4) Labs:
  - CBC w diff, CMP, GGT, magnesium, trough tacrolimus level, quantitative EBC and CMV PCR: 
every 3 months
  
 5) follow up in liver transplant clinic: in 2015, then annually
 
 Health Care Maintenance: 
 - No live vaccines for immunosuppressed patients  
 - Flu vaccine (intramuscular): every . 
  2 shots the first year after transplant (regardless of age)
  If late in the season, can be given next season
 - Dental care every 6 months
 
 At this visit: 
 Dr. Nazario and DHARMESH Francois from [facility] were presen
t as observers during this visit.
 Psychosocial or economic issues that may affect patient's medical care or well being:none 
 Co-morbid chronic medical problems that may affect procedural sedation risk: N/A 
 
 Labs/imaging:
 
 Component
     Latest Ref Rng 2014 
 GLUCOSE
     60-99 mg/dL 81 
 BUN  6-20 mg/dL 13 
 
 CREATININE     0.46-0.81 mg/dL 0.76 
 SODIUM 136-145 mmol/L 139 
 POTASSIUM
     3.4-5.0 mmol/L 3.9 
 CHLORIDE     mmol/L 105 
 TOTAL CO2
     21-32 mmol/L 30 
 CALCIUM    8.6-10.2 mg/dL 9.4 
 BILIRUBIN TOTAL
     0.3-1.2 mg/dL 0.2 (L) 
 TOTAL PROTEIN
     6.2-8.5 g/dL 7.6 
 ALBUMIN
     3.5-4.7 g/dL 3.7 
 ALK PHOS
      U/L 101 
 AST(SGOT)
     18-36 U/L 15 (L) 
 ALT (SGPT)
     12-60 U/L 18 
 ANION GAP(ALB CORRECTED)
     4-11 mmol/L 4 
 ANION GAP     4 
 BILIRUBIN DIRECT
     0.0-0.3 mg/dL <0.1 
 LD TOTAL, PLASMA
     175-285 U/L 154 (L) 
 MAGNESIUM    1.8-2.5 mg/dL 1.6 (L) 
 PHOSPHORUS
     2.4-4.7 mg/dL 3.3 
 URIC ACID    2.5-6.2 mg/dL 3.7 
 CHOLESTEROL 
     <200 mg/dL 136 
 TACROLIMUS ()  5.0-15.0 ng/mL <2.0 (L) 
 
 Neema Khalil MD
 Pediatric Gastroenterology
 Consult line: [phone] 
 
 Electronically signed by Neema Khalil MD at 2014  9:34 PM PSTdocumented in this en
counter
 
 Plan of Treatment
 Not on filedocumented as of this encounter
 
 Results
 TACROLIMUS, WHOLE BLOOD (2014  5:27 PM PST)
 
+-------------+----------+-------------+-------------+--------------+
| Component   | Value    | Ref Range   | Performed   | Pathologist  |
|             |          |             | At          | Signature    |
+-------------+----------+-------------+-------------+--------------+
| TACROLIMUS  | <2.0 (L) | 5.0 - 15.0  | [facility]        |              |
| ()    |          | ng/mL       | LABORATORY  |              |
|             |          |             | SERVICES,   |              |
|             |          |             | SPECIAL IMM |              |
|             |          |             |  + COAG     |              |
+-------------+----------+-------------+-------------+--------------+
 
 
 
 
+---------------+
| Specimen      |
+---------------+
| Blood - Blood |
+---------------+
 
 
 
+------------------------------------------------------------------------+----------------+
| Narrative                                                              | Performed At   |
+------------------------------------------------------------------------+----------------+
|         Therapeutic range is based on a whole blood specimen drawn 12  |   [facility]         |
| hours post-dose or prior to next dose (the trough). Some other factors | LABORATORY     |
|  influencing therapeutic range, dose administered, and result          | SERVICES,      |
| interpretation include time since transplantation, the organ           | SPECIAL IMM +  |
| transplanted, co-administration of other immunosuppressants and        | COAG           |
| interaction with other drugs that may increase or decrease Tacrolimus  |                |
| concentrations.                                                        |                |
+------------------------------------------------------------------------+----------------+
 
 
 
+--------------------+------------------------+----------------------+--------------+
| Performing         | Address                | City/State/Zipcode   | Phone Number |
| Organization       |                        |                      |              |
+--------------------+------------------------+----------------------+--------------+
|   [facility]  |   [address]  |   [place] [address] |              |
| SERVICES, SPECIAL  | [address]                |                      |              |
| IMM + COAG         |                        |                      |              |
+--------------------+------------------------+----------------------+--------------+
 CHOLESTEROL TOTAL, PLASMA (2014  5:27 PM PST)
 
+-------------+-------+------------+-------------+--------------+
| Component   | Value | Ref Range  | Performed   | Pathologist  |
|             |       |            | At          | Signature    |
+-------------+-------+------------+-------------+--------------+
| CHOLESTEROL | 136   | <200 mg/dL | [facility]        |              |
|   (LAB)     |       |            | LABORATORY  |              |
|             |       |            | SERVICES,   |              |
|             |       |            | CORE        |              |
+-------------+-------+------------+-------------+--------------+
 
 
 
+---------------+
| Specimen      |
+---------------+
| Blood - Blood |
+---------------+
 
 
 
+--------------------+------------------------+----------------------+--------------+
| Performing         | Address                | City/State/Zipcode   | Phone Number |
| Organization       |                        |                      |              |
+--------------------+------------------------+----------------------+--------------+
|   [facility]  |   [address]  |   [place] [address] |              |
| SERVICES, CORE     | [address]                |                      |              |
+--------------------+------------------------+----------------------+--------------+
 
 LDH TOTAL, PLASMA (2014  5:27 PM PST)
 
+------------+---------+---------------+-------------+--------------+
| Component  | Value   | Ref Range     | Performed   | Pathologist  |
|            |         |               | At          | Signature    |
+------------+---------+---------------+-------------+--------------+
| LD TOTAL,  | 154 (L) | 175 - 285 U/L | [facility]        |              |
| PLASMA     |         |               | LABORATORY  |              |
|            |         |               | SERVICES,   |              |
|            |         |               | CORE        |              |
+------------+---------+---------------+-------------+--------------+
| LD CMNT    | No Hemo |               | [facility]        |              |
|            |         |               | LABORATORY  |              |
|            |         |               | SERVICES,   |              |
|            |         |               | CORE        |              |
+------------+---------+---------------+-------------+--------------+
 
 
 
+---------------+
| Specimen      |
+---------------+
| Blood - Blood |
+---------------+
 
 
 
+-----------------------------------------------------------------------+----------------+
| Narrative                                                             | Performed At   |
+-----------------------------------------------------------------------+----------------+
|      New reference range effective 2013 for LD Total performed  |   [facility]         |
| in Core Lab only.                                                     | LABORATORY     |
|                                                                       | SERVICES, CORE |
+-----------------------------------------------------------------------+----------------+
 
 
 
+--------------------+------------------------+----------------------+--------------+
| Performing         | Address                | City/State/Zipcode   | Phone Number |
| Organization       |                        |                      |              |
+--------------------+------------------------+----------------------+--------------+
|   [facility]  |   [address]  |   [place] [address] |              |
| SERVICES, CORE     | [address]                |                      |              |
+--------------------+------------------------+----------------------+--------------+
 BILIRUBIN DIRECT (2014  5:27 PM PST)
 
+-------------+---------+-----------------+-------------+--------------+
| Component   | Value   | Ref Range       | Performed   | Pathologist  |
|             |         |                 | At          | Signature    |
+-------------+---------+-----------------+-------------+--------------+
| BILIRUBIN   | <0.1    | 0.0 - 0.3 mg/dL | [facility]        |              |
| DIRECT      |         |                 | LABORATORY  |              |
|             |         |                 | SERVICES,   |              |
|             |         |                 | CORE        |              |
+-------------+---------+-----------------+-------------+--------------+
| BILI D CMNT | No Hemo |                 | [facility]        |              |
|             |         |                 | LABORATORY  |              |
|             |         |                 | SERVICES,   |              |
|             |         |                 | CORE        |              |
+-------------+---------+-----------------+-------------+--------------+
 
 
 
 
+---------------+
| Specimen      |
+---------------+
| Blood - Blood |
+---------------+
 
 
 
+--------------------+------------------------+----------------------+--------------+
| Performing         | Address                | City/State/Zipcode   | Phone Number |
| Organization       |                        |                      |              |
+--------------------+------------------------+----------------------+--------------+
|   [facility]  |   [address]  |   [place] [address] |              |
| SERVICES, CORE     | [address]                |                      |              |
+--------------------+------------------------+----------------------+--------------+
 URIC ACID, PLASMA (2014  5:27 PM PST)
 
+-------------+-------+-----------------+-------------+--------------+
| Component   | Value | Ref Range       | Performed   | Pathologist  |
|             |       |                 | At          | Signature    |
+-------------+-------+-----------------+-------------+--------------+
| URIC ACID,  | 3.7   | 2.5 - 6.2 mg/dL | [facility]        |              |
| PLASMA      |       |                 | LABORATORY  |              |
| (LAB)       |       |                 | SERVICES,   |              |
|             |       |                 | CORE        |              |
+-------------+-------+-----------------+-------------+--------------+
 
 
 
+---------------+
| Specimen      |
+---------------+
| Blood - Blood |
+---------------+
 
 
 
+--------------------+------------------------+----------------------+--------------+
| Performing         | Address                | City/State/Zipcode   | Phone Number |
| Organization       |                        |                      |              |
+--------------------+------------------------+----------------------+--------------+
|   [facility]  |   [id number]  |   [place] [address] |              |
| SERVICES, CORE     | [address]                |                      |              |
+--------------------+------------------------+----------------------+--------------+
 PHOSPHORUS, PLASMA (2014  5:27 PM PST)
 
+-------------+-------+-----------------+-------------+--------------+
| Component   | Value | Ref Range       | Performed   | Pathologist  |
|             |       |                 | At          | Signature    |
+-------------+-------+-----------------+-------------+--------------+
| PHOSPHORUS, | 3.3   | 2.4 - 4.7 mg/dL | [facility]        |              |
|  PLASMA     |       |                 | LABORATORY  |              |
| (LAB)       |       |                 | SERVICES,   |              |
|             |       |                 | CORE        |              |
+-------------+-------+-----------------+-------------+--------------+
 
 
 
 
+---------------+
| Specimen      |
+---------------+
| Blood - Blood |
+---------------+
 
 
 
+--------------------+------------------------+----------------------+--------------+
| Performing         | Address                | City/State/Zipcode   | Phone Number |
| Organization       |                        |                      |              |
+--------------------+------------------------+----------------------+--------------+
|   [facility]  |   [address]  |   [place] [address] |              |
| SERVICES, CORE     | [address]                |                      |              |
+--------------------+------------------------+----------------------+--------------+
 MAGNESIUM, PLASMA (2014  5:27 PM PST)
 
+-------------+---------+-----------------+-------------+--------------+
| Component   | Value   | Ref Range       | Performed   | Pathologist  |
|             |         |                 | At          | Signature    |
+-------------+---------+-----------------+-------------+--------------+
| MAGNESIUM,P | 1.6 (L) | 1.8 - 2.5 mg/dL | [facility]        |              |
| LASMA       |         |                 | LABORATORY  |              |
|             |         |                 | SERVICES,   |              |
|             |         |                 | CORE        |              |
+-------------+---------+-----------------+-------------+--------------+
 
 
 
+---------------+
| Specimen      |
+---------------+
| Blood - Blood |
+---------------+
 
 
 
+--------------------+------------------------+----------------------+--------------+
| Performing         | Address                | City/State/Zipcode   | Phone Number |
| Organization       |                        |                      |              |
+--------------------+------------------------+----------------------+--------------+
|   [facility]  |   [address]  |   [place] [address] |              |
| SERVICES, CORE     | [address]                |                      |              |
+--------------------+------------------------+----------------------+--------------+
 COMPLETE METABOLIC SET (NA,K,CL,CO2,BUN,CREAT,GLUC,CA,AST,ALT,BILI TOTAL,ALK PHOS,ALB,PROT 
TOTAL) (2014  5:27 PM PST)
 
+-------------+---------+-----------------+-------------+--------------+
| Component   | Value   | Ref Range       | Performed   | Pathologist  |
|             |         |                 | At          | Signature    |
+-------------+---------+-----------------+-------------+--------------+
| GLUCOSE,    | 81      | 60 - 99 mg/dL   | [facility]        |              |
| PLASMA      |         |                 | LABORATORY  |              |
| (LAB)       |         |                 | SERVICES,   |              |
|             |         |                 | CORE        |              |
+-------------+---------+-----------------+-------------+--------------+
| BUN, PLASMA | 13      | 6 - 20 mg/dL    | [facility]        |              |
|  (LAB)      |         |                 | LABORATORY  |              |
|             |         |                 | SERVICES,   |              |
 
|             |         |                 | CORE        |              |
+-------------+---------+-----------------+-------------+--------------+
| CREATININE  | 0.76    | 0.46 - 0.81     | [facility]        |              |
| PLASMA      |         | mg/dL           | LABORATORY  |              |
| (LAB)       |         |                 | SERVICES,   |              |
|             |         |                 | CORE        |              |
+-------------+---------+-----------------+-------------+--------------+
| SODIUM,     | 139     | 136 - 145       | [facility]        |              |
| PLASMA      |         | mmol/L          | LABORATORY  |              |
| (LAB)       |         |                 | SERVICES,   |              |
|             |         |                 | CORE        |              |
+-------------+---------+-----------------+-------------+--------------+
| POTASSIUM,  | 3.9     | 3.4 - 5.0       | [facility]        |              |
| PLASMA      |         | mmol/L          | LABORATORY  |              |
| (LAB)       |         |                 | SERVICES,   |              |
|             |         |                 | CORE        |              |
+-------------+---------+-----------------+-------------+--------------+
| CHLORIDE,   | 105     | 97 - 108 mmol/L | [facility]        |              |
| PLASMA      |         |                 | LABORATORY  |              |
| (LAB)       |         |                 | SERVICES,   |              |
|             |         |                 | CORE        |              |
+-------------+---------+-----------------+-------------+--------------+
| TOTAL CO2,  | 30      | 21 - 32 mmol/L  | [facility]        |              |
| PLASMA      |         |                 | LABORATORY  |              |
| (LAB)       |         |                 | SERVICES,   |              |
|             |         |                 | CORE        |              |
+-------------+---------+-----------------+-------------+--------------+
| CALCIUM,    | 9.4     | 8.6 - 10.2      | [facility]        |              |
| PLASMA      |         | mg/dL           | LABORATORY  |              |
| (LAB)       |         |                 | SERVICES,   |              |
|             |         |                 | CORE        |              |
+-------------+---------+-----------------+-------------+--------------+
| BILIRUBIN   | 0.2 (L) | 0.3 - 1.2 mg/dL | [facility]        |              |
| TOTAL       |         |                 | LABORATORY  |              |
|             |         |                 | SERVICES,   |              |
|             |         |                 | CORE        |              |
+-------------+---------+-----------------+-------------+--------------+
| TOTAL       | 7.6     | 6.2 - 8.5 g/dL  | [facility]        |              |
| PROTEIN,    |         |                 | LABORATORY  |              |
| PLASMA      |         |                 | SERVICES,   |              |
| (LAB)       |         |                 | CORE        |              |
+-------------+---------+-----------------+-------------+--------------+
| ALBUMIN,    | 3.7     | 3.5 - 4.7 g/dL  | [facility]        |              |
| PLASMA      |         |                 | LABORATORY  |              |
| (LAB)       |         |                 | SERVICES,   |              |
|             |         |                 | CORE        |              |
+-------------+---------+-----------------+-------------+--------------+
| ALK PHOS    | 101     | 42 - 110 U/L    | [facility]        |              |
|             |         |                 | LABORATORY  |              |
|             |         |                 | SERVICES,   |              |
|             |         |                 | CORE        |              |
+-------------+---------+-----------------+-------------+--------------+
| AST(SGOT)   | 15 (L)  | 18 - 36 U/L     | [facility]        |              |
|             |         |                 | LABORATORY  |              |
|             |         |                 | SERVICES,   |              |
|             |         |                 | CORE        |              |
+-------------+---------+-----------------+-------------+--------------+
| ALT (SGPT)  | 18      | 12 - 60 U/L     | [facility]        |              |
|             |         |                 | LABORATORY  |              |
|             |         |                 | SERVICES,   |              |
 
|             |         |                 | CORE        |              |
+-------------+---------+-----------------+-------------+--------------+
| ANION       | 4       | 4 - 11 mmol/L   | [facility]        |              |
| GAP(ALB     |         |                 | LABORATORY  |              |
| CORRECTED)  |         |                 | SERVICES,   |              |
|             |         |                 | CORE        |              |
+-------------+---------+-----------------+-------------+--------------+
| POTASSIUM   | No Hemo |                 | [facility]        |              |
| CMNT        |         |                 | LABORATORY  |              |
|             |         |                 | SERVICES,   |              |
|             |         |                 | CORE        |              |
+-------------+---------+-----------------+-------------+--------------+
| BILI T CMNT | No Hemo |                 | [facility]        |              |
|             |         |                 | LABORATORY  |              |
|             |         |                 | SERVICES,   |              |
|             |         |                 | CORE        |              |
+-------------+---------+-----------------+-------------+--------------+
| AST CMNT    | No Hemo |                 | [facility]        |              |
|             |         |                 | LABORATORY  |              |
|             |         |                 | SERVICES,   |              |
|             |         |                 | CORE        |              |
+-------------+---------+-----------------+-------------+--------------+
| ANION GAP   | 4       | mmol/L          | [facility]        |              |
|             |         |                 | LABORATORY  |              |
|             |         |                 | SERVICES,   |              |
|             |         |                 | CORE        |              |
+-------------+---------+-----------------+-------------+--------------+
 
 
 
+---------------+
| Specimen      |
+---------------+
| Blood - Blood |
+---------------+
 
 
 
+--------------------+------------------------+----------------------+--------------+
| Performing         | Address                | City/State/Zipcode   | Phone Number |
| Organization       |                        |                      |              |
+--------------------+------------------------+----------------------+--------------+
|   [facility]  |   [address]  |   [place] [address] |              |
| SERVICES, CORE     | [address]                |                      |              |
+--------------------+------------------------+----------------------+--------------+
 documented in this encounter
 
 Visit Diagnoses
 
 
+--------------------------------------------------------------------------------------+
| Diagnosis                                                                            |
+--------------------------------------------------------------------------------------+
|   Transplanted liver (HCC) - Primary  Liver replaced by transplant                   |
+--------------------------------------------------------------------------------------+
|   Immunosuppressed status (HCC)  Unspecified disorder of immune mechanism            |
+--------------------------------------------------------------------------------------+
|   High risk medications (not anticoagulants) long-term use  Encounter for long-term  |
| (current) use of other medications                                                   |
+--------------------------------------------------------------------------------------+
 
|   VSD (ventricular septal defect), perimembranous  Ventricular septal defect         |
+--------------------------------------------------------------------------------------+
|   Polysplenia  Congenital anomalies of spleen                                        |
+--------------------------------------------------------------------------------------+
|   Aortic insufficiency  Aortic valve disorders                                       |
+--------------------------------------------------------------------------------------+
|   Aortic root dilatation (HCC)  Thoracic aortic ectasia                              |
+--------------------------------------------------------------------------------------+
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
+--------------------------------------------------------------------------------------+
 documented in this encounter

## 2020-10-13 NOTE — XMS
Encounter Summary
  Created on: 10/13/2020
 
 Bonifacio Nila JB
 External Reference #: [id number]
 : [birth year]
 Sex: Female
 
 Demographics
 
 
+-----------------------+------------------------+
| Address               | [address]            |
|                       | [place]  [address]   |
+-----------------------+------------------------+
| Home Phone            | [phone]        |
+-----------------------+------------------------+
| Preferred Language    | Unknown                |
+-----------------------+------------------------+
| Marital Status        | Single                 |
+-----------------------+------------------------+
| Buddhist Affiliation | Unknown                |
+-----------------------+------------------------+
| Race                  | White                  |
+-----------------------+------------------------+
| Ethnic Group          | Not  or  |
+-----------------------+------------------------+
 
 
 Author
 
 
+--------------+------------------------------+
| Author       | [facility] |
+--------------+------------------------------+
| Organization | [facility] |
+--------------+------------------------------+
| Address      | Unknown                      |
+--------------+------------------------------+
| Phone        | Unavailable                  |
+--------------+------------------------------+
 
 
 
 Support
 
 
+-----------------+--------------+---------+-----------------+
| Name            | Relationship | Address | Phone           |
+-----------------+--------------+---------+-----------------+
| Kit Valdovinos   | ECON         | Unknown | [phone] |
+-----------------+--------------+---------+-----------------+
| Magdalena Valdovinos | ECON         | Unknown | [phone] |
+-----------------+--------------+---------+-----------------+
 
 
 
 Care Team Providers
 
 
 
+-----------------------+------+-----------------+
| Care Team Member Name | Role | Phone           |
+-----------------------+------+-----------------+
| Lily Horton MD   | PCP  | [phone] |
+-----------------------+------+-----------------+
 
 
 
 Reason for Visit
 
 
+-------------------+----------+
| Reason            | Comments |
+-------------------+----------+
| Follow-up in      |          |
| outpatient clinic |          |
+-------------------+----------+
 Office Visit - E/M Services (Routine)
 
+--------+--------------+---------------+--------------+--------------+---------------+
| Status | Reason       | Specialty     | Diagnoses /  | Referred By  | Referred To   |
|        |              |               | Procedures   | Contact      | Contact       |
+--------+--------------+---------------+--------------+--------------+---------------+
| Closed |   Specialty  | Pediatric     |   Diagnoses  |   [facility],    |   Ped Gastro  |
|        | Services     | Gastroenterol |  Liver       | Lily CARY MD | [facility]  [address]   |
|        | Required     | ogy           | replaced by  |   PEDS       | [address]     |
|        |              |               | transplant   | SPECIALISTS  | [place]   |
|        |              |               | (HCC)        | [facility] [place] | [facility]    |
|        |              |               |              |   [address]    | [facility] |
|        |              |               |              | [address] |  floor        |
|        |              |               |              |  [address]         | [facility], [place]  |
|        |              |               |              | [place],   | [address]    |
|        |              |               |              | [place] [address]     | Phone:        |
|        |              |               |              | Phone:       | [phone]  |
|        |              |               |              | [phone] |  Fax:         |
|        |              |               |              |   Fax:       | [phone]  |
|        |              |               |              | [phone] |               |
+--------+--------------+---------------+--------------+--------------+---------------+
 
 
 
 
 Encounter Details
 
 
+--------+---------+----------------------+--------------------+----------------------+
| Date   | Type    | Department           | Care Team          | Description          |
+--------+---------+----------------------+--------------------+----------------------+
| 10/22/ | Office  |   Specialty Clinics  |   Ivis Burkett MD | Complications of     |
|    | Visit   | at [facility]  [address]       |                    | Transplanted Liver;  |
|        |         | [place]             |                    | Transplanted Liver   |
|        |         | [place]          |                    | (HCC)                |
|        |         | [facility], |                    |                      |
|        |         |  [facility] floor           |                    |                      |
|        |         | [facility], [place]         |                    |                      |
|        |         | [address]           |                    |                      |
|        |         | [phone]         |                    |                      |
+--------+---------+----------------------+--------------------+----------------------+
 
 
 
 
 Social History
 
 
+----------------+-------+-----------+--------+------+
| Tobacco Use    | Types | Packs/Day | Years  | Date |
|                |       |           | Used   |      |
+----------------+-------+-----------+--------+------+
| Never Assessed |       |           |        |      |
+----------------+-------+-----------+--------+------+
 
 
 
+------------------+---------------+
| Sex Assigned at  | Date Recorded |
| Birth            |               |
+------------------+---------------+
| Not on file      |               |
+------------------+---------------+
 documented as of this encounter
 
 Last Filed Vital Signs
 
 
+-------------------+----------------------+----------------------+----------+
| Vital Sign        | Reading              | Time Taken           | Comments |
+-------------------+----------------------+----------------------+----------+
| Blood Pressure    | 131/78               | 10/22/2009  9:20 AM  |          |
|                   |                      | PDT                  |          |
+-------------------+----------------------+----------------------+----------+
| Pulse             | 98                   | 10/22/2009  9:20 AM  |          |
|                   |                      | PDT                  |          |
+-------------------+----------------------+----------------------+----------+
| Temperature       | -                    | -                    |          |
+-------------------+----------------------+----------------------+----------+
| Respiratory Rate  | -                    | -                    |          |
+-------------------+----------------------+----------------------+----------+
| Oxygen Saturation | -                    | -                    |          |
+-------------------+----------------------+----------------------+----------+
| Inhaled Oxygen    | -                    | -                    |          |
| Concentration     |                      |                      |          |
+-------------------+----------------------+----------------------+----------+
| Weight            | 50.4 kg (111 lb 1.8  | 10/22/2009  9:20 AM  |          |
|                   | oz)                  | PDT                  |          |
+-------------------+----------------------+----------------------+----------+
| Height            | 153.3 cm (5' 0.35")  | 10/22/2009  9:20 AM  |          |
|                   |                      | PDT                  |          |
+-------------------+----------------------+----------------------+----------+
| Body Mass Index   | 21.45                | 10/22/2009  9:20 AM  |          |
|                   |                      | PDT                  |          |
+-------------------+----------------------+----------------------+----------+
 documented in this encounter
 
 Patient Instructions
 Patient Instructions Ivis Burkett MD - 10/22/2009 10:25 AM PDTPlan:  
 1. Please have labs done   every  3  Months
 
 2. EBV PCR Quantitative                     every   12  months
 
 
 3 . Other labs or imaging studies: none
 
 5. Please get Flu shots  from your PCP
  Be sure to get both the seasonal influenza shot and the H1N1 influenza shot
  Your child should not receive Flumist or any live virus vaccines
  Family members should not receive Flumist
  If your child develops flu like symptoms of cough, fever, muscle pains, then call your [facility] doctor immediately to start Tamiflu
 
 7. change in medications:
    non3  
 
 8. Return to clinic in   12  months
 
 Electronically signed by Ivis Burkett MD at 10/22/2009 10:25 AM PDT
 documented in this encounter
 
 Progress Notes
 Ivis Burkett MD - 10/22/2009 11:17 AM PDTFormatting of this note might be different from t
he original.
 
 
 Nila Valdovinos is an 9 y.o. female, who is referred by Lily Horton, who returns to [facility] for transplant followup. Dr Aguirre from [place] was an observ
er in clinic today.
 
 Patient Active Problem List: 
   VSD (Ventricular Septal Defect)[745.4Y]
     Comment: With polysplenia 
   Aortic Valve Insufficiency[424.1F]
   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
     Comment: For biliary atresia, 2000 at [place]
 
  
 Current outpatient prescriptions prior to encounter 
 Medication Sig Dispense Refill 
   PROGRAF 1 mg Oral Capsule Take 1 Cap by mouth two times daily.   60  3 
 
 Allergies 
 Allergen Reactions 
   Sulfa (Sulfonamide Antibiotics) Hives and Swelling-Facial 
   Varicella  
   Possible reaction with sulfa meds 
 
 HPI:Nila was seen in clinic with both of her parents. She reports that she has been doing 
well, and has not had any significant illnesses since her last visit except one UTI. She say
s she misses about one dose of Prograf a week. She has not had liver labs between Feb and -- her mother said "I guess we need to be a little better about them". 
 
 She is seeing her cardiologist every 2 years and last saw him 1 year ago. She gets SBE prop
hylaxis because of polysplenia. Her energy is good and she backpacked 30 miles last summer. 
 
 Has not had a flu shot yet.
 
 lives with parents, and sibling and grade 5. Says school is going well.
 
 Physical Examination:
 
 Ht 153.3 cm (5' 0.35") (93 %ile), Wt 50.4 kg (111 lbs 1.8 oz) (93 %ile), Weight for age(%) 
 
 92.64%, BMI for age(%)  88.85%, Length for age(%)  93.23%, /78, Pulse 98. 
 88.85% of growth percentile based on BMI-for-age.
 
 Appearance: alert, active and in no apparent distress.
 Skin:  turgor normal, capillary refill brisk, no rashes, petechiae 
 HEENT: normocephalic,  no icterus,nose without discharge, mucous membranes moist, pharynx u
nremarkable
 Neck: supple, without thyromegaly
 Chest: clear to auscultation bilaterally.
 CV: regular sinus rhythm, normal S1 and S2, no murmurs.
 Abdomen: , well healed incisions, soft, no distention, no tenderness to palpation, no rebou
nd, no guarding, no palpable masses, normal bowel sounds, no hepatosplenomegaly
 Musculoskeletal: grossly intact without clubbing or edema
 Neuro: appropriate interactions, symmetric facies, moves all extremities well, 
 Nodes: no signficant cervical, supraclavicular,adenopathy
 
 Last labs 10/20/09
 AST 20, ALT 19, alk phos 220, GGT 10, cr 0.51, rest normal also
 Wbc 5.7, hct 43, MCV 87, plt 320
 EBV PCR pending
 
 Lab Results 
 Component Value Date 
    0.5 09 
    3.4 10/9/07 
 
  
  
 Assessment: Patient Active Problem List: 
   VSD (Ventricular Septal Defect)[745.4Y]
     Comment: With polysplenia 
   Aortic Valve Insufficiency[424.1F]
   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
     Comment: For biliary atresia, 2000 at [facility]
 
 Plan:  
 1. CBC, primary transplant panel, drug levels-Prograf 1     every  3  months
 2. EBV PCR Quantitative                                                             every  
 12  months
 3. CMV PCR Quantitative                                                            every  -
   months
 
 4. Other labs:   none
 5. Imaging:       non3
 6. Flu shot and other non-live virus vaccinations from PCP
 
 7. If she develops flu -ross symptoms, she should be treated immediately empirically with Ta
miflu for a 10 day course
 8. change in medications: none
       
 9. Return to clinic    12  months Electronically signed by Ivis Burkett MD at 10/26/2009  1
:01 PM PDTdocumented in this encounter
 
 Miscellaneous Notes
 Scan - Dash, Faculty - 10/20/2009 12:00 AM PDTAssociated Order(s): LAB REPORTS 
   Electronically signed by Faculty Other at 10/22/2009 12:00 AM PDTdocumented in this encou
nter
 
 Plan of Treatment
 Not on filedocumented as of this encounter
 
 
 Procedures
 
 
+----------------+--------+-------------+----------------------+----------------------+
| Procedure Name | Priori | Date/Time   | Associated Diagnosis | Comments             |
|                | ty     |             |                      |                      |
+----------------+--------+-------------+----------------------+----------------------+
| LAB REPORTS    |        | 10/20/2009  |                      |   Results for this   |
|                |        | 12:00 AM    |                      | procedure are in the |
|                |        | PDT         |                      |  results section.    |
+----------------+--------+-------------+----------------------+----------------------+
 documented in this encounter
 
 Results
 LAB REPORTS (10/20/2009 12:00 AM PDT)
 
+----------------------------------------------------------+--------------+
| Narrative                                                | Performed At |
+----------------------------------------------------------+--------------+
|   This result has an attachment that is not available.   |              |
+----------------------------------------------------------+--------------+
 
 
 
+-----------------------------------------------+
| Procedure Note                                |
+-----------------------------------------------+
|   Dash, Faculty - 10/20/2009 12:00 AM PDT    |
|                                               |
+-----------------------------------------------+
 documented in this encounter
 
 Visit Diagnoses
 
 
+----------------------------------------------------------+
| Diagnosis                                                |
+----------------------------------------------------------+
|   Complications of transplanted liver                    |
+----------------------------------------------------------+
|   Transplanted liver (HCC)  Liver replaced by transplant |
+----------------------------------------------------------+
 documented in this encounter

## 2020-10-13 NOTE — XMS
Encounter Summary
  Created on: 10/13/2020
 
 Bonifacio Nila JB
 External Reference #: [id number]
 : [birth year]
 Sex: Female
 
 Demographics
 
 
+-----------------------+------------------------+
| Address               | [address]            |
|                       | [place]  [address]   |
+-----------------------+------------------------+
| Home Phone            | [phone]        |
+-----------------------+------------------------+
| Preferred Language    | Unknown                |
+-----------------------+------------------------+
| Marital Status        | Single                 |
+-----------------------+------------------------+
| Anglican Affiliation | Unknown                |
+-----------------------+------------------------+
| Race                  | White                  |
+-----------------------+------------------------+
| Ethnic Group          | Not  or  |
+-----------------------+------------------------+
 
 
 Author
 
 
+--------------+------------------------------+
| Author       | [facility] |
+--------------+------------------------------+
| Organization | [facility] |
+--------------+------------------------------+
| Address      | Unknown                      |
+--------------+------------------------------+
| Phone        | Unavailable                  |
+--------------+------------------------------+
 
 
 
 Support
 
 
+-----------------+--------------+---------+-----------------+
| Name            | Relationship | Address | Phone           |
+-----------------+--------------+---------+-----------------+
| Kit Valdovinos   | ECON         | Unknown | [phone] |
+-----------------+--------------+---------+-----------------+
| Magdalena Valdovinos | ECON         | Unknown | [phone] |
+-----------------+--------------+---------+-----------------+
 
 
 
 Care Team Providers
 
 
 
+------------------------+------+-----------------+
| Care Team Member Name  | Role | Phone           |
+------------------------+------+-----------------+
| Lily Horton MD | PCP  | [phone] |
+------------------------+------+-----------------+
 
 
 
 Reason for Visit
 
 
+-------------------+----------+
| Reason            | Comments |
+-------------------+----------+
| Care Coordination | [facility] |
+-------------------+----------+
 
 
 
 Encounter Details
 
 
+--------+-------------+----------------------+----------------------+--------------------+
| Date   | Type        | Department           | Care Team            | Description        |
+--------+-------------+----------------------+----------------------+--------------------+
| / | Documentati |   Pediatric          |   Neema Khalil,   | Care Coordination  |
| 2017   | on          | Gastroenterology at  | MD  [address] | ([facility])         |
|        |             | [facility]          |   [place], [place]       |                    |
|        |             | [facility]  | [address]           |                    |
|        |             |  [address]    | [phone]         |                    |
|        |             | [facility]          | [phone] (Fax)   |                    |
|        |             | [facility], |                      |                    |
|        |             |  7th floor           |                      |                    |
|        |             | [place], [place]         |                      |                    |
|        |             | [address]           |                      |                    |
|        |             | [phone]         |                      |                    |
+--------+-------------+----------------------+----------------------+--------------------+
 
 
 
 Social History
 
 
+-------------------+-------+-----------+--------+------+
| Tobacco Use       | Types | Packs/Day | Years  | Date |
|                   |       |           | Used   |      |
+-------------------+-------+-----------+--------+------+
| Passive Smoke     |       |           |        |      |
| Exposure - Never  |       |           |        |      |
| Smoker            |       |           |        |      |
+-------------------+-------+-----------+--------+------+
 
 
 
+---------------------+---+---+---+
| Smokeless Tobacco:  |   |   |   |
| Never Used          |   |   |   |
+---------------------+---+---+---+
 
 
 
 
+-------------+-------------+---------+----------+
| Alcohol Use | Drinks/Week | oz/Week | Comments |
+-------------+-------------+---------+----------+
| Not Asked   |             |         |          |
+-------------+-------------+---------+----------+
 
 
 
+------------------+---------------+
| Sex Assigned at  | Date Recorded |
| Birth            |               |
+------------------+---------------+
| Not on file      |               |
+------------------+---------------+
 documented as of this encounter
 
 Miscellaneous Notes
 Telephone Encounter - Tri Mcnamara RN - 2017  2:03 PM PSTReceived the following
 communication from NATI Valencia MS, SYLVESTER of the [facility] team:
 Thank you. We had that information. We haven  t been able to reach them. So if they make c
ontact let us know if there are insurance updates. Dr. Khalil wanted us to help with transit
ion but until we get insurance information we can  t find a transplant center that can take
 her.
 Thanks!
 RachelElectronically signed by Tri Mcnamara RN at 2017  2:04 PM PSTTelephone Edgardo Mcnamara, Tri Luther RN - 2017  1:36 PM PSTReceived the following communication
 from  the [facility] team:
 
 Hi [facility], 
 
 We are looking to assist Nila with transition to an adult transplant program. 
 Do you have any updates regarding her insurance?
 It appears, we do not have active insurance information on file.
 Have Nila or her mother recently contacted your team?
 
 Thank you, 
 PETER ClarkN, RN, PHN
 
 Email sent back with information provided to mother on 10/23/17 encounter.Electronically si
gned by Tri Mcnamara RN at 2017  1:38 PM PSTdocumented in this encounter
 
 Plan of Treatment
 Not on filedocumented as of this encounter
 
 Visit Diagnoses
 Not on filedocumented in this encounter

## 2020-10-13 NOTE — XMS
Encounter Summary
  Created on: 10/13/2020
 
 Bonifacio Nila JB
 External Reference #: [id number]
 : [birth year]
 Sex: Female
 
 Demographics
 
 
+-----------------------+------------------------+
| Address               | [address]            |
|                       | [place]  [address]   |
+-----------------------+------------------------+
| Home Phone            | [phone]        |
+-----------------------+------------------------+
| Preferred Language    | Unknown                |
+-----------------------+------------------------+
| Marital Status        | Single                 |
+-----------------------+------------------------+
| Spiritism Affiliation | Unknown                |
+-----------------------+------------------------+
| Race                  | White                  |
+-----------------------+------------------------+
| Ethnic Group          | Not  or  |
+-----------------------+------------------------+
 
 
 Author
 
 
+--------------+------------------------------+
| Author       | [facility] |
+--------------+------------------------------+
| Organization | [facility] |
+--------------+------------------------------+
| Address      | Unknown                      |
+--------------+------------------------------+
| Phone        | Unavailable                  |
+--------------+------------------------------+
 
 
 
 Support
 
 
+-----------------+--------------+---------+-----------------+
| Name            | Relationship | Address | Phone           |
+-----------------+--------------+---------+-----------------+
| Kit Valdovinos   | ECON         | Unknown | [phone] |
+-----------------+--------------+---------+-----------------+
| Magdalena Valdovinos | ECON         | Unknown | [phone] |
+-----------------+--------------+---------+-----------------+
 
 
 
 Care Team Providers
 
 
 
+------------------------+------+-----------------+
| Care Team Member Name  | Role | Phone           |
+------------------------+------+-----------------+
| Lily Horton MD | PCP  | [phone] |
+------------------------+------+-----------------+
 
 
 
 Reason for Visit
 
 
+-------------------+--------+----------+
| Reason            | Onset  | Comments |
|                   | Date   |          |
+-------------------+--------+----------+
| Care Coordination | / |          |
|                   |    |          |
+-------------------+--------+----------+
 
 
 
 Encounter Details
 
 
+--------+-----------+----------------------+----------------------+-------------------+
| Date   | Type      | Department           | Care Team            | Description       |
+--------+-----------+----------------------+----------------------+-------------------+
| / | Telephone |   Pediatric          |   Neema Khalil,   | Care Coordination |
| 2016   |           | Gastroenterology at  | MD  [address] |                   |
|        |           | [place]          |   [place], [place]       |                   |
|        |           | [facility]  | [address]           |                   |
|        |           |  [address]     | [phone]         |                   |
|        |           | [place]          | [phone] (Fax)   |                   |
|        |           | [facility], |                      |                   |
|        |           |  [facility] floor           |                      |                   |
|        |           | [place], [place]         |                      |                   |
|        |           | [address]           |                      |                   |
|        |           | [phone]         |                      |                   |
+--------+-----------+----------------------+----------------------+-------------------+
 
 
 
 Social History
 
 
+-------------------+-------+-----------+--------+------+
| Tobacco Use       | Types | Packs/Day | Years  | Date |
|                   |       |           | Used   |      |
+-------------------+-------+-----------+--------+------+
| Passive Smoke     |       |           |        |      |
| Exposure - Never  |       |           |        |      |
| Smoker            |       |           |        |      |
+-------------------+-------+-----------+--------+------+
 
 
 
+---------------------+---+---+---+
| Smokeless Tobacco:  |   |   |   |
 
| Never Used          |   |   |   |
+---------------------+---+---+---+
 
 
 
+-------------+-------------+---------+----------+
| Alcohol Use | Drinks/Week | oz/Week | Comments |
+-------------+-------------+---------+----------+
| Not Asked   |             |         |          |
+-------------+-------------+---------+----------+
 
 
 
+------------------+---------------+
| Sex Assigned at  | Date Recorded |
| Birth            |               |
+------------------+---------------+
| Not on file      |               |
+------------------+---------------+
 documented as of this encounter
 
 Miscellaneous Notes
 Telephone Encounter - Irais Rosa RN - 2016  1:39 PM PDTLeft message on voicemail
 stating Nila is due for labs.  Asked family to call our office if they have questions.Elec
tronically signed by Irais Rosa RN at 2016  1:39 PM PDTdocumented in this encounte
r
 
 Plan of Treatment
 Not on filedocumented as of this encounter
 
 Visit Diagnoses
 Not on filedocumented in this encounter

## 2020-10-13 NOTE — XMS
Encounter Summary
  Created on: 10/13/2020
 
 Bonifacio Nila JB
 External Reference #: [id number]
 : [birth year]
 Sex: Female
 
 Demographics
 
 
+-----------------------+------------------------+
| Address               | [address]            |
|                       | [place]  [address]   |
+-----------------------+------------------------+
| Home Phone            | [phone]        |
+-----------------------+------------------------+
| Preferred Language    | Unknown                |
+-----------------------+------------------------+
| Marital Status        | Single                 |
+-----------------------+------------------------+
| Anglican Affiliation | Unknown                |
+-----------------------+------------------------+
| Race                  | White                  |
+-----------------------+------------------------+
| Ethnic Group          | Not  or  |
+-----------------------+------------------------+
 
 
 Author
 
 
+--------------+------------------------------+
| Author       | [facility] |
+--------------+------------------------------+
| Organization | [facility] |
+--------------+------------------------------+
| Address      | Unknown                      |
+--------------+------------------------------+
| Phone        | Unavailable                  |
+--------------+------------------------------+
 
 
 
 Support
 
 
+-----------------+--------------+---------+-----------------+
| Name            | Relationship | Address | Phone           |
+-----------------+--------------+---------+-----------------+
| Kit Valdovinos   | ECON         | Unknown | [phone] |
+-----------------+--------------+---------+-----------------+
| Magdalena Valdovinos | ECON         | Unknown | [phone] |
+-----------------+--------------+---------+-----------------+
 
 
 
 Care Team Providers
 
 
 
+-----------------------+------+-------------+
| Care Team Member Name | Role | Phone       |
+-----------------------+------+-------------+
 PCP  | Unavailable |
+-----------------------+------+-------------+
 
 
 
 Reason for Visit
 
 
+--------------+----------+
| Reason       | Comments |
+--------------+----------+
| Social work  |          |
| consultation |          |
+--------------+----------+
 
 
 
 Encounter Details
 
 
+--------+-------------+----------------------+----------------------+--------------+
| Date   | Type        | Department           | Care Team            | Description  |
+--------+-------------+----------------------+----------------------+--------------+
| 07/17/ | Documentati |   SOCIAL WORK        |   [facility],       | Social work  |
|    | on          | AMBULATORY  [address]   | [place]      | consultation |
|        |             | [address]  | [address]  |              |
|        |             |  Mailcode: [address]      | [address]  [place],   |              |
|        |             | [place], [place]         | [place] [address]        |              |
|        |             | [address]           | [phone]         |              |
|        |             | [phone]         | [phone] (Fax)   |              |
+--------+-------------+----------------------+----------------------+--------------+
 
 
 
 Social History
 
 
+----------------+-------+-----------+--------+------+
| Tobacco Use    | Types | Packs/Day | Years  | Date |
|                |       |           | Used   |      |
+----------------+-------+-----------+--------+------+
| Never Assessed |       |           |        |      |
+----------------+-------+-----------+--------+------+
 
 
 
+------------------+---------------+
| Sex Assigned at  | Date Recorded |
| Birth            |               |
+------------------+---------------+
| Not on file      |               |
+------------------+---------------+
 documented as of this encounter
 
 Miscellaneous Notes
 
 Telephone Encounter - Coretta Armijo MSW - 2014 10:05 AM PDTSocial Work (BEN) note:
 
 Patient no-showed to 7/10/14 Liver clinic appointment.  Patient has not been seen by Liver 
clinic since .  MD requested referral to [facility] for investigation.  BEN called and spoke wit
 office of Dr. Horton, patient's most recent pediatrician of record.  PCP office confirmed 
they have not seen patient since  and [facility]  BEN called and spoke with [facility] in [place], patient's most recent pha
rmacy of record.  Pharmacist stated they have no record of filling any medications for patie
nt.  
 
 BEN called [facility] hotline [phone] and spoke with a screener.  
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
[facility] Liver specialist since .  Per MD, patient should be seen every year by a Liver spe
cialist and have frequent labs.  BEN made efforts to assess barriers by calling in March, at 
that time mom said there were no barriers.  Mom did obtain labs for patient, but no-showed t
o appointment with [facility] team on 7/10/14.  BEN explained to hotline screener that per MD, 
liver transplantation requires regular follow-up even if child appears well.  Per MD, risk o
f serious adverse outcomes without follow-up.  BEN also concerned that patient may not be get
ting medication or seeing PCP.  Furthermore, patient also has a heart condition that require
s follow-up.  BEN unable to determine whether patient is receiving follow-up for heart condit
ion.  
 
 Hotline screener stated that the case would be assigned for investigation.  BEN requested MD Khalil draft brief supplemental statement explaining specific risks of harm to patient from
 lack of specialty medical care after Liver transplant.  
 
 Coretta Armijo LCSW
 Medical Social Worker
 [phone]
 Pager [phone]
 Electronically signed by SISSY Post at 2014 10:27 AM PDTdocumented in this e
ncounter
 
 Plan of Treatment
 Not on filedocumented as of this encounter
 
 Visit Diagnoses
 Not on filedocumented in this encounter

## 2020-10-13 NOTE — XMS
Encounter Summary
  Created on: 10/13/2020
 
 Bonifacio Nila JB
 External Reference #: [id number]
 : [birth year]
 Sex: Female
 
 Demographics
 
 
+-----------------------+------------------------+
| Address               | [address]            |
|                       | [place]  [address]   |
+-----------------------+------------------------+
| Home Phone            | [phone]        |
+-----------------------+------------------------+
| Preferred Language    | Unknown                |
+-----------------------+------------------------+
| Marital Status        | Single                 |
+-----------------------+------------------------+
| Latter-day Affiliation | Unknown                |
+-----------------------+------------------------+
| Race                  | White                  |
+-----------------------+------------------------+
| Ethnic Group          | Not  or  |
+-----------------------+------------------------+
 
 
 Author
 
 
+--------------+------------------------------+
| Author       | [email or web address] |
+--------------+------------------------------+
| Organization | [facility] |
+--------------+------------------------------+
| Address      | Unknown                      |
+--------------+------------------------------+
| Phone        | Unavailable                  |
+--------------+------------------------------+
 
 
 
 Support
 
 
+-----------------+--------------+---------+-----------------+
| Name            | Relationship | Address | Phone           |
+-----------------+--------------+---------+-----------------+
| Kit Valdovinos   | ECON         | Unknown | [phone] |
+-----------------+--------------+---------+-----------------+
| Magdalena Valdovinos | ECON         | Unknown | [phone] |
+-----------------+--------------+---------+-----------------+
 
 
 
 Care Team Providers
 
 
 
+------------------------+------+-----------------+
| Care Team Member Name  | Role | Phone           |
+------------------------+------+-----------------+
| Lily Horton MD | PCP  | [phone] |
+------------------------+------+-----------------+
 
 
 
 Reason for Visit
 
 
+-------------------+-------------------+
| Reason            | Comments          |
+-------------------+-------------------+
| Care Coordination | lost to follow-up |
+-------------------+-------------------+
 
 
 
 Encounter Details
 
 
+--------+-------------+----------------------+----------------------+---------------------+
| Date   | Type        | Department           | Care Team            | Description         |
+--------+-------------+----------------------+----------------------+---------------------+
| / | Documentati |   Pediatric          |   Neema Khalil,   | Care Coordination   |
| 2017   | on          | Gastroenterology at  | MD  [address] | (lost to follow-up) |
|        |             | [place]          |   [place], [place]       |                     |
|        |             | [facility]  | [address]           |                     |
|        |             |  [address]    | [phone]         |                     |
|        |             | [place]          | [phone] (Fax)   |                     |
|        |             | [facility], |                      |                     |
|        |             |  [facility] floor           |                      |                     |
|        |             | [place], [place]         |                      |                     |
|        |             | [address]           |                      |                     |
|        |             | [phone]         |                      |                     |
+--------+-------------+----------------------+----------------------+---------------------+
 
 
 
 Social History
 
 
+-------------------+-------+-----------+--------+------+
| Tobacco Use       | Types | Packs/Day | Years  | Date |
|                   |       |           | Used   |      |
+-------------------+-------+-----------+--------+------+
| Passive Smoke     |       |           |        |      |
| Exposure - Never  |       |           |        |      |
| Smoker            |       |           |        |      |
+-------------------+-------+-----------+--------+------+
 
 
 
+---------------------+---+---+---+
| Smokeless Tobacco:  |   |   |   |
| Never Used          |   |   |   |
+---------------------+---+---+---+
 
 
 
 
+-------------+-------------+---------+----------+
| Alcohol Use | Drinks/Week | oz/Week | Comments |
+-------------+-------------+---------+----------+
| Not Asked   |             |         |          |
+-------------+-------------+---------+----------+
 
 
 
+------------------+---------------+
| Sex Assigned at  | Date Recorded |
| Birth            |               |
+------------------+---------------+
| Not on file      |               |
+------------------+---------------+
 documented as of this encounter
 
 Miscellaneous Notes
 Telephone Encounter - Marianne Barba RN - 2017 12:00 PM PDTThe following email re
ceived from NATI Valencia MS, PA-C at [facility]):
 We have to make Nila lost to follow up in our system and in the [facility] (organ sharing system
) as we have not had labs from her or seen her in clinic for >1 year despite several attempt
s by our team to call the family. She is >18 years so if you get in touch with her she shoul
d see an adult liver transplant team. Let us know if you hear from her.Electronically signed
 by Marianne Barba RN at 2017 12:01 PM PDTdocumented in this encounter
 
 Plan of Treatment
 Not on filedocumented as of this encounter
 
 Visit Diagnoses
 Not on filedocumented in this encounter

## 2020-10-13 NOTE — XMS
Encounter Summary
  Created on: 10/13/2020
 
 Bonifacio Nila JB
 External Reference #: [id number]
 : [birth year]
 Sex: Female
 
 Demographics
 
 
+-----------------------+------------------------+
| Address               | [address]            |
|                       | [place]  [address]   |
+-----------------------+------------------------+
| Home Phone            | [phone]        |
+-----------------------+------------------------+
| Preferred Language    | Unknown                |
+-----------------------+------------------------+
| Marital Status        | Single                 |
+-----------------------+------------------------+
| Mandaeism Affiliation | Unknown                |
+-----------------------+------------------------+
| Race                  | White                  |
+-----------------------+------------------------+
| Ethnic Group          | Not  or  |
+-----------------------+------------------------+
 
 
 Author
 
 
+--------------+-------------+
| Organization | Unknown     |
+--------------+-------------+
| Address      | Unknown     |
+--------------+-------------+
| Phone        | Unavailable |
+--------------+-------------+
 
 
 
 Support
 
 
+-----------------+--------------+---------+-----------------+
| Name            | Relationship | Address | Phone           |
+-----------------+--------------+---------+-----------------+
| Kit Valdovinos   | ECON         | Unknown | [phone] |
+-----------------+--------------+---------+-----------------+
| Magdalena Valdovinos | ECON         | Unknown | [phone] |
+-----------------+--------------+---------+-----------------+
 
 
 
 Care Team Providers
 
 
 
+-----------------------+------+-----------------+
| Care Team Member Name | Role | Phone           |
+-----------------------+------+-----------------+
| Lily Horton MD   | PCP  | [phone] |
+-----------------------+------+-----------------+
 
 
 
 Encounter Details
 
 
+--------+-------------+------------+--------------------+-------------+
| Date   | Type        | Department | Care Team          | Description |
+--------+-------------+------------+--------------------+-------------+
| 10/28/ | Transcribed |            |   Dictation, Other | Transcribed |
|    |             |            |                    |             |
+--------+-------------+------------+--------------------+-------------+
 
 
 
 Social History
 
 
+----------------+-------+-----------+--------+------+
| Tobacco Use    | Types | Packs/Day | Years  | Date |
|                |       |           | Used   |      |
+----------------+-------+-----------+--------+------+
| Never Assessed |       |           |        |      |
+----------------+-------+-----------+--------+------+
 
 
 
+------------------+---------------+
| Sex Assigned at  | Date Recorded |
| Birth            |               |
+------------------+---------------+
| Not on file      |               |
+------------------+---------------+
 documented as of this encounter
 
 Progress Notes
 Interface, Transcription In - 12/10/2006  3:10 AM [address] [address] [phone] or [phone]
 
 2000
 
 Lily Horton M.D.
 [address] [place] [address]   [id number]
 
 RE:   NILA VALDOVINOS
 MR #: [id number]
 
 Dear Sol:
 
 Nila was evaluated at [facility] in the Pediatric Liver Transplantation Clinic
 
 for the first time today by members of the [facility]
 [facility]plant Program which included Jorge Aguirre M.D. and Becca Marsh R.N.,
 Pediatric Liver Transplant Coordinator.  I presented Nila to the team in
 place of Dr. Bailey.  Briefly as you know, Nila is an 7-year-old female
 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
 heart disease.  This included left atrial isomerism, interrupted IVC, left
 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
 There were some other mild abnormalities found on angiography on 1999, (this information has been forwarded from the Cardiologist at
 [facility] to the cardiologists at [facility]).  She was eventually
 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  It required revision two days later, and she seemed to respond
 well to this with a fall in her bilirubin.  By , her bilirubin was 1.8
 and she was doing reasonably well.  She is doing reasonably well with
 colored stools.  Unfortunately in August, she developed jaundice with no
 fever.  Her white count was 20,000 and her delta bilirubin was elevated
 She was admitted to [facility] for a treatment of possible cholangitis.
 Unfortunately, her bilirubin did not come down and it has become clear that
 her Kasai procedure has failed.  Additional problems that have developed
 over the last few months include poor weight gain and growth requiring
 nocturnal supplementation, variceal bleeding, and apparent cirrhosis with
 resultant portal hypertension.  Approximately two weeks ago, she developed
 hematemesis and melena and required two transfusions with packed red blood
 cells.  Endoscopy showed incipient esophageal varices.
 
 She is on multiple medications including Actigall, Zantac, vitamin A, D, E,
 K, and iron.
 
 Her parents live in [place], [place] approximately four hours from
 [place].  They and the rest of the family are quite committed Nila's
 medical care.
 On examination today, she weighs 5.76 kg and measures 65 cm.  She is mildly
 icteric with an NG tube in place.  Her overall appearance is petite.  Her
 respirations are unlabored.  Her abdomen is soft and full with a large,
 hard nodular liver that extends 4-5 cm below the right costal margin.
 Spleen is also enlarged in the left upper quadrant descending approximately
 6 cm below the left costal margin.  Her umbilicus protrudes slightly and
 there certainly is a suggestion of ascites.  Her extremities are rather
 thin.  She is an alert and responsive child.
 
 Recent laboratory tests show a white blood count of 8.8, hematocrit of 27%,
 platelet count of 168k, sodium of 126, chloride 97, potassium 4.6, CO2 15,
 glucose 72, calcium 8.9, BUN 11, creatinine 0.2, , , alkaline
 phosphatase 1.017, gamma-GT 1250, total bilirubin 6.1 (mostly direct), and
 an albumin of 3.4.  Recent vitamin levels show adequate A and K but
 deficiency in D and E.  Her doses of D and E were recently to treat these
 deficiencies.
 
 In summary, Nila is an 7-year-old white female with rather progressed
 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
 biliary atresia and a failed Kasai procedure.  She is at an increased risk
 of even more progressive liver disease over the next 3 to 12 months
 resulting in death without liver transplantation.  She clearly needs to be
 listed and the transplant team intends to do this soon.  During the later
 half of the visit today at [facility], the family spent conversing with
 transplant coordinator regarding many of the practical aspects of getting
 one listed for liver transplantation.  In addition to category transplants,
 the living-related donor program was discussed.  Assuming a suitable donor
 can be identified, Nila is a good candidate for this program given the
 distance that the family lives from the Transplant Center and the degree of
 
 liver disease present.
 
 Dr. Bailey will continue to follow Nila closely and act as the liaison
 between the family and the Transplant Center.  Please let me know if you
 have any questions.
 
 Eagle De Luna M.D.
 
 [facility] / 
 [id number] / [id number] / [id number] /
 D: 2000
 T: 2000
 
 cc:
 
 Kevon Bailey M.D.
 [address] [place] [address]   [id number]
 
 Jorge Aguirre M.D.
 [address] [place] [address]   [phone]
 
 Allen Covington M.D.
 [address] [place] [address]   [id number]
 
 [id number] signed by Interface, Transcription In at 12/10/2006  3:10 AM PSTdocume
nted in this encounter
 
 Plan of Treatment
 Not on filedocumented as of this encounter
 
 Visit Diagnoses
 Not on filedocumented in this encounter

## 2020-10-13 NOTE — XMS
Encounter Summary
  Created on: 10/13/2020
 
 Bonifacio Nila JB
 External Reference #: [id number]
 : [birth year]
 Sex: Female
 
 Demographics
 
 
+-----------------------+---------------------------+
| Address               | [address]          |
|                       | [place]  [address] |
+-----------------------+---------------------------+
| Home Phone            | [phone]           |
+-----------------------+---------------------------+
| Preferred Language    | Unknown                   |
+-----------------------+---------------------------+
| Marital Status        | Single                    |
+-----------------------+---------------------------+
| Taoism Affiliation | Unknown                   |
+-----------------------+---------------------------+
| Race                  | White                     |
+-----------------------+---------------------------+
| Ethnic Group          | Not  or     |
+-----------------------+---------------------------+
 
 
 Author
 
 
+--------------+--------------------------------------------+
| Author       | [facility]  |
|              | [facility] [place]                                |
+--------------+--------------------------------------------+
| Organization | [facility]  |
|              | [facility] [place]                                |
+--------------+--------------------------------------------+
| Address      | Unknown                                    |
+--------------+--------------------------------------------+
| Phone        | Unavailable                                |
+--------------+--------------------------------------------+
 
 
 
 Support
 
 
+-----------------+--------------+-------------------+-----------------+
| Name            | Relationship | Address           | Phone           |
+-----------------+--------------+-------------------+-----------------+
| Magdalena Valdovinos | ECON         | [address]     | [phone] |
|                 |              | [place]   |                 |
|                 |              | [address]        |                 |
+-----------------+--------------+-------------------+-----------------+
 
 
 
 
 Care Team Providers
 
 
+-----------------------+------+-----------------+
| Care Team Member Name | Role | Phone           |
+-----------------------+------+-----------------+
| Augustine Buck DO      | PCP  | [phone] |
+-----------------------+------+-----------------+
 
 
 
 Encounter Details
 
 
+--------+-------------+----------------------+--------------------+-------------+
| Date   | Type        | Department           | Care Team          | Description |
+--------+-------------+----------------------+--------------------+-------------+
| / | Orders Only |   [facility]     |   Conversion       |             |
| 2019   |             | CONVERSION DEP  [address]  | Transaction,       |             |
|        |             | [address]           | Provider Unknown   |             |
|        |             | [place], [place]         | [phone]       |             |
|        |             | [address]           | [phone] (Fax) |             |
|        |             | [phone]         |                    |             |
+--------+-------------+----------------------+--------------------+-------------+
 
 
 
 Social History
 
 
+--------------+-------+-----------+--------+------+
| Tobacco Use  | Types | Packs/Day | Years  | Date |
|              |       |           | Used   |      |
+--------------+-------+-----------+--------+------+
| Never Smoker |       |           |        |      |
+--------------+-------+-----------+--------+------+
 
 
 
+------------------+---------------+
| Sex Assigned at  | Date Recorded |
| Birth            |               |
+------------------+---------------+
| Not on file      |               |
+------------------+---------------+
 documented as of this encounter
 
 Plan of Treatment
 Not on filedocumented as of this encounter
 
 Visit Diagnoses
 Not on filedocumented in this encounter

## 2020-10-13 NOTE — XMS
Encounter Summary
  Created on: 10/13/2020
 
 Bonifacio Nila JB
 External Reference #: [id number]
 : [birth year]
 Sex: Female
 
 Demographics
 
 
+-----------------------+------------------------+
| Address               | [address]            |
|                       | [place]  [address]   |
+-----------------------+------------------------+
| Home Phone            | [phone]        |
+-----------------------+------------------------+
| Preferred Language    | Unknown                |
+-----------------------+------------------------+
| Marital Status        | Single                 |
+-----------------------+------------------------+
| Protestant Affiliation | Unknown                |
+-----------------------+------------------------+
| Race                  | White                  |
+-----------------------+------------------------+
| Ethnic Group          | Not  or  |
+-----------------------+------------------------+
 
 
 Author
 
 
+--------------+------------------------------+
| Author       | [facility] |
+--------------+------------------------------+
| Organization | [facility] |
+--------------+------------------------------+
| Address      | Unknown                      |
+--------------+------------------------------+
| Phone        | Unavailable                  |
+--------------+------------------------------+
 
 
 
 Support
 
 
+-----------------+--------------+---------+-----------------+
| Name            | Relationship | Address | Phone           |
+-----------------+--------------+---------+-----------------+
| Kit Valdovinos   | ECON         | Unknown | [phone] |
+-----------------+--------------+---------+-----------------+
| Magdalena Valdovinos | ECON         | Unknown | [phone] |
+-----------------+--------------+---------+-----------------+
 
 
 
 Care Team Providers
 
 
 
+------------------------+------+-----------------+
| Care Team Member Name  | Role | Phone           |
+------------------------+------+-----------------+
| Lily Horton MD | PCP  | [phone] |
+------------------------+------+-----------------+
 
 
 
 Encounter Details
 
 
+--------+-------------+----------------------+--------------+-------------+
| Date   | Type        | Department           | Care Team    | Description |
+--------+-------------+----------------------+--------------+-------------+
| / | Document-Sc |   Health Information |   Unknown  . |             |
|    | anned       |  Services  [address]    |              |             |
|        |             | [address]  |              |             |
|        |             |  Mailcode: OP17A     |              |             |
|        |             | [facility]  |              |             |
|        |             | [place], [place]  |              |             |
|        |             | [address]           |              |             |
|        |             | [phone]         |              |             |
+--------+-------------+----------------------+--------------+-------------+
 
 
 
 Social History
 
 
+-------------------+-------+-----------+--------+------+
| Tobacco Use       | Types | Packs/Day | Years  | Date |
|                   |       |           | Used   |      |
+-------------------+-------+-----------+--------+------+
| Passive Smoke     |       |           |        |      |
| Exposure - Never  |       |           |        |      |
| Smoker            |       |           |        |      |
+-------------------+-------+-----------+--------+------+
 
 
 
+---------------------+---+---+---+
| Smokeless Tobacco:  |   |   |   |
| Never Used          |   |   |   |
+---------------------+---+---+---+
 
 
 
+-------------+-------------+---------+----------+
| Alcohol Use | Drinks/Week | oz/Week | Comments |
+-------------+-------------+---------+----------+
| Not Asked   |             |         |          |
+-------------+-------------+---------+----------+
 
 
 
+------------------+---------------+
| Sex Assigned at  | Date Recorded |
| Birth            |               |
 
+------------------+---------------+
| Not on file      |               |
+------------------+---------------+
 documented as of this encounter
 
 Plan of Treatment
 Not on filedocumented as of this encounter
 
 Procedures
 
 
+----------------+--------+-------------+----------------------+----------------------+
| Procedure Name | Priori | Date/Time   | Associated Diagnosis | Comments             |
|                | ty     |             |                      |                      |
+----------------+--------+-------------+----------------------+----------------------+
| LAB REPORTS    |        | 2015  |                      |   Results for this   |
|                |        | 12:00 AM    |                      | procedure are in the |
|                |        | PDT         |                      |  results section.    |
+----------------+--------+-------------+----------------------+----------------------+
 documented in this encounter
 
 Results
 LAB REPORTS (2015 12:00 AM PDT)
 
+----------------------------------------------------------+--------------+
| Narrative                                                | Performed At |
+----------------------------------------------------------+--------------+
|   This result has an attachment that is not available.   |              |
+----------------------------------------------------------+--------------+
 documented in this encounter
 
 Visit Diagnoses
 Not on filedocumented in this encounter

## 2020-10-13 NOTE — XMS
Encounter Summary
  Created on: 10/13/2020
 
 Bonifacio Nila JB
 External Reference #: [id number]
 : [birth year]
 Sex: Female
 
 Demographics
 
 
+-----------------------+------------------------+
| Address               | [address]            |
|                       | [place]  [address]   |
+-----------------------+------------------------+
| Home Phone            | [phone]        |
+-----------------------+------------------------+
| Preferred Language    | Unknown                |
+-----------------------+------------------------+
| Marital Status        | Single                 |
+-----------------------+------------------------+
| Rastafari Affiliation | Unknown                |
+-----------------------+------------------------+
| Race                  | White                  |
+-----------------------+------------------------+
| Ethnic Group          | Not  or  |
+-----------------------+------------------------+
 
 
 Author
 
 
+--------------+------------------------------+
| Author       | [facility] |
+--------------+------------------------------+
| Organization | [facility] |
+--------------+------------------------------+
| Address      | Unknown                      |
+--------------+------------------------------+
| Phone        | Unavailable                  |
+--------------+------------------------------+
 
 
 
 Support
 
 
+-----------------+--------------+---------+-----------------+
| Name            | Relationship | Address | Phone           |
+-----------------+--------------+---------+-----------------+
| Kit Valdovinos   | ECON         | Unknown | [phone] |
+-----------------+--------------+---------+-----------------+
| Magdalena Valdovinos | ECON         | Unknown | [phone] |
+-----------------+--------------+---------+-----------------+
 
 
 
 Care Team Providers
 
 
 
+-----------------------+------+-----------------+
| Care Team Member Name | Role | Phone           |
+-----------------------+------+-----------------+
| Lily Horton MD   | PCP  | [phone] |
+-----------------------+------+-----------------+
 
 
 
 Encounter Details
 
 
+--------+----------+----------------------+--------------------+-------------+
| Date   | Type     | Department           | Care Team          | Description |
+--------+----------+----------------------+--------------------+-------------+
| / | Abstract |   Pediatric          |   Ivis Burkett MD |             |
|    |          | Gastroenterology at  |                    |             |
|        |          | [place]          |                    |             |
|        |          | [facility]  |                    |             |
|        |          |  [address]     |                    |             |
|        |          | [place]          |                    |             |
|        |          | [facility], |                    |             |
|        |          |  [address] floor           |                    |             |
|        |          | [place], [place]         |                    |             |
|        |          | [address]           |                    |             |
|        |          | [phone]         |                    |             |
+--------+----------+----------------------+--------------------+-------------+
 
 
 
 Social History
 
 
+----------------+-------+-----------+--------+------+
| Tobacco Use    | Types | Packs/Day | Years  | Date |
|                |       |           | Used   |      |
+----------------+-------+-----------+--------+------+
| Never Assessed |       |           |        |      |
+----------------+-------+-----------+--------+------+
 
 
 
+------------------+---------------+
| Sex Assigned at  | Date Recorded |
| Birth            |               |
+------------------+---------------+
| Not on file      |               |
+------------------+---------------+
 documented as of this encounter
 
 Plan of Treatment
 Not on filedocumented as of this encounter
 
 Procedures
 
 
+----------------------+--------+-------------+----------------------+----------------------
+
| Procedure Name       | Priori | Date/Time   | Associated Diagnosis | Comments             
 
|
|                      | ty     |             |                      |                      
|
+----------------------+--------+-------------+----------------------+----------------------
+
| CBC, WITH            | Routin | 2008  |                      |   Results for this   
|
| DIFFERENTIAL         | e      |  3:25 PM    |                      | procedure are in the 
|
|                      |        | PDT         |                      |  results section.    
|
+----------------------+--------+-------------+----------------------+----------------------
+
| COMPLETE METABOLIC   | Routin | 2008  |                      |   Results for this   
|
| SET                  | e      |  3:25 PM    |                      | procedure are in the 
|
| (NA,K,CL,CO2,BUN,CRE |        | PDT         |                      |  results section.    
|
| AT,GLUC,CA,AST,ALT,B |        |             |                      |                      
|
| ASHWINI TOTAL,ALK        |        |             |                      |                      
|
| PHOS,ALB,PROT TOTAL) |        |             |                      |                      
|
+----------------------+--------+-------------+----------------------+----------------------
+
| TACROLIMUS, WHOLE    | Routin | 2008  |                      |                      
|
| BLOOD                | e      |  3:25 PM    |                      |                      
|
|                      |        | PDT         |                      |                      
|
+----------------------+--------+-------------+----------------------+----------------------
+
| PHOSPHORUS, PLASMA   | Routin | 2008  |                      |   Results for this   
|
|                      | e      |  3:25 PM    |                      | procedure are in the 
|
|                      |        | PDT         |                      |  results section.    
|
+----------------------+--------+-------------+----------------------+----------------------
+
| GGT, PLASMA          | Routin | 2008  |                      |   Results for this   
|
|                      | e      |  3:25 PM    |                      | procedure are in the 
|
|                      |        | PDT         |                      |  results section.    
|
+----------------------+--------+-------------+----------------------+----------------------
+
| MAGNESIUM, PLASMA    | Routin | 2008  |                      |   Results for this   
|
|                      | e      |  3:25 PM    |                      | procedure are in the 
|
|                      |        | PDT         |                      |  results section.    
|
+----------------------+--------+-------------+----------------------+----------------------
+
| TRIGLYCERIDES,       | Routin | 2008  |                      |   Results for this   
 
|
| PLASMA               | e      |  3:25 PM    |                      | procedure are in the 
|
|                      |        | PDT         |                      |  results section.    
|
+----------------------+--------+-------------+----------------------+----------------------
+
| CHOLESTEROL TOTAL,   | Routin | 2008  |                      |   Results for this   
|
| PLASMA               | e      |  3:25 PM    |                      | procedure are in the 
|
|                      |        | PDT         |                      |  results section.    
|
+----------------------+--------+-------------+----------------------+----------------------
+
 documented in this encounter
 
 Results
 COMPLETE METABOLIC SET (NA,K,CL,CO2,BUN,CREAT,GLUC,CA,AST,ALT,BILI TOTAL,ALK PHOS,ALB,PROT 
TOTAL) (2008  3:25 PM PDT)
 
+-------------+-------+-----------------+------------+--------------+
| Component   | Value | Ref Range       | Performed  | Pathologist  |
|             |       |                 | At         | Signature    |
+-------------+-------+-----------------+------------+--------------+
| GLUCOSE,    | 82    | 60 - 100 mg/dL  | NON [facility]   |              |
| PLASMA      |       |                 | LAB        |              |
| (LAB)       |       |                 |            |              |
+-------------+-------+-----------------+------------+--------------+
| BUN, PLASMA | 11.1  | 7 - 21 mg/dL    | NON [facility]   |              |
|  (LAB)      |       |                 | LAB        |              |
+-------------+-------+-----------------+------------+--------------+
| CREATININE  | 0.51  | 0.5 - 1.5 mg/dL | NON [facility]   |              |
| PLASMA      |       |                 | LAB        |              |
| (LAB)       |       |                 |            |              |
+-------------+-------+-----------------+------------+--------------+
| TOTAL       | 7.1   | 6.1 - 8.5 g/dL  | NON [facility]   |              |
| PROTEIN,    |       |                 | LAB        |              |
| PLASMA      |       |                 |            |              |
| (LAB)       |       |                 |            |              |
+-------------+-------+-----------------+------------+--------------+
| ALBUMIN,    | 4.2   | 2.9 - 5.5 g/dL  | NON [facility]   |              |
| PLASMA      |       |                 | LAB        |              |
| (LAB)       |       |                 |            |              |
+-------------+-------+-----------------+------------+--------------+
| CALCIUM,    | 9.4   | 8.5 - 10.5      | NON [facility]   |              |
| PLASMA      |       | mg/dL           | LAB        |              |
| (LAB)       |       |                 |            |              |
+-------------+-------+-----------------+------------+--------------+
| BILIRUBIN   | 0.8   | 0.0 - 1.2       | NON [facility]   |              |
| TOTAL       |       | Transcutaneous  | LAB        |              |
|             |       | Bilirubinometer |            |              |
+-------------+-------+-----------------+------------+--------------+
| ALK PHOS    | 230   | 135 - 384 U/L   | NON [facility]   |              |
|             |       |                 | LAB        |              |
+-------------+-------+-----------------+------------+--------------+
| AST(SGOT)   | 28    | 0 - 40 U/L      | NON [facility]   |              |
|             |       |                 | LAB        |              |
+-------------+-------+-----------------+------------+--------------+
| SODIUM,     | 136   | 132 - 143       | NON [facility]   |              |
 
| PLASMA      |       | mmol/L          | LAB        |              |
| (LAB)       |       |                 |            |              |
+-------------+-------+-----------------+------------+--------------+
| POTASSIUM,  | 3.8   | 3.6 - 5.1       | NON [facility]   |              |
| PLASMA      |       | mmol/L          | LAB        |              |
| (LAB)       |       |                 |            |              |
+-------------+-------+-----------------+------------+--------------+
| CHLORIDE,   | 107   | 95 - 112 mmol/L | NON [facility]   |              |
| PLASMA      |       |                 | LAB        |              |
| (LAB)       |       |                 |            |              |
+-------------+-------+-----------------+------------+--------------+
| TOTAL CO2,  | 21.7  | 19 - 31 mmol/L  | NON [facility]   |              |
| PLASMA      |       |                 | LAB        |              |
| (LAB)       |       |                 |            |              |
+-------------+-------+-----------------+------------+--------------+
| ALT (SGPT)  | 24    | 0 - 46 U/L      | NON [facility]   |              |
|             |       |                 | LAB        |              |
+-------------+-------+-----------------+------------+--------------+
| BILIRUBIN   | 0.1   | 0.0 - 0.4 mg/dL | NON [facility]   |              |
| DIRECT      |       |                 | LAB        |              |
+-------------+-------+-----------------+------------+--------------+
 
 
 
+----------+
| Specimen |
+----------+
|          |
+----------+
 
 
 
+----------------+---------+--------------------+--------------+
| Performing     | Address | City/State/Zipcode | Phone Number |
| Organization   |         |                    |              |
+----------------+---------+--------------------+--------------+
|   [facility] |         |                    |              |
+----------------+---------+--------------------+--------------+
 CBC, WITH DIFFERENTIAL (2008  3:25 PM PDT)
 
+-------------+----------+-----------------+------------+--------------+
| Component   | Value    | Ref Range       | Performed  | Pathologist  |
|             |          |                 | At         | Signature    |
+-------------+----------+-----------------+------------+--------------+
| WHITE CELL  | 8.3      | 4.5 - 13.5 K/cu | NON [facility]   |              |
| COUNT       |          |  mm             | LAB        |              |
+-------------+----------+-----------------+------------+--------------+
| RED CELL    | 4.65     | 4.1 - 5.3 M/cu  | [facility]   |              |
| COUNT       |          | mm              | LAB        |              |
+-------------+----------+-----------------+------------+--------------+
| HEMOGLOBIN  | 14.2     | 10.6 - 15.1     | NON [facility]   |              |
|             |          | g/dL            | LAB        |              |
+-------------+----------+-----------------+------------+--------------+
| HEMATOCRIT  | 40.8     | 32 - 42 %       | NON [facility]   |              |
|             |          |                 | LAB        |              |
+-------------+----------+-----------------+------------+--------------+
| MCV         | 84.6     | 71 - 89 fL      | NON [facility]   |              |
|             |          |                 | LAB        |              |
+-------------+----------+-----------------+------------+--------------+
| MCH         | 31 (A)   | 24 - 30 pg      | NON [facility]   |              |
 
|             |          |                 | LAB        |              |
+-------------+----------+-----------------+------------+--------------+
| MCHC        | 35       | 30 - 36 g/dL    | [facility]   |              |
|             |          |                 | LAB        |              |
+-------------+----------+-----------------+------------+--------------+
| PLATELET    | 339      | 140 - 440 K/cu  | [facility]   |              |
| COUNT       |          | mm              | LAB        |              |
+-------------+----------+-----------------+------------+--------------+
| NEUTROPHIL  | 43.3     | 31 - 60 %       | [facility]   |              |
| %           |          |                 | LAB        |              |
+-------------+----------+-----------------+------------+--------------+
| LYMPHOCYTE  | 41.1     | 28 - 48 %       | [facility]   |              |
| %           |          |                 | LAB        |              |
+-------------+----------+-----------------+------------+--------------+
| MONOCYTE %  | 12.2 (A) | 0 - 12 %        | NON [facility]   |              |
|             |          |                 | LAB        |              |
+-------------+----------+-----------------+------------+--------------+
| EOS %       | 3.2      | 0 - 6 %         | [facility]   |              |
|             |          |                 | LAB        |              |
+-------------+----------+-----------------+------------+--------------+
| BASO %      | 0.2      | 0 - 2 %         | NON [facility]   |              |
|             |          |                 | LAB        |              |
+-------------+----------+-----------------+------------+--------------+
| RDW         | 12.6     | 10.5 - 15.0 %   | NON [facility]   |              |
|             |          |                 | LAB        |              |
+-------------+----------+-----------------+------------+--------------+
| MPV         |          | fL              | NON [facility]   |              |
|             |          |                 | LAB        |              |
+-------------+----------+-----------------+------------+--------------+
| NEUTROPHIL  |          | K/cu mm         | NON [facility]   |              |
| #           |          |                 | LAB        |              |
+-------------+----------+-----------------+------------+--------------+
| LYMPHOCYTE  |          | K/cu mm         | [facility]   |              |
| #           |          |                 | LAB        |              |
+-------------+----------+-----------------+------------+--------------+
| MONOCYTE #  |          | K/cu mm         | [facility]   |              |
|             |          |                 | LAB        |              |
+-------------+----------+-----------------+------------+--------------+
| EOS #       |          | K/cu mm         | [facility]   |              |
|             |          |                 | LAB        |              |
+-------------+----------+-----------------+------------+--------------+
| BASO #      |          |                 | NON [facility]   |              |
|             |          |                 | LAB        |              |
+-------------+----------+-----------------+------------+--------------+
 
 
 
+----------+
| Specimen |
+----------+
|          |
+----------+
 
 
 
+----------------+---------+--------------------+--------------+
| Performing     | Address | City/State/Zipcode | Phone Number |
| Organization   |         |                    |              |
+----------------+---------+--------------------+--------------+
|   [facility] |         |                    |              |
 
+----------------+---------+--------------------+--------------+
 MAGNESIUM, PLASMA (2008  3:25 PM PDT)
 
+-------------+-------+-----------------+------------+--------------+
| Component   | Value | Ref Range       | Performed  | Pathologist  |
|             |       |                 | At         | Signature    |
+-------------+-------+-----------------+------------+--------------+
| MAGNESIUM,P | 2.0   | 1.7 - 2.5 mg/dL | NON [facility]   |              |
| LASMA       |       |                 | LAB        |              |
+-------------+-------+-----------------+------------+--------------+
 
 
 
+----------+
| Specimen |
+----------+
|          |
+----------+
 
 
 
+----------------+---------+--------------------+--------------+
| Performing     | Address | City/State/Zipcode | Phone Number |
| Organization   |         |                    |              |
+----------------+---------+--------------------+--------------+
|   [facility] |         |                    |              |
+----------------+---------+--------------------+--------------+
 TACROLIMUS, WHOLE BLOOD (2008  3:25 PM PDT)
 
+-------------+-------+-----------+------------+--------------+
| Component   | Value | Ref Range | Performed  | Pathologist  |
|             |       |           | At         | Signature    |
+-------------+-------+-----------+------------+--------------+
| TACROLIMUS  |       | ng/mL     | [facility]   |              |
| ()    |       |           | LAB        |              |
+-------------+-------+-----------+------------+--------------+
 
 
 
+----------+
| Specimen |
+----------+
|          |
+----------+
 
 
 
+----------------+---------+--------------------+--------------+
| Performing     | Address | City/State/Zipcode | Phone Number |
| Organization   |         |                    |              |
+----------------+---------+--------------------+--------------+
|   [facility] |         |                    |              |
+----------------+---------+--------------------+--------------+
 GGT, PLASMA (2008  3:25 PM PDT)
 
+-----------+-------+------------+------------+--------------+
| Component | Value | Ref Range  | Performed  | Pathologist  |
|           |       |            | At         | Signature    |
+-----------+-------+------------+------------+--------------+
| GAMMA     | 9     | 5 - 60 U/L | NON [facility]   |              |
 
| GLUTAMYL  |       |            | LAB        |              |
| TRANS     |       |            |            |              |
+-----------+-------+------------+------------+--------------+
 
 
 
+----------+
| Specimen |
+----------+
|          |
+----------+
 
 
 
+----------------+---------+--------------------+--------------+
| Performing     | Address | City/State/Zipcode | Phone Number |
| Organization   |         |                    |              |
+----------------+---------+--------------------+--------------+
|   [facility] |         |                    |              |
+----------------+---------+--------------------+--------------+
 CHOLESTEROL TOTAL, PLASMA (2008  3:25 PM PDT)
 
+-------------+-------+---------------+------------+--------------+
| Component   | Value | Ref Range     | Performed  | Pathologist  |
|             |       |               | At         | Signature    |
+-------------+-------+---------------+------------+--------------+
| CHOLESTEROL | 145   | < - 200 mg/dL | [facility]   |              |
|   (LAB)     |       |               | LAB        |              |
+-------------+-------+---------------+------------+--------------+
 
 
 
+----------+
| Specimen |
+----------+
|          |
+----------+
 
 
 
+----------------+---------+--------------------+--------------+
| Performing     | Address | City/State/Zipcode | Phone Number |
| Organization   |         |                    |              |
+----------------+---------+--------------------+--------------+
|   [facility] |         |                    |              |
+----------------+---------+--------------------+--------------+
 TRIGLYCERIDES, PLASMA (2008  3:25 PM PDT)
 
+-------------+-------+----------------+------------+--------------+
| Component   | Value | Ref Range      | Performed  | Pathologist  |
|             |       |                | At         | Signature    |
+-------------+-------+----------------+------------+--------------+
| TRIGLYCERID | 120   | 30 - 150 mg/dL | NON [facility]   |              |
| ES          |       |                | LAB        |              |
+-------------+-------+----------------+------------+--------------+
 
 
 
+----------+
| Specimen |
 
+----------+
|          |
+----------+
 
 
 
+----------------+---------+--------------------+--------------+
| Performing     | Address | City/State/Zipcode | Phone Number |
| Organization   |         |                    |              |
+----------------+---------+--------------------+--------------+
|   [facility] |         |                    |              |
+----------------+---------+--------------------+--------------+
 PHOSPHORUS, PLASMA (2008  3:25 PM PDT)
 
+-------------+-------+-----------------+------------+--------------+
| Component   | Value | Ref Range       | Performed  | Pathologist  |
|             |       |                 | At         | Signature    |
+-------------+-------+-----------------+------------+--------------+
| PHOSPHORUS, | 4.5   | 2.6 - 6.2 mg/dL | NON [facility]   |              |
|  PLASMA     |       |                 | LAB        |              |
| (LAB)       |       |                 |            |              |
+-------------+-------+-----------------+------------+--------------+
 
 
 
+----------+
| Specimen |
+----------+
|          |
+----------+
 
 
 
+----------------+---------+--------------------+--------------+
| Performing     | Address | City/State/Zipcode | Phone Number |
| Organization   |         |                    |              |
+----------------+---------+--------------------+--------------+
|   [facility] |         |                    |              |
+----------------+---------+--------------------+--------------+
 documented in this encounter
 
 Visit Diagnoses
 Not on filedocumented in this encounter

## 2020-10-13 NOTE — XMS
Encounter Summary
  Created on: 10/13/2020
 
 Bonifacio Nila JB
 External Reference #: [id number]
 : [birth year]
 Sex: Female
 
 Demographics
 
 
+-----------------------+------------------------+
| Address               | [address]            |
|                       | [place]  [address]   |
+-----------------------+------------------------+
| Home Phone            | [phone]        |
+-----------------------+------------------------+
| Preferred Language    | Unknown                |
+-----------------------+------------------------+
| Marital Status        | Single                 |
+-----------------------+------------------------+
| Rastafari Affiliation | Unknown                |
+-----------------------+------------------------+
| Race                  | White                  |
+-----------------------+------------------------+
| Ethnic Group          | Not  or  |
+-----------------------+------------------------+
 
 
 Author
 
 
+--------------+------------------------------+
| Author       | [facility] |
+--------------+------------------------------+
| Organization | [facility] |
+--------------+------------------------------+
| Address      | Unknown                      |
+--------------+------------------------------+
| Phone        | Unavailable                  |
+--------------+------------------------------+
 
 
 
 Support
 
 
+-----------------+--------------+---------+-----------------+
| Name            | Relationship | Address | Phone           |
+-----------------+--------------+---------+-----------------+
| Kit Valdovinos   | ECON         | Unknown | [phone] |
+-----------------+--------------+---------+-----------------+
| Magdalena Valdovinos | ECON         | Unknown | [phone] |
+-----------------+--------------+---------+-----------------+
 
 
 
 Care Team Providers
 
 
 
+-----------------------+------+-------------+
| Care Team Member Name | Role | Phone       |
+-----------------------+------+-------------+
| No Pcp Per Patient    | PCP  | Unavailable |
+-----------------------+------+-------------+
 
 
 
 Reason for Visit
 
 
+-------------------+----------+
| Reason            | Comments |
+-------------------+----------+
| Follow-up in      |          |
| outpatient clinic |          |
+-------------------+----------+
 Office Visit - E/M Services (Routine)
 
+--------+--------------+---------------+--------------+--------------+---------------+
| Status | Reason       | Specialty     | Diagnoses /  | Referred By  | Referred To   |
|        |              |               | Procedures   | Contact      | Contact       |
+--------+--------------+---------------+--------------+--------------+---------------+
| Closed |   Specialty  | Pediatric     |              |   Non-[facility]   |   Remi,     |
|        | Services     | Gastroenterol |              | Epic Dept    | MD Neema   |
|        | Required     | ogy           |              |              | [address] |
|        |              |               |              |              |  Ed           |
|        |              |               |              |              | [place], [place]  |
|        |              |               |              |              | [address]    |
|        |              |               |              |              | Phone:        |
|        |              |               |              |              | [phone]  |
|        |              |               |              |              |  Fax:         |
|        |              |               |              |              | [phone]  |
+--------+--------------+---------------+--------------+--------------+---------------+
 
 
 
 
 Encounter Details
 
 
+--------+---------+----------------------+----------------------+----------------------+
| Date   | Type    | Department           | Care Team            | Description          |
+--------+---------+----------------------+----------------------+----------------------+
| / | Office  |   Pediatric          |   Neema Khalil,   | Immunosuppressed     |
|    | Visit   | Gastroenterology at  | MD [address] | status (HCC)         |
|        |         | [facility]          |   [place], [place]       | (Primary Dx);        |
|        |         | [facility]  | [address]           | Noncompliance with   |
|        |         |  [address]     | [phone]         | treatment; BP (high  |
|        |         | [facility]          | [phone] (Fax)   | blood pressure);     |
|        |         | [facility], |                      | Transplanted liver   |
|        |         |  7th floor           |                      | ([facility]); VSD           |
|        |         | [place], [place]         |                      | (ventricular septal  |
|        |         | [address]           |                      | defect); Aortic      |
|        |         | [phone]         |                      | valve insufficiency  |
+--------+---------+----------------------+----------------------+----------------------+
 
 
 
 
 Social History
 
 
+-------------------+-------+-----------+--------+------+
| Tobacco Use       | Types | Packs/Day | Years  | Date |
|                   |       |           | Used   |      |
+-------------------+-------+-----------+--------+------+
| Passive Smoke     |       |           |        |      |
| Exposure - Never  |       |           |        |      |
| Smoker            |       |           |        |      |
+-------------------+-------+-----------+--------+------+
 
 
 
+---------------------+---+---+---+
| Smokeless Tobacco:  |   |   |   |
| Never Used          |   |   |   |
+---------------------+---+---+---+
 
 
 
+-------------+-------------+---------+----------+
| Alcohol Use | Drinks/Week | oz/Week | Comments |
+-------------+-------------+---------+----------+
| Not Asked   |             |         |          |
+-------------+-------------+---------+----------+
 
 
 
+------------------+---------------+
| Sex Assigned at  | Date Recorded |
| Birth            |               |
+------------------+---------------+
| Not on file      |               |
+------------------+---------------+
 documented as of this encounter
 
 Last Filed Vital Signs
 
 
+-------------------+----------------------+----------------------+----------+
| Vital Sign        | Reading              | Time Taken           | Comments |
+-------------------+----------------------+----------------------+----------+
| Blood Pressure    | 141/104              | 2014  9:33 AM  |          |
|                   |                      | PDT                  |          |
+-------------------+----------------------+----------------------+----------+
| Pulse             | 71                   | 2014  9:33 AM  |          |
|                   |                      | PDT                  |          |
+-------------------+----------------------+----------------------+----------+
| Temperature       | -                    | -                    |          |
+-------------------+----------------------+----------------------+----------+
| Respiratory Rate  | -                    | -                    |          |
+-------------------+----------------------+----------------------+----------+
| Oxygen Saturation | -                    | -                    |          |
+-------------------+----------------------+----------------------+----------+
| Inhaled Oxygen    | -                    | -                    |          |
| Concentration     |                      |                      |          |
+-------------------+----------------------+----------------------+----------+
| Weight            | 72.2 kg (159 lb 2.8  | 2014  9:33 AM  |          |
 
|                   | oz)                  | PDT                  |          |
+-------------------+----------------------+----------------------+----------+
| Height            | 164 cm (5' 4.57")    | 2014  9:33 AM  |          |
|                   |                      | PDT                  |          |
+-------------------+----------------------+----------------------+----------+
| Body Mass Index   | 26.84                | 2014  9:33 AM  |          |
|                   |                      | PDT                  |          |
+-------------------+----------------------+----------------------+----------+
 documented in this encounter
 
 Progress Notes
 Neema Khalil MD - 2014  9:04 AM PDTFormatting of this note might be different fro
m the original.
 Referring provider: No Pcp Per PATIENT
 Primary Care Provider: No Pcp Per PATIENT
 
 Pediatric Hepatology Clinic
 
 Visit type: New Patient Visit 
 DOS: 2014
 
 CHIEF  COMPLAINT: Post liver transplant care first time since 
 
 HISTORY OF PRESENT ILLNESS: Nila Valdovinos is an 14 y.o. female, who is referred by Lily Horton, for post liver transplant care. She had liver transplant for biliary atresia 2000 at [facility]. She was last seen by Dr Burkett in . In , 
it was noticed that she has not been seen, attempts to reach to family was unsuccessful. She
 was lost to follow up since that time. In 2014, our SW contacted family and they agre
ed to go to local lab for post-transplant labs and to come for clinic visit at [facility].
 She is accompanied by her father today. Today, she denies any pain, nausea, emesis, diarrhe
a or constipation. No fever.
 She reports she takes tacrolimus one pill once daily. Father reports she has not been takin
maliha regularly
 
 In [facility], Nila's last cardiology note is from  from Dr Lily Horton, cardiologist at [facility]. 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
mal tissue leaving a tiny VSD, 2) trace central aortic insufficiency, 3) Left atrial isomeri
sm (polysplenia). It was recommended her annual follow up for progression of aortic insuffic
iency in which case she may need closure of VSD. No further notes available in our system as
 she is lost to follow up since . Today, father says she has an appointment with cardiocarrol costa at [facility] soon. 
 
 Current Outpatient Prescriptions 
 Medication Sig 
   PROGRAF 1 mg Oral Capsule Take 1 Cap by mouth two times daily. 
 
 Allergies 
 Allergen Reactions 
   Sulfa (Sulfonamide Antibiotics) Hives and Swelling-Facial 
   Varicella Vaccines  
   Possible reaction with sulfa meds 
 
 Past Medical History 
 Diagnosis Date 
   Biliary atresia  
 
  
 Past Surgical History 
 Procedure Laterality Date 
   Liver transplant  2000 
 
   at [facility] 
 
 Family History 
 No liver problems 
 
 SHx: lives with family
  
 
 REVIEW OF SYSTEMS: 
 General:  No fever, fatigue, or weight loss.  
 Eyes:  No changes in vision, no eye irritation or discharge.  
 ENT:  No nosebleeds, nasal congestion, difficulty swallowing, no mouth sores. 
 Respiratory:  No shortness of breath, cough, wheezing.
 Cardiovascular: No breathing difficulty, cyanosis
 Genitourinary:  No urinary infections.  
 Neurologic:  No seizures. No headaches.  
 Musculoskeletal:  No joint pain, swelling. No back pain.  Full range of motion.
 Skin:  No itching, rash, jaundice.  
 Heme: No anemia, abnormal bleeding, easy bruising
 Lymphatic: No enlarged lymph nodes.
 Metabolic/Endo: no glucose intolerance, hypothyroidism
 Allergy/immunology: no seasonal or food allergies, no asthma
 
 All other ROS are negative.
 
  
 PHYSICAL EXAMINATION:
 
 Patient reports a pain level of  0 today. No action required.
 
 Ht 164 cm (5' 4.57") (60%, Z = 0.25), Wt 72.2 kg (159 lb 2.8 oz) (92%, Z = 1.41), /10
4, Pulse 71, BMI 26.84 kg/(m^2).
 
 APPEARANCE: alert, active and in no apparent distress. 
 SKIN:  no jaundice or rashes.  
 HEENT: normocephalic, PERRLA, mucous membranes moist. 
 NECK: supple, without thyromegaly. 
 CHEST: clear to auscultation bilaterally. 
 CV: systolic murmur at left sternal border
 ABDOMEN: soft, NTND, no hepatosplenomegaly. 
 BACK: without tenderness. 
 MUSCULOSKELETAL: no muscle wasting, no deformity.
 EXTREMITIES: no edema, clubbing, deformity.
 NEURO: grossly intact and appropriate to age, DTRs 2+ and symmetric
 NODES: no significant cervical, axillary or inguinal adenopathy
  
 Labs/imaging: as below
 
  ASSESSMENT and Plan: 14 yo female with
 
 279.9   Immunosuppressed status
 V15.81  Noncompliance with treatment
 401.9   BP (high blood pressure): repeat BP at the end of visit is 131/92
 V42.7   Transplanted liver
 745.4   VSD (ventricular septal defect)
 424.1   Aortic valve insufficiency
 
 Pt is lost to follow up for 5 years. Off meds, reportedly started taking tacrolimus once a 
day recently. She might be immunotolerant as she did not reject past 5 years. However this d
oesn't guarantee future rejection.
 
 Educated importance of compliance with meds and clinic visits. Since she is 14 years out fr
 liver transplant, her lab schedule is every 3 months, clinic visit schedule is once a beck ellis. Father says they will be able to take her to local laboratory every 3 months for blood dr
karlee and they will be able to come to [facility] when [facility] team is here next time.
 I strongly recommended to follow her BPs at PCPs office. If they continue to be persistentl
y elevated she will need to be seen by nephrology team. Hypertension is a well-known complic
ation of calcineurin inhibitors, although she has not been taking tacrolimus past couple of 
years.  
 
 - labs: CBC, CMP, GGT, magnesium, trough tacrolimus level: every 3 months
 - follow up in liver transplant clinic: annually, next in 2014.
 - cardiology fu locally and at [facility]
  
 
 At this visit: 
 Patient accompanied by: father 
  used:no 
 Psychosocial or economic issues that may affect patient's medical care or well being: nonco
mpliance 
 Co-morbid chronic medical problems that may affect procedural sedation risk: VSD
 
 Labs/imaging:
 
 Component
     Latest Ref Rng 3/26/2014 2014 
 GLUCOSE
     70 - 100 mg/dL 82 87 
 BUN  6 - 23 mg/dL 18 13 
 CREATININE
     0.40 - 1.05 mg/dL 0.81 0.75 
 TOTAL PROTEIN
     6.1 - 8.5 g/dL 7.3 7.6 
 ALBUMIN
     3.5 - 5.0 g/dL 4.2 4.4 
 CALCIUM
     8.4 - 10.2 mg/dL 9.6 9.9 
 BILIRUBIN TOTAL
     0 - 1.2  0.3 0.8 
 ALK PHOS
     135 - 384 U/L 78 (A) 92 (A) 
 AST(SGOT)
     13 - 39 U/L 15 13 
 SODIUM
     132 - 143 mmol/L 136 140 
 POTASSIUM
     3.6 - 5.1 mmol/L 3.6 4.0 
 CHLORIDE
     95 - 112 mmol/L 102 100 
 TOTAL CO2
     19 - 31 mmol/L 25 28 
 ALT (SGPT)
     7 - 52 U/L 12 9 
 ANION GAP
     7 - 21 12.6 16 
 BUN/CREATININE RATIO  6.0 - 28.6 22.2 17.3 
 GLOBULIN
     1.8 - 3.5 3.1 3.2 
 A/G RATIO
     1.1 - 2.4 1.4 1.4 
 WHITE CELL COUNT
 
  4.4 - 11.8 K/cu mm 7.8 6.6 
 RED CELL COUNT
     3.8 - 5.3 M/cu mm 4.55 4.83 
 HEMOGLOBIN
     11.1 - 15.7 g/dL 14 14.7 
 HEMATOCRIT
     32 - 47 % 40.7 42.8 
 MCV 73 - 91 fL 89.4 88.6 
 PLATELET COUNT
  140 - 440 K/cu mm 296 328 
 GGT  5 - 60 U/L 8  
 MAGNESIUM
     1.7 - 2.5 mg/dL 1.8  
 
 Neema Khalil MD
 Pediatric Gastroenterology
 Consult line: [phone] 
 
 Electronically signed by Neema Khalil MD at 2014 10:27 PM PDTdocumented in this en
counter
 
 Plan of Treatment
 Not on filedocumented as of this encounter
 
 Visit Diagnoses
 
 
+-------------------------------------------------------------------------------------+
| Diagnosis                                                                           |
+-------------------------------------------------------------------------------------+
|   Immunosuppressed status (HCC) - Primary  Unspecified disorder of immune mechanism |
+-------------------------------------------------------------------------------------+
|   Noncompliance with treatment  Personal history of noncompliance with medical      |
| treatment, presenting hazards to health                                             |
+-------------------------------------------------------------------------------------+
|   BP (high blood pressure)  Unspecified essential hypertension                      |
+-------------------------------------------------------------------------------------+
|   Transplanted liver (HCC)  Liver replaced by transplant                            |
+-------------------------------------------------------------------------------------+
|   VSD (ventricular septal defect)  Ventricular septal defect                        |
+-------------------------------------------------------------------------------------+
|   Aortic valve insufficiency  Aortic valve disorders                                |
+-------------------------------------------------------------------------------------+
 documented in this encounter

## 2020-10-13 NOTE — XMS
Encounter Summary
  Created on: 10/13/2020
 
 Bonifacio Nila JB
 External Reference #: [id number]
 : [birth year]
 Sex: Female
 
 Demographics
 
 
+-----------------------+------------------------+
| Address               | [address]            |
|                       | [place]  [address]   |
+-----------------------+------------------------+
| Home Phone            | [phone]        |
+-----------------------+------------------------+
| Preferred Language    | Unknown                |
+-----------------------+------------------------+
| Marital Status        | Single                 |
+-----------------------+------------------------+
| Orthodox Affiliation | Unknown                |
+-----------------------+------------------------+
| Race                  | White                  |
+-----------------------+------------------------+
| Ethnic Group          | Not  or  |
+-----------------------+------------------------+
 
 
 Author
 
 
+--------------+------------------------------+
| Author       | [facility] |
+--------------+------------------------------+
| Organization | [facility] |
+--------------+------------------------------+
| Address      | Unknown                      |
+--------------+------------------------------+
| Phone        | Unavailable                  |
+--------------+------------------------------+
 
 
 
 Support
 
 
+-----------------+--------------+---------+-----------------+
| Name            | Relationship | Address | Phone           |
+-----------------+--------------+---------+-----------------+
| Kit Valdovinos   | ECON         | Unknown | [phone] |
+-----------------+--------------+---------+-----------------+
| Magdalena Valdovinos | ECON         | Unknown | [phone] |
+-----------------+--------------+---------+-----------------+
 
 
 
 Care Team Providers
 
 
 
+-----------------------+------+-----------------+
| Care Team Member Name | Role | Phone           |
+-----------------------+------+-----------------+
| Lily Horton MD   | PCP  | [phone] |
+-----------------------+------+-----------------+
 
 
 
 Reason for Visit
 
 
+----------------+--------+----------+
| Reason         | Onset  | Comments |
|                | Date   |          |
+----------------+--------+----------+
| Refill Request | / |          |
|                |    |          |
+----------------+--------+----------+
 
 
 
 Encounter Details
 
 
+--------+--------+----------------------+---------------------+----------------+
| Date   | Type   | Department           | Care Team           | Description    |
+--------+--------+----------------------+---------------------+----------------+
| / | Refill |   Pediatric          |   Monserrat Spann,  | Refill Request |
|    |        | Gastroenterology at  | RN  [address] [facility]     |                |
|        |        | [place]          | [facility]     |                |
|        |        | [place]  | [place] [address]  |                |
|        |        |  [address]    |                     |                |
|        |        | [place]          |                     |                |
|        |        | [place], |                     |                |
|        |        |  [address] [place] [address]           |                     |                |
|        |        | [place], [place]         |                     |                |
|        |        | [address]           |                     |                |
|        |        | [phone]         |                     |                |
+--------+--------+----------------------+---------------------+----------------+
 
 
 
 Social History
 
 
+----------------+-------+-----------+--------+------+
| Tobacco Use    | Types | Packs/Day | Years  | Date |
|                |       |           | Used   |      |
+----------------+-------+-----------+--------+------+
| Never Assessed |       |           |        |      |
+----------------+-------+-----------+--------+------+
 
 
 
+------------------+---------------+
| Sex Assigned at  | Date Recorded |
| Birth            |               |
+------------------+---------------+
 
| Not on file      |               |
+------------------+---------------+
 documented as of this encounter
 
 Plan of Treatment
 Not on filedocumented as of this encounter
 
 Visit Diagnoses
 Not on filedocumented in this encounter

## 2020-10-13 NOTE — XMS
Encounter Summary
  Created on: 10/13/2020
 
 Bonifacio Nila JB
 External Reference #: [id number]
 : [birth year]
 Sex: Female
 
 Demographics
 
 
+-----------------------+------------------------+
| Address               | [address]            |
|                       | [place]  [address]   |
+-----------------------+------------------------+
| Home Phone            | [phone]        |
+-----------------------+------------------------+
| Preferred Language    | Unknown                |
+-----------------------+------------------------+
| Marital Status        | Single                 |
+-----------------------+------------------------+
| Sikh Affiliation | Unknown                |
+-----------------------+------------------------+
| Race                  | White                  |
+-----------------------+------------------------+
| Ethnic Group          | Not  or  |
+-----------------------+------------------------+
 
 
 Author
 
 
+--------------+------------------------------+
| Author       | [facility] |
+--------------+------------------------------+
| Organization | [facility] |
+--------------+------------------------------+
| Address      | Unknown                      |
+--------------+------------------------------+
| Phone        | Unavailable                  |
+--------------+------------------------------+
 
 
 
 Support
 
 
+-----------------+--------------+---------+-----------------+
| Name            | Relationship | Address | Phone           |
+-----------------+--------------+---------+-----------------+
| Kit Valdovinos   | ECON         | Unknown | [phone] |
+-----------------+--------------+---------+-----------------+
| Magdalena Valdovions | ECON         | Unknown | [phone] |
+-----------------+--------------+---------+-----------------+
 
 
 
 Care Team Providers
 
 
 
+------------------------+------+-----------------+
| Care Team Member Name  | Role | Phone           |
+------------------------+------+-----------------+
| Lily Horton MD | PCP  | [phone] |
+------------------------+------+-----------------+
 
 
 
 Encounter Details
 
 
+--------+----------+----------------------+----------------------+-------------+
| Date   | Type     | Department           | Care Team            | Description |
+--------+----------+----------------------+----------------------+-------------+
| 10/26/ | Abstract |   Pediatric          |   Neema Khalil,   |             |
|    |          | Gastroenterology at  | MD  [address] |             |
|        |          | [place]          |   [place], [place]       |             |
|        |          | [facility]  | [address]           |             |
|        |          |  [address]     | [phone]         |             |
|        |          | [place]          | [phone] (Fax)   |             |
|        |          | [facility], |                      |             |
|        |          |  [address] [place] [address]           |                      |             |
|        |          | [place], [place]         |                      |             |
|        |          | [address]           |                      |             |
|        |          | [phone]         |                      |             |
+--------+----------+----------------------+----------------------+-------------+
 
 
 
 Social History
 
 
+-------------------+-------+-----------+--------+------+
| Tobacco Use       | Types | Packs/Day | Years  | Date |
|                   |       |           | Used   |      |
+-------------------+-------+-----------+--------+------+
| Passive Smoke     |       |           |        |      |
| Exposure - Never  |       |           |        |      |
| Smoker            |       |           |        |      |
+-------------------+-------+-----------+--------+------+
 
 
 
+---------------------+---+---+---+
| Smokeless Tobacco:  |   |   |   |
| Never Used          |   |   |   |
+---------------------+---+---+---+
 
 
 
+-------------+-------------+---------+----------+
| Alcohol Use | Drinks/Week | oz/Week | Comments |
+-------------+-------------+---------+----------+
| Not Asked   |             |         |          |
+-------------+-------------+---------+----------+
 
 
 
 
+------------------+---------------+
| Sex Assigned at  | Date Recorded |
| Birth            |               |
+------------------+---------------+
| Not on file      |               |
+------------------+---------------+
 documented as of this encounter
 
 Plan of Treatment
 Not on filedocumented as of this encounter
 
 Procedures
 
 
+----------------------+--------+-------------+----------------------+----------------------
+
| Procedure Name       | Priori | Date/Time   | Associated Diagnosis | Comments             
|
|                      | ty     |             |                      |                      
|
+----------------------+--------+-------------+----------------------+----------------------
+
| CHOLESTEROL, TOTAL   | Routin | 10/23/2015  |                      |   Results for this   
|
| (EXTERNAL RESULTS    | e      |  9:25 AM    |                      | procedure are in the 
|
| ONLY)                |        | PDT         |                      |  results section.    
|
+----------------------+--------+-------------+----------------------+----------------------
+
| CBC, WITH            | Routin | 10/23/2015  |                      |   Results for this   
|
| DIFFERENTIAL         | e      |  9:25 AM    |                      | procedure are in the 
|
|                      |        | PDT         |                      |  results section.    
|
+----------------------+--------+-------------+----------------------+----------------------
+
| COMPLETE METABOLIC   | Routin | 10/23/2015  |                      |   Results for this   
|
| SET                  | e      |  9:25 AM    |                      | procedure are in the 
|
| (NA,K,CL,CO2,BUN,CRE |        | PDT         |                      |  results section.    
|
| AT,GLUC,CA,AST,ALT,B |        |             |                      |                      
|
| ASHWINI TOTAL,ALK        |        |             |                      |                      
|
| PHOS,ALB,PROT TOTAL) |        |             |                      |                      
|
+----------------------+--------+-------------+----------------------+----------------------
+
| TACROLIMUS, WHOLE    | Routin | 10/23/2015  |                      |   Results for this   
|
| BLOOD                | e      |  9:25 AM    |                      | procedure are in the 
|
|                      |        | PDT         |                      |  results section.    
|
+----------------------+--------+-------------+----------------------+----------------------
+
 
| PHOSPHORUS, PLASMA   | Routin | 10/23/2015  |                      |   Results for this   
|
|                      | e      |  9:25 AM    |                      | procedure are in the 
|
|                      |        | PDT         |                      |  results section.    
|
+----------------------+--------+-------------+----------------------+----------------------
+
| GGT, PLASMA          | Routin | 10/23/2015  |                      |   Results for this   
|
|                      | e      |  9:25 AM    |                      | procedure are in the 
|
|                      |        | PDT         |                      |  results section.    
|
+----------------------+--------+-------------+----------------------+----------------------
+
| URIC ACID, PLASMA    | Routin | 10/23/2015  |                      |   Results for this   
|
|                      | e      |  9:25 AM    |                      | procedure are in the 
|
|                      |        | PDT         |                      |  results section.    
|
+----------------------+--------+-------------+----------------------+----------------------
+
| MAGNESIUM, PLASMA    | Routin | 10/23/2015  |                      |   Results for this   
|
|                      | e      |  9:25 AM    |                      | procedure are in the 
|
|                      |        | PDT         |                      |  results section.    
|
+----------------------+--------+-------------+----------------------+----------------------
+
| TRIGLYCERIDES,       | Routin | 10/23/2015  |                      |   Results for this   
|
| PLASMA               | e      |  9:25 AM    |                      | procedure are in the 
|
|                      |        | PDT         |                      |  results section.    
|
+----------------------+--------+-------------+----------------------+----------------------
+
| LDH TOTAL, PLASMA    | Routin | 10/23/2015  |                      |   Results for this   
|
|                      | e      |  9:25 AM    |                      | procedure are in the 
|
|                      |        | PDT         |                      |  results section.    
|
+----------------------+--------+-------------+----------------------+----------------------
+
 documented in this encounter
 
 Results
 TACROLIMUS, WHOLE BLOOD (10/23/2015  9:25 AM PDT)
 
+-----------+-------+-----------+------------+--------------+
| Component | Value | Ref Range | Performed  | Pathologist  |
|           |       |           | At         | Signature    |
+-----------+-------+-----------+------------+--------------+
|     | <1.0  |           | [facility]  |              |
|           |       |           | LAB -      |              |
|           |       |           | [place]  |              |
 
+-----------+-------+-----------+------------+--------------+
 
 
 
+---------------+
| Specimen      |
+---------------+
| Blood - Blood |
+---------------+
 
 
 
+--------------------+----------------------+--------------------+----------------+
| Performing         | Address              | City/State/Zipcode | Phone Number   |
| Organization       |                      |                    |                |
+--------------------+----------------------+--------------------+----------------+
|   [facility]  |   [address] |   [place], [place]    |   [phone] |
| [place]          |                      |                    |                |
+--------------------+----------------------+--------------------+----------------+
 CBC, WITH DIFFERENTIAL (10/23/2015  9:25 AM PDT)
 
+-------------+-------+-----------------+------------+--------------+
| Component   | Value | Ref Range       | Performed  | Pathologist  |
|             |       |                 | At         | Signature    |
+-------------+-------+-----------------+------------+--------------+
| WHITE CELL  | 5.2   | 4.5 - 11.0 K/cu | [facility]  |              |
| COUNT       |       |  mm             | LAB -      |              |
|             |       |                 | [place]  |              |
+-------------+-------+-----------------+------------+--------------+
| RED CELL    | 4.65  | 3.8 - 5.1 M/cu  | [facility]  |              |
| COUNT       |       | mm              | LAB -      |              |
|             |       |                 | [place]  |              |
+-------------+-------+-----------------+------------+--------------+
| HEMOGLOBIN  | 13.5  | 12 - 16 g/dL    | [facility]  |              |
|             |       |                 | LAB -      |              |
|             |       |                 | [place]  |              |
+-------------+-------+-----------------+------------+--------------+
| HEMATOCRIT  | 41    | 35 - 45 %       | [facility]  |              |
|             |       |                 | LAB -      |              |
|             |       |                 | [place]  |              |
+-------------+-------+-----------------+------------+--------------+
| MCV         | 88.2  | 81 - 99 fL      | [facility]  |              |
|             |       |                 | LAB -      |              |
|             |       |                 | [place]  |              |
+-------------+-------+-----------------+------------+--------------+
| MCH         | 29    | 27 - 33 pg      | [facility]  |              |
|             |       |                 | LAB -      |              |
|             |       |                 | [place]  |              |
+-------------+-------+-----------------+------------+--------------+
| MCHC        | 33    | 30 - 36 g/dL    | [facility]  |              |
|             |       |                 | LAB -      |              |
|             |       |                 | [place]  |              |
+-------------+-------+-----------------+------------+--------------+
| PLATELET    | 343   | 140 - 440 K/cu  | [facility]  |              |
| COUNT       |       | mm              | LAB -      |              |
|             |       |                 | [place]  |              |
+-------------+-------+-----------------+------------+--------------+
| NEUTROPHIL  | 54.2  | 39 - 80 %       | [facility]  |              |
| %           |       |                 | LAB -      |              |
|             |       |                 | [place]  |              |
 
+-------------+-------+-----------------+------------+--------------+
| LYMPHOCYTE  | 32.3  | 24 - 44 %       | [facility]  |              |
| %           |       |                 | LAB -      |              |
|             |       |                 | [place]  |              |
+-------------+-------+-----------------+------------+--------------+
| MONOCYTE %  | 9.8   | 0 - 12 %        | [facility]  |              |
|             |       |                 | LAB -      |              |
|             |       |                 | [place]  |              |
+-------------+-------+-----------------+------------+--------------+
| EOS %       | 2.6   | 0 - 6 %         | [facility]  |              |
|             |       |                 | LAB -      |              |
|             |       |                 | [place]  |              |
+-------------+-------+-----------------+------------+--------------+
| BASO %      | 1.1   | 0 - 2 %         | [facility]  |              |
|             |       |                 | LAB -      |              |
|             |       |                 | [place]  |              |
+-------------+-------+-----------------+------------+--------------+
| RDW         | 13.7  | 10.5 - 15.0 %   | [facility]  |              |
|             |       |                 | LAB -      |              |
|             |       |                 | [place]  |              |
+-------------+-------+-----------------+------------+--------------+
 
 
 
+---------------+
| Specimen      |
+---------------+
| Blood - Blood |
+---------------+
 
 
 
+--------------------+----------------------+--------------------+----------------+
| Performing         | Address              | City/State/Zipcode | Phone Number   |
| Organization       |                      |                    |                |
+--------------------+----------------------+--------------------+----------------+
|   [facility]  |   [address] |   [place], [place]    |   [phone] |
| [place]          |                      |                    |                |
+--------------------+----------------------+--------------------+----------------+
 MAGNESIUM, PLASMA (10/23/2015  9:25 AM PDT)
 
+-----------+-------+-----------------+------------+--------------+
| Component | Value | Ref Range       | Performed  | Pathologist  |
|           |       |                 | At         | Signature    |
+-----------+-------+-----------------+------------+--------------+
| MAGNESIUM | 1.7   | 1.7 - 2.5 mg/dL | [facility]  |              |
|           |       |                 | LAB -      |              |
|           |       |                 | [place]  |              |
+-----------+-------+-----------------+------------+--------------+
 
 
 
+---------------+
| Specimen      |
+---------------+
| Blood - Blood |
+---------------+
 
 
 
 
+--------------------+----------------------+--------------------+----------------+
| Performing         | Address              | City/State/Zipcode | Phone Number   |
| Organization       |                      |                    |                |
+--------------------+----------------------+--------------------+----------------+
|   [facility]  |   [address] |   [place], [place]    |   [phone] |
| [place]          |                      |                    |                |
+--------------------+----------------------+--------------------+----------------+
 GGT, PLASMA (10/23/2015  9:25 AM PDT)
 
+-----------+-------+------------+------------+--------------+
| Component | Value | Ref Range  | Performed  | Pathologist  |
|           |       |            | At         | Signature    |
+-----------+-------+------------+------------+--------------+
| GAMMA     | 9     | 5 - 60 U/L | [facility]  |              |
| GLUTAMYL  |       |            | LAB -      |              |
| TRANS     |       |            | [place]  |              |
+-----------+-------+------------+------------+--------------+
 
 
 
+---------------+
| Specimen      |
+---------------+
| Blood - Blood |
+---------------+
 
 
 
+--------------------+----------------------+--------------------+----------------+
| Performing         | Address              | City/State/Zipcode | Phone Number   |
| Organization       |                      |                    |                |
+--------------------+----------------------+--------------------+----------------+
|   [facility]  |   [address] |   [place], [place]    |   [phone] |
| [place]          |                      |                    |                |
+--------------------+----------------------+--------------------+----------------+
 COMPLETE METABOLIC SET (NA,K,CL,CO2,BUN,CREAT,GLUC,CA,AST,ALT,BILI TOTAL,ALK PHOS,ALB,PROT 
TOTAL) (10/23/2015  9:25 AM PDT)
 
+-------------+-------+-----------------+------------+--------------+
| Component   | Value | Ref Range       | Performed  | Pathologist  |
|             |       |                 | At         | Signature    |
+-------------+-------+-----------------+------------+--------------+
| GLUCOSE,    | 83    | 70 - 100 mg/dL  | [facility]  |              |
| PLASMA      |       |                 | LAB -      |              |
| (LAB)       |       |                 | [place]  |              |
+-------------+-------+-----------------+------------+--------------+
| BUN, PLASMA | 13    | 6 - 23 mg/dL    | [facility]  |              |
|  (LAB)      |       |                 | LAB -      |              |
|             |       |                 | [place]  |              |
+-------------+-------+-----------------+------------+--------------+
| CREATININE  | 0.61  | 0.60 - 1.20     | [facility]  |              |
| PLASMA      |       | mg/dL           | LAB -      |              |
| (LAB)       |       |                 | [place]  |              |
+-------------+-------+-----------------+------------+--------------+
| TOTAL       | 7.2   | 6.1 - 8.5 g/dL  | [facility]  |              |
| PROTEIN,    |       |                 | LAB -      |              |
| PLASMA      |       |                 | [place]  |              |
| (LAB)       |       |                 |            |              |
+-------------+-------+-----------------+------------+--------------+
| ALBUMIN,    | 4.1   | 3.5 - 5.0 g/dL  | [facility]  |              |
 
| PLASMA      |       |                 | LAB -      |              |
| (LAB)       |       |                 | [place]  |              |
+-------------+-------+-----------------+------------+--------------+
| CALCIUM,    | 9.4   | 8.4 - 10.2      | [facility]  |              |
| PLASMA      |       | mg/dL           | LAB -      |              |
| (LAB)       |       |                 | [place]  |              |
+-------------+-------+-----------------+------------+--------------+
| BILIRUBIN   | 0.6   | 0 - 1.2         | [facility]  |              |
| TOTAL       |       | Transcutaneous  | LAB -      |              |
|             |       | Bilirubinometer | [place]  |              |
+-------------+-------+-----------------+------------+--------------+
| ALK PHOS    | 91    | 30 - 128 U/L    | [facility]  |              |
|             |       |                 | LAB -      |              |
|             |       |                 | [place]  |              |
+-------------+-------+-----------------+------------+--------------+
| AST(SGOT)   | 16    | 13 - 39 U/L     | [facility]  |              |
|             |       |                 | LAB -      |              |
|             |       |                 | [place]  |              |
+-------------+-------+-----------------+------------+--------------+
| SODIUM,     | 139   | 132 - 143       | [facility]  |              |
| PLASMA      |       | mmol/L          | LAB -      |              |
| (LAB)       |       |                 | [place]  |              |
+-------------+-------+-----------------+------------+--------------+
| POTASSIUM,  | 4.0   | 3.6 - 5.1       | [facility]  |              |
| PLASMA      |       | mmol/L          | LAB -      |              |
| (LAB)       |       |                 | [place]  |              |
+-------------+-------+-----------------+------------+--------------+
| CHLORIDE,   | 105   | 95 - 112 mmol/L | [facility]  |              |
| PLASMA      |       |                 | LAB -      |              |
| (LAB)       |       |                 | [place]  |              |
+-------------+-------+-----------------+------------+--------------+
| TOTAL CO2,  | 24    | 19 - 31 mmol/L  | [facility]  |              |
| PLASMA      |       |                 | LAB -      |              |
| (LAB)       |       |                 | [place]  |              |
+-------------+-------+-----------------+------------+--------------+
| ALT (SGPT)  | 14    | 7 - 52 U/L      | [facility]  |              |
|             |       |                 | LAB -      |              |
|             |       |                 | [place]  |              |
+-------------+-------+-----------------+------------+--------------+
| ANION GAP   | 14    | 7 - 21          | [facility]  |              |
|             |       |                 | LAB -      |              |
|             |       |                 | [place]  |              |
+-------------+-------+-----------------+------------+--------------+
| BUN/CREATIN | 21.3  | 6.0 - 28.6      | [facility]  |              |
| INE RATIO   |       |                 | LAB -      |              |
|             |       |                 | [place]  |              |
+-------------+-------+-----------------+------------+--------------+
| GLOBULIN    | 3.1   | 1.8 - 3.5       | [facility]  |              |
|             |       |                 | LAB -      |              |
|             |       |                 | [place]  |              |
+-------------+-------+-----------------+------------+--------------+
| A/G RATIO   | 1.3   | 1.1 - 2.4       | [facility]  |              |
|             |       |                 | LAB -      |              |
|             |       |                 | [place]  |              |
+-------------+-------+-----------------+------------+--------------+
| BILIRUBIN   | 0.1   | 0 - 0.1 mg/dL   | [facility]  |              |
| DIRECT      |       |                 | LAB -      |              |
|             |       |                 | [place]  |              |
+-------------+-------+-----------------+------------+--------------+
 
 
 
 
+---------------+
| Specimen      |
+---------------+
| Blood - Blood |
+---------------+
 
 
 
+--------------------+----------------------+--------------------+----------------+
| Performing         | Address              | City/State/Zipcode | Phone Number   |
| Organization       |                      |                    |                |
+--------------------+----------------------+--------------------+----------------+
|   [facility]  |   [address] |   [place]    |   [phone] |
| [place]          |                      |                    |                |
+--------------------+----------------------+--------------------+----------------+
 TRIGLYCERIDES, PLASMA (10/23/2015  9:25 AM PDT)
 
+-------------+-------+----------------+------------+--------------+
| Component   | Value | Ref Range      | Performed  | Pathologist  |
|             |       |                | At         | Signature    |
+-------------+-------+----------------+------------+--------------+
| TRIGLYCERID | 54    | 30 - 150 mg/dL | [facility]  |              |
| ES          |       |                | LAB -      |              |
|             |       |                | [place]  |              |
+-------------+-------+----------------+------------+--------------+
 
 
 
+---------------+
| Specimen      |
+---------------+
| Blood - Blood |
+---------------+
 
 
 
+--------------------+----------------------+--------------------+----------------+
| Performing         | Address              | City/State/Zipcode | Phone Number   |
| Organization       |                      |                    |                |
+--------------------+----------------------+--------------------+----------------+
|   [facility]  |   [address] |   [place], [place]    |   [phone] |
| [place]          |                      |                    |                |
+--------------------+----------------------+--------------------+----------------+
 LDH TOTAL, PLASMA (10/23/2015  9:25 AM PDT)
 
+-------------+-------+------------+------------+--------------+
| Component   | Value | Ref Range  | Performed  | Pathologist  |
|             |       |            | At         | Signature    |
+-------------+-------+------------+------------+--------------+
| LDH (TOTAL) | 144   | 100 - 215  | [facility]  |              |
|             |       | Units/L    | LAB -      |              |
|             |       |            | [place]  |              |
+-------------+-------+------------+------------+--------------+
 
 
 
+---------------+
| Specimen      |
 
+---------------+
| Blood - Blood |
+---------------+
 
 
 
+--------------------+----------------------+--------------------+----------------+
| Performing         | Address              | City/State/Zipcode | Phone Number   |
| Organization       |                      |                    |                |
+--------------------+----------------------+--------------------+----------------+
|   [facility]  |   [address] |   [place], [place]    |   [phone] |
| [place]          |                      |                    |                |
+--------------------+----------------------+--------------------+----------------+
 CHOLESTEROL, TOTAL (EXTERNAL RESULTS ONLY) (10/23/2015  9:25 AM PDT)
 
+-------------+-------+-----------+------------+--------------+
| Component   | Value | Ref Range | Performed  | Pathologist  |
|             |       |           | At         | Signature    |
+-------------+-------+-----------+------------+--------------+
| CHOLESTEROL | 120   | 200 mg/dL | [facility]  |              |
|   (LAB)     |       |           | LAB -      |              |
|             |       |           | [place]  |              |
+-------------+-------+-----------+------------+--------------+
 
 
 
+----------+
| Specimen |
+----------+
| Blood    |
+----------+
 
 
 
+--------------------+----------------------+--------------------+----------------+
| Performing         | Address              | City/State/Zipcode | Phone Number   |
| Organization       |                      |                    |                |
+--------------------+----------------------+--------------------+----------------+
|   [facility]  |   [address] |   [place]    |   [phone] |
| [place]          |                      |                    |                |
+--------------------+----------------------+--------------------+----------------+
 URIC ACID, PLASMA (10/23/2015  9:25 AM PDT)
 
+-------------+-------+-----------------+------------+--------------+
| Component   | Value | Ref Range       | Performed  | Pathologist  |
|             |       |                 | At         | Signature    |
+-------------+-------+-----------------+------------+--------------+
| URIC ACID,  | 4.8   | 2.3 - 6.6 mg/dL | [facility]  |              |
| PLASMA      |       |                 | LAB -      |              |
| (LAB)       |       |                 | [place]  |              |
+-------------+-------+-----------------+------------+--------------+
 
 
 
+---------------+
| Specimen      |
+---------------+
| Blood - Blood |
+---------------+
 
 
 
 
+--------------------+----------------------+--------------------+----------------+
| Performing         | Address              | City/State/Zipcode | Phone Number   |
| Organization       |                      |                    |                |
+--------------------+----------------------+--------------------+----------------+
|   [facility]  |   [address] |   [place]    |   [phone] |
| [place]          |                      |                    |                |
+--------------------+----------------------+--------------------+----------------+
 PHOSPHORUS, PLASMA (10/23/2015  9:25 AM PDT)
 
+-------------+-------+-----------------+------------+--------------+
| Component   | Value | Ref Range       | Performed  | Pathologist  |
|             |       |                 | At         | Signature    |
+-------------+-------+-----------------+------------+--------------+
| PHOSPHORUS, | 3.0   | 2.5 - 5.0 mg/dL | [facility]  |              |
|  PLASMA     |       |                 | LAB -      |              |
| (LAB)       |       |                 | [place]  |              |
+-------------+-------+-----------------+------------+--------------+
 
 
 
+---------------+
| Specimen      |
+---------------+
| Blood - Blood |
+---------------+
 
 
 
+--------------------+----------------------+--------------------+----------------+
| Performing         | Address              | City/State/Zipcode | Phone Number   |
| Organization       |                      |                    |                |
+--------------------+----------------------+--------------------+----------------+
|   [facility]  |   [address] |   [place]    |   [phone] |
| [place]          |                      |                    |                |
+--------------------+----------------------+--------------------+----------------+
 documented in this encounter
 
 Visit Diagnoses
 Not on filedocumented in this encounter

## 2020-10-13 NOTE — XMS
Encounter Summary
  Created on: 10/13/2020
 
 Bonifacio Nila JB
 External Reference #: [id number]
 : [birth year]
 Sex: Female
 
 Demographics
 
 
+-----------------------+------------------------+
| Address               | [address]            |
|                       | [place]  [address]   |
+-----------------------+------------------------+
| Home Phone            | [phone]        |
+-----------------------+------------------------+
| Preferred Language    | Unknown                |
+-----------------------+------------------------+
| Marital Status        | Single                 |
+-----------------------+------------------------+
| Orthodoxy Affiliation | Unknown                |
+-----------------------+------------------------+
| Race                  | White                  |
+-----------------------+------------------------+
| Ethnic Group          | Not  or  |
+-----------------------+------------------------+
 
 
 Author
 
 
+--------------+------------------------------+
| Author       | [facility] |
+--------------+------------------------------+
| Organization | [facility] |
+--------------+------------------------------+
| Address      | Unknown                      |
+--------------+------------------------------+
| Phone        | Unavailable                  |
+--------------+------------------------------+
 
 
 
 Support
 
 
+-----------------+--------------+---------+-----------------+
| Name            | Relationship | Address | Phone           |
+-----------------+--------------+---------+-----------------+
| Kit Valdovinos   | ECON         | Unknown | [phone] |
+-----------------+--------------+---------+-----------------+
| Magdalena Valdovinos | ECON         | Unknown | [phone] |
+-----------------+--------------+---------+-----------------+
 
 
 
 Care Team Providers
 
 
 
+-----------------------+------+-----------------+
| Care Team Member Name | Role | Phone           |
+-----------------------+------+-----------------+
| Lily Horton MD   | PCP  | [phone] |
+-----------------------+------+-----------------+
 
 
 
 Encounter Details
 
 
+--------+-------------+----------------------+---------------------+----------------------+
| Date   | Type        | Department           | Care Team           | Description          |
+--------+-------------+----------------------+---------------------+----------------------+
| / |  |   Pediatric          |   Monserrat Spann,  | Complications of     |
|    |             | Gastroenterology at  | RN  [address]     | Transplanted Liver;  |
|        |             | [facility]          | [address]     | Transplanted Liver   |
|        |             | [facility]  | [place] [address]  | (HCC)                |
|        |             |  [address] [facility]     |                     |                      |
|        |             | [facility]          |                     |                      |
|        |             | [facility], |                     |                      |
|        |             |  7th floor           |                     |                      |
|        |             | [place], [place]         |                     |                      |
|        |             | [address]           |                     |                      |
|        |             | [phone]         |                     |                      |
+--------+-------------+----------------------+---------------------+----------------------+
 
 
 
 Social History
 
 
+----------------+-------+-----------+--------+------+
| Tobacco Use    | Types | Packs/Day | Years  | Date |
|                |       |           | Used   |      |
+----------------+-------+-----------+--------+------+
| Never Assessed |       |           |        |      |
+----------------+-------+-----------+--------+------+
 
 
 
+------------------+---------------+
| Sex Assigned at  | Date Recorded |
| Birth            |               |
+------------------+---------------+
| Not on file      |               |
+------------------+---------------+
 documented as of this encounter
 
 Plan of Treatment
 
 
+----------------------+------+--------+----------------------+---------------------+
| Name                 | Type | Priori | Associated Diagnoses | Order Schedule      |
|                      |      | ty     |                      |                     |
+----------------------+------+--------+----------------------+---------------------+
| LDH TOTAL, PLASMA    | Lab  | Routin |   Complications of   | Ordered: 2009 |
|                      |      | e      | Transplanted Liver   |                     |
 
|                      |      |        | Transplanted Liver   |                     |
|                      |      |        | (HCC)                |                     |
+----------------------+------+--------+----------------------+---------------------+
| CMV PCR              | Lab  | Routin |   Complications of   | Ordered: 2009 |
| QUANTITATION, PLASMA |      | e      | Transplanted Liver   |                     |
|                      |      |        | Transplanted Liver   |                     |
|                      |      |        | (HCC)                |                     |
+----------------------+------+--------+----------------------+---------------------+
 documented as of this encounter
 
 Procedures
 
 
+----------------------+--------+-------------+----------------------+----------------------
+
| Procedure Name       | Priori | Date/Time   | Associated Diagnosis | Comments             
|
|                      | ty     |             |                      |                      
|
+----------------------+--------+-------------+----------------------+----------------------
+
| CHOLESTEROL TOTAL,   | Routin | 2009  |   Complications of   |   Results for this   
|
| PLASMA               | e      |  3:26 PM    | Transplanted Liver   | procedure are in the 
|
|                      |        | PST         | Transplanted Liver   |  results section.    
|
|                      |        |             | (HCC)                |                      
|
+----------------------+--------+-------------+----------------------+----------------------
+
| COMPLETE METABOLIC   | Routin | 2009  |   Complications of   |   Results for this   
|
| SET                  | e      |  3:20 PM    | Transplanted Liver   | procedure are in the 
|
| (NA,K,CL,CO2,BUN,CRE |        | PST         | Transplanted Liver   |  results section.    
|
| AT,GLUC,CA,AST,ALT,B |        |             | (HCC)                |                      
|
| ASHWINI TOTAL,ALK        |        |             |                      |                      
|
| PHOS,ALB,PROT TOTAL) |        |             |                      |                      
|
+----------------------+--------+-------------+----------------------+----------------------
+
| TACROLIMUS, WHOLE    | Routin | 2009  |   Complications of   |   Results for this   
|
| BLOOD                | e      |  3:20 PM    | Transplanted Liver   | procedure are in the 
|
|                      |        | PST         | Transplanted Liver   |  results section.    
|
|                      |        |             | (HCC)                |                      
|
+----------------------+--------+-------------+----------------------+----------------------
+
| PHOSPHORUS, PLASMA   | Routin | 2009  |   Complications of   |   Results for this   
|
|                      | e      |  3:20 PM    | Transplanted Liver   | procedure are in the 
|
|                      |        | PST         | Transplanted Liver   |  results section.    
 
|
|                      |        |             | (HCC)                |                      
|
+----------------------+--------+-------------+----------------------+----------------------
+
| GGT, PLASMA          | Routin | 2009  |   Complications of   |   Results for this   
|
|                      | e      |  3:20 PM    | Transplanted Liver   | procedure are in the 
|
|                      |        | PST         | Transplanted Liver   |  results section.    
|
|                      |        |             | (HCC)                |                      
|
+----------------------+--------+-------------+----------------------+----------------------
+
| BILIRUBIN DIRECT     | Routin | 2009  |   Complications of   |   Results for this   
|
|                      | e      |  3:20 PM    | Transplanted Liver   | procedure are in the 
|
|                      |        | PST         | Transplanted Liver   |  results section.    
|
|                      |        |             | (HCC)                |                      
|
+----------------------+--------+-------------+----------------------+----------------------
+
| URIC ACID, PLASMA    | Routin | 2009  |   Complications of   |   Results for this   
|
|                      | e      |  3:20 PM    | Transplanted Liver   | procedure are in the 
|
|                      |        | PST         | Transplanted Liver   |  results section.    
|
|                      |        |             | (HCC)                |                      
|
+----------------------+--------+-------------+----------------------+----------------------
+
| MAGNESIUM, PLASMA    | Routin | 2009  |   Complications of   |   Results for this   
|
|                      | e      |  3:20 PM    | Transplanted Liver   | procedure are in the 
|
|                      |        | PST         | Transplanted Liver   |  results section.    
|
|                      |        |             | (HCC)                |                      
|
+----------------------+--------+-------------+----------------------+----------------------
+
| FRANCIS-BARR VIRUS   | Routin | 2009  |   Complications of   |   Results for this   
|
| PCR, PLASMA          | e      |             | Transplanted Liver   | procedure are in the 
|
|                      |        |             | Transplanted Liver   |  results section.    
|
|                      |        |             | (HCC)                |                      
|
+----------------------+--------+-------------+----------------------+----------------------
+
| CBC, WITH            | Routin | 2009  |   Complications of   |   Results for this   
|
| DIFFERENTIAL         | e      |             | Transplanted Liver   | procedure are in the 
|
|                      |        |             | Transplanted Liver   |  results section.    
 
|
|                      |        |             | (HCC)                |                      
|
+----------------------+--------+-------------+----------------------+----------------------
+
 documented in this encounter
 
 Results
 CHOLESTEROL TOTAL, PLASMA (2009  3:26 PM PST)
 
+-------------+-------+---------------+------------+--------------+
| Component   | Value | Ref Range     | Performed  | Pathologist  |
|             |       |               | At         | Signature    |
+-------------+-------+---------------+------------+--------------+
| CHOLESTEROL | 149   | < - 200 mg/dL | NON [facility]   |              |
|   (LAB)     |       |               | LAB        |              |
+-------------+-------+---------------+------------+--------------+
 
 
 
+---------------+
| Specimen      |
+---------------+
| Serum - Blood |
+---------------+
 
 
 
+----------------+---------+--------------------+--------------+
| Performing     | Address | City/State/Zipcode | Phone Number |
| Organization   |         |                    |              |
+----------------+---------+--------------------+--------------+
|   NON [facility] |         |                    |              |
+----------------+---------+--------------------+--------------+
 TACROLIMUS, WHOLE BLOOD (2009  3:20 PM PST)
 
+-------------+---------+--------------+------------+--------------+
| Component   | Value   | Ref Range    | Performed  | Pathologist  |
|             |         |              | At         | Signature    |
+-------------+---------+--------------+------------+--------------+
| TACROLIMUS  | 0.5 (A) | 5 - 18 ng/mL | [facility]  |              |
| ()    |         |              | LAB -      |              |
|             |         |              | [place]  |              |
+-------------+---------+--------------+------------+--------------+
 
 
 
+---------------+
| Specimen      |
+---------------+
| Serum - Blood |
+---------------+
 
 
 
+--------------------+----------------------+--------------------+----------------+
| Performing         | Address              | City/State/Zipcode | Phone Number   |
| Organization       |                      |                    |                |
+--------------------+----------------------+--------------------+----------------+
|   [facility]  |   [address] |   [place]    |   [phone] |
 
| [place]          |                      |                    |                |
+--------------------+----------------------+--------------------+----------------+
|   [facility] -  |                      |   [place], [place]    |                |
| [place]          |                      |                    |                |
+--------------------+----------------------+--------------------+----------------+
 GGT, PLASMA (2009  3:20 PM PST)
 
+-----------+-------+-----------+------------+--------------+
| Component | Value | Ref Range | Performed  | Pathologist  |
|           |       |           | At         | Signature    |
+-----------+-------+-----------+------------+--------------+
| GAMMA     | 7 (A) | 5 - 6 U/L | NON [facility]   |              |
| GLUTAMYL  |       |           | LAB        |              |
| TRANS     |       |           |            |              |
+-----------+-------+-----------+------------+--------------+
 
 
 
+---------------+
| Specimen      |
+---------------+
| Serum - Blood |
+---------------+
 
 
 
+----------------+---------+--------------------+--------------+
| Performing     | Address | City/State/Zipcode | Phone Number |
| Organization   |         |                    |              |
+----------------+---------+--------------------+--------------+
|   NON [facility] |         |                    |              |
+----------------+---------+--------------------+--------------+
 BILIRUBIN DIRECT (2009  3:20 PM PST)
 
+------------+-------+-----------------+------------+--------------+
| Component  | Value | Ref Range       | Performed  | Pathologist  |
|            |       |                 | At         | Signature    |
+------------+-------+-----------------+------------+--------------+
| BILIRUBIN  | 0.1   | 0.0 - 0.4 mg/dL | [facility]  |              |
| DIRECT     |       |                 | LAB -      |              |
|            |       |                 | [place]  |              |
+------------+-------+-----------------+------------+--------------+
 
 
 
+---------------+
| Specimen      |
+---------------+
| Serum - Blood |
+---------------+
 
 
 
+--------------------+----------------------+--------------------+----------------+
| Performing         | Address              | City/State/Zipcode | Phone Number   |
| Organization       |                      |                    |                |
+--------------------+----------------------+--------------------+----------------+
|   [facility]  |   [address] |   [place], [place]    |   [phone] |
| [place]          |                      |                    |                |
+--------------------+----------------------+--------------------+----------------+
 
|   [facility]  |                      |   [place], [place]    |                |
| [place]          |                      |                    |                |
+--------------------+----------------------+--------------------+----------------+
 URIC ACID, PLASMA (2009  3:20 PM PST)
 
+-------------+---------+-----------+------------+--------------+
| Component   | Value   | Ref Range | Performed  | Pathologist  |
|             |         |           | At         | Signature    |
+-------------+---------+-----------+------------+--------------+
| URIC ACID,  | pending | mg/dL     | [facility]  |              |
| PLASMA      |         |           | LAB -      |              |
| (LAB)       |         |           | [place]  |              |
+-------------+---------+-----------+------------+--------------+
 
 
 
+---------------+
| Specimen      |
+---------------+
| Serum - Blood |
+---------------+
 
 
 
+--------------------+----------------------+--------------------+----------------+
| Performing         | Address              | City/State/Zipcode | Phone Number   |
| Organization       |                      |                    |                |
+--------------------+----------------------+--------------------+----------------+
|   [facility]  |   [address] |   [place]    |   [phone] |
| [place]          |                      |                    |                |
+--------------------+----------------------+--------------------+----------------+
|   [facility]  |                      |   [place], [place]    |                |
| [place]          |                      |                    |                |
+--------------------+----------------------+--------------------+----------------+
 PHOSPHORUS, PLASMA (2009  3:20 PM PST)
 
+-------------+-------+-----------------+------------+--------------+
| Component   | Value | Ref Range       | Performed  | Pathologist  |
|             |       |                 | At         | Signature    |
+-------------+-------+-----------------+------------+--------------+
| PHOSPHORUS, | 4.8   | 2.6 - 6.2 mg/dL | [facility]  |              |
|  PLASMA     |       |                 | LAB -      |              |
| (LAB)       |       |                 | [place]  |              |
+-------------+-------+-----------------+------------+--------------+
 
 
 
+---------------+
| Specimen      |
+---------------+
| Serum - Blood |
+---------------+
 
 
 
+--------------------+----------------------+--------------------+----------------+
| Performing         | Address              | City/State/Zipcode | Phone Number   |
| Organization       |                      |                    |                |
+--------------------+----------------------+--------------------+----------------+
|   [facility]  |   [address] |   [place], [place]    |   [phone] |
 
| [place]          |                      |                    |                |
+--------------------+----------------------+--------------------+----------------+
|   [facility]  |                      |   [place]    |                |
| [place]          |                      |                    |                |
+--------------------+----------------------+--------------------+----------------+
 MAGNESIUM, PLASMA (2009  3:20 PM PST)
 
+-------------+-------+-----------------+------------+--------------+
| Component   | Value | Ref Range       | Performed  | Pathologist  |
|             |       |                 | At         | Signature    |
+-------------+-------+-----------------+------------+--------------+
| MAGNESIUM,P | 1.99  | 1.7 - 2.5 mg/dL | [facility]  |              |
| LASMA       |       |                 | LAB -      |              |
|             |       |                 | [place]  |              |
+-------------+-------+-----------------+------------+--------------+
 
 
 
+---------------+
| Specimen      |
+---------------+
| Serum - Blood |
+---------------+
 
 
 
+--------------------+----------------------+--------------------+----------------+
| Performing         | Address              | City/State/Zipcode | Phone Number   |
| Organization       |                      |                    |                |
+--------------------+----------------------+--------------------+----------------+
|   [facility]  |   [address] |   [place], [place]    |   [phone] |
| [place]          |                      |                    |                |
+--------------------+----------------------+--------------------+----------------+
|   [facility]  |                      |   [place], [place]    |                |
| [place]          |                      |                    |                |
+--------------------+----------------------+--------------------+----------------+
 COMPLETE METABOLIC SET (NA,K,CL,CO2,BUN,CREAT,GLUC,CA,AST,ALT,BILI TOTAL,ALK PHOS,ALB,PROT 
TOTAL) (2009  3:20 PM PST)
 
+-------------+-------+-----------------+------------+--------------+
| Component   | Value | Ref Range       | Performed  | Pathologist  |
|             |       |                 | At         | Signature    |
+-------------+-------+-----------------+------------+--------------+
| GLUCOSE,    | 88    | 60 - 100 mg/dL  | [facility]  |              |
| PLASMA      |       |                 | LAB -      |              |
| (LAB)       |       |                 | [place]  |              |
+-------------+-------+-----------------+------------+--------------+
| BUN, PLASMA | 13    | 6 - 23 mg/dL    | [facility]  |              |
|  (LAB)      |       |                 | LAB -      |              |
|             |       |                 | [place]  |              |
+-------------+-------+-----------------+------------+--------------+
| CREATININE  | 0.56  | 0.5 - 1.5 mg/dL | [facility]  |              |
| PLASMA      |       |                 | LAB -      |              |
| (LAB)       |       |                 | [place]  |              |
+-------------+-------+-----------------+------------+--------------+
| TOTAL       | 7.3   | 6.1 - 8.5 g/dL  | [facility]  |              |
| PROTEIN,    |       |                 | LAB -      |              |
| PLASMA      |       |                 | [place]  |              |
| (LAB)       |       |                 |            |              |
+-------------+-------+-----------------+------------+--------------+
 
| ALBUMIN,    | 4.3   | 2.9 - 5.5 g/dL  | [facility]  |              |
| PLASMA      |       |                 | LAB -      |              |
| (LAB)       |       |                 | [place]  |              |
+-------------+-------+-----------------+------------+--------------+
| CALCIUM,    | 10.0  | 8.5 - 10.5      | [facility]  |              |
| PLASMA      |       | mg/dL           | LAB -      |              |
| (LAB)       |       |                 | [place]  |              |
+-------------+-------+-----------------+------------+--------------+
| BILIRUBIN   | 0.4   | 0.0 - 1.2       | [facility]  |              |
| TOTAL       |       | Transcutaneous  | LAB -      |              |
|             |       | Bilirubinometer | [place]  |              |
+-------------+-------+-----------------+------------+--------------+
| ALK PHOS    | 273   | 135 - 384 U/L   | [facility]  |              |
|             |       |                 | LAB -      |              |
|             |       |                 | [place]  |              |
+-------------+-------+-----------------+------------+--------------+
| AST(SGOT)   | 26    | 0 - 40 U/L      | [facility]  |              |
|             |       |                 | LAB -      |              |
|             |       |                 | [place]  |              |
+-------------+-------+-----------------+------------+--------------+
| SODIUM,     | 140   | 132 - 143       | [facility]  |              |
| PLASMA      |       | mmol/L          | LAB -      |              |
| (LAB)       |       |                 | [place]  |              |
+-------------+-------+-----------------+------------+--------------+
| POTASSIUM,  | 3.9   | 3.6 - 5.1       | [facility]  |              |
| PLASMA      |       | mmol/L          | LAB -      |              |
| (LAB)       |       |                 | [place]  |              |
+-------------+-------+-----------------+------------+--------------+
| CHLORIDE,   | 107   | 95 - 112 mmol/L | [facility]  |              |
| PLASMA      |       |                 | LAB -      |              |
| (LAB)       |       |                 | [place]  |              |
+-------------+-------+-----------------+------------+--------------+
| TOTAL CO2,  | 22.4  | 19 - 31 mmol/L  | [facility]  |              |
| PLASMA      |       |                 | LAB -      |              |
| (LAB)       |       |                 | [place]  |              |
+-------------+-------+-----------------+------------+--------------+
| ALT (SGPT)  | 20    | 0 - 46 U/L      | [facility]  |              |
|             |       |                 | LAB -      |              |
|             |       |                 | [place]  |              |
+-------------+-------+-----------------+------------+--------------+
| ANION GAP   | 14.5  | 7.0 - 21        | [facility]  |              |
|             |       |                 | LAB -      |              |
|             |       |                 | [place]  |              |
+-------------+-------+-----------------+------------+--------------+
| BUN/CREATIN | 23.2  | 6.0 - 28.6      | [facility]  |              |
| INE RATIO   |       |                 | LAB -      |              |
|             |       |                 | [place]  |              |
+-------------+-------+-----------------+------------+--------------+
| GLOBULIN    | 3.0   | 1.8 - 3.5       | [facility]  |              |
|             |       |                 | LAB -      |              |
|             |       |                 | [place]  |              |
+-------------+-------+-----------------+------------+--------------+
| A/G RATIO   | 1.4   | 1.1 - 2.4       | [facility]  |              |
|             |       |                 | LAB -      |              |
|             |       |                 | [place]  |              |
+-------------+-------+-----------------+------------+--------------+
| BILIRUBIN   | 0.1   | 0.0 - 0.4 mg/dL | [facility]  |              |
| DIRECT      |       |                 | LAB -      |              |
|             |       |                 | [place]  |              |
+-------------+-------+-----------------+------------+--------------+
 
| GAMMA       | 7     | 5 - 60 U/L      | [facility]  |              |
| GLUTAMYL    |       |                 | LAB -      |              |
| TRANS       |       |                 | [place]  |              |
+-------------+-------+-----------------+------------+--------------+
 
 
 
+---------------+
| Specimen      |
+---------------+
| Serum - Blood |
+---------------+
 
 
 
+--------------------+----------------------+--------------------+----------------+
| Performing         | Address              | City/State/Zipcode | Phone Number   |
| Organization       |                      |                    |                |
+--------------------+----------------------+--------------------+----------------+
|   [facility]  |   [address] |   [place]    |   [phone] |
| [place]          |                      |                    |                |
+--------------------+----------------------+--------------------+----------------+
|   [facility]  |                      |   [place], [place]    |                |
| [place]          |                      |                    |                |
+--------------------+----------------------+--------------------+----------------+
 FRANCIS-BARR VIRUS PCR (2009)
 
+-------------+-------+-----------+------------+--------------+
| Component   | Value | Ref Range | Performed  | Pathologist  |
|             |       |           | At         | Signature    |
+-------------+-------+-----------+------------+--------------+
| EBV QUANT   | <2.6  | <2.6      | [facility]  |              |
| INTERPRETAT |       |           | LAB -      |              |
| ION         |       |           | [place]  |              |
+-------------+-------+-----------+------------+--------------+
 
 
 
+----------+
| Specimen |
+----------+
| Serum    |
+----------+
 
 
 
+--------------------+----------------------+--------------------+----------------+
| Performing         | Address              | City/State/Zipcode | Phone Number   |
| Organization       |                      |                    |                |
+--------------------+----------------------+--------------------+----------------+
|   [facility]  |   [address] |   [place], [place]    |   [phone] |
| [place]          |                      |                    |                |
+--------------------+----------------------+--------------------+----------------+
|   [facility] -  |                      |   [place], [place]    |                |
| [place]          |                      |                    |                |
+--------------------+----------------------+--------------------+----------------+
 CBC, WITH DIFFERENTIAL (2009)
 
+-------------+-------+-----------------+------------+--------------+
| Component   | Value | Ref Range       | Performed  | Pathologist  |
 
|             |       |                 | At         | Signature    |
+-------------+-------+-----------------+------------+--------------+
| WHITE CELL  | 8.6   | 4.5 - 13.5 K/cu | [facility]  |              |
| COUNT       |       |  mm             | LAB -      |              |
|             |       |                 | [place]  |              |
+-------------+-------+-----------------+------------+--------------+
| RED CELL    | 4.92  | 4.1 - 5.3 M/cu  | [facility]  |              |
| COUNT       |       | mm              | LAB -      |              |
|             |       |                 | [place]  |              |
+-------------+-------+-----------------+------------+--------------+
| HEMOGLOBIN  | 14.5  | 10.6 - 15.2     | [facility]  |              |
|             |       |                 | LAB -      |              |
|             |       |                 | [place]  |              |
+-------------+-------+-----------------+------------+--------------+
| HEMATOCRIT  | 41.5  | 32 - 42 %       | [facility]  |              |
|             |       |                 | LAB -      |              |
|             |       |                 | [place]  |              |
+-------------+-------+-----------------+------------+--------------+
| MCV         | 84.3  | 71 - 89 fL      | [facility]  |              |
|             |       |                 | LAB -      |              |
|             |       |                 | [place]  |              |
+-------------+-------+-----------------+------------+--------------+
| MCH         | 29    | 24 - 30 pg      | [facility]  |              |
|             |       |                 | LAB -      |              |
|             |       |                 | [place]  |              |
+-------------+-------+-----------------+------------+--------------+
| MCHC        | 35    | 30 - 36 g/dL    | [facility]  |              |
|             |       |                 | LAB -      |              |
|             |       |                 | [place]  |              |
+-------------+-------+-----------------+------------+--------------+
| PLATELET    | 368   | 140 - 440 K/cu  | [facility]  |              |
| COUNT       |       | mm              | LAB -      |              |
|             |       |                 | [place]  |              |
+-------------+-------+-----------------+------------+--------------+
| NEUTROPHIL  | 59.3  | 31 - 60 %       | [facility]  |              |
| %           |       |                 | LAB -      |              |
|             |       |                 | [place]  |              |
+-------------+-------+-----------------+------------+--------------+
| LYMPHOCYTE  | 30.1  | 28 - 48 %       | [facility]  |              |
| %           |       |                 | LAB -      |              |
|             |       |                 | [place]  |              |
+-------------+-------+-----------------+------------+--------------+
| MONOCYTE %  | 7.2   | 0 - 12 %        | [facility]  |              |
|             |       |                 | LAB -      |              |
|             |       |                 | [place]  |              |
+-------------+-------+-----------------+------------+--------------+
| EOS %       | 2.0   | 0 - 6 %         | [facility]  |              |
|             |       |                 | LAB -      |              |
|             |       |                 | [place]  |              |
+-------------+-------+-----------------+------------+--------------+
| BASO %      | 0.7   | 0 - 2 %         | [facility]  |              |
|             |       |                 | LAB -      |              |
|             |       |                 | [place]  |              |
+-------------+-------+-----------------+------------+--------------+
| RDW         | 13.4  | 10.5 - 15.8 %   | [facility]  |              |
|             |       |                 | LAB -      |              |
|             |       |                 | [place]  |              |
+-------------+-------+-----------------+------------+--------------+
| MPV         |       | fL              | [facility]  |              |
|             |       |                 | LAB -      |              |
 
|             |       |                 | [place]  |              |
+-------------+-------+-----------------+------------+--------------+
| NEUTROPHIL  |       | K/cu mm         | [facility]  |              |
| #           |       |                 | LAB -      |              |
|             |       |                 | [place]  |              |
+-------------+-------+-----------------+------------+--------------+
| LYMPHOCYTE  |       | K/cu mm         | [facility]  |              |
| #           |       |                 | LAB -      |              |
|             |       |                 | [place]  |              |
+-------------+-------+-----------------+------------+--------------+
| MONOCYTE #  |       | K/cu mm         | [facility]  |              |
|             |       |                 | LAB -      |              |
|             |       |                 | [place]  |              |
+-------------+-------+-----------------+------------+--------------+
| EOS #       |       | K/cu mm         | [facility]  |              |
|             |       |                 | LAB -      |              |
|             |       |                 | [place]  |              |
+-------------+-------+-----------------+------------+--------------+
| BASO #      |       |                 | [facility]  |              |
|             |       |                 | LAB -      |              |
|             |       |                 | [place]  |              |
+-------------+-------+-----------------+------------+--------------+
 
 
 
+---------------+
| Specimen      |
+---------------+
| Serum - Blood |
+---------------+
 
 
 
+--------------------+----------------------+--------------------+----------------+
| Performing         | Address              | City/State/Zipcode | Phone Number   |
| Organization       |                      |                    |                |
+--------------------+----------------------+--------------------+----------------+
|   [facility]  |   [address] |   [place]    |   [phone] |
| [place]          |                      |                    |                |
+--------------------+----------------------+--------------------+----------------+
|   [facility]  |                      |   [place], [place]    |                |
| [place]          |                      |                    |                |
+--------------------+----------------------+--------------------+----------------+
 documented in this encounter
 
 Visit Diagnoses
 
 
+----------------------------------------------------------+
| Diagnosis                                                |
+----------------------------------------------------------+
|   Complications of transplanted liver                    |
+----------------------------------------------------------+
|   Transplanted liver (HCC)  Liver replaced by transplant |
+----------------------------------------------------------+
 documented in this encounter

## 2020-10-13 NOTE — XMS
Encounter Summary
  Created on: 10/13/2020
 
 Bonifacio Nila JB
 External Reference #: [id number]
 : [birth year]
 Sex: Female
 
 Demographics
 
 
+-----------------------+------------------------+
| Address               | [address]            |
|                       | [place]  [address]   |
+-----------------------+------------------------+
| Home Phone            | [phone]        |
+-----------------------+------------------------+
| Preferred Language    | Unknown                |
+-----------------------+------------------------+
| Marital Status        | Single                 |
+-----------------------+------------------------+
| Religion Affiliation | Unknown                |
+-----------------------+------------------------+
| Race                  | White                  |
+-----------------------+------------------------+
| Ethnic Group          | Not  or  |
+-----------------------+------------------------+
 
 
 Author
 
 
+--------------+------------------------------+
| Author       | [facility] |
+--------------+------------------------------+
| Organization | [facility] |
+--------------+------------------------------+
| Address      | Unknown                      |
+--------------+------------------------------+
| Phone        | Unavailable                  |
+--------------+------------------------------+
 
 
 
 Support
 
 
+-----------------+--------------+---------+-----------------+
| Name            | Relationship | Address | Phone           |
+-----------------+--------------+---------+-----------------+
| Kit Valdovinos   | ECON         | Unknown | [phone] |
+-----------------+--------------+---------+-----------------+
| Magdalena Valdovinos | ECON         | Unknown | [phone] |
+-----------------+--------------+---------+-----------------+
 
 
 
 Care Team Providers
 
 
 
+------------------------+------+-----------------+
| Care Team Member Name  | Role | Phone           |
+------------------------+------+-----------------+
| Lily Horton MD | PCP  | [phone] |
+------------------------+------+-----------------+
 
 
 
 Reason for Referral
 PROC - Dept/Practice Procedure (Routine)
 
+--------+--------+---------------+--------------+--------------+---------------+
| Status | Reason | Specialty     | Diagnoses /  | Referred By  | Referred To   |
|        |        |               | Procedures   | Contact      | Contact       |
+--------+--------+---------------+--------------+--------------+---------------+
| Closed |        | Gastroenterol |   Diagnoses  |   Eroglu,    |   Gas Endo    |
|        |        | ogy           |  Biliary     | MD Neema  | [address]  |
|        |        |               | atresia      |  [address]      | [address]      |
|        |        |               | Transplanted | [address]    | Center for    |
|        |        |               |  liver (HCC) | [place], [place] | Health and    |
|        |        |               |   Procedures |  [address]  | Healing,      |
|        |        |               |   [facility]  |  Phone:      | [address]    |
|        |        |               |              | [phone] | [place], [place]  |
|        |        |               |              |   Fax:       | [phone]    |
|        |        |               |              | [phone] | Phone:        |
|        |        |               |              |              | [phone]  |
|        |        |               |              |              |  Fax:         |
|        |        |               |              |              | [phone]  |
+--------+--------+---------------+--------------+--------------+---------------+
 
 
 Consultation (Routine)
 
+------------+--------+------------+--------------+--------------+---------------+
| Status     | Reason | Specialty  | Diagnoses /  | Referred By  | Referred To   |
|            |        |            | Procedures   | Contact      | Contact       |
+------------+--------+------------+--------------+--------------+---------------+
| Authorized |        | Liver      |   Diagnoses  |   Eroglu,    |   Ltx Liver   |
|            |        | Transplant |  Biliary     | MD Neema  | [address]  |
|            |        |            | atresia      |  [address]      | [address]   |
|            |        |            | Other        | [address]    | Loop          |
|            |        |            | secondary    | [place], [place] | Physician's   |
|            |        |            | hypertension |  [address]  | [address] |
|            |        |            |              |  Phone:      |  floor        |
|            |        |            | Transplanted | [phone] | [place], [place]  |
|            |        |            |  liver (HCC) |   Fax:       | [phone]    |
|            |        |            |   Procedures | [phone] | Phone:        |
|            |        |            |   CONSULT TO |              | [phone]  |
|            |        |            |  HEPATOLOGY  |              |  Fax:         |
|            |        |            |              |              | [phone]  |
+------------+--------+------------+--------------+--------------+---------------+
 
 
 
 
 Reason for Visit
 Office Visit - E/M Services (Routine)
 
 
+--------+--------+---------------+--------------+--------------+---------------+
| Status | Reason | Specialty     | Diagnoses /  | Referred By  | Referred To   |
|        |        |               | Procedures   | Contact      | Contact       |
+--------+--------+---------------+--------------+--------------+---------------+
| Closed |        | Pediatric     |   Diagnoses  |   Sol,    |   Ped Gastro  |
|        |        | Gastroenterol |  Congenital  | Lily Lakhani, | [facility]  [address]   |
|        |        | ogy           | malformation |  MD  PEDS    | [place]      |
|        |        |               | s of spleen  | SPECIALISTS  | [facility]   |
|        |        |               |  Liver       | [facility] [place] | [facility]    |
|        |        |               | transplant   |   [address]     | [address] |
|        |        |               | status       | [address]  |  floor        |
|        |        |               | Procedures   |  [place],  | [place], [place]  |
|        |        |               | includes     | [place] [address]     | [address]    |
|        |        |               | diagnostics  | Phone:       | Phone:        |
|        |        |               |              | [phone] | [phone]  |
|        |        |               |              |   Fax:       |  Fax:         |
|        |        |               |              | [phone] | [phone]  |
+--------+--------+---------------+--------------+--------------+---------------+
 
 
 
 
 Encounter Details
 
 
+--------+---------+----------------------+----------------------+----------------------+
| Date   | Type    | Department           | Care Team            | Description          |
+--------+---------+----------------------+----------------------+----------------------+
| / | Office  |   Pediatric          |   Neema Khalil,   | Biliary atresia      |
| 2016   | Visit   | Gastroenterology at  | MD  [address] | (Primary Dx);        |
|        |         | [facility]          |   [place], [place]       | Polysplenia; Other   |
|        |         | [facility]  | [address]           | secondary            |
|        |         |  [address] [place]     | [phone]         | hypertension;        |
|        |         | [facility]          | [phone] (Fax)   | Transplanted liver   |
|        |         | [facility], |                      | ([facility] Nonrheumatic  |
|        |         |  7th floor           |                      | aortic valve         |
|        |         | [place], [place]         |                      | insufficiency; VSD   |
|        |         | [address]           |                      | (ventricular septal  |
|        |         | [phone]         |                      | defect),             |
|        |         |                      |                      | perimembranous;      |
|        |         |                      |                      | Interrupted inferior |
|        |         |                      |                      |  vena cava; Aortic   |
|        |         |                      |                      | root dilatation      |
|        |         |                      |                      | ([facility])                |
+--------+---------+----------------------+----------------------+----------------------+
 
 
 
 Social History
 
 
+-------------------+-------+-----------+--------+------+
| Tobacco Use       | Types | Packs/Day | Years  | Date |
|                   |       |           | Used   |      |
+-------------------+-------+-----------+--------+------+
| Passive Smoke     |       |           |        |      |
| Exposure - Never  |       |           |        |      |
| Smoker            |       |           |        |      |
+-------------------+-------+-----------+--------+------+
 
 
 
 
+---------------------+---+---+---+
| Smokeless Tobacco:  |   |   |   |
| Never Used          |   |   |   |
+---------------------+---+---+---+
 
 
 
+-------------+-------------+---------+----------+
| Alcohol Use | Drinks/Week | oz/Week | Comments |
+-------------+-------------+---------+----------+
| Not Asked   |             |         |          |
+-------------+-------------+---------+----------+
 
 
 
+------------------+---------------+
| Sex Assigned at  | Date Recorded |
| Birth            |               |
+------------------+---------------+
| Not on file      |               |
+------------------+---------------+
 documented as of this encounter
 
 Last Filed Vital Signs
 
 
+-------------------+----------------------+----------------------+----------------------+
| Vital Sign        | Reading              | Time Taken           | Comments             |
+-------------------+----------------------+----------------------+----------------------+
| Blood Pressure    | 130/93               | 2016  9:24 AM  | right arm adult cuff |
|                   |                      | PDT                  |                      |
+-------------------+----------------------+----------------------+----------------------+
| Pulse             | 72                   | 2016  9:24 AM  |                      |
|                   |                      | PDT                  |                      |
+-------------------+----------------------+----------------------+----------------------+
| Temperature       | 36.7   C (98.1   F)  | 2016  8:59 AM  |                      |
|                   |                      | PDT                  |                      |
+-------------------+----------------------+----------------------+----------------------+
| Respiratory Rate  | -                    | -                    |                      |
+-------------------+----------------------+----------------------+----------------------+
| Oxygen Saturation | -                    | -                    |                      |
+-------------------+----------------------+----------------------+----------------------+
| Inhaled Oxygen    | -                    | -                    |                      |
| Concentration     |                      |                      |                      |
+-------------------+----------------------+----------------------+----------------------+
| Weight            | 81.5 kg (179 lb 10.8 | 2016  8:59 AM  |                      |
|                   |  oz)                 | PDT                  |                      |
+-------------------+----------------------+----------------------+----------------------+
| Height            | 164.4 cm (5' 4.72")  | 2016  8:59 AM  |                      |
|                   |                      | PDT                  |                      |
+-------------------+----------------------+----------------------+----------------------+
| Body Mass Index   | 30.16                | 2016  8:59 AM  |                      |
|                   |                      | PDT                  |                      |
+-------------------+----------------------+----------------------+----------------------+
 documented in this encounter
 
 Patient Instructions
 Patient Instructions Neema Khalil MD - 2016  9:03 AM PDTDear family, 
 
 
 It was nice to see you in the clinic today !  Our recommendations are as below:
 
 Plan: 
 
 - Blood draw for labs: every 3 months
 
 - Next appointment: With Adult GI next summer (can schedule with Fibroscan)
 
 Results of tests:  
 Test results will be sent to you by cisimple. 
 
 Calls:
 Medical emergencies:  call 031
 Non-urgent issues:  Send cisimple message
 Monday - Friday work hours: call  [phone] # 3
 After hours, weekends, holidays: call [phone]
 To schedule an appointment: call [phone] # 1
 For refills: call to your pharmacy a week before running out medicine
 
 Electronically signed by Marianne Barba RN at 2016  9:17 AM PDT
 documented in this encounter
 
 Progress Notes
 Neema Khalil MD - 2016  3:40 PM PDTFormatting of this note might be different fro
m the original.
 
 Primary Care Provider: Lily Horton MD
 
 [facility] 
 DOS: 2016
 Visit type: Follow-up 
 
 Patient accompanied by: father
  used: no
 
 CC: 
 - Post-liver transplant care 
 - High risk medication management
 
 Patient Active Problem List 
 Diagnosis 
   Transplanted Liver 
   VSD (ventricular septal defect), perimembranous 
   Polysplenia 
   Interrupted inferior vena cava 
   Aortic insufficiency 
   Aortic root dilatation 
 
  PAST Hx/kim: Biliary atresia
 -  s/p OLT at [facility]. She was last seen by Dr Burkett in . 
In , it was noticed that she has not been seen, attempts to reach to family was unsucces
sful. lost to follow up since that time. 
 - Cardiology: last cardiology note is from  from Dr Lily Horton, cardiologist at [facility]. Her cardiac dx includes 1) moderate perimembraneous VSD nearly occluded by aneurysmal
 tissue leaving a tiny VSD, 2) trace central aortic insufficiency, 3) Left atrial isomerism 
(polysplenia). It was recommended her annual follow up for progression of aortic insufficien
cy in which case she may need closure of VSD. No further notes available in our system as sh
mily is lost to follow up since .
 - 10/16/2014 s/p VSD repair, 8-mm Amplatzer Vascular Plug IV with no significant residual s
 
hunting noted.
 - not on prograf for a long time
 
 INTERVAL HISTORY:
 - no complaints,no abdominal pain, emesis, diarrhea or constipation
 - started BCP, blood pressures were high since then, she will be seen  By cardiologist chelita acosta, they will monitor BPs
 
 REVIEW OF SYSTEMS: 
 General:  No fever, fatigue, or weight loss.  
 Eyes:  No changes in vision, no eye irritation or discharge.  
 ENT:  No nosebleeds, nasal congestion, difficulty swallowing, no mouth sores. 
 Respiratory:  No shortness of breath, cough, wheezing.
 Cardiovascular:  No difficulty breathing, edema, cyanosis.  
 Genitourinary:  No urinary infections.  
 Neurologic:  No seizures. No headaches.  
 Musculoskeletal:  No joint pain, swelling. No back pain.  Full range of motion.
 Skin:  No itching, rash, jaundice.  
 Psychological:  No abnormal anxiety, depression. Attending school regularly. Will be senior
 this year
 Heme: No anemia, abnormal bleeding, easy bruising
 Lymphatic: No enlarged lymph nodes.
 Metabolic/Endo: no glucose intolerance, hypothyroidism
 Allergy/immunology: no seasonal or food allergies, no asthma
 
 All other ROS are negative.
 
  
 Past Medical History 
 Diagnosis Date 
   Biliary atresia  
 
 Past Surgical History 
 Procedure Laterality Date 
   Liver transplant  2000 
   at [facility] 
   Vsd repair, amplatzer device  10/16/2014 
   8-mm Amplatzer Vascular Plug IV, no significant residual shunting 
 
 FHx: reviewed, unchanged
 
 SHx: reviewed, will be senior at  this year
 
 Allergies 
 Allergen Reactions 
   Sulfa (Sulfonamide Antibiotics) Hives and Swelling-Facial 
   Varicella Vaccines  
   Possible reaction with sulfa meds 
 
 PHYSICAL EXAMINATION: 
 
 Patient reports a pain level of  0 today. No action required.
 
 Ht 1.644 m (5' 4.72") (58 %*, Z = 0.21), Wt 81.5 kg (179 lb 10.8 oz) (96 %*, Z = 1.70), Jae
ght for age(%)  96% (Z=1.70) , /98, Pulse 84, Temperature 36.7 C (98.1 F), Tempera
ture source Oral, BMI 30.15 kg/(m^2). 
 
 Repeat BP at the end of clinic: 130/93 mmHg
 
 APPEARANCE: alert, active and in no apparent distress. 
 
 SKIN: no jaundice or rashes. 
 HEENT: normocephalic, PERRLA, mucous membranes moist. 
 NECK: supple, without thyromegaly. 
 CHEST: clear to auscultation bilaterally. 
 CV: no murmurs. 
 ABDOMEN: soft, NTND, no hepatosplenomegaly. 
 BACK: without tenderness. 
 MUSCULOSKELETAL: no muscle wasting, no deformity.
 EXTREMITIES: No edema, clubbing, deformity.
 NEURO: grossly intact
 
  
 ASSESSMENT: 14 yo female with h/o biliary atresia, s/p OLT in , off immunosuppression b
y choice for a long time, congenital heart disease, s/p surgery, now w hypertension. CNI cou
ld be contributor to HTN, despite that she has been off for a long time. Patient says she wi
ll be referred to nephrologist by PCP.
 Recommend fibroscan to evaluate for fibrosis which could have developed despite normal LFts
.
 
 Q44.2   Biliary atresia
 Q89.09  Polysplenia
 I15.8   Other secondary hypertension
 Z94.4   Transplanted liver (HCC)
 I35.1   Nonrheumatic aortic valve insufficiency
 Q21.0   VSD (ventricular septal defect), perimembranous
 Q26.9   Interrupted inferior vena cava
 I77.810  Aortic root dilatation (HCC)
 
 PLAN/RECOMMENDATIONS:  
 - Labs: CBC w diff, CMP, GGT: every 3 months
 - will transfer care to adult hepatology, requested appt to be scheduled for summer 2017 , 
this works for family well.
 - Fibroscan next year when she is seen by adult hepatology
 - she will be seen by cardiology today for follow up
 - PCP referred her to nephrologist for HTN
 
 Health Care Maintenance: 
 - she can receive all vaccines as she is not on immunsupression, especially we recommend he
p A, hep B vaccines if she is not already immune.
 - Flu vaccine (intramuscular): every fall. 
 - Dental care every 6 months
 
 At this visit: 
 Dr. Jorge Aguirre and DHARMESH Webber from [facility] were presen
t as observers during this visit.
 Psychosocial or economic issues that may affect patient's medical care or well being:none 
 Co-morbid chronic medical problems that may affect future procedural sedation risk: NA
 
 Labs/imaging:
 Component
     Latest Ref Rng 2016 
 GLUCOSE, PLASMA (LAB)
     70 - 100 mg/dL 73 
 BUN, PLASMA (LAB)
     6 - 23 mg/dL 8 
 CREATININE PLASMA (LAB)
     0.6 - 1.2 mg/dL 0.61 
 TOTAL PROTEIN, PLASMA 
     6 - 8 g/dL 7.9 
 ALBUMIN, PLASMA (LAB)
 
     3.5 - 5 g/dL 4 
 CALCIUM, PLASMA (LAB)
     8.4 - 10.2 mg/dL 9.7 
 BILIRUBIN TOTAL
     0 - 1.2 mg/dL 0.3 
 ALK PHOS     30 - 128 U/L 108 
 AST(SGOT)     13 - 39 U/L 18 
 SODIUM, PLASMA (LAB)
     132 - 143 mmol/L 137 
 POTASSIUM, PLASMA  (LAB)
     3.6 - 5.1 mmol/L 4 
 CHLORIDE, PLASMA (LAB)
     95 - 112 mmol/L 100 
 TOTAL CO2, PLASMA (LAB)
     19 - 31 mmol/L 24 
 ALT (SGPT)     7 - 52 U/L 18 
 WHITE CELL COUNT
     4.5 - 11 K/cu mm 8.8 
 RED CELL COUNT
     3.8 - 5.1 M/cu mm 4.97 
 HEMOGLOBIN
     12 - 16 g/dL 14.5 
 HEMATOCRIT
     35 - 45 % 43.2 
 MCV     81 - 99 fL 86.9 
 PLATELET COUNT
     140 - 440 K/cu mm 415 
 
 Neema Khalil MD
 Pediatric Gastroenterology
 Consult line: [phone] 
 
 Electronically signed by Neema Khalil MD at 2016 12:31 PM PDTEroNeema dias MD -
 2016  3:39 PM PDT
 
 Electronically signed by Neema Khalil MD at 2016 12:31 PM PDTdocumented in this en
counter
 
 Plan of Treatment
 
 
+-----------+------------+--------+----------------------+---------------------+
| Name      | Type       | Priori | Associated Diagnoses | Order Schedule      |
|           |            | ty     |                      |                     |
+-----------+------------+--------+----------------------+---------------------+
| [facility] | Procedures | Routin |   Biliary atresia    | Ordered: 2016 |
|           |            | e      | Transplanted liver   |                     |
|           |            |        | (HCC)                |                     |
+-----------+------------+--------+----------------------+---------------------+
 documented as of this encounter
 
 Visit Diagnoses
 
 
+------------------------------------------------------------------------------+
| Diagnosis                                                                    |
+------------------------------------------------------------------------------+
|   Biliary atresia - Primary  Congenital biliary atresia                      |
+------------------------------------------------------------------------------+
|   Polysplenia  Congenital anomalies of spleen                                |
 
+------------------------------------------------------------------------------+
|   Other secondary hypertension                                               |
+------------------------------------------------------------------------------+
|   Transplanted liver (HCC)  Liver replaced by transplant                     |
+------------------------------------------------------------------------------+
|   Nonrheumatic aortic valve insufficiency  Aortic valve disorders            |
+------------------------------------------------------------------------------+
|   VSD (ventricular septal defect), perimembranous  Ventricular septal defect |
+------------------------------------------------------------------------------+
|   Interrupted inferior vena cava  Other congenital anomalies of great veins  |
+------------------------------------------------------------------------------+
|   Aortic root dilatation (HCC)  Thoracic aortic ectasia                      |
+------------------------------------------------------------------------------+
 documented in this encounter

## 2020-10-13 NOTE — XMS
Encounter Summary
  Created on: 10/13/2020
 
 Bonifacio Nila JB
 External Reference #: [id number]
 : [birth year]
 Sex: Female
 
 Demographics
 
 
+-----------------------+------------------------+
| Address               | [address]            |
|                       | [place]  [address]   |
+-----------------------+------------------------+
| Home Phone            | [phone]        |
+-----------------------+------------------------+
| Preferred Language    | Unknown                |
+-----------------------+------------------------+
| Marital Status        | Single                 |
+-----------------------+------------------------+
| Baptism Affiliation | Unknown                |
+-----------------------+------------------------+
| Race                  | White                  |
+-----------------------+------------------------+
| Ethnic Group          | Not  or  |
+-----------------------+------------------------+
 
 
 Author
 
 
+--------------+------------------------------+
| Author       | [facility] |
+--------------+------------------------------+
| Organization | [facility] |
+--------------+------------------------------+
| Address      | Unknown                      |
+--------------+------------------------------+
| Phone        | Unavailable                  |
+--------------+------------------------------+
 
 
 
 Support
 
 
+-----------------+--------------+---------+-----------------+
| Name            | Relationship | Address | Phone           |
+-----------------+--------------+---------+-----------------+
| Kit Valdovinos   | ECON         | Unknown | [phone] |
+-----------------+--------------+---------+-----------------+
| Magdalena Valdovinos | ECON         | Unknown | [phone] |
+-----------------+--------------+---------+-----------------+
 
 
 
 Care Team Providers
 
 
 
+-----------------------+------+-----------------+
| Care Team Member Name | Role | Phone           |
+-----------------------+------+-----------------+
| Lily Horton MD   | PCP  | [phone] |
+-----------------------+------+-----------------+
 
 
 
 Encounter Details
 
 
+--------+----------+----------------------+--------------------+-------------+
| Date   | Type     | Department           | Care Team          | Description |
+--------+----------+----------------------+--------------------+-------------+
| / | Abstract |   Pediatric          |   Ivis Burkett MD |             |
|    |          | Gastroenterology at  |                    |             |
|        |          | [place]          |                    |             |
|        |          | [facility]  |                    |             |
|        |          |  [address]     |                    |             |
|        |          | [place]          |                    |             |
|        |          | [facility], |                    |             |
|        |          |  [address]th floor           |                    |             |
|        |          | [place], [place]         |                    |             |
|        |          | [id number]           |                    |             |
|        |          | [phone]         |                    |             |
+--------+----------+----------------------+--------------------+-------------+
 
 
 
 Social History
 
 
+----------------+-------+-----------+--------+------+
| Tobacco Use    | Types | Packs/Day | Years  | Date |
|                |       |           | Used   |      |
+----------------+-------+-----------+--------+------+
| Never Assessed |       |           |        |      |
+----------------+-------+-----------+--------+------+
 
 
 
+------------------+---------------+
| Sex Assigned at  | Date Recorded |
| Birth            |               |
+------------------+---------------+
| Not on file      |               |
+------------------+---------------+
 documented as of this encounter
 
 Plan of Treatment
 Not on filedocumented as of this encounter
 
 Visit Diagnoses
 Not on filedocumented in this encounter

## 2020-10-13 NOTE — XMS
Encounter Summary
  Created on: 10/13/2020
 
 Bonifacio Nila JB
 External Reference #: [id number]
 : [birth year]
 Sex: Female
 
 Demographics
 
 
+-----------------------+------------------------+
| Address               | [address]            |
|                       | [place]  [address]   |
+-----------------------+------------------------+
| Home Phone            | [phone]        |
+-----------------------+------------------------+
| Preferred Language    | Unknown                |
+-----------------------+------------------------+
| Marital Status        | Single                 |
+-----------------------+------------------------+
| Yazdanism Affiliation | Unknown                |
+-----------------------+------------------------+
| Race                  | White                  |
+-----------------------+------------------------+
| Ethnic Group          | Not  or  |
+-----------------------+------------------------+
 
 
 Author
 
 
+--------------+------------------------------+
| Author       | [facility] |
+--------------+------------------------------+
| Organization | [facility] |
+--------------+------------------------------+
| Address      | Unknown                      |
+--------------+------------------------------+
| Phone        | Unavailable                  |
+--------------+------------------------------+
 
 
 
 Support
 
 
+-----------------+--------------+---------+-----------------+
| Name            | Relationship | Address | Phone           |
+-----------------+--------------+---------+-----------------+
| Kit Valdovinos   | ECON         | Unknown | [phone] |
+-----------------+--------------+---------+-----------------+
| Magdalena Valdovinos | ECON         | Unknown | [phone] |
+-----------------+--------------+---------+-----------------+
 
 
 
 Care Team Providers
 
 
 
+------------------------+------+-----------------+
| Care Team Member Name  | Role | Phone           |
+------------------------+------+-----------------+
| Lily Horton MD | PCP  | [phone] |
+------------------------+------+-----------------+
 
 
 
 Encounter Details
 
 
+--------+------+----------------------+-----------+---------------------+
| Date   | Type | Department           | Care Team | Description         |
+--------+------+----------------------+-----------+---------------------+
| / | Lab  |   Lab Center at      |           | Transplanted liver  |
|    |      | [facility]          |           | (HCC); High risk    |
|        |      | [facility]  |           | medications (not    |
|        |      |  [address]    |           | anticoagulants)     |
|        |      | [facility]          |           | long-term use       |
|        |      | [facility]  |           |                     |
|        |      | [address] [facility], |           |                     |
|        |      |  [place] [address]       |           |                     |
|        |      | [phone]         |           |                     |
+--------+------+----------------------+-----------+---------------------+
 
 
 
 Social History
 
 
+-------------------+-------+-----------+--------+------+
| Tobacco Use       | Types | Packs/Day | Years  | Date |
|                   |       |           | Used   |      |
+-------------------+-------+-----------+--------+------+
| Passive Smoke     |       |           |        |      |
| Exposure - Never  |       |           |        |      |
| Smoker            |       |           |        |      |
+-------------------+-------+-----------+--------+------+
 
 
 
+---------------------+---+---+---+
| Smokeless Tobacco:  |   |   |   |
| Never Used          |   |   |   |
+---------------------+---+---+---+
 
 
 
+-------------+-------------+---------+----------+
| Alcohol Use | Drinks/Week | oz/Week | Comments |
+-------------+-------------+---------+----------+
| Not Asked   |             |         |          |
+-------------+-------------+---------+----------+
 
 
 
+------------------+---------------+
| Sex Assigned at  | Date Recorded |
 
| Birth            |               |
+------------------+---------------+
| Not on file      |               |
+------------------+---------------+
 documented as of this encounter
 
 Plan of Treatment
 Not on filedocumented as of this encounter
 
 Procedures
 
 
+----------------------+--------+-------------+----------------------+----------------------
+
| Procedure Name       | Priori | Date/Time   | Associated Diagnosis | Comments             
|
|                      | ty     |             |                      |                      
|
+----------------------+--------+-------------+----------------------+----------------------
+
| COMPLETE METABOLIC   | Routin | 2014  |   Transplanted liver |   Results for this   
|
| SET                  | e      |  5:27 PM    |  (HCC)  High risk    | procedure are in the 
|
| (NA,K,CL,CO2,BUN,CRE |        | PST         | medications (not     |  results section.    
|
| AT,GLUC,CA,AST,ALT,B |        |             | anticoagulants)      |                      
|
| ASHWINI TOTAL,ALK        |        |             | long-term use        |                      
|
| PHOS,ALB,PROT TOTAL) |        |             |                      |                      
|
+----------------------+--------+-------------+----------------------+----------------------
+
| TACROLIMUS, WHOLE    | Routin | 2014  |   Transplanted liver |   Results for this   
|
| BLOOD                | e      |  5:27 PM    |  (HCC)  High risk    | procedure are in the 
|
|                      |        | PST         | medications (not     |  results section.    
|
|                      |        |             | anticoagulants)      |                      
|
|                      |        |             | long-term use        |                      
|
+----------------------+--------+-------------+----------------------+----------------------
+
| PHOSPHORUS, PLASMA   | Routin | 2014  |   Transplanted liver |   Results for this   
|
|                      | e      |  5:27 PM    |  (HCC)  High risk    | procedure are in the 
|
|                      |        | PST         | medications (not     |  results section.    
|
|                      |        |             | anticoagulants)      |                      
|
|                      |        |             | long-term use        |                      
|
+----------------------+--------+-------------+----------------------+----------------------
+
| BILIRUBIN DIRECT     | Routin | 2014  |   Transplanted liver |   Results for this   
|
 
|                      | e      |  5:27 PM    |  (HCC)  High risk    | procedure are in the 
|
|                      |        | PST         | medications (not     |  results section.    
|
|                      |        |             | anticoagulants)      |                      
|
|                      |        |             | long-term use        |                      
|
+----------------------+--------+-------------+----------------------+----------------------
+
| URIC ACID, PLASMA    | Routin | 2014  |   Transplanted liver |   Results for this   
|
|                      | e      |  5:27 PM    |  (HCC)  High risk    | procedure are in the 
|
|                      |        | PST         | medications (not     |  results section.    
|
|                      |        |             | anticoagulants)      |                      
|
|                      |        |             | long-term use        |                      
|
+----------------------+--------+-------------+----------------------+----------------------
+
| MAGNESIUM, PLASMA    | Routin | 2014  |   Transplanted liver |   Results for this   
|
|                      | e      |  5:27 PM    |  (HCC)  High risk    | procedure are in the 
|
|                      |        | PST         | medications (not     |  results section.    
|
|                      |        |             | anticoagulants)      |                      
|
|                      |        |             | long-term use        |                      
|
+----------------------+--------+-------------+----------------------+----------------------
+
| LDH TOTAL, PLASMA    | Routin | 2014  |   Transplanted liver |   Results for this   
|
|                      | e      |  5:27 PM    |  (HCC)  High risk    | procedure are in the 
|
|                      |        | PST         | medications (not     |  results section.    
|
|                      |        |             | anticoagulants)      |                      
|
|                      |        |             | long-term use        |                      
|
+----------------------+--------+-------------+----------------------+----------------------
+
| CHOLESTEROL TOTAL,   | Routin | 2014  |   Transplanted liver |   Results for this   
|
| PLASMA               | e      |  5:27 PM    |  (HCC)  High risk    | procedure are in the 
|
|                      |        | PST         | medications (not     |  results section.    
|
|                      |        |             | anticoagulants)      |                      
|
|                      |        |             | long-term use        |                      
|
+----------------------+--------+-------------+----------------------+----------------------
+
 documented in this encounter
 
 
 Results
 TACROLIMUS, WHOLE BLOOD (2014  5:27 PM PST)
 
+-------------+----------+-------------+-------------+--------------+
| Component   | Value    | Ref Range   | Performed   | Pathologist  |
|             |          |             | At          | Signature    |
+-------------+----------+-------------+-------------+--------------+
| TACROLIMUS  | <2.0 (L) | 5.0 - 15.0  | [facility]        |              |
| ()    |          | ng/mL       | LABORATORY  |              |
|             |          |             | SERVICES,   |              |
|             |          |             | SPECIAL IMM |              |
|             |          |             |  + COAG     |              |
+-------------+----------+-------------+-------------+--------------+
 
 
 
+---------------+
| Specimen      |
+---------------+
| Blood - Blood |
+---------------+
 
 
 
+------------------------------------------------------------------------+----------------+
| Narrative                                                              | Performed At   |
+------------------------------------------------------------------------+----------------+
|         Therapeutic range is based on a whole blood specimen drawn 12  |   [facility]         |
| hours post-dose or prior to next dose (the trough). Some other factors | LABORATORY     |
|  influencing therapeutic range, dose administered, and result          | SERVICES,      |
| interpretation include time since transplantation, the organ           | SPECIAL IMM +  |
| transplanted, co-administration of other immunosuppressants and        | COAG           |
| interaction with other drugs that may increase or decrease Tacrolimus  |                |
| concentrations.                                                        |                |
+------------------------------------------------------------------------+----------------+
 
 
 
+--------------------+------------------------+----------------------+--------------+
| Performing         | Address                | City/State/Zipcode   | Phone Number |
| Organization       |                        |                      |              |
+--------------------+------------------------+----------------------+--------------+
|   [facility]  |   [address]  |   [place] [address] |              |
| SERVICES, SPECIAL  | [address]                |                      |              |
| IMM + COAG         |                        |                      |              |
+--------------------+------------------------+----------------------+--------------+
 CHOLESTEROL TOTAL, PLASMA (2014  5:27 PM PST)
 
+-------------+-------+------------+-------------+--------------+
| Component   | Value | Ref Range  | Performed   | Pathologist  |
|             |       |            | At          | Signature    |
+-------------+-------+------------+-------------+--------------+
| CHOLESTEROL | 136   | <200 mg/dL | [facility]        |              |
|   (LAB)     |       |            | LABORATORY  |              |
|             |       |            | SERVICES,   |              |
|             |       |            | CORE        |              |
+-------------+-------+------------+-------------+--------------+
 
 
 
 
+---------------+
| Specimen      |
+---------------+
| Blood - Blood |
+---------------+
 
 
 
+--------------------+------------------------+----------------------+--------------+
| Performing         | Address                | City/State/Zipcode   | Phone Number |
| Organization       |                        |                      |              |
+--------------------+------------------------+----------------------+--------------+
|   [facility]  |   [address]  |   [place] [address] |              |
| SERVICES, CORE     | [address]                |                      |              |
+--------------------+------------------------+----------------------+--------------+
 LDH TOTAL, PLASMA (2014  5:27 PM PST)
 
+------------+---------+---------------+-------------+--------------+
| Component  | Value   | Ref Range     | Performed   | Pathologist  |
|            |         |               | At          | Signature    |
+------------+---------+---------------+-------------+--------------+
| LD TOTAL,  | 154 (L) | 175 - 285 U/L | [facility]        |              |
| PLASMA     |         |               | LABORATORY  |              |
|            |         |               | SERVICES,   |              |
|            |         |               | CORE        |              |
+------------+---------+---------------+-------------+--------------+
| LD CMNT    | No Hemo |               | [facility]        |              |
|            |         |               | LABORATORY  |              |
|            |         |               | SERVICES,   |              |
|            |         |               | CORE        |              |
+------------+---------+---------------+-------------+--------------+
 
 
 
+---------------+
| Specimen      |
+---------------+
| Blood - Blood |
+---------------+
 
 
 
+-----------------------------------------------------------------------+----------------+
| Narrative                                                             | Performed At   |
+-----------------------------------------------------------------------+----------------+
|      New reference range effective 2013 for LD Total performed  |   [facility]         |
| in Core Lab only.                                                     | LABORATORY     |
|                                                                       | SERVICES, CORE |
+-----------------------------------------------------------------------+----------------+
 
 
 
+--------------------+------------------------+----------------------+--------------+
| Performing         | Address                | City/State/Zipcode   | Phone Number |
| Organization       |                        |                      |              |
+--------------------+------------------------+----------------------+--------------+
|   [facility]  |   [address]  |   [place] [address] |              |
| SERVICES, CORE     | [address]                |                      |              |
+--------------------+------------------------+----------------------+--------------+
 BILIRUBIN DIRECT (2014  5:27 PM PST)
 
 
+-------------+---------+-----------------+-------------+--------------+
| Component   | Value   | Ref Range       | Performed   | Pathologist  |
|             |         |                 | At          | Signature    |
+-------------+---------+-----------------+-------------+--------------+
| BILIRUBIN   | <0.1    | 0.0 - 0.3 mg/dL | [facility]        |              |
| DIRECT      |         |                 | LABORATORY  |              |
|             |         |                 | SERVICES,   |              |
|             |         |                 | CORE        |              |
+-------------+---------+-----------------+-------------+--------------+
| BILI D CMNT | No Hemo |                 | [facility]        |              |
|             |         |                 | LABORATORY  |              |
|             |         |                 | SERVICES,   |              |
|             |         |                 | CORE        |              |
+-------------+---------+-----------------+-------------+--------------+
 
 
 
+---------------+
| Specimen      |
+---------------+
| Blood - Blood |
+---------------+
 
 
 
+--------------------+------------------------+----------------------+--------------+
| Performing         | Address                | City/State/Zipcode   | Phone Number |
| Organization       |                        |                      |              |
+--------------------+------------------------+----------------------+--------------+
|   [facility]  |   [address]  |   [place] [address] |              |
| SERVICES, CORE     | [address]                |                      |              |
+--------------------+------------------------+----------------------+--------------+
 URIC ACID, PLASMA (2014  5:27 PM PST)
 
+-------------+-------+-----------------+-------------+--------------+
| Component   | Value | Ref Range       | Performed   | Pathologist  |
|             |       |                 | At          | Signature    |
+-------------+-------+-----------------+-------------+--------------+
| URIC ACID,  | 3.7   | 2.5 - 6.2 mg/dL | [facility]        |              |
| PLASMA      |       |                 | LABORATORY  |              |
| (LAB)       |       |                 | SERVICES,   |              |
|             |       |                 | CORE        |              |
+-------------+-------+-----------------+-------------+--------------+
 
 
 
+---------------+
| Specimen      |
+---------------+
| Blood - Blood |
+---------------+
 
 
 
+--------------------+------------------------+----------------------+--------------+
| Performing         | Address                | City/State/Zipcode   | Phone Number |
| Organization       |                        |                      |              |
+--------------------+------------------------+----------------------+--------------+
|   [facility]  |   [address]  |   [place] [address] |              |
 
| SERVICES, CORE     | [address]                |                      |              |
+--------------------+------------------------+----------------------+--------------+
 PHOSPHORUS, PLASMA (2014  5:27 PM PST)
 
+-------------+-------+-----------------+-------------+--------------+
| Component   | Value | Ref Range       | Performed   | Pathologist  |
|             |       |                 | At          | Signature    |
+-------------+-------+-----------------+-------------+--------------+
| PHOSPHORUS, | 3.3   | 2.4 - 4.7 mg/dL | [facility]        |              |
|  PLASMA     |       |                 | LABORATORY  |              |
| (LAB)       |       |                 | SERVICES,   |              |
|             |       |                 | CORE        |              |
+-------------+-------+-----------------+-------------+--------------+
 
 
 
+---------------+
| Specimen      |
+---------------+
| Blood - Blood |
+---------------+
 
 
 
+--------------------+------------------------+----------------------+--------------+
| Performing         | Address                | City/State/Zipcode   | Phone Number |
| Organization       |                        |                      |              |
+--------------------+------------------------+----------------------+--------------+
|   [facility]  |   [address]  |   [place] [address] |              |
| SERVICES, CORE     | [address]                |                      |              |
+--------------------+------------------------+----------------------+--------------+
 MAGNESIUM, PLASMA (2014  5:27 PM PST)
 
+-------------+---------+-----------------+-------------+--------------+
| Component   | Value   | Ref Range       | Performed   | Pathologist  |
|             |         |                 | At          | Signature    |
+-------------+---------+-----------------+-------------+--------------+
| MAGNESIUM,P | 1.6 (L) | 1.8 - 2.5 mg/dL | [facility]        |              |
| LASMA       |         |                 | LABORATORY  |              |
|             |         |                 | SERVICES,   |              |
|             |         |                 | CORE        |              |
+-------------+---------+-----------------+-------------+--------------+
 
 
 
+---------------+
| Specimen      |
+---------------+
| Blood - Blood |
+---------------+
 
 
 
+--------------------+------------------------+----------------------+--------------+
| Performing         | Address                | City/State/Zipcode   | Phone Number |
| Organization       |                        |                      |              |
+--------------------+------------------------+----------------------+--------------+
|   [facility]  |   [address]  |   [place] [address] |              |
| SERVICES, CORE     | [address]                |                      |              |
+--------------------+------------------------+----------------------+--------------+
 
 COMPLETE METABOLIC SET (NA,K,CL,CO2,BUN,CREAT,GLUC,CA,AST,ALT,BILI TOTAL,ALK PHOS,ALB,PROT 
TOTAL) (2014  5:27 PM PST)
 
+-------------+---------+-----------------+-------------+--------------+
| Component   | Value   | Ref Range       | Performed   | Pathologist  |
|             |         |                 | At          | Signature    |
+-------------+---------+-----------------+-------------+--------------+
| GLUCOSE,    | 81      | 60 - 99 mg/dL   | [facility]        |              |
| PLASMA      |         |                 | LABORATORY  |              |
| (LAB)       |         |                 | SERVICES,   |              |
|             |         |                 | CORE        |              |
+-------------+---------+-----------------+-------------+--------------+
| BUN, PLASMA | 13      | 6 - 20 mg/dL    | [facility]        |              |
|  (LAB)      |         |                 | LABORATORY  |              |
|             |         |                 | SERVICES,   |              |
|             |         |                 | CORE        |              |
+-------------+---------+-----------------+-------------+--------------+
| CREATININE  | 0.76    | 0.46 - 0.81     | [facility]        |              |
| PLASMA      |         | mg/dL           | LABORATORY  |              |
| (LAB)       |         |                 | SERVICES,   |              |
|             |         |                 | CORE        |              |
+-------------+---------+-----------------+-------------+--------------+
| SODIUM,     | 139     | 136 - 145       | [facility]        |              |
| PLASMA      |         | mmol/L          | LABORATORY  |              |
| (LAB)       |         |                 | SERVICES,   |              |
|             |         |                 | CORE        |              |
+-------------+---------+-----------------+-------------+--------------+
| POTASSIUM,  | 3.9     | 3.4 - 5.0       | [facility]        |              |
| PLASMA      |         | mmol/L          | LABORATORY  |              |
| (LAB)       |         |                 | SERVICES,   |              |
|             |         |                 | CORE        |              |
+-------------+---------+-----------------+-------------+--------------+
| CHLORIDE,   | 105     | 97 - 108 mmol/L | [facility]        |              |
| PLASMA      |         |                 | LABORATORY  |              |
| (LAB)       |         |                 | SERVICES,   |              |
|             |         |                 | CORE        |              |
+-------------+---------+-----------------+-------------+--------------+
| TOTAL CO2,  | 30      | 21 - 32 mmol/L  | [facility]        |              |
| PLASMA      |         |                 | LABORATORY  |              |
| (LAB)       |         |                 | SERVICES,   |              |
|             |         |                 | CORE        |              |
+-------------+---------+-----------------+-------------+--------------+
| CALCIUM,    | 9.4     | 8.6 - 10.2      | [facility]        |              |
| PLASMA      |         | mg/dL           | LABORATORY  |              |
| (LAB)       |         |                 | SERVICES,   |              |
|             |         |                 | CORE        |              |
+-------------+---------+-----------------+-------------+--------------+
| BILIRUBIN   | 0.2 (L) | 0.3 - 1.2 mg/dL | [facility]        |              |
| TOTAL       |         |                 | LABORATORY  |              |
|             |         |                 | SERVICES,   |              |
|             |         |                 | CORE        |              |
+-------------+---------+-----------------+-------------+--------------+
| TOTAL       | 7.6     | 6.2 - 8.5 g/dL  | [facility]        |              |
| PROTEIN,    |         |                 | LABORATORY  |              |
| PLASMA      |         |                 | SERVICES,   |              |
| (LAB)       |         |                 | CORE        |              |
+-------------+---------+-----------------+-------------+--------------+
| ALBUMIN,    | 3.7     | 3.5 - 4.7 g/dL  | [facility]        |              |
| PLASMA      |         |                 | LABORATORY  |              |
| (LAB)       |         |                 | SERVICES,   |              |
 
|             |         |                 | CORE        |              |
+-------------+---------+-----------------+-------------+--------------+
| ALK PHOS    | 101     | 42 - 110 U/L    | [facility]        |              |
|             |         |                 | LABORATORY  |              |
|             |         |                 | SERVICES,   |              |
|             |         |                 | CORE        |              |
+-------------+---------+-----------------+-------------+--------------+
| AST(SGOT)   | 15 (L)  | 18 - 36 U/L     | [facility]        |              |
|             |         |                 | LABORATORY  |              |
|             |         |                 | SERVICES,   |              |
|             |         |                 | CORE        |              |
+-------------+---------+-----------------+-------------+--------------+
| ALT (SGPT)  | 18      | 12 - 60 U/L     | [facility]        |              |
|             |         |                 | LABORATORY  |              |
|             |         |                 | SERVICES,   |              |
|             |         |                 | CORE        |              |
+-------------+---------+-----------------+-------------+--------------+
| ANION       | 4       | 4 - 11 mmol/L   | [facility]        |              |
| GAP(ALB     |         |                 | LABORATORY  |              |
| CORRECTED)  |         |                 | SERVICES,   |              |
|             |         |                 | CORE        |              |
+-------------+---------+-----------------+-------------+--------------+
| POTASSIUM   | No Hemo |                 | [facility]        |              |
| CMNT        |         |                 | LABORATORY  |              |
|             |         |                 | SERVICES,   |              |
|             |         |                 | CORE        |              |
+-------------+---------+-----------------+-------------+--------------+
| BILI T CMNT | No Hemo |                 | [facility]        |              |
|             |         |                 | LABORATORY  |              |
|             |         |                 | SERVICES,   |              |
|             |         |                 | CORE        |              |
+-------------+---------+-----------------+-------------+--------------+
| AST CMNT    | No Hemo |                 | [facility]        |              |
|             |         |                 | LABORATORY  |              |
|             |         |                 | SERVICES,   |              |
|             |         |                 | CORE        |              |
+-------------+---------+-----------------+-------------+--------------+
| ANION GAP   | 4       | mmol/L          | [facility]        |              |
|             |         |                 | LABORATORY  |              |
|             |         |                 | SERVICES,   |              |
|             |         |                 | CORE        |              |
+-------------+---------+-----------------+-------------+--------------+
 
 
 
+---------------+
| Specimen      |
+---------------+
| Blood - Blood |
+---------------+
 
 
 
+--------------------+------------------------+----------------------+--------------+
| Performing         | Address                | City/State/Zipcode   | Phone Number |
| Organization       |                        |                      |              |
+--------------------+------------------------+----------------------+--------------+
|   [facility]  |   [address]  |   [place] [address] |              |
| SERVICES, CORE     | [address]                |                      |              |
+--------------------+------------------------+----------------------+--------------+
 
 documented in this encounter
 
 Visit Diagnoses
 
 
+--------------------------------------------------------------------------------------+
| Diagnosis                                                                            |
+--------------------------------------------------------------------------------------+
|   Transplanted liver (HCC)  Liver replaced by transplant                             |
+--------------------------------------------------------------------------------------+
|   High risk medications (not anticoagulants) long-term use  Encounter for long-term  |
| (current) use of other medications                                                   |
+--------------------------------------------------------------------------------------+
 documented in this encounter

## 2020-10-13 NOTE — XMS
Encounter Summary
  Created on: 10/13/2020
 
 Bonifacio Nila JB
 External Reference #: [id number]
 : [birth year]
 Sex: Female
 
 Demographics
 
 
+-----------------------+------------------------+
| Address               | [address]            |
|                       | [place]  [address]   |
+-----------------------+------------------------+
| Home Phone            | [phone]        |
+-----------------------+------------------------+
| Preferred Language    | Unknown                |
+-----------------------+------------------------+
| Marital Status        | Single                 |
+-----------------------+------------------------+
| Jainism Affiliation | Unknown                |
+-----------------------+------------------------+
| Race                  | White                  |
+-----------------------+------------------------+
| Ethnic Group          | Not  or  |
+-----------------------+------------------------+
 
 
 Author
 
 
+--------------+------------------------------+
| Author       | [facility] |
+--------------+------------------------------+
| Organization | [facility] |
+--------------+------------------------------+
| Address      | Unknown                      |
+--------------+------------------------------+
| Phone        | Unavailable                  |
+--------------+------------------------------+
 
 
 
 Support
 
 
+-----------------+--------------+---------+-----------------+
| Name            | Relationship | Address | Phone           |
+-----------------+--------------+---------+-----------------+
| Kit Valdovinos   | ECON         | Unknown | [phone] |
+-----------------+--------------+---------+-----------------+
| Magdalena Valdovinos | ECON         | Unknown | [phone] |
+-----------------+--------------+---------+-----------------+
 
 
 
 Care Team Providers
 
 
 
+-----------------------+------+-----------------+
| Care Team Member Name | Role | Phone           |
+-----------------------+------+-----------------+
| Lily Horton MD   | PCP  | [phone] |
+-----------------------+------+-----------------+
 
 
 
 Encounter Details
 
 
+--------+----------+----------------------+--------------------+-------------+
| Date   | Type     | Department           | Care Team          | Description |
+--------+----------+----------------------+--------------------+-------------+
| / | Abstract |   Pediatric          |   Ivis Burkett MD |             |
|    |          | Gastroenterology at  |                    |             |
|        |          | [place]          |                    |             |
|        |          | [facility]  |                    |             |
|        |          |  [address]     |                    |             |
|        |          | [place]          |                    |             |
|        |          | [facility], |                    |             |
|        |          |  7th floor           |                    |             |
|        |          | [place], [place]         |                    |             |
|        |          | [address]           |                    |             |
|        |          | [phone]         |                    |             |
+--------+----------+----------------------+--------------------+-------------+
 
 
 
 Social History
 
 
+----------------+-------+-----------+--------+------+
| Tobacco Use    | Types | Packs/Day | Years  | Date |
|                |       |           | Used   |      |
+----------------+-------+-----------+--------+------+
| Never Assessed |       |           |        |      |
+----------------+-------+-----------+--------+------+
 
 
 
+------------------+---------------+
| Sex Assigned at  | Date Recorded |
| Birth            |               |
+------------------+---------------+
| Not on file      |               |
+------------------+---------------+
 documented as of this encounter
 
 Plan of Treatment
 Not on filedocumented as of this encounter
 
 Visit Diagnoses
 Not on filedocumented in this encounter

## 2020-10-13 NOTE — XMS
Encounter Summary
  Created on: 10/13/2020
 
 Bonifacio Nila JB
 External Reference #: [id number]
 : [birth year]
 Sex: Female
 
 Demographics
 
 
+-----------------------+------------------------+
| Address               | [address]            |
|                       | [place]  [address]   |
+-----------------------+------------------------+
| Home Phone            | [phone]        |
+-----------------------+------------------------+
| Preferred Language    | Unknown                |
+-----------------------+------------------------+
| Marital Status        | Single                 |
+-----------------------+------------------------+
| Mormon Affiliation | Unknown                |
+-----------------------+------------------------+
| Race                  | White                  |
+-----------------------+------------------------+
| Ethnic Group          | Not  or  |
+-----------------------+------------------------+
 
 
 Author
 
 
+--------------+------------------------------+
| Author       | [facility] |
+--------------+------------------------------+
| Organization | [facility] |
+--------------+------------------------------+
| Address      | Unknown                      |
+--------------+------------------------------+
| Phone        | Unavailable                  |
+--------------+------------------------------+
 
 
 
 Support
 
 
+-----------------+--------------+---------+-----------------+
| Name            | Relationship | Address | Phone           |
+-----------------+--------------+---------+-----------------+
| Kit Valdovinos   | ECON         | Unknown | [phone] |
+-----------------+--------------+---------+-----------------+
| Magdalena Valdovinos | ECON         | Unknown | [phone] |
+-----------------+--------------+---------+-----------------+
 
 
 
 Care Team Providers
 
 
 
+------------------------+------+-----------------+
| Care Team Member Name  | Role | Phone           |
+------------------------+------+-----------------+
| Lily Horton MD | PCP  | [phone] |
+------------------------+------+-----------------+
 
 
 
 Reason for Visit
 
 
+--------------+----------+
| Reason       | Comments |
+--------------+----------+
| Test Results |          |
+--------------+----------+
 
 
 
 Encounter Details
 
 
+--------+-------------+----------------------+----------------------+--------------+
| Date   | Type        | Department           | Care Team            | Description  |
+--------+-------------+----------------------+----------------------+--------------+
| / | Documentati |   Pediatric          |   Neema Khalil,   | Test Results |
| 2015   | on          | Gastroenterology at  | MD  [address]  |              |
|        |             | [place]          |   [place], [place]       |              |
|        |             | [facility]  | [address]           |              |
|        |             |  [address]     | [phone]         |              |
|        |             | [facility]          | [phone] (Fax)   |              |
|        |             | [facility], |                      |              |
|        |             |  7th floor           |                      |              |
|        |             | [place], [place]         |                      |              |
|        |             | [address]           |                      |              |
|        |             | [phone]         |                      |              |
+--------+-------------+----------------------+----------------------+--------------+
 
 
 
 Social History
 
 
+-------------------+-------+-----------+--------+------+
| Tobacco Use       | Types | Packs/Day | Years  | Date |
|                   |       |           | Used   |      |
+-------------------+-------+-----------+--------+------+
| Passive Smoke     |       |           |        |      |
| Exposure - Never  |       |           |        |      |
| Smoker            |       |           |        |      |
+-------------------+-------+-----------+--------+------+
 
 
 
+---------------------+---+---+---+
| Smokeless Tobacco:  |   |   |   |
| Never Used          |   |   |   |
+---------------------+---+---+---+
 
 
 
 
+-------------+-------------+---------+----------+
| Alcohol Use | Drinks/Week | oz/Week | Comments |
+-------------+-------------+---------+----------+
| Not Asked   |             |         |          |
+-------------+-------------+---------+----------+
 
 
 
+------------------+---------------+
| Sex Assigned at  | Date Recorded |
| Birth            |               |
+------------------+---------------+
| Not on file      |               |
+------------------+---------------+
 documented as of this encounter
 
 Miscellaneous Notes
 Telephone Encounter - Olivia Qureshi RN - 2015  2:48 PM PSTReceived the following not
e from Dr. Khalil in epic inbasket:
 
 Notes Recorded by Neema Khalil MD on 2015 at 10:27 AM
 Labs normal, routed RN Staff to inform family,
 
 YE
 
 Sent a letter to address in demographics to inform family.Electronically signed by Olivia bledsoe RN at 2015  2:51 PM PSTdocumented in this encounter
 
 Plan of Treatment
 Not on filedocumented as of this encounter
 
 Visit Diagnoses
 Not on filedocumented in this encounter

## 2020-10-13 NOTE — XMS
Encounter Summary
  Created on: 10/13/2020
 
 Bonifacio Nila JB
 External Reference #: [id number]
 : [birth year]
 Sex: Female
 
 Demographics
 
 
+-----------------------+------------------------+
| Address               | [address]            |
|                       | [place]  [address]   |
+-----------------------+------------------------+
| Home Phone            | [phone]        |
+-----------------------+------------------------+
| Preferred Language    | Unknown                |
+-----------------------+------------------------+
| Marital Status        | Single                 |
+-----------------------+------------------------+
| Evangelical Affiliation | Unknown                |
+-----------------------+------------------------+
| Race                  | White                  |
+-----------------------+------------------------+
| Ethnic Group          | Not  or  |
+-----------------------+------------------------+
 
 
 Author
 
 
+--------------+------------------------------+
| Author       | [facility] |
+--------------+------------------------------+
| Organization | [facility] |
+--------------+------------------------------+
| Address      | Unknown                      |
+--------------+------------------------------+
| Phone        | Unavailable                  |
+--------------+------------------------------+
 
 
 
 Support
 
 
+-----------------+--------------+---------+-----------------+
| Name            | Relationship | Address | Phone           |
+-----------------+--------------+---------+-----------------+
| Kit Valdovinos   | ECON         | Unknown | [phone] |
+-----------------+--------------+---------+-----------------+
| Magdalena Valdovinos | ECON         | Unknown | [phone] |
+-----------------+--------------+---------+-----------------+
 
 
 
 Care Team Providers
 
 
 
+------------------------+------+-----------------+
| Care Team Member Name  | Role | Phone           |
+------------------------+------+-----------------+
| Lily Horton MD | PCP  | [phone] |
+------------------------+------+-----------------+
 
 
 
 Encounter Details
 
 
+--------+-------------+----------------------+----------------------+-------------+
| Date   | Type        | Department           | Care Team            | Description |
+--------+-------------+----------------------+----------------------+-------------+
| / | Documentati |   SOCIAL WORK        |   [facility]       |             |
|    | on          | AMBULATORY  [address]   | [address]      |             |
|        |             | [address]  | [address]  |             |
|        |             |  Mailcode: [address]      | [place],   |             |
|        |             | [place], [place]         | [place] [address]        |             |
|        |             | [address]           | [phone]         |             |
|        |             | [phone]         | [phone] (Fax)   |             |
+--------+-------------+----------------------+----------------------+-------------+
 
 
 
 Social History
 
 
+-------------------+-------+-----------+--------+------+
| Tobacco Use       | Types | Packs/Day | Years  | Date |
|                   |       |           | Used   |      |
+-------------------+-------+-----------+--------+------+
| Passive Smoke     |       |           |        |      |
| Exposure - Never  |       |           |        |      |
| Smoker            |       |           |        |      |
+-------------------+-------+-----------+--------+------+
 
 
 
+---------------------+---+---+---+
| Smokeless Tobacco:  |   |   |   |
| Never Used          |   |   |   |
+---------------------+---+---+---+
 
 
 
+-------------+-------------+---------+----------+
| Alcohol Use | Drinks/Week | oz/Week | Comments |
+-------------+-------------+---------+----------+
| Not Asked   |             |         |          |
+-------------+-------------+---------+----------+
 
 
 
+------------------+---------------+
| Sex Assigned at  | Date Recorded |
| Birth            |               |
+------------------+---------------+
 
| Not on file      |               |
+------------------+---------------+
 documented as of this encounter
 
 Plan of Treatment
 Not on filedocumented as of this encounter
 
 Visit Diagnoses
 Not on filedocumented in this encounter

## 2020-10-13 NOTE — XMS
Encounter Summary
  Created on: 10/13/2020
 
 Bonifacio Nila JB
 External Reference #: [id number]
 : [birth year]
 Sex: Female
 
 Demographics
 
 
+-----------------------+------------------------+
| Address               | [address]            |
|                       | [place]  [address]   |
+-----------------------+------------------------+
| Home Phone            | [phone]        |
+-----------------------+------------------------+
| Preferred Language    | Unknown                |
+-----------------------+------------------------+
| Marital Status        | Single                 |
+-----------------------+------------------------+
| Voodoo Affiliation | Unknown                |
+-----------------------+------------------------+
| Race                  | White                  |
+-----------------------+------------------------+
| Ethnic Group          | Not  or  |
+-----------------------+------------------------+
 
 
 Author
 
 
+--------------+------------------------------+
| Author       | [facility] |
+--------------+------------------------------+
| Organization | [facility] |
+--------------+------------------------------+
| Address      | Unknown                      |
+--------------+------------------------------+
| Phone        | Unavailable                  |
+--------------+------------------------------+
 
 
 
 Support
 
 
+-----------------+--------------+---------+-----------------+
| Name            | Relationship | Address | Phone           |
+-----------------+--------------+---------+-----------------+
| Kit Valdovinos   | ECON         | Unknown | [phone] |
+-----------------+--------------+---------+-----------------+
| Magdalena Valdovinos | ECON         | Unknown | [phone] |
+-----------------+--------------+---------+-----------------+
 
 
 
 Care Team Providers
 
 
 
+------------------------+------+-----------------+
| Care Team Member Name  | Role | Phone           |
+------------------------+------+-----------------+
| Lily Horton MD | PCP  | [phone] |
+------------------------+------+-----------------+
 
 
 
 Reason for Visit
 
 
+-------------+--------+----------+
| Reason      | Onset  | Comments |
|             | Date   |          |
+-------------+--------+----------+
| Lab Results | 10/23/ |          |
|             |    |          |
+-------------+--------+----------+
 
 
 
 Encounter Details
 
 
+--------+-----------+----------------------+----------------------+-------------+
| Date   | Type      | Department           | Care Team            | Description |
+--------+-----------+----------------------+----------------------+-------------+
| 10/23/ | Telephone |   Pediatric          |   Neema Khalil,   | Lab Results |
|    |           | Gastroenterology at  | MD  [address] |             |
|        |           | [place]          |   [place], [place]       |             |
|        |           | [facility]  | [address]           |             |
|        |           |  [address]     | [phone]         |             |
|        |           | [place]          | [phone] (Fax)   |             |
|        |           | [facility], |                      |             |
|        |           |  [address] [place] [address]           |                      |             |
|        |           | [place], [place]         |                      |             |
|        |           | [address]           |                      |             |
|        |           | [phone]         |                      |             |
+--------+-----------+----------------------+----------------------+-------------+
 
 
 
 Social History
 
 
+-------------------+-------+-----------+--------+------+
| Tobacco Use       | Types | Packs/Day | Years  | Date |
|                   |       |           | Used   |      |
+-------------------+-------+-----------+--------+------+
| Passive Smoke     |       |           |        |      |
| Exposure - Never  |       |           |        |      |
| Smoker            |       |           |        |      |
+-------------------+-------+-----------+--------+------+
 
 
 
+---------------------+---+---+---+
| Smokeless Tobacco:  |   |   |   |
 
| Never Used          |   |   |   |
+---------------------+---+---+---+
 
 
 
+-------------+-------------+---------+----------+
| Alcohol Use | Drinks/Week | oz/Week | Comments |
+-------------+-------------+---------+----------+
| Not Asked   |             |         |          |
+-------------+-------------+---------+----------+
 
 
 
+------------------+---------------+
| Sex Assigned at  | Date Recorded |
| Birth            |               |
+------------------+---------------+
| Not on file      |               |
+------------------+---------------+
 documented as of this encounter
 
 Miscellaneous Notes
 Telephone Encounter - Marianne Barba RN - 10/23/2017  4:45 PM PDTThe following email re
ceived from NATI Valencia MS, PA-C at [place] ([facility]):
 Her liver numbers are normal and she isn  t on immunosuppression. She should repeat labs i
n 3 months and transition to an adult liver transplant team.
 
 Pt's mom informed as above and verbalized understanding.  Pt has not identified an Adult Li
linden Transplant team yet.  Mom will be contacting [facility] to see if there is a ph
ysician that can see her there.  Offered assistance as needed with transferring care.  Efrain mercado mom call to let us know when pt has identified an Adult provider to transfer care to.El
ectronically signed by Marianne Barba, RN at 10/23/2017  5:00 PM PDTdocumented in this enc
ounter
 
 Plan of Treatment
 Not on filedocumented as of this encounter
 
 Visit Diagnoses
 Not on filedocumented in this encounter

## 2020-10-13 NOTE — XMS
Encounter Summary
  Created on: 10/13/2020
 
 Bonifacio Nila JB
 External Reference #: [id number]
 : [birth year]
 Sex: Female
 
 Demographics
 
 
+-----------------------+------------------------+
| Address               | [address]            |
|                       | [place]  [address]   |
+-----------------------+------------------------+
| Home Phone            | [phone]        |
+-----------------------+------------------------+
| Preferred Language    | Unknown                |
+-----------------------+------------------------+
| Marital Status        | Single                 |
+-----------------------+------------------------+
| Adventism Affiliation | Unknown                |
+-----------------------+------------------------+
| Race                  | White                  |
+-----------------------+------------------------+
| Ethnic Group          | Not  or  |
+-----------------------+------------------------+
 
 
 Author
 
 
+--------------+-------------+
| Organization | Unknown     |
+--------------+-------------+
| Address      | Unknown     |
+--------------+-------------+
| Phone        | Unavailable |
+--------------+-------------+
 
 
 
 Support
 
 
+-----------------+--------------+---------+-----------------+
| Name            | Relationship | Address | Phone           |
+-----------------+--------------+---------+-----------------+
| Kit Valdovinos   | ECON         | Unknown | [phone] |
+-----------------+--------------+---------+-----------------+
| Magdalena Valdovinos | ECON         | Unknown | [phone] |
+-----------------+--------------+---------+-----------------+
 
 
 
 Care Team Providers
 
 
 
+-----------------------+------+-----------------+
| Care Team Member Name | Role | Phone           |
+-----------------------+------+-----------------+
| Lily Horton MD   | PCP  | [phone] |
+-----------------------+------+-----------------+
 
 
 
 Encounter Details
 
 
+--------+-------------+------------+--------------------+-------------+
| Date   | Type        | Department | Care Team          | Description |
+--------+-------------+------------+--------------------+-------------+
| / | Transcribed |            |   Dictation, Other | Transcribed |
| 2000   |             |            |                    |             |
+--------+-------------+------------+--------------------+-------------+
 
 
 
 Social History
 
 
+----------------+-------+-----------+--------+------+
| Tobacco Use    | Types | Packs/Day | Years  | Date |
|                |       |           | Used   |      |
+----------------+-------+-----------+--------+------+
| Never Assessed |       |           |        |      |
+----------------+-------+-----------+--------+------+
 
 
 
+------------------+---------------+
| Sex Assigned at  | Date Recorded |
| Birth            |               |
+------------------+---------------+
| Not on file      |               |
+------------------+---------------+
 documented as of this encounter
 
 Progress Notes
 Interface, Transcription In - 2006  1:05 AM [facility]                                    [place]
[address] [address] [phone] or [phone]
 
 2000
 
 Lily Horton M.D.
 [address] [place] [address]  [id number]
 
 RE:   NILA VALDOVINOS
 MR #: [id number]
 
 Dear Dr. Horton:
 
 I had the pleasure of seeing Nila at  [facility]
 
 today in the Pediatric Liver Transplant  Clinic  and  presenting  her  once
 again  to members of the Pediatric Liver  Transplant  team  from  [place],
 which included Dr. Marcos Aguirre.  Nila  has been doing reasonably well over
 the last six months during the interim  since her interim since her initial
 presentation to the transplant team.   She  has  grown  and  gained weight,
 although in a subdued fashion.  Her mother has few complaints.  She is on a
 regular diet and has dark brown stools.   Evidently her VSD is closing, and
 she is followed by Dr. Baljinder Bush, pediatric cardiologist.
 
 His present medications include Zantac  1  mL  b.i.d.,  Bactrim    teaspoon
 q.d., Actigall 1.5 cc t.i.d., iron 0.8  q.d.,  vitamin  A  and  D capsule 1
 q.d., vitamin E capsule 1 q.d., and vitamin K 2.5 mg q.d.
 
 On physical examination, she weighs  8.37  kg  and  measures  70.4 cm.  Her
 blood pressure is 104/69.  She is anicteric,  although her skin is slightly
 pigmented.   She  has thin extremities  and  a  protuberant  abdomen.   The
 vasculature over her abdomen is prominent.   She  has a very large and hard
 liver,  left lobe even more so than  the  right  lobe  and  a  spleen  that
 descends  at least 6 to 7 cm below the  left  costal  margin.   Ascites  is
 questionable.
 
 Her most recent laboratory tests on 2000, show an AST of 137, ALT
 99,  albumin 3.0, a direct bilirubin  of  1.0,  and  a  platelet  count  of
 116,000.  Her GGT is 571.  Nila has a blood type of O negative.
 
 In  summary, Nila is a 15-month-old  white  female  who  has  extrahepatic
 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
 disease and evidence of portal hypertension  and  failure to thrive.  Given
 the details of the situation, Dr. Aguirre wishes to move ahead and continue her
 listing as a 2B.  Members of the transplant  team  will investigate further
 and firm up plans to have a living-related donor on the wings in case Nila
 deteriorates quickly at some point in  the future requiring transplantation
 sooner  than  later.   In  the  meantime,  we  will  continue  the  present
 medications.  With her next blood draw,  she  needs  a  PT,  INR,  and PTT.
 Mother will try to remember to request  this  the  next  time  she comes to
 either your office or your laboratory  facility  for  the  next blood draw.
 Finally, mother will coordinate a visit  to both Dr. Bush and Dr. Bailey
 in the next month or two.  Please let me know if you have any questions.
 
 Sincerely,
 
 Eagle De Luna M.D.
 Pediatric Gastroenterology
 
 [facility] / 
 [id number] / [id number] / [id number] / [id number]
 D: 2000
 T: 2000
 
 cc:
 
 Marcos Aguirre M.D.
 [facility]
 [address]
 [place]
 
 Cristo Bailey M.D.
 [address] [place] [address]. [address] [place] [address]  [id number]
 
 
 [id number] signed by Interface, Transcription In at 2006  1:05 AM PSTdocume
nted in this encounter
 
 Plan of Treatment
 Not on filedocumented as of this encounter
 
 Visit Diagnoses
 Not on filedocumented in this encounter

## 2020-10-13 NOTE — XMS
Encounter Summary
  Created on: 10/13/2020
 
 Bonifacio Nila JB
 External Reference #: [id number]
 : [birth year]
 Sex: Female
 
 Demographics
 
 
+-----------------------+------------------------+
| Address               | [address]            |
|                       | [place]  [address]   |
+-----------------------+------------------------+
| Home Phone            | [phone]        |
+-----------------------+------------------------+
| Preferred Language    | Unknown                |
+-----------------------+------------------------+
| Marital Status        | Single                 |
+-----------------------+------------------------+
| Shinto Affiliation | Unknown                |
+-----------------------+------------------------+
| Race                  | White                  |
+-----------------------+------------------------+
| Ethnic Group          | Not  or  |
+-----------------------+------------------------+
 
 
 Author
 
 
+--------------+------------------------------+
| Author       | [facility] |
+--------------+------------------------------+
| Organization | [facility] |
+--------------+------------------------------+
| Address      | Unknown                      |
+--------------+------------------------------+
| Phone        | Unavailable                  |
+--------------+------------------------------+
 
 
 
 Support
 
 
+-----------------+--------------+---------+-----------------+
| Name            | Relationship | Address | Phone           |
+-----------------+--------------+---------+-----------------+
| Kit Valdovinos   | ECON         | Unknown | [phone] |
+-----------------+--------------+---------+-----------------+
| Magdalena Valdovinos | ECON         | Unknown | [phone] |
+-----------------+--------------+---------+-----------------+
 
 
 
 Care Team Providers
 
 
 
+-----------------------+------+-----------------+
| Care Team Member Name | Role | Phone           |
+-----------------------+------+-----------------+
| Lily Horton MD   | PCP  | [phone] |
+-----------------------+------+-----------------+
 
 
 
 Reason for Visit
 
 
+--------+--------+-----------------+
| Reason | Onset  | Comments        |
|        | Date   |                 |
+--------+--------+-----------------+
| Other  | 10/14/ | Need labs drawn |
|        |    |                 |
+--------+--------+-----------------+
 
 
 
 Encounter Details
 
 
+--------+-----------+----------------------+---------------------+-------------------+
| Date   | Type      | Department           | Care Team           | Description       |
+--------+-----------+----------------------+---------------------+-------------------+
| 10/14/ | Telephone |   Pediatric          |   Monserrat Spann,  | Other (Need labs  |
|    |           | Gastroenterology at  | RN  [address]     | drawn)            |
|        |           | [place]          | [address]     |                   |
|        |           | [facility]  | [place] [address]  |                   |
|        |           |  [address]     |                     |                   |
|        |           | [place]          |                     |                   |
|        |           | [facility], |                     |                   |
|        |           |  [address] [place] [address]           |                     |                   |
|        |           | [place], [place]         |                     |                   |
|        |           | [address]           |                     |                   |
|        |           | [phone]         |                     |                   |
+--------+-----------+----------------------+---------------------+-------------------+
 
 
 
 Social History
 
 
+----------------+-------+-----------+--------+------+
| Tobacco Use    | Types | Packs/Day | Years  | Date |
|                |       |           | Used   |      |
+----------------+-------+-----------+--------+------+
| Never Assessed |       |           |        |      |
+----------------+-------+-----------+--------+------+
 
 
 
+------------------+---------------+
| Sex Assigned at  | Date Recorded |
| Birth            |               |
+------------------+---------------+
 
| Not on file      |               |
+------------------+---------------+
 documented as of this encounter
 
 Miscellaneous Notes
 Telephone Encounter - Monserrat Spann RN - 10/14/2009  9:28 AM PDTLeft message for mom to 
call to confirm appt and to have labs drawn as have not had labs since 2009.Electro
nically signed by Monserrat Spann RN at 10/14/2009  9:28 AM PDTdocumented in this encounter
 
 Plan of Treatment
 Not on filedocumented as of this encounter
 
 Visit Diagnoses
 Not on filedocumented in this encounter

## 2020-10-13 NOTE — XMS
Encounter Summary
  Created on: 10/13/2020
 
 Bonifacio Nila JB
 External Reference #: [id number]
 : [birth year]
 Sex: Female
 
 Demographics
 
 
+-----------------------+------------------------+
| Address               | [address]            |
|                       | [place]  [address]   |
+-----------------------+------------------------+
| Home Phone            | [phone]        |
+-----------------------+------------------------+
| Preferred Language    | Unknown                |
+-----------------------+------------------------+
| Marital Status        | Single                 |
+-----------------------+------------------------+
| Shinto Affiliation | Unknown                |
+-----------------------+------------------------+
| Race                  | White                  |
+-----------------------+------------------------+
| Ethnic Group          | Not  or  |
+-----------------------+------------------------+
 
 
 Author
 
 
+--------------+------------------------------+
| Author       | [facility] |
+--------------+------------------------------+
| Organization | [facility] |
+--------------+------------------------------+
| Address      | Unknown                      |
+--------------+------------------------------+
| Phone        | Unavailable                  |
+--------------+------------------------------+
 
 
 
 Support
 
 
+-----------------+--------------+---------+-----------------+
| Name            | Relationship | Address | Phone           |
+-----------------+--------------+---------+-----------------+
| Kit Valdovinos   | ECON         | Unknown | [phone] |
+-----------------+--------------+---------+-----------------+
| Magdalena Valdovinos | ECON         | Unknown | [phone] |
+-----------------+--------------+---------+-----------------+
 
 
 
 Care Team Providers
 
 
 
+-----------------------+------+-----------------+
| Care Team Member Name | Role | Phone           |
+-----------------------+------+-----------------+
| Lily Horton MD   | PCP  | [phone] |
+-----------------------+------+-----------------+
 
 
 
 Reason for Visit
 
 
+----------------+--------+----------+
| Reason         | Onset  | Comments |
|                | Date   |          |
+----------------+--------+----------+
| Refill Request | / |          |
|                |    |          |
+----------------+--------+----------+
 
 
 
 Encounter Details
 
 
+--------+--------+----------------------+---------------------+----------------+
| Date   | Type   | Department           | Care Team           | Description    |
+--------+--------+----------------------+---------------------+----------------+
| / | Refill |   Pediatric          |   Monserrat Spann,  | Refill Request |
|    |        | Gastroenterology at  | RN  [address] [facility]     |                |
|        |        | [place]          | [facility]     |                |
|        |        | [facility]  | [place] [address]  |                |
|        |        |  [address]    |                     |                |
|        |        | [place]          |                     |                |
|        |        | [facility], |                     |                |
|        |        |  [address] [place] [address]           |                     |                |
|        |        | [place], [place]         |                     |                |
|        |        | [address]           |                     |                |
|        |        | [phone]         |                     |                |
+--------+--------+----------------------+---------------------+----------------+
 
 
 
 Social History
 
 
+----------------+-------+-----------+--------+------+
| Tobacco Use    | Types | Packs/Day | Years  | Date |
|                |       |           | Used   |      |
+----------------+-------+-----------+--------+------+
| Never Assessed |       |           |        |      |
+----------------+-------+-----------+--------+------+
 
 
 
+------------------+---------------+
| Sex Assigned at  | Date Recorded |
| Birth            |               |
+------------------+---------------+
 
| Not on file      |               |
+------------------+---------------+
 documented as of this encounter
 
 Plan of Treatment
 Not on filedocumented as of this encounter
 
 Visit Diagnoses
 Not on filedocumented in this encounter

## 2020-10-13 NOTE — XMS
Encounter Summary
  Created on: 10/13/2020
 
 Bonifacio Nila JB
 External Reference #: [id number]
 : [birth year]
 Sex: Female
 
 Demographics
 
 
+-----------------------+------------------------+
| Address               | [address]            |
|                       | [place]  [address]   |
+-----------------------+------------------------+
| Home Phone            | [phone]        |
+-----------------------+------------------------+
| Preferred Language    | Unknown                |
+-----------------------+------------------------+
| Marital Status        | Single                 |
+-----------------------+------------------------+
| Jainism Affiliation | Unknown                |
+-----------------------+------------------------+
| Race                  | White                  |
+-----------------------+------------------------+
| Ethnic Group          | Not  or  |
+-----------------------+------------------------+
 
 
 Author
 
 
+--------------+-------------+
| Organization | Unknown     |
+--------------+-------------+
| Address      | Unknown     |
+--------------+-------------+
| Phone        | Unavailable |
+--------------+-------------+
 
 
 
 Support
 
 
+-----------------+--------------+---------+-----------------+
| Name            | Relationship | Address | Phone           |
+-----------------+--------------+---------+-----------------+
| Kit Valdovinos   | ECON         | Unknown | [phone] |
+-----------------+--------------+---------+-----------------+
| Magdalena Valdovinos | ECON         | Unknown | [phone] |
+-----------------+--------------+---------+-----------------+
 
 
 
 Care Team Providers
 
 
 
+-----------------------+------+-----------------+
| Care Team Member Name | Role | Phone           |
+-----------------------+------+-----------------+
| Lily Horton MD   | PCP  | [phone] |
+-----------------------+------+-----------------+
 
 
 
 Encounter Details
 
 
+--------+-------------+------------+--------------------+-------------+
| Date   | Type        | Department | Care Team          | Description |
+--------+-------------+------------+--------------------+-------------+
| 10/26/ | Transcribed |            |   Dictation, Other | Transcribed |
| 2000   |             |            |                    |             |
+--------+-------------+------------+--------------------+-------------+
 
 
 
 Social History
 
 
+----------------+-------+-----------+--------+------+
| Tobacco Use    | Types | Packs/Day | Years  | Date |
|                |       |           | Used   |      |
+----------------+-------+-----------+--------+------+
| Never Assessed |       |           |        |      |
+----------------+-------+-----------+--------+------+
 
 
 
+------------------+---------------+
| Sex Assigned at  | Date Recorded |
| Birth            |               |
+------------------+---------------+
| Not on file      |               |
+------------------+---------------+
 documented as of this encounter
 
 Progress Notes
 Interface, Transcription In - 2006  3:05 AM [facility]                                    [place]
[address] [address] [phone] or [phone]
 
 2000
 
 Lily Horton M.D.
 [address] [place] [address]  [id number]
 
 RE:   NILA VALDOVINOS
 MR #: [id number]
 :  2000
 DATE OF TRANSPLANT:  2000
 
 Dear Dr. Horton:
 
 
 I had the pleasure of seeing Nila at the Pediatric Liver Transplant Clinic
 held  at  [facility],  attended  by  Dr. Mcrae   and   Dr. Becca Marsh,
 representatives of the Liver Transplant  team at [facility], in lieu with Dr. Bailey.  As you know, she underwent  liver  transplantation  on  2000, and did well posttransplant.   Since  she  has  been at home, she has
 continued to do fairly well.  The only problem is some diarrhea.  On a good
 day she will have 2 bowel movements a  day,  and on a bad day she will have
 4-5.  She has no fever or blood in the stool, however.
 
 She is on Prograf 5 ml b.i.d. and acyclovir 10 ml q.i.d.
 
 Septra was stopped a few weeks ago because of A LIKELY ALLERGIC REACTION TO
 SEPTRA.  It involved hives.
 
 On exam, she weighs 11.2 kg and measures  78  cm.  Blood pressure is 85/50.
 Pulse was 126.  She is anicteric, active, and playful.  Her abdomen is soft
 and rounded with no mass, tenderness, or organomegaly.  She had some recent
 blood tests, which show transaminases in the 40-50 range.  The remainder of
 the blood test results are very similar  to  the  ones  done a week before.
 Her most recent tacrolimus level is appropriate.   The  most  recent one is
 pending.
 
 In summary, Nila is a 1-1/2-year-old  white  female  who  is  2-1/2 months
 status post liver transplantation for  extrahepatic  biliary  atresia.  She
 has  had a relatively uncomplicated  posttransplant  course  and  is  doing
 fairly well.  We need to begin a pneumocystis  prophylaxis  soon.  She will
 start receiving pentamidine, 1 unit  dose  per  month,  (1 vial) inhalation
 therapy.  We will see if can arrange this to be done in the [facility],
 rather than having the family drive all  the  way to the [facility] for this.
 This should be done for 1 year posttransplant.
 
 For the next 1-1/2 months, the family will have Nila's blood drawn every 2
 weeks.   Assuming  everything is under  control,  then  it  could  be  done
 monthly.  The family will be talking  to  Dr. Bailey soon to firm up plans
 to have Nila followed mostly by you in  the [facility], and to come to
 [facility] either when the situation is  unstable  and,  at  the  very least,
 every 3-6 months for chronic monitoring.
 
 Finally, Becca will work out the details  of which laboratory tests should
 be done and when with the family.   Please  let  me  know  if  you have any
 questions.
 
 Sincerely,
 
 Eagle De Luna M.D.
 Pediatric Gastroenterology
 
 [facility] / 
 [id number] / [id number] / [id number] / [id number]
 D: 10/26/2000
 T: 10/28/2000
 
 cc:
 
 Eagle Mcrae M.D.
 [address] [place] [address]  [id number]
 
 [id number] signed by Interface, Transcription In at 2006  3:05 AM PSTdocume
 
nted in this encounter
 
 Plan of Treatment
 Not on filedocumented as of this encounter
 
 Visit Diagnoses
 Not on filedocumented in this encounter

## 2020-10-13 NOTE — XMS
Encounter Summary
  Created on: 10/13/2020
 
 Bonifacio Nila JB
 External Reference #: [id number]
 : [birth year]
 Sex: Female
 
 Demographics
 
 
+-----------------------+------------------------+
| Address               | [address]            |
|                       | [place]  [address]   |
+-----------------------+------------------------+
| Home Phone            | [phone]        |
+-----------------------+------------------------+
| Preferred Language    | Unknown                |
+-----------------------+------------------------+
| Marital Status        | Single                 |
+-----------------------+------------------------+
| Yazdanism Affiliation | Unknown                |
+-----------------------+------------------------+
| Race                  | White                  |
+-----------------------+------------------------+
| Ethnic Group          | Not  or  |
+-----------------------+------------------------+
 
 
 Author
 
 
+--------------+------------------------------+
| Author       | [facility] |
+--------------+------------------------------+
| Organization | [facility] |
+--------------+------------------------------+
| Address      | Unknown                      |
+--------------+------------------------------+
| Phone        | Unavailable                  |
+--------------+------------------------------+
 
 
 
 Support
 
 
+-----------------+--------------+---------+-----------------+
| Name            | Relationship | Address | Phone           |
+-----------------+--------------+---------+-----------------+
| Kit Valdovinos   | ECON         | Unknown | [phone] |
+-----------------+--------------+---------+-----------------+
| Magdalena Valdovinos | ECON         | Unknown | [phone] |
+-----------------+--------------+---------+-----------------+
 
 
 
 Care Team Providers
 
 
 
+-----------------------+------+-----------------+
| Care Team Member Name | Role | Phone           |
+-----------------------+------+-----------------+
| Lily Horton MD   | PCP  | [phone] |
+-----------------------+------+-----------------+
 
 
 
 Reason for Visit
 
 
+-------------------+--------+----------+
| Reason            | Onset  | Comments |
|                   | Date   |          |
+-------------------+--------+----------+
| Medication Refill | / |          |
|                   |    |          |
+-------------------+--------+----------+
 
 
 
 Encounter Details
 
 
+--------+-----------+----------------------+--------------------+-------------------+
| Date   | Type      | Department           | Care Team          | Description       |
+--------+-----------+----------------------+--------------------+-------------------+
| / | Telephone |   Pediatric          |   Ivis Burkett MD | Medication Refill |
|    |           | Gastroenterology at  |                    |                   |
|        |           | [facility]          |                    |                   |
|        |           | [facility]  |                    |                   |
|        |           |  [address]     |                    |                   |
|        |           | [place]          |                    |                   |
|        |           | [facility], |                    |                   |
|        |           |  [address] [place] [address]           |                    |                   |
|        |           | [place], [place]         |                    |                   |
|        |           | [address]           |                    |                   |
|        |           | [phone]         |                    |                   |
+--------+-----------+----------------------+--------------------+-------------------+
 
 
 
 Social History
 
 
+----------------+-------+-----------+--------+------+
| Tobacco Use    | Types | Packs/Day | Years  | Date |
|                |       |           | Used   |      |
+----------------+-------+-----------+--------+------+
| Never Assessed |       |           |        |      |
+----------------+-------+-----------+--------+------+
 
 
 
+------------------+---------------+
| Sex Assigned at  | Date Recorded |
| Birth            |               |
+------------------+---------------+
 
| Not on file      |               |
+------------------+---------------+
 documented as of this encounter
 
 Miscellaneous Notes
 Telephone Encounter - Shelley Guzman, Irais - 01/15/2008 11:25 AM PST Addended by: TORY HARDWICK on: 1/15/2008 11:25:51 AM
 
   Modules accepted: Orders
 
   Electronically signed by Irais Hardwick Rn at 01/15/2008 11:25 AM PSTTelephone Encounter -
 Irais Hardwick Rn - 01/15/2008 11:16 AM PSTMessage left for mom with Dr. Burkett's input.  Sh
e also requested labs be drawn.  Electronically signed by Irais Hardwick Rn at 01/15/2008 11:
16 AM PSTTelephone Encounter - Ivis Burkett - 2008  5:43 PM PSTWe have to refill her P
rograf , but only for 2 months. She needs to be seen at the upcoming transplant clinic.Elect
ronically signed by Ivis Burkett at 2008  5:43 PM PSTTelephone Encounter - Irais Hardwick Rn - 2008  4:06 PM PSTNila is a post transplant pt that transferred care from [facility] in 2006.  They have not seen Dr. Burkett since that time.  Her prescription of 
prograf will run out on .  Advised I will need to check with Dr. Burkett to see if she wou
ld like labs before refilling medication.  Her last labs were drawn in 10-07.  She is curren
tly taking 1 mg bid. The transplant team had told mom she needs to be seen once yearly.  Neeru shipman had an acute illness when they were here last.  Dr. Burkett may request they come in for fo
llow up.  Will get input from her and call back. Mom agrees with plan.
 
 Electronically signed by Irais Hardwick Rn at 2008  4:06 PM PSTdocumented in this enco
unter
 
 Plan of Treatment
 Not on filedocumented as of this encounter
 
 Visit Diagnoses
 Not on filedocumented in this encounter

## 2020-10-13 NOTE — EKG
[facility]
                                    [address]
                                  [place], [place]  [address]
_________________________________________________________________________________________
                                                                 Signed   
 
 
Normal sinus rhythm
Normal ECG
When compared with ECG of 19-JUN-2020 23:26,
No significant change was found
Confirmed by CONNIE TOMLIN DO ([id number]) on 10/13/2020 8:32:12 PM
 
 
 
 
 
 
 
 
 
 
 
 
 
 
 
 
 
 
 
 
 
 
 
 
 
 
 
 
 
 
 
 
 
 
 
 
 
 
 
 
    Electronically Signed By: CONNIE TOMLIN DO  10/13/20 2032
_________________________________________________________________________________________
PATIENT NAME:     ROSA MATHEW LETI                 
MEDICAL RECORD #: [id number]                     Electrocardiogram             
          ACCT #: [id number]  
DATE OF BIRTH:   [birth year]                                       
PHYSICIAN:   CONNIE TOMLNI DO                     REPORT #: [id number]
REPORT IS CONFIDENTIAL AND NOT TO BE RELEASED WITHOUT AUTHORIZATION

## 2020-10-13 NOTE — XMS
Encounter Summary
  Created on: 10/13/2020
 
 Bonifacio Nila JB
 External Reference #: [id number]
 : [birth year]
 Sex: Female
 
 Demographics
 
 
+-----------------------+------------------------+
| Address               | [address]            |
|                       | [place]  [address]   |
+-----------------------+------------------------+
| Home Phone            | [phone]        |
+-----------------------+------------------------+
| Preferred Language    | Unknown                |
+-----------------------+------------------------+
| Marital Status        | Single                 |
+-----------------------+------------------------+
| Hinduism Affiliation | Unknown                |
+-----------------------+------------------------+
| Race                  | White                  |
+-----------------------+------------------------+
| Ethnic Group          | Not  or  |
+-----------------------+------------------------+
 
 
 Author
 
 
+--------------+------------------------------+
| Author       | [facility] |
+--------------+------------------------------+
| Organization | [facility] |
+--------------+------------------------------+
| Address      | Unknown                      |
+--------------+------------------------------+
| Phone        | Unavailable                  |
+--------------+------------------------------+
 
 
 
 Support
 
 
+-----------------+--------------+---------+-----------------+
| Name            | Relationship | Address | Phone           |
+-----------------+--------------+---------+-----------------+
| Kit Valdovinos   | ECON         | Unknown | [phone] |
+-----------------+--------------+---------+-----------------+
| Magdalena Valdovinos | ECON         | Unknown | [phone] |
+-----------------+--------------+---------+-----------------+
 
 
 
 Care Team Providers
 
 
 
+------------------------+------+-----------------+
| Care Team Member Name  | Role | Phone           |
+------------------------+------+-----------------+
| Lily Horton MD | PCP  | [phone] |
+------------------------+------+-----------------+
 
 
 
 Encounter Details
 
 
+--------+-------------+----------------------+----------------------+-------------+
| Date   | Type        | Department           | Care Team            | Description |
+--------+-------------+----------------------+----------------------+-------------+
| / | Documentati |   Pediatric          |   Neema Khalil,   |             |
|    | on          | Gastroenterology at  | MD [address] |             |
|        |             | [facility]          |   [place], [place]       |             |
|        |             | [facility]  | [address]           |             |
|        |             |  [address]    | [phone]         |             |
|        |             | [facility]          | [phone] (Fax)   |             |
|        |             | [facility], |                      |             |
|        |             |  [address] [facility]           |                      |             |
|        |             | [place], [place]         |                      |             |
|        |             | [address]           |                      |             |
|        |             | [phone]         |                      |             |
+--------+-------------+----------------------+----------------------+-------------+
 
 
 
 Social History
 
 
+-------------------+-------+-----------+--------+------+
| Tobacco Use       | Types | Packs/Day | Years  | Date |
|                   |       |           | Used   |      |
+-------------------+-------+-----------+--------+------+
| Passive Smoke     |       |           |        |      |
| Exposure - Never  |       |           |        |      |
| Smoker            |       |           |        |      |
+-------------------+-------+-----------+--------+------+
 
 
 
+---------------------+---+---+---+
| Smokeless Tobacco:  |   |   |   |
| Never Used          |   |   |   |
+---------------------+---+---+---+
 
 
 
+-------------+-------------+---------+----------+
| Alcohol Use | Drinks/Week | oz/Week | Comments |
+-------------+-------------+---------+----------+
| Not Asked   |             |         |          |
+-------------+-------------+---------+----------+
 
 
 
 
+------------------+---------------+
| Sex Assigned at  | Date Recorded |
| Birth            |               |
+------------------+---------------+
| Not on file      |               |
+------------------+---------------+
 documented as of this encounter
 
 Miscellaneous Notes
 Telephone Encounter - Olivia Qureshi RN - 2015 11:50 AM PSTReceived a call back from 
Ariana at [employer], the last blood draw she has for patient in their system is from
 2014.  Notified Irais Rosa RN.Electronically signed by Olivia Qureshi RN at  11:51 AM PSTTelephone Encounter - Irais Rosa RN - 2015 11:36 AM PSTAn e-
mail was received from Sera Gunn MD, Peds GI Fellow with the [facility].  She met with Nila and her family during the last transplant outreach clinic.  Nila whiting
olled in a study being run by Dr. Gunn. Dr. Gunn is looking for labs related to the study.  
They should have had a CBC, GGT and hepatic function within 4-8 weeks after the  ap
pt.  Called [facility] for results.  Had to leave a voicemail message.  Asked that they 
fax results if available, or call our office to let us know no labs were drawn.Electronicall
y signed by Irais Rosa RN at 2015 11:45 AM PSTdocumented in this encounter
 
 Plan of Treatment
 Not on filedocumented as of this encounter
 
 Visit Diagnoses
 Not on filedocumented in this encounter

## 2020-10-13 NOTE — XMS
Encounter Summary
  Created on: 10/13/2020
 
 Bonifacio Nila JB
 External Reference #: [id number]
 : [birth year]
 Sex: Female
 
 Demographics
 
 
+-----------------------+------------------------+
| Address               | [address]            |
|                       | [place]  [address]   |
+-----------------------+------------------------+
| Home Phone            | [phone]        |
+-----------------------+------------------------+
| Preferred Language    | Unknown                |
+-----------------------+------------------------+
| Marital Status        | Single                 |
+-----------------------+------------------------+
| Temple Affiliation | Unknown                |
+-----------------------+------------------------+
| Race                  | White                  |
+-----------------------+------------------------+
| Ethnic Group          | Not  or  |
+-----------------------+------------------------+
 
 
 Author
 
 
+--------------+------------------------------+
| Author       | [facility] |
+--------------+------------------------------+
| Organization | [facility] |
+--------------+------------------------------+
| Address      | Unknown                      |
+--------------+------------------------------+
| Phone        | Unavailable                  |
+--------------+------------------------------+
 
 
 
 Support
 
 
+-----------------+--------------+---------+-----------------+
| Name            | Relationship | Address | Phone           |
+-----------------+--------------+---------+-----------------+
| Kit Valdovinos   | ECON         | Unknown | [phone] |
+-----------------+--------------+---------+-----------------+
| Magdalena Valdovinos | ECON         | Unknown | [phone] |
+-----------------+--------------+---------+-----------------+
 
 
 
 Care Team Providers
 
 
 
+------------------------+------+-----------------+
| Care Team Member Name  | Role | Phone           |
+------------------------+------+-----------------+
| Lily Horton MD | PCP  | [phone] |
+------------------------+------+-----------------+
 
 
 
 Reason for Visit
 
 
+-------------+--------+----------+
| Reason      | Onset  | Comments |
|             | Date   |          |
+-------------+--------+----------+
| Lab Results | / |          |
|             |    |          |
+-------------+--------+----------+
 
 
 
 Encounter Details
 
 
+--------+-----------+----------------------+----------------------+-------------+
| Date   | Type      | Department           | Care Team            | Description |
+--------+-----------+----------------------+----------------------+-------------+
| / | Telephone |   Pediatric          |   Neema Khalil,   | Lab Results |
|    |           | Gastroenterology at  | MD  [address] |             |
|        |           | [place]          |   [place], [place]       |             |
|        |           | [facility]  | [address]           |             |
|        |           |  [address]     | [phone]         |             |
|        |           | [place]          | [phone] (Fax)   |             |
|        |           | [facility], |                      |             |
|        |           |  [address] [place] [address]           |                      |             |
|        |           | [place], [place]         |                      |             |
|        |           | [address]           |                      |             |
|        |           | [phone]         |                      |             |
+--------+-----------+----------------------+----------------------+-------------+
 
 
 
 Social History
 
 
+-------------------+-------+-----------+--------+------+
| Tobacco Use       | Types | Packs/Day | Years  | Date |
|                   |       |           | Used   |      |
+-------------------+-------+-----------+--------+------+
| Passive Smoke     |       |           |        |      |
| Exposure - Never  |       |           |        |      |
| Smoker            |       |           |        |      |
+-------------------+-------+-----------+--------+------+
 
 
 
+---------------------+---+---+---+
| Smokeless Tobacco:  |   |   |   |
 
| Never Used          |   |   |   |
+---------------------+---+---+---+
 
 
 
+-------------+-------------+---------+----------+
| Alcohol Use | Drinks/Week | oz/Week | Comments |
+-------------+-------------+---------+----------+
| Not Asked   |             |         |          |
+-------------+-------------+---------+----------+
 
 
 
+------------------+---------------+
| Sex Assigned at  | Date Recorded |
| Birth            |               |
+------------------+---------------+
| Not on file      |               |
+------------------+---------------+
 documented as of this encounter
 
 Miscellaneous Notes
 Telephone Encounter - Marianne Barba RN - 2016 10:47 AM PDTThe following email re
ceived from Carmen BARROSO at [facility]:
 Reviewed labs on Nila DONOVAN from 16. Labs look great. We can continue monitoring every 3 m
onths. For her next set due in Sept can we please add a GGT .....  Also, we would like her t
o get an US of the liver with Doppler since she if off immunosuppression before we see her a
t the end of the month. Would you be able to arrange for this?
 
 Call to [facility]- GGT was not drawn.  Our records indicate this is part of the Standin
g Order, [facility] does not show GGT ordered.  Standing Order re-faxed.
 
 Call to pt's mom, informed of lab results and to continue Q 3 month lab draws.  Mom agrees 
to have liver US w/ doppler done locally before  appt.  [facility], mom will call to schedule appt.  Mom verbalized understanding of results a
nd plan.Electronically signed by Marianne Barba RN at 2016 10:58 AM PDTdocumented i
n this encounter
 
 Plan of Treatment
 Not on filedocumented as of this encounter
 
 Visit Diagnoses
 Not on filedocumented in this encounter

## 2020-10-13 NOTE — XMS
Encounter Summary
  Created on: 10/13/2020
 
 Bonifacio Nila JB
 External Reference #: [id number]
 : [birth year]
 Sex: Female
 
 Demographics
 
 
+-----------------------+------------------------+
| Address               | [address]            |
|                       | [place]  [address]   |
+-----------------------+------------------------+
| Home Phone            | [phone]        |
+-----------------------+------------------------+
| Preferred Language    | Unknown                |
+-----------------------+------------------------+
| Marital Status        | Single                 |
+-----------------------+------------------------+
| Sikh Affiliation | Unknown                |
+-----------------------+------------------------+
| Race                  | White                  |
+-----------------------+------------------------+
| Ethnic Group          | Not  or  |
+-----------------------+------------------------+
 
 
 Author
 
 
+--------------+------------------------------+
| Author       | [facility] |
+--------------+------------------------------+
| Organization | [facility] |
+--------------+------------------------------+
| Address      | Unknown                      |
+--------------+------------------------------+
| Phone        | Unavailable                  |
+--------------+------------------------------+
 
 
 
 Support
 
 
+-----------------+--------------+---------+-----------------+
| Name            | Relationship | Address | Phone           |
+-----------------+--------------+---------+-----------------+
| Kit Valdovinos   | ECON         | Unknown | [phone] |
+-----------------+--------------+---------+-----------------+
| Magdalena Valdovinos | ECON         | Unknown | [phone] |
+-----------------+--------------+---------+-----------------+
 
 
 
 Care Team Providers
 
 
 
+------------------------+------+-----------------+
| Care Team Member Name  | Role | Phone           |
+------------------------+------+-----------------+
| Lily Horton MD | PCP  | [phone] |
+------------------------+------+-----------------+
 
 
 
 Reason for Visit
 
 
+-------------------+--------+----------+
| Reason            | Onset  | Comments |
|                   | Date   |          |
+-------------------+--------+----------+
| Care Coordination | / |          |
|                   |    |          |
+-------------------+--------+----------+
 
 
 
 Encounter Details
 
 
+--------+-----------+----------------------+----------------------+-------------------+
| Date   | Type      | Department           | Care Team            | Description       |
+--------+-----------+----------------------+----------------------+-------------------+
| / | Telephone |   Pediatric          |   Neema Khalil,   | Care Coordination |
| 2016   |           | Gastroenterology at  | MD  [address] |                   |
|        |           | [place]          |   [place], [place]       |                   |
|        |           | [facility]  | [address]           |                   |
|        |           |  [address] [facility]     | [phone]         |                   |
|        |           | [place]          | [phone] (Fax)   |                   |
|        |           | [facility], |                      |                   |
|        |           |  [facility] floor           |                      |                   |
|        |           | [place], [place]         |                      |                   |
|        |           | [address]           |                      |                   |
|        |           | [phone]         |                      |                   |
+--------+-----------+----------------------+----------------------+-------------------+
 
 
 
 Social History
 
 
+-------------------+-------+-----------+--------+------+
| Tobacco Use       | Types | Packs/Day | Years  | Date |
|                   |       |           | Used   |      |
+-------------------+-------+-----------+--------+------+
| Passive Smoke     |       |           |        |      |
| Exposure - Never  |       |           |        |      |
| Smoker            |       |           |        |      |
+-------------------+-------+-----------+--------+------+
 
 
 
+---------------------+---+---+---+
| Smokeless Tobacco:  |   |   |   |
 
| Never Used          |   |   |   |
+---------------------+---+---+---+
 
 
 
+-------------+-------------+---------+----------+
| Alcohol Use | Drinks/Week | oz/Week | Comments |
+-------------+-------------+---------+----------+
| Not Asked   |             |         |          |
+-------------+-------------+---------+----------+
 
 
 
+------------------+---------------+
| Sex Assigned at  | Date Recorded |
| Birth            |               |
+------------------+---------------+
| Not on file      |               |
+------------------+---------------+
 documented as of this encounter
 
 Miscellaneous Notes
 Telephone Encounter - Irais Rosa RN - 2016  3:23 PM PDTLeft message on [facility]
, identified by number, asking for Nila to get labs at least 1 week before upcoming [facility].  Orders are on file at [facility] in [place].  Asked mom to
 call back if there are questions or concerns.Electronically signed by Irais Rosa RN at 
2016  3:24 PM PDTdocumented in this encounter
 
 Plan of Treatment
 Not on filedocumented as of this encounter
 
 Visit Diagnoses
 Not on filedocumented in this encounter

## 2020-10-13 NOTE — XMS
Encounter Summary
  Created on: 10/13/2020
 
 Bonifacio Nila JB
 External Reference #: [id number]
 : [birth year]
 Sex: Female
 
 Demographics
 
 
+-----------------------+------------------------+
| Address               | [address]            |
|                       | [place]  [address]   |
+-----------------------+------------------------+
| Home Phone            | [phone]        |
+-----------------------+------------------------+
| Preferred Language    | Unknown                |
+-----------------------+------------------------+
| Marital Status        | Single                 |
+-----------------------+------------------------+
| Confucianist Affiliation | Unknown                |
+-----------------------+------------------------+
| Race                  | White                  |
+-----------------------+------------------------+
| Ethnic Group          | Not  or  |
+-----------------------+------------------------+
 
 
 Author
 
 
+--------------+------------------------------+
| Author       | [facility] |
+--------------+------------------------------+
| Organization | [facility] |
+--------------+------------------------------+
| Address      | Unknown                      |
+--------------+------------------------------+
| Phone        | Unavailable                  |
+--------------+------------------------------+
 
 
 
 Support
 
 
+-----------------+--------------+---------+-----------------+
| Name            | Relationship | Address | Phone           |
+-----------------+--------------+---------+-----------------+
| Kit Valdovinos   | ECON         | Unknown | [phone] |
+-----------------+--------------+---------+-----------------+
| Magdalena Valdovinos | ECON         | Unknown | [phone] |
+-----------------+--------------+---------+-----------------+
 
 
 
 Care Team Providers
 
 
 
+------------------------+------+-----------------+
| Care Team Member Name  | Role | Phone           |
+------------------------+------+-----------------+
| Lily Horton MD | PCP  | [phone] |
+------------------------+------+-----------------+
 
 
 
 Reason for Visit
 
 
+-------------------+--------+------------------+
| Reason            | Onset  | Comments         |
|                   | Date   |                  |
+-------------------+--------+------------------+
| Care Coordination | / | Pedro request |
|                   |    |                  |
+-------------------+--------+------------------+
 
 
 
 Encounter Details
 
 
+--------+-----------+----------------------+----------------------+--------------------+
| Date   | Type      | Department           | Care Team            | Description        |
+--------+-----------+----------------------+----------------------+--------------------+
| / | Telephone |   Pediatric          |   Neema Khalil,   | Care Coordination  |
| 2017   |           | Gastroenterology at  | MD  [address] | ([facility]) |
|        |           | [facility]          |   [place], [place]       |                    |
|        |           | [facility]  | [address]           |                    |
|        |           |  [address]     | [phone]         |                    |
|        |           | [place]          | [phone] (Fax)   |                    |
|        |           | [facility], |                      |                    |
|        |           |  [address] [place] [address]           |                      |                    |
|        |           | [place], [place]         |                      |                    |
|        |           | [address]           |                      |                    |
|        |           | [phone]         |                      |                    |
+--------+-----------+----------------------+----------------------+--------------------+
 
 
 
 Social History
 
 
+-------------------+-------+-----------+--------+------+
| Tobacco Use       | Types | Packs/Day | Years  | Date |
|                   |       |           | Used   |      |
+-------------------+-------+-----------+--------+------+
| Passive Smoke     |       |           |        |      |
| Exposure - Never  |       |           |        |      |
| Smoker            |       |           |        |      |
+-------------------+-------+-----------+--------+------+
 
 
 
+---------------------+---+---+---+
| Smokeless Tobacco:  |   |   |   |
 
| Never Used          |   |   |   |
+---------------------+---+---+---+
 
 
 
+-------------+-------------+---------+----------+
| Alcohol Use | Drinks/Week | oz/Week | Comments |
+-------------+-------------+---------+----------+
| Not Asked   |             |         |          |
+-------------+-------------+---------+----------+
 
 
 
+------------------+---------------+
| Sex Assigned at  | Date Recorded |
| Birth            |               |
+------------------+---------------+
| Not on file      |               |
+------------------+---------------+
 documented as of this encounter
 
 Miscellaneous Notes
 Telephone Encounter - Tri Mcnaamra RN - 2017  4:01 PM PDTReceived the following
 communication from [facility]:
 I called the phone numbers you have provided and was unable to contact the family. 
 I will let you know if we receive any updates. Please let us know if you hear anything as kylah rey.
 
 Thank you,
 Meghan Vallejo, PETERN, RN, PHN
 Registered Nurse
 [facility], [facility] TransplantElectronically ihsan
d by Tri Mcnamara RN at 2017  4:01 PM PDTTelephone Encounter - Tri Mcnamara RN - 2017 11:13 AM PDTAdditional phone numbers provided to [facility] as requested.Elec
tronically signed by Tri Mcnamara RN at 2017 11:13 AM PDTTelephone Encounter - Irais Tay RN - 2017  3:49 PM PDTWe received a message from [facility] asking if Nila has transitioned to an adult provider.  If so, they would like to get the
 name so they can communicate appropriately.
 
 Called mom.  Asked if Nila has transitioned to adult, is planning to transition or if she 
will be following with us for a little while longer.  Mom states they are planning to transi
tion to an adult provider closer to home, but have not had the first appointment yet.  She s
tated the appointment has been scheduled.  Mom did not have the name of the provider or when
 the appointment is with her.  Requested that she let us know so we can get the information 
to [facility] to facilitate a seamless transition.
 
 Mom questions how [facility] follows up with patients seen by adult providers not at [facility].  A
dvised they will be better able to answer this than we are.  We will request the information
 from them and let mom know when we have the information.  Asked that she try to have the na
me and appointment time when we call back in a couple of days.  Mom agreed with plan.
 
 Conveyed this information to [facility].  Suggested it may make things go more smoothly if mo
m heard from them encouraging appointment with adult provider.Electronically signed by Irais Rosa RN at 2017  3:55 PM PDTdocumented in this encounter
 
 Plan of Treatment
 Not on filedocumented as of this encounter
 
 Visit Diagnoses
 Not on filedocumented in this encounter

## 2020-10-13 NOTE — XMS
Encounter Summary
  Created on: 10/13/2020
 
 Bonifacio Nila JB
 External Reference #: [id number]
 : [birth year]
 Sex: Female
 
 Demographics
 
 
+-----------------------+------------------------+
| Address               | [address]            |
|                       | [place]  [address]   |
+-----------------------+------------------------+
| Home Phone            | [phone]        |
+-----------------------+------------------------+
| Preferred Language    | Unknown                |
+-----------------------+------------------------+
| Marital Status        | Single                 |
+-----------------------+------------------------+
| Pentec[employer]alism Affiliation | Unknown                |
+-----------------------+------------------------+
| Race                  | White                  |
+-----------------------+------------------------+
| Ethnic Group          | Not  or  |
+-----------------------+------------------------+
 
 
 Author
 
 
+--------------+------------------------------+
| Author       | [facility] [employer] [facility] |
+--------------+------------------------------+
| Organization | [facility] |
+--------------+------------------------------+
| Address      | Unknown                      |
+--------------+------------------------------+
| Phone        | Unavailable                  |
+--------------+------------------------------+
 
 
 
 Support
 
 
+-----------------+--------------+---------+-----------------+
| Name            | Relationship | Address | Phone           |
+-----------------+--------------+---------+-----------------+
| Kit Valdovinos   | ECON         | Unknown | [phone] |
+-----------------+--------------+---------+-----------------+
| Magdalena Valdovinos | ECON         | Unknown | [phone] |
+-----------------+--------------+---------+-----------------+
 
 
 
 Care Team Providers
 
 
 
+------------------------+------+-----------------+
| Care Team Member Name  | Role | Phone           |
+------------------------+------+-----------------+
| Lily Horton MD | PCP  | [phone] |
+------------------------+------+-----------------+
 
 
 
 Reason for Visit
 
 
+-------------+----------+
| Reason      | Comments |
+-------------+----------+
| Lab Results |          |
+-------------+----------+
 
 
 
 Encounter Details
 
 
+--------+----------+----------------------+----------------------+-------------+
| Date   | Type     | Department           | Care Team            | Description |
+--------+----------+----------------------+----------------------+-------------+
| 10/23/ | Abstract |   Pediatric          |   Neema Khalil,   | Lab Results |
| 2017   |          | Gastroenterology at  | MD  [address]  |             |
|        |          | [place]          |   [place], [place]       |             |
|        |          | [facility]  | [address]           |             |
|        |          |  [address]    | [phone]         |             |
|        |          | [place]          | [phone] (Fax)   |             |
|        |          | [facility], |                      |             |
|        |          |  [address] [place] [address]           |                      |             |
|        |          | [place], [place]         |                      |             |
|        |          | [address]           |                      |             |
|        |          | [phone]         |                      |             |
+--------+----------+----------------------+----------------------+-------------+
 
 
 
 Social History
 
 
+-------------------+-------+-----------+--------+------+
| Tobacco Use       | Types | Packs/Day | Years  | Date |
|                   |       |           | Used   |      |
+-------------------+-------+-----------+--------+------+
| Passive Smoke     |       |           |        |      |
| Exposure - Never  |       |           |        |      |
| Smoker            |       |           |        |      |
+-------------------+-------+-----------+--------+------+
 
 
 
+---------------------+---+---+---+
| Smokeless Tobacco:  |   |   |   |
| Never Used          |   |   |   |
+---------------------+---+---+---+
 
 
 
 
+-------------+-------------+---------+----------+
| Alcohol Use | Drinks/Week | oz/Week | Comments |
+-------------+-------------+---------+----------+
| Not Asked   |             |         |          |
+-------------+-------------+---------+----------+
 
 
 
+------------------+---------------+
| Sex Assigned at  | Date Recorded |
| Birth            |               |
+------------------+---------------+
| Not on file      |               |
+------------------+---------------+
 documented as of this encounter
 
 Plan of Treatment
 Not on filedocumented as of this encounter
 
 Procedures
 
 
+----------------------+--------+------------+----------------------+----------------------+
| Procedure Name       | Priori | Date/Time  | Associated Diagnosis | Comments             |
|                      | ty     |            |                      |                      |
+----------------------+--------+------------+----------------------+----------------------+
| CBC, WITH            | Routin | 10/19/2017 |                      |   Results for this   |
| DIFFERENTIAL         | e      |            |                      | procedure are in the |
|                      |        |            |                      |  results section.    |
+----------------------+--------+------------+----------------------+----------------------+
| COMPLETE METABOLIC   | Routin | 10/19/2017 |                      |   Results for this   |
| SET                  | e      |            |                      | procedure are in the |
| (NA,K,CL,CO2,BUN,CRE |        |            |                      |  results section.    |
| AT,GLUC,CA,AST,ALT,B |        |            |                      |                      |
| ASHWINI TOTAL,ALK        |        |            |                      |                      |
| PHOS,ALB,PROT TOTAL) |        |            |                      |                      |
+----------------------+--------+------------+----------------------+----------------------+
| TACROLIMUS, WHOLE    | Routin | 10/19/2017 |                      |   Results for this   |
| BLOOD                | e      |            |                      | procedure are in the |
|                      |        |            |                      |  results section.    |
+----------------------+--------+------------+----------------------+----------------------+
| GGT, PLASMA          | Routin | 10/19/2017 |                      |   Results for this   |
|                      | e      |            |                      | procedure are in the |
|                      |        |            |                      |  results section.    |
+----------------------+--------+------------+----------------------+----------------------+
| MAGNESIUM, PLASMA    | Routin | 10/19/2017 |                      |   Results for this   |
|                      | e      |            |                      | procedure are in the |
|                      |        |            |                      |  results section.    |
+----------------------+--------+------------+----------------------+----------------------+
 documented in this encounter
 
 Results
 MAGNESIUM, PLASMA (10/19/2017)
 
+-----------+-------+-----------------+------------+--------------+
| Component | Value | Ref Range       | Performed  | Pathologist  |
|           |       |                 | At         | Signature    |
 
+-----------+-------+-----------------+------------+--------------+
| MAGNESIUM | 1.9   | 1.7 - 2.5 mg/dL | [facility]  |              |
|           |       |                 | LAB -      |              |
|           |       |                 | [place]  |              |
+-----------+-------+-----------------+------------+--------------+
 
 
 
+----------------+
| Specimen       |
+----------------+
| Blood - Blood  |
| (substance)    |
+----------------+
 
 
 
+--------------------+----------------------+--------------------+----------------+
| Performing         | Address              | City/State/Zipcode | Phone Number   |
| Organization       |                      |                    |                |
+--------------------+----------------------+--------------------+----------------+
|   [facility]  |   [address] |   [place]    |   [phone] |
| [place]          |                      |                    |                |
+--------------------+----------------------+--------------------+----------------+
 GGT, PLASMA (10/19/2017)
 
+-----------+-------+------------+------------+--------------+
| Component | Value | Ref Range  | Performed  | Pathologist  |
|           |       |            | At         | Signature    |
+-----------+-------+------------+------------+--------------+
| GAMMA     | 14    | 5 - 60 U/L | [facility]  |              |
| GLUTAMYL  |       |            | LAB -      |              |
| TRANS     |       |            | [place]  |              |
+-----------+-------+------------+------------+--------------+
 
 
 
+----------------+
| Specimen       |
+----------------+
| Blood - Blood  |
| (substance)    |
+----------------+
 
 
 
+--------------------+----------------------+--------------------+----------------+
| Performing         | Address              | City/State/Zipcode | Phone Number   |
| Organization       |                      |                    |                |
+--------------------+----------------------+--------------------+----------------+
|   [facility]  |   [address] |   [place], [place]    |   [phone] |
| [place]          |                      |                    |                |
+--------------------+----------------------+--------------------+----------------+
 TACROLIMUS, WHOLE BLOOD (10/19/2017)
 
+-------------+-------+-----------+------------+--------------+
| Component   | Value | Ref Range | Performed  | Pathologist  |
|             |       |           | At         | Signature    |
+-------------+-------+-----------+------------+--------------+
| TACROLIMUS  | <1    | ng/mL     | [facility]  |              |
 
| ()    |       |           | LAB -      |              |
|             |       |           | [place]  |              |
+-------------+-------+-----------+------------+--------------+
 
 
 
+----------------+
| Specimen       |
+----------------+
| Blood - Blood  |
| (substance)    |
+----------------+
 
 
 
+--------------------+----------------------+--------------------+----------------+
| Performing         | Address              | City/State/Zipcode | Phone Number   |
| Organization       |                      |                    |                |
+--------------------+----------------------+--------------------+----------------+
|   [facility]  |   [address] |   [place], [place]    |   [phone] |
| [place]          |                      |                    |                |
+--------------------+----------------------+--------------------+----------------+
 COMPLETE METABOLIC SET (NA,K,CL,CO2,BUN,CREAT,GLUC,CA,AST,ALT,BILI TOTAL,ALK PHOS,ALB,PROT 
TOTAL) (10/19/2017)
 
+-------------+-------+-----------------+------------+--------------+
| Component   | Value | Ref Range       | Performed  | Pathologist  |
|             |       |                 | At         | Signature    |
+-------------+-------+-----------------+------------+--------------+
| GLUCOSE,    | 83    | 70 - 100 mg/dL  | [facility]  |              |
| PLASMA      |       |                 | LAB -      |              |
| (LAB)       |       |                 | [place]  |              |
+-------------+-------+-----------------+------------+--------------+
| BUN, PLASMA | 13    | 6 - 23 mg/dL    | [facility]  |              |
|  (LAB)      |       |                 | LAB -      |              |
|             |       |                 | [place]  |              |
+-------------+-------+-----------------+------------+--------------+
| CREATININE  | 0.74  | 0.6 - 1.26      | [facility]  |              |
| PLASMA      |       | mg/dL           | LAB -      |              |
| (LAB)       |       |                 | [place]  |              |
+-------------+-------+-----------------+------------+--------------+
| TOTAL       | 7.7   | 6 - 8 g/dL      | [facility]  |              |
| PROTEIN,    |       |                 | LAB -      |              |
| PLASMA      |       |                 | [place]  |              |
| (LAB)       |       |                 |            |              |
+-------------+-------+-----------------+------------+--------------+
| ALBUMIN,    | 4.3   | 3.5 - 5 g/dL    | [facility]  |              |
| PLASMA      |       |                 | LAB -      |              |
| (LAB)       |       |                 | [place]  |              |
+-------------+-------+-----------------+------------+--------------+
| CALCIUM,    | 10.1  | 8.4 - 10.2      | [facility]  |              |
| PLASMA      |       | mg/dL           | LAB -      |              |
| (LAB)       |       |                 | [place]  |              |
+-------------+-------+-----------------+------------+--------------+
| BILIRUBIN   | 0.4   | 0 - 1.2 mg/dL   | [facility]  |              |
| TOTAL       |       |                 | LAB -      |              |
|             |       |                 | [place]  |              |
+-------------+-------+-----------------+------------+--------------+
| ALK PHOS    | 106   | 42 - 145 U/L    | [facility]  |              |
|             |       |                 | LAB -      |              |
 
|             |       |                 | [place]  |              |
+-------------+-------+-----------------+------------+--------------+
| AST(SGOT)   | 17    | 13 - 39 U/L     | [facility]  |              |
|             |       |                 | LAB -      |              |
|             |       |                 | [place]  |              |
+-------------+-------+-----------------+------------+--------------+
| SODIUM,     | 139   | 132 - 143       | [facility]  |              |
| PLASMA      |       | mmol/L          | LAB -      |              |
| (LAB)       |       |                 | [place]  |              |
+-------------+-------+-----------------+------------+--------------+
| POTASSIUM,  | 3.8   | 3.6 - 5.1       | [facility]  |              |
| PLASMA      |       | mmol/L          | LAB -      |              |
| (LAB)       |       |                 | [place]  |              |
+-------------+-------+-----------------+------------+--------------+
| CHLORIDE,   | 104   | 95 - 112 mmol/L | [facility]  |              |
| PLASMA      |       |                 | LAB -      |              |
| (LAB)       |       |                 | [place]  |              |
+-------------+-------+-----------------+------------+--------------+
| TOTAL CO2,  | 23    | 19 - 31 mmol/L  | [facility]  |              |
| PLASMA      |       |                 | LAB -      |              |
| (LAB)       |       |                 | [place]  |              |
+-------------+-------+-----------------+------------+--------------+
| ALT (SGPT)  | 23    | 7 - 52 U/L      | [facility]  |              |
|             |       |                 | LAB -      |              |
|             |       |                 | [place]  |              |
+-------------+-------+-----------------+------------+--------------+
 
 
 
+----------------+
| Specimen       |
+----------------+
| Blood - Blood  |
| (substance)    |
+----------------+
 
 
 
+--------------------+----------------------+--------------------+----------------+
| Performing         | Address              | City/State/Zipcode | Phone Number   |
| Organization       |                      |                    |                |
+--------------------+----------------------+--------------------+----------------+
|   [facility]  |   [address] |   [place]    |   [phone] |
| [place]          |                      |                    |                |
+--------------------+----------------------+--------------------+----------------+
 CBC, WITH DIFFERENTIAL (10/19/2017)
 
+-------------+-------+-----------------+------------+--------------+
| Component   | Value | Ref Range       | Performed  | Pathologist  |
|             |       |                 | At         | Signature    |
+-------------+-------+-----------------+------------+--------------+
| WHITE CELL  | 8.2   | 4.5 - 11 K/cu   | [facility]  |              |
| COUNT       |       | mm              | LAB -      |              |
|             |       |                 | [place]  |              |
+-------------+-------+-----------------+------------+--------------+
| RED CELL    | 4.91  | 3.8 - 5.1 M/cu  | [facility]  |              |
| COUNT       |       | mm              | LAB -      |              |
|             |       |                 | [place]  |              |
+-------------+-------+-----------------+------------+--------------+
| HEMOGLOBIN  | 14.8  | 12 - 16 g/dL    | [facility]  |              |
 
|             |       |                 | LAB -      |              |
|             |       |                 | [place]  |              |
+-------------+-------+-----------------+------------+--------------+
| HEMATOCRIT  | 43.5  | 35 - 45 %       | [facility]  |              |
|             |       |                 | LAB -      |              |
|             |       |                 | [place]  |              |
+-------------+-------+-----------------+------------+--------------+
| MCV         | 88.7  | 81 - 99 fL      | [facility]  |              |
|             |       |                 | LAB -      |              |
|             |       |                 | [place]  |              |
+-------------+-------+-----------------+------------+--------------+
| MCH         | 30    | 27 - 33 pg      | [facility]  |              |
|             |       |                 | LAB -      |              |
|             |       |                 | [place]  |              |
+-------------+-------+-----------------+------------+--------------+
| MCHC        | 34    | 30 - 36 g/dL    | [facility]  |              |
|             |       |                 | LAB -      |              |
|             |       |                 | [place]  |              |
+-------------+-------+-----------------+------------+--------------+
| PLATELET    | 330   | 140 - 440 K/cu  | [facility]  |              |
| COUNT       |       | mm              | LAB -      |              |
|             |       |                 | [place]  |              |
+-------------+-------+-----------------+------------+--------------+
| NEUTROPHIL  | 61.3  | 39 - 80 %       | [facility]  |              |
| %           |       |                 | LAB -      |              |
|             |       |                 | [place]  |              |
+-------------+-------+-----------------+------------+--------------+
| LYMPHOCYTE  | 26.5  | 24 - 44 %       | [facility]  |              |
| %           |       |                 | LAB -      |              |
|             |       |                 | [place]  |              |
+-------------+-------+-----------------+------------+--------------+
| MONOCYTE %  | 9.8   | 0 - 12 %        | [facility]  |              |
|             |       |                 | LAB -      |              |
|             |       |                 | [place]  |              |
+-------------+-------+-----------------+------------+--------------+
| EOS %       | 1.4   | 0 - 6 %         | [facility]  |              |
|             |       |                 | LAB -      |              |
|             |       |                 | [place]  |              |
+-------------+-------+-----------------+------------+--------------+
| BASO %      | 1     | 0 - 2 %         | [facility]  |              |
|             |       |                 | LAB -      |              |
|             |       |                 | [place]  |              |
+-------------+-------+-----------------+------------+--------------+
| RDW         | 13.6  | 10.5 - 15 %     | [facility]  |              |
|             |       |                 | LAB -      |              |
|             |       |                 | [place]  |              |
+-------------+-------+-----------------+------------+--------------+
 
 
 
+----------------+
| Specimen       |
+----------------+
| Blood - Blood  |
| (substance)    |
+----------------+
 
 
 
+--------------------+----------------------+--------------------+----------------+
 
| Performing         | Address              | City/State/Zipcode | Phone Number   |
| Organization       |                      |                    |                |
+--------------------+----------------------+--------------------+----------------+
|   [facility]  |   [address] |   [place]    |   [phone] |
| [place]          |                      |                    |                |
+--------------------+----------------------+--------------------+----------------+
 documented in this encounter
 
 Visit Diagnoses
 Not on filedocumented in this encounter

## 2020-10-13 NOTE — XMS
Encounter Summary
  Created on: 10/13/2020
 
 Bonifacio Nila JB
 External Reference #: [id number]
 : [birth year]
 Sex: Female
 
 Demographics
 
 
+-----------------------+------------------------+
| Address               | [address]            |
|                       | [place]  [address]   |
+-----------------------+------------------------+
| Home Phone            | [phone]        |
+-----------------------+------------------------+
| Preferred Language    | Unknown                |
+-----------------------+------------------------+
| Marital Status        | Single                 |
+-----------------------+------------------------+
| Quaker Affiliation | Unknown                |
+-----------------------+------------------------+
| Race                  | White                  |
+-----------------------+------------------------+
| Ethnic Group          | Not  or  |
+-----------------------+------------------------+
 
 
 Author
 
 
+--------------+-------------+
| Organization | Unknown     |
+--------------+-------------+
| Address      | Unknown     |
+--------------+-------------+
| Phone        | Unavailable |
+--------------+-------------+
 
 
 
 Support
 
 
+-----------------+--------------+---------+-----------------+
| Name            | Relationship | Address | Phone           |
+-----------------+--------------+---------+-----------------+
| Kit Valdovinos   | ECON         | Unknown | [phone] |
+-----------------+--------------+---------+-----------------+
| Magdalena Valdovinos | ECON         | Unknown | [phone] |
+-----------------+--------------+---------+-----------------+
 
 
 
 Care Team Providers
 
 
 
+-----------------------+------+-----------------+
| Care Team Member Name | Role | Phone           |
+-----------------------+------+-----------------+
| Lily Horton MD   | PCP  | [phone] |
+-----------------------+------+-----------------+
 
 
 
 Encounter Details
 
 
+--------+-------------+------------+--------------------+-------------+
| Date   | Type        | Department | Care Team          | Description |
+--------+-------------+------------+--------------------+-------------+
| / | Transcribed |            |   Dictation, Other | Transcribed |
|    |             |            |                    |             |
+--------+-------------+------------+--------------------+-------------+
 
 
 
 Social History
 
 
+----------------+-------+-----------+--------+------+
| Tobacco Use    | Types | Packs/Day | Years  | Date |
|                |       |           | Used   |      |
+----------------+-------+-----------+--------+------+
| Never Assessed |       |           |        |      |
+----------------+-------+-----------+--------+------+
 
 
 
+------------------+---------------+
| Sex Assigned at  | Date Recorded |
| Birth            |               |
+------------------+---------------+
| Not on file      |               |
+------------------+---------------+
 documented as of this encounter
 
 Progress Notes
 Interface, Transcription In - 10/29/2006  5:04 AM [facility]                                    [place]
[address] [address] [phone] or [phone]
 
 2001
 
 Lily Horton M.D.
 [address] [facility]. [place]. [address] [place]  [address]
 
 RE:   NILA VALDOVINOS
 MR #: [id number]
 
 Dear Dr. Horton:
 
 I had the pleasure of seeing Nila in the Pediatric Liver Transplant Clinic
 
 at [facility] today attended by Dr. Marcos Aguirre of the [place] [facility]
 Team for Dr. Bailey.  As you know,  she  is  a  1-year-old female who is 8
 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   She has had a relatively  uneventful  posttransplant
 course and is doing reasonably well.   The  only  clinical concern today is
 intermittent yet chronic mild diarrhea  and  intestinal  gas which produces
 abdominal discomfort and pain.  Since  the  intake  of fruit juice and milk
 products have been reduced, her diarrhea  is  better  but the gas persists.
 There is no fever, vomiting, weight loss,  or  blood  in the stool, but the
 mother is yet concerned.  No studies have been done.
 
 She is otherwise doing well.  Her present  medications include Prograf 5 mL
 b.i.d. (0.5 mg/mL), acyclovir (200 mg/5mL),  2.75  mL  (110 mg) b.i.d., and
 monthly pentamidine.  She missed the pentamidine last month, so the plan is
 to get it soon.  SHE IS ALLERGIC TO SULFA.
 
 On exam, she weighs 12.92 kg and measures  87.2  cm.   Both  parameters are
 close to the 50th percentile for age and represent normal increases.  Blood
 pressure is 107/52, pulse is 120, and  head  circumference is 50.4 cm.  She
 is slightly pale yet active, alert, and  playful.   She  has no adenopathy.
 Her abdomen is slightly distended, and  her  liver  is  several centimeters
 below the right costal margin, although  it  is soft and not enlarged.  Her
 spleen is palpable (or spleens since  she  has polysplenia), and she has an
 adequate amount of fat and muscle in her extremities.
 
 The  last  laboratory  tests were on  2000.   There  were  no
 significant abnormalities except a slightly  elevated  phosphorus  of  8.0.
 Her tacrolimus level was 7.6.  Faye-Barr  virus serology showed positive
 viral capsule antigen IgG but negative IgM, and negative EBV DNA PCR.
 
 In summary, Nila is a [id number]-tkh  white  female  who  is 8 months status
 post liver transplantation for biliary atresia who is doing quite well.  We
 need to make some changes in her posttransplant management.
 
 The first goal is to get her Prograf level down.  The first step will be to
 decrease her Prograf dose to 4 mL b.i.d.  or 4 mg a day divided b.i.d.  The
 mother will do this today.  In one week,  she  will  need  laboratory tests
 that will include a chemistry panel,  CBC,  and  Prograf  level.  Then, she
 will get labs one month later.  She needs  to  have  labs done monthly.  If
 she has normal laboratory tests for 2  months  after a reduction in Prograf
 dose, then another change can be made.   Her  dose should be decreased by 1
 mg a day which is 1 mL in the morning  and  1  mL at night.  The goal is to
 get her down to 1 to 2 g a day, although  3  g  a  day would be acceptable.
 The Prograf blood level goal is 3 to 5 ng/mL.
 
 Posttransplant patients usually receive  monthly  labs  for  the  first  12
 months following transplant.  During  the  second  year,  if  everything is
 going  well, they can have their laboratory  tests  drawn  every  6  weeks.
 During the third year, if all goes well,  they can have the blood test done
 every other month.  During the third and fourth years and thereafter, every
 third month labs is acceptable, again, given the provision of an uneventful
 course.
 
 When her Prograf dose is down to 3  mg  a  day  (or  3  mL b.i.d.), her EBV
 serology and DNA PCR should be checked  again.  If it is negative, then the
 acyclovir can be discontinued.
 
 Finally, again, when her Prograf dose  is  down  to  3 mg a day, she should
 receive hepatitis A and B vaccines if  they  have  not  already been given.
 Given that when the Prograf dose is  so  elevated,  often  it is associated
 
 with ineffectiveness, and obviously, immunosuppression  patients should not
 receive live virus vaccines such as the measles vaccine.
 
 Nila should be seen again in 6 months  when  the  transplant  team  visits
 [place] again.
 
 Please let me know if you have any questions.
 
 Sincerely,
 
 Eagle De Luna M.D.
 Pediatric Gastroenterology
 
 [facility] / 
 [id number] / [id number] / [id number] / [id number]
 D: 2001
 T: 2001
 
 cc:
 
 Cristo Bailey M.D.
 [address] [facility] [address]
 [place]  [address]
 
 Valentin Covington M.D.
 [address]
 [place]  [address]
 
 Marcos Aguirre M.D.
 [address]
 [place]  [address]
 
 [id number] signed by Interface, Transcription In at 10/29/2006  5:04 AM PSTdocume
nted in this encounter
 
 Plan of Treatment
 Not on filedocumented as of this encounter
 
 Visit Diagnoses
 Not on filedocumented in this encounter

## 2020-10-13 NOTE — XMS
Encounter Summary
  Created on: 10/13/2020
 
 Bonifacio Nila JB
 External Reference #: [id number]
 : [birth year]
 Sex: Female
 
 Demographics
 
 
+-----------------------+------------------------+
| Address               | [address]            |
|                       | [place]  [address]   |
+-----------------------+------------------------+
| Home Phone            | [phone]        |
+-----------------------+------------------------+
| Preferred Language    | Unknown                |
+-----------------------+------------------------+
| Marital Status        | Single                 |
+-----------------------+------------------------+
| Orthodox Affiliation | Unknown                |
+-----------------------+------------------------+
| Race                  | White                  |
+-----------------------+------------------------+
| Ethnic Group          | Not  or  |
+-----------------------+------------------------+
 
 
 Author
 
 
+--------------+------------------------------+
| Author       | [facility] |
+--------------+------------------------------+
| Organization | [facility] |
+--------------+------------------------------+
| Address      | Unknown                      |
+--------------+------------------------------+
| Phone        | Unavailable                  |
+--------------+------------------------------+
 
 
 
 Support
 
 
+-----------------+--------------+---------+-----------------+
| Name            | Relationship | Address | Phone           |
+-----------------+--------------+---------+-----------------+
| Kit Valdovinos   | ECON         | Unknown | [phone] |
+-----------------+--------------+---------+-----------------+
| Magdalena Valdovinos | ECON         | Unknown | [phone] |
+-----------------+--------------+---------+-----------------+
 
 
 
 Care Team Providers
 
 
 
+-----------------------+------+-----------------+
| Care Team Member Name | Role | Phone           |
+-----------------------+------+-----------------+
| Lily Horton MD   | PCP  | [phone] |
+-----------------------+------+-----------------+
 
 
 
 Reason for Visit
 Consultation (Routine)
 
+--------+--------------+---------------+--------------+--------------+---------------+
| Status | Reason       | Specialty     | Diagnoses /  | Referred By  | Referred To   |
|        |              |               | Procedures   | Contact      | Contact       |
+--------+--------------+---------------+--------------+--------------+---------------+
| Closed |   Specialty  | Pediatric     |   Diagnoses  |   [facility]  |
|        | Services     | Gastroenterol |  Liver       | Lily CARY MD | [address]   |
|        | Required     | ogy           | replaced by  |   PEDS       | [address]     |
|        |              |               | transplant   | SPECIALISTS  | [place]   |
|        |              |               | (HCC)        | [facility] [place] | [facility]    |
|        |              |               |              |   [address]   | [facility] |
|        |              |               |              | [address] |  floor        |
|        |              |               |              |  [address]         | [place], [place]  |
|        |              |               |              | [place],   | [address]    |
|        |              |               |              | [place] [address]     | Phone:        |
|        |              |               |              | Phone:       | [phone]  |
|        |              |               |              | [phone] |  Fax:         |
|        |              |               |              |   Fax:       | [phone]  |
|        |              |               |              | [phone] |               |
+--------+--------------+---------------+--------------+--------------+---------------+
 
 
 
 
 Encounter Details
 
 
+--------+-----------+----------------------+--------------------+-------------+
| Date   | Type      | Department           | Care Team          | Description |
+--------+-----------+----------------------+--------------------+-------------+
| 10/12/ | Office    |   Pediatric          |   Ivis Burkett MD |             |
|    | Visit-ECX | Gastroenterology at  |                    |             |
|        |           | [place]          |                    |             |
|        |           | [facility]  |                    |             |
|        |           |  [address]    |                    |             |
|        |           | [place]          |                    |             |
|        |           | [facility], |                    |             |
|        |           |  [address] [facility]           |                    |             |
|        |           | [place], [place]         |                    |             |
|        |           | [address]           |                    |             |
|        |           | [phone]         |                    |             |
+--------+-----------+----------------------+--------------------+-------------+
 
 
 
 Social History
 
 
 
+----------------+-------+-----------+--------+------+
| Tobacco Use    | Types | Packs/Day | Years  | Date |
|                |       |           | Used   |      |
+----------------+-------+-----------+--------+------+
| Never Assessed |       |           |        |      |
+----------------+-------+-----------+--------+------+
 
 
 
+------------------+---------------+
| Sex Assigned at  | Date Recorded |
| Birth            |               |
+------------------+---------------+
| Not on file      |               |
+------------------+---------------+
 documented as of this encounter
 
 Last Filed Vital Signs
 
 
+-------------------+----------------------+----------------------+----------+
| Vital Sign        | Reading              | Time Taken           | Comments |
+-------------------+----------------------+----------------------+----------+
| Blood Pressure    | 120/83               | 10/12/2006  2:15 PM  |          |
|                   |                      | PDT                  |          |
+-------------------+----------------------+----------------------+----------+
| Pulse             | 80                   | 10/12/2006  2:15 PM  |          |
|                   |                      | PDT                  |          |
+-------------------+----------------------+----------------------+----------+
| Temperature       | -                    | -                    |          |
+-------------------+----------------------+----------------------+----------+
| Respiratory Rate  | -                    | -                    |          |
+-------------------+----------------------+----------------------+----------+
| Oxygen Saturation | -                    | -                    |          |
+-------------------+----------------------+----------------------+----------+
| Inhaled Oxygen    | -                    | -                    |          |
| Concentration     |                      |                      |          |
+-------------------+----------------------+----------------------+----------+
| Weight            | 30.8 kg (67 lb 14.4  | 10/12/2006  2:15 PM  |          |
|                   | oz)                  | PDT                  |          |
+-------------------+----------------------+----------------------+----------+
| Height            | 130 cm (4' 3.18")    | 10/12/2006  2:15 PM  |          |
|                   |                      | PDT                  |          |
+-------------------+----------------------+----------------------+----------+
| Body Mass Index   | 18.23                | 10/12/2006  2:15 PM  |          |
|                   |                      | PDT                  |          |
+-------------------+----------------------+----------------------+----------+
 documented in this encounter
 
 Plan of Treatment
 Not on filedocumented as of this encounter
 
 Visit Diagnoses
 Not on filedocumented in this encounter

## 2020-10-13 NOTE — XMS
Encounter Summary
  Created on: 10/13/2020
 
 Bonifacio Nila JB
 External Reference #: [id number]
 : [birth year]
 Sex: Female
 
 Demographics
 
 
+-----------------------+------------------------+
| Address               | [address]            |
|                       | [place]  [address]   |
+-----------------------+------------------------+
| Home Phone            | [phone]        |
+-----------------------+------------------------+
| Preferred Language    | Unknown                |
+-----------------------+------------------------+
| Marital Status        | Single                 |
+-----------------------+------------------------+
| Yarsani Affiliation | Unknown                |
+-----------------------+------------------------+
| Race                  | White                  |
+-----------------------+------------------------+
| Ethnic Group          | Not  or  |
+-----------------------+------------------------+
 
 
 Author
 
 
+--------------+------------------------------+
| Author       | [facility] |
+--------------+------------------------------+
| Organization | [facility] |
+--------------+------------------------------+
| Address      | Unknown                      |
+--------------+------------------------------+
| Phone        | Unavailable                  |
+--------------+------------------------------+
 
 
 
 Support
 
 
+-----------------+--------------+---------+-----------------+
| Name            | Relationship | Address | Phone           |
+-----------------+--------------+---------+-----------------+
| Kit Valdovinos   | ECON         | Unknown | [phone] |
+-----------------+--------------+---------+-----------------+
| Magdalena Valdovinos | ECON         | Unknown | [phone] |
+-----------------+--------------+---------+-----------------+
 
 
 
 Care Team Providers
 
 
 
+------------------------+------+-----------------+
| Care Team Member Name  | Role | Phone           |
+------------------------+------+-----------------+
| Lily Horton MD | PCP  | [phone] |
+------------------------+------+-----------------+
 
 
 
 Reason for Visit
 
 
+-------------------+----------+
| Reason            | Comments |
+-------------------+----------+
| Social Work Notes |          |
+-------------------+----------+
 
 
 
 Encounter Details
 
 
+--------+-------------+----------------------+---------------------+-------------------+
| Date   | Type        | Department           | Care Team           | Description       |
+--------+-------------+----------------------+---------------------+-------------------+
| / | Documentati |   SOCIAL WORK        |   Magdalena Galarza,  | Social Work Notes |
| 2016   | on          | AMBULATORY  [address]   | LCSW  [address]   |                   |
|        |             | [address]  | [address]     |                   |
|        |             |  Mailcode: [address]      | [place], [place]        |                   |
|        |             | [place], [place]         | [address]          |                   |
|        |             | [address]           |                     |                   |
|        |             | [phone]         |                     |                   |
+--------+-------------+----------------------+---------------------+-------------------+
 
 
 
 Social History
 
 
+-------------------+-------+-----------+--------+------+
| Tobacco Use       | Types | Packs/Day | Years  | Date |
|                   |       |           | Used   |      |
+-------------------+-------+-----------+--------+------+
| Passive Smoke     |       |           |        |      |
| Exposure - Never  |       |           |        |      |
| Smoker            |       |           |        |      |
+-------------------+-------+-----------+--------+------+
 
 
 
+---------------------+---+---+---+
| Smokeless Tobacco:  |   |   |   |
| Never Used          |   |   |   |
+---------------------+---+---+---+
 
 
 
+-------------+-------------+---------+----------+
 
| Alcohol Use | Drinks/Week | oz/Week | Comments |
+-------------+-------------+---------+----------+
| Not Asked   |             |         |          |
+-------------+-------------+---------+----------+
 
 
 
+------------------+---------------+
| Sex Assigned at  | Date Recorded |
| Birth            |               |
+------------------+---------------+
| Not on file      |               |
+------------------+---------------+
 documented as of this encounter
 
 Miscellaneous Notes
 Telephone Encounter - Magdalena GalarzaMEENA - 2016 10:51 AM PDT                      
                  Social Work Note
 
 Clinic: GI
 Date: 16
 
 BEN discussed pt with Dr. Khalil who would like to make sure pt continues to be followed onc
e she transitions to adult team. Family had struggled with insurance issues in the past as w
ell as getting lab work done and attending clinic appointments. BEN reviewed the chart and wi
ll continue to monitor until pt turns 17 and transitions to adult team. BEN contacted adult shirley CONTRERAS, Antonia Pichardo to notify her of pt transitioning to adult team in 6 or so months.
 
 Plan: SW will continue to follow the family and remain available to provide ongoing support
 and resources as needed.  
 
 SISSY Aldana, [facility]
 Medical Social Worker
 Pgr: [phone]
 
 Electronically signed by Magdalena Galarza LCSW at 2016 10:53 AM PDTdocumented in this 
encounter
 
 Plan of Treatment
 Not on filedocumented as of this encounter
 
 Visit Diagnoses
 Not on filedocumented in this encounter

## 2020-10-13 NOTE — XMS
Encounter Summary
  Created on: 10/13/2020
 
 Bonifacio Nila JB
 External Reference #: [id number]
 : [birth year]
 Sex: Female
 
 Demographics
 
 
+-----------------------+------------------------+
| Address               | [address]            |
|                       | [place]  [address]   |
+-----------------------+------------------------+
| Home Phone            | [phone]        |
+-----------------------+------------------------+
| Preferred Language    | Unknown                |
+-----------------------+------------------------+
| Marital Status        | Single                 |
+-----------------------+------------------------+
| Temple Affiliation | Unknown                |
+-----------------------+------------------------+
| Race                  | White                  |
+-----------------------+------------------------+
| Ethnic Group          | Not  or  |
+-----------------------+------------------------+
 
 
 Author
 
 
+--------------+------------------------------+
| Author       | [facility] |
+--------------+------------------------------+
| Organization | [facility] |
+--------------+------------------------------+
| Address      | Unknown                      |
+--------------+------------------------------+
| Phone        | Unavailable                  |
+--------------+------------------------------+
 
 
 
 Support
 
 
+-----------------+--------------+---------+-----------------+
| Name            | Relationship | Address | Phone           |
+-----------------+--------------+---------+-----------------+
| Kit Valdovinos   | ECON         | Unknown | [phone] |
+-----------------+--------------+---------+-----------------+
| Magdalena Valdovinos | ECON         | Unknown | [phone] |
+-----------------+--------------+---------+-----------------+
 
 
 
 Care Team Providers
 
 
 
+-----------------------+------+-----------------+
| Care Team Member Name | Role | Phone           |
+-----------------------+------+-----------------+
| Lily Horton MD   | PCP  | [phone] |
+-----------------------+------+-----------------+
 
 
 
 Reason for Visit
 
 
+-------------------+----------+
| Reason            | Comments |
+-------------------+----------+
| Follow-up in      |          |
| outpatient clinic |          |
+-------------------+----------+
 Consultation (Routine)
 
+--------+--------------+---------------+--------------+--------------+---------------+
| Status | Reason       | Specialty     | Diagnoses /  | Referred By  | Referred To   |
|        |              |               | Procedures   | Contact      | Contact       |
+--------+--------------+---------------+--------------+--------------+---------------+
| Closed |   Specialty  | Pediatric     |   Diagnoses  |   [facility]    |   Ped Gastro  |
|        | Services     | Gastroenterol |  Liver       | Lily CARY MD | [address]   |
|        | Required     | ogy           | replaced by  |   PEDS       | [address]     |
|        |              |               | transplant   | SPECIALISTS  | [facility]   |
|        |              |               | (HCC)        | [facility] [place] | [facility]    |
|        |              |               |              |   [address]   | [facility] |
|        |              |               |              | [address] |  floor        |
|        |              |               |              |  [address]         | [place], [place]  |
|        |              |               |              | [place],   | [address]    |
|        |              |               |              | [place] [address]     | Phone:        |
|        |              |               |              | Phone:       | [phone]  |
|        |              |               |              | [phone] |  Fax:         |
|        |              |               |              |   Fax:       | [phone]  |
|        |              |               |              | [phone] |               |
+--------+--------------+---------------+--------------+--------------+---------------+
 
 
 
 
 Encounter Details
 
 
+--------+---------+----------------------+--------------------+---------------------+
| Date   | Type    | Department           | Care Team          | Description         |
+--------+---------+----------------------+--------------------+---------------------+
| / | Office  |   Pediatric          |   Ivis Burkett MD | Transplanted Liver  |
|    | Visit   | Gastroenterology at  |                    | (HCC) (Primary Dx)  |
|        |         | [facility]          |                    |                     |
|        |         | [facility]  |                    |                     |
|        |         |  [address] [facility]     |                    |                     |
|        |         | [place]          |                    |                     |
|        |         | [facility], |                    |                     |
|        |         |  [address] [facility]           |                    |                     |
|        |         | [place], [place]         |                    |                     |
|        |         | [address]           |                    |                     |
 
|        |         | [phone]         |                    |                     |
+--------+---------+----------------------+--------------------+---------------------+
 
 
 
 Social History
 
 
+----------------+-------+-----------+--------+------+
| Tobacco Use    | Types | Packs/Day | Years  | Date |
|                |       |           | Used   |      |
+----------------+-------+-----------+--------+------+
| Never Assessed |       |           |        |      |
+----------------+-------+-----------+--------+------+
 
 
 
+------------------+---------------+
| Sex Assigned at  | Date Recorded |
| Birth            |               |
+------------------+---------------+
| Not on file      |               |
+------------------+---------------+
 documented as of this encounter
 
 Last Filed Vital Signs
 
 
+-------------------+----------------------+----------------------+----------+
| Vital Sign        | Reading              | Time Taken           | Comments |
+-------------------+----------------------+----------------------+----------+
| Blood Pressure    | 126/80               | 2008 12:49 PM  |          |
|                   |                      | PDT                  |          |
+-------------------+----------------------+----------------------+----------+
| Pulse             | 77                   | 2008 12:49 PM  |          |
|                   |                      | PDT                  |          |
+-------------------+----------------------+----------------------+----------+
| Temperature       | -                    | -                    |          |
+-------------------+----------------------+----------------------+----------+
| Respiratory Rate  | -                    | -                    |          |
+-------------------+----------------------+----------------------+----------+
| Oxygen Saturation | -                    | -                    |          |
+-------------------+----------------------+----------------------+----------+
| Inhaled Oxygen    | -                    | -                    |          |
| Concentration     |                      |                      |          |
+-------------------+----------------------+----------------------+----------+
| Weight            | 38.9 kg (85 lb 12.1  | 2008 12:49 PM  |          |
|                   | oz)                  | PDT                  |          |
+-------------------+----------------------+----------------------+----------+
| Height            | 140.6 cm (4' 7.35")  | 2008 12:49 PM  |          |
|                   |                      | PDT                  |          |
+-------------------+----------------------+----------------------+----------+
| Body Mass Index   | 19.68                | 2008 12:49 PM  |          |
|                   |                      | PDT                  |          |
+-------------------+----------------------+----------------------+----------+
 documented in this encounter
 
 Progress Notes
 Ivis Burkett - 2008  1:12 PM PDTFormatting of this note might be different from the o
riginal.
 
 
 Nila Valdovinos is an 8 y.o. female, who is referred by Lily Horton, who returns to [facility] GI clinic for followup of her liver transplant
 
 Patient Active Problem List: 
   VSD (Ventricular Septal Defect)[745.4Y]
     Comment: With polysplenia 
   Aortic Valve Insufficiency[424.1F]
   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
     Comment: For biliary atresia, 2000 at [place]
 
 Current outpatient prescriptions prior to encounter 
 Medication Sig Dispense Refill 
   Tacrolimus (PROGRAF) 1 mg Oral Capsule 1 PO bid  60  1 
 
 No Known Allergies.
 
 HPI: Nila returned to clinic today with her mother. She has been doing very well and has n
ot had any significant illnesses since her last visit, which was in Oct 2006. She has contin
ued to take Prograf but admits to forgetting a dose about every few days. Her mother gives h
er a dose at 5AM but she is at her grandfather's at 5PM and sometimes forgets. 
 
 She did not have a flu shot this year. She changed schools to a private school with only 11
 kids in her class and 50 in the school, and due to decreased exposure, her mother felt she 
didn't need the shot.
 
 She was seen by her cardiologist, Dr Norman, and new aortic insufficiency was noted. Althoug
h her VSD is functionally smaller, it may need closure in the future if the aortic insuffici
ency worsens. She has no exercise restriction but SBE prophylaxis is recommended.
 
 Review of Systems:  
 General:  No  Fevers. Not sure whether EBV PCR was done with last weeks labs
 Ears, Nose and Throat:  No recurrent aphthous ulcers, sore throat
 Respiratory:  No cough, history of pneumonia, or wheezing
 Gastrointestinal:  No diarrhea or abdominal pain except when she is nervous
 Neurologic:  No unusual headaches 
 Skin:  No rashes
 
 grade 3  and runs track, with her best events being the 100 and 1500 M
 
 Physical Examination:
 
 /80, Pulse 77, Ht 140.6 cm (4' 7") (83 %ile), Wt 38.900 kg (85 lbs 12.1 oz) (89 %ile)
, Weight for age(%)  88.63%, BMI for age(%)  86.99%, Length for age(%)  82.81%. 
 
 86.99% of growth percentile based on BMI-for-age.
 
 Appearance: alert, active and in no apparent distress.
 Skin:  turgor normal,  capillary refill brisk, no rashes, petechiae 
 HEENT: normocephalic,PERRLA , no icterus,,nose without discharge, mouth no aphthous lesions
, mucous membranes moist, pharynx unremarkable, tonsils moderate sized without exudate
 Neck: supple, without thyromegaly
 Chest: clear to auscultation bilaterally.
 CV: regular sinus rhythm, normal S1 and S2, no  murmurs.
 Abdomen:,  well healed incisions, soft, no distention, no tenderness to palpation, no rebou
nd , no guarding, no palpable masses, normal bowel sounds, no hepatosplenomegaly
 Musculoskeletal: grossly intact  without clubbing or edema
 Neuro: appropriate interactions, symmetric facies, moves all extremities well
 Nodes: no signficant cervical, supraclavicular, or axillary adenopathy
 
 
 Last labs 08
 AST 28, ALT 24, alk phos 230, bili 0.1/0.8
 Cr 0.5
 GGT9
 Wbc 8.3, hct 40, plt 339
 Prograf level pending
 
 Assessment: Patient Active Problem List: 
   VSD (Ventricular Septal Defect)[745.4Y]
     Comment: With polysplenia 
   Aortic Valve Insufficiency[424.1F]
 
   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
     Comment: For biliary atresia, 2000 at [place]
 
 Doing well. May or may not have had labs in December, but recent labs normal
 
 Plan:  
 1. CBC, primary transplant panel, drug levels-      every  3  months
 2. EBV PCR and serology                                      every   12  months
 3. CMV PCR and serology                                     Not needed
 
 4. Other labs:   
 5. Imaging:       none
 6. Flu shot from PCP- recommend yearly 
 7. Return to transplant clinic about 6 months.
 
 8. .Mother has questions about whether immunosuppression can ever be stopped, and we discus
sed this and they were counselled to be regular with her medication. Advised to get med disp
enser with days of the week to keep at her grandfather's house.
 
 7. change in medications: none
      
 
 Electronically signed by Ivis Burkett at 2008  1:27 PM PDTdocumented in this encounter
 
 Plan of Treatment
 Not on filedocumented as of this encounter
 
 Visit Diagnoses
 
 
+--------------------------------------------------------------------+
| Diagnosis                                                          |
+--------------------------------------------------------------------+
|   Transplanted liver (HCC) - Primary  Liver replaced by transplant |
+--------------------------------------------------------------------+
 documented in this encounter

## 2020-10-13 NOTE — XMS
Clinical Summary
  Created on: 10/13/2020
 
 Nila Valdovinos
 External Reference #: [id number]
 : [birth year]
 Sex: Female
 
 Demographics
 
 
+-----------------------+---------------------------+
| Address               | [address]          |
|                       | [place]  [address] |
+-----------------------+---------------------------+
| Home Phone            | [phone]           |
+-----------------------+---------------------------+
| Preferred Language    | Unknown                   |
+-----------------------+---------------------------+
| Marital Status        | Single                    |
+-----------------------+---------------------------+
| Latter-day Affiliation | Unknown                   |
+-----------------------+---------------------------+
| Race                  | White                     |
+-----------------------+---------------------------+
| Ethnic Group          | Not  or     |
+-----------------------+---------------------------+
 
 
 Author
 
 
+--------------+--------------------------------------------+
| Author       | [facility]  |
|              | and [place]                                |
+--------------+--------------------------------------------+
| Organization | [facility]  |
|              | and [place]                                |
+--------------+--------------------------------------------+
| Address      | Unknown                                    |
+--------------+--------------------------------------------+
| Phone        | Unavailable                                |
+--------------+--------------------------------------------+
 
 
 
 Support
 
 
+-----------------+--------------+-------------------+-----------------+
| Name            | Relationship | Address           | Phone           |
+-----------------+--------------+-------------------+-----------------+
| Magdalena Valdovinos | ECON         | [address]     | [phone] |
|                 |              | [place]   |                 |
|                 |              | [address]        |                 |
+-----------------+--------------+-------------------+-----------------+
 
 
 
 
 Care Team Providers
 
 
+-----------------------+------+-----------------+
| Care Team Member Name | Role | Phone           |
+-----------------------+------+-----------------+
| Augustine Buck DO      | PCP  | [phone] |
+-----------------------+------+-----------------+
 
 
 
 Allergies
 
 
+-------------------+----------------+----------+----------+----------+
| Active Allergy    | Reactions      | Severity | Noted    | Comments |
|                   |                |          | Date     |          |
+-------------------+----------------+----------+----------+----------+
| Sulfa Antibiotics | Swelling, Rash | Medium   | / |          |
|                   |                |          | 19       |          |
+-------------------+----------------+----------+----------+----------+
 
 
 
 Medications
 
 
+----------------------+---------------------+-----------+---------+------+------+-------+
| Medication           | Sig                 | Dispensed | Refills | Star | End  | Statu |
|                      |                     |           |         | t    | Date | s     |
|                      |                     |           |         | Date |      |       |
+----------------------+---------------------+-----------+---------+------+------+-------+
|                      | Inject  into the    |           | 0       | 05/0 |      | Activ |
| medroxyPROGESTERone  | muscle every 3      |           |         | /20 |      | e     |
| Acetate              | (three) months.     |           |         | 19   |      |       |
| (DEPO-PROVERA IM)    |                     |           |         |      |      |       |
+----------------------+---------------------+-----------+---------+------+------+-------+
|   amLODIPine         | Take 5 mg by mouth  |           | 0       | 05/0 |      | Activ |
| (NORVASC) 5 mg       | daily.              |           |         |  |      | e     |
| tablet               |                     |           |         | 19   |      |       |
+----------------------+---------------------+-----------+---------+------+------+-------+
 
 
 
 Active Problems
 
 
+-----------------+------------+
| Problem         | Noted Date |
+-----------------+------------+
| S/P VSD closure | 2016 |
+-----------------+------------+
 
 
 
+------------------------------------------------------------+
|   Overview:   Overview:                                    |
| 8 mm Amplatzer Vascular plug-4 device placement (10/16/14) |
+------------------------------------------------------------+
 
 
 
 
+-------------------------------------------------+------------+
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
+-------------------------------------------------+------------+
| VSD (ventricular septal defect), perimembranous | 2008 |
+-------------------------------------------------+------------+
 
 
 
+-------------------------------------------------------------------+
|   Overview:   Overview:  10/16/2014  S/p 8-mm Amplatzer Vascular  |
| Plug IV, with no significant residual shunting Left atrial        |
| isomerism  LAE (left atrial enlargement)  LVE (left ventricular   |
| enlargement)                                                      |
|LVE (left ventricular enlargement)                                 |
+-------------------------------------------------------------------+
 
 
 
+--------------------+------------+
| Transplanted liver | 10/12/2006 |
+--------------------+------------+
 
 
 
+-------------------------------------------+
|   Overview:   Overview:                   |
| For biliary atresia, 2000 at [place] |
+-------------------------------------------+
 
 
 
 Family History
 
 
+-----------------+----------+------+--------------------+
| Medical History | Relation | Name | Comments           |
+-----------------+----------+------+--------------------+
| Heart disease   | Father   |      | had heart surgery  |
+-----------------+----------+------+--------------------+
| Hypertension    | Father   |      |                    |
+-----------------+----------+------+--------------------+
 
 
 
+----------+------+--------+----------+
| Relation | Name | Status | Comments |
+----------+------+--------+----------+
| Father   |      | Alive  |          |
+----------+------+--------+----------+
| Father   |      |        |          |
+----------+------+--------+----------+
| Mother   |      | Alive  |          |
+----------+------+--------+----------+
 
 
 
 Social History
 
 
 
+--------------+-------+-----------+--------+------+
| Tobacco Use  | Types | Packs/Day | Years  | Date |
|              |       |           | Used   |      |
+--------------+-------+-----------+--------+------+
| Never Smoker |       |           |        |      |
+--------------+-------+-----------+--------+------+
 
 
 
+---------------------+---+---+---+
| Smokeless Tobacco:  |   |   |   |
| Current User        |   |   |   |
+---------------------+---+---+---+
 
 
 
+---------------+-------------+---------+----------+
| Alcohol Use   | Drinks/Week | oz/Week | Comments |
+---------------+-------------+---------+----------+
| Not Currently |             |         |          |
+---------------+-------------+---------+----------+
 
 
 
+------------------+---------------+
| Sex Assigned at  | Date Recorded |
| Birth            |               |
+------------------+---------------+
| Not on file      |               |
+------------------+---------------+
 
 
 
 Last Filed Vital Signs
 
 
+-------------------+------------------+----------------------+----------+
| Vital Sign        | Reading          | Time Taken           | Comments |
+-------------------+------------------+----------------------+----------+
| Blood Pressure    | 108/72           | 2020  9:48 AM  |          |
|                   |                  | PDT                  |          |
+-------------------+------------------+----------------------+----------+
| Pulse             | 95               | 2020  9:48 AM  |          |
|                   |                  | PDT                  |          |
+-------------------+------------------+----------------------+----------+
| Temperature       | -                | -                    |          |
+-------------------+------------------+----------------------+----------+
| Respiratory Rate  | -                | -                    |          |
+-------------------+------------------+----------------------+----------+
| Oxygen Saturation | 100%             | 2020  9:48 AM  |          |
|                   |                  | PDT                  |          |
+-------------------+------------------+----------------------+----------+
| Inhaled Oxygen    | -                | -                    |          |
| Concentration     |                  |                      |          |
+-------------------+------------------+----------------------+----------+
| Weight            | 99.8 kg (220 lb) | 2020  9:48 AM  |          |
|                   |                  | PDT                  |          |
+-------------------+------------------+----------------------+----------+
 
| Height            | 162.6 cm (5' 4") | 2020  9:48 AM  |          |
|                   |                  | PDT                  |          |
+-------------------+------------------+----------------------+----------+
| Body Mass Index   | 37.76            | 2020  9:48 AM  |          |
|                   |                  | PDT                  |          |
+-------------------+------------------+----------------------+----------+
 
 
 
 Plan of Treatment
 
 
+---------------------+-----------+-------+----------+
| Health Maintenance  | Due Date  | Last  | Comments |
|                     |           | Done  |          |
+---------------------+-----------+-------+----------+
| Hepatitis C         | 01/15/199 |       |          |
| Screening           | 9         |       |          |
+---------------------+-----------+-------+----------+
| Well Child Check    | 01/15/200 |       |          |
|                     | 2         |       |          |
+---------------------+-----------+-------+----------+
| Vaccine:            | 01/15/200 |       |          |
| Pneumococcal 19-64  | 5         |       |          |
| (1 of 3 - PCV13)    |           |       |          |
+---------------------+-----------+-------+----------+
| Vaccine: HPV (1 -   | 01/15/201 |       |          |
| Risk 3-dose series) | 0         |       |          |
+---------------------+-----------+-------+----------+
| Vaccine:            | 01/15/201 |       |          |
| Dtap/Tdap/Td (1 -   | 8         |       |          |
| Tdap)               |           |       |          |
+---------------------+-----------+-------+----------+
| Cervical Cancer     | 01/15/202 |       |          |
| Screening (Pap)     | 0         |       |          |
+---------------------+-----------+-------+----------+
| Vaccine: Influenza  |  |       |          |
| (#1)                | 0         |       |          |
+---------------------+-----------+-------+----------+
 
 
 
 Results
 Not on filefrom Last 3 Months
 
 Insurance
 
 
+--------------------+--------+-------------+--------+-------------+---------+------+
| Payer              | Benefi | Subscriber  | Effect | Phone       | Address | Type |
|                    | t Plan | ID          | david    |             |         |      |
|                    |  /     |             | Dates  |             |         |      |
|                    | Group  |             |        |             |         |      |
+--------------------+--------+-------------+--------+-------------+---------+------+
| [employer]      | PACIFI | [id number] | 20 | [phone] |         | PPO  |
|                    | CSOURC |             | 19-Pre | 2           |         |      |
|                    | E      |             | sent   |             |         |      |
|                    | FIRST  |             |        |             |         |      |
|                    | CHOICE |             |        |             |         |      |
+--------------------+--------+-------------+--------+-------------+---------+------+
 
| [employer]       | FIRST  | [id number] |  | [phone] |         | PPO  |
|                    | HEALTH |             | 018-Pr | 7           |         |      |
|                    |  OTHER |             | esent  |             |         |      |
+--------------------+--------+-------------+--------+-------------+---------+------+
| [employer]  | PHP    | [id number] | 20 | [phone] |         | PPO  |
| PLAN               | PEBB   |             | 19-Pre | 5           |         |      |
|                    | STATEW |             | sent   |             |         |      |
|                    | JONY    |             |        |             |         |      |
+--------------------+--------+-------------+--------+-------------+---------+------+
 
 
 
+-----------------+--------+-------------+--------+-------------+----------------------+
| Guarantor Name  | Accoun | Relation to | Date   | Phone       | Billing Address      |
|                 | t Type |  Patient    | of     |             |                      |
|                 |        |             | Birth  |             |                      |
+-----------------+--------+-------------+--------+-------------+----------------------+
| Nila Valdovinos | Person | Self        | [birth year]             |   [address]   |
|                 | al/Fam |             |    | [phone] | [place]        |
|                 | [place]    |             |        | [address] (Home)    | [address]           |
+-----------------+--------+-------------+--------+-------------+----------------------+
| Nila Valdovinos | Person | Self        | [birth year] |             |   [address]   |
|                 | al/Fam |             |    | [address] | [place]        |
|                 | [place]    |             |        | [address] (Home)    | [address]           |
+-----------------+--------+-------------+--------+-------------+----------------------+
 
 
 
 Advance Directives
 
 
+-----------+---------------+----------------+-------------+
| Type      | Date Recorded | Patient        | Explanation |
|           |               | Representative |             |
+-----------+---------------+----------------+-------------+
| Power of  |               |                |             |
|   |               |                |             |
+-----------+---------------+----------------+-------------+
| Advance   |               |                |             |
| Directive |               |                |             |
+-----------+---------------+----------------+-------------+

## 2020-10-13 NOTE — XMS
Encounter Summary
  Created on: 10/13/2020
 
 Bonifacio Nila JB
 External Reference #: [id number]
 : [birth year]
 Sex: Female
 
 Demographics
 
 
+-----------------------+------------------------+
| Address               | [address]            |
|                       | [place]  [address]   |
+-----------------------+------------------------+
| Home Phone            | [phone]        |
+-----------------------+------------------------+
| Preferred Language    | Unknown                |
+-----------------------+------------------------+
| Marital Status        | Single                 |
+-----------------------+------------------------+
| Oriental orthodox Affiliation | Unknown                |
+-----------------------+------------------------+
| Race                  | White                  |
+-----------------------+------------------------+
| Ethnic Group          | Not  or  |
+-----------------------+------------------------+
 
 
 Author
 
 
+--------------+------------------------------+
| Author       | [facility] |
+--------------+------------------------------+
| Organization | [facility] |
+--------------+------------------------------+
| Address      | Unknown                      |
+--------------+------------------------------+
| Phone        | Unavailable                  |
+--------------+------------------------------+
 
 
 
 Support
 
 
+-----------------+--------------+---------+-----------------+
| Name            | Relationship | Address | Phone           |
+-----------------+--------------+---------+-----------------+
| Kit Valdovinos   | ECON         | Unknown | [phone] |
+-----------------+--------------+---------+-----------------+
| Magdalena Valdovinos | ECON         | Unknown | [phone] |
+-----------------+--------------+---------+-----------------+
 
 
 
 Care Team Providers
 
 
 
+-----------------------+------+-------------+
| Care Team Member Name | Role | Phone       |
+-----------------------+------+-------------+
 PCP  | Unavailable |
+-----------------------+------+-------------+
 
 
 
 Reason for Visit
 Office Visit - E/M Services (Routine)
 
+--------+--------------+---------------+--------------+--------------+---------------+
| Status | Reason       | Specialty     | Diagnoses /  | Referred By  | Referred To   |
|        |              |               | Procedures   | Contact      | Contact       |
+--------+--------------+---------------+--------------+--------------+---------------+
| Closed |   Specialty  | Pediatric     |   Diagnoses  |   [facility]   |   Ped         |
|        | Services     | Endocrinology |              | Epic Dept    | Endocrinology |
|        | Required     |               | Complication |              |  [address]  |
|        |              |               | s of         |              | [address]      |
|        |              |               | transplanted |              | [address]   |
|        |              |               |  liver       |              | [place]  |
|        |              |               | Liver        |              | [address]    |
|        |              |               | replaced by  |              | Phone:        |
|        |              |               | transplant   |              | [phone]  |
|        |              |               | (HCC)        |              |  Fax:         |
|        |              |               | Procedures   |              | [phone]  |
|        |              |               | [place]           |              |               |
|        |              |               | OFFICE/OUTPT |              |               |
|        |              |               |              |              |               |
|        |              |               | VISIT,EST,LE |              |               |
|        |              |               | VL II  [place]    |              |               |
|        |              |               | EST PATIENT  |              |               |
|        |              |               | LEVEL V      |              |               |
+--------+--------------+---------------+--------------+--------------+---------------+
 
 
 
 
 Encounter Details
 
 
+--------+---------+----------------------+----------------------+---------------------+
| Date   | Type    | Department           | Care Team            | Description         |
+--------+---------+----------------------+----------------------+---------------------+
| / | Office  |   Specialty Clinics  |   Neema Khalil,   | Transplanted liver  |
|    | Visit   | at [facility]  [address]       | MD  [address] | (HCC) (Primary Dx)  |
|        |         | [address]            |   [place], [place]       |                     |
|        |         | [place]          | [address]           |                     |
|        |         | [facility], | [phone]         |                     |
|        |         |  [address]th floor           | [phone] (Fax)   |                     |
|        |         | [place], [place]         |                      |                     |
|        |         | [address]           |                      |                     |
|        |         | [phone]         |                      |                     |
+--------+---------+----------------------+----------------------+---------------------+
 
 
 
 Social History
 
 
 
+----------------+-------+-----------+--------+------+
| Tobacco Use    | Types | Packs/Day | Years  | Date |
|                |       |           | Used   |      |
+----------------+-------+-----------+--------+------+
| Never Assessed |       |           |        |      |
+----------------+-------+-----------+--------+------+
 
 
 
+------------------+---------------+
| Sex Assigned at  | Date Recorded |
| Birth            |               |
+------------------+---------------+
| Not on file      |               |
+------------------+---------------+
 documented as of this encounter
 
 Patient Instructions
 Patient Instructions Neema Khalil MD - 2014  8:22 AM PDTElectronically signed by 
Neema Khalil MD at 2014  7:18 PM PDT
 documented in this encounter
 
 Progress Notes
 Neema Khalil MD - 2014  8:23 AM PDT
 Pt is no show for appt. Our SW called to family as she is lost to follow up since :
 
 "I called and spoke with Nila Valdovinos  s mother Magdalena Valdovinos.  She stated that she did
 not realize Nila still had an appointment today.  She was expecting to do labs locally bef
ore coming here for the appointment.  She said she called us several times but the labs were
 never ordered.  I don  t see any record of her calling, but I also don  t see any order f
or labs.  She wants to reschedule and they need the lab order sent to [facility] lab in [facility].  She also said that the reason they have been absent from clinic for so long was due t
o insurance issues, but they are now insured.  From our phone call it seemed that she was ha
ppy to reschedule".
 
 Plan:
 - send orders for her labs (CBC, CMP, GGT) to local lab
 - schedule a clinic appt with me at [facility]
 - schedule for the next liver transplant clinic (2014)
 - follow compliance issues very closely
 
 YEElectronically signed by Neema Khalil MD at 2014  7:19 PM PDTdocumented in this 
encounter
 
 Plan of Treatment
 Not on filedocumented as of this encounter
 
 Visit Diagnoses
 
 
+--------------------------------------------------------------------+
| Diagnosis                                                          |
+--------------------------------------------------------------------+
|   Transplanted liver (HCC) - Primary  Liver replaced by transplant |
+--------------------------------------------------------------------+
 documented in this encounter

## 2020-10-13 NOTE — XMS
Encounter Summary
  Created on: 10/13/2020
 
 Bonifacio Nila JB
 External Reference #: [id number]
 : [birth year]
 Sex: Female
 
 Demographics
 
 
+-----------------------+------------------------+
| Address               | [address]            |
|                       | [place]  [address]   |
+-----------------------+------------------------+
| Home Phone            | [phone]        |
+-----------------------+------------------------+
| Preferred Language    | Unknown                |
+-----------------------+------------------------+
| Marital Status        | Single                 |
+-----------------------+------------------------+
| Temple Affiliation | Unknown                |
+-----------------------+------------------------+
| Race                  | White                  |
+-----------------------+------------------------+
| Ethnic Group          | Not  or  |
+-----------------------+------------------------+
 
 
 Author
 
 
+--------------+------------------------------+
| Author       | [facility] [employer] [facility] [place] [facility] |
+--------------+------------------------------+
| Organization | [facility] |
+--------------+------------------------------+
| Address      | Unknown                      |
+--------------+------------------------------+
| Phone        | Unavailable                  |
+--------------+------------------------------+
 
 
 
 Support
 
 
+-----------------+--------------+---------+-----------------+
| Name            | Relationship | Address | Phone           |
+-----------------+--------------+---------+-----------------+
| Kit Valdovinos   | ECON         | Unknown | [phone] |
+-----------------+--------------+---------+-----------------+
| Magdalena Valdovinos | ECON         | Unknown | [phone] |
+-----------------+--------------+---------+-----------------+
 
 
 
 Care Team Providers
 
 
 
+------------------------+------+-----------------+
| Care Team Member Name  | Role | Phone           |
+------------------------+------+-----------------+
| Lily Horton MD | PCP  | [phone] |
+------------------------+------+-----------------+
 
 
 
 Reason for Visit
 
 
+----------+--------+------------------------------+
| Reason   | Onset  | Comments                     |
|          | Date   |                              |
+----------+--------+------------------------------+
| Lab Draw | / | past due for transplant labs |
|          |    |                              |
+----------+--------+------------------------------+
 
 
 
 Encounter Details
 
 
+--------+-----------+----------------------+----------------------+----------------------+
| Date   | Type      | Department           | Care Team            | Description          |
+--------+-----------+----------------------+----------------------+----------------------+
| / | Telephone |   Pediatric          |   Neema Khalil,   | Lab Draw (past due   |
| 2015   |           | Gastroenterology at  | MD  [address] | for transplant labs) |
|        |           | [place]          |   [place], [place]       |                      |
|        |           | [facility]  | [address]           |                      |
|        |           |  [address]    | [phone]         |                      |
|        |           | [place]          | [phone] (Fax)   |                      |
|        |           | [facility], |                      |                      |
|        |           |  [facility]           |                      |                      |
|        |           | [facility] [place]         |                      |                      |
|        |           | [address]           |                      |                      |
|        |           | [phone]         |                      |                      |
+--------+-----------+----------------------+----------------------+----------------------+
 
 
 
 Social History
 
 
+-------------------+-------+-----------+--------+------+
| Tobacco Use       | Types | Packs/Day | Years  | Date |
|                   |       |           | Used   |      |
+-------------------+-------+-----------+--------+------+
| Passive Smoke     |       |           |        |      |
| Exposure - Never  |       |           |        |      |
| Smoker            |       |           |        |      |
+-------------------+-------+-----------+--------+------+
 
 
 
+---------------------+---+---+---+
| Smokeless Tobacco:  |   |   |   |
 
| Never Used          |   |   |   |
+---------------------+---+---+---+
 
 
 
+-------------+-------------+---------+----------+
| Alcohol Use | Drinks/Week | oz/Week | Comments |
+-------------+-------------+---------+----------+
| Not Asked   |             |         |          |
+-------------+-------------+---------+----------+
 
 
 
+------------------+---------------+
| Sex Assigned at  | Date Recorded |
| Birth            |               |
+------------------+---------------+
| Not on file      |               |
+------------------+---------------+
 documented as of this encounter
 
 Miscellaneous Notes
 Telephone Encounter - Marianne Barba RN - 2015  9:09 AM PDTReminded mom that pt i
s (past) due for transplant labs (due in May).  Standing lab order current at [facility] 
in [place].
 Mom agrees to take pt next week for lab draw.Electronically signed by Marianne Barba RN 
at 2015  9:11 AM PDTdocumented in this encounter
 
 Plan of Treatment
 Not on filedocumented as of this encounter
 
 Visit Diagnoses
 Not on filedocumented in this encounter

## 2020-10-13 NOTE — XMS
Encounter Summary
  Created on: 10/13/2020
 
 Bonifacio Nila JB
 External Reference #: [id number]
 : [birth year]
 Sex: Female
 
 Demographics
 
 
+-----------------------+------------------------+
| Address               | [address]            |
|                       | [place]  [address]   |
+-----------------------+------------------------+
| Home Phone            | [phone]        |
+-----------------------+------------------------+
| Preferred Language    | Unknown                |
+-----------------------+------------------------+
| Marital Status        | Single                 |
+-----------------------+------------------------+
| [employer] Affiliation | Unknown                |
+-----------------------+------------------------+
| Race                  | White                  |
+-----------------------+------------------------+
| Ethnic Group          | Not  or  |
+-----------------------+------------------------+
 
 
 Author
 
 
+--------------+------------------------------+
| Author       | [facility] [employer] [facility] |
+--------------+------------------------------+
| Organization | [facility] |
+--------------+------------------------------+
| Address      | Unknown                      |
+--------------+------------------------------+
| Phone        | Unavailable                  |
+--------------+------------------------------+
 
 
 
 Support
 
 
+-----------------+--------------+---------+-----------------+
| Name            | Relationship | Address | Phone           |
+-----------------+--------------+---------+-----------------+
| Kit Valdovinos   | ECON         | Unknown | [phone] |
+-----------------+--------------+---------+-----------------+
| Magdalena Valdovinos | ECON         | Unknown | [phone] |
+-----------------+--------------+---------+-----------------+
 
 
 
 Care Team Providers
 
 
 
+------------------------+------+-----------------+
| Care Team Member Name  | Role | Phone           |
+------------------------+------+-----------------+
| Lily Horton MD | PCP  | [phone] |
+------------------------+------+-----------------+
 
 
 
 Encounter Details
 
 
+--------+-------------+----------------------+--------------+-------------+
| Date   | Type        | Department           | Care Team    | Description |
+--------+-------------+----------------------+--------------+-------------+
| / | Document-Sc |   UNKNOWN DEPARTMENT |   Unknown  . |             |
| 2016   | anned       |   [address]        |              |             |
|        |             | [address]      |              |             |
|        |             | [place], [place]         |              |             |
|        |             | [address]           |              |             |
+--------+-------------+----------------------+--------------+-------------+
 
 
 
 Social History
 
 
+-------------------+-------+-----------+--------+------+
| Tobacco Use       | Types | Packs/Day | Years  | Date |
|                   |       |           | Used   |      |
+-------------------+-------+-----------+--------+------+
| Passive Smoke     |       |           |        |      |
| Exposure - Never  |       |           |        |      |
| Smoker            |       |           |        |      |
+-------------------+-------+-----------+--------+------+
 
 
 
+---------------------+---+---+---+
| Smokeless Tobacco:  |   |   |   |
| Never Used          |   |   |   |
+---------------------+---+---+---+
 
 
 
+-------------+-------------+---------+----------+
| Alcohol Use | Drinks/Week | oz/Week | Comments |
+-------------+-------------+---------+----------+
| Not Asked   |             |         |          |
+-------------+-------------+---------+----------+
 
 
 
+------------------+---------------+
| Sex Assigned at  | Date Recorded |
| Birth            |               |
+------------------+---------------+
| Not on file      |               |
+------------------+---------------+
 
 documented as of this encounter
 
 Plan of Treatment
 Not on filedocumented as of this encounter
 
 Procedures
 
 
+----------------+--------+-------------+----------------------+----------------------+
| Procedure Name | Priori | Date/Time   | Associated Diagnosis | Comments             |
|                | ty     |             |                      |                      |
+----------------+--------+-------------+----------------------+----------------------+
| LAB REPORTS    |        | 2016  |                      |   Results for this   |
|                |        | 12:00 AM    |                      | procedure are in the |
|                |        | PDT         |                      |  results section.    |
+----------------+--------+-------------+----------------------+----------------------+
 documented in this encounter
 
 Results
 LAB REPORTS (2016 12:00 AM PDT)
 
+----------------------------------------------------------+--------------+
| Narrative                                                | Performed At |
+----------------------------------------------------------+--------------+
|   This result has an attachment that is not available.   |              |
+----------------------------------------------------------+--------------+
 documented in this encounter
 
 Visit Diagnoses
 Not on filedocumented in this encounter

## 2021-04-06 ENCOUNTER — HOSPITAL ENCOUNTER (EMERGENCY)
Dept: HOSPITAL 46 - ED | Age: 21
Discharge: HOME | End: 2021-04-06
Payer: COMMERCIAL

## 2021-04-06 VITALS — HEIGHT: 66 IN | WEIGHT: 207 LBS | BODY MASS INDEX: 33.27 KG/M2

## 2021-04-06 DIAGNOSIS — Z88.2: ICD-10-CM

## 2021-04-06 DIAGNOSIS — R11.2: Primary | ICD-10-CM

## 2021-04-06 DIAGNOSIS — H83.03: ICD-10-CM

## 2021-04-06 DIAGNOSIS — R19.7: ICD-10-CM

## 2021-04-06 DIAGNOSIS — Z79.899: ICD-10-CM

## 2021-04-06 DIAGNOSIS — Z87.891: ICD-10-CM

## 2021-04-06 DIAGNOSIS — I10: ICD-10-CM

## 2021-04-06 NOTE — EKG
[facility]
                                    [address] [facility]
                                  [place], [place]  [address]
_________________________________________________________________________________________
                                                                 Signed   
 
 
Normal sinus rhythm
Normal ECG
When compared with ECG of 13-OCT-2020 12:04,
No significant change was found
Confirmed by CAROLE FRANCES MD ([id number]) on 4/6/2021 3:14:27 PM
 
 
 
 
 
 
 
 
 
 
 
 
 
 
 
 
 
 
 
 
 
 
 
 
 
 
 
 
 
 
 
 
 
 
 
 
 
 
 
 
    Electronically Signed By: CAROLE FRANCES MD  04/06/21 1514
_________________________________________________________________________________________
PATIENT NAME:     ROSA MATHEW LETI                 
MEDICAL RECORD #: [id number]                     Electrocardiogram             
          ACCT #: [id number]  
DATE OF BIRTH:   [birth year]                                       
PHYSICIAN:   CAROLE FRANCES MD                   REPORT #: [id number]
REPORT IS CONFIDENTIAL AND NOT TO BE RELEASED WITHOUT AUTHORIZATION

## 2021-06-01 NOTE — XMS
[email or web address] message sent, will postpone and will check if patient reads  the message. Encounter Summary
  Created on: 2019
 
 Bonifacio Nila JB
 External Reference #: [id number]
 : [birth year]
 Sex: Female
 
 Demographics
 
 
+-----------------------+------------------------+
| Address               | [address]            |
|                       | [place]  [address]   |
+-----------------------+------------------------+
| Home Phone            | [phone]        |
+-----------------------+------------------------+
| Preferred Language    | Unknown                |
+-----------------------+------------------------+
| Marital Status        | Single                 |
+-----------------------+------------------------+
| [employer] Affiliation | Unknown                |
+-----------------------+------------------------+
| Race                  | White                  |
+-----------------------+------------------------+
| Ethnic Group          | Not  or  |
+-----------------------+------------------------+
 
 
 Author
 
 
+--------------+------------------------------+
| Author       | [facility] |
+--------------+------------------------------+
| Organization | [facility] |
+--------------+------------------------------+
| Address      | Unknown                      |
+--------------+------------------------------+
| Phone        | Unavailable                  |
+--------------+------------------------------+
 
 
 
 Support
 
 
+-----------------+--------------+---------+-----------------+
| Name            | Relationship | Address | Phone           |
+-----------------+--------------+---------+-----------------+
| Kit Valdovinos   | ECON         | Unknown | [phone] |
+-----------------+--------------+---------+-----------------+
| Magdalena Valdovinos | ECON         | Unknown | [phone] |
+-----------------+--------------+---------+-----------------+
 
 
 
 Care Team Providers
 
 
 
+------------------------+------+-----------------+
| Care Team Member Name  | Role | Phone           |
+------------------------+------+-----------------+
| Lily Horton MD | PCP  | [phone] |
+------------------------+------+-----------------+
 
 
 
 Reason for Visit
 
 
+-------------+----------+
| Reason      | Comments |
+-------------+----------+
| Lab Results |          |
+-------------+----------+
 
 
 
 Encounter Details
 
 
+--------+----------+----------------------+----------------------+-------------+
| Date   | Type     | Department           | Care Team            | Description |
+--------+----------+----------------------+----------------------+-------------+
| / | Abstract |   Pediatric          |   Neema Khalil,   | Lab Results |
| 2016   |          | Gastroenterology at  | MD  [address] |             |
|        |          | [facility]          |   [place], [place]       |             |
|        |          | [facility]  | [address]           |             |
|        |          |  [address] | [phone]         |             |
|        |          |  [address]  Mailcode:  | [phone] (Fax)   |             |
|        |          | [facility]   |                      |             |
|        |          | [place], [place]         |                      |             |
|        |          | [address]           |                      |             |
|        |          | [phone]         |                      |             |
+--------+----------+----------------------+----------------------+-------------+
 
 
 
 Social History
 
 
+-------------------+-------+-----------+--------+------+
| Tobacco Use       | Types | Packs/Day | Years  | Date |
|                   |       |           | Used   |      |
+-------------------+-------+-----------+--------+------+
| Passive Smoke     |       |           |        |      |
| Exposure - Never  |       |           |        |      |
| Smoker            |       |           |        |      |
+-------------------+-------+-----------+--------+------+
 
 
 
+---------------------+---+---+---+
| Smokeless Tobacco:  |   |   |   |
| Never Used          |   |   |   |
+---------------------+---+---+---+
 
 
 
 
+-------------+-------------+---------+----------+
| Alcohol Use | Drinks/Week | oz/Week | Comments |
+-------------+-------------+---------+----------+
| Not Asked   |             |         |          |
+-------------+-------------+---------+----------+
 
 
 
+------------------+---------------+
| Sex Assigned at  | Date Recorded |
| Birth            |               |
+------------------+---------------+
| Not on file      |               |
+------------------+---------------+
 
 
 
+----------------+-------------+-------------+
| Job Start Date | Occupation  | Industry    |
+----------------+-------------+-------------+
| Not on file    | Not on file | Not on file |
+----------------+-------------+-------------+
 
 
 
+----------------+--------------+------------+
| Travel History | Travel Start | Travel End |
+----------------+--------------+------------+
 
 
 
+-------------------------------------+
| No recent travel history available. |
+-------------------------------------+
 documented as of this encounter
 
 Plan of Treatment
 Not on filedocumented as of this encounter
 
 Procedures
 
 
+----------------------+--------+------------+----------------------+----------------------+
| Procedure Name       | Priori | Date/Time  | Associated Diagnosis | Comments             |
|                      | ty     |            |                      |                      |
+----------------------+--------+------------+----------------------+----------------------+
| CBC, WITH            | Routin | 2016 |                      |   Results for this   |
| DIFFERENTIAL         | e      |            |                      | procedure are in the |
|                      |        |            |                      |  results section.    |
+----------------------+--------+------------+----------------------+----------------------+
| COMPLETE METABOLIC   | Routin | 2016 |                      |   Results for this   |
| SET                  | e      |            |                      | procedure are in the |
| (NA,K,CL,CO2,BUN,CRE |        |            |                      |  results section.    |
| AT,GLUC,CA,AST,ALT,B |        |            |                      |                      |
| ASHWINI TOTAL,ALK        |        |            |                      |                      |
| PHOS,ALB,PROT TOTAL) |        |            |                      |                      |
+----------------------+--------+------------+----------------------+----------------------+
 documented in this encounter
 
 
 Results
 CBC, WITH DIFFERENTIAL (2016)
 
+-------------+--------+-----------------+------------+--------------+
| Component   | Value  | Ref Range       | Performed  | Pathologist  |
|             |        |                 | At         | Signature    |
+-------------+--------+-----------------+------------+--------------+
| WHITE CELL  | 8.8    | 4.5 - 11 K/cu   | [facility]  |              |
| COUNT       |        | mm              | LAB -      |              |
|             |        |                 | [place]  |              |
+-------------+--------+-----------------+------------+--------------+
| RED CELL    | 4.97   | 3.8 - 5.1 M/cu  | [facility]  |              |
| COUNT       |        | mm              | LAB -      |              |
|             |        |                 | [place]  |              |
+-------------+--------+-----------------+------------+--------------+
| HEMOGLOBIN  | 14.5   | 12 - 16 g/dL    | [facility]  |              |
|             |        |                 | LAB -      |              |
|             |        |                 | [place]  |              |
+-------------+--------+-----------------+------------+--------------+
| HEMATOCRIT  | 43.2   | 35 - 45 %       | [facility]  |              |
|             |        |                 | LAB -      |              |
|             |        |                 | [place]  |              |
+-------------+--------+-----------------+------------+--------------+
| MCV         | 86.9   | 81 - 99 fL      | [facility]  |              |
|             |        |                 | LAB -      |              |
|             |        |                 | [place]  |              |
+-------------+--------+-----------------+------------+--------------+
| MCH         | 29     | 27 - 33 pg      | [facility]  |              |
|             |        |                 | LAB -      |              |
|             |        |                 | [place]  |              |
+-------------+--------+-----------------+------------+--------------+
| MCHC        | 34     | 30 - 36 g/dL    | [facility]  |              |
|             |        |                 | LAB -      |              |
|             |        |                 | [place]  |              |
+-------------+--------+-----------------+------------+--------------+
| PLATELET    | 415    | 140 - 440 K/cu  | [facility]  |              |
| COUNT       |        | mm              | LAB -      |              |
|             |        |                 | [place]  |              |
+-------------+--------+-----------------+------------+--------------+
| NEUTROPHIL  | 65.8   | 39 - 80 %       | [facility]  |              |
| %           |        |                 | LAB -      |              |
|             |        |                 | [place]  |              |
+-------------+--------+-----------------+------------+--------------+
| LYMPHOCYTE  | 22 (A) | 24 - 44 %       | [facility]  |              |
| %           |        |                 | LAB -      |              |
|             |        |                 | [place]  |              |
+-------------+--------+-----------------+------------+--------------+
| MONOCYTE %  | 7.4    | 0 - 12 %        | [facility]  |              |
|             |        |                 | LAB -      |              |
|             |        |                 | [place]  |              |
+-------------+--------+-----------------+------------+--------------+
| EOS %       | 3.9    | 0 - 6 %         | [facility]  |              |
|             |        |                 | LAB -      |              |
|             |        |                 | [place]  |              |
+-------------+--------+-----------------+------------+--------------+
| BASO %      | 0.9    | 0 - 2 %         | [facility]  |              |
|             |        |                 | LAB -      |              |
|             |        |                 | [place]  |              |
+-------------+--------+-----------------+------------+--------------+
 
| RDW         | 14.4   | 10.5 - 15 %     | [facility]  |              |
|             |        |                 | LAB -      |              |
|             |        |                 | [place]  |              |
+-------------+--------+-----------------+------------+--------------+
 
 
 
+---------------+
| Specimen      |
+---------------+
| Blood - Blood |
+---------------+
 
 
 
+--------------------+----------------------+--------------------+----------------+
| Performing         | Address              | City/State/Zipcode | Phone Number   |
| Organization       |                      |                    |                |
+--------------------+----------------------+--------------------+----------------+
|   [facility]  |   [address] | [place], [place]      |   [phone] |
| [place]          |                      |                    |                |
+--------------------+----------------------+--------------------+----------------+
 COMPLETE METABOLIC SET (NA,K,CL,CO2,BUN,CREAT,GLUC,CA,AST,ALT,BILI TOTAL,ALK PHOS,ALB,PROT 
TOTAL) (2016)
 
+-------------+-------+-----------------+------------+--------------+
| Component   | Value | Ref Range       | Performed  | Pathologist  |
|             |       |                 | At         | Signature    |
+-------------+-------+-----------------+------------+--------------+
| GLUCOSE,    | 73    | 70 - 100 mg/dL  | [facility]  |              |
| PLASMA      |       |                 | LAB -      |              |
| (LAB)       |       |                 | [place]  |              |
+-------------+-------+-----------------+------------+--------------+
| BUN, PLASMA | 8     | 6 - 23 mg/dL    | [facility]  |              |
|  (LAB)      |       |                 | LAB -      |              |
|             |       |                 | [place]  |              |
+-------------+-------+-----------------+------------+--------------+
| CREATININE  | 0.61  | 0.6 - 1.2 mg/dL | [facility]  |              |
| PLASMA      |       |                 | LAB -      |              |
| (LAB)       |       |                 | [place]  |              |
+-------------+-------+-----------------+------------+--------------+
| TOTAL       | 7.9   | 6 - 8 g/dL      | [facility]  |              |
| PROTEIN,    |       |                 | LAB -      |              |
| PLASMA      |       |                 | [place]  |              |
| (LAB)       |       |                 |            |              |
+-------------+-------+-----------------+------------+--------------+
| ALBUMIN,    | 4     | 3.5 - 5 g/dL    | [facility]  |              |
| PLASMA      |       |                 | LAB -      |              |
| (LAB)       |       |                 | [place]  |              |
+-------------+-------+-----------------+------------+--------------+
| CALCIUM,    | 9.7   | 8.4 - 10.2      | [facility]  |              |
| PLASMA      |       | mg/dL           | LAB -      |              |
| (LAB)       |       |                 | [place]  |              |
+-------------+-------+-----------------+------------+--------------+
| BILIRUBIN   | 0.3   | 0 - 1.2 mg/dL   | [facility]  |              |
| TOTAL       |       |                 | LAB -      |              |
|             |       |                 | [place]  |              |
+-------------+-------+-----------------+------------+--------------+
| ALK PHOS    | 108   | 30 - 128 U/L    | [facility]  |              |
|             |       |                 | LAB -      |              |
 
|             |       |                 | [place]  |              |
+-------------+-------+-----------------+------------+--------------+
| AST(SGOT)   | 18    | 13 - 39 U/L     | [facility]  |              |
|             |       |                 | LAB -      |              |
|             |       |                 | [place]  |              |
+-------------+-------+-----------------+------------+--------------+
| SODIUM,     | 137   | 132 - 143       | [facility]  |              |
| PLASMA      |       | mmol/L          | LAB -      |              |
| (LAB)       |       |                 | [place]  |              |
+-------------+-------+-----------------+------------+--------------+
| POTASSIUM,  | 4     | 3.6 - 5.1       | [facility]  |              |
| PLASMA      |       | mmol/L          | LAB -      |              |
| (LAB)       |       |                 | [place]  |              |
+-------------+-------+-----------------+------------+--------------+
| CHLORIDE,   | 100   | 95 - 112 mmol/L | [facility]  |              |
| PLASMA      |       |                 | LAB -      |              |
| (LAB)       |       |                 | [place]  |              |
+-------------+-------+-----------------+------------+--------------+
| TOTAL CO2,  | 24    | 19 - 31 mmol/L  | [facility]  |              |
| PLASMA      |       |                 | LAB -      |              |
| (LAB)       |       |                 | [place]  |              |
+-------------+-------+-----------------+------------+--------------+
| ALT (SGPT)  | 18    | 7 - 52 U/L      | [facility]  |              |
|             |       |                 | LAB -      |              |
|             |       |                 | [place]  |              |
+-------------+-------+-----------------+------------+--------------+
 
 
 
+---------------+
| Specimen      |
+---------------+
| Blood - Blood |
+---------------+
 
 
 
+--------------------+----------------------+--------------------+----------------+
| Performing         | Address              | City/State/Zipcode | Phone Number   |
| Organization       |                      |                    |                |
+--------------------+----------------------+--------------------+----------------+
|   [facility]  |   [address] | [place]      |   [phone] |
| [place]          |                      |                    |                |
+--------------------+----------------------+--------------------+----------------+
 documented in this encounter
 
 Visit Diagnoses
 Not on filedocumented in this encounter

## 2021-10-25 ENCOUNTER — HOSPITAL ENCOUNTER (EMERGENCY)
Dept: HOSPITAL 46 - ED | Age: 21
Discharge: HOME | End: 2021-10-25
Payer: COMMERCIAL

## 2021-10-25 VITALS — BODY MASS INDEX: 36.1 KG/M2 | WEIGHT: 224.65 LBS | HEIGHT: 66 IN

## 2021-10-25 DIAGNOSIS — J18.9: Primary | ICD-10-CM

## 2021-10-25 DIAGNOSIS — Z88.2: ICD-10-CM

## 2021-10-25 DIAGNOSIS — Z79.899: ICD-10-CM

## 2021-10-25 DIAGNOSIS — I10: ICD-10-CM

## 2021-10-25 DIAGNOSIS — Z87.891: ICD-10-CM

## 2023-01-03 ENCOUNTER — HOSPITAL ENCOUNTER (EMERGENCY)
Dept: HOSPITAL 46 - ED | Age: 23
Discharge: HOME | End: 2023-01-03
Payer: COMMERCIAL

## 2023-01-03 DIAGNOSIS — R10.32: ICD-10-CM

## 2023-01-03 DIAGNOSIS — Z88.2: ICD-10-CM

## 2023-01-03 DIAGNOSIS — Z79.82: ICD-10-CM

## 2023-01-03 DIAGNOSIS — Z3A.16: ICD-10-CM

## 2023-01-03 DIAGNOSIS — I10: ICD-10-CM

## 2023-01-03 DIAGNOSIS — O99.891: Primary | ICD-10-CM

## 2023-01-03 DIAGNOSIS — Z79.899: ICD-10-CM

## 2023-01-03 DIAGNOSIS — Z87.891: ICD-10-CM

## 2023-03-03 ENCOUNTER — HOSPITAL ENCOUNTER (EMERGENCY)
Dept: HOSPITAL 46 - ED | Age: 23
Discharge: HOME | End: 2023-03-03
Payer: COMMERCIAL

## 2023-03-03 VITALS — WEIGHT: 249.76 LBS | HEIGHT: 65 IN | BODY MASS INDEX: 41.61 KG/M2

## 2023-03-03 DIAGNOSIS — Z87.891: ICD-10-CM

## 2023-03-03 DIAGNOSIS — Z3A.25: ICD-10-CM

## 2023-03-03 DIAGNOSIS — F41.9: ICD-10-CM

## 2023-03-03 DIAGNOSIS — O10.912: ICD-10-CM

## 2023-03-03 DIAGNOSIS — Z79.82: ICD-10-CM

## 2023-03-03 DIAGNOSIS — Z88.2: ICD-10-CM

## 2023-03-03 DIAGNOSIS — O99.342: Primary | ICD-10-CM

## 2023-03-03 DIAGNOSIS — Z79.899: ICD-10-CM

## 2023-03-04 NOTE — EKG
[facility]
                                    [address]
                                  [place], [place]  [address]
_________________________________________________________________________________________
                                                                 Signed   
 
 
Normal sinus rhythm
Minimal voltage criteria for LVH, may be normal variant ( R in aVL )
Borderline ECG
When compared with ECG of 06-APR-2021 13:24,
No significant change was found
Confirmed by BETHEL GREEN MD ([id number]) on 3/4/2023 1:30:33 PM
 
 
 
 
 
 
 
 
 
 
 
 
 
 
 
 
 
 
 
 
 
 
 
 
 
 
 
 
 
 
 
 
 
 
 
 
 
 
 
    Electronically Signed By: BETHEL GREEN MD  03/04/23 1330
_________________________________________________________________________________________
PATIENT NAME:     ROSA MATHEW                 
MEDICAL RECORD #: [id number]                     Electrocardiogram             
          ACCT #: [id number]  
DATE OF BIRTH:   [birth year]                                       
PHYSICIAN:   BETHEL GREEN MD                    REPORT #: [id number]
REPORT IS CONFIDENTIAL AND NOT TO BE RELEASED WITHOUT AUTHORIZATION

## 2024-02-03 NOTE — XMS
Encounter Summary
  Created on: 2019
 
 Bonifacio Nila JB
 External Reference #: [id number]
 : [birth year]
 Sex: Female
 
 Demographics
 
 
+-----------------------+------------------------+
| Address               | [address]            |
|                       | [place]  [address]   |
+-----------------------+------------------------+
| Home Phone            | [phone]        |
+-----------------------+------------------------+
| Preferred Language    | Unknown                |
+-----------------------+------------------------+
| Marital Status        | Single                 |
+-----------------------+------------------------+
| [employer] Affiliation | Unknown                |
+-----------------------+------------------------+
| Race                  | White                  |
+-----------------------+------------------------+
| Ethnic Group          | Not  or  |
+-----------------------+------------------------+
 
 
 Author
 
 
+--------------+------------------------------+
| Author       | [facility] |
+--------------+------------------------------+
| Organization | [facility] |
+--------------+------------------------------+
| Address      | Unknown                      |
+--------------+------------------------------+
| Phone        | Unavailable                  |
+--------------+------------------------------+
 
 
 
 Support
 
 
+-----------------+--------------+---------+-----------------+
| Name            | Relationship | Address | Phone           |
+-----------------+--------------+---------+-----------------+
| Kit Valdovinos   | ECON         | Unknown | [phone] |
+-----------------+--------------+---------+-----------------+
| Magdalena Valdovinos | ECON         | Unknown | [phone] |
+-----------------+--------------+---------+-----------------+
 
 
 
 Care Team Providers
 
 
 
+------------------------+------+-----------------+
| Care Team Member Name  | Role | Phone           |
+------------------------+------+-----------------+
| Lily Horton MD | PCP  | [phone] |
+------------------------+------+-----------------+
 
 
 
 Reason for Visit
 
 
+----------+---------------+
| Reason   | Comments      |
+----------+---------------+
| Lab Draw | Reminder call |
+----------+---------------+
 
 
 
 Encounter Details
 
 
+--------+-----------+----------------------+----------------------+---------------------+
| Date   | Type      | Department           | Care Team            | Description         |
+--------+-----------+----------------------+----------------------+---------------------+
| 10/13/ | Telephone |   Pediatric          |   Neema Khalil,   | Lab Draw (Reminder  |
| 2016   |           | Gastroenterology at  | MD  [address] | call)               |
|        |           | [facility]          |   [place], [place]       |                     |
|        |           | [facility]  | [address]           |                     |
|        |           |  [address] [facility] | [phone]         |                     |
|        |           |  [address]  Mailcode:  | [phone] (Fax)   |                     |
|        |           | [facility]   |                      |                     |
|        |           | [place], [place]         |                      |                     |
|        |           | [address]           |                      |                     |
|        |           | [phone]         |                      |                     |
+--------+-----------+----------------------+----------------------+---------------------+
 
 
 
 Social History
 
 
+-------------------+-------+-----------+--------+------+
| Tobacco Use       | Types | Packs/Day | Years  | Date |
|                   |       |           | Used   |      |
+-------------------+-------+-----------+--------+------+
| Passive Smoke     |       |           |        |      |
| Exposure - Never  |       |           |        |      |
| Smoker            |       |           |        |      |
+-------------------+-------+-----------+--------+------+
 
 
 
+---------------------+---+---+---+
| Smokeless Tobacco:  |   |   |   |
| Never Used          |   |   |   |
+---------------------+---+---+---+
 
 
 
 
+-------------+-------------+---------+----------+
| Alcohol Use | Drinks/Week | oz/Week | Comments |
+-------------+-------------+---------+----------+
| Not Asked   |             |         |          |
+-------------+-------------+---------+----------+
 
 
 
+------------------+---------------+
| Sex Assigned at  | Date Recorded |
| Birth            |               |
+------------------+---------------+
| Not on file      |               |
+------------------+---------------+
 
 
 
+----------------+-------------+-------------+
| Job Start Date | Occupation  | Industry    |
+----------------+-------------+-------------+
| Not on file    | Not on file | Not on file |
+----------------+-------------+-------------+
 
 
 
+----------------+--------------+------------+
| Travel History | Travel Start | Travel End |
+----------------+--------------+------------+
 
 
 
+-------------------------------------+
| No recent travel history available. |
+-------------------------------------+
 documented as of this encounter
 
 Plan of Treatment
 Not on filedocumented as of this encounter
 
 Visit Diagnoses
 Not on filedocumented in this encounter Yes

## 2024-09-27 ENCOUNTER — HOSPITAL ENCOUNTER (EMERGENCY)
Dept: HOSPITAL 46 - ED | Age: 24
Discharge: HOME | End: 2024-09-27
Payer: COMMERCIAL

## 2024-09-27 VITALS — WEIGHT: 257.94 LBS | BODY MASS INDEX: 42.98 KG/M2 | HEIGHT: 65 IN

## 2024-09-27 VITALS — SYSTOLIC BLOOD PRESSURE: 115 MMHG | DIASTOLIC BLOOD PRESSURE: 83 MMHG

## 2024-09-27 DIAGNOSIS — Z87.891: ICD-10-CM

## 2024-09-27 DIAGNOSIS — Z79.899: ICD-10-CM

## 2024-09-27 DIAGNOSIS — I10: ICD-10-CM

## 2024-09-27 DIAGNOSIS — K52.9: Primary | ICD-10-CM

## 2024-09-27 DIAGNOSIS — Z88.2: ICD-10-CM

## 2024-09-27 LAB
ALBUMIN SERPL-MCNC: 4.2 G/DL (ref 3.4–5)
ALBUMIN/GLOB SERPL: 0.86 {RATIO} (ref 1.1–2.4)
ALP SERPL-CCNC: 114 U/L (ref 46–116)
ALT SERPL W P-5'-P-CCNC: 37 U/L (ref 14–59)
ANION GAP SERPL CALCULATED.4IONS-SCNC: 17.6 MMOL/L (ref 7–21)
AST SERPL-CCNC: 17 U/L (ref 15–37)
BASOPHILS NFR BLD AUTO: 0.2 % (ref 0–2)
BUN SERPL-MCNC: 27 MG/DL (ref 7–18)
BUN/CREAT SERPL: 20 (ref 6–28.6)
CALCIUM SERPL-MCNC: 10.3 MG/DL (ref 8.5–10.1)
CHLORIDE SERPL-SCNC: 98 MMOL/L (ref 98–107)
CO2 SERPL-SCNC: 23 MMOL/L (ref 21–32)
DEPRECATED RDW RBC AUTO: 13.3 FL (ref 10.5–15)
EGFRCR SERPLBLD CKD-EPI 2021: 56 ML/MIN (ref 60–?)
EOSINOPHIL NFR BLD AUTO: 0.3 % (ref 0–6)
GLOBULIN SER-MCNC: 4.9 G/DL (ref 1.8–3.5)
HCT VFR BLD AUTO: 50.8 % (ref 35–50)
HGB BLD-MCNC: 17.2 G/DL (ref 12–18)
LYMPHOCYTES NFR BLD AUTO: 8.4 % (ref 24–44)
MAGNESIUM SERPL-MCNC: 1.5 MG/DL (ref 1.8–2.4)
MCH RBC QN AUTO: 30.8 PG (ref 27–36)
MCHC RBC AUTO-ENTMCNC: 33.9 G/DL (ref 30–36)
MCV RBC AUTO: 91 FL (ref 81–99)
MONOCYTES NFR BLD AUTO: 2.3 % (ref 0–12)
NEUTROPHILS NFR BLD AUTO: 88.8 % (ref 39–80)
PLATELET # BLD AUTO: 342 K/UL (ref 140–440)
POTASSIUM SERPL-SCNC: 3.6 MMOL/L (ref 3.5–5.1)
PROT SERPL-MCNC: 9.1 G/DL (ref 6.4–8.2)
RBC # BLD AUTO: 5.58 M/UL (ref 4.3–5.7)

## 2024-09-27 PROCEDURE — A9270 NON-COVERED ITEM OR SERVICE: HCPCS

## 2024-10-18 ENCOUNTER — HOSPITAL ENCOUNTER (EMERGENCY)
Dept: HOSPITAL 46 - ED | Age: 24
LOS: 1 days | Discharge: HOME | End: 2024-10-19
Payer: COMMERCIAL

## 2024-10-18 VITALS — BODY MASS INDEX: 41.87 KG/M2 | HEIGHT: 65 IN | WEIGHT: 251.33 LBS

## 2024-10-18 DIAGNOSIS — Z79.899: ICD-10-CM

## 2024-10-18 DIAGNOSIS — F41.9: ICD-10-CM

## 2024-10-18 DIAGNOSIS — Z87.891: ICD-10-CM

## 2024-10-18 DIAGNOSIS — I10: ICD-10-CM

## 2024-10-18 DIAGNOSIS — K52.9: ICD-10-CM

## 2024-10-18 DIAGNOSIS — Z88.2: ICD-10-CM

## 2024-10-18 DIAGNOSIS — Z79.82: ICD-10-CM

## 2024-10-18 DIAGNOSIS — J32.9: Primary | ICD-10-CM

## 2024-10-18 LAB
ALBUMIN SERPL-MCNC: 3.4 G/DL (ref 3.4–5)
ALBUMIN/GLOB SERPL: 0.77 {RATIO} (ref 1.1–2.4)
ALP SERPL-CCNC: 98 U/L (ref 46–116)
ALT SERPL W P-5'-P-CCNC: 36 U/L (ref 14–59)
ANION GAP SERPL CALCULATED.4IONS-SCNC: 14.1 MMOL/L (ref 7–21)
APTT PPP: 26.8 SEC (ref 22.9–41.3)
AST SERPL-CCNC: 30 U/L (ref 15–37)
BACTERIA #/AREA URNS HPF: (no result) /HPF
BASOPHILS NFR BLD AUTO: 0.3 % (ref 0–2)
BUN SERPL-MCNC: 17 MG/DL (ref 7–18)
BUN/CREAT SERPL: 15.45 (ref 6–28.6)
CALCIUM SERPL-MCNC: 9.1 MG/DL (ref 8.5–10.1)
CASTS #/AREA URNS LPF: (no result) "LPF"
CHLORIDE SERPL-SCNC: 103 MMOL/L (ref 98–107)
CO2 SERPL-SCNC: 26 MMOL/L (ref 21–32)
CRYSTALS URNS MICRO: (no result)
DEPRECATED RDW RBC AUTO: 13.3 FL (ref 10.5–15)
EGFRCR SERPLBLD CKD-EPI 2021: 72 ML/MIN (ref 60–?)
EOSINOPHIL NFR BLD AUTO: 0.8 % (ref 0–6)
EPI CELLS #/AREA URNS HPF: (no result) /LPF
FLUBV RNA RESP QL NAA+PROBE: NEGATIVE
GLOBULIN SER-MCNC: 4.4 G/DL (ref 1.8–3.5)
HCT VFR BLD AUTO: 46.4 % (ref 35–50)
HGB BLD-MCNC: 16.1 G/DL (ref 12–18)
HGB UR QL STRIP: (no result)
INR PPP: 0.99 (ref 0.8–1.3)
KETONES UR QL STRIP: NEGATIVE
LACTATE SERPL-SCNC: 2.7 MMOL/L (ref 0.4–2)
LEUKOCYTE ESTERASE UR QL STRIP: NEGATIVE
LYMPHOCYTES NFR BLD AUTO: 8.2 % (ref 24–44)
MCH RBC QN AUTO: 31 PG (ref 27–36)
MCHC RBC AUTO-ENTMCNC: 34.8 G/DL (ref 30–36)
MCV RBC AUTO: 89.2 FL (ref 81–99)
MONOCYTES NFR BLD AUTO: 4.6 % (ref 0–12)
NEUTROPHILS NFR BLD AUTO: 86.1 % (ref 39–80)
NITRITE UR QL STRIP: NEGATIVE
PLATELET # BLD AUTO: 310 K/UL (ref 140–440)
POTASSIUM SERPL-SCNC: 4.1 MMOL/L (ref 3.5–5.1)
PROT SERPL-MCNC: 7.8 G/DL (ref 6.4–8.2)
PROTHROMBIN TIME: 12.4 SEC (ref 11.2–14.2)
RBC # BLD AUTO: 5.2 M/UL (ref 4.3–5.7)
RSV RNA ISLT QL NAA+PROBE: NEGATIVE
URN SPEC COLLECT METH UR: (no result)

## 2024-10-18 PROCEDURE — U0002 COVID-19 LAB TEST NON-CDC: HCPCS

## 2024-10-18 PROCEDURE — A9270 NON-COVERED ITEM OR SERVICE: HCPCS

## 2024-10-18 NOTE — XMS
PreManage Notification: ROSA MATHEW MRN:[id number]
 
Security Information
 
Security Events
No recent Security Events currently on file
 
 
 
CRITERIA MET
------------
- [facility] - 2 Visits in 30 Days
 
 
CARE PROVIDERS
-------------------------------------------------------------------------------------
-, [facility]     Dentist: General Practice     Current
[place]
 
PHONE: [phone]
-------------------------------------------------------------------------------------
-Bindu-            Dentist: General Practice     [facility]
 
PHONE: [phone]
-------------------------------------------------------------------------------------
 
Meenakshi has no Care Guidelines for this patient.
 
REBEL VISIT COUNT (12 MO.)
-------------------------------------------------------------------------------------
[address] [place] [address]
-------------------------------------------------------------------------------------
TOTAL 3
-------------------------------------------------------------------------------------
NOTE: Visits indicate total known visits.
 
ED/UCC VISIT TRACKING (12 MO.)
-------------------------------------------------------------------------------------
10/18/2024 20:49
[facility] [place]
 
TYPE: Emergency
 
COMPLAINT:
- FLU SYMPTOMS
-------------------------------------------------------------------------------------
09/27/2024 04:24
[facility] [place]
 
TYPE: Emergency
 
COMPLAINT:
- VOMITING
 
DIAGNOSES:
- Allergy status to sulfonamides
- Essential (primary) hypertension
 
- Nausea with vomiting, unspecified
- Noninfective gastroenteritis and colitis, unspecified
- Other long term (current) drug therapy
- Personal history of nicotine dependence
-------------------------------------------------------------------------------------
06/10/2024 17:41
[facility] [place]
 
TYPE: Emergency
 
COMPLAINT:
- ABD PAIN, DIARRHEA
 
DIAGNOSES:
- Allergy status to sulfonamides
- Essential (primary) hypertension
- Noninfective gastroenteritis and colitis, unspecified
- Other long term (current) drug therapy
- Personal history of nicotine dependence
- Upper abdominal pain, unspecified
-------------------------------------------------------------------------------------
 
 
INPATIENT VISIT TRACKING (12 MO.)
No inpatient visits to display in this time frame
 
[email or web address]

## 2024-10-19 VITALS — DIASTOLIC BLOOD PRESSURE: 60 MMHG | SYSTOLIC BLOOD PRESSURE: 89 MMHG

## 2024-10-19 LAB — LACTATE SERPL-SCNC: 1.7 MMOL/L (ref 0.4–2)
